# Patient Record
Sex: MALE | Race: WHITE | Employment: FULL TIME | URBAN - METROPOLITAN AREA
[De-identification: names, ages, dates, MRNs, and addresses within clinical notes are randomized per-mention and may not be internally consistent; named-entity substitution may affect disease eponyms.]

---

## 2019-10-25 ENCOUNTER — ANESTHESIA EVENT (OUTPATIENT)
Dept: OPERATING ROOM | Age: 54
End: 2019-10-25
Payer: COMMERCIAL

## 2019-10-28 ENCOUNTER — ANESTHESIA (OUTPATIENT)
Dept: OPERATING ROOM | Age: 54
End: 2019-10-28
Payer: COMMERCIAL

## 2019-10-29 ENCOUNTER — HOSPITAL ENCOUNTER (OUTPATIENT)
Dept: PREADMISSION TESTING | Age: 54
Discharge: HOME | End: 2019-11-02
Payer: COMMERCIAL

## 2019-10-29 VITALS
DIASTOLIC BLOOD PRESSURE: 93 MMHG | BODY MASS INDEX: 40.43 KG/M2 | HEART RATE: 85 BPM | SYSTOLIC BLOOD PRESSURE: 165 MMHG | OXYGEN SATURATION: 93 % | HEIGHT: 74 IN | WEIGHT: 315 LBS | RESPIRATION RATE: 20 BRPM

## 2019-10-29 LAB
ABSOLUTE EOS #: 0.4 K/UL (ref 0–0.44)
ABSOLUTE IMMATURE GRANULOCYTE: 0.24 K/UL (ref 0–0.3)
ABSOLUTE LYMPH #: 1.26 K/UL (ref 1.1–3.7)
ABSOLUTE MONO #: 1.08 K/UL (ref 0.1–1.2)
ANION GAP SERPL CALCULATED.3IONS-SCNC: 11 MMOL/L (ref 9–17)
BASOPHILS # BLD: 1 % (ref 0–2)
BASOPHILS ABSOLUTE: 0.07 K/UL (ref 0–0.2)
BUN BLDV-MCNC: 21 MG/DL (ref 6–20)
BUN/CREAT BLD: 21 (ref 9–20)
CALCIUM SERPL-MCNC: 9.3 MG/DL (ref 8.6–10.4)
CHLORIDE BLD-SCNC: 100 MMOL/L (ref 98–107)
CO2: 28 MMOL/L (ref 20–31)
CREAT SERPL-MCNC: 0.99 MG/DL (ref 0.7–1.2)
DIFFERENTIAL TYPE: ABNORMAL
EOSINOPHILS RELATIVE PERCENT: 3 % (ref 1–4)
GFR AFRICAN AMERICAN: >60 ML/MIN
GFR NON-AFRICAN AMERICAN: >60 ML/MIN
GFR SERPL CREATININE-BSD FRML MDRD: ABNORMAL ML/MIN/{1.73_M2}
GFR SERPL CREATININE-BSD FRML MDRD: ABNORMAL ML/MIN/{1.73_M2}
GLUCOSE BLD-MCNC: 111 MG/DL (ref 70–99)
HCT VFR BLD CALC: 52.8 % (ref 40.7–50.3)
HEMOGLOBIN: 16.5 G/DL (ref 13–17)
IMMATURE GRANULOCYTES: 2 %
LYMPHOCYTES # BLD: 10 % (ref 24–43)
MCH RBC QN AUTO: 26.7 PG (ref 25.2–33.5)
MCHC RBC AUTO-ENTMCNC: 31.3 G/DL (ref 28.4–34.8)
MCV RBC AUTO: 85.3 FL (ref 82.6–102.9)
MONOCYTES # BLD: 9 % (ref 3–12)
NRBC AUTOMATED: 0 PER 100 WBC
PDW BLD-RTO: 17.9 % (ref 11.8–14.4)
PLATELET # BLD: 231 K/UL (ref 138–453)
PLATELET ESTIMATE: ABNORMAL
PMV BLD AUTO: 9.9 FL (ref 8.1–13.5)
POTASSIUM SERPL-SCNC: 4.4 MMOL/L (ref 3.7–5.3)
RBC # BLD: 6.19 M/UL (ref 4.21–5.77)
RBC # BLD: ABNORMAL 10*6/UL
SEG NEUTROPHILS: 75 % (ref 36–65)
SEGMENTED NEUTROPHILS ABSOLUTE COUNT: 9.55 K/UL (ref 1.5–8.1)
SODIUM BLD-SCNC: 139 MMOL/L (ref 135–144)
WBC # BLD: 12.6 K/UL (ref 3.5–11.3)
WBC # BLD: ABNORMAL 10*3/UL

## 2019-10-29 PROCEDURE — 36415 COLL VENOUS BLD VENIPUNCTURE: CPT

## 2019-10-29 PROCEDURE — 80048 BASIC METABOLIC PNL TOTAL CA: CPT

## 2019-10-29 PROCEDURE — 93005 ELECTROCARDIOGRAM TRACING: CPT | Performed by: ANESTHESIOLOGY

## 2019-10-29 PROCEDURE — 85025 COMPLETE CBC W/AUTO DIFF WBC: CPT

## 2019-10-29 RX ORDER — FEXOFENADINE HCL 180 MG/1
180 TABLET ORAL DAILY
Status: ON HOLD | COMMUNITY
End: 2022-01-07 | Stop reason: HOSPADM

## 2019-10-29 RX ORDER — OMEPRAZOLE 40 MG/1
40 CAPSULE, DELAYED RELEASE ORAL DAILY
Status: ON HOLD | COMMUNITY
End: 2022-01-07 | Stop reason: HOSPADM

## 2019-10-29 RX ORDER — SUMATRIPTAN 100 MG/1
100 TABLET, FILM COATED ORAL
Status: ON HOLD | COMMUNITY
End: 2022-01-07 | Stop reason: SDUPTHER

## 2019-10-29 RX ORDER — FUROSEMIDE 40 MG/1
40 TABLET ORAL DAILY
Status: ON HOLD | COMMUNITY
End: 2022-01-07 | Stop reason: HOSPADM

## 2019-10-29 RX ORDER — MONTELUKAST SODIUM 10 MG/1
10 TABLET ORAL DAILY
Status: ON HOLD | COMMUNITY
End: 2022-01-07 | Stop reason: SDUPTHER

## 2019-10-29 RX ORDER — ALBUTEROL SULFATE 2.5 MG/3ML
2.5 SOLUTION RESPIRATORY (INHALATION) EVERY 6 HOURS PRN
Status: ON HOLD | COMMUNITY
End: 2022-01-07 | Stop reason: SDUPTHER

## 2019-10-30 LAB
EKG ATRIAL RATE: 84 BPM
EKG P AXIS: 50 DEGREES
EKG P-R INTERVAL: 168 MS
EKG Q-T INTERVAL: 344 MS
EKG QRS DURATION: 92 MS
EKG QTC CALCULATION (BAZETT): 406 MS
EKG R AXIS: 20 DEGREES
EKG T AXIS: 48 DEGREES
EKG VENTRICULAR RATE: 84 BPM

## 2019-10-30 PROCEDURE — 93010 ELECTROCARDIOGRAM REPORT: CPT | Performed by: INTERNAL MEDICINE

## 2019-10-31 ENCOUNTER — ANESTHESIA EVENT (OUTPATIENT)
Dept: OPERATING ROOM | Age: 54
End: 2019-10-31
Payer: COMMERCIAL

## 2019-11-01 ENCOUNTER — ANESTHESIA (OUTPATIENT)
Dept: OPERATING ROOM | Age: 54
End: 2019-11-01
Payer: COMMERCIAL

## 2019-11-01 ENCOUNTER — HOSPITAL ENCOUNTER (OUTPATIENT)
Age: 54
Setting detail: OUTPATIENT SURGERY
Discharge: HOME | End: 2019-11-01
Attending: PODIATRIST | Admitting: PODIATRIST
Payer: COMMERCIAL

## 2019-11-01 VITALS
SYSTOLIC BLOOD PRESSURE: 162 MMHG | OXYGEN SATURATION: 93 % | TEMPERATURE: 97.9 F | RESPIRATION RATE: 20 BRPM | HEART RATE: 90 BPM | DIASTOLIC BLOOD PRESSURE: 93 MMHG

## 2019-11-01 VITALS — TEMPERATURE: 97.9 F | OXYGEN SATURATION: 94 % | DIASTOLIC BLOOD PRESSURE: 84 MMHG | SYSTOLIC BLOOD PRESSURE: 147 MMHG

## 2019-11-01 DIAGNOSIS — Z98.890 STATUS POST LEFT FOOT SURGERY: Primary | ICD-10-CM

## 2019-11-01 LAB
GLUCOSE BLD-MCNC: 128 MG/DL (ref 75–110)
GLUCOSE BLD-MCNC: 96 MG/DL (ref 75–110)

## 2019-11-01 PROCEDURE — 7100000001 HC PACU RECOVERY - ADDTL 15 MIN: Performed by: PODIATRIST

## 2019-11-01 PROCEDURE — 82947 ASSAY GLUCOSE BLOOD QUANT: CPT

## 2019-11-01 PROCEDURE — 3600000012 HC SURGERY LEVEL 2 ADDTL 15MIN: Performed by: PODIATRIST

## 2019-11-01 PROCEDURE — 7100000000 HC PACU RECOVERY - FIRST 15 MIN: Performed by: PODIATRIST

## 2019-11-01 PROCEDURE — 2709999900 HC NON-CHARGEABLE SUPPLY: Performed by: PODIATRIST

## 2019-11-01 PROCEDURE — 6360000002 HC RX W HCPCS: Performed by: ANESTHESIOLOGY

## 2019-11-01 PROCEDURE — 2500000003 HC RX 250 WO HCPCS: Performed by: PODIATRIST

## 2019-11-01 PROCEDURE — 7100000010 HC PHASE II RECOVERY - FIRST 15 MIN: Performed by: PODIATRIST

## 2019-11-01 PROCEDURE — 7100000011 HC PHASE II RECOVERY - ADDTL 15 MIN: Performed by: PODIATRIST

## 2019-11-01 PROCEDURE — 3600000002 HC SURGERY LEVEL 2 BASE: Performed by: PODIATRIST

## 2019-11-01 PROCEDURE — 2500000003 HC RX 250 WO HCPCS: Performed by: NURSE ANESTHETIST, CERTIFIED REGISTERED

## 2019-11-01 PROCEDURE — 6360000002 HC RX W HCPCS: Performed by: STUDENT IN AN ORGANIZED HEALTH CARE EDUCATION/TRAINING PROGRAM

## 2019-11-01 PROCEDURE — 2580000003 HC RX 258: Performed by: NURSE ANESTHETIST, CERTIFIED REGISTERED

## 2019-11-01 PROCEDURE — 3700000000 HC ANESTHESIA ATTENDED CARE: Performed by: PODIATRIST

## 2019-11-01 PROCEDURE — 2580000003 HC RX 258: Performed by: ANESTHESIOLOGY

## 2019-11-01 PROCEDURE — 6360000002 HC RX W HCPCS: Performed by: NURSE ANESTHETIST, CERTIFIED REGISTERED

## 2019-11-01 PROCEDURE — C1776 JOINT DEVICE (IMPLANTABLE): HCPCS | Performed by: PODIATRIST

## 2019-11-01 PROCEDURE — 2720000010 HC SURG SUPPLY STERILE: Performed by: PODIATRIST

## 2019-11-01 PROCEDURE — 2580000003 HC RX 258: Performed by: STUDENT IN AN ORGANIZED HEALTH CARE EDUCATION/TRAINING PROGRAM

## 2019-11-01 PROCEDURE — 3700000001 HC ADD 15 MINUTES (ANESTHESIA): Performed by: PODIATRIST

## 2019-11-01 DEVICE — FLEXSPAN FLEXIBLE HINGE TOE W/GROMMETS
Type: IMPLANTABLE DEVICE | Site: FOOT | Status: FUNCTIONAL
Brand: SWANSON

## 2019-11-01 RX ORDER — SODIUM CHLORIDE, SODIUM LACTATE, POTASSIUM CHLORIDE, CALCIUM CHLORIDE 600; 310; 30; 20 MG/100ML; MG/100ML; MG/100ML; MG/100ML
INJECTION, SOLUTION INTRAVENOUS CONTINUOUS
Status: DISCONTINUED | OUTPATIENT
Start: 2019-11-02 | End: 2019-11-01 | Stop reason: HOSPADM

## 2019-11-01 RX ORDER — SODIUM CHLORIDE 0.9 % (FLUSH) 0.9 %
10 SYRINGE (ML) INJECTION PRN
Status: DISCONTINUED | OUTPATIENT
Start: 2019-11-01 | End: 2019-11-01 | Stop reason: HOSPADM

## 2019-11-01 RX ORDER — BUPIVACAINE HYDROCHLORIDE 5 MG/ML
INJECTION, SOLUTION EPIDURAL; INTRACAUDAL PRN
Status: DISCONTINUED | OUTPATIENT
Start: 2019-11-01 | End: 2019-11-01 | Stop reason: ALTCHOICE

## 2019-11-01 RX ORDER — SODIUM CHLORIDE 9 MG/ML
INJECTION, SOLUTION INTRAVENOUS CONTINUOUS
Status: DISCONTINUED | OUTPATIENT
Start: 2019-11-02 | End: 2019-11-01

## 2019-11-01 RX ORDER — LIDOCAINE HYDROCHLORIDE 20 MG/ML
INJECTION, SOLUTION EPIDURAL; INFILTRATION; INTRACAUDAL; PERINEURAL PRN
Status: DISCONTINUED | OUTPATIENT
Start: 2019-11-01 | End: 2019-11-01 | Stop reason: SDUPTHER

## 2019-11-01 RX ORDER — ROCURONIUM BROMIDE 10 MG/ML
INJECTION, SOLUTION INTRAVENOUS PRN
Status: DISCONTINUED | OUTPATIENT
Start: 2019-11-01 | End: 2019-11-01 | Stop reason: SDUPTHER

## 2019-11-01 RX ORDER — DEXAMETHASONE SODIUM PHOSPHATE 10 MG/ML
INJECTION INTRAMUSCULAR; INTRAVENOUS PRN
Status: DISCONTINUED | OUTPATIENT
Start: 2019-11-01 | End: 2019-11-01 | Stop reason: SDUPTHER

## 2019-11-01 RX ORDER — MIDAZOLAM HYDROCHLORIDE 1 MG/ML
INJECTION INTRAMUSCULAR; INTRAVENOUS PRN
Status: DISCONTINUED | OUTPATIENT
Start: 2019-11-01 | End: 2019-11-01 | Stop reason: SDUPTHER

## 2019-11-01 RX ORDER — SODIUM CHLORIDE, SODIUM LACTATE, POTASSIUM CHLORIDE, CALCIUM CHLORIDE 600; 310; 30; 20 MG/100ML; MG/100ML; MG/100ML; MG/100ML
INJECTION, SOLUTION INTRAVENOUS CONTINUOUS PRN
Status: DISCONTINUED | OUTPATIENT
Start: 2019-11-01 | End: 2019-11-01 | Stop reason: SDUPTHER

## 2019-11-01 RX ORDER — LIDOCAINE HYDROCHLORIDE 10 MG/ML
1 INJECTION, SOLUTION EPIDURAL; INFILTRATION; INTRACAUDAL; PERINEURAL
Status: DISCONTINUED | OUTPATIENT
Start: 2019-11-01 | End: 2019-11-01 | Stop reason: HOSPADM

## 2019-11-01 RX ORDER — ONDANSETRON 2 MG/ML
INJECTION INTRAMUSCULAR; INTRAVENOUS PRN
Status: DISCONTINUED | OUTPATIENT
Start: 2019-11-01 | End: 2019-11-01 | Stop reason: SDUPTHER

## 2019-11-01 RX ORDER — FENTANYL CITRATE 50 UG/ML
25 INJECTION, SOLUTION INTRAMUSCULAR; INTRAVENOUS EVERY 5 MIN PRN
Status: DISCONTINUED | OUTPATIENT
Start: 2019-11-01 | End: 2019-11-01 | Stop reason: HOSPADM

## 2019-11-01 RX ORDER — FENTANYL CITRATE 50 UG/ML
INJECTION, SOLUTION INTRAMUSCULAR; INTRAVENOUS PRN
Status: DISCONTINUED | OUTPATIENT
Start: 2019-11-01 | End: 2019-11-01 | Stop reason: SDUPTHER

## 2019-11-01 RX ORDER — HYDROMORPHONE HCL 110MG/55ML
0.25 PATIENT CONTROLLED ANALGESIA SYRINGE INTRAVENOUS EVERY 5 MIN PRN
Status: DISCONTINUED | OUTPATIENT
Start: 2019-11-01 | End: 2019-11-01 | Stop reason: HOSPADM

## 2019-11-01 RX ORDER — ONDANSETRON 2 MG/ML
4 INJECTION INTRAMUSCULAR; INTRAVENOUS
Status: DISCONTINUED | OUTPATIENT
Start: 2019-11-01 | End: 2019-11-01 | Stop reason: HOSPADM

## 2019-11-01 RX ORDER — SODIUM CHLORIDE 0.9 % (FLUSH) 0.9 %
10 SYRINGE (ML) INJECTION EVERY 12 HOURS SCHEDULED
Status: DISCONTINUED | OUTPATIENT
Start: 2019-11-01 | End: 2019-11-01 | Stop reason: HOSPADM

## 2019-11-01 RX ORDER — GLYCOPYRROLATE 1 MG/5 ML
SYRINGE (ML) INTRAVENOUS PRN
Status: DISCONTINUED | OUTPATIENT
Start: 2019-11-01 | End: 2019-11-01 | Stop reason: SDUPTHER

## 2019-11-01 RX ORDER — PROPOFOL 10 MG/ML
INJECTION, EMULSION INTRAVENOUS PRN
Status: DISCONTINUED | OUTPATIENT
Start: 2019-11-01 | End: 2019-11-01 | Stop reason: SDUPTHER

## 2019-11-01 RX ORDER — HYDROCODONE BITARTRATE AND ACETAMINOPHEN 5; 325 MG/1; MG/1
1 TABLET ORAL EVERY 6 HOURS PRN
Qty: 28 TABLET | Refills: 0 | Status: SHIPPED | OUTPATIENT
Start: 2019-11-01 | End: 2019-11-08

## 2019-11-01 RX ADMIN — SODIUM CHLORIDE, POTASSIUM CHLORIDE, SODIUM LACTATE AND CALCIUM CHLORIDE: 600; 310; 30; 20 INJECTION, SOLUTION INTRAVENOUS at 06:40

## 2019-11-01 RX ADMIN — SUGAMMADEX 400 MG: 100 INJECTION, SOLUTION INTRAVENOUS at 08:37

## 2019-11-01 RX ADMIN — FENTANYL CITRATE 150 MCG: 50 INJECTION, SOLUTION INTRAMUSCULAR; INTRAVENOUS at 07:37

## 2019-11-01 RX ADMIN — LIDOCAINE HYDROCHLORIDE 100 MG: 20 INJECTION, SOLUTION EPIDURAL; INFILTRATION; INTRACAUDAL; PERINEURAL at 07:37

## 2019-11-01 RX ADMIN — Medication 0.2 MG: at 07:43

## 2019-11-01 RX ADMIN — FENTANYL CITRATE 25 MCG: 50 INJECTION, SOLUTION INTRAMUSCULAR; INTRAVENOUS at 09:58

## 2019-11-01 RX ADMIN — MIDAZOLAM 2 MG: 1 INJECTION INTRAMUSCULAR; INTRAVENOUS at 07:32

## 2019-11-01 RX ADMIN — SODIUM CHLORIDE, POTASSIUM CHLORIDE, SODIUM LACTATE AND CALCIUM CHLORIDE: 600; 310; 30; 20 INJECTION, SOLUTION INTRAVENOUS at 07:32

## 2019-11-01 RX ADMIN — FENTANYL CITRATE 25 MCG: 50 INJECTION, SOLUTION INTRAMUSCULAR; INTRAVENOUS at 09:52

## 2019-11-01 RX ADMIN — ONDANSETRON 4 MG: 2 INJECTION, SOLUTION INTRAMUSCULAR; INTRAVENOUS at 07:43

## 2019-11-01 RX ADMIN — PROPOFOL 170 MG: 10 INJECTION, EMULSION INTRAVENOUS at 07:37

## 2019-11-01 RX ADMIN — CEFAZOLIN 3 G: 1 INJECTION, POWDER, FOR SOLUTION INTRAMUSCULAR; INTRAVENOUS at 07:50

## 2019-11-01 RX ADMIN — DEXAMETHASONE SODIUM PHOSPHATE 10 MG: 10 INJECTION INTRAMUSCULAR; INTRAVENOUS at 07:43

## 2019-11-01 RX ADMIN — ROCURONIUM BROMIDE 50 MG: 10 INJECTION, SOLUTION INTRAVENOUS at 07:37

## 2019-11-01 ASSESSMENT — PULMONARY FUNCTION TESTS
PIF_VALUE: 32
PIF_VALUE: 33
PIF_VALUE: 32
PIF_VALUE: 33
PIF_VALUE: 33
PIF_VALUE: 34
PIF_VALUE: 33
PIF_VALUE: 32
PIF_VALUE: 33
PIF_VALUE: 33
PIF_VALUE: 32
PIF_VALUE: 22
PIF_VALUE: 33
PIF_VALUE: 32
PIF_VALUE: 33
PIF_VALUE: 34
PIF_VALUE: 34
PIF_VALUE: 32
PIF_VALUE: 33
PIF_VALUE: 33
PIF_VALUE: 31
PIF_VALUE: 32
PIF_VALUE: 32
PIF_VALUE: 33
PIF_VALUE: 34
PIF_VALUE: 33
PIF_VALUE: 34
PIF_VALUE: 33
PIF_VALUE: 34
PIF_VALUE: 32
PIF_VALUE: 33
PIF_VALUE: 1
PIF_VALUE: 33
PIF_VALUE: 2
PIF_VALUE: 33
PIF_VALUE: 34
PIF_VALUE: 32
PIF_VALUE: 33
PIF_VALUE: 35
PIF_VALUE: 33
PIF_VALUE: 32
PIF_VALUE: 33
PIF_VALUE: 32
PIF_VALUE: 33
PIF_VALUE: 33
PIF_VALUE: 1
PIF_VALUE: 32
PIF_VALUE: 33
PIF_VALUE: 34
PIF_VALUE: 32
PIF_VALUE: 32
PIF_VALUE: 33
PIF_VALUE: 32
PIF_VALUE: 33
PIF_VALUE: 32
PIF_VALUE: 34
PIF_VALUE: 33
PIF_VALUE: 33
PIF_VALUE: 2
PIF_VALUE: 33
PIF_VALUE: 32
PIF_VALUE: 33
PIF_VALUE: 33
PIF_VALUE: 0
PIF_VALUE: 2
PIF_VALUE: 33
PIF_VALUE: 33
PIF_VALUE: 34
PIF_VALUE: 33
PIF_VALUE: 1
PIF_VALUE: 1
PIF_VALUE: 34
PIF_VALUE: 50
PIF_VALUE: 0
PIF_VALUE: 33
PIF_VALUE: 31
PIF_VALUE: 33
PIF_VALUE: 32
PIF_VALUE: 33
PIF_VALUE: 32
PIF_VALUE: 32
PIF_VALUE: 33

## 2019-11-01 ASSESSMENT — PAIN SCALES - GENERAL
PAINLEVEL_OUTOF10: 0
PAINLEVEL_OUTOF10: 5
PAINLEVEL_OUTOF10: 0
PAINLEVEL_OUTOF10: 4

## 2019-11-01 ASSESSMENT — PAIN - FUNCTIONAL ASSESSMENT: PAIN_FUNCTIONAL_ASSESSMENT: 0-10

## 2019-12-12 ENCOUNTER — HOSPITAL ENCOUNTER (OUTPATIENT)
Age: 54
Setting detail: OUTPATIENT SURGERY
Discharge: HOME | End: 2019-12-12
Attending: PODIATRIST | Admitting: PODIATRIST
Payer: COMMERCIAL

## 2019-12-12 VITALS
HEART RATE: 79 BPM | BODY MASS INDEX: 40.43 KG/M2 | HEIGHT: 74 IN | WEIGHT: 315 LBS | DIASTOLIC BLOOD PRESSURE: 97 MMHG | OXYGEN SATURATION: 93 % | SYSTOLIC BLOOD PRESSURE: 137 MMHG | RESPIRATION RATE: 14 BRPM | TEMPERATURE: 97.7 F

## 2019-12-12 VITALS — SYSTOLIC BLOOD PRESSURE: 131 MMHG | OXYGEN SATURATION: 91 % | DIASTOLIC BLOOD PRESSURE: 77 MMHG | TEMPERATURE: 97.2 F

## 2019-12-12 DIAGNOSIS — Z98.890 STATUS POST FOOT SURGERY: ICD-10-CM

## 2019-12-12 DIAGNOSIS — G89.18 ACUTE POSTOPERATIVE PAIN: Primary | ICD-10-CM

## 2019-12-12 LAB — GLUCOSE BLD-MCNC: 101 MG/DL (ref 75–110)

## 2019-12-12 PROCEDURE — 2580000003 HC RX 258: Performed by: NURSE ANESTHETIST, CERTIFIED REGISTERED

## 2019-12-12 PROCEDURE — 3700000001 HC ADD 15 MINUTES (ANESTHESIA): Performed by: PODIATRIST

## 2019-12-12 PROCEDURE — 82947 ASSAY GLUCOSE BLOOD QUANT: CPT

## 2019-12-12 PROCEDURE — 3700000000 HC ANESTHESIA ATTENDED CARE: Performed by: PODIATRIST

## 2019-12-12 PROCEDURE — 2500000003 HC RX 250 WO HCPCS: Performed by: NURSE ANESTHETIST, CERTIFIED REGISTERED

## 2019-12-12 PROCEDURE — 7100000010 HC PHASE II RECOVERY - FIRST 15 MIN: Performed by: PODIATRIST

## 2019-12-12 PROCEDURE — 6360000002 HC RX W HCPCS: Performed by: STUDENT IN AN ORGANIZED HEALTH CARE EDUCATION/TRAINING PROGRAM

## 2019-12-12 PROCEDURE — 7100000011 HC PHASE II RECOVERY - ADDTL 15 MIN: Performed by: PODIATRIST

## 2019-12-12 PROCEDURE — 6360000002 HC RX W HCPCS: Performed by: NURSE ANESTHETIST, CERTIFIED REGISTERED

## 2019-12-12 PROCEDURE — 6360000002 HC RX W HCPCS: Performed by: ANESTHESIOLOGY

## 2019-12-12 PROCEDURE — 3600000012 HC SURGERY LEVEL 2 ADDTL 15MIN: Performed by: PODIATRIST

## 2019-12-12 PROCEDURE — 6370000000 HC RX 637 (ALT 250 FOR IP): Performed by: ANESTHESIOLOGY

## 2019-12-12 PROCEDURE — 2709999900 HC NON-CHARGEABLE SUPPLY: Performed by: PODIATRIST

## 2019-12-12 PROCEDURE — 2500000003 HC RX 250 WO HCPCS: Performed by: PODIATRIST

## 2019-12-12 PROCEDURE — 2580000003 HC RX 258: Performed by: STUDENT IN AN ORGANIZED HEALTH CARE EDUCATION/TRAINING PROGRAM

## 2019-12-12 PROCEDURE — 7100000000 HC PACU RECOVERY - FIRST 15 MIN: Performed by: PODIATRIST

## 2019-12-12 PROCEDURE — 7100000001 HC PACU RECOVERY - ADDTL 15 MIN: Performed by: PODIATRIST

## 2019-12-12 PROCEDURE — 3600000002 HC SURGERY LEVEL 2 BASE: Performed by: PODIATRIST

## 2019-12-12 PROCEDURE — 2580000003 HC RX 258: Performed by: ANESTHESIOLOGY

## 2019-12-12 PROCEDURE — C1776 JOINT DEVICE (IMPLANTABLE): HCPCS | Performed by: PODIATRIST

## 2019-12-12 DEVICE — FLEXSPAN FLEXIBLE HINGE TOE W/GROMMETS
Type: IMPLANTABLE DEVICE | Site: FOOT | Status: FUNCTIONAL
Brand: SWANSON

## 2019-12-12 RX ORDER — ONDANSETRON 2 MG/ML
4 INJECTION INTRAMUSCULAR; INTRAVENOUS
Status: DISCONTINUED | OUTPATIENT
Start: 2019-12-12 | End: 2019-12-12 | Stop reason: HOSPADM

## 2019-12-12 RX ORDER — BUDESONIDE AND FORMOTEROL FUMARATE DIHYDRATE 160; 4.5 UG/1; UG/1
2 AEROSOL RESPIRATORY (INHALATION) 2 TIMES DAILY
Status: ON HOLD | COMMUNITY
End: 2022-01-07 | Stop reason: SDUPTHER

## 2019-12-12 RX ORDER — LIDOCAINE HYDROCHLORIDE 10 MG/ML
1 INJECTION, SOLUTION EPIDURAL; INFILTRATION; INTRACAUDAL; PERINEURAL
Status: DISCONTINUED | OUTPATIENT
Start: 2019-12-12 | End: 2019-12-12 | Stop reason: HOSPADM

## 2019-12-12 RX ORDER — SODIUM CHLORIDE 0.9 % (FLUSH) 0.9 %
10 SYRINGE (ML) INJECTION EVERY 12 HOURS SCHEDULED
Status: DISCONTINUED | OUTPATIENT
Start: 2019-12-12 | End: 2019-12-12 | Stop reason: HOSPADM

## 2019-12-12 RX ORDER — ONDANSETRON 2 MG/ML
INJECTION INTRAMUSCULAR; INTRAVENOUS PRN
Status: DISCONTINUED | OUTPATIENT
Start: 2019-12-12 | End: 2019-12-12 | Stop reason: SDUPTHER

## 2019-12-12 RX ORDER — OXYCODONE HYDROCHLORIDE AND ACETAMINOPHEN 5; 325 MG/1; MG/1
1 TABLET ORAL EVERY 6 HOURS PRN
Qty: 28 TABLET | Refills: 0 | Status: SHIPPED | OUTPATIENT
Start: 2019-12-12 | End: 2019-12-19

## 2019-12-12 RX ORDER — HYDROMORPHONE HCL 110MG/55ML
0.25 PATIENT CONTROLLED ANALGESIA SYRINGE INTRAVENOUS EVERY 5 MIN PRN
Status: DISCONTINUED | OUTPATIENT
Start: 2019-12-12 | End: 2019-12-12 | Stop reason: HOSPADM

## 2019-12-12 RX ORDER — SODIUM CHLORIDE, SODIUM LACTATE, POTASSIUM CHLORIDE, CALCIUM CHLORIDE 600; 310; 30; 20 MG/100ML; MG/100ML; MG/100ML; MG/100ML
INJECTION, SOLUTION INTRAVENOUS CONTINUOUS
Status: DISCONTINUED | OUTPATIENT
Start: 2019-12-12 | End: 2019-12-12 | Stop reason: HOSPADM

## 2019-12-12 RX ORDER — ROCURONIUM BROMIDE 10 MG/ML
INJECTION, SOLUTION INTRAVENOUS PRN
Status: DISCONTINUED | OUTPATIENT
Start: 2019-12-12 | End: 2019-12-12 | Stop reason: SDUPTHER

## 2019-12-12 RX ORDER — FENTANYL CITRATE 50 UG/ML
25 INJECTION, SOLUTION INTRAMUSCULAR; INTRAVENOUS EVERY 5 MIN PRN
Status: DISCONTINUED | OUTPATIENT
Start: 2019-12-12 | End: 2019-12-12 | Stop reason: HOSPADM

## 2019-12-12 RX ORDER — PROPOFOL 10 MG/ML
INJECTION, EMULSION INTRAVENOUS PRN
Status: DISCONTINUED | OUTPATIENT
Start: 2019-12-12 | End: 2019-12-12 | Stop reason: SDUPTHER

## 2019-12-12 RX ORDER — OXYCODONE HYDROCHLORIDE AND ACETAMINOPHEN 5; 325 MG/1; MG/1
1 TABLET ORAL ONCE
Status: COMPLETED | OUTPATIENT
Start: 2019-12-12 | End: 2019-12-12

## 2019-12-12 RX ORDER — LIDOCAINE HYDROCHLORIDE 20 MG/ML
INJECTION, SOLUTION EPIDURAL; INFILTRATION; INTRACAUDAL; PERINEURAL PRN
Status: DISCONTINUED | OUTPATIENT
Start: 2019-12-12 | End: 2019-12-12 | Stop reason: SDUPTHER

## 2019-12-12 RX ORDER — DEXAMETHASONE SODIUM PHOSPHATE 10 MG/ML
INJECTION INTRAMUSCULAR; INTRAVENOUS PRN
Status: DISCONTINUED | OUTPATIENT
Start: 2019-12-12 | End: 2019-12-12 | Stop reason: SDUPTHER

## 2019-12-12 RX ORDER — BUPIVACAINE HYDROCHLORIDE 5 MG/ML
INJECTION, SOLUTION EPIDURAL; INTRACAUDAL PRN
Status: DISCONTINUED | OUTPATIENT
Start: 2019-12-12 | End: 2019-12-12 | Stop reason: ALTCHOICE

## 2019-12-12 RX ORDER — SODIUM CHLORIDE, SODIUM LACTATE, POTASSIUM CHLORIDE, CALCIUM CHLORIDE 600; 310; 30; 20 MG/100ML; MG/100ML; MG/100ML; MG/100ML
INJECTION, SOLUTION INTRAVENOUS CONTINUOUS PRN
Status: DISCONTINUED | OUTPATIENT
Start: 2019-12-12 | End: 2019-12-12 | Stop reason: SDUPTHER

## 2019-12-12 RX ORDER — SODIUM CHLORIDE 9 MG/ML
INJECTION, SOLUTION INTRAVENOUS CONTINUOUS
Status: DISCONTINUED | OUTPATIENT
Start: 2019-12-12 | End: 2019-12-12

## 2019-12-12 RX ORDER — MIDAZOLAM HYDROCHLORIDE 1 MG/ML
INJECTION INTRAMUSCULAR; INTRAVENOUS PRN
Status: DISCONTINUED | OUTPATIENT
Start: 2019-12-12 | End: 2019-12-12 | Stop reason: SDUPTHER

## 2019-12-12 RX ORDER — SODIUM CHLORIDE 0.9 % (FLUSH) 0.9 %
10 SYRINGE (ML) INJECTION PRN
Status: DISCONTINUED | OUTPATIENT
Start: 2019-12-12 | End: 2019-12-12 | Stop reason: HOSPADM

## 2019-12-12 RX ORDER — FENTANYL CITRATE 50 UG/ML
INJECTION, SOLUTION INTRAMUSCULAR; INTRAVENOUS PRN
Status: DISCONTINUED | OUTPATIENT
Start: 2019-12-12 | End: 2019-12-12 | Stop reason: SDUPTHER

## 2019-12-12 RX ADMIN — SODIUM CHLORIDE, POTASSIUM CHLORIDE, SODIUM LACTATE AND CALCIUM CHLORIDE: 600; 310; 30; 20 INJECTION, SOLUTION INTRAVENOUS at 13:11

## 2019-12-12 RX ADMIN — DEXTROSE MONOHYDRATE 3 G: 50 INJECTION, SOLUTION INTRAVENOUS at 13:26

## 2019-12-12 RX ADMIN — ONDANSETRON 4 MG: 2 INJECTION, SOLUTION INTRAMUSCULAR; INTRAVENOUS at 14:36

## 2019-12-12 RX ADMIN — MIDAZOLAM 1 MG: 1 INJECTION INTRAMUSCULAR; INTRAVENOUS at 13:18

## 2019-12-12 RX ADMIN — OXYCODONE AND ACETAMINOPHEN 1 TABLET: 5; 325 TABLET ORAL at 15:44

## 2019-12-12 RX ADMIN — DEXAMETHASONE SODIUM PHOSPHATE 10 MG: 10 INJECTION INTRAMUSCULAR; INTRAVENOUS at 14:36

## 2019-12-12 RX ADMIN — SODIUM CHLORIDE, POTASSIUM CHLORIDE, SODIUM LACTATE AND CALCIUM CHLORIDE: 600; 310; 30; 20 INJECTION, SOLUTION INTRAVENOUS at 12:11

## 2019-12-12 RX ADMIN — FENTANYL CITRATE 25 MCG: 50 INJECTION, SOLUTION INTRAMUSCULAR; INTRAVENOUS at 15:59

## 2019-12-12 RX ADMIN — LIDOCAINE HYDROCHLORIDE 60 MG: 20 INJECTION, SOLUTION EPIDURAL; INFILTRATION; INTRACAUDAL; PERINEURAL at 13:18

## 2019-12-12 RX ADMIN — PROPOFOL 200 MG: 10 INJECTION, EMULSION INTRAVENOUS at 13:18

## 2019-12-12 RX ADMIN — Medication 100 MCG: at 13:18

## 2019-12-12 RX ADMIN — MIDAZOLAM 1 MG: 1 INJECTION INTRAMUSCULAR; INTRAVENOUS at 13:11

## 2019-12-12 RX ADMIN — ROCURONIUM BROMIDE 50 MG: 10 INJECTION, SOLUTION INTRAVENOUS at 13:18

## 2019-12-12 ASSESSMENT — PULMONARY FUNCTION TESTS
PIF_VALUE: 15
PIF_VALUE: 35
PIF_VALUE: 0
PIF_VALUE: 33
PIF_VALUE: 12
PIF_VALUE: 34
PIF_VALUE: 32
PIF_VALUE: 2
PIF_VALUE: 34
PIF_VALUE: 2
PIF_VALUE: 33
PIF_VALUE: 34
PIF_VALUE: 0
PIF_VALUE: 35
PIF_VALUE: 34
PIF_VALUE: 34
PIF_VALUE: 0
PIF_VALUE: 33
PIF_VALUE: 34
PIF_VALUE: 33
PIF_VALUE: 33
PIF_VALUE: 2
PIF_VALUE: 34
PIF_VALUE: 34
PIF_VALUE: 33
PIF_VALUE: 34
PIF_VALUE: 33
PIF_VALUE: 11
PIF_VALUE: 33
PIF_VALUE: 33
PIF_VALUE: 1
PIF_VALUE: 34
PIF_VALUE: 2
PIF_VALUE: 34
PIF_VALUE: 33
PIF_VALUE: 33
PIF_VALUE: 34
PIF_VALUE: 34
PIF_VALUE: 33
PIF_VALUE: 35
PIF_VALUE: 33
PIF_VALUE: 35
PIF_VALUE: 30
PIF_VALUE: 34
PIF_VALUE: 35
PIF_VALUE: 31
PIF_VALUE: 9
PIF_VALUE: 28
PIF_VALUE: 34
PIF_VALUE: 1
PIF_VALUE: 11
PIF_VALUE: 35
PIF_VALUE: 33
PIF_VALUE: 34
PIF_VALUE: 2
PIF_VALUE: 34
PIF_VALUE: 33
PIF_VALUE: 35
PIF_VALUE: 35
PIF_VALUE: 16
PIF_VALUE: 34
PIF_VALUE: 36
PIF_VALUE: 34
PIF_VALUE: 0
PIF_VALUE: 34
PIF_VALUE: 33
PIF_VALUE: 34
PIF_VALUE: 0
PIF_VALUE: 34
PIF_VALUE: 0
PIF_VALUE: 34
PIF_VALUE: 34
PIF_VALUE: 33
PIF_VALUE: 34
PIF_VALUE: 33
PIF_VALUE: 34
PIF_VALUE: 33
PIF_VALUE: 35
PIF_VALUE: 11
PIF_VALUE: 33
PIF_VALUE: 25
PIF_VALUE: 34
PIF_VALUE: 1
PIF_VALUE: 34
PIF_VALUE: 35
PIF_VALUE: 5
PIF_VALUE: 35
PIF_VALUE: 35
PIF_VALUE: 33
PIF_VALUE: 34
PIF_VALUE: 35
PIF_VALUE: 33
PIF_VALUE: 34
PIF_VALUE: 33
PIF_VALUE: 16
PIF_VALUE: 40
PIF_VALUE: 34
PIF_VALUE: 33
PIF_VALUE: 10
PIF_VALUE: 1

## 2019-12-12 ASSESSMENT — PAIN - FUNCTIONAL ASSESSMENT: PAIN_FUNCTIONAL_ASSESSMENT: 0-10

## 2019-12-12 ASSESSMENT — PAIN SCALES - GENERAL
PAINLEVEL_OUTOF10: 5
PAINLEVEL_OUTOF10: 5
PAINLEVEL_OUTOF10: 0
PAINLEVEL_OUTOF10: 0
PAINLEVEL_OUTOF10: 5
PAINLEVEL_OUTOF10: 0

## 2019-12-12 ASSESSMENT — PAIN DESCRIPTION - ORIENTATION: ORIENTATION: RIGHT

## 2019-12-12 ASSESSMENT — PAIN DESCRIPTION - LOCATION: LOCATION: FOOT

## 2019-12-12 ASSESSMENT — PAIN DESCRIPTION - DESCRIPTORS: DESCRIPTORS: PRESSURE

## 2019-12-12 ASSESSMENT — PAIN DESCRIPTION - PAIN TYPE: TYPE: SURGICAL PAIN

## 2019-12-14 ENCOUNTER — TELEPHONE (OUTPATIENT)
Dept: PODIATRY | Age: 54
End: 2019-12-14

## 2021-12-11 ENCOUNTER — HOSPITAL ENCOUNTER (INPATIENT)
Age: 56
LOS: 1 days | Discharge: HOME | DRG: 208 | End: 2021-12-12
Attending: EMERGENCY MEDICINE | Admitting: FAMILY MEDICINE
Payer: COMMERCIAL

## 2021-12-11 ENCOUNTER — APPOINTMENT (OUTPATIENT)
Dept: GENERAL RADIOLOGY | Age: 56
DRG: 208 | End: 2021-12-11
Payer: COMMERCIAL

## 2021-12-11 ENCOUNTER — APPOINTMENT (OUTPATIENT)
Dept: CT IMAGING | Age: 56
DRG: 208 | End: 2021-12-11
Payer: COMMERCIAL

## 2021-12-11 DIAGNOSIS — U07.1 PNEUMONIA DUE TO COVID-19 VIRUS: ICD-10-CM

## 2021-12-11 DIAGNOSIS — J18.9 PNEUMONIA OF BOTH LUNGS DUE TO INFECTIOUS ORGANISM, UNSPECIFIED PART OF LUNG: Primary | ICD-10-CM

## 2021-12-11 DIAGNOSIS — J12.82 PNEUMONIA DUE TO COVID-19 VIRUS: ICD-10-CM

## 2021-12-11 PROBLEM — J96.01 ACUTE RESPIRATORY FAILURE WITH HYPOXIA (HCC): Status: ACTIVE | Noted: 2021-12-11

## 2021-12-11 LAB
-: ABNORMAL
ABSOLUTE EOS #: 0 K/UL (ref 0–0.4)
ABSOLUTE IMMATURE GRANULOCYTE: 0 K/UL (ref 0–0.3)
ABSOLUTE LYMPH #: 0.29 K/UL (ref 1–4.8)
ABSOLUTE MONO #: 0.08 K/UL (ref 0.2–0.8)
ALBUMIN SERPL-MCNC: 3.4 G/DL (ref 3.5–5.2)
ALBUMIN/GLOBULIN RATIO: ABNORMAL (ref 1–2.5)
ALP BLD-CCNC: 99 U/L (ref 40–129)
ALT SERPL-CCNC: 48 U/L (ref 5–41)
AMORPHOUS: ABNORMAL
ANION GAP SERPL CALCULATED.3IONS-SCNC: 16 MMOL/L (ref 9–17)
AST SERPL-CCNC: 90 U/L
BACTERIA: ABNORMAL
BASOPHILS # BLD: 0 %
BASOPHILS ABSOLUTE: 0 K/UL (ref 0–0.2)
BILIRUB SERPL-MCNC: 0.32 MG/DL (ref 0.3–1.2)
BILIRUBIN URINE: NEGATIVE
BUN BLDV-MCNC: 22 MG/DL (ref 6–20)
BUN/CREAT BLD: 19 (ref 9–20)
C-REACTIVE PROTEIN: 293.3 MG/L (ref 0–5)
CALCIUM SERPL-MCNC: 8.6 MG/DL (ref 8.6–10.4)
CASTS UA: ABNORMAL /LPF
CASTS UA: ABNORMAL /LPF
CHLORIDE BLD-SCNC: 101 MMOL/L (ref 98–107)
CO2: 18 MMOL/L (ref 20–31)
COLOR: YELLOW
COMMENT UA: ABNORMAL
CREAT SERPL-MCNC: 1.13 MG/DL (ref 0.7–1.2)
CRYSTALS, UA: ABNORMAL /HPF
D-DIMER QUANTITATIVE: 0.99 MG/L FEU (ref 0–0.59)
DIFFERENTIAL TYPE: ABNORMAL
EOSINOPHILS RELATIVE PERCENT: 0 % (ref 1–4)
EPITHELIAL CELLS UA: ABNORMAL /HPF (ref 0–5)
FERRITIN: 2586 UG/L (ref 30–400)
GFR AFRICAN AMERICAN: >60 ML/MIN
GFR NON-AFRICAN AMERICAN: >60 ML/MIN
GFR SERPL CREATININE-BSD FRML MDRD: ABNORMAL ML/MIN/{1.73_M2}
GFR SERPL CREATININE-BSD FRML MDRD: ABNORMAL ML/MIN/{1.73_M2}
GLUCOSE BLD-MCNC: 139 MG/DL (ref 70–99)
GLUCOSE URINE: NEGATIVE
HCT VFR BLD CALC: 47.2 % (ref 40.7–50.3)
HEMOGLOBIN: 15.7 G/DL (ref 13–17)
IMMATURE GRANULOCYTES: 0 %
KETONES, URINE: NEGATIVE
LACTATE DEHYDROGENASE: 838 U/L (ref 135–225)
LACTIC ACID, SEPSIS WHOLE BLOOD: NORMAL MMOL/L (ref 0.5–1.9)
LACTIC ACID, SEPSIS WHOLE BLOOD: NORMAL MMOL/L (ref 0.5–1.9)
LACTIC ACID, SEPSIS: 1.4 MMOL/L (ref 0.5–1.9)
LACTIC ACID, SEPSIS: 1.8 MMOL/L (ref 0.5–1.9)
LEGIONELLA PNEUMOPHILIA AG, URINE: NEGATIVE
LEUKOCYTE ESTERASE, URINE: NEGATIVE
LYMPHOCYTES # BLD: 7 % (ref 24–44)
MCH RBC QN AUTO: 30.7 PG (ref 25.2–33.5)
MCHC RBC AUTO-ENTMCNC: 33.3 G/DL (ref 28.4–34.8)
MCV RBC AUTO: 92.2 FL (ref 82.6–102.9)
MONOCYTES # BLD: 2 % (ref 1–7)
MORPHOLOGY: ABNORMAL
MORPHOLOGY: ABNORMAL
MUCUS: ABNORMAL
NITRITE, URINE: NEGATIVE
NRBC AUTOMATED: 0 PER 100 WBC
OTHER OBSERVATIONS UA: ABNORMAL
PDW BLD-RTO: 14.1 % (ref 11.8–14.4)
PH UA: 6 (ref 5–8)
PLATELET # BLD: 133 K/UL (ref 138–453)
PLATELET ESTIMATE: ABNORMAL
PMV BLD AUTO: 11.1 FL (ref 8.1–13.5)
POTASSIUM SERPL-SCNC: 3.9 MMOL/L (ref 3.7–5.3)
PROCALCITONIN: 0.37 NG/ML
PROTEIN UA: ABNORMAL
RBC # BLD: 5.12 M/UL (ref 4.21–5.77)
RBC # BLD: ABNORMAL 10*6/UL
RBC UA: ABNORMAL /HPF (ref 0–2)
RENAL EPITHELIAL, UA: ABNORMAL /HPF
SARS-COV-2, RAPID: DETECTED
SEG NEUTROPHILS: 91 % (ref 36–66)
SEGMENTED NEUTROPHILS ABSOLUTE COUNT: 3.73 K/UL (ref 1.8–7.7)
SODIUM BLD-SCNC: 135 MMOL/L (ref 135–144)
SOURCE: NORMAL
SPECIFIC GRAVITY UA: 1.01 (ref 1–1.03)
SPECIMEN DESCRIPTION: ABNORMAL
STREP PNEUMONIAE ANTIGEN: NEGATIVE
TOTAL PROTEIN: 6.7 G/DL (ref 6.4–8.3)
TRICHOMONAS: ABNORMAL
TURBIDITY: CLEAR
URINE HGB: ABNORMAL
UROBILINOGEN, URINE: NORMAL
WBC # BLD: 4.1 K/UL (ref 3.5–11.3)
WBC # BLD: ABNORMAL 10*3/UL
WBC UA: ABNORMAL /HPF (ref 0–5)
YEAST: ABNORMAL

## 2021-12-11 PROCEDURE — 6360000002 HC RX W HCPCS: Performed by: EMERGENCY MEDICINE

## 2021-12-11 PROCEDURE — 6360000004 HC RX CONTRAST MEDICATION: Performed by: EMERGENCY MEDICINE

## 2021-12-11 PROCEDURE — 71045 X-RAY EXAM CHEST 1 VIEW: CPT

## 2021-12-11 PROCEDURE — 85025 COMPLETE CBC W/AUTO DIFF WBC: CPT

## 2021-12-11 PROCEDURE — 83605 ASSAY OF LACTIC ACID: CPT

## 2021-12-11 PROCEDURE — 2700000000 HC OXYGEN THERAPY PER DAY

## 2021-12-11 PROCEDURE — 84145 PROCALCITONIN (PCT): CPT

## 2021-12-11 PROCEDURE — 1200000000 HC SEMI PRIVATE

## 2021-12-11 PROCEDURE — 82803 BLOOD GASES ANY COMBINATION: CPT

## 2021-12-11 PROCEDURE — 2580000003 HC RX 258: Performed by: EMERGENCY MEDICINE

## 2021-12-11 PROCEDURE — 87899 AGENT NOS ASSAY W/OPTIC: CPT

## 2021-12-11 PROCEDURE — 80053 COMPREHEN METABOLIC PANEL: CPT

## 2021-12-11 PROCEDURE — 94660 CPAP INITIATION&MGMT: CPT

## 2021-12-11 PROCEDURE — 94640 AIRWAY INHALATION TREATMENT: CPT

## 2021-12-11 PROCEDURE — 36415 COLL VENOUS BLD VENIPUNCTURE: CPT

## 2021-12-11 PROCEDURE — 82728 ASSAY OF FERRITIN: CPT

## 2021-12-11 PROCEDURE — 2500000003 HC RX 250 WO HCPCS: Performed by: EMERGENCY MEDICINE

## 2021-12-11 PROCEDURE — 87449 NOS EACH ORGANISM AG IA: CPT

## 2021-12-11 PROCEDURE — 87635 SARS-COV-2 COVID-19 AMP PRB: CPT

## 2021-12-11 PROCEDURE — 87040 BLOOD CULTURE FOR BACTERIA: CPT

## 2021-12-11 PROCEDURE — 99284 EMERGENCY DEPT VISIT MOD MDM: CPT

## 2021-12-11 PROCEDURE — 96360 HYDRATION IV INFUSION INIT: CPT

## 2021-12-11 PROCEDURE — 6370000000 HC RX 637 (ALT 250 FOR IP): Performed by: FAMILY MEDICINE

## 2021-12-11 PROCEDURE — 93005 ELECTROCARDIOGRAM TRACING: CPT | Performed by: EMERGENCY MEDICINE

## 2021-12-11 PROCEDURE — 81001 URINALYSIS AUTO W/SCOPE: CPT

## 2021-12-11 PROCEDURE — 83615 LACTATE (LD) (LDH) ENZYME: CPT

## 2021-12-11 PROCEDURE — 86140 C-REACTIVE PROTEIN: CPT

## 2021-12-11 PROCEDURE — 6360000002 HC RX W HCPCS: Performed by: FAMILY MEDICINE

## 2021-12-11 PROCEDURE — 71260 CT THORAX DX C+: CPT

## 2021-12-11 PROCEDURE — 85379 FIBRIN DEGRADATION QUANT: CPT

## 2021-12-11 PROCEDURE — 94761 N-INVAS EAR/PLS OXIMETRY MLT: CPT

## 2021-12-11 RX ORDER — ACETAMINOPHEN 650 MG/1
650 SUPPOSITORY RECTAL EVERY 6 HOURS PRN
Status: DISCONTINUED | OUTPATIENT
Start: 2021-12-11 | End: 2021-12-12 | Stop reason: HOSPADM

## 2021-12-11 RX ORDER — SODIUM CHLORIDE 9 MG/ML
INJECTION, SOLUTION INTRAVENOUS CONTINUOUS
Status: DISCONTINUED | OUTPATIENT
Start: 2021-12-11 | End: 2021-12-12 | Stop reason: HOSPADM

## 2021-12-11 RX ORDER — TIZANIDINE 2 MG/1
2 TABLET ORAL 3 TIMES DAILY PRN
Status: DISCONTINUED | OUTPATIENT
Start: 2021-12-11 | End: 2021-12-12 | Stop reason: HOSPADM

## 2021-12-11 RX ORDER — ONDANSETRON 2 MG/ML
4 INJECTION INTRAMUSCULAR; INTRAVENOUS EVERY 6 HOURS PRN
Status: DISCONTINUED | OUTPATIENT
Start: 2021-12-11 | End: 2021-12-12 | Stop reason: HOSPADM

## 2021-12-11 RX ORDER — SODIUM CHLORIDE 9 MG/ML
25 INJECTION, SOLUTION INTRAVENOUS PRN
Status: CANCELLED | OUTPATIENT
Start: 2021-12-11

## 2021-12-11 RX ORDER — BUDESONIDE AND FORMOTEROL FUMARATE DIHYDRATE 160; 4.5 UG/1; UG/1
2 AEROSOL RESPIRATORY (INHALATION) 2 TIMES DAILY
Status: DISCONTINUED | OUTPATIENT
Start: 2021-12-11 | End: 2021-12-12 | Stop reason: HOSPADM

## 2021-12-11 RX ORDER — SODIUM CHLORIDE 0.9 % (FLUSH) 0.9 %
10 SYRINGE (ML) INJECTION PRN
Status: DISCONTINUED | OUTPATIENT
Start: 2021-12-11 | End: 2021-12-11 | Stop reason: SDUPTHER

## 2021-12-11 RX ORDER — MORPHINE SULFATE 2 MG/ML
1 INJECTION, SOLUTION INTRAMUSCULAR; INTRAVENOUS EVERY 8 HOURS
Status: DISCONTINUED | OUTPATIENT
Start: 2021-12-11 | End: 2021-12-12

## 2021-12-11 RX ORDER — MONTELUKAST SODIUM 10 MG/1
10 TABLET ORAL DAILY
Status: DISCONTINUED | OUTPATIENT
Start: 2021-12-11 | End: 2021-12-12 | Stop reason: HOSPADM

## 2021-12-11 RX ORDER — 0.9 % SODIUM CHLORIDE 0.9 %
20 INTRAVENOUS SOLUTION INTRAVENOUS ONCE
Status: DISCONTINUED | OUTPATIENT
Start: 2021-12-11 | End: 2021-12-12 | Stop reason: HOSPADM

## 2021-12-11 RX ORDER — LORAZEPAM 2 MG/ML
1 INJECTION INTRAMUSCULAR ONCE
Status: COMPLETED | OUTPATIENT
Start: 2021-12-11 | End: 2021-12-11

## 2021-12-11 RX ORDER — PANTOPRAZOLE SODIUM 40 MG/1
40 TABLET, DELAYED RELEASE ORAL
Status: DISCONTINUED | OUTPATIENT
Start: 2021-12-12 | End: 2021-12-12 | Stop reason: HOSPADM

## 2021-12-11 RX ORDER — PROPOFOL 10 MG/ML
INJECTION, EMULSION INTRAVENOUS
Status: COMPLETED
Start: 2021-12-11 | End: 2021-12-12

## 2021-12-11 RX ORDER — ALBUTEROL SULFATE 2.5 MG/3ML
2.5 SOLUTION RESPIRATORY (INHALATION) EVERY 4 HOURS PRN
Status: DISCONTINUED | OUTPATIENT
Start: 2021-12-11 | End: 2021-12-12 | Stop reason: HOSPADM

## 2021-12-11 RX ORDER — DEXAMETHASONE SODIUM PHOSPHATE 10 MG/ML
10 INJECTION, SOLUTION INTRAMUSCULAR; INTRAVENOUS ONCE
Status: COMPLETED | OUTPATIENT
Start: 2021-12-11 | End: 2021-12-11

## 2021-12-11 RX ORDER — ACETAMINOPHEN 325 MG/1
650 TABLET ORAL EVERY 6 HOURS PRN
Status: DISCONTINUED | OUTPATIENT
Start: 2021-12-11 | End: 2021-12-12 | Stop reason: HOSPADM

## 2021-12-11 RX ORDER — LORAZEPAM 2 MG/ML
1 INJECTION INTRAMUSCULAR EVERY 6 HOURS PRN
Status: DISCONTINUED | OUTPATIENT
Start: 2021-12-11 | End: 2021-12-12 | Stop reason: HOSPADM

## 2021-12-11 RX ORDER — SODIUM CHLORIDE 0.9 % (FLUSH) 0.9 %
5-40 SYRINGE (ML) INJECTION EVERY 12 HOURS SCHEDULED
Status: DISCONTINUED | OUTPATIENT
Start: 2021-12-11 | End: 2021-12-12 | Stop reason: HOSPADM

## 2021-12-11 RX ORDER — SODIUM CHLORIDE, SODIUM LACTATE, POTASSIUM CHLORIDE, AND CALCIUM CHLORIDE .6; .31; .03; .02 G/100ML; G/100ML; G/100ML; G/100ML
1000 INJECTION, SOLUTION INTRAVENOUS ONCE
Status: COMPLETED | OUTPATIENT
Start: 2021-12-11 | End: 2021-12-11

## 2021-12-11 RX ORDER — SODIUM CHLORIDE 0.9 % (FLUSH) 0.9 %
10 SYRINGE (ML) INJECTION PRN
Status: DISCONTINUED | OUTPATIENT
Start: 2021-12-11 | End: 2021-12-12 | Stop reason: HOSPADM

## 2021-12-11 RX ORDER — IPRATROPIUM BROMIDE AND ALBUTEROL SULFATE 2.5; .5 MG/3ML; MG/3ML
1 SOLUTION RESPIRATORY (INHALATION) EVERY 4 HOURS PRN
Status: DISCONTINUED | OUTPATIENT
Start: 2021-12-11 | End: 2021-12-12 | Stop reason: HOSPADM

## 2021-12-11 RX ORDER — ONDANSETRON 4 MG/1
4 TABLET, ORALLY DISINTEGRATING ORAL EVERY 8 HOURS PRN
Status: DISCONTINUED | OUTPATIENT
Start: 2021-12-11 | End: 2021-12-12 | Stop reason: HOSPADM

## 2021-12-11 RX ADMIN — ETOMIDATE 20 MG: 2 INJECTION, SOLUTION INTRAVENOUS at 23:46

## 2021-12-11 RX ADMIN — MORPHINE SULFATE 1 MG: 2 INJECTION, SOLUTION INTRAMUSCULAR; INTRAVENOUS at 20:48

## 2021-12-11 RX ADMIN — MONTELUKAST 10 MG: 10 TABLET, FILM COATED ORAL at 20:47

## 2021-12-11 RX ADMIN — SODIUM CHLORIDE 0.2 MCG/KG/HR: 9 INJECTION, SOLUTION INTRAVENOUS at 21:55

## 2021-12-11 RX ADMIN — ENOXAPARIN SODIUM 30 MG: 100 INJECTION SUBCUTANEOUS at 20:49

## 2021-12-11 RX ADMIN — SUCCINYLCHOLINE CHLORIDE 100 MG: 20 INJECTION, SOLUTION INTRAMUSCULAR; INTRAVENOUS at 23:48

## 2021-12-11 RX ADMIN — Medication 20 ML: at 17:12

## 2021-12-11 RX ADMIN — IOPAMIDOL 100 ML: 755 INJECTION, SOLUTION INTRAVENOUS at 17:12

## 2021-12-11 RX ADMIN — DEXAMETHASONE SODIUM PHOSPHATE 10 MG: 10 INJECTION, SOLUTION INTRAMUSCULAR; INTRAVENOUS at 17:21

## 2021-12-11 RX ADMIN — SUCCINYLCHOLINE CHLORIDE 100 MG: 20 INJECTION, SOLUTION INTRAMUSCULAR; INTRAVENOUS at 23:47

## 2021-12-11 RX ADMIN — LORAZEPAM 1 MG: 2 INJECTION INTRAMUSCULAR; INTRAVENOUS at 21:48

## 2021-12-11 RX ADMIN — SODIUM CHLORIDE, PRESERVATIVE FREE 10 ML: 5 INJECTION INTRAVENOUS at 17:13

## 2021-12-11 RX ADMIN — SODIUM CHLORIDE, POTASSIUM CHLORIDE, SODIUM LACTATE AND CALCIUM CHLORIDE 1000 ML: 600; 310; 30; 20 INJECTION, SOLUTION INTRAVENOUS at 14:36

## 2021-12-11 RX ADMIN — LORAZEPAM 1 MG: 2 INJECTION INTRAMUSCULAR; INTRAVENOUS at 20:48

## 2021-12-11 RX ADMIN — ALBUTEROL SULFATE 5 MG: 5 SOLUTION RESPIRATORY (INHALATION) at 17:30

## 2021-12-11 ASSESSMENT — PAIN SCALES - GENERAL
PAINLEVEL_OUTOF10: 6
PAINLEVEL_OUTOF10: 6

## 2021-12-11 NOTE — ED PROVIDER NOTES
Noncontributory at this time    Allergies:is allergic to nuts [peanut-containing drug products] and sunflower oil. PHYSICAL EXAM     INITIAL VITALS: BP (!) 140/83   Pulse 100   Temp 98.4 °F (36.9 °C) (Oral)   Resp (!) 33   Ht 6' 2\" (1.88 m)   Wt 300 lb (136.1 kg)   SpO2 93%   BMI 38.52 kg/m²    Awake, alert, coop, responsive. Speech fluent, normal comprehension. Lungs demonstrate poor air entry bilaterally. No rales, rhonchi, wheezes, stridor, retractions. Patient is initial room air pulse ox saturation was in the mid to upper 50s on room air. Cardiac-S1S2, RRR, no murmur, rub, or gallop. Abdomen soft, nondistended, nontender. No organomegaly, mass, bruit. Normal bowel sounds. Lower extremities are negative for edema, swelling, tenderness. DIAGNOSTIC RESULTS     EKG: All EKG's are interpreted by the Emergency Department Physician who either signs or Co-signs this chart in the absence of a cardiologist.  EKG Interpretation    Interpreted by me    Rhythm: Sinus tachycardia. Rate: 100  Axis: normal  Ectopy: none  Conduction: Incomplete right bundle branch block. ST Segments: no acute change  T Waves: no acute change  Q Waves: none    Clinical Impression: no acute changes when compared to prior tracing dated 29 October 2019. RADIOLOGY:   I directly visualized the following  images and reviewed the radiologist interpretations:  CT CHEST PULMONARY EMBOLISM W CONTRAST   Final Result   1. No evidence of pulmonary embolism   2. Diffuse ground-glass opacities throughout the lungs, typical of COVID-19   pulmonary disease. XR CHEST (SINGLE VIEW FRONTAL)   Final Result   Multifocal hazy opacities throughout both lungs consistent with COVID   pneumonia and or pulmonary edema. Chest x-ray and radiologist interpretation have been reviewed.       LABS:  Labs Reviewed   COVID-19, RAPID - Abnormal; Notable for the following components:       Result Value    SARS-CoV-2, Rapid DETECTED (*) All other components within normal limits   CBC WITH AUTO DIFFERENTIAL - Abnormal; Notable for the following components:    Platelets 615 (*)     Seg Neutrophils 91 (*)     Lymphocytes 7 (*)     Eosinophils % 0 (*)     Absolute Lymph # 0.29 (*)     Absolute Mono # 0.08 (*)     All other components within normal limits   COMPREHENSIVE METABOLIC PANEL W/ REFLEX TO MG FOR LOW K - Abnormal; Notable for the following components:    Glucose 139 (*)     BUN 22 (*)     CO2 18 (*)     ALT 48 (*)     AST 90 (*)     Albumin 3.4 (*)     All other components within normal limits   URINALYSIS - Abnormal; Notable for the following components:    Urine Hgb 1+ (*)     Protein, UA 1+ (*)     All other components within normal limits   D-DIMER, QUANTITATIVE - Abnormal; Notable for the following components:    D-Dimer, Quant 0.99 (*)     All other components within normal limits   FERRITIN - Abnormal; Notable for the following components:    Ferritin 2,586 (*)     All other components within normal limits   LACTATE DEHYDROGENASE - Abnormal; Notable for the following components:     (*)     All other components within normal limits   MICROSCOPIC URINALYSIS - Abnormal; Notable for the following components:    Mucus, UA 2+ (*)     All other components within normal limits   CULTURE, BLOOD 1   CULTURE, BLOOD 2   LEGIONELLA ANTIGEN, URINE   STREP PNEUMONIAE ANTIGEN   CULTURE, RESPIRATORY   QUANTIFERON (R) TB GOLD   LACTATE, SEPSIS   LACTATE, SEPSIS   PROCALCITONIN   C-REACTIVE PROTEIN   POCT LACTIC ACID (LACTATE)   POCT LACTIC ACID (LACTATE)     Available lab results have been reviewed. EMERGENCY DEPARTMENT COURSE:   -------------------------  Lab work, chest x-ray, EKG are pending. IV fluid bolus has been ordered as well as additional lab work. Patient is currently on BiPAP and is oxygen saturation is in the lower to mid 90% range. Impression: Hypoxemia probably secondary to Covid pneumonia.   Plan: IV fluids and sepsis work-up in progress. Patient require admission to the hospital most likely to the intensive care unit. Chest x-ray has been reviewed and the radiologist interpretation is pending. Available lab work is been reviewed as well. Note is made of the elevated D-dimer. CT of the chest to rule out PE has been ordered and is pending. Patient was discussed with  who accepted the patient for admission and indicated he would place the orders. Radiologist interpretation of the CT of the chest is reviewed and does not demonstrate any obvious PE.    CRITICAL CARE:     35 minutes. CONSULTS:    Internal medicine. FINAL IMPRESSION      1. Pneumonia of both lungs due to infectious organism, unspecified part of lung    2. Pneumonia due to COVID-19 virus          DISPOSITION/PLAN:  DISPOSITION          PATIENT REFERRED TO:  No follow-up provider specified. DISCHARGE MEDICATIONS:  New Prescriptions    No medications on file       (Please note that portions of this note were completed with a voice recognition program.  Efforts were made to edit the dictations but occasionally words are mis-transcribed. )    Terese Thomas MD [address] [facility]  Attending Emergency Physician      Terese Thomas MD  12/11/21 0848

## 2021-12-11 NOTE — Clinical Note
Patient Class: Inpatient [101]   REQUIRED: Diagnosis: Acute respiratory failure with hypoxia ([facility] [659600]   Estimated Length of Stay: Estimated stay of more than 2 midnights

## 2021-12-11 NOTE — H&P
History & Physical  [facility]    Adult Hospitalist      Name: Flip Holm  MRN: [id number]     Acct: [de-identified]  Room: [place] [address]    Admit Date: 12/11/2021  2:18 PM  PCP: Kirk Guerrero MD    Primary Problem  Active Problems:    Acute respiratory failure with hypoxia [facility])  Resolved Problems:    * No resolved hospital problems. *        Assesment:     · Acute respiratory failure with hypoxia  · COVID-19 pneumonia  · Hypotension/hypovolemic shock   · Unvaccinated against SARS-CoV-2  · Diabetes mellitus type 2  · Essential hypertension  · Migraine headaches  · Obstructive sleep apnea on CPAP  · Moderate persistent asthma  · Osteoarthritis multiple joints  · Gastroesophageal reflux disease without esophagitis  · Obesity  · Low back pain, chronic  · Anxiety disorder        Plan:     · Admit to ICU  · Monitor vitals closely  · Keep SPO2 above 90%  · I's and O's  · IV fluid bolus/cautious IV fluids-DC  · BiPAP as needed  · IV Decadron  · Consult pulmonary/critical care  · Consult ID  · Pain control  · Antiemetics as needed  · Symbicort, Singulair  · Hold Lasix  · Hold Glucophage  · Accu-Cheks before meals and at bedtime  · Insulin sliding scale  · Hypoglycemia protocol  · Check inflammatory markers  · Ativan as needed  · Tizanidine as needed back pain  · Proning  · CBC, BMP  · DVT and GI prophylaxis. Chief Complaint:     Chief Complaint   Patient presents with    Cough    Shortness of Breath    Extremity Weakness         History of Present Illness:      Flip Holm is a 60 y.o.  male who presents with Cough, Shortness of Breath, and Extremity Weakness    Patient admitted through the emergency room where he presented to ED with cough, congestion and fatigue for almost 5 days. Patient says he also started getting dyspneic initially with exertion and now at rest.  Patient says cough has been dry. There are episodes of hacking cough. He says he has not had much appetite.   He has also had MED performed by Raquel Lopez MD at [address] [facility] Right 12/12/2019    RIGHT FOOT ARTHROPLASTY 1ST MTPJ (Right Foot)    ARTHROPLASTY Right 12/12/2019    RIGHT FOOT ARTHROPLASTY 1ST MTPJ performed by Raquel Lopez MD at [address] Right    [place]   2014    no blockage no stents    CHOLECYSTECTOMY      COLONOSCOPY      ENDOSCOPY, COLON, DIAGNOSTIC      TOE SURGERY Left 11/01/2019     LEFT FOOT 1ST MTPJ ARTHROPLASTY WITH PEREZ IMPLANT AND GROMMETS  ALLEN MED (Left )    TONSILLECTOMY          Medications Prior to Admission:       Prior to Admission medications    Medication Sig Start Date End Date Taking? Authorizing Provider   budesonide-formoterol (SYMBICORT) 160-4.5 MCG/ACT AERO Inhale 2 puffs into the lungs 2 times daily   Yes Historical Provider, MD   albuterol (PROVENTIL) (2.5 MG/3ML) 0.083% nebulizer solution Take 2.5 mg by nebulization every 6 hours as needed for Wheezing   Yes Historical Provider, MD   omeprazole (PRILOSEC) 40 MG delayed release capsule Take 40 mg by mouth daily   Yes Historical Provider, MD   montelukast (SINGULAIR) 10 MG tablet Take 10 mg by mouth daily   Yes Historical Provider, MD   fexofenadine (ALLEGRA) 180 MG tablet Take 180 mg by mouth daily   Yes Historical Provider, MD   SUMAtriptan (IMITREX) 100 MG tablet Take 100 mg by mouth once as needed for Migraine    Historical Provider, MD   furosemide (LASIX) 40 MG tablet Take 40 mg by mouth daily    Historical Provider, MD   metFORMIN (GLUCOPHAGE) 500 MG tablet Take 500 mg by mouth 2 times daily (with meals)    Historical Provider, MD        Allergies:       Nuts [peanut-containing drug products] and Sunflower oil    Social History:     Tobacco:    reports that he has quit smoking. He has never used smokeless tobacco.  Alcohol:      reports current alcohol use. Drug Use:  reports no history of drug use.     Family History:     Family History   Problem Relation Age of Onset    Heart Attack Sister 32    Diabetes Paternal Grandmother     Heart Disease Paternal Uncle     Heart Disease Paternal Uncle     Heart Disease Paternal Uncle     Cancer Maternal Aunt     Cancer Maternal Aunt     Cancer Maternal Uncle     Cancer Maternal Uncle          Physical Exam:     Vitals:  BP (!) 140/83   Pulse 106   Temp 98.4 °F (36.9 °C) (Oral)   Resp (!) 36   Ht 6' 2\" (1.88 m)   Wt 300 lb (136.1 kg)   SpO2 (!) 89%   BMI 38.52 kg/m²   Temp (24hrs), Av.4 °F (36.9 °C), Min:98.4 °F (36.9 °C), Max:98.4 °F (36.9 °C)          General appearance - alert, well appearing, and in moderate respiratory acute distress  Mental status - oriented to person, place, and time with normal affect  Head - normocephalic and atraumatic  Eyes - pupils equal and reactive, extraocular eye movements intact, conjunctiva clear  Ears - hearing appears to be intact  Nose - no drainage noted  Mouth - mucous membranes moist  Neck - supple, no carotid bruits, thyroid not palpable  Chest -coarse crackles auscultation bilaterally, increased effort  Heart - normal rate, regular rhythm, no murmur  Abdomen - soft, obese, nontender, nondistended, bowel sounds present all four quadrants, no masses, hepatomegaly or splenomegaly  Neurological - normal speech, no focal findings or movement disorder noted, cranial nerves II through XII grossly intact  Extremities - peripheral pulses palpable, no pedal edema or calf pain with palpation  Skin - no gross lesions, rashes, or induration noted        Data:     Labs:    Hematology:  Recent Labs     21  1445   WBC 4.1   RBC 5.12   HGB 15.7   HCT 47.2   MCV 92.2   MCH 30.7   MCHC 33.3   RDW 14.1   *   MPV 11.1   DDIMER 0.99*     Chemistry:  Recent Labs     21  1445      K 3.9      CO2 18*   GLUCOSE 139*   BUN 22*   CREATININE 1.13   ANIONGAP 16   LABGLOM >60   GFRAA >60   CALCIUM 8.6     Recent Labs     21  1445   PROT 6.7   LABALBU 3.4*   AST 90*   ALT 48* *   ALKPHOS 99   BILITOT 0.32       No results found for: INR, PROTIME    Lab Results   Component Value Date/Time    SPECIAL NOT REPORTED 11/13/2012 12:53 PM     Lab Results   Component Value Date/Time    CULTURE NO GROWTH 11/13/2012 12:53 PM    CULTURE  11/13/2012 12:53 PM     [facility] [address] [facility]  [facility] 3 [phone]       No results found for: POCPH, PHART, PH, POCPCO2, UNK0UME, PCO2, POCPO2, PO2ART, PO2, POCHCO3, IFH8OVF, HCO3, NBEA, PBEA, BEART, BE, THGBART, THB, YFG4NTQ, AVJR3XAM, U0MXVQTW, O2SAT, FIO2    Radiology:    XR CHEST (SINGLE VIEW FRONTAL)    Result Date: 12/11/2021  Multifocal hazy opacities throughout both lungs consistent with COVID pneumonia and or pulmonary edema. All radiological studies reviewed                Code Status:  No Order    Electronically signed by Baltazar Yen MD on 12/11/2021 at 5:21 PM     Copy sent to Dr. Castro Balbuena MD    This note was created with the assistance of a speech-recognition program.  Although the intention is to generate a document that actually reflects the content of the visit, no guarantees can be provided that every mistake has been identified and corrected by editing. Note was updated later by me after  physical examination and  completion of the assessment.

## 2021-12-12 ENCOUNTER — APPOINTMENT (OUTPATIENT)
Dept: GENERAL RADIOLOGY | Age: 56
DRG: 208 | End: 2021-12-12
Payer: COMMERCIAL

## 2021-12-12 ENCOUNTER — HOSPITAL ENCOUNTER (INPATIENT)
Age: 56
LOS: 26 days | Discharge: NURSING, REHAB OR LONG-TERM CARE | DRG: 004 | End: 2022-01-07
Attending: INTERNAL MEDICINE | Admitting: INTERNAL MEDICINE
Payer: COMMERCIAL

## 2021-12-12 ENCOUNTER — APPOINTMENT (OUTPATIENT)
Dept: GENERAL RADIOLOGY | Age: 56
DRG: 004 | End: 2021-12-12
Attending: INTERNAL MEDICINE
Payer: COMMERCIAL

## 2021-12-12 VITALS
BODY MASS INDEX: 38.5 KG/M2 | RESPIRATION RATE: 30 BRPM | DIASTOLIC BLOOD PRESSURE: 72 MMHG | TEMPERATURE: 98.4 F | SYSTOLIC BLOOD PRESSURE: 110 MMHG | HEIGHT: 74 IN | HEART RATE: 76 BPM | WEIGHT: 300 LBS | OXYGEN SATURATION: 95 %

## 2021-12-12 DIAGNOSIS — R45.1 RESTLESSNESS AND AGITATION: Primary | ICD-10-CM

## 2021-12-12 PROBLEM — K21.9 GERD (GASTROESOPHAGEAL REFLUX DISEASE): Status: ACTIVE | Noted: 2021-12-12

## 2021-12-12 PROBLEM — J45.909 ASTHMA: Status: ACTIVE | Noted: 2021-12-12

## 2021-12-12 PROBLEM — J12.82 PNEUMONIA DUE TO COVID-19 VIRUS: Status: ACTIVE | Noted: 2021-12-12

## 2021-12-12 PROBLEM — J80 ARDS (ADULT RESPIRATORY DISTRESS SYNDROME) (HCC): Status: ACTIVE | Noted: 2021-12-12

## 2021-12-12 PROBLEM — U07.1 PNEUMONIA DUE TO COVID-19 VIRUS: Status: ACTIVE | Noted: 2021-12-12

## 2021-12-12 PROBLEM — Z99.89 OSA ON CPAP: Status: ACTIVE | Noted: 2021-12-12

## 2021-12-12 PROBLEM — G47.33 OSA ON CPAP: Status: ACTIVE | Noted: 2021-12-12

## 2021-12-12 PROBLEM — E11.9 DIABETES MELLITUS (HCC): Status: ACTIVE | Noted: 2021-12-12

## 2021-12-12 PROBLEM — I10 HYPERTENSION: Status: ACTIVE | Noted: 2021-12-12

## 2021-12-12 LAB
ALLEN TEST: ABNORMAL
ANION GAP SERPL CALCULATED.3IONS-SCNC: 16 MMOL/L (ref 9–17)
BUN BLDV-MCNC: 23 MG/DL (ref 6–20)
BUN/CREAT BLD: 20 (ref 9–20)
C-REACTIVE PROTEIN: 277.4 MG/L (ref 0–5)
CALCIUM SERPL-MCNC: 8.1 MG/DL (ref 8.6–10.4)
CHLORIDE BLD-SCNC: 105 MMOL/L (ref 98–107)
CO2: 20 MMOL/L (ref 20–31)
CREAT SERPL-MCNC: 1.15 MG/DL (ref 0.7–1.2)
D-DIMER QUANTITATIVE: 2.47 MG/L FEU (ref 0–0.59)
FERRITIN: 2800 UG/L (ref 30–400)
FIBRINOGEN: 628 MG/DL (ref 179–518)
FIO2: 90
GFR AFRICAN AMERICAN: >60 ML/MIN
GFR NON-AFRICAN AMERICAN: >60 ML/MIN
GFR SERPL CREATININE-BSD FRML MDRD: ABNORMAL ML/MIN/{1.73_M2}
GFR SERPL CREATININE-BSD FRML MDRD: ABNORMAL ML/MIN/{1.73_M2}
GLUCOSE BLD-MCNC: 122 MG/DL (ref 75–110)
GLUCOSE BLD-MCNC: 152 MG/DL (ref 70–99)
GLUCOSE BLD-MCNC: 153 MG/DL (ref 75–110)
HCT VFR BLD CALC: 45.2 % (ref 40.7–50.3)
HEMOGLOBIN: 14.6 G/DL (ref 13–17)
INR BLD: 1
MCH RBC QN AUTO: 30.2 PG (ref 25.2–33.5)
MCHC RBC AUTO-ENTMCNC: 32.3 G/DL (ref 28.4–34.8)
MCV RBC AUTO: 93.6 FL (ref 82.6–102.9)
MODE: ABNORMAL
NEGATIVE BASE EXCESS, ART: 3 (ref 0–2)
NRBC AUTOMATED: 0 PER 100 WBC
O2 DEVICE/FLOW/%: ABNORMAL
PARTIAL THROMBOPLASTIN TIME: 39.1 SEC (ref 23.9–33.8)
PATIENT TEMP: ABNORMAL
PDW BLD-RTO: 14.5 % (ref 11.8–14.4)
PLATELET # BLD: 164 K/UL (ref 138–453)
PMV BLD AUTO: 10.7 FL (ref 8.1–13.5)
POC HCO3: 26 MMOL/L (ref 21–28)
POC O2 SATURATION: 89 % (ref 94–98)
POC PCO2 TEMP: ABNORMAL MM HG
POC PCO2: 60.2 MM HG (ref 35–48)
POC PH TEMP: ABNORMAL
POC PH: 7.24 (ref 7.35–7.45)
POC PO2 TEMP: ABNORMAL MM HG
POC PO2: 67.7 MM HG (ref 83–108)
POSITIVE BASE EXCESS, ART: ABNORMAL (ref 0–3)
POTASSIUM SERPL-SCNC: 4.6 MMOL/L (ref 3.7–5.3)
PROTHROMBIN TIME: 13 SEC (ref 11.5–14.2)
RBC # BLD: 4.83 M/UL (ref 4.21–5.77)
SAMPLE SITE: ABNORMAL
SODIUM BLD-SCNC: 141 MMOL/L (ref 135–144)
TCO2 (CALC), ART: ABNORMAL MMOL/L (ref 22–29)
TROPONIN INTERP: ABNORMAL
TROPONIN T: ABNORMAL NG/ML
TROPONIN, HIGH SENSITIVITY: 31 NG/L (ref 0–22)
WBC # BLD: 5.2 K/UL (ref 3.5–11.3)

## 2021-12-12 PROCEDURE — 85379 FIBRIN DEGRADATION QUANT: CPT

## 2021-12-12 PROCEDURE — 85610 PROTHROMBIN TIME: CPT

## 2021-12-12 PROCEDURE — 6370000000 HC RX 637 (ALT 250 FOR IP): Performed by: NURSE PRACTITIONER

## 2021-12-12 PROCEDURE — 2500000003 HC RX 250 WO HCPCS: Performed by: EMERGENCY MEDICINE

## 2021-12-12 PROCEDURE — 99291 CRITICAL CARE FIRST HOUR: CPT | Performed by: INTERNAL MEDICINE

## 2021-12-12 PROCEDURE — 5A1955Z RESPIRATORY VENTILATION, GREATER THAN 96 CONSECUTIVE HOURS: ICD-10-PCS | Performed by: INTERNAL MEDICINE

## 2021-12-12 PROCEDURE — 82947 ASSAY GLUCOSE BLOOD QUANT: CPT

## 2021-12-12 PROCEDURE — 6360000002 HC RX W HCPCS: Performed by: NURSE PRACTITIONER

## 2021-12-12 PROCEDURE — 82728 ASSAY OF FERRITIN: CPT

## 2021-12-12 PROCEDURE — 94645 CONT INHLJ TX EACH ADDL HOUR: CPT

## 2021-12-12 PROCEDURE — 2700000000 HC OXYGEN THERAPY PER DAY

## 2021-12-12 PROCEDURE — 71045 X-RAY EXAM CHEST 1 VIEW: CPT

## 2021-12-12 PROCEDURE — 5A1935Z RESPIRATORY VENTILATION, LESS THAN 24 CONSECUTIVE HOURS: ICD-10-PCS | Performed by: INTERNAL MEDICINE

## 2021-12-12 PROCEDURE — 84484 ASSAY OF TROPONIN QUANT: CPT

## 2021-12-12 PROCEDURE — 0BH17EZ INSERTION OF ENDOTRACHEAL AIRWAY INTO TRACHEA, VIA NATURAL OR ARTIFICIAL OPENING: ICD-10-PCS | Performed by: INTERNAL MEDICINE

## 2021-12-12 PROCEDURE — 2580000003 HC RX 258: Performed by: INTERNAL MEDICINE

## 2021-12-12 PROCEDURE — 6360000002 HC RX W HCPCS: Performed by: INTERNAL MEDICINE

## 2021-12-12 PROCEDURE — 6360000002 HC RX W HCPCS: Performed by: FAMILY MEDICINE

## 2021-12-12 PROCEDURE — 94002 VENT MGMT INPAT INIT DAY: CPT

## 2021-12-12 PROCEDURE — 85730 THROMBOPLASTIN TIME PARTIAL: CPT

## 2021-12-12 PROCEDURE — 85027 COMPLETE CBC AUTOMATED: CPT

## 2021-12-12 PROCEDURE — APPSS60 APP SPLIT SHARED TIME 46-60 MINUTES: Performed by: NURSE PRACTITIONER

## 2021-12-12 PROCEDURE — 85384 FIBRINOGEN ACTIVITY: CPT

## 2021-12-12 PROCEDURE — 94761 N-INVAS EAR/PLS OXIMETRY MLT: CPT

## 2021-12-12 PROCEDURE — 2000000000 HC ICU R&B

## 2021-12-12 PROCEDURE — 99223 1ST HOSP IP/OBS HIGH 75: CPT | Performed by: INTERNAL MEDICINE

## 2021-12-12 PROCEDURE — 6360000002 HC RX W HCPCS

## 2021-12-12 PROCEDURE — XW033H5 INTRODUCTION OF TOCILIZUMAB INTO PERIPHERAL VEIN, PERCUTANEOUS APPROACH, NEW TECHNOLOGY GROUP 5: ICD-10-PCS | Performed by: INTERNAL MEDICINE

## 2021-12-12 PROCEDURE — 36620 INSERTION CATHETER ARTERY: CPT

## 2021-12-12 PROCEDURE — 37799 UNLISTED PX VASCULAR SURGERY: CPT

## 2021-12-12 PROCEDURE — 87641 MR-STAPH DNA AMP PROBE: CPT

## 2021-12-12 PROCEDURE — 2580000003 HC RX 258: Performed by: NURSE PRACTITIONER

## 2021-12-12 PROCEDURE — 6360000002 HC RX W HCPCS: Performed by: EMERGENCY MEDICINE

## 2021-12-12 PROCEDURE — 82803 BLOOD GASES ANY COMBINATION: CPT

## 2021-12-12 PROCEDURE — 99222 1ST HOSP IP/OBS MODERATE 55: CPT | Performed by: INTERNAL MEDICINE

## 2021-12-12 PROCEDURE — 02HV33Z INSERTION OF INFUSION DEVICE INTO SUPERIOR VENA CAVA, PERCUTANEOUS APPROACH: ICD-10-PCS | Performed by: INTERNAL MEDICINE

## 2021-12-12 PROCEDURE — 6370000000 HC RX 637 (ALT 250 FOR IP): Performed by: INTERNAL MEDICINE

## 2021-12-12 PROCEDURE — 2500000003 HC RX 250 WO HCPCS: Performed by: INTERNAL MEDICINE

## 2021-12-12 PROCEDURE — 80048 BASIC METABOLIC PNL TOTAL CA: CPT

## 2021-12-12 PROCEDURE — 2580000003 HC RX 258

## 2021-12-12 PROCEDURE — 86140 C-REACTIVE PROTEIN: CPT

## 2021-12-12 RX ORDER — DEXTROSE MONOHYDRATE 50 MG/ML
100 INJECTION, SOLUTION INTRAVENOUS PRN
Status: DISCONTINUED | OUTPATIENT
Start: 2021-12-12 | End: 2022-01-07 | Stop reason: HOSPADM

## 2021-12-12 RX ORDER — SODIUM CHLORIDE 9 MG/ML
INJECTION, SOLUTION INTRAVENOUS CONTINUOUS
Status: DISCONTINUED | OUTPATIENT
Start: 2021-12-12 | End: 2022-01-07 | Stop reason: HOSPADM

## 2021-12-12 RX ORDER — PROPOFOL 10 MG/ML
5-50 INJECTION, EMULSION INTRAVENOUS CONTINUOUS
Status: DISCONTINUED | OUTPATIENT
Start: 2021-12-12 | End: 2021-12-12 | Stop reason: HOSPADM

## 2021-12-12 RX ORDER — NICOTINE POLACRILEX 4 MG
15 LOZENGE BUCCAL PRN
Status: DISCONTINUED | OUTPATIENT
Start: 2021-12-12 | End: 2022-01-07 | Stop reason: HOSPADM

## 2021-12-12 RX ORDER — POLYETHYLENE GLYCOL 3350 17 G/17G
17 POWDER, FOR SOLUTION ORAL DAILY PRN
Status: DISCONTINUED | OUTPATIENT
Start: 2021-12-12 | End: 2021-12-18

## 2021-12-12 RX ORDER — SUCCINYLCHOLINE CHLORIDE 20 MG/ML
100 INJECTION INTRAMUSCULAR; INTRAVENOUS ONCE
Status: COMPLETED | OUTPATIENT
Start: 2021-12-12 | End: 2021-12-11

## 2021-12-12 RX ORDER — VECURONIUM BROMIDE 1 MG/ML
10 INJECTION, POWDER, LYOPHILIZED, FOR SOLUTION INTRAVENOUS ONCE
Status: COMPLETED | OUTPATIENT
Start: 2021-12-12 | End: 2021-12-12

## 2021-12-12 RX ORDER — MORPHINE SULFATE 2 MG/ML
1 INJECTION, SOLUTION INTRAMUSCULAR; INTRAVENOUS EVERY 8 HOURS PRN
Status: DISCONTINUED | OUTPATIENT
Start: 2021-12-12 | End: 2021-12-12 | Stop reason: HOSPADM

## 2021-12-12 RX ORDER — VITAMIN B COMPLEX
6000 TABLET ORAL DAILY
Status: COMPLETED | OUTPATIENT
Start: 2021-12-12 | End: 2021-12-18

## 2021-12-12 RX ORDER — VITAMIN B COMPLEX
2000 TABLET ORAL DAILY
Status: DISCONTINUED | OUTPATIENT
Start: 2021-12-19 | End: 2022-01-07 | Stop reason: HOSPADM

## 2021-12-12 RX ORDER — PROPOFOL 10 MG/ML
5-50 INJECTION, EMULSION INTRAVENOUS
Status: DISCONTINUED | OUTPATIENT
Start: 2021-12-12 | End: 2021-12-16

## 2021-12-12 RX ORDER — PROPOFOL 10 MG/ML
5-50 INJECTION, EMULSION INTRAVENOUS CONTINUOUS
Status: DISCONTINUED | OUTPATIENT
Start: 2021-12-12 | End: 2021-12-12

## 2021-12-12 RX ORDER — ACETAMINOPHEN 325 MG/1
650 TABLET ORAL EVERY 6 HOURS PRN
Status: DISCONTINUED | OUTPATIENT
Start: 2021-12-12 | End: 2021-12-18

## 2021-12-12 RX ORDER — NOREPINEPHRINE BIT/0.9 % NACL 16MG/250ML
2-100 INFUSION BOTTLE (ML) INTRAVENOUS CONTINUOUS
Status: DISCONTINUED | OUTPATIENT
Start: 2021-12-12 | End: 2022-01-06

## 2021-12-12 RX ORDER — FUROSEMIDE 10 MG/ML
100 INJECTION INTRAMUSCULAR; INTRAVENOUS ONCE
Status: COMPLETED | OUTPATIENT
Start: 2021-12-12 | End: 2021-12-12

## 2021-12-12 RX ORDER — PROPOFOL 10 MG/ML
5-50 INJECTION, EMULSION INTRAVENOUS ONCE
Status: COMPLETED | OUTPATIENT
Start: 2021-12-12 | End: 2021-12-12

## 2021-12-12 RX ORDER — SODIUM CHLORIDE 9 MG/ML
25 INJECTION, SOLUTION INTRAVENOUS PRN
Status: DISCONTINUED | OUTPATIENT
Start: 2021-12-12 | End: 2022-01-07 | Stop reason: HOSPADM

## 2021-12-12 RX ORDER — DEXTROSE MONOHYDRATE 25 G/50ML
12.5 INJECTION, SOLUTION INTRAVENOUS PRN
Status: DISCONTINUED | OUTPATIENT
Start: 2021-12-12 | End: 2022-01-07 | Stop reason: HOSPADM

## 2021-12-12 RX ORDER — ROCURONIUM BROMIDE 10 MG/ML
100 INJECTION, SOLUTION INTRAVENOUS ONCE
Status: COMPLETED | OUTPATIENT
Start: 2021-12-12 | End: 2021-12-12

## 2021-12-12 RX ORDER — DEXAMETHASONE SODIUM PHOSPHATE 10 MG/ML
6 INJECTION INTRAMUSCULAR; INTRAVENOUS EVERY 24 HOURS
Status: DISCONTINUED | OUTPATIENT
Start: 2021-12-12 | End: 2021-12-12

## 2021-12-12 RX ORDER — PROPOFOL 10 MG/ML
INJECTION, EMULSION INTRAVENOUS
Status: DISCONTINUED
Start: 2021-12-12 | End: 2021-12-12

## 2021-12-12 RX ORDER — ETOMIDATE 2 MG/ML
20 INJECTION INTRAVENOUS ONCE
Status: COMPLETED | OUTPATIENT
Start: 2021-12-12 | End: 2021-12-11

## 2021-12-12 RX ORDER — DEXAMETHASONE SODIUM PHOSPHATE 10 MG/ML
10 INJECTION INTRAMUSCULAR; INTRAVENOUS EVERY 24 HOURS
Status: DISCONTINUED | OUTPATIENT
Start: 2021-12-17 | End: 2022-01-01

## 2021-12-12 RX ORDER — SODIUM CHLORIDE 0.9 % (FLUSH) 0.9 %
5-40 SYRINGE (ML) INJECTION PRN
Status: DISCONTINUED | OUTPATIENT
Start: 2021-12-12 | End: 2022-01-07 | Stop reason: HOSPADM

## 2021-12-12 RX ORDER — MIDAZOLAM HYDROCHLORIDE 1 MG/ML
6 INJECTION INTRAMUSCULAR; INTRAVENOUS ONCE
Status: COMPLETED | OUTPATIENT
Start: 2021-12-12 | End: 2021-12-12

## 2021-12-12 RX ORDER — ONDANSETRON 4 MG/1
4 TABLET, ORALLY DISINTEGRATING ORAL EVERY 8 HOURS PRN
Status: DISCONTINUED | OUTPATIENT
Start: 2021-12-12 | End: 2022-01-07 | Stop reason: HOSPADM

## 2021-12-12 RX ORDER — ONDANSETRON 2 MG/ML
4 INJECTION INTRAMUSCULAR; INTRAVENOUS EVERY 6 HOURS PRN
Status: DISCONTINUED | OUTPATIENT
Start: 2021-12-12 | End: 2022-01-07 | Stop reason: HOSPADM

## 2021-12-12 RX ORDER — SODIUM CHLORIDE 0.9 % (FLUSH) 0.9 %
5-40 SYRINGE (ML) INJECTION EVERY 12 HOURS SCHEDULED
Status: DISCONTINUED | OUTPATIENT
Start: 2021-12-12 | End: 2022-01-07 | Stop reason: HOSPADM

## 2021-12-12 RX ORDER — ACETAMINOPHEN 650 MG/1
650 SUPPOSITORY RECTAL EVERY 6 HOURS PRN
Status: DISCONTINUED | OUTPATIENT
Start: 2021-12-12 | End: 2022-01-07 | Stop reason: HOSPADM

## 2021-12-12 RX ORDER — PROPOFOL 10 MG/ML
INJECTION, EMULSION INTRAVENOUS
Status: COMPLETED
Start: 2021-12-12 | End: 2021-12-12

## 2021-12-12 RX ADMIN — PROPOFOL 5 MCG/KG/MIN: 10 INJECTION, EMULSION INTRAVENOUS at 13:15

## 2021-12-12 RX ADMIN — Medication 6000 UNITS: at 15:01

## 2021-12-12 RX ADMIN — PROPOFOL 40 MCG/KG/MIN: 10 INJECTION, EMULSION INTRAVENOUS at 00:00

## 2021-12-12 RX ADMIN — EPOPROSTENOL 50 NG/KG/MIN: 1.5 INJECTION, POWDER, LYOPHILIZED, FOR SOLUTION INTRAVENOUS at 12:37

## 2021-12-12 RX ADMIN — EPOPROSTENOL 50 NG/KG/MIN: 1.5 INJECTION, POWDER, LYOPHILIZED, FOR SOLUTION INTRAVENOUS at 17:50

## 2021-12-12 RX ADMIN — INSULIN LISPRO 0 UNITS: 100 INJECTION, SOLUTION INTRAVENOUS; SUBCUTANEOUS at 22:10

## 2021-12-12 RX ADMIN — FUROSEMIDE 100 MG: 10 INJECTION, SOLUTION INTRAMUSCULAR; INTRAVENOUS at 01:36

## 2021-12-12 RX ADMIN — Medication 7 MG/HR: at 18:33

## 2021-12-12 RX ADMIN — SODIUM CHLORIDE: 9 INJECTION, SOLUTION INTRAVENOUS at 20:57

## 2021-12-12 RX ADMIN — ENOXAPARIN SODIUM 30 MG: 100 INJECTION SUBCUTANEOUS at 15:01

## 2021-12-12 RX ADMIN — Medication 100 MCG/HR: at 18:33

## 2021-12-12 RX ADMIN — Medication 1 MG/HR: at 01:40

## 2021-12-12 RX ADMIN — CISATRACURIUM BESYLATE 2 MCG/KG/MIN: 10 INJECTION, SOLUTION INTRAVENOUS at 14:07

## 2021-12-12 RX ADMIN — Medication 50 MCG/HR: at 11:40

## 2021-12-12 RX ADMIN — TOCILIZUMAB 810 MG: 180 INJECTION, SOLUTION SUBCUTANEOUS at 15:07

## 2021-12-12 RX ADMIN — ENOXAPARIN SODIUM 30 MG: 100 INJECTION SUBCUTANEOUS at 20:58

## 2021-12-12 RX ADMIN — INSULIN LISPRO 1 UNITS: 100 INJECTION, SOLUTION INTRAVENOUS; SUBCUTANEOUS at 15:07

## 2021-12-12 RX ADMIN — PROPOFOL 15 MCG/KG/MIN: 10 INJECTION, EMULSION INTRAVENOUS at 20:57

## 2021-12-12 RX ADMIN — VECURONIUM BROMIDE 10 MG: 1 INJECTION, POWDER, LYOPHILIZED, FOR SOLUTION INTRAVENOUS at 14:45

## 2021-12-12 RX ADMIN — VECURONIUM BROMIDE 10 MG: 1 INJECTION, POWDER, LYOPHILIZED, FOR SOLUTION INTRAVENOUS at 00:19

## 2021-12-12 RX ADMIN — Medication 2 MCG/MIN: at 11:46

## 2021-12-12 RX ADMIN — ROCURONIUM BROMIDE 100 MG: 10 INJECTION INTRAVENOUS at 11:40

## 2021-12-12 RX ADMIN — WATER: 1 INJECTION INTRAMUSCULAR; INTRAVENOUS; SUBCUTANEOUS at 14:30

## 2021-12-12 RX ADMIN — ACETAMINOPHEN 650 MG: 325 TABLET ORAL at 18:47

## 2021-12-12 RX ADMIN — PROPOFOL 40 MCG/KG/MIN: 10 INJECTION, EMULSION INTRAVENOUS at 03:03

## 2021-12-12 RX ADMIN — ENOXAPARIN SODIUM 30 MG: 100 INJECTION SUBCUTANEOUS at 09:02

## 2021-12-12 RX ADMIN — CISATRACURIUM BESYLATE 3 MCG/KG/MIN: 10 INJECTION, SOLUTION INTRAVENOUS at 18:33

## 2021-12-12 RX ADMIN — Medication 4 MG/HR: at 11:46

## 2021-12-12 RX ADMIN — DEXAMETHASONE SODIUM PHOSPHATE 20 MG: 10 INJECTION INTRAMUSCULAR; INTRAVENOUS at 16:46

## 2021-12-12 RX ADMIN — MIDAZOLAM 6 MG: 1 INJECTION INTRAMUSCULAR; INTRAVENOUS at 00:06

## 2021-12-12 RX ADMIN — PROPOFOL 40 MCG/KG/MIN: 10 INJECTION, EMULSION INTRAVENOUS at 08:09

## 2021-12-12 RX ADMIN — CISATRACURIUM BESYLATE 0.5 MCG/KG/MIN: 10 INJECTION, SOLUTION INTRAVENOUS at 13:53

## 2021-12-12 ASSESSMENT — PAIN SCALES - GENERAL
PAINLEVEL_OUTOF10: 6
PAINLEVEL_OUTOF10: 0

## 2021-12-12 ASSESSMENT — PULMONARY FUNCTION TESTS
PIF_VALUE: 36
PIF_VALUE: 35
PIF_VALUE: 37
PIF_VALUE: 24
PIF_VALUE: 30
PIF_VALUE: 38
PIF_VALUE: 38
PIF_VALUE: 27
PIF_VALUE: 28
PIF_VALUE: 33
PIF_VALUE: 40

## 2021-12-12 NOTE — ED NOTES
Pt ripped out both IVs and bipap mask respiratory, dr at bedside     [facility]  12/11/21 3427

## 2021-12-12 NOTE — ED NOTES
Pt ripping off bipap mask. Very agitated. Respiratory, ER doctor at bedside.       Bandar Sanchez RN  12/11/21 7243

## 2021-12-12 NOTE — ED NOTES
Writer spoke with Dr. Larissa Lara about pt being intubated. Dr. Larissa Lara informed by Armin Shelley there may be a bed at [address] [facility], that Dr. Larissa Lara needs to speak with access to initiate transfer.       Sukhjinder Lewis, RN  12/12/21 3064

## 2021-12-12 NOTE — CONSULTS
[facility] - Initial Consult Note COVID 19 Patient  Today's Date and Time: 12/12/2021, 12:17 PM    Impression :     COVID 19 Confirmed Infection  Covid tests:  12/11/2021: Positive  Elevated inflammatory markers  Acute hypoxic respiratory failure  History of asthma  Diabetes mellitus  Essential hypertension  IRMA on CPAP  Patient has not received the Covid vaccination    Recommendations:   Antibiotic treatment:  Monitor off antibiotics  Covid Rx:    Remdesivir-out of window  Decadron-high-dose initiated  Actemra-ordered 12/12/2021  Monoclonal antibodies-out of window      Medical Decision Making/Summary/Discussion:12/12/2021     Patient admitted with COVID 19 infection    Infection Control Recommendations   [place] Precautions  Airborne isolation  Droplet Isolation    Antimicrobial Stewardship Recommendations       Discontinuation of therapy  Coordination of Outpatient Care:   Estimated Length of IV antimicrobials:TBD  Patient will need Midline Catheter Insertion: TBD  Patient will need PICC line Insertion: No  Patient will need: Home IV , [place],  SNF,  LTAC:TBD  Patient will need outpatient wound care:No    Chief complaint/reason for consultation:   Concern for COVID infection      History of Present Illness:   Dominga Silva is a 60y.o.-year-old male who was initially admitted on 12/12/2021. Patient seen at the request of Dr. Rose Colby:    Patient presented through [facility] on 12/11/2021 with complaints of worsening shortness of breath with associated generalized weakness and nonproductive cough over the past several days. He was exposed to his son who was diagnosed with Covid last week and has not received the Covid vaccine. The patient was very hypoxic with an SPO2 in the high 50s on room air.     Imaging revealed bilateral pulmonary opacities typical of COVID-19    Abnormal labs include:    Ferritin 2800  LDH 838    Patient has been intubated and transferred to [facility]  He has been started on high-dose Decadron and Actemra has been ordered    CT CHEST PULMONARY EMBOLISM W CONTRAST 12/11/2021   Final Result   1. No evidence of pulmonary embolism   2. Diffuse ground-glass opacities throughout the lungs, typical of COVID-19   pulmonary disease.           XR CHEST (SINGLE VIEW FRONTAL) 12/11/2021   Final Result   Multifocal hazy opacities throughout both lungs consistent with COVID   pneumonia and or pulmonary edema       Patient admitted because of concerns with COVID 19.    CURRENT EVALUATION : 12/12/2021    T-max 99.7  VS stable    Patient exhibiting respiratory distress. yes  Respiratory secretions: no    Patient receiving supplemental oxygen. Mechanical ventilation  RR:  20  02 sat: 91    % FIO2: 90  PEEP:   21    QTc:       NEWS Score: 0-4 Low risk group; 5-6: Medium risk group; 7 or above: High risk group  Parameters 3 2 1 0 1 2 3   Age    < 65   = 65   RR = 8  9-11 12-20  21-24 = 25   O2 Sats = 91 92-93 94-95 = 96      Suppl O2  Yes  No      SBP = 90  101-110 111-219   = 220   HR = 40  41-50 51-90  111-130 = 131   Consciousness    Alert   Drowsiness, lethargy, or confusion   Temperature = 35.0 C (95.0 F)  35.1-36.0 C 95.1-96.9 F 36.1-38.0 C 97.0-100.4 F 38.1-39.0 C 100.5-102.3 F = 39.1 C = 102.4 F      NEWS Score:  12/12/2021: 13 high risk    Overall Daily Picture:      Worsening    Presence of secondary bacterial Infection:    No   Additional antibiotics: No    Labs, X rays reviewed: 12/12/2021    BUN:23  Cr:1.15    WBC:5.2  Hb:14.6  Plat: 164    Absolute Neutrophils:3.73  Absolute Lymphocytes:0.29  Neutrophil/Lymphocyte Ratio: 12.8 high risk    CRP:293-->277  Ferritin:2800  LDH: 838    Pro Calcitonin:      Cultures:  Urine:    Blood:    Sputum :    Wound:      CXR:   1221 diffuse bilateral infiltrates  CAT:      Discussed with patient, RN, CC, IM.      12-12-21:      I have personally Mukul Mott reviewed the past medical history, past surgical history, medications, social history, and family history, and I have updated the database accordingly.   Past Medical History:     Past Medical History:   Diagnosis Date    Arthritis     Asthma     Diabetes mellitus ([address]     Edema     GERD (gastroesophageal reflux disease)     Hypertension     on lasix    Migraine     IRMA on CPAP     seldom use machine       Past Surgical  History:     Past Surgical History:   Procedure Laterality Date    APPENDECTOMY      ARTHROPLASTY Left 11/1/2019    LEFT FOOT 1ST MTPJ ARTHROPLASTY WITH PEREZ IMPLANT AND GROMMETS  [facility] performed by Milagros Cueva MD at [address] [facility] Right 12/12/2019    RIGHT FOOT ARTHROPLASTY 1ST MTPJ (Right Foot)    ARTHROPLASTY Right 12/12/2019    RIGHT FOOT ARTHROPLASTY 1ST MTPJ performed by Milagros Cueva MD at [address] Right    [address]  2014    no blockage no stents    CHOLECYSTECTOMY      COLONOSCOPY      ENDOSCOPY, COLON, DIAGNOSTIC      TOE SURGERY Left 11/01/2019     LEFT FOOT 1ST MTPJ ARTHROPLASTY WITH PEREZ IMPLANT AND GROMMETS  [facility] (Left )    TONSILLECTOMY         Medications:      sodium chloride flush  5-40 mL IntraVENous 2 times per day    enoxaparin  30 mg SubCUTAneous BID    Vitamin D  6,000 Units Oral Daily    Followed by   Beryle Oxford ON 12/19/2021] Vitamin D  2,000 Units Oral Daily    dexamethasone  6 mg IntraVENous Q24H       Social History:     Social History     Socioeconomic History    Marital status:      Spouse name: Not on file    Number of children: Not on file    Years of education: Not on file    Highest education level: Not on file   Occupational History    Not on file   Tobacco Use    Smoking status: Former Smoker    Smokeless tobacco: Never Used   Vaping Use    Vaping Use: Never used   Substance and Sexual Activity    Alcohol use: Yes     Comment: every couple months    Drug use: Never    Sexual activity: Not on file   Other Topics Concern    Not on file   Social History Narrative    Not on file     Social Determinants of Health     Financial Resource Strain:     Difficulty of Paying Living Expenses: Not on file   Food Insecurity:     Worried About Running Out of Food in the Last Year: Not on file    Lucina of Food in the Last Year: Not on file   Transportation Needs:     Lack of Transportation (Medical): Not on file    Lack of Transportation (Non-Medical): Not on file   Physical Activity:     Days of Exercise per Week: Not on file    Minutes of Exercise per Session: Not on file   Stress:     Feeling of Stress : Not on file   Social Connections:     Frequency of Communication with Friends and Family: Not on file    Frequency of Social Gatherings with Friends and Family: Not on file    Attends Evangelical Services: Not on file    Active Member of [address] [place] [address] [employer] or Organizations: Not on file    Attends Club or Organization Meetings: Not on file    Marital Status: Not on file   Intimate Partner Violence:     Fear of Current or Ex-Partner: Not on file    Emotionally Abused: Not on file    Physically Abused: Not on file    Sexually Abused: Not on file   Housing Stability:     Unable to Pay for Housing in the Last Year: Not on file    Number of [place] in the Last Year: Not on file    Unstable Housing in the Last Year: Not on file       Family History:     Family History   Problem Relation Age of Onset    Heart Attack Sister 32    Diabetes Paternal Grandmother     Heart Disease Paternal Uncle     Heart Disease Paternal Uncle     Heart Disease Paternal Uncle     Cancer Maternal Aunt     Cancer Maternal Aunt     Cancer Maternal Uncle     Cancer Maternal Uncle         Allergies:   Nuts [peanut-containing drug products] and Sunflower oil     Review of Systems:     Unable to assess 12/12/2021  Intubated and sedated  Constitutional: No fevers or chills.  No systemic complaints  Head: No headaches  Eyes: No double vision or blurry vision. No conjunctival inflammation. ENT: No sore throat or runny nose. . No hearing loss, tinnitus or vertigo. Cardiovascular: No chest pain or palpitations. No Shortness of breath. No LARSON  Lung: No Shortness of breath or cough. No sputum production  Abdomen: No nausea, vomiting, diarrhea, or abdominal pain. Jordon Punt No cramps. Genitourinary: No increased urinary frequency, or dysuria. No hematuria. No suprapubic or CVA pain  Musculoskeletal: No muscle aches or pains. No joint effusions, swelling or deformities  Hematologic: No bleeding or bruising. Neurologic: No headache, weakness, numbness, or tingling. Integument: No rash, no ulcers. Psychiatric: No depression. Endocrine: No polyuria, no polydipsia, no polyphagia. Physical Examination :     Patient Vitals for the past 8 hrs:   Pulse Resp SpO2   12/12/21 1154 77 (!) 6 95 %   12/12/21 1140 63 (!) 31 92 %   12/12/21 1139 63 (!) 31 91 %     General Appearance: Intubated and sedated  Head:  Normocephalic, no trauma  Eyes: Pupils equal, round, reactive to light; sclera anicteric; conjunctivae pink. No embolic phenomena. ENT: Oropharynx clear, without erythema, exudate, or thrush. No tenderness of sinuses. Mouth/throat: mucosa pink and moist. No lesions. Dentition in good repair. Neck:Supple, without lymphadenopathy. Thyroid normal, No bruits. Pulmonary/Chest: Diminished to auscultation, without wheezes, rales, or rhonchi. No dullness to percussion. Cardiovascular: Regular rate and rhythm without murmurs, rubs, or gallops. Abdomen: Soft, non tender. Bowel sounds normal. No organomegaly  All four Extremities: No cyanosis, clubbing, edema, or effusions. Neurologic: Intubated and sedated  Skin: Warm and dry with good turgor. No signs of peripheral arterial or venous insufficiency. No ulcerations. No open wounds.     Medical Decision Making -Laboratory:   I have independently reviewed/ordered the following labs:    CBC with Differential:   Recent Labs     12/11/21  1445 12/12/21  0645   WBC 4.1 5.2   HGB 15.7 14.6   HCT 47.2 45.2   * 164   LYMPHOPCT 7*  --    MONOPCT 2  --      BMP:   Recent Labs     12/11/21  1445 12/12/21  0645    141   K 3.9 4.6    105   CO2 18* 20   BUN 22* 23*   CREATININE 1.13 1.15     Hepatic Function Panel:   Recent Labs     12/11/21  1445   PROT 6.7   LABALBU 3.4*   BILITOT 0.32   ALKPHOS 99   ALT 48*   AST 90*     No results for input(s): RPR in the last 72 hours. No results for input(s): HIV in the last 72 hours. No results for input(s): BC in the last 72 hours. Lab Results   Component Value Date    MUCUS 2+ 12/11/2021    RBC 4.83 12/12/2021    TRICHOMONAS NOT REPORTED 12/11/2021    WBC 5.2 12/12/2021    YEAST NOT REPORTED 12/11/2021    TURBIDITY Clear 12/11/2021     Lab Results   Component Value Date    CREATININE 1.15 12/12/2021    GLUCOSE 152 12/12/2021       Medical Decision Making-Imaging:     Narrative   EXAMINATION:   CTA OF THE CHEST 12/11/2021 11:31 am       TECHNIQUE:   CTA of the chest was performed after the administration of intravenous   contrast.  Multiplanar reformatted images are provided for review.  MIP   images are provided for review.  Dose modulation, iterative reconstruction,   and/or weight based adjustment of the mA/kV was utilized to reduce the   radiation dose to as low as reasonably achievable.       COMPARISON:   Chest x-ray dated December 11, 2021       HISTORY:   ORDERING SYSTEM PROVIDED HISTORY: hypoxemia, covid positive, d-dimer-0.99   TECHNOLOGIST PROVIDED HISTORY:   hypoxemia, covid positive, d-dimer-0.99   Decision Support Exception - unselect if not a suspected or confirmed   emergency medical condition->Emergency Medical Condition (MA)   Reason for Exam: COVID positive, elevated D-Dimer       FINDINGS:   Pulmonary Arteries: Pulmonary arteries are adequately opacified for   evaluation.  No evidence of intraluminal filling defect to suggest pulmonary   embolism.  Main pulmonary artery is normal in caliber.       Mediastinum: Nonspecific mediastinal and hilar lymph nodes are present,   likely reactive. .  The heart and pericardium demonstrate no acute   abnormality.  There is no acute abnormality of the thoracic aorta.       Lungs/pleura: Diffuse ground-glass opacification is present throughout the   lungs, typical of COVID-19 pulmonary disease.  No pleural effusion or   pneumothorax is present.       Upper Abdomen: Images through the upper abdomen demonstrate a small hiatal   hernia.  Diffuse hepatic steatosis is present.  The gallbladder is surgically   absent.       Soft Tissues/Bones: No acute bone or soft tissue abnormality.           Impression   1. No evidence of pulmonary embolism   2. Diffuse ground-glass opacities throughout the lungs, typical of COVID-19   pulmonary disease.         Narrative   EXAMINATION:   ONE XRAY VIEW OF THE CHEST       12/11/2021 3:54 pm       COMPARISON:   November 13, 2012       HISTORY:   ORDERING SYSTEM PROVIDED HISTORY: hypoxemia, probable covid pneumonia   TECHNOLOGIST PROVIDED HISTORY:   hypoxemia, probable covid pneumonia       FINDINGS:   Multifocal hazy opacities throughout both lungs would be consistent with   history of COVID pneumonia.  Pulmonary edema could have a similar appearance.    No pneumothorax or pleural effusion.  Cardiac size enlarged.  Mediastinum   unremarkable.  No acute osseous abnormality.           Impression   Multifocal hazy opacities throughout both lungs consistent with COVID   pneumonia and or pulmonary edema.                   Medical Decision [id number]:       Medical Decision Making-Other:     Note:  Labs, medications, radiologic studies were reviewed with personal review of films  Large amounts of data were reviewed  Discussed with nursing Staff, Discharge planner  Infection Control and Prevention measures reviewed  All prior entries were reviewed  Administer medications as ordered  Prognosis: Guarded  Discharge planning reviewed      Thank you for allowing us to participate in the care of this patient. Please call with questions. SHO Regalado - CNP     ATTESTATION:    I have discussed the case, including pertinent history and exam findings with the APRN. I have evaluated the  History, physical findings and pictures of the patient and the key elements of the encounter have been performed by me. I have reviewed the laboratory data, other diagnostic studies and discussed them with the APRN. I have updated the medical record where necessary. I agree with the assessment, plan and orders as documented by the APRN.     Christian Dolan MD.      Pager: [phone] - Office: [phone]

## 2021-12-12 NOTE — PLAN OF CARE
Problem: OXYGENATION/RESPIRATORY FUNCTION  Goal: Patient will maintain patent airway  Outcome: Ongoing     Problem: OXYGENATION/RESPIRATORY FUNCTION  Goal: Patient will achieve/maintain normal respiratory rate/effort  Description: Respiratory rate and effort will be within normal limits for the patient  Outcome: Ongoing     Problem: MECHANICAL VENTILATION  Goal: Patient will maintain patent airway  Outcome: Ongoing     Problem: MECHANICAL VENTILATION  Goal: Oral health is maintained or improved  Outcome: Ongoing     Problem: MECHANICAL VENTILATION  Goal: ET tube will be managed safely  Outcome: Ongoing     Problem: MECHANICAL VENTILATION  Goal: Ability to express needs and understand communication  Outcome: Ongoing     Problem: SKIN INTEGRITY  Goal: Skin integrity is maintained or improved  Outcome: Ongoing

## 2021-12-12 NOTE — PROGRESS NOTES
Insert Arterial Line  Date/Time:  12/12/21, 6:59 AM  Performed by: Michelet Gudino RCP    Patient identity confirmed: arm band and provided demographic data   Time out: Immediately prior to procedure a \"time out\" was called to verify the correct patient, procedure, equipment, support staff. Preparation: Patient was prepped and draped in the usual sterile fashion.     Location:left radial    Hamilton's test normal: yes  Needle gauge: 20     Number of attempts: 1  Post-procedure: transparent dressing applied and line secured    Patient tolerance: well

## 2021-12-12 NOTE — CONSULTS
Pulmonary Critical Care   Consult Note    Patient - Dominga Silva  Date of Admission -  12/12/2021 11:39 AM  Date of Evaluation -  12/12/2021  Room and Bed Number -  3026/3026-01   Hospital Day - 0    CHIEF COMPLAINT : ACUTE HYPOXIC RESPIRATORY FAILURE DUE TO COVID -19 PNEUMONIA   HPI:   Dominga Silva  60 y.o. male  admitted for COVID-19 at [facility] ER due to worsening oxygenation he was initially started on BiPAP and subsequently intubated. On room air his saturations had been 50%. CTA no PE but bilateral pulmonary infiltrates. He was accepted at [address] [place] [address] ICU. On arrival he is on PEEP of 22, 100% FiO2. SECRETIONS  -Amount:  [x] Small [] Moderate  [] Large    [] None  Color:     [x] White [] Colored  [] Bloody    SEDATION:    [] Propofol gtt  [x] Versed gtt  [x] FENTANYL  gtt  gtt   [] No Sedation    PARALYZED:  [] No    [x] Yes    VASOPRESSORS:  [x] No    [] Yes  [] Levophed [] Dopamine [] Vasopressin  [] Dobutamine [] Phenylephrine [] Epinephrine    INHALED veletri  : [] No    [x] Yes started 12/12/21    PRONE :       [] No    [x] Yes    Actemra:             [] No    [x] Yes  12/12/21  DEXAMETHASONE : [] No    [x] Yes      Ros unable to perform due to intubation and sedation    OBJECTIVE:     VITAL SIGNS:  /69   Pulse 88   Resp (!) 38   SpO2 90%   Tmax over 24 hours:  No data recorded.       Patient Vitals for the past 8 hrs:   BP Pulse Resp SpO2   12/12/21 1400 -- 88 (!) 38 90 %   12/12/21 1330 118/69 97 (!) 36 90 %   12/12/21 1315 (!) 159/87 110 (!) 38 92 %   12/12/21 1300 (!) 172/91 111 (!) 33 92 %   12/12/21 1238 -- 109 30 91 %   12/12/21 1230 (!) 169/84 105 30 90 %   12/12/21 1200 119/75 84 30 92 %   12/12/21 1154 -- 77 (!) 6 95 %   12/12/21 1145 -- 68 30 94 %   12/12/21 1140 -- 63 (!) 31 92 %   12/12/21 1139 -- 63 (!) 31 91 %         Intake/Output Summary (Last 24 hours) at 12/12/2021 1444  Last data filed at 12/12/2021 1330  Gross per 24 hour   Intake --   Output [address] [] Room Air      ABGs:     Lab Results   Component Value Date    GOK8JNW NOT REPORTED 12/12/2021    FIO2 90.0 12/12/2021       DATA:  Complete Blood Count:   Recent Labs     12/11/21  1445 12/12/21  0645   WBC 4.1 5.2   RBC 5.12 4.83   HGB 15.7 14.6   HCT 47.2 45.2   MCV 92.2 93.6   MCH 30.7 30.2   MCHC 33.3 32.3   RDW 14.1 14.5*   * 164   MPV 11.1 10.7        Last 3 Blood Glucose:   Recent Labs     12/11/21  1445 12/12/21  0645   GLUCOSE 139* 152*        PT/INR:    Lab Results   Component Value Date    PROTIME 13.0 12/12/2021    INR 1.0 12/12/2021     PTT:    Lab Results   Component Value Date    APTT 39.1 12/12/2021       Comprehensive Metabolic Profile:   Recent Labs     12/11/21  1445 12/12/21  0645    141   K 3.9 4.6    105   CO2 18* 20   BUN 22* 23*   CREATININE 1.13 1.15   GLUCOSE 139* 152*   CALCIUM 8.6 8.1*   PROT 6.7  --    LABALBU 3.4*  --    BILITOT 0.32  --    ALKPHOS 99  --    AST 90*  --    ALT 48*  --       Magnesium: No results found for: MG  Phosphorus: No results found for: PHOS  Ionized Calcium: No results found for: CAION     Urinalysis:   Lab Results   Component Value Date    NITRU NEGATIVE 12/11/2021    COLORU Yellow 12/11/2021    PHUR 6.0 12/11/2021    WBCUA 5 TO 10 12/11/2021    RBCUA 5 TO 10 12/11/2021    MUCUS 2+ 12/11/2021    TRICHOMONAS NOT REPORTED 12/11/2021    YEAST NOT REPORTED 12/11/2021    BACTERIA NOT REPORTED 12/11/2021    SPECGRAV 1.015 12/11/2021    LEUKOCYTESUR NEGATIVE 12/11/2021    UROBILINOGEN Normal 12/11/2021    BILIRUBINUR NEGATIVE 12/11/2021    GLUCOSEU NEGATIVE 12/11/2021    KETUA NEGATIVE 12/11/2021    AMORPHOUS NOT REPORTED 12/11/2021               XR CHEST (SINGLE VIEW FRONTAL)    Result Date: 12/11/2021  Multifocal hazy opacities throughout both lungs consistent with COVID pneumonia and or pulmonary edema.      XR CHEST PORTABLE    Result Date: 12/12/2021  Stable appearing chest with unchanged support tubes/lines and persistent extensive bilateral airspace disease consistent with known COVID pneumonia. XR CHEST PORTABLE    Result Date: 12/12/2021  Right internal jugular central venous catheter tip projecting over the proximal SVC versus brachiocephalic vein. No pneumothorax. Otherwise stable exam from earlier today. XR CHEST PORTABLE    Result Date: 12/12/2021  Endotracheal tube tip is 3.2 cm above the saul. Overall significant worsening of multifocal airspace opacities keeping with history of COVID-19. CT CHEST PULMONARY EMBOLISM W CONTRAST    Result Date: 12/11/2021  1. No evidence of pulmonary embolism 2. Diffuse ground-glass opacities throughout the lungs, typical of COVID-19 pulmonary disease. Past Medical History:   Diagnosis Date    Arthritis     Asthma     Diabetes mellitus ([address]     Edema     GERD (gastroesophageal reflux disease)     Hypertension     on lasix    Migraine     IRMA on CPAP     seldom use machine        Social History     Socioeconomic History    Marital status:      Spouse name: Not on file    Number of children: Not on file    Years of education: Not on file    Highest education level: Not on file   Occupational History    Not on file   Tobacco Use    Smoking status: Former Smoker    Smokeless tobacco: Never Used   Vaping Use    Vaping Use: Never used   Substance and Sexual Activity    Alcohol use: Yes     Comment: every couple months    Drug use: Never    Sexual activity: Not on file   Other Topics Concern    Not on file   Social History Narrative    Not on file     Social Determinants of Health     Financial Resource Strain:     Difficulty of Paying Living Expenses: Not on file   Food Insecurity:     Worried About Running Out of Food in the Last Year: Not on file    Lucina of Food in the Last Year: Not on file   Transportation Needs:     Lack of Transportation (Medical): Not on file    Lack of Transportation (Non-Medical):  Not on file   Physical Activity:     Days of Exercise per Week: Not on file    Minutes of Exercise per Session: Not on file   Stress:     Feeling of Stress : Not on file   Social Connections:     Frequency of Communication with Friends and Family: Not on file    Frequency of Social Gatherings with Friends and Family: Not on file    Attends [employer] Services: Not on file    Active Member of [address] [place] [address] [place] or Organizations: Not on file    Attends Club or Organization Meetings: Not on file    Marital Status: Not on file   Intimate Partner Violence:     Fear of Current or Ex-Partner: Not on file    Emotionally Abused: Not on file    Physically Abused: Not on file    Sexually Abused: Not on file   Housing Stability:     Unable to Pay for Housing in the Last Year: Not on file    Number of [place] in the Last Year: Not on file    Unstable Housing in the Last Year: Not on file         There is no immunization history on file for this patient.       Family History   Problem Relation Age of Onset    Heart Attack Sister 32    Diabetes Paternal Grandmother     Heart Disease Paternal Uncle     Heart Disease Paternal Uncle     Heart Disease Paternal Uncle     Cancer Maternal Aunt     Cancer Maternal Aunt     Cancer Maternal Uncle     Cancer Maternal Uncle          Past Surgical History:   Procedure Laterality Date    APPENDECTOMY      ARTHROPLASTY Left 11/1/2019    LEFT FOOT 1ST MTPJ ARTHROPLASTY WITH PEREZ IMPLANT AND GROMMETS  [facility] performed by Sobia Price MD at [address] [facility] Right 12/12/2019    RIGHT FOOT ARTHROPLASTY 1ST MTPJ (Right Foot)    ARTHROPLASTY Right 12/12/2019    RIGHT FOOT ARTHROPLASTY 1ST MTPJ performed by Sobia Price MD at [address] Right    [address]  2014    no blockage no stents    CHOLECYSTECTOMY      COLONOSCOPY      ENDOSCOPY, COLON, DIAGNOSTIC      TOE SURGERY Left 11/01/2019     LEFT FOOT 1ST MTPJ ARTHROPLASTY WITH PEREZ IMPLANT AND GROMMETS  ALLEN MED (Left )    TONSILLECTOMY            VENTILATOR SETTINGS:  Vent Information  $Ventilation: $Subsequent Day  Vent Type: Servo i  Vent Mode: PRVC  Vt Ordered: 500 mL  Rate Set: 30 bmp  FiO2 : (S) 100 %  SpO2: 90 %  SpO2/FiO2 ratio: 90  Sensitivity: 5  PEEP/CPAP: 21  I Time/ I Time %: 0.7 s  Humidification Source: HME  Nitric Oxide/Epoprostenol In Use?: Yes     PaO2/FiO2 RATIO:  Recent Labs     12/12/21  1320   POCPO2 67.7*      FiO2 : (S) 100 %       LABS:  ABGs:   Recent Labs     12/12/21  1320   POCPH 7.244*   POCPCO2 60.2*   POCPO2 67.7*   POCHCO3 26.0   ZGEY2NSA 89*            ASSESSMENT:     Principal Problem:    Pneumonia due to COVID-19 virus  Active Problems:    Acute respiratory failure with hypoxia (HCC)    Diabetes mellitus (HCC)    Asthma    RIMA on CPAP    Hypertension    GERD (gastroesophageal reflux disease)    ARDS (adult respiratory distress syndrome) (HCC)  Resolved Problems:    * No resolved hospital problems. *              Acute hypoxic respiratory failure secondary to COVID 19   Acute respiratory distress syndrome   Bilateral multifocal pneumonia due to COVID 19 infection   Covid -19 pandemic emergency     LOS: 0  PLAN:    D/w RT  D/w RN   Vent setting reviewed - Cont vent support per ARDS protocol - wean peep and fio2 - keep sats >90 %   Sedation reviewed  Start nimbex   Will start prone positioning   Start inhaled veletri   Airborne isolation and droplet precautions to be continued  Continue supportive care   Cont treatment with medications for COVID  Minimize iv fluids to keep the patient on the dry side  Cont tube feeding    Monitor endotracheal secretions   Will obtain xray chest and ABG as needed        Treatment plan Discussed with nursing staff in detail , all questions answered .      Total critical care time caring for this patient with life threatening, unstable organ failure, including direct patient contact, management of life support systems, review of data including imaging and labs, discussions with other team members and physicians at least 28  Min so far today, excluding procedures. Electronically signed by Allean Hammans, MD on 12/12/2021 at 2:44 PM       This patient was evaluated in the context of the global SARS-CoV-2 (COVID-19) pandemic, which necessitated considerations that the patient either has COVID-19 infection or is at risk of infection with COVID-19. Institutional protocols and algorithms that pertain to the evaluation & management of patients with COVID-19 or those at risk for COVID-19 are in a state of rapid changes based on information released by regulatory bodies including the CDC and federal and state organizations. These policies and algorithms were followed during the patient's care. Please note that this chart was generated using voice recognition Dragon dictation software. Although every effort was made to ensure the accuracy of this automated transcription, some errors in transcription may have occurred.

## 2021-12-12 NOTE — ED NOTES
Pt had a couple episodes of panic and severe anxiety about wearing Bi-Pap mask. Pt pulled mask off and stated his frustration and irritation with mask. When pt pulled Bi-pap mask off, his O2 sat dropped significantly. Bi-pap mask placed back on pt and writer and Mala TERAN were able to educate pt about keeping mask on and pt calmed down for the moment. Will contact Dr. Nora Eller to update about pt status and request medication for pt Anxiety.       Zen Jimenez RN  12/11/21 2022

## 2021-12-12 NOTE — H&P
[facility]  Office: [address], DO, Eliseo Reilly, DO, Chris Norton, DO, Ryann Pineda Blood, DO, Krista Real MD, Tima Portillo MD, Yasmeen Viramontes MD, Sanam Rico MD, Caro Roberts MD, Patsy Leon MD, Rancho Singh MD, Haskell Leventhal, DO, Abelardo Romero, DO, Cheryle Bumpers, MD,  Yves Kothari, DO, Liat Cosme MD, Kay Barhaona MD, Jamal Abarca MD, Darius Rivera MD, Gordon Cruz MD, Kalvin Spurling, MD, Saint Goodwill, MD, Pavel Jennings, [facility], [facility], [facility], Nancy Modi, CNP, Talia Rice, CNS, Elías Saldaña, CNP, Farzana Flores, CNP, Joy Barber, CNP, Georgia Abraham, CNP, Sia Barnes, CNP, Kiara Alba PA-C, Patricia Adhikari, DNP, Wilian Jack, CNP, Staci Askew, CNP, Ihsan Hopkins, CNP, Ariane Carter, CNP, Trupti Venegas, CNP, Brigette Varma, CNP, Nicole June, CNP         [address]    HISTORY AND PHYSICAL EXAMINATION            Date:   12/12/2021  Patient name:  Nancy Ortiz  Date of admission:  12/12/2021 11:39 AM  MRN:   [id number]  Account:  [de-identified]  YOB: 1965  PCP:    Mile Monahan MD  Room:   3026/3026-01  Code Status:    Full Code    Chief Complaint:     COVID    History Obtained From:     patient, electronic medical record    History of Present Illness:     43-year-old male presents for cough, shortness of breath and extremity weakness for 5 days. Patient presented at [facility].  Patient found to be hypoxic and hypotensive with oxygen saturation in the mid 90s on room air. Patient quickly required BiPAP to maintain oxygen saturations above 90%. Patient was unable to tolerate BiPAP and per chart repeatedly took off the mask and desaturated and 70s. Patient was seen by pulmonary medicine recommending transfer to [place] for further care.   Patient arrived intubated and sedated, pulmonary medicine was at bedside requiring paralytics to maintain ventilator synchrony. Intermittently required Levophed for blood pressure. Patient now sedated on Versed and fentanyl. Patient requiring 100% FiO2 with PEEP of 21. Past Medical History:     Past Medical History:   Diagnosis Date    Arthritis     Asthma     Diabetes mellitus ([address]     Edema     GERD (gastroesophageal reflux disease)     Hypertension     on lasix    Migraine     IRMA on CPAP     seldom use machine        Past Surgical History:     Past Surgical History:   Procedure Laterality Date    APPENDECTOMY      ARTHROPLASTY Left 11/1/2019    LEFT FOOT 1ST MTPJ ARTHROPLASTY WITH PEREZ IMPLANT AND GROMMETS  [facility] performed by Mag Dasilva MD at [address] [facility] Right 12/12/2019    RIGHT FOOT ARTHROPLASTY 1ST MTPJ (Right Foot)    ARTHROPLASTY Right 12/12/2019    RIGHT FOOT ARTHROPLASTY 1ST MTPJ performed by Mag Dasilva MD at [address] Right    [address]  [address]    no blockage no stents    CHOLECYSTECTOMY      COLONOSCOPY      ENDOSCOPY, COLON, DIAGNOSTIC      TOE SURGERY Left 11/01/2019     LEFT FOOT 1ST MTPJ ARTHROPLASTY WITH PEREZ IMPLANT AND GROMMETS  [facility] (Left )    TONSILLECTOMY          Medications Prior to Admission:     Prior to Admission medications    Medication Sig Start Date End Date Taking?  Authorizing Provider   budesonide-formoterol (SYMBICORT) 160-4.5 MCG/ACT AERO Inhale 2 puffs into the lungs 2 times daily    Historical Provider, MD   albuterol (PROVENTIL) (2.5 MG/3ML) 0.083% nebulizer solution Take 2.5 mg by nebulization every 6 hours as needed for Wheezing    Historical Provider, MD   SUMAtriptan (IMITREX) 100 MG tablet Take 100 mg by mouth once as needed for Migraine    Historical Provider, MD   omeprazole (PRILOSEC) 40 MG delayed release capsule Take 40 mg by mouth daily    Historical Provider, MD   furosemide (LASIX) 40 MG tablet Take 40 mg by mouth daily    Historical Provider, MD   montelukast (SINGULAIR) 10 MG tablet Take 10 mg by mouth daily    Historical Provider, MD   fexofenadine (ALLEGRA) 180 MG tablet Take 180 mg by mouth daily    Historical Provider, MD   metFORMIN (GLUCOPHAGE) 500 MG tablet Take 500 mg by mouth 2 times daily (with meals)    Historical Provider, MD        Allergies:     Nuts [peanut-containing drug products] and Sunflower oil    Social History:     Tobacco:    reports that he has quit smoking. He has never used smokeless tobacco.  Alcohol:      reports current alcohol use. Drug Use:  reports no history of drug use. Family History:     Family History   Problem Relation Age of Onset    Heart Attack Sister 32    Diabetes Paternal Grandmother     Heart Disease Paternal Uncle     Heart Disease Paternal Uncle     Heart Disease Paternal Uncle     Cancer Maternal Aunt     Cancer Maternal Aunt     Cancer Maternal Uncle     Cancer Maternal Uncle        Review of Systems:     Unable to assess, unresponsive paralyzed on ventilator    Physical Exam:   /69   Pulse 88   Resp (!) 38   SpO2 90%   No data recorded. No results for input(s): POCGLU in the last 72 hours.     Intake/Output Summary (Last 24 hours) at 12/12/2021 1445  Last data filed at 12/12/2021 1330  Gross per 24 hour   Intake --   Output 575 ml   Net -575 ml       General Appearance: ill appearing, toxic, intubated sedated  Head: normocephalic, atraumatic  Mouth: mucous membranes moist, ET tube in place, OG in place   Neck: supple, no carotid bruits, thyroid not palpable, CVC in place right CVC  Lungs: Bilateral equal air entry, clear to ausculation, no wheezing, rales or rhonchi, normal effort  Cardiovascular: normal rate, regular rhythm, no murmur, gallop, rub  Abdomen: Soft, nontender, nondistended, normal bowel sounds, no hepatomegaly or splenomegaly  Neurologic: unable to assess  Skin: No gross lesions, rashes, bruising or bleeding on exposed skin area  Extremities: peripheral pulses palpable, no pedal edema or calf pain with palpation    Investigations:      Laboratory Testing:  Recent Results (from the past 24 hour(s))   Culture, Blood 2    Collection Time: 12/11/21  3:04 PM    Specimen: Blood   Result Value Ref Range    Specimen Description . BLOOD     Special Requests RT ARM, 4 ML     Culture NO GROWTH 12 HOURS    Lactate, Sepsis    Collection Time: 12/11/21  5:15 PM   Result Value Ref Range    Lactic Acid, Sepsis 1.4 0.5 - 1.9 mmol/L    Lactic Acid, Sepsis, Whole Blood NOT REPORTED 0.5 - 1.9 mmol/L   Urinalysis    Collection Time: 12/11/21  5:30 PM   Result Value Ref Range    Color, UA Yellow Yellow    Turbidity UA Clear Clear    Glucose, Ur NEGATIVE NEGATIVE    Bilirubin Urine NEGATIVE NEGATIVE    Ketones, Urine NEGATIVE NEGATIVE    Specific Gravity, UA 1.015 1.005 - 1.030    Urine Hgb 1+ (A) NEGATIVE    pH, UA 6.0 5.0 - 8.0    Protein, UA 1+ (A) NEGATIVE    Urobilinogen, Urine Normal Normal    Nitrite, Urine NEGATIVE NEGATIVE    Leukocyte Esterase, Urine NEGATIVE NEGATIVE    Urinalysis Comments NOT REPORTED    Legionella antigen, urine    Collection Time: 12/11/21  5:30 PM    Specimen: Urine, clean catch   Result Value Ref Range    Legionella Pneumophilia Ag, Urine NEGATIVE    Strep Pneumoniae Antigen    Collection Time: 12/11/21  5:30 PM    Specimen: Urine, clean catch   Result Value Ref Range    Source . URINE     Strep pneumo Ag NEGATIVE    Microscopic Urinalysis    Collection Time: 12/11/21  5:30 PM   Result Value Ref Range    -          WBC, UA 5 TO 10 0 - 5 /HPF    RBC, UA 5 TO 10 0 - 2 /HPF    Casts UA 20 TO 50 /LPF    Casts UA HYALINE /LPF    Crystals, UA NOT REPORTED None /HPF    Epithelial Cells UA 2 TO 5 0 - 5 /HPF    Renal Epithelial, UA NOT REPORTED 0 /HPF    Bacteria, UA NOT REPORTED None    Mucus, UA 2+ (A) None    Trichomonas, UA NOT REPORTED None    Amorphous, UA NOT REPORTED None    Other Observations UA NOT REPORTED NOT REQ.     Yeast, UA NOT REPORTED None   EKG 12 lead    Collection Time: 12/11/21  5:47 PM   Result Value Ref Range    Ventricular Rate 100 BPM    Atrial Rate 100 BPM    P-R Interval 168 ms    QRS Duration 98 ms    Q-T Interval 338 ms    QTc Calculation (Bazett) 436 ms    P Axis 41 degrees    R Axis -8 degrees    T Axis 31 degrees   Fibrinogen    Collection Time: 12/12/21  6:45 AM   Result Value Ref Range    Fibrinogen 628 (H) 179 - 518 mg/dL   APTT    Collection Time: 12/12/21  6:45 AM   Result Value Ref Range    PTT 39.1 (H) 23.9 - 33.8 sec   Protime-INR    Collection Time: 12/12/21  6:45 AM   Result Value Ref Range    Protime 13.0 11.5 - 14.2 sec    INR 1.0    Troponin    Collection Time: 12/12/21  6:45 AM   Result Value Ref Range    Troponin, High Sensitivity 31 (H) 0 - 22 ng/L    Troponin T NOT REPORTED <0.03 ng/mL    Troponin Interp NOT REPORTED    Basic Metabolic Panel w/ Reflex to MG    Collection Time: 12/12/21  6:45 AM   Result Value Ref Range    Glucose 152 (H) 70 - 99 mg/dL    BUN 23 (H) 6 - 20 mg/dL    CREATININE 1.15 0.70 - 1.20 mg/dL    Bun/Cre Ratio 20 9 - 20    Calcium 8.1 (L) 8.6 - 10.4 mg/dL    Sodium 141 135 - 144 mmol/L    Potassium 4.6 3.7 - 5.3 mmol/L    Chloride 105 98 - 107 mmol/L    CO2 20 20 - 31 mmol/L    Anion Gap 16 9 - 17 mmol/L    GFR Non-African American >60 >60 mL/min    GFR African American >60 >60 mL/min    GFR Comment          GFR Staging NOT REPORTED    CBC    Collection Time: 12/12/21  6:45 AM   Result Value Ref Range    WBC 5.2 3.5 - 11.3 k/uL    RBC 4.83 4.21 - 5.77 m/uL    Hemoglobin 14.6 13.0 - 17.0 g/dL    Hematocrit 45.2 40.7 - 50.3 %    MCV 93.6 82.6 - 102.9 fL    MCH 30.2 25.2 - 33.5 pg    MCHC 32.3 28.4 - 34.8 g/dL    RDW 14.5 (H) 11.8 - 14.4 %    Platelets 003 122 - 881 k/uL    MPV 10.7 8.1 - 13.5 fL    NRBC Automated 0.0 0.0 per 100 WBC   D-Dimer, Quantitative    Collection Time: 12/12/21  6:45 AM   Result Value Ref Range    D-Dimer, Quant 2.47 (H) 0.00 - 0.59 mg/L FEU   FERRITIN    Collection Time: 12/12/21  6:45 AM   Result Value Ref Range    Ferritin 2,800 (H) 30 - 400 ug/L   C-REACTIVE PROTEIN    Collection Time: 12/12/21  6:45 AM   Result Value Ref Range    .4 (H) 0.0 - 5.0 mg/L   Arterial Blood Gas, POC    Collection Time: 12/12/21  1:20 PM   Result Value Ref Range    POC pH 7.244 (L) 7.350 - 7.450    POC pCO2 60.2 (H) 35.0 - 48.0 mm Hg    POC PO2 67.7 (L) 83.0 - 108.0 mm Hg    POC HCO3 26.0 21.0 - 28.0 mmol/L    TCO2 (calc), Art NOT REPORTED 22.0 - 29.0 mmol/L    Negative Base Excess, Art 3 (H) 0.0 - 2.0    Positive Base Excess, Art NOT REPORTED 0.0 - 3.0    POC O2 SAT 89 (L) 94.0 - 98.0 %    O2 Device/Flow/% Adult Ventilator     Hamilton Test NOT REPORTED     Sample Site Arterial Line     Mode NOT REPORTED     FIO2 90.0     Pt Temp NOT REPORTED     POC pH Temp NOT REPORTED     POC pCO2 Temp NOT REPORTED mm Hg    POC pO2 Temp NOT REPORTED mm Hg       Imaging/Diagnostics:  XR CHEST (SINGLE VIEW FRONTAL)    Result Date: 12/11/2021  Multifocal hazy opacities throughout both lungs consistent with COVID pneumonia and or pulmonary edema. XR CHEST PORTABLE    Result Date: 12/12/2021  Stable appearing chest with unchanged support tubes/lines and persistent extensive bilateral airspace disease consistent with known COVID pneumonia. XR CHEST PORTABLE    Result Date: 12/12/2021  Right internal jugular central venous catheter tip projecting over the proximal SVC versus brachiocephalic vein. No pneumothorax. Otherwise stable exam from earlier today. XR CHEST PORTABLE    Result Date: 12/12/2021  Endotracheal tube tip is 3.2 cm above the saul. Overall significant worsening of multifocal airspace opacities keeping with history of COVID-19. CT CHEST PULMONARY EMBOLISM W CONTRAST    Result Date: 12/11/2021  1. No evidence of pulmonary embolism 2. Diffuse ground-glass opacities throughout the lungs, typical of COVID-19 pulmonary disease.        Assessment :      Hospital Problems           Last Modified POA    * (Principal) Pneumonia due to COVID-19 virus 12/12/2021 Yes    Acute respiratory failure with hypoxia ([address] 12/12/2021 Yes    Diabetes mellitus ([address] 12/12/2021 Yes    Asthma 12/12/2021 Yes    IRMA on CPAP 12/12/2021 Yes    Overview Signed 12/12/2021  2:39 PM by Adarsh Busby DO     seldom use machine         Hypertension 12/12/2021 Yes    Overview Signed 12/12/2021  2:39 PM by Adarsh Busby DO     on lasix         GERD (gastroesophageal reflux disease) 12/12/2021 Yes    ARDS (adult respiratory distress syndrome) ([address] 12/12/2021 Yes          Plan:     Patient status inpatient in the Progressive Unit/Step down    COVID-19 pneumonia-patient being followed by infectious disease and pulmonary medicine. Patient ordered Actemra, paralyzed and sedated by pulmonary medicine. CRP very elevated, patient's prognosis guarded  Respiratory failure with hypoxia-paralyzed and sedated  ARDS  Essential hypertension  IRMA  Type 2 diabetes- ISS, NPO currently      Consultations:   IP CONSULT TO INFECTIOUS DISEASES  IP CONSULT TO CRITICAL CARE  PHARMACY TO DOSE MEDICATION    Patient is admitted as inpatient status because of co-morbidities listed above, severity of signs and symptoms as outlined, requirement for current medical therapies and most importantly because of direct risk to patient if care not provided in a hospital setting. Expected length of stay > 48 hours.     Adarsh Busby DO  12/12/2021  2:45 PM    Copy sent to Dr. Roselyn Mcclain MD

## 2021-12-12 NOTE — PROGRESS NOTES
Patient weight is between 101-149kg. For prophylaxis with Enoxaparin, Pharmacy adjusted the dose to account for the patient's increased body weight in accordance with hospital approved protocol. The dose has been changed to 30mg BID. Please contact pharmacy with any concerns @ [phone]. Thank you.    Ana Lilia [facility]andro [facility]  12/11/2021 7:03 PM

## 2021-12-12 NOTE — ED NOTES
Spoke with Peterson Peters (Son) and updated about pt current status. Gave son ED phone number to call for any questions.       Inderjit Richardson RN  12/11/21 1033

## 2021-12-13 LAB
ABSOLUTE EOS #: 0 K/UL (ref 0–0.4)
ABSOLUTE IMMATURE GRANULOCYTE: 0.03 K/UL (ref 0–0.3)
ABSOLUTE LYMPH #: 0.23 K/UL (ref 1–4.8)
ABSOLUTE MONO #: 0.3 K/UL (ref 0.1–0.8)
ACTION: NORMAL
ALBUMIN SERPL-MCNC: 3.1 G/DL (ref 3.5–5.2)
ALBUMIN/GLOBULIN RATIO: 1.1 (ref 1–2.5)
ALLEN TEST: ABNORMAL
ALP BLD-CCNC: 104 U/L (ref 40–129)
ALT SERPL-CCNC: 50 U/L (ref 5–41)
ANION GAP SERPL CALCULATED.3IONS-SCNC: 13 MMOL/L (ref 9–17)
AST SERPL-CCNC: 79 U/L
ATYPICAL LYMPHOCYTE ABSOLUTE COUNT: 0.07 K/UL
ATYPICAL LYMPHOCYTES: 2 %
BASOPHILS # BLD: 0 % (ref 0–2)
BASOPHILS ABSOLUTE: 0 K/UL (ref 0–0.2)
BILIRUB SERPL-MCNC: 0.25 MG/DL (ref 0.3–1.2)
BUN BLDV-MCNC: 33 MG/DL (ref 6–20)
BUN/CREAT BLD: ABNORMAL (ref 9–20)
C-REACTIVE PROTEIN: 137.3 MG/L (ref 0–5)
CALCIUM SERPL-MCNC: 8.4 MG/DL (ref 8.6–10.4)
CHLORIDE BLD-SCNC: 110 MMOL/L (ref 98–107)
CO2: 20 MMOL/L (ref 20–31)
CREAT SERPL-MCNC: 0.91 MG/DL (ref 0.7–1.2)
DATE AND TIME: NORMAL
DIFFERENTIAL TYPE: ABNORMAL
EKG ATRIAL RATE: 100 BPM
EKG P AXIS: 41 DEGREES
EKG P-R INTERVAL: 168 MS
EKG Q-T INTERVAL: 338 MS
EKG QRS DURATION: 98 MS
EKG QTC CALCULATION (BAZETT): 436 MS
EKG R AXIS: -8 DEGREES
EKG T AXIS: 31 DEGREES
EKG VENTRICULAR RATE: 100 BPM
EOSINOPHILS RELATIVE PERCENT: 0 % (ref 1–4)
FIO2: 100
GFR AFRICAN AMERICAN: >60 ML/MIN
GFR NON-AFRICAN AMERICAN: >60 ML/MIN
GFR SERPL CREATININE-BSD FRML MDRD: ABNORMAL ML/MIN/{1.73_M2}
GFR SERPL CREATININE-BSD FRML MDRD: ABNORMAL ML/MIN/{1.73_M2}
GLUCOSE BLD-MCNC: 135 MG/DL (ref 75–110)
GLUCOSE BLD-MCNC: 141 MG/DL (ref 75–110)
GLUCOSE BLD-MCNC: 144 MG/DL (ref 75–110)
GLUCOSE BLD-MCNC: 148 MG/DL (ref 75–110)
GLUCOSE BLD-MCNC: 155 MG/DL (ref 74–100)
GLUCOSE BLD-MCNC: 158 MG/DL (ref 70–99)
GLUCOSE BLD-MCNC: 158 MG/DL (ref 75–110)
GLUCOSE BLD-MCNC: 163 MG/DL (ref 75–110)
GLUCOSE BLD-MCNC: 167 MG/DL (ref 74–100)
HCO3 VENOUS: 24.6 MMOL/L (ref 22–29)
HCT VFR BLD CALC: 45.1 % (ref 40.7–50.3)
HEMOGLOBIN: 14.8 G/DL (ref 13–17)
IMMATURE GRANULOCYTES: 1 %
LYMPHOCYTES # BLD: 7 % (ref 24–44)
MAGNESIUM: 2.2 MG/DL (ref 1.6–2.6)
MCH RBC QN AUTO: 31 PG (ref 25.2–33.5)
MCHC RBC AUTO-ENTMCNC: 32.8 G/DL (ref 28.4–34.8)
MCV RBC AUTO: 94.4 FL (ref 82.6–102.9)
MODE: ABNORMAL
MONOCYTES # BLD: 9 % (ref 1–7)
MORPHOLOGY: NORMAL
MRSA, DNA, NASAL: NORMAL
NEGATIVE BASE EXCESS, ART: 2 (ref 0–2)
NEGATIVE BASE EXCESS, ART: 3 (ref 0–2)
NEGATIVE BASE EXCESS, ART: ABNORMAL (ref 0–2)
NEGATIVE BASE EXCESS, ART: ABNORMAL (ref 0–2)
NEGATIVE BASE EXCESS, VEN: 5 (ref 0–2)
NOTIFY: NORMAL
NRBC AUTOMATED: 0 PER 100 WBC
O2 DEVICE/FLOW/%: ABNORMAL
O2 SAT, VEN: 92 % (ref 60–85)
PATIENT TEMP: 37.5
PATIENT TEMP: ABNORMAL
PCO2, VEN: 65.2 MM HG (ref 41–51)
PDW BLD-RTO: 14.6 % (ref 11.8–14.4)
PH VENOUS: 7.18 (ref 7.32–7.43)
PLATELET # BLD: 205 K/UL (ref 138–453)
PLATELET ESTIMATE: ABNORMAL
PMV BLD AUTO: 10.6 FL (ref 8.1–13.5)
PO2, VEN: 80.1 MM HG (ref 30–50)
POC HCO3: 22.1 MMOL/L (ref 21–28)
POC HCO3: 24.5 MMOL/L (ref 21–28)
POC HCO3: 27.7 MMOL/L (ref 21–28)
POC HCO3: 29.8 MMOL/L (ref 21–28)
POC O2 SATURATION: 100 % (ref 94–98)
POC O2 SATURATION: 96 % (ref 94–98)
POC O2 SATURATION: 96 % (ref 94–98)
POC O2 SATURATION: 97 % (ref 94–98)
POC PCO2 TEMP: 65 MM HG
POC PCO2 TEMP: ABNORMAL MM HG
POC PCO2: 37.3 MM HG (ref 35–48)
POC PCO2: 46.8 MM HG (ref 35–48)
POC PCO2: 49.1 MM HG (ref 35–48)
POC PCO2: 63.7 MM HG (ref 35–48)
POC PH TEMP: 7.27
POC PH TEMP: ABNORMAL
POC PH: 7.28 (ref 7.35–7.45)
POC PH: 7.33 (ref 7.35–7.45)
POC PH: 7.36 (ref 7.35–7.45)
POC PH: 7.38 (ref 7.35–7.45)
POC PO2 TEMP: 101 MM HG
POC PO2 TEMP: ABNORMAL MM HG
POC PO2: 319.5 MM HG (ref 83–108)
POC PO2: 85.8 MM HG (ref 83–108)
POC PO2: 95.3 MM HG (ref 83–108)
POC PO2: 97.9 MM HG (ref 83–108)
POSITIVE BASE EXCESS, ART: 1 (ref 0–3)
POSITIVE BASE EXCESS, ART: 1 (ref 0–3)
POSITIVE BASE EXCESS, ART: ABNORMAL (ref 0–3)
POSITIVE BASE EXCESS, ART: ABNORMAL (ref 0–3)
POSITIVE BASE EXCESS, VEN: ABNORMAL (ref 0–3)
POTASSIUM SERPL-SCNC: 4.9 MMOL/L (ref 3.7–5.3)
RBC # BLD: 4.78 M/UL (ref 4.21–5.77)
RBC # BLD: ABNORMAL 10*6/UL
READ BACK: YES
SAMPLE SITE: ABNORMAL
SEG NEUTROPHILS: 81 % (ref 36–66)
SEGMENTED NEUTROPHILS ABSOLUTE COUNT: 2.67 K/UL (ref 1.8–7.7)
SODIUM BLD-SCNC: 143 MMOL/L (ref 135–144)
SPECIMEN DESCRIPTION: NORMAL
TCO2 (CALC), ART: ABNORMAL MMOL/L (ref 22–29)
TOTAL CO2, VENOUS: ABNORMAL MMOL/L (ref 23–30)
TOTAL PROTEIN: 6 G/DL (ref 6.4–8.3)
VITAMIN D 25-HYDROXY: 69.2 NG/ML (ref 30–100)
WBC # BLD: 3.3 K/UL (ref 3.5–11.3)
WBC # BLD: ABNORMAL 10*3/UL

## 2021-12-13 PROCEDURE — 94644 CONT INHLJ TX 1ST HOUR: CPT

## 2021-12-13 PROCEDURE — 2500000003 HC RX 250 WO HCPCS: Performed by: INTERNAL MEDICINE

## 2021-12-13 PROCEDURE — 94645 CONT INHLJ TX EACH ADDL HOUR: CPT

## 2021-12-13 PROCEDURE — APPSS30 APP SPLIT SHARED TIME 16-30 MINUTES: Performed by: NURSE PRACTITIONER

## 2021-12-13 PROCEDURE — 6360000002 HC RX W HCPCS: Performed by: NURSE PRACTITIONER

## 2021-12-13 PROCEDURE — 51702 INSERT TEMP BLADDER CATH: CPT

## 2021-12-13 PROCEDURE — 94003 VENT MGMT INPAT SUBQ DAY: CPT

## 2021-12-13 PROCEDURE — 51798 US URINE CAPACITY MEASURE: CPT

## 2021-12-13 PROCEDURE — 80053 COMPREHEN METABOLIC PANEL: CPT

## 2021-12-13 PROCEDURE — 2580000003 HC RX 258: Performed by: NURSE PRACTITIONER

## 2021-12-13 PROCEDURE — 82947 ASSAY GLUCOSE BLOOD QUANT: CPT

## 2021-12-13 PROCEDURE — 6360000002 HC RX W HCPCS: Performed by: INTERNAL MEDICINE

## 2021-12-13 PROCEDURE — 83735 ASSAY OF MAGNESIUM: CPT

## 2021-12-13 PROCEDURE — 82306 VITAMIN D 25 HYDROXY: CPT

## 2021-12-13 PROCEDURE — 6370000000 HC RX 637 (ALT 250 FOR IP): Performed by: INTERNAL MEDICINE

## 2021-12-13 PROCEDURE — 85025 COMPLETE CBC W/AUTO DIFF WBC: CPT

## 2021-12-13 PROCEDURE — 2000000000 HC ICU R&B

## 2021-12-13 PROCEDURE — 2580000003 HC RX 258: Performed by: INTERNAL MEDICINE

## 2021-12-13 PROCEDURE — 99291 CRITICAL CARE FIRST HOUR: CPT | Performed by: INTERNAL MEDICINE

## 2021-12-13 PROCEDURE — 2700000000 HC OXYGEN THERAPY PER DAY

## 2021-12-13 PROCEDURE — 86140 C-REACTIVE PROTEIN: CPT

## 2021-12-13 PROCEDURE — 6370000000 HC RX 637 (ALT 250 FOR IP): Performed by: NURSE PRACTITIONER

## 2021-12-13 PROCEDURE — 82803 BLOOD GASES ANY COMBINATION: CPT

## 2021-12-13 PROCEDURE — 99232 SBSQ HOSP IP/OBS MODERATE 35: CPT | Performed by: INTERNAL MEDICINE

## 2021-12-13 PROCEDURE — 37799 UNLISTED PX VASCULAR SURGERY: CPT

## 2021-12-13 PROCEDURE — 94761 N-INVAS EAR/PLS OXIMETRY MLT: CPT

## 2021-12-13 RX ORDER — 0.9 % SODIUM CHLORIDE 0.9 %
500 INTRAVENOUS SOLUTION INTRAVENOUS ONCE
Status: COMPLETED | OUTPATIENT
Start: 2021-12-13 | End: 2021-12-14

## 2021-12-13 RX ADMIN — Medication 6000 UNITS: at 09:08

## 2021-12-13 RX ADMIN — DEXAMETHASONE SODIUM PHOSPHATE 20 MG: 10 INJECTION INTRAMUSCULAR; INTRAVENOUS at 13:33

## 2021-12-13 RX ADMIN — PROPOFOL 15 MCG/KG/MIN: 10 INJECTION, EMULSION INTRAVENOUS at 13:32

## 2021-12-13 RX ADMIN — PROPOFOL 15 MCG/KG/MIN: 10 INJECTION, EMULSION INTRAVENOUS at 05:54

## 2021-12-13 RX ADMIN — ENOXAPARIN SODIUM 30 MG: 100 INJECTION SUBCUTANEOUS at 09:08

## 2021-12-13 RX ADMIN — EPOPROSTENOL 50 NG/KG/MIN: 1.5 INJECTION, POWDER, LYOPHILIZED, FOR SOLUTION INTRAVENOUS at 18:21

## 2021-12-13 RX ADMIN — CISATRACURIUM BESYLATE 3 MCG/KG/MIN: 10 INJECTION, SOLUTION INTRAVENOUS at 21:18

## 2021-12-13 RX ADMIN — EPOPROSTENOL 50 NG/KG/MIN: 1.5 INJECTION, POWDER, LYOPHILIZED, FOR SOLUTION INTRAVENOUS at 00:25

## 2021-12-13 RX ADMIN — ENOXAPARIN SODIUM 30 MG: 100 INJECTION SUBCUTANEOUS at 22:25

## 2021-12-13 RX ADMIN — Medication 100 MCG/HR: at 05:54

## 2021-12-13 RX ADMIN — EPOPROSTENOL 50 NG/KG/MIN: 1.5 INJECTION, POWDER, LYOPHILIZED, FOR SOLUTION INTRAVENOUS at 23:37

## 2021-12-13 RX ADMIN — EPOPROSTENOL 50 NG/KG/MIN: 1.5 INJECTION, POWDER, LYOPHILIZED, FOR SOLUTION INTRAVENOUS at 06:41

## 2021-12-13 RX ADMIN — Medication 7 MG/HR: at 10:17

## 2021-12-13 RX ADMIN — SODIUM CHLORIDE, PRESERVATIVE FREE 10 ML: 5 INJECTION INTRAVENOUS at 09:08

## 2021-12-13 RX ADMIN — EPOPROSTENOL 50 NG/KG/MIN: 1.5 INJECTION, POWDER, LYOPHILIZED, FOR SOLUTION INTRAVENOUS at 12:30

## 2021-12-13 RX ADMIN — INSULIN LISPRO 1 UNITS: 100 INJECTION, SOLUTION INTRAVENOUS; SUBCUTANEOUS at 22:26

## 2021-12-13 RX ADMIN — PROPOFOL 15 MCG/KG/MIN: 10 INJECTION, EMULSION INTRAVENOUS at 21:18

## 2021-12-13 RX ADMIN — CISATRACURIUM BESYLATE 3 MCG/KG/MIN: 10 INJECTION, SOLUTION INTRAVENOUS at 13:11

## 2021-12-13 RX ADMIN — CISATRACURIUM BESYLATE 3 MCG/KG/MIN: 10 INJECTION, SOLUTION INTRAVENOUS at 03:29

## 2021-12-13 RX ADMIN — Medication 100 MCG/HR: at 16:14

## 2021-12-13 RX ADMIN — SODIUM CHLORIDE, PRESERVATIVE FREE 10 ML: 5 INJECTION INTRAVENOUS at 21:19

## 2021-12-13 ASSESSMENT — PULMONARY FUNCTION TESTS
PIF_VALUE: 33
PIF_VALUE: 34
PIF_VALUE: 39
PIF_VALUE: 30
PIF_VALUE: 32

## 2021-12-13 ASSESSMENT — PAIN SCALES - GENERAL
PAINLEVEL_OUTOF10: 0

## 2021-12-13 NOTE — PROGRESS NOTES
Physician Progress Note      PATIENT:               Austin Ramirez  CSN #:                  [id number]  :                       1965  ADMIT DATE:       2021 11:39 AM  DISCH DATE:  [facility]  PROVIDER #:        Krystal CLEANING          QUERY TEXT:    Pt admitted with COVID-19 pneumonia and noted to have SIRS. If possible,   please document in progress notes and discharge summary if you are evaluating   and/or treating: The medical record reflects the following:  Risk Factors: COVID-19 pneumonia  Clinical Indicators: WBC 3.3, Tmax 100.6, HR max 111, RR 30's, CRP   137.3<277.4, fibrinogen 628, ferritin 2800, hypotension initially requiring   pressors  Treatment: IVF boluses, Decadron, Actemra, mechanical ventilation,   Levophed-now off, labs, cultures, ID and pulmonology consults, droplet plus   isolation    Call if any questions. Thank you, Rosey Baron, [facility]  Options provided:  -- Sepsis present on admission due to COVID-19 pneumonia  -- Sepsis with septic shock-now resolved present on admission due to COVID-19   pneumonia  -- Covid-19 pneumonia without sepsis  -- Other - I will add my own diagnosis  -- Disagree - Not applicable / Not valid  -- Disagree - Clinically unable to determine / Unknown  -- Refer to Clinical Documentation Reviewer    PROVIDER RESPONSE TEXT:    This patient has sepsis which was present on admission due to COVID-19   pneumonia.     Query created by: Franky Washington on 2021 7:12 AM      Electronically signed by:  Krystal CLEANING 2021 9:37 AM

## 2021-12-13 NOTE — PLAN OF CARE
Nutrition Problem #1: Inadequate oral intake  Intervention: Food and/or Nutrient Delivery: Continue Current Diet, Start Tube Feeding  Nutritional Goals: Pt to meet % of est'd needs daily via EN

## 2021-12-13 NOTE — PLAN OF CARE
Problem: Airway Clearance - Ineffective  Goal: Achieve or maintain patent airway  Outcome: Ongoing     Problem: Gas Exchange - Impaired  Goal: Absence of hypoxia  Outcome: Ongoing  Goal: Promote optimal lung function  Outcome: Ongoing     Problem: Breathing Pattern - Ineffective  Goal: Ability to achieve and maintain a regular respiratory rate  Outcome: Ongoing     Problem:  Body Temperature -  Risk of, Imbalanced  Goal: Ability to maintain a body temperature within defined limits  Outcome: Ongoing  Goal: Will regain or maintain usual level of consciousness  Outcome: Ongoing  Goal: Complications related to the disease process, condition or treatment will be avoided or minimized  Outcome: Ongoing     Problem: Isolation Precautions - Risk of Spread of Infection  Goal: Prevent transmission of infection  Outcome: Ongoing     Problem: Nutrition Deficits  Goal: Optimize nutritional status  Outcome: Ongoing     Problem: Risk for Fluid Volume Deficit  Goal: Maintain normal heart rhythm  Outcome: Ongoing  Goal: Maintain absence of muscle cramping  Outcome: Ongoing  Goal: Maintain normal serum potassium, sodium, calcium, phosphorus, and pH  Outcome: Ongoing     Problem: Loneliness or Risk for Loneliness  Goal: Demonstrate positive use of time alone when socialization is not possible  Outcome: Ongoing     Problem: Fatigue  Goal: Verbalize increase energy and improved vitality  Outcome: Ongoing     Problem: Patient Education: Go to Patient Education Activity  Goal: Patient/Family Education  Outcome: Ongoing     Problem: OXYGENATION/RESPIRATORY FUNCTION  Goal: Patient will maintain patent airway  12/13/2021 0922 by Netta Bundy RN  Outcome: Ongoing  12/13/2021 0858 by Carmen Erwin RCP  Outcome: Ongoing  12/12/2021 2145 by Gila Armstrong RCP  Outcome: Ongoing  Goal: Patient will achieve/maintain normal respiratory rate/effort  Description: Respiratory rate and effort will be within normal limits for the patient  12/13/2021 1653 by Abigail Leal RN  Outcome: Ongoing  12/13/2021 0858 by Stormy Jimenez RCP  Outcome: Ongoing  12/12/2021 2145 by Claudene Cord, RCP  Outcome: Ongoing     Problem: MECHANICAL VENTILATION  Goal: Patient will maintain patent airway  12/13/2021 0922 by Abigail Leal RN  Outcome: Ongoing  12/13/2021 0858 by Stormy Jimenez RCP  Outcome: Ongoing  12/12/2021 2145 by Claudene Cord, RCP  Outcome: Ongoing  Goal: Oral health is maintained or improved  12/13/2021 0922 by Abigail Leal RN  Outcome: Ongoing  12/13/2021 0858 by Stormy Jimenez RCP  Outcome: Ongoing  12/12/2021 2145 by Claudene Cord, RCP  Outcome: Ongoing  Goal: ET tube will be managed safely  12/13/2021 0922 by Abigail Leal RN  Outcome: Ongoing  12/13/2021 0858 by Stormy Jimenez RCP  Outcome: Ongoing  12/12/2021 2145 by Claudene Cord, RCP  Outcome: Ongoing  Goal: Ability to express needs and understand communication  12/13/2021 0922 by Abigail Leal RN  Outcome: Ongoing  12/13/2021 0858 by Stormy Jimenez RCP  Outcome: Ongoing  12/12/2021 2145 by Claudene Cord, RCP  Outcome: Ongoing  Goal: Mobility/activity is maintained at optimum level for patient  12/13/2021 4123 by Abigail Leal RN  Outcome: Ongoing  12/13/2021 0858 by Stormy Jimenez RCP  Outcome: Ongoing  12/12/2021 2145 by Claudene Cord, RCP  Outcome: Ongoing     Problem: SKIN INTEGRITY  Goal: Skin integrity is maintained or improved  12/13/2021 0922 by Abigail Leal RN  Outcome: Ongoing  12/12/2021 2145 by Claudene Cord, RCP  Outcome: Ongoing     Problem: Skin Integrity:  Goal: Will show no infection signs and symptoms  Description: Will show no infection signs and symptoms  Outcome: Ongoing  Goal: Absence of new skin breakdown  Description: Absence of new skin breakdown  Outcome: Ongoing     Problem: Falls - Risk of:  Goal: Will remain free from falls  Description: Will remain free from falls  Outcome: Ongoing  Goal: Absence of physical injury  Description: Absence of physical injury  Outcome: Ongoing

## 2021-12-13 NOTE — PROGRESS NOTES
[facility]  Office: [address], DO, Tiffanie Bran, DO, Gildardo Altamirano, DO, Debbie Cat Blood, DO, Navi Manuel MD, Say Galan MD, Stephani Gupta MD, Ángel Cosme MD, Darby Leger MD, Nabeel Jiang MD, Koko Duron MD, Praneeth Church, DO, Segundo Villar DO, Andriy Li MD,  Tennille Stover, DO, Cyndy North MD, Domenica Srivastava MD, Nuzhat Redmond MD, Minesh Nieves MD, Sammie Strong MD, Rolanda Tapia MD, Jl Krishnamurthy MD, Beth Oconnor, [facility], [facility], [facility], Shaun Yepez, CNP, Wilfredo Hearn, CNS, Rupa Mak, CNP, Yang Serra, CNP, Havemiguel Lemoss, CNP, Ronna Vasquez, CNP, Shari Pro, CNP, Milissa Pallas, PA-C, Trudi Soulier, [facility], Halle Dunn, CNP, Criselda Gupta, CNP, Jewel Benson, CNP, Frankey Blush, CNP, Marissa Johnson, CNP, Kaitlin Berry, CNP, Pablo Underwood, [address] [place] [address]    Progress Note    12/13/2021    10:50 AM    Name:   Viky Devi  MRN:     [id number]     Acct:      [de-identified]   Room:   [address] [place] [address] Day:  1  Admit Date:  12/12/2021 11:39 AM    PCP:   Jerry Taylor MD  Code Status:  Full Code    Subjective:     C/C: COVID Pneumonia    Interval History Status: not changed. Patient still on high ventilator requirements. Slightly improving. RN updated family today. Brief History:     38-year-old male presents for cough, shortness of breath and extremity weakness for 5 days. Patient presented at [facility].  Patient found to be hypoxic and hypotensive with oxygen saturation in the mid 90s on room air. Patient quickly required BiPAP to maintain oxygen saturations above 90%. Patient was unable to tolerate BiPAP and per chart repeatedly took off the mask and desaturated and 70s. Patient was seen by pulmonary medicine recommending transfer to [place] for further care.   Patient arrived intubated and sedated, pulmonary medicine was at bedside requiring paralytics to maintain ventilator synchrony. Intermittently required Levophed for blood pressure. Patient now sedated on Versed and fentanyl. Patient requiring 100% FiO2 with PEEP of 21. Review of Systems:     Patient unable to respond due to being intubated sedated and paralyzed    Medications: Allergies: Allergies   Allergen Reactions    Nuts [Peanut-Containing Drug Products] Anaphylaxis    Sunflower Oil Anaphylaxis     Sunflower seeds       Current Meds:   Scheduled Meds:    sodium chloride flush  5-40 mL IntraVENous 2 times per day    enoxaparin  30 mg SubCUTAneous BID    Vitamin D  6,000 Units Oral Daily    Followed by   Alexandrea Grit ON 12/19/2021] Vitamin D  2,000 Units Oral Daily    dexamethasone  20 mg IntraVENous Q24H    Followed by   Alexandrea Grit ON 12/17/2021] dexamethasone  10 mg IntraVENous Q24H    insulin lispro  0-6 Units SubCUTAneous Q4H     Continuous Infusions:    sodium chloride      dextrose      norepinephrine Stopped (12/12/21 1149)    cisatracurium (NIMBEX) infusion 3 mcg/kg/min (12/13/21 0329)    midazolam 7 mg/hr (12/13/21 1017)    fentaNYL 100 mcg/hr (12/13/21 0554)    epoprostenol (VELETRI) nebulization solution 50 ng/kg/min (12/13/21 0641)    propofol 15 mcg/kg/min (12/13/21 0554)    dextrose      sodium chloride 50 mL/hr at 12/12/21 2057     PRN Meds: sodium chloride flush, sodium chloride, ondansetron **OR** ondansetron, polyethylene glycol, acetaminophen **OR** acetaminophen, glucose, dextrose, glucagon (rDNA), dextrose, glucose, dextrose, glucagon (rDNA), dextrose    Data:     Past Medical History:   has a past medical history of Arthritis, Asthma, Diabetes mellitus ([address] Edema, GERD (gastroesophageal reflux disease), Hypertension, Migraine, and IRMA on CPAP. Social History:   reports that he has quit smoking. He has never used smokeless tobacco. He reports current alcohol use. He reports that he does not use drugs.      Family History:   Family History   Problem Relation Age of Onset    Heart Attack Sister 32    Diabetes Paternal Grandmother     Heart Disease Paternal Uncle     Heart Disease Paternal Uncle     Heart Disease Paternal Uncle     Cancer Maternal Aunt     Cancer Maternal Aunt     Cancer Maternal Uncle     Cancer Maternal Uncle        Vitals:  /72   Pulse 68   Temp 100.6 °F (38.1 °C) (Esophageal)   Resp 30   Wt (!) 306 lb 7 oz (139 kg)   SpO2 94%   BMI 39.34 kg/m²   Temp (24hrs), Av.6 °F (38.1 °C), Min:100.6 °F (38.1 °C), Max:100.6 °F (38.1 °C)    Recent Labs     21  0009 21  0253 21  0447 21  0717   POCGLU 167* 148* 155* 141*       I/O (24Hr):     Intake/Output Summary (Last 24 hours) at 2021 1050  Last data filed at 2021 0447  Gross per 24 hour   Intake 1744.41 ml   Output 1850 ml   Net -105.59 ml       Labs:  Hematology:  Recent Labs     21  1445 21  0645 21  0539   WBC 4.1 5.2 3.3*   RBC 5.12 4.83 4.78   HGB 15.7 14.6 14.8   HCT 47.2 45.2 45.1   MCV 92.2 93.6 94.4   MCH 30.7 30.2 31.0   MCHC 33.3 32.3 32.8   RDW 14.1 14.5* 14.6*   * 164 205   MPV 11.1 10.7 10.6   .3* 277.4* 137.3*   INR  --  1.0  --    DDIMER 0.99* 2.47*  --      Chemistry:  Recent Labs     21  1445 21  0645 21  0539    141 143   K 3.9 4.6 4.9    105 110*   CO2 18* 20 20   GLUCOSE 139* 152* 158*   BUN 22* 23* 33*   CREATININE 1.13 1.15 0.91   MG  --   --  2.2   ANIONGAP 16 16 13   LABGLOM >60 >60 >60   GFRAA >60 >60 >60   CALCIUM 8.6 8.1* 8.4*   TROPHS  --  31*  --      Recent Labs     21  1445 21  1504 21  1825 21  0009 21  0253 21  0447 21  0539 21  0717   PROT 6.7  --   --   --   --   --  6.0*  --    LABALBU 3.4*  --   --   --   --   --  3.1*  --    AST 90*  --   --   --   --   --  79*  --    ALT 48*  --   --   --   --   --  50*  --    *  --   --   --   --   --   --   --    ALKPHOS 99  --   -- --   --   --  104  --    BILITOT 0.32  --   --   --   --   --  0.25*  --    POCGLU  --  153* 122* 167* 148* 155*  --  141*     ABG:  Lab Results   Component Value Date    POCPH 7.358 12/13/2021    POCPCO2 49.1 12/13/2021    POCPO2 319.5 12/13/2021    POCHCO3 27.7 12/13/2021    NBEA NOT REPORTED 12/13/2021    PBEA 1 12/13/2021    DHS4FGB NOT REPORTED 12/13/2021    GHBY8MXV 100 12/13/2021    FIO2 100.0 12/13/2021     Lab Results   Component Value Date/Time    SPECIAL RT ARM, 4 ML 12/11/2021 03:04 PM     Lab Results   Component Value Date/Time    CULTURE NO GROWTH 1 DAY 12/11/2021 03:04 PM       Radiology:  XR CHEST (SINGLE VIEW FRONTAL)    Result Date: 12/12/2021  Stable appearing     XR CHEST (SINGLE VIEW FRONTAL)    Result Date: 12/11/2021  Multifocal hazy opacities throughout both lungs consistent with COVID pneumonia and or pulmonary edema. XR CHEST PORTABLE    Result Date: 12/12/2021  Stable appearing chest with unchanged support tubes/lines and persistent extensive bilateral airspace disease consistent with known COVID pneumonia. XR CHEST PORTABLE    Result Date: 12/12/2021  Right internal jugular central venous catheter tip projecting over the proximal SVC versus brachiocephalic vein. No pneumothorax. Otherwise stable exam from earlier today. XR CHEST PORTABLE    Result Date: 12/12/2021  Endotracheal tube tip is 3.2 cm above the saul. Overall significant worsening of multifocal airspace opacities keeping with history of COVID-19. CT CHEST PULMONARY EMBOLISM W CONTRAST    Result Date: 12/11/2021  1. No evidence of pulmonary embolism 2. Diffuse ground-glass opacities throughout the lungs, typical of COVID-19 pulmonary disease.        Physical Examination:        General appearance: Unresponsive and paralyzed  Lungs:  clear to auscultation bilaterally, normal effort  Heart:  regular rate and rhythm, no murmur  Abdomen:  soft, nontender, nondistended, normal bowel sounds, no masses, hepatomegaly, splenomegaly  Extremities:  no edema, redness, tenderness in the calves  Skin:  no gross lesions, rashes, induration    Assessment:        Hospital Problems           Last Modified POA    * (Principal) Pneumonia due to COVID-19 virus 12/12/2021 Yes    Acute respiratory failure with hypoxia ([address] 12/12/2021 Yes    Diabetes mellitus ([address] 12/12/2021 Yes    Asthma 12/12/2021 Yes    IRMA on CPAP 12/12/2021 Yes    Overview Signed 12/12/2021  2:39 PM by Jan Monroe DO     seldom use machine         Hypertension 12/12/2021 Yes    Overview Signed 12/12/2021  2:39 PM by Jan Monroe DO     on lasix         GERD (gastroesophageal reflux disease) 12/12/2021 Yes    ARDS (adult respiratory distress syndrome) ([facility] 12/12/2021 Yes          Plan:        COVID-19 pneumonia-status post Actemra 12/12/2021, CRP decreasing. Patient is oxygen requirements also improving while proning. Nursing called family today, requested  support.   Respiratory failure with hypoxia-paralyzed and sedated  ARDS  Essential hypertension  IRMA  Type 2 diabetes- ISS, NPO currently        Jan Monroe DO  12/13/2021  10:50 AM

## 2021-12-13 NOTE — CARE COORDINATION
Case Management Initial Discharge Plan  Janet Gastelum,             Met with: son, Vera Olmedo I, and daughter, Angella Dejesus, via telephone to discuss discharge plans. Information verified: address, contacts, phone number, , insurance Yes  Insurance Provider: [employer]    Emergency Contact/Next of Kin name & number: Angella Dejesus (daughter [phone]  Who are involved in patient's support system? family    PCP: Hayes Orr MD  Date of last visit: couple weeks      Discharge Planning    Living Arrangements:  [address]  1 stairs to climb to get into front door, 1 flight stairs to climb to reach second floor  Location of bedroom/bathroom in home 2nd    Patient able to perform ADL's:Independent    Current Services (outpatient & in home)   DME equipment: cpap-does not wear and nebulizer  DME provider:     Is patient receiving oral anticoagulation therapy? No-daughter does not think so    If indicated:   Physician managing anticoagulation treatment:   Where does patient obtain lab work for ATC treatment? Potential Assistance Needed:  [place]    Patient agreeable to home care: No  [place] of choice provided:  n/a    Prior SNF/Rehab Placement and Facility:   Agreeable to SNF/Rehab: Yes  [place] of choice provided: yes     Evaluation: no    Expected Discharge date:       Patient expects to be discharged to: If home: is the family and/or caregiver wiling & able to provide support at home? yes  Who will be providing this support? children    Follow Up Appointment: Best Day/ Time:      Transportation provider:   Transportation arrangements needed for discharge: No    Readmission Risk              Risk of Unplanned Readmission:  16             Does patient have a readmission risk score greater than 14?: Yes  If yes, follow-up appointment must be made within 7 days of discharge.      Goals of Care: breathe easier      Educated Vera Olmedo I and Angella Dejesus on transitional options, provided freedom of choice and are agreeable with plan      Discharge Plan: goal is to return home with children. Pt lives with daughter and two sons. They are agreeable to [facility] or SNF if needed. Lists sent to Edgar's email [email or web address]          Electronically signed by Reyna Tejada RN on 12/13/21 at 11:27 AM EST

## 2021-12-13 NOTE — PROGRESS NOTES
Pt currently proned, head turned to the left, no skin breakdown noticed. ETT secured by twill. Pt tolerated well. RN x1 assisted.

## 2021-12-13 NOTE — PROGRESS NOTES
[facility] - Initial Consult Note COVID 19 Patient  Today's Date and Time: 12/13/2021, 10:45 AM    Impression :     · COVID 19 Confirmed Infection  · Covid tests:  · 12/11/2021: Positive  · Elevated inflammatory markers  · Acute hypoxic respiratory failure  · History of asthma  · Diabetes mellitus  · Essential hypertension  · IRMA on CPAP  · Patient has not received the Covid vaccination    Recommendations:   · Antibiotic treatment:  · Monitor off antibiotics  · Covid Rx:    · Remdesivir-out of window  · Decadron-high-dose initiated  · Actemra-ordered 12/12/2021  · Monoclonal antibodies-out of window      Medical Decision Making/Summary/Discussion:12/13/2021     · Patient admitted with COVID 19 infection    Infection Control Recommendations   · Universal Precautions  · Airborne isolation  · Droplet Isolation    Antimicrobial Stewardship Recommendations       · Discontinuation of therapy  Coordination of Outpatient Care:   · Estimated Length of IV antimicrobials:TBD  · Patient will need Midline Catheter Insertion: TBD  · Patient will need PICC line Insertion: No  · Patient will need: Home IV , [place],  SNF,  LTAC:TBD  · Patient will need outpatient wound care:No    Chief complaint/reason for consultation:   · Concern for COVID infection      History of Present Illness:   Kj Galvan is a 60y.o.-year-old male who was initially admitted on 12/12/2021. Patient seen at the request of Dr. Lashawn Rawls:    Patient presented through [facility] ER on 12/11/2021 with complaints of worsening shortness of breath with associated generalized weakness and nonproductive cough over the past several days. He was exposed to his son who was diagnosed with Covid last week and has not received the Covid vaccine. The patient was very hypoxic with an SPO2 in the high 50s on room air.     Imaging revealed bilateral pulmonary opacities typical of Infection:    No   Additional antibiotics: No    Labs, X rays reviewed: 12/13/2021    BUN:23-->33  Cr:1.15-->0.91    WBC:5.2-->3.3  Hb:14.6-->14.8  Plat: 164-->205    Absolute Neutrophils:3.73  Absolute Lymphocytes:0.29  Neutrophil/Lymphocyte Ratio: 12.8 high risk    CRP:293-->277-->137  Ferritin:2800  LDH: 838    Pro Calcitonin:      Cultures:  Urine:  ·   Blood:  ·   Sputum :  ·   Wound:       CXR:   · 1221 diffuse bilateral infiltrates  CAT:      Discussed with patient, RN, CC, IM.      12-12-21:      I have personally reviewed the past medical history, past surgical history, medications, social history, and family history, and I have updated the database accordingly.   Past Medical History:     Past Medical History:   Diagnosis Date    Arthritis     Asthma     Diabetes mellitus ([address]     Edema     GERD (gastroesophageal reflux disease)     Hypertension     on lasix    Migraine     IRMA on CPAP     seldom use machine       Past Surgical  History:     Past Surgical History:   Procedure Laterality Date    APPENDECTOMY      ARTHROPLASTY Left 11/1/2019    LEFT FOOT 1ST MTPJ ARTHROPLASTY WITH PEREZ IMPLANT AND GROMMETS  [facility] performed by Lex Orosco MD at [address] [facility] Right 12/12/2019    RIGHT FOOT ARTHROPLASTY 1ST MTPJ (Right Foot)    ARTHROPLASTY Right 12/12/2019    RIGHT FOOT ARTHROPLASTY 1ST MTPJ performed by Lex Orosco MD at [address] Right    [address]  2014    no blockage no stents    CHOLECYSTECTOMY      COLONOSCOPY      ENDOSCOPY, COLON, DIAGNOSTIC      TOE SURGERY Left 11/01/2019     LEFT FOOT 1ST MTPJ ARTHROPLASTY WITH PEREZ IMPLANT AND GROMMETS  ALLEN MED (Left )    TONSILLECTOMY         Medications:      sodium chloride flush  5-40 mL IntraVENous 2 times per day    enoxaparin  30 mg SubCUTAneous BID    Vitamin D  6,000 Units Oral Daily    Followed by   Armando Amezcua ON 12/19/2021] Vitamin D  2,000 Units Oral Daily    Number of Places Lived in the Last Year: Not on file    Unstable Housing in the Last Year: Not on file       Family History:     Family History   Problem Relation Age of Onset    Heart Attack Sister 32    Diabetes Paternal Grandmother     Heart Disease Paternal Uncle     Heart Disease Paternal Uncle     Heart Disease Paternal Uncle     Cancer Maternal Aunt     Cancer Maternal Aunt     Cancer Maternal Uncle     Cancer Maternal Uncle         Allergies:   Nuts [peanut-containing drug products] and Sunflower oil     Review of Systems:     Unable to assess 12/13/2021  Intubated and sedated  Constitutional: No fevers or chills. No systemic complaints  Head: No headaches  Eyes: No double vision or blurry vision. No conjunctival inflammation. ENT: No sore throat or runny nose. . No hearing loss, tinnitus or vertigo. Cardiovascular: No chest pain or palpitations. No Shortness of breath. No LARSON  Lung: No Shortness of breath or cough. No sputum production  Abdomen: No nausea, vomiting, diarrhea, or abdominal pain. [place] No cramps. Genitourinary: No increased urinary frequency, or dysuria. No hematuria. No suprapubic or CVA pain  Musculoskeletal: No muscle aches or pains. No joint effusions, swelling or deformities  Hematologic: No bleeding or bruising. Neurologic: No headache, weakness, numbness, or tingling. Integument: No rash, no ulcers. Psychiatric: No depression. Endocrine: No polyuria, no polydipsia, no polyphagia.     Physical Examination :     Patient Vitals for the past 8 hrs:   BP Pulse Resp SpO2 Weight   12/13/21 0900 113/72 68 30 94 %    12/13/21 0853  67 29 94 %    12/13/21 0800 125/86 69 30 94 %    12/13/21 0700 116/86 68 30 94 %    12/13/21 0641   30 93 %    12/13/21 0600 109/77 65 30 93 %    12/13/21 0500 108/74 66 30 94 % (!) 306 lb 7 oz (139 kg)   12/13/21 0445   30 95 %    12/13/21 0400 106/71 72 30 96 %    12/13/21 0300 103/61 69 30 95 %      General Appearance: Intubated and sedated  Head:  Normocephalic, no trauma  Eyes: Pupils equal, round, reactive to light; sclera anicteric; conjunctivae pink. No embolic phenomena. ENT: Oropharynx clear, without erythema, exudate, or thrush. No tenderness of sinuses. Mouth/throat: mucosa pink and moist. No lesions. Dentition in good repair. Neck:Supple, without lymphadenopathy. Thyroid normal, No bruits. Pulmonary/Chest: Diminished to auscultation, without wheezes, rales, or rhonchi. No dullness to percussion. Cardiovascular: Regular rate and rhythm without murmurs, rubs, or gallops. Abdomen: Soft, non tender. Bowel sounds normal. No organomegaly  All four Extremities: No cyanosis, clubbing, edema, or effusions. Neurologic: Intubated and sedated  Skin: Warm and dry with good turgor. No signs of peripheral arterial or venous insufficiency. No ulcerations. No open wounds. Medical Decision Making -Laboratory:   I have independently reviewed/ordered the following labs:    CBC with Differential:   Recent Labs     12/11/21  1445 12/11/21  1445 12/12/21  0645 12/13/21  0539   WBC 4.1   < > 5.2 3.3*   HGB 15.7   < > 14.6 14.8   HCT 47.2   < > 45.2 45.1   *   < > 164 205   LYMPHOPCT 7*  --   --  7*   MONOPCT 2  --   --  9*    < > = values in this interval not displayed. BMP:   Recent Labs     12/12/21  0645 12/13/21  0539    143   K 4.6 4.9    110*   CO2 20 20   BUN 23* 33*   CREATININE 1.15 0.91   MG  --  2.2     Hepatic Function Panel:   Recent Labs     12/11/21  1445 12/13/21  0539   PROT 6.7 6.0*   LABALBU 3.4* 3.1*   BILITOT 0.32 0.25*   ALKPHOS 99 104   ALT 48* 50*   AST 90* 79*     No results for input(s): RPR in the last 72 hours. No results for input(s): HIV in the last 72 hours. No results for input(s): BC in the last 72 hours.   Lab Results   Component Value Date    MUCUS 2+ 12/11/2021    RBC 4.78 12/13/2021    TRICHOMONAS NOT REPORTED 12/11/2021    WBC 3.3 12/13/2021    YEAST NOT REPORTED 12/11/2021    TURBIDITY Clear 12/11/2021     Lab Results   Component Value Date    CREATININE 0.91 12/13/2021    GLUCOSE 158 12/13/2021       Medical Decision Making-Imaging:     Narrative   EXAMINATION:   CTA OF THE CHEST 12/11/2021 11:31 am       TECHNIQUE:   CTA of the chest was performed after the administration of intravenous   contrast.  Multiplanar reformatted images are provided for review. MIP   images are provided for review. Dose modulation, iterative reconstruction,   and/or weight based adjustment of the mA/kV was utilized to reduce the   radiation dose to as low as reasonably achievable. COMPARISON:   Chest x-ray dated December 11, 2021       HISTORY:   ORDERING SYSTEM PROVIDED HISTORY: hypoxemia, covid positive, d-dimer-0.99   TECHNOLOGIST PROVIDED HISTORY:   hypoxemia, covid positive, d-dimer-0.99   Decision Support Exception - unselect if not a suspected or confirmed   emergency medical condition->Emergency Medical Condition (MA)   Reason for Exam: COVID positive, elevated D-Dimer       FINDINGS:   Pulmonary Arteries: Pulmonary arteries are adequately opacified for   evaluation. No evidence of intraluminal filling defect to suggest pulmonary   embolism. Main pulmonary artery is normal in caliber. Mediastinum: Nonspecific mediastinal and hilar lymph nodes are present,   likely reactive. .  The heart and pericardium demonstrate no acute   abnormality. There is no acute abnormality of the thoracic aorta. Lungs/pleura: Diffuse ground-glass opacification is present throughout the   lungs, typical of COVID-19 pulmonary disease. No pleural effusion or   pneumothorax is present. Upper Abdomen: Images through the upper abdomen demonstrate a small hiatal   hernia. Diffuse hepatic steatosis is present. The gallbladder is surgically   absent. Soft Tissues/Bones: No acute bone or soft tissue abnormality. Impression   1. No evidence of pulmonary embolism   2.  Diffuse ground-glass opacities throughout the lungs, typical of COVID-19   pulmonary disease. Narrative   EXAMINATION:   ONE XRAY VIEW OF THE CHEST       12/11/2021 3:54 pm       COMPARISON:   November 13, 2012       HISTORY:   ORDERING SYSTEM PROVIDED HISTORY: hypoxemia, probable covid pneumonia   TECHNOLOGIST PROVIDED HISTORY:   hypoxemia, probable covid pneumonia       FINDINGS:   Multifocal hazy opacities throughout both lungs would be consistent with   history of COVID pneumonia. Pulmonary edema could have a similar appearance. No pneumothorax or pleural effusion. Cardiac size enlarged. Mediastinum   unremarkable. No acute osseous abnormality. Impression   Multifocal hazy opacities throughout both lungs consistent with COVID   pneumonia and or pulmonary edema. Medical Decision Tgxqqs-Wlvnknql-Ssuls:       Medical Decision Making-Other:     Note:  · Labs, medications, radiologic studies were reviewed with personal review of films  · Large amounts of data were reviewed  · Discussed with nursing Staff, Discharge planner  · Infection Control and Prevention measures reviewed  · All prior entries were reviewed  · Administer medications as ordered  · Prognosis: Guarded  · Discharge planning reviewed      Thank you for allowing us to participate in the care of this patient. Please call with questions. Onofre Greenwood APRN - CNP     ATTESTATION:    I have discussed the case, including pertinent history and exam findings with the APRN. I have evaluated the  History, physical findings and pictures of the patient and the key elements of the encounter have been performed by me. I have reviewed the laboratory data, other diagnostic studies and discussed them with the APRN. I have updated the medical record where necessary. I agree with the assessment, plan and orders as documented by the APRN.     Isidra Klein MD.          Pager: [phone] - Office: [phone]

## 2021-12-13 NOTE — PLAN OF CARE
Problem: OXYGENATION/RESPIRATORY FUNCTION  Goal: Patient will maintain patent airway  12/12/2021 2145 by Too Hernandez RCP  Outcome: Ongoing     Problem: OXYGENATION/RESPIRATORY FUNCTION  Goal: Patient will achieve/maintain normal respiratory rate/effort  Description: Respiratory rate and effort will be within normal limits for the patient  12/12/2021 2145 by Too Hernandez RCP  Outcome: Ongoing     Problem: MECHANICAL VENTILATION  Goal: Patient will maintain patent airway  12/12/2021 2145 by Too Hernandez RCP  Outcome: Ongoing     Problem: MECHANICAL VENTILATION  Goal: Oral health is maintained or improved  12/12/2021 2145 by Too Hernandez RCP  Outcome: Ongoing     Problem: MECHANICAL VENTILATION  Goal: ET tube will be managed safely  12/12/2021 2145 by Too Hernandez RCP  Outcome: Ongoing     Problem: MECHANICAL VENTILATION  Goal: Ability to express needs and understand communication  12/12/2021 2145 by Too Hernandez RCP  Outcome: Ongoing     Problem: MECHANICAL VENTILATION  Goal: Mobility/activity is maintained at optimum level for patient  Outcome: Ongoing     Problem: SKIN INTEGRITY  Goal: Skin integrity is maintained or improved  12/12/2021 2145 by Too Hernandez RCP  Outcome: Ongoing

## 2021-12-13 NOTE — PROGRESS NOTES
[facility] - Initial Consult Note COVID 19 Patient  Today's Date and Time: 12/13/2021, 3:39 PM    Impression :     COVID 19 Confirmed Infection  Covid tests:  12/11/2021: Positive  Elevated inflammatory markers  Acute hypoxic respiratory failure  History of asthma  Diabetes mellitus  Essential hypertension  IRMA on CPAP  Patient has not received the Covid vaccination    Recommendations:   Antibiotic treatment:  Monitor off antibiotics  Covid Rx:    Remdesivir-out of window  Decadron-high-dose initiated  Actemra-ordered 12/12/2021  Monoclonal antibodies-out of window      Medical Decision Making/Summary/Discussion:12/13/2021     Patient admitted with COVID 19 infection    Infection Control Recommendations   [place] Precautions  Airborne isolation  Droplet Isolation    Antimicrobial Stewardship Recommendations       Discontinuation of therapy  Coordination of Outpatient Care:   Estimated Length of IV antimicrobials:TBD  Patient will need Midline Catheter Insertion: TBD  Patient will need PICC line Insertion: No  Patient will need: Home IV , [place],  SNF,  LTAC:TBD  Patient will need outpatient wound care:No    Chief complaint/reason for consultation:   Concern for COVID infection      History of Present Illness:   Alexsandra Walton is a 60y.o.-year-old male who was initially admitted on 12/12/2021. Patient seen at the request of Dr. Nicolette Negro:    Patient presented through [facility]'s ER on 12/11/2021 with complaints of worsening shortness of breath with associated generalized weakness and nonproductive cough over the past several days. He was exposed to his son who was diagnosed with Covid last week and has not received the Covid vaccine. The patient was very hypoxic with an SPO2 in the high 50s on room air.     Imaging revealed bilateral pulmonary opacities typical of COVID-19    Abnormal labs include:    Ferritin 2800  LDH 838    Patient has been intubated and transferred to [facility]  He has been started on high-dose Decadron and Actemra has been ordered    CT CHEST PULMONARY EMBOLISM W CONTRAST 12/11/2021   Final Result   1. No evidence of pulmonary embolism   2. Diffuse ground-glass opacities throughout the lungs, typical of COVID-19   pulmonary disease.           XR CHEST (SINGLE VIEW FRONTAL) 12/11/2021   Final Result   Multifocal hazy opacities throughout both lungs consistent with COVID   pneumonia and or pulmonary edema       Patient admitted because of concerns with COVID 19.    CURRENT EVALUATION : 12/13/2021    Low-grade fevers continue  VS stable    The patient continues on mechanical ventilation  He was prone at the time of evaluation. CRP remains elevated but has trended down to day to 137 from 293  Sediment noted in urine-the patient has a same Hahn from outlying facility. Orders placed to place Hahn and send a new urine culture. No other acute issues noted at this time    Discussed with RN    Patient exhibiting respiratory distress. yes  Respiratory secretions: no    Patient receiving supplemental oxygen.   Mechanical ventilation  RR:  20-->30  02 sat: 91-->94    % FIO2: 90-->80  PEEP:   21-->16    QTc:       NEWS Score: 0-4 Low risk group; 5-6: Medium risk group; 7 or above: High risk group  Parameters 3 2 1 0 1 2 3   Age    < 65   = 65   RR = 8  9-11 12-20  21-24 = 25   O2 Sats = 91 92-93 94-95 = 96      Suppl O2  Yes  No      SBP = 90  101-110 111-219   = 220   HR = 40  41-50 51-90  111-130 = 131   Consciousness    Alert   Drowsiness, lethargy, or confusion   Temperature = 35.0 C (95.0 F)  35.1-36.0 C 95.1-96.9 F 36.1-38.0 C 97.0-100.4 F 38.1-39.0 C 100.5-102.3 F = 39.1 C = 102.4 F      NEWS Score:  12/12/2021: 13 high risk    Overall Daily Picture:     Slightly improving today    Presence of secondary bacterial Infection:    No   Additional antibiotics: No    Labs, X rays reviewed: 12/13/2021    BUN:23-->33  Cr:1.15-->0.91    WBC:5.2-->3.3  Hb:14.6-->14.8  Plat: 164-->205    Absolute Neutrophils:3.73  Absolute Lymphocytes:0.29  Neutrophil/Lymphocyte Ratio: 12.8 high risk    CRP:293-->277-->137  Ferritin:2800  LDH: 838    Pro Calcitonin:      Cultures:  Urine:    Blood:    Sputum :    Wound:      CXR:   1221 diffuse bilateral infiltrates  CAT:      Discussed with patient, RN, CC, IM.      12-12-21:      I have personally reviewed the past medical history, past surgical history, medications, social history, and family history, and I have updated the database accordingly.   Past Medical History:     Past Medical History:   Diagnosis Date    Arthritis     Asthma     Diabetes mellitus ([address]     Edema     GERD (gastroesophageal reflux disease)     Hypertension     on lasix    Migraine     IRMA on CPAP     seldom use machine       Past Surgical  History:     Past Surgical History:   Procedure Laterality Date    APPENDECTOMY      ARTHROPLASTY Left 11/1/2019    LEFT FOOT 1ST MTPJ ARTHROPLASTY WITH PEREZ IMPLANT AND GROMMETS  [facility] performed by Ashley Lainez MD at [address] [facility] Right 12/12/2019    RIGHT FOOT ARTHROPLASTY 1ST MTPJ (Right Foot)    ARTHROPLASTY Right 12/12/2019    RIGHT FOOT ARTHROPLASTY 1ST MTPJ performed by Ashley Lainez MD at [address] Right    [address]  2014    no blockage no stents    CHOLECYSTECTOMY      COLONOSCOPY      ENDOSCOPY, COLON, DIAGNOSTIC      TOE SURGERY Left 11/01/2019     LEFT FOOT 1ST MTPJ ARTHROPLASTY WITH PEREZ IMPLANT AND GROMMETS  [facility] (Left )    TONSILLECTOMY         Medications:      sodium chloride flush  5-40 mL IntraVENous 2 times per day    enoxaparin  30 mg SubCUTAneous BID    Vitamin D  6,000 Units Oral Daily    Followed by   Danika Oliver ON 12/19/2021] Vitamin D  2,000 Units Oral Daily    dexamethasone  20 mg IntraVENous Q24H    Followed by   Danika Oliver ON 12/17/2021] dexamethasone  10 mg IntraVENous Q24H    insulin lispro  0-6 Units SubCUTAneous Q4H       Social History:     Social History     Socioeconomic History    Marital status:      Spouse name: Not on file    Number of children: Not on file    Years of education: Not on file    Highest education level: Not on file   Occupational History    Not on file   Tobacco Use    Smoking status: Former Smoker    Smokeless tobacco: Never Used   Vaping Use    Vaping Use: Never used   Substance and Sexual Activity    Alcohol use: Yes     Comment: every couple months    Drug use: Never    Sexual activity: Not on file   Other Topics Concern    Not on file   Social History Narrative    Not on file     Social Determinants of Health     Financial Resource Strain:     Difficulty of Paying Living Expenses: Not on file   Food Insecurity:     Worried About [address] [employer] in the Last Year: Not on file    [address] [employer] in the Last Year: Not on file   Transportation Needs:     Lack of Transportation (Medical): Not on file    Lack of Transportation (Non-Medical):  Not on file   Physical Activity:     Days of Exercise per Week: Not on file    Minutes of Exercise per Session: Not on file   Stress:     Feeling of Stress : Not on file   Social Connections:     Frequency of Communication with Friends and Family: Not on file    Frequency of Social Gatherings with Friends and Family: Not on file    Attends Sabianism Services: Not on file    Active Member of Clubs or Organizations: Not on file    Attends Club or Organization Meetings: Not on file    Marital Status: Not on file   Intimate Partner Violence:     Fear of Current or Ex-Partner: Not on file    Emotionally Abused: Not on file    Physically Abused: Not on file    Sexually Abused: Not on file   Housing Stability:     Unable to Pay for Housing in the Last Year: Not on file    Number of [place] in the Last Year: Not on file    Unstable Housing in the Last Year: Not on file       Family History:     Family History   Problem Relation Age of Onset    Heart Attack Sister 32    Diabetes Paternal Grandmother     Heart Disease Paternal Uncle     Heart Disease Paternal Uncle     Heart Disease Paternal Uncle     Cancer Maternal Aunt     Cancer Maternal Aunt     Cancer Maternal Uncle     Cancer Maternal Uncle         Allergies:   Nuts [peanut-containing drug products] and Sunflower oil     Review of Systems:     Unable to assess 12/13/2021  Intubated and sedated  Constitutional: No fevers or chills. No systemic complaints  Head: No headaches  Eyes: No double vision or blurry vision. No conjunctival inflammation. ENT: No sore throat or runny nose. . No hearing loss, tinnitus or vertigo. Cardiovascular: No chest pain or palpitations. No Shortness of breath. No LARSON  Lung: No Shortness of breath or cough. No sputum production  Abdomen: No nausea, vomiting, diarrhea, or abdominal pain. Mani Freud No cramps. Genitourinary: No increased urinary frequency, or dysuria. No hematuria. No suprapubic or CVA pain  Musculoskeletal: No muscle aches or pains. No joint effusions, swelling or deformities  Hematologic: No bleeding or bruising. Neurologic: No headache, weakness, numbness, or tingling. Integument: No rash, no ulcers. Psychiatric: No depression. Endocrine: No polyuria, no polydipsia, no polyphagia.     Physical Examination :     Patient Vitals for the past 8 hrs:   BP Pulse Resp SpO2 Height   12/13/21 1300 124/81 70 30 90 % --   12/13/21 1200 122/77 70 30 (!) 89 % --   12/13/21 1153 -- 70 28 90 % --   12/13/21 1133 -- -- -- -- 6' 2\" (1.88 m)   12/13/21 1130 118/73 73 30 91 % --   12/13/21 1100 118/77 70 30 95 % --   12/13/21 1030 115/73 67 30 94 % --   12/13/21 1000 106/68 66 28 94 % --   12/13/21 0900 113/72 68 30 94 % --   12/13/21 0853 -- 67 29 94 % --   12/13/21 0800 125/86 69 30 94 % --     General Appearance: Intubated and sedated  Head:  Normocephalic, no trauma  Eyes: Pupils equal, round, reactive to light; sclera anicteric; conjunctivae pink. No embolic phenomena. ENT: Oropharynx clear, without erythema, exudate, or thrush. No tenderness of sinuses. Mouth/throat: mucosa pink and moist. No lesions. Dentition in good repair. Neck:Supple, without lymphadenopathy. Thyroid normal, No bruits. Pulmonary/Chest: Diminished to auscultation, without wheezes, rales, or rhonchi. No dullness to percussion. Cardiovascular: Regular rate and rhythm without murmurs, rubs, or gallops. Abdomen: Soft, non tender. Bowel sounds normal. No organomegaly  All four Extremities: No cyanosis, clubbing, edema, or effusions. Neurologic: Intubated and sedated  Skin: Warm and dry with good turgor. No signs of peripheral arterial or venous insufficiency. No ulcerations. No open wounds. Medical Decision Making -Laboratory:   I have independently reviewed/ordered the following labs:    CBC with Differential:   Recent Labs     12/11/21  1445 12/11/21  1445 12/12/21  0645 12/13/21  0539   WBC 4.1   < > 5.2 3.3*   HGB 15.7   < > 14.6 14.8   HCT 47.2   < > 45.2 45.1   *   < > 164 205   LYMPHOPCT 7*  --   --  7*   MONOPCT 2  --   --  9*    < > = values in this interval not displayed. BMP:   Recent Labs     12/12/21  0645 12/13/21  0539    143   K 4.6 4.9    110*   CO2 20 20   BUN 23* 33*   CREATININE 1.15 0.91   MG  --  2.2     Hepatic Function Panel:   Recent Labs     12/11/21  1445 12/13/21  0539   PROT 6.7 6.0*   LABALBU 3.4* 3.1*   BILITOT 0.32 0.25*   ALKPHOS 99 104   ALT 48* 50*   AST 90* 79*     No results for input(s): RPR in the last 72 hours. No results for input(s): HIV in the last 72 hours. No results for input(s): BC in the last 72 hours.   Lab Results   Component Value Date    MUCUS 2+ 12/11/2021    RBC 4.78 12/13/2021    TRICHOMONAS NOT REPORTED 12/11/2021    WBC 3.3 12/13/2021    YEAST NOT REPORTED 12/11/2021    TURBIDITY Clear 12/11/2021     Lab Results Component Value Date    CREATININE 0.91 12/13/2021    GLUCOSE 158 12/13/2021       Medical Decision Making-Imaging:     Narrative   EXAMINATION:   CTA OF THE CHEST 12/11/2021 11:31 am       TECHNIQUE:   CTA of the chest was performed after the administration of intravenous   contrast.  Multiplanar reformatted images are provided for review.  MIP   images are provided for review. Dose modulation, iterative reconstruction,   and/or weight based adjustment of the mA/kV was utilized to reduce the   radiation dose to as low as reasonably achievable.       COMPARISON:   Chest x-ray dated December 11, 2021       HISTORY:   ORDERING SYSTEM PROVIDED HISTORY: hypoxemia, covid positive, d-dimer-0.99   TECHNOLOGIST PROVIDED HISTORY:   hypoxemia, covid positive, d-dimer-0.99   Decision Support Exception - unselect if not a suspected or confirmed   emergency medical condition->Emergency Medical Condition (MA)   Reason for Exam: COVID positive, elevated D-Dimer       FINDINGS:   Pulmonary Arteries: Pulmonary arteries are adequately opacified for   evaluation.  No evidence of intraluminal filling defect to suggest pulmonary   embolism.  Main pulmonary artery is normal in caliber.       Mediastinum: Nonspecific mediastinal and hilar lymph nodes are present,   likely reactive. .  The heart and pericardium demonstrate no acute   abnormality.  There is no acute abnormality of the thoracic aorta.       Lungs/pleura: Diffuse ground-glass opacification is present throughout the   lungs, typical of COVID-19 pulmonary disease.  No pleural effusion or   pneumothorax is present.       Upper Abdomen: Images through the upper abdomen demonstrate a small hiatal   hernia.  Diffuse hepatic steatosis is present.  The gallbladder is surgically   absent.       Soft Tissues/Bones: No acute bone or soft tissue abnormality.           Impression   1. No evidence of pulmonary embolism   2.  Diffuse ground-glass opacities throughout the lungs, typical of

## 2021-12-13 NOTE — PLAN OF CARE
Problem: OXYGENATION/RESPIRATORY FUNCTION  Goal: Patient will maintain patent airway  12/13/2021 0858 by Uriah Rico RCP  Outcome: Ongoing     Problem: OXYGENATION/RESPIRATORY FUNCTION  Goal: Patient will achieve/maintain normal respiratory rate/effort  Description: Respiratory rate and effort will be within normal limits for the patient  12/13/2021 0858 by Uriah Rico RCP  Outcome: Ongoing     Problem: MECHANICAL VENTILATION  Goal: Patient will maintain patent airway  12/13/2021 0858 by Uriah Rico RCP  Outcome: Ongoing     Problem: MECHANICAL VENTILATION  Goal: Oral health is maintained or improved  12/13/2021 0858 by Uriah Rico RCP  Outcome: Ongoing     Problem: MECHANICAL VENTILATION  Goal: ET tube will be managed safely  12/13/2021 0858 by Uriah Rico RCP  Outcome: Ongoing     Problem: MECHANICAL VENTILATION  Goal: Ability to express needs and understand communication  12/13/2021 0858 by Uriah Rico RCP  Outcome: Ongoing     Problem: MECHANICAL VENTILATION  Goal: Mobility/activity is maintained at optimum level for patient  12/13/2021 0858 by Uriah Rico RCP  Outcome: Ongoing

## 2021-12-13 NOTE — PROGRESS NOTES
Comprehensive Nutrition Assessment    Type and Reason for Visit:  Initial, Consult (TF ordering and management)    Nutrition Recommendations/Plan:   -Continue NPO status   -Recommend tube feeding of Vital AF 1.2 (Peptide-Based) @ 50 mL/hr x 24 hrs + 2 protein modulars per day~> 1648 kcals, 142 gms protein, 973 mLs water (w/ current rate of propofol 1981 kcals/d              -Water flushes per MD discretion               -If Prone/supine ~>               -Run TF @ 20 mL/hr x 16 hrs while prone               -Run TF @ 110 mL/hr x 8 hrs while supine + 2 protein modulars per day  -Will monitor EN, labs and weights     Nutrition Assessment:  Pt admitted d/t COVID Pneumonia. RD consulted to start tube feeding today. Pt currently NPO, intubated, sedated and in droplet plus precaution room. Pt currently prone during time of visit. Propofol running @ 12.6 mL/hr. Pt noted w/ nuts and sunflower oil. No sunflower oil in Vital AF 1.2 No wt hx in EMR. Will start tube feeding and monitor.      Malnutrition Assessment:  Malnutrition Status:  Insufficient data    Context:  Acute Illness     Findings of the 6 clinical characteristics of malnutrition:  Energy Intake:  Mild decrease in energy intake (Comment)  Weight Loss:  Unable to assess     Body Fat Loss:  Unable to assess     Muscle Mass Loss:  Unable to assess    Fluid Accumulation:  1 - Mild Generalized   Strength:  Not Performed    Estimated Daily Nutrient Needs:  Energy (kcal):  11-14 ~> 5054-0794 kcals/d; Weight Used for Energy Requirements:  Current     Protein (g):  1.5-2.0 gm/kg ~>129-172 gms/d; Weight Used for Protein Requirements:  Ideal        Fluid (ml/day):  per MD;     Nutrition Related Findings:  hypoactive bowel sounds; glucose 158; meds reviewed      Wounds:  None       Current Nutrition Therapies:    Diet NPO  Additional Calorie Sources:   propofol @ 12.6 mL/hr ~> 333 kcals/d    Anthropometric Measures:  · Height: 6' 2\" (188 cm)  · Current Body Weight: 306 lb (138.8 kg)   · Admission Body Weight: 306 lb (138.8 kg)    · Ideal Body Weight: 190 lbs; % Ideal Body Weight 161.1 %   · BMI: 39.3  · BMI Categories: Obese Class 2 (BMI 35.0 -39.9)       Nutrition Diagnosis:   · Inadequate oral intake related to impaired respiratory function as evidenced by NPO or clear liquid status due to medical condition, intubation, nutrition support - enteral nutrition      Nutrition Interventions:   Food and/or Nutrient Delivery:  Continue Current Diet, Start Tube Feeding  Nutrition Education/Counseling:  Education not indicated   Coordination of Nutrition Care:  Continue to monitor while inpatient    Goals: Set   Pt to meet % of est'd needs daily via EN       Nutrition Monitoring and Evaluation:   Food/Nutrient Intake Outcomes:  Enteral Nutrition Intake/Tolerance  Physical Signs/Symptoms Outcomes:  Biochemical Data, Nutrition Focused Physical Findings, Skin, Weight, GI Status, Fluid Status or Edema, Hemodynamic Status     Discharge Planning:     Too soon to determine     Electronically signed by Jose Whiting RD, LD on 12/13/21 at 11:37 AM EST    Contact: [phone]

## 2021-12-14 ENCOUNTER — APPOINTMENT (OUTPATIENT)
Dept: GENERAL RADIOLOGY | Age: 56
DRG: 004 | End: 2021-12-14
Attending: INTERNAL MEDICINE
Payer: COMMERCIAL

## 2021-12-14 LAB
-: ABNORMAL
ALBUMIN SERPL-MCNC: 3 G/DL (ref 3.5–5.2)
ALBUMIN SERPL-MCNC: 3 G/DL (ref 3.5–5.2)
ALBUMIN/GLOBULIN RATIO: 1 (ref 1–2.5)
ALLEN TEST: ABNORMAL
ALP BLD-CCNC: 106 U/L (ref 40–129)
ALT SERPL-CCNC: 58 U/L (ref 5–41)
AMORPHOUS: ABNORMAL
ANION GAP SERPL CALCULATED.3IONS-SCNC: 11 MMOL/L (ref 9–17)
AST SERPL-CCNC: 76 U/L
BACTERIA: ABNORMAL
BILIRUB SERPL-MCNC: 0.32 MG/DL (ref 0.3–1.2)
BILIRUBIN DIRECT: 0.15 MG/DL
BILIRUBIN URINE: NEGATIVE
BILIRUBIN, INDIRECT: 0.17 MG/DL (ref 0–1)
BUN BLDV-MCNC: 36 MG/DL (ref 6–20)
BUN/CREAT BLD: ABNORMAL (ref 9–20)
C-REACTIVE PROTEIN: 50.8 MG/L (ref 0–5)
CALCIUM SERPL-MCNC: 8.5 MG/DL (ref 8.6–10.4)
CASTS UA: ABNORMAL /LPF (ref 0–8)
CHLORIDE BLD-SCNC: 111 MMOL/L (ref 98–107)
CO2: 24 MMOL/L (ref 20–31)
COLOR: YELLOW
CREAT SERPL-MCNC: 0.83 MG/DL (ref 0.7–1.2)
CRYSTALS, UA: ABNORMAL /HPF
CULTURE: NO GROWTH
EPITHELIAL CELLS UA: ABNORMAL /HPF (ref 0–5)
FIO2: 90
GFR AFRICAN AMERICAN: >60 ML/MIN
GFR NON-AFRICAN AMERICAN: >60 ML/MIN
GFR SERPL CREATININE-BSD FRML MDRD: ABNORMAL ML/MIN/{1.73_M2}
GFR SERPL CREATININE-BSD FRML MDRD: ABNORMAL ML/MIN/{1.73_M2}
GLOBULIN: ABNORMAL G/DL (ref 1.5–3.8)
GLUCOSE BLD-MCNC: 151 MG/DL (ref 75–110)
GLUCOSE BLD-MCNC: 160 MG/DL (ref 75–110)
GLUCOSE BLD-MCNC: 164 MG/DL (ref 75–110)
GLUCOSE BLD-MCNC: 173 MG/DL (ref 75–110)
GLUCOSE BLD-MCNC: 174 MG/DL (ref 70–99)
GLUCOSE BLD-MCNC: 182 MG/DL (ref 75–110)
GLUCOSE BLD-MCNC: 183 MG/DL (ref 74–100)
GLUCOSE URINE: NEGATIVE
HCT VFR BLD CALC: 47.4 % (ref 40.7–50.3)
HEMOGLOBIN: 14.9 G/DL (ref 13–17)
KETONES, URINE: NEGATIVE
LEUKOCYTE ESTERASE, URINE: NEGATIVE
Lab: NORMAL
MAGNESIUM: 2.3 MG/DL (ref 1.6–2.6)
MCH RBC QN AUTO: 30.1 PG (ref 25.2–33.5)
MCHC RBC AUTO-ENTMCNC: 31.4 G/DL (ref 28.4–34.8)
MCV RBC AUTO: 95.8 FL (ref 82.6–102.9)
MODE: ABNORMAL
MUCUS: ABNORMAL
NEGATIVE BASE EXCESS, ART: ABNORMAL (ref 0–2)
NITRITE, URINE: NEGATIVE
NRBC AUTOMATED: 0 PER 100 WBC
O2 DEVICE/FLOW/%: ABNORMAL
OTHER OBSERVATIONS UA: ABNORMAL
PATIENT TEMP: ABNORMAL
PDW BLD-RTO: 14.6 % (ref 11.8–14.4)
PH UA: 6.5 (ref 5–8)
PHOSPHORUS: 2.5 MG/DL (ref 2.5–4.5)
PLATELET # BLD: 250 K/UL (ref 138–453)
PMV BLD AUTO: 10.7 FL (ref 8.1–13.5)
POC HCO3: 30.7 MMOL/L (ref 21–28)
POC O2 SATURATION: 100 % (ref 94–98)
POC PCO2 TEMP: ABNORMAL MM HG
POC PCO2: 55.8 MM HG (ref 35–48)
POC PH TEMP: ABNORMAL
POC PH: 7.35 (ref 7.35–7.45)
POC PO2 TEMP: ABNORMAL MM HG
POC PO2: 254.3 MM HG (ref 83–108)
POSITIVE BASE EXCESS, ART: 4 (ref 0–3)
POTASSIUM SERPL-SCNC: 5.1 MMOL/L (ref 3.7–5.3)
PROTEIN UA: ABNORMAL
RBC # BLD: 4.95 M/UL (ref 4.21–5.77)
RBC UA: ABNORMAL /HPF (ref 0–4)
RENAL EPITHELIAL, UA: ABNORMAL /HPF
SAMPLE SITE: ABNORMAL
SODIUM BLD-SCNC: 146 MMOL/L (ref 135–144)
SPECIFIC GRAVITY UA: 1.03 (ref 1–1.03)
SPECIMEN DESCRIPTION: NORMAL
TCO2 (CALC), ART: ABNORMAL MMOL/L (ref 22–29)
TOTAL PROTEIN: 6.1 G/DL (ref 6.4–8.3)
TRICHOMONAS: ABNORMAL
TURBIDITY: CLEAR
URINE HGB: ABNORMAL
UROBILINOGEN, URINE: NORMAL
WBC # BLD: 5.6 K/UL (ref 3.5–11.3)
WBC UA: ABNORMAL /HPF (ref 0–5)
YEAST: ABNORMAL

## 2021-12-14 PROCEDURE — 6370000000 HC RX 637 (ALT 250 FOR IP): Performed by: INTERNAL MEDICINE

## 2021-12-14 PROCEDURE — 6360000002 HC RX W HCPCS: Performed by: INTERNAL MEDICINE

## 2021-12-14 PROCEDURE — 82947 ASSAY GLUCOSE BLOOD QUANT: CPT

## 2021-12-14 PROCEDURE — 94645 CONT INHLJ TX EACH ADDL HOUR: CPT

## 2021-12-14 PROCEDURE — 81001 URINALYSIS AUTO W/SCOPE: CPT

## 2021-12-14 PROCEDURE — 6360000002 HC RX W HCPCS: Performed by: NURSE PRACTITIONER

## 2021-12-14 PROCEDURE — 87086 URINE CULTURE/COLONY COUNT: CPT

## 2021-12-14 PROCEDURE — 2580000003 HC RX 258: Performed by: NURSE PRACTITIONER

## 2021-12-14 PROCEDURE — 86140 C-REACTIVE PROTEIN: CPT

## 2021-12-14 PROCEDURE — 2700000000 HC OXYGEN THERAPY PER DAY

## 2021-12-14 PROCEDURE — 2580000003 HC RX 258: Performed by: INTERNAL MEDICINE

## 2021-12-14 PROCEDURE — 80053 COMPREHEN METABOLIC PANEL: CPT

## 2021-12-14 PROCEDURE — C9113 INJ PANTOPRAZOLE SODIUM, VIA: HCPCS | Performed by: NURSE PRACTITIONER

## 2021-12-14 PROCEDURE — 85027 COMPLETE CBC AUTOMATED: CPT

## 2021-12-14 PROCEDURE — 2580000003 HC RX 258: Performed by: STUDENT IN AN ORGANIZED HEALTH CARE EDUCATION/TRAINING PROGRAM

## 2021-12-14 PROCEDURE — 82803 BLOOD GASES ANY COMBINATION: CPT

## 2021-12-14 PROCEDURE — 2500000003 HC RX 250 WO HCPCS: Performed by: INTERNAL MEDICINE

## 2021-12-14 PROCEDURE — 99233 SBSQ HOSP IP/OBS HIGH 50: CPT | Performed by: INTERNAL MEDICINE

## 2021-12-14 PROCEDURE — 82248 BILIRUBIN DIRECT: CPT

## 2021-12-14 PROCEDURE — 83735 ASSAY OF MAGNESIUM: CPT

## 2021-12-14 PROCEDURE — 6370000000 HC RX 637 (ALT 250 FOR IP): Performed by: NURSE PRACTITIONER

## 2021-12-14 PROCEDURE — 99233 SBSQ HOSP IP/OBS HIGH 50: CPT | Performed by: NURSE PRACTITIONER

## 2021-12-14 PROCEDURE — 94640 AIRWAY INHALATION TREATMENT: CPT

## 2021-12-14 PROCEDURE — 80076 HEPATIC FUNCTION PANEL: CPT

## 2021-12-14 PROCEDURE — 94003 VENT MGMT INPAT SUBQ DAY: CPT

## 2021-12-14 PROCEDURE — 6360000002 HC RX W HCPCS: Performed by: STUDENT IN AN ORGANIZED HEALTH CARE EDUCATION/TRAINING PROGRAM

## 2021-12-14 PROCEDURE — 2000000000 HC ICU R&B

## 2021-12-14 PROCEDURE — 71045 X-RAY EXAM CHEST 1 VIEW: CPT

## 2021-12-14 PROCEDURE — 80069 RENAL FUNCTION PANEL: CPT

## 2021-12-14 PROCEDURE — 94761 N-INVAS EAR/PLS OXIMETRY MLT: CPT

## 2021-12-14 PROCEDURE — 99291 CRITICAL CARE FIRST HOUR: CPT | Performed by: INTERNAL MEDICINE

## 2021-12-14 PROCEDURE — 37799 UNLISTED PX VASCULAR SURGERY: CPT

## 2021-12-14 PROCEDURE — 84100 ASSAY OF PHOSPHORUS: CPT

## 2021-12-14 PROCEDURE — APPSS30 APP SPLIT SHARED TIME 16-30 MINUTES: Performed by: NURSE PRACTITIONER

## 2021-12-14 RX ORDER — PANTOPRAZOLE SODIUM 40 MG/10ML
40 INJECTION, POWDER, LYOPHILIZED, FOR SOLUTION INTRAVENOUS DAILY
Status: DISCONTINUED | OUTPATIENT
Start: 2021-12-14 | End: 2021-12-18

## 2021-12-14 RX ORDER — SODIUM CHLORIDE 9 MG/ML
10 INJECTION INTRAVENOUS DAILY
Status: DISCONTINUED | OUTPATIENT
Start: 2021-12-14 | End: 2021-12-18

## 2021-12-14 RX ORDER — FUROSEMIDE 10 MG/ML
40 INJECTION INTRAMUSCULAR; INTRAVENOUS ONCE
Status: COMPLETED | OUTPATIENT
Start: 2021-12-14 | End: 2021-12-14

## 2021-12-14 RX ADMIN — CISATRACURIUM BESYLATE 3 MCG/KG/MIN: 10 INJECTION, SOLUTION INTRAVENOUS at 20:47

## 2021-12-14 RX ADMIN — PANTOPRAZOLE SODIUM 40 MG: 40 INJECTION, POWDER, FOR SOLUTION INTRAVENOUS at 14:10

## 2021-12-14 RX ADMIN — Medication 150 MCG/HR: at 13:16

## 2021-12-14 RX ADMIN — SODIUM CHLORIDE, PRESERVATIVE FREE 10 ML: 5 INJECTION INTRAVENOUS at 14:10

## 2021-12-14 RX ADMIN — INSULIN LISPRO 1 UNITS: 100 INJECTION, SOLUTION INTRAVENOUS; SUBCUTANEOUS at 22:18

## 2021-12-14 RX ADMIN — ENOXAPARIN SODIUM 30 MG: 100 INJECTION SUBCUTANEOUS at 08:34

## 2021-12-14 RX ADMIN — CISATRACURIUM BESYLATE 3 MCG/KG/MIN: 10 INJECTION, SOLUTION INTRAVENOUS at 05:37

## 2021-12-14 RX ADMIN — ENOXAPARIN SODIUM 30 MG: 100 INJECTION SUBCUTANEOUS at 21:25

## 2021-12-14 RX ADMIN — PROPOFOL 30 MCG/KG/MIN: 10 INJECTION, EMULSION INTRAVENOUS at 23:38

## 2021-12-14 RX ADMIN — INSULIN LISPRO 3 UNITS: 100 INJECTION, SOLUTION INTRAVENOUS; SUBCUTANEOUS at 09:12

## 2021-12-14 RX ADMIN — Medication 150 MCG/HR: at 20:46

## 2021-12-14 RX ADMIN — INSULIN LISPRO 3 UNITS: 100 INJECTION, SOLUTION INTRAVENOUS; SUBCUTANEOUS at 18:11

## 2021-12-14 RX ADMIN — Medication 6000 UNITS: at 08:34

## 2021-12-14 RX ADMIN — PROPOFOL 15 MCG/KG/MIN: 10 INJECTION, EMULSION INTRAVENOUS at 04:39

## 2021-12-14 RX ADMIN — PROPOFOL 30 MCG/KG/MIN: 10 INJECTION, EMULSION INTRAVENOUS at 18:48

## 2021-12-14 RX ADMIN — CISATRACURIUM BESYLATE 3 MCG/KG/MIN: 10 INJECTION, SOLUTION INTRAVENOUS at 12:33

## 2021-12-14 RX ADMIN — EPOPROSTENOL 40 NG/KG/MIN: 1.5 INJECTION, POWDER, LYOPHILIZED, FOR SOLUTION INTRAVENOUS at 23:55

## 2021-12-14 RX ADMIN — FUROSEMIDE 40 MG: 10 INJECTION, SOLUTION INTRAMUSCULAR; INTRAVENOUS at 08:34

## 2021-12-14 RX ADMIN — EPOPROSTENOL 50 NG/KG/MIN: 1.5 INJECTION, POWDER, LYOPHILIZED, FOR SOLUTION INTRAVENOUS at 10:50

## 2021-12-14 RX ADMIN — EPOPROSTENOL 40 NG/KG/MIN: 1.5 INJECTION, POWDER, LYOPHILIZED, FOR SOLUTION INTRAVENOUS at 16:42

## 2021-12-14 RX ADMIN — PROPOFOL 25 MCG/KG/MIN: 10 INJECTION, EMULSION INTRAVENOUS at 09:43

## 2021-12-14 RX ADMIN — DEXAMETHASONE SODIUM PHOSPHATE 20 MG: 10 INJECTION INTRAMUSCULAR; INTRAVENOUS at 12:28

## 2021-12-14 RX ADMIN — EPOPROSTENOL 50 NG/KG/MIN: 1.5 INJECTION, POWDER, LYOPHILIZED, FOR SOLUTION INTRAVENOUS at 04:49

## 2021-12-14 RX ADMIN — PROPOFOL 30 MCG/KG/MIN: 10 INJECTION, EMULSION INTRAVENOUS at 14:10

## 2021-12-14 RX ADMIN — Medication 100 MCG/HR: at 04:39

## 2021-12-14 RX ADMIN — SODIUM CHLORIDE, PRESERVATIVE FREE 10 ML: 5 INJECTION INTRAVENOUS at 20:47

## 2021-12-14 RX ADMIN — SODIUM CHLORIDE 500 ML: 0.9 INJECTION, SOLUTION INTRAVENOUS at 00:22

## 2021-12-14 RX ADMIN — SODIUM CHLORIDE, PRESERVATIVE FREE 10 ML: 5 INJECTION INTRAVENOUS at 08:36

## 2021-12-14 RX ADMIN — Medication 7 MG/HR: at 02:10

## 2021-12-14 RX ADMIN — INSULIN LISPRO 1 UNITS: 100 INJECTION, SOLUTION INTRAVENOUS; SUBCUTANEOUS at 05:47

## 2021-12-14 RX ADMIN — Medication 8 MG/HR: at 17:11

## 2021-12-14 RX ADMIN — INSULIN LISPRO 3 UNITS: 100 INJECTION, SOLUTION INTRAVENOUS; SUBCUTANEOUS at 12:34

## 2021-12-14 ASSESSMENT — PULMONARY FUNCTION TESTS
PIF_VALUE: 31
PIF_VALUE: 31
PIF_VALUE: 30
PIF_VALUE: 32
PIF_VALUE: 30

## 2021-12-14 ASSESSMENT — PAIN SCALES - GENERAL: PAINLEVEL_OUTOF10: 0

## 2021-12-14 NOTE — PROGRESS NOTES
[facility] - Progress Note   Note COVID 19 Patient  Today's Date and Time: 12/14/2021, 8:47 AM    Impression :     COVID 19 Confirmed Infection  Covid tests:  12/11/2021: Positive  Elevated inflammatory markers  Acute hypoxic respiratory failure  History of asthma  Diabetes mellitus  Essential hypertension  IRMA on CPAP  Patient has not received the Covid vaccination    Recommendations:   Antibiotic treatment:  Monitor off antibiotics  Covid Rx:    Remdesivir-out of window  Decadron-high-dose initiated  Actemra-ordered 12/12/2021  Monoclonal antibodies-out of window      Medical Decision Making/Summary/Discussion:12/14/2021     Patient admitted with COVID 19 infection  He may come out of isolation on 12/31/21    Infection Control Recommendations   [place] Precautions  Airborne isolation  Droplet Isolation    Antimicrobial Stewardship Recommendations       Discontinuation of therapy  Coordination of Outpatient Care:   Estimated Length of IV antimicrobials:TBD  Patient will need Midline Catheter Insertion: TBD  Patient will need PICC line Insertion: No  Patient will need: Home IV , [place],  SNF,  LTAC:TBD  Patient will need outpatient wound care:No    Chief complaint/reason for consultation:   Concern for COVID infection      History of Present Illness:   Ana Dooley is a 60y.o.-year-old male who was initially admitted on 12/12/2021. Patient seen at the request of Dr. Taryn Kim:    Patient presented through [facility] ER on 12/11/2021 with complaints of worsening shortness of breath with associated generalized weakness and nonproductive cough over the past several days. He was exposed to his son who was diagnosed with Covid last week and has not received the Covid vaccine. The patient was very hypoxic with an SPO2 in the high 50s on room air.     Imaging revealed bilateral pulmonary opacities typical of COVID-19    Abnormal labs include:    Ferritin 2800      Patient has been intubated and transferred to [facility]  He has been started on high-dose Decadron and Actemra has been ordered    CT CHEST PULMONARY EMBOLISM W CONTRAST 12/11/2021   Final Result   1. No evidence of pulmonary embolism   2. Diffuse ground-glass opacities throughout the lungs, typical of COVID-19   pulmonary disease.           XR CHEST (SINGLE VIEW FRONTAL) 12/11/2021   Final Result   Multifocal hazy opacities throughout both lungs consistent with COVID   pneumonia and or pulmonary edema       Patient admitted because of concerns with COVID 19.    CURRENT EVALUATION : 12/14/2021    Low-grade fevers continue  VS stable    The patient continues on mechanical ventilation with sedation and paralytic per ARDS protocol. CRP remains elevated but has trended down to day to 137 from 293  Sediment noted in urine-the patient has a same Hahn from outlying facility. Orders placed to place Hahn and send a new urine culture. No other acute issues noted at this time    Discussed with RN    Patient exhibiting respiratory distress. yes  Respiratory secretions: no    Patient receiving supplemental oxygen.   Mechanical ventilation  RR:  20-->30  02 sat: 91-->94-->96    % FIO2: 90-->80  PEEP:   21-->16    QTc:       NEWS Score: 0-4 Low risk group; 5-6: Medium risk group; 7 or above: High risk group  Parameters 3 2 1 0 1 2 3   Age    < 65   = 65   RR = 8  9-11 12-20  21-24 = 25   O2 Sats = 91 92-93 94-95 = 96      Suppl O2  Yes  No      SBP = 90  101-110 111-219   = 220   HR = 40  41-50 51-90  111-130 = 131   Consciousness    Alert   Drowsiness, lethargy, or confusion   Temperature = 35.0 C (95.0 F)  35.1-36.0 C 95.1-96.9 F 36.1-38.0 C 97.0-100.4 F 38.1-39.0 C 100.5-102.3 F = 39.1 C = 102.4 F      NEWS Score:  12/12/2021: 13 high risk    Overall Daily Picture:     Unchanged    Presence of secondary bacterial Infection:    No   Additional antibiotics: No    Labs, X rays reviewed: 12/14/2021    BUN:23-->33-->36  Cr:1.15-->0.91-->0.83    WBC:5.2-->3.3-->5.6  Hb:14.6-->14.8-->14.9  Plat: 164-->205-->250    Absolute Neutrophils:3.73  Absolute Lymphocytes:0.29  Neutrophil/Lymphocyte Ratio: 12.8 high risk    CRP:293-->277-->137-->50.8  Ferritin:2800  LDH: 838    Pro Calcitonin:      Cultures:  Urine:    Blood:    Sputum :    Wound:      CXR:   1221 diffuse bilateral infiltrates  CAT:      Discussed with patient, RN, CC, IM.      12-12-21:      I have personally reviewed the past medical history, past surgical history, medications, social history, and family history, and I have updated the database accordingly.   Past Medical History:     Past Medical History:   Diagnosis Date    Arthritis     Asthma     Diabetes mellitus ([address]     Edema     GERD (gastroesophageal reflux disease)     Hypertension     on lasix    Migraine     IRMA on CPAP     seldom use machine       Past Surgical  History:     Past Surgical History:   Procedure Laterality Date    APPENDECTOMY      ARTHROPLASTY Left 11/1/2019    LEFT FOOT 1ST MTPJ ARTHROPLASTY WITH PEREZ IMPLANT AND GROMMETS  [facility] performed by Kavitha Saunders MD at [address] [facility] Right 12/12/2019    RIGHT FOOT ARTHROPLASTY 1ST MTPJ (Right Foot)    ARTHROPLASTY Right 12/12/2019    RIGHT FOOT ARTHROPLASTY 1ST MTPJ performed by Kavitha Saunders MD at [address] Right    [address] [facility] [address]  2014    no blockage no stents    CHOLECYSTECTOMY      COLONOSCOPY      ENDOSCOPY, COLON, DIAGNOSTIC      TOE SURGERY Left 11/01/2019     LEFT FOOT 1ST MTPJ ARTHROPLASTY WITH PEREZ IMPLANT AND GROMMETS  [facility] (Left )    TONSILLECTOMY         Medications:      sodium chloride flush  5-40 mL IntraVENous 2 times per day    enoxaparin  30 mg SubCUTAneous BID    Vitamin D  6,000 Units Oral Daily    Followed by   Peterson Rodas ON 12/19/2021] Vitamin D  2,000 Units Oral Daily    dexamethasone  20 mg IntraVENous Q24H    Followed by   Hope Rosa ON 12/17/2021] dexamethasone  10 mg IntraVENous Q24H    insulin lispro  0-6 Units SubCUTAneous Q4H       Social History:     Social History     Socioeconomic History    Marital status:      Spouse name: Not on file    Number of children: Not on file    Years of education: Not on file    Highest education level: Not on file   Occupational History    Not on file   Tobacco Use    Smoking status: Former Smoker    Smokeless tobacco: Never Used   Vaping Use    Vaping Use: Never used   Substance and Sexual Activity    Alcohol use: Yes     Comment: every couple months    Drug use: Never    Sexual activity: Not on file   Other Topics Concern    Not on file   Social History Narrative    Not on file     Social Determinants of Health     Financial Resource Strain:     Difficulty of Paying Living Expenses: Not on file   Food Insecurity:     Worried About [address] in the Last Year: Not on file    [address] in the Last Year: Not on file   Transportation Needs:     Lack of Transportation (Medical): Not on file    Lack of Transportation (Non-Medical):  Not on file   Physical Activity:     Days of Exercise per Week: Not on file    Minutes of Exercise per Session: Not on file   Stress:     Feeling of Stress : Not on file   Social Connections:     Frequency of Communication with Friends and Family: Not on file    Frequency of Social Gatherings with Friends and Family: Not on file    Attends Yazidism Services: Not on file    Active Member of Clubs or Organizations: Not on file    Attends Club or Organization Meetings: Not on file    Marital Status: Not on file   Intimate Partner Violence:     Fear of Current or Ex-Partner: Not on file    Emotionally Abused: Not on file    Physically Abused: Not on file    Sexually Abused: Not on file   Housing Stability:     Unable to Pay for Housing in the Last Year: Not on file    Number of Places Lived in the Last Year: Not on file    Unstable Housing in the Last Year: Not on file       Family History:     Family History   Problem Relation Age of Onset    Heart Attack Sister 32    Diabetes Paternal Grandmother     Heart Disease Paternal Uncle     Heart Disease Paternal Uncle     Heart Disease Paternal Uncle     Cancer Maternal Aunt     Cancer Maternal Aunt     Cancer Maternal Uncle     Cancer Maternal Uncle         Allergies:   Nuts [peanut-containing drug products] and Sunflower oil     Review of Systems:     Unable to assess 12/14/2021  Intubated and sedated  Constitutional: No fevers or chills. No systemic complaints  Head: No headaches  Eyes: No double vision or blurry vision. No conjunctival inflammation. ENT: No sore throat or runny nose. . No hearing loss, tinnitus or vertigo. Cardiovascular: No chest pain or palpitations. No Shortness of breath. No LARSON  Lung: No Shortness of breath or cough. No sputum production  Abdomen: No nausea, vomiting, diarrhea, or abdominal pain. [place] Skates No cramps. Genitourinary: No increased urinary frequency, or dysuria. No hematuria. No suprapubic or CVA pain  Musculoskeletal: No muscle aches or pains. No joint effusions, swelling or deformities  Hematologic: No bleeding or bruising. Neurologic: No headache, weakness, numbness, or tingling. Integument: No rash, no ulcers. Psychiatric: No depression. Endocrine: No polyuria, no polydipsia, no polyphagia.     Physical Examination :     Patient Vitals for the past 8 hrs:   BP Temp Temp src Pulse Resp SpO2 Weight   12/14/21 0715 -- -- -- -- 30 96 % --   12/14/21 0615 -- -- -- -- 30 95 % --   12/14/21 0600 -- 98.8 °F (37.1 °C) -- 75 30 95 % 283 lb 11.7 oz (128.7 kg)   12/14/21 0514 -- -- -- -- 30 96 % --   12/14/21 0505 -- -- -- 81 30 96 % --   12/14/21 0500 -- -- -- 83 30 96 % --   12/14/21 0415 -- -- -- -- 30 95 % --   12/14/21 0400 139/71 99 °F (37.2 °C) Esophageal 82 30 95 % --   12/14/21 1235 -- -- -- -- 30 95 % --   12/14/21 0300 -- -- -- 79 30 95 % --   12/14/21 0215 -- -- -- -- 30 95 % --   12/14/21 0200 -- 99 °F (37.2 °C) Esophageal 74 30 95 % --   12/14/21 0115 -- -- -- -- 30 95 % --   12/14/21 0100 -- -- -- 67 30 95 % --     General Appearance: Intubated and sedated  Head:  Normocephalic, no trauma  Eyes: Pupils equal, round, reactive to light; sclera anicteric; conjunctivae pink. No embolic phenomena. ENT: Oropharynx clear, without erythema, exudate, or thrush. No tenderness of sinuses. Mouth/throat: mucosa pink and moist. No lesions. Dentition in good repair. Neck:Supple, without lymphadenopathy. Thyroid normal, No bruits. Pulmonary/Chest: Diminished to auscultation, without wheezes, rales, or rhonchi. No dullness to percussion. Cardiovascular: Regular rate and rhythm without murmurs, rubs, or gallops. Abdomen: Soft, non tender. Bowel sounds normal. No organomegaly  All four Extremities: No cyanosis, clubbing, edema, or effusions. Neurologic: Intubated and sedated  Skin: Warm and dry with good turgor. No signs of peripheral arterial or venous insufficiency. No ulcerations. No open wounds. Medical Decision Making -Laboratory:   I have independently reviewed/ordered the following labs:    CBC with Differential:   Recent Labs     12/11/21  1445 12/12/21  0645 12/13/21  0539 12/14/21  0545   WBC 4.1   < > 3.3* 5.6   HGB 15.7   < > 14.8 14.9   HCT 47.2   < > 45.1 47.4   *   < > 205 250   LYMPHOPCT 7*  --  7*  --    MONOPCT 2  --  9*  --     < > = values in this interval not displayed.      BMP:   Recent Labs     12/13/21  0539 12/14/21  0545    146*   K 4.9 5.1   * 111*   CO2 20 24   BUN 33* 36*   CREATININE 0.91 0.83   MG 2.2 2.3     Hepatic Function Panel:   Recent Labs     12/13/21  0539 12/14/21  0545   PROT 6.0* 6.1*   LABALBU 3.1* 3.0*  3.0*   BILIDIR  --  0.15   IBILI  --  0.17   BILITOT 0.25* 0.32   ALKPHOS 104 106   ALT 50* 58*   AST 79* 76*     No results for input(s): RPR in the last 72 hours. No results for input(s): HIV in the last 72 hours. No results for input(s): BC in the last 72 hours. Lab Results   Component Value Date    MUCUS NOT REPORTED 12/14/2021    RBC 4.95 12/14/2021    TRICHOMONAS NOT REPORTED 12/14/2021    WBC 5.6 12/14/2021    YEAST NOT REPORTED 12/14/2021    TURBIDITY Clear 12/14/2021     Lab Results   Component Value Date    CREATININE 0.83 12/14/2021    GLUCOSE 174 12/14/2021       Medical Decision Making-Imaging:     Narrative   EXAMINATION:   CTA OF THE CHEST 12/11/2021 11:31 am       TECHNIQUE:   CTA of the chest was performed after the administration of intravenous   contrast.  Multiplanar reformatted images are provided for review.  MIP   images are provided for review. Dose modulation, iterative reconstruction,   and/or weight based adjustment of the mA/kV was utilized to reduce the   radiation dose to as low as reasonably achievable.       COMPARISON:   Chest x-ray dated December 11, 2021       HISTORY:   ORDERING SYSTEM PROVIDED HISTORY: hypoxemia, covid positive, d-dimer-0.99   TECHNOLOGIST PROVIDED HISTORY:   hypoxemia, covid positive, d-dimer-0.99   Decision Support Exception - unselect if not a suspected or confirmed   emergency medical condition->Emergency Medical Condition (MA)   Reason for Exam: COVID positive, elevated D-Dimer       FINDINGS:   Pulmonary Arteries: Pulmonary arteries are adequately opacified for   evaluation.  No evidence of intraluminal filling defect to suggest pulmonary   embolism.  Main pulmonary artery is normal in caliber.       Mediastinum: Nonspecific mediastinal and hilar lymph nodes are present,   likely reactive. .  The heart and pericardium demonstrate no acute   abnormality.  There is no acute abnormality of the thoracic aorta.       Lungs/pleura: Diffuse ground-glass opacification is present throughout the   lungs, typical of COVID-19 pulmonary disease.  No pleural effusion or   pneumothorax is present.       Upper Abdomen: Images through the upper abdomen demonstrate a small hiatal   hernia.  Diffuse hepatic steatosis is present.  The gallbladder is surgically   absent.       Soft Tissues/Bones: No acute bone or soft tissue abnormality.           Impression   1. No evidence of pulmonary embolism   2. Diffuse ground-glass opacities throughout the lungs, typical of COVID-19   pulmonary disease.         Narrative   EXAMINATION:   ONE XRAY VIEW OF THE CHEST       12/11/2021 3:54 pm       COMPARISON:   November 13, 2012       HISTORY:   ORDERING SYSTEM PROVIDED HISTORY: hypoxemia, probable covid pneumonia   TECHNOLOGIST PROVIDED HISTORY:   hypoxemia, probable covid pneumonia       FINDINGS:   Multifocal hazy opacities throughout both lungs would be consistent with   history of COVID pneumonia.  Pulmonary edema could have a similar appearance. No pneumothorax or pleural effusion.  Cardiac size enlarged.  Mediastinum   unremarkable.  No acute osseous abnormality.           Impression   Multifocal hazy opacities throughout both lungs consistent with COVID   pneumonia and or pulmonary edema.                   Medical Decision [id number]:       Medical Decision Making-Other:     Note:  Labs, medications, radiologic studies were reviewed with personal review of films  Large amounts of data were reviewed  Discussed with nursing Staff, Discharge planner  Infection Control and Prevention measures reviewed  All prior entries were reviewed  Administer medications as ordered  Prognosis: Guarded  Discharge planning reviewed      Thank you for allowing us to participate in the care of this patient. Please call with questions. Kaleigh Cobb, APRN - CNP     ATTESTATION:    I have discussed the case, including pertinent history and exam findings with the APRN. I have evaluated the  History, physical findings and pictures of the patient and the key elements of the encounter have been performed by me.  I have reviewed the laboratory data, other diagnostic studies and discussed them with the APRN. I have updated the medical record where necessary. I agree with the assessment, plan and orders as documented by the APRN.     Padmini Jimenez MD.        Pager: [phone] - Office: [phone]

## 2021-12-14 NOTE — PLAN OF CARE
Problem: OXYGENATION/RESPIRATORY FUNCTION  Goal: Patient will maintain patent airway  12/13/2021 2151 by Donald Del Valle RCP  Outcome: Ongoing     Problem: OXYGENATION/RESPIRATORY FUNCTION  Goal: Patient will achieve/maintain normal respiratory rate/effort  Description: Respiratory rate and effort will be within normal limits for the patient  12/13/2021 2151 by Elizabeth Del Valle RCP  Outcome: Ongoing     Problem: MECHANICAL VENTILATION  Goal: Patient will maintain patent airway  12/13/2021 2151 by Donald Del Valle RCP  Outcome: Ongoing     Problem: MECHANICAL VENTILATION  Goal: Oral health is maintained or improved  12/13/2021 2151 by Elizabeth Del Valle RCP  Outcome: Ongoing     Problem: MECHANICAL VENTILATION  Goal: ET tube will be managed safely  12/13/2021 2151 by Elizabeth Del Valle RCP  Outcome: Ongoing     Problem: MECHANICAL VENTILATION  Goal: Ability to express needs and understand communication  12/13/2021 2151 by Elizabeth Del Valle RCP  Outcome: Ongoing     Problem: MECHANICAL VENTILATION  Goal: Mobility/activity is maintained at optimum level for patient  12/13/2021 2151 by Elizabeth Del Valle RCP  Outcome: Ongoing     Problem: SKIN INTEGRITY  Goal: Skin integrity is maintained or improved  12/13/2021 2151 by Rohit Nolasco RCP  Outcome: Ongoing

## 2021-12-14 NOTE — PLAN OF CARE
Problem: OXYGENATION/RESPIRATORY FUNCTION  Goal: Patient will maintain patent airway  12/14/2021 0925 by Fidencio Portillo RCP  Outcome: Ongoing     Problem: OXYGENATION/RESPIRATORY FUNCTION  Goal: Patient will achieve/maintain normal respiratory rate/effort  Description: Respiratory rate and effort will be within normal limits for the patient  12/14/2021 0925 by Fidencio Portillo RCP  Outcome: Ongoing     Problem: MECHANICAL VENTILATION  Goal: Patient will maintain patent airway  12/14/2021 0925 by Fidencio Portillo RCP  Outcome: Ongoing     Problem: MECHANICAL VENTILATION  Goal: Oral health is maintained or improved  12/14/2021 0925 by Fidencio Portillo RCP  Outcome: Ongoing     Problem: MECHANICAL VENTILATION  Goal: ET tube will be managed safely  12/14/2021 0925 by Fidencio Portillo RCP  Outcome: Ongoing     Problem: MECHANICAL VENTILATION  Goal: Ability to express needs and understand communication  12/14/2021 0925 by Fidencio Portillo RCP  Outcome: Ongoing     Problem: MECHANICAL VENTILATION  Goal: Mobility/activity is maintained at optimum level for patient  12/14/2021 0925 by Fidencio Portillo RCP  Outcome: Ongoing     Problem: SKIN INTEGRITY  Goal: Skin integrity is maintained or improved  12/14/2021 0925 by Fidencio Portillo RCP  Outcome: Ongoing

## 2021-12-14 NOTE — PLAN OF CARE
Problem: Airway Clearance - Ineffective  Goal: Achieve or maintain patent airway  Outcome: Ongoing     Problem: Gas Exchange - Impaired  Goal: Absence of hypoxia  Outcome: Ongoing  Goal: Promote optimal lung function  Outcome: Ongoing     Problem: Breathing Pattern - Ineffective  Goal: Ability to achieve and maintain a regular respiratory rate  Outcome: Ongoing     Problem:  Body Temperature -  Risk of, Imbalanced  Goal: Ability to maintain a body temperature within defined limits  Outcome: Ongoing  Goal: Will regain or maintain usual level of consciousness  Outcome: Ongoing  Goal: Complications related to the disease process, condition or treatment will be avoided or minimized  Outcome: Ongoing     Problem: Isolation Precautions - Risk of Spread of Infection  Goal: Prevent transmission of infection  Outcome: Ongoing     Problem: Nutrition Deficits  Goal: Optimize nutritional status  Outcome: Ongoing     Problem: Risk for Fluid Volume Deficit  Goal: Maintain normal heart rhythm  Outcome: Ongoing  Goal: Maintain absence of muscle cramping  Outcome: Ongoing  Goal: Maintain normal serum potassium, sodium, calcium, phosphorus, and pH  Outcome: Ongoing     Problem: Loneliness or Risk for Loneliness  Goal: Demonstrate positive use of time alone when socialization is not possible  Outcome: Ongoing     Problem: Fatigue  Goal: Verbalize increase energy and improved vitality  Outcome: Ongoing     Problem: Patient Education: Go to Patient Education Activity  Goal: Patient/Family Education  Outcome: Ongoing     Problem: OXYGENATION/RESPIRATORY FUNCTION  Goal: Patient will maintain patent airway  12/14/2021 0853 by Kelsey Guerra RN  Outcome: Ongoing  12/13/2021 2151 by Lorri Del Valle RCP  Outcome: Ongoing  Goal: Patient will achieve/maintain normal respiratory rate/effort  Description: Respiratory rate and effort will be within normal limits for the patient  12/14/2021 0853 by Kelsey Guerra RN  Outcome: Ongoing  12/13/2021 2151 by Isabel Del Valle RCP  Outcome: Ongoing     Problem: MECHANICAL VENTILATION  Goal: Patient will maintain patent airway  12/14/2021 0853 by Maria R Fragoso RN  Outcome: Ongoing  12/13/2021 2151 by Sandee Bernheim, RCP  Outcome: Ongoing  Goal: Oral health is maintained or improved  12/14/2021 0853 by Maria R Fragoso RN  Outcome: Ongoing  12/13/2021 2151 by Sandee Bernheim, RCP  Outcome: Ongoing  Goal: ET tube will be managed safely  12/14/2021 0853 by Maria R Fragoso RN  Outcome: Ongoing  12/13/2021 2151 by Isabel Del Valle RCP  Outcome: Ongoing  Goal: Ability to express needs and understand communication  12/14/2021 0853 by Maria R Fragoso RN  Outcome: Ongoing  12/13/2021 2151 by Isabel Del Valle RCP  Outcome: Ongoing  Goal: Mobility/activity is maintained at optimum level for patient  12/14/2021 0853 by Maria R Fragoso RN  Outcome: Ongoing  12/13/2021 2151 by Isabel Del Valle RCP  Outcome: Ongoing     Problem: SKIN INTEGRITY  Goal: Skin integrity is maintained or improved  12/14/2021 0853 by Maria R Fragoso RN  Outcome: Ongoing  12/13/2021 2151 by Isabel Del Valle RCP  Outcome: Ongoing     Problem: Skin Integrity:  Goal: Will show no infection signs and symptoms  Description: Will show no infection signs and symptoms  Outcome: Ongoing  Goal: Absence of new skin breakdown  Description: Absence of new skin breakdown  Outcome: Ongoing     Problem: Falls - Risk of:  Goal: Will remain free from falls  Description: Will remain free from falls  Outcome: Ongoing  Goal: Absence of physical injury  Description: Absence of physical injury  Outcome: Ongoing     Problem: Nutrition  Goal: Optimal nutrition therapy  Outcome: Ongoing

## 2021-12-14 NOTE — FLOWSHEET NOTE
Assessment: Patient is a 60 y.o. male who was admitted to the hospital due to \"COVID-19. \" Patient remains \"in isolation,\" and was \"intubated,\" at time of  visit to unit. Intervention:  visited patient per [facility] support. \" Patient was \"intubated,\" and in \"isolation,\" at time of 's visit to unit.  located patient's bedside nurse, who indicated that she recently spoke to patient's mother and daughter. Per nurse, patient's family is Haskell Gottron a hard time accepting,\" patient's condition. Patient's bedside nurse indicated that family may appreciate a follow-up call in the morning from spiritual care to offer support. Outcome: Patient remained intubated throughout encounter. Plan: Patient will remain on [facility] list and next shift  will make follow-up visit. Chaplains can be reached 24/7 via [employer]. Camryn Corey     12/13/21 [id number]   Encounter Summary   Services provided to: Patient not available   Referral/Consult From: Multi-disciplinary team  ([facility])   Support System Parent;  Children   Continue Visiting   (12/13/2021, pt intubated)   Complexity of Encounter Low   Length of Encounter 15 minutes   Routine   Type Initial   Spiritual/Presybeterian   Type Spiritual support   Assessment Unable to respond   Intervention Sustaining presence/ Ministry of presence   Outcome Did not respond

## 2021-12-14 NOTE — PROGRESS NOTES
[facility]  Office: [address], DO, Kim Orellana, DO, Shweta Song, DO, Ramy Barba Blood, DO, Tyler Robledo MD, Payton Reich MD, Chip Mcneil MD, Francisca Arias MD, Bonnie Hodgkin, MD, Shelbie Lora MD, Anuel Garsia MD, Kasia Fabian, DO, Daily Luke, DO, Torrey Estes MD,  Desiree Christine, DO, Barbara Harrison MD, Christiano Perez MD, Dawna Bella MD, Naeem Van MD, Odalys Moreno MD, Jamar Schmitz MD, Aston Sauer MD, Ankit Adair [facility], [facility], CNP, Caity Ramos, CNP, Jcarlos Ramirez, CNS, Lluvia Adams, CNP, Antoinette Hoskins, CNP, Mile Beckham, CNP, Bre Deal, CNP, Dharmesh Doll, CNP, Radonna Aschoff, PA-C, Sherri Wilson, [facility], Marc Cobb, CNP, Enzo Hernandez, CNP, Crissy Yen, CNP, Morgan Gan, CNP, Brandan Bundy, CNP, Renuka Rodriguez CNP, Arron Sahu, [address] [place] [address]    Progress Note    12/14/2021    9:52 AM    Name:   Milagro Murray  MRN:     [id number]     Acct:      [de-identified]   Room:   [address] [place] [address] Day:  2  Admit Date:  12/12/2021 11:39 AM    PCP:   Quinton Saavedra MD  Code Status:  Full Code    Subjective:     C/C: SOB  Interval History Status: not changed.      Patient seen and evaluated in room intubated, sedated, proned  On Nimbex, fentanyl, Versed, propofol  Levophed weaned off with good blood pressure support  On 80% FiO2 vent support, SPO2 in the mid 90 range, epoprostenol continuous nebulizations started on 12/14  UOP 1.3 L over the last 24 hours with a slightly +950 mL fluid balance since admission, received one-time dose of Lasix 40 mg IV push on 12/14, chest x-ray from 12/12 reviewed  CRP downtrending  BC X2 NGTD D2, urine culture pending  Tube feed infusing at 25 mL an hour    Brief History:     64-year-old male presents for cough, shortness of breath and extremity weakness for 5 days.  Patient presented at [address] [place] [address] found to be hypoxic and hypotensive with oxygen saturation in the mid 90s on room air.  Patient quickly required BiPAP to maintain oxygen saturations above 90%.  Patient was unable to tolerate BiPAP and per chart repeatedly took off the mask and desaturated and 70s.  Patient was seen by pulmonary medicine recommending transfer to [place] for further care. Medardo Metz arrived intubated and sedated, pulmonary medicine was at bedside requiring paralytics to maintain ventilator synchrony.  Intermittently required Levophed for blood pressure.  Patient now sedated on Versed and fentanyl.  Patient requiring 100% FiO2 with PEEP of 21. Review of Systems:     Unable to assess intubated, sedated, proned    Medications: Allergies:     Allergies   Allergen Reactions    Nuts [Peanut-Containing Drug Products] Anaphylaxis    Sunflower Oil Anaphylaxis     Sunflower seeds       Current Meds:   Scheduled Meds:    sodium chloride flush  5-40 mL IntraVENous 2 times per day    enoxaparin  30 mg SubCUTAneous BID    Vitamin D  6,000 Units Oral Daily    Followed by   Carrie Loges ON 12/19/2021] Vitamin D  2,000 Units Oral Daily    dexamethasone  20 mg IntraVENous Q24H    Followed by   Carrie Logdevon ON 12/17/2021] dexamethasone  10 mg IntraVENous Q24H    insulin lispro  0-6 Units SubCUTAneous Q4H     Continuous Infusions:    sodium chloride      dextrose      norepinephrine Stopped (12/12/21 1149)    cisatracurium (NIMBEX) infusion 3 mcg/kg/min (12/14/21 0537)    midazolam 8 mg/hr (12/14/21 0945)    fentaNYL 125 mcg/hr (12/14/21 0945)    epoprostenol (VELETRI) nebulization solution 50 ng/kg/min (12/14/21 7659)    propofol 25 mcg/kg/min (12/14/21 0943)    dextrose      sodium chloride 10 mL/hr at 12/13/21 1548     PRN Meds: sodium chloride flush, sodium chloride, ondansetron **OR** ondansetron, polyethylene glycol, acetaminophen **OR** acetaminophen, glucose, dextrose, glucagon (rDNA), dextrose, glucose, dextrose, glucagon (rDNA), dextrose    Data:     Past Medical History:   has a past medical history of Arthritis, Asthma, Diabetes mellitus ([address] Edema, GERD (gastroesophageal reflux disease), Hypertension, Migraine, and IRMA on CPAP. Social History:   reports that he has quit smoking. He has never used smokeless tobacco. He reports current alcohol use. He reports that he does not use drugs. Family History:   Family History   Problem Relation Age of Onset    Heart Attack Sister 32    Diabetes Paternal Grandmother     Heart Disease Paternal Uncle     Heart Disease Paternal Uncle     Heart Disease Paternal Uncle     Cancer Maternal Aunt     Cancer Maternal Aunt     Cancer Maternal Uncle     Cancer Maternal Uncle        Vitals:  /71   Pulse 87   Temp 99 °F (37.2 °C) (Bladder)   Resp 30   Ht 6' 2\" (1.88 m)   Wt 283 lb 11.7 oz (128.7 kg)   SpO2 96%   BMI 36.43 kg/m²   Temp (24hrs), Av.9 °F (37.2 °C), Min:98.4 °F (36.9 °C), Max:99 °F (37.2 °C)    Recent Labs     21  2224 21  0209 21  0501 21  0655   POCGLU 163* 173* 183* 160*       I/O (24Hr): Intake/Output Summary (Last 24 hours) at 2021 0952  Last data filed at 2021 0800  Gross per 24 hour   Intake 2463.8 ml   Output 1205 ml   Net 1258.8 ml       Labs:  Hematology:  Recent Labs     21  1445 21  1445 21  0645 21  0539 21  0545   WBC 4.1   < > 5.2 3.3* 5.6   RBC 5.12   < > 4.83 4.78 4.95   HGB 15.7   < > 14.6 14.8 14.9   HCT 47.2   < > 45.2 45.1 47.4   MCV 92.2   < > 93.6 94.4 95.8   MCH 30.7   < > 30.2 31.0 30.1   MCHC 33.3   < > 32.3 32.8 31.4   RDW 14.1   < > 14.5* 14.6* 14.6*   *   < > 164 205 250   MPV 11.1   < > 10.7 10.6 10.7   .3*   < > 277.4* 137.3* 50.8*   INR  --   --  1.0  --   --    DDIMER 0.99*  --  2.47*  --   --     < > = values in this interval not displayed.      Chemistry:  Recent Labs     21  0645 21  0539 21  0545    143 146*   K 4.6 4.9 5.1  110* 111*   CO2 20 20 24   GLUCOSE 152* 158* 174*   BUN 23* 33* 36*   CREATININE 1.15 0.91 0.83   MG  --  2.2 2.3   ANIONGAP 16 13 11   LABGLOM >60 >60 >60   GFRAA >60 >60 >60   CALCIUM 8.1* 8.4* 8.5*   PHOS  --   --  2.5   TROPHS 31*  --   --      Recent Labs     12/11/21  1445 12/12/21  1504 12/13/21  0539 12/13/21  0717 12/13/21  1130 12/13/21  1853 12/13/21  2224 12/14/21  0209 12/14/21  0501 12/14/21  0545 12/14/21  0655   PROT 6.7  --  6.0*  --   --   --   --   --   --  6.1*  --    LABALBU 3.4*  --  3.1*  --   --   --   --   --   --  3.0*  3.0*  --    AST 90*  --  79*  --   --   --   --   --   --  76*  --    ALT 48*  --  50*  --   --   --   --   --   --  58*  --    *  --   --   --   --   --   --   --   --   --   --    ALKPHOS 99  --  104  --   --   --   --   --   --  106  --    BILITOT 0.32  --  0.25*  --   --   --   --   --   --  0.32  --    BILIDIR  --   --   --   --   --   --   --   --   --  0.15  --    POCGLU  --    < >  --    < > 144* 135* 163* 173* 183*  --  160*    < > = values in this interval not displayed. ABG:  Lab Results   Component Value Date    POCPH 7.349 12/14/2021    POCPCO2 55.8 12/14/2021    POCPO2 254.3 12/14/2021    POCHCO3 30.7 12/14/2021    NBEA NOT REPORTED 12/14/2021    PBEA 4 12/14/2021    WXS2TOD NOT REPORTED 12/14/2021    SSFC2ZRT 100 12/14/2021    FIO2 90.0 12/14/2021     Lab Results   Component Value Date/Time    SPECIAL RT ARM, 4 ML 12/11/2021 03:04 PM     Lab Results   Component Value Date/Time    CULTURE NO GROWTH 2 DAYS 12/11/2021 03:04 PM       Radiology:  XR CHEST (SINGLE VIEW FRONTAL)    Result Date: 12/12/2021  Stable appearing     XR CHEST (SINGLE VIEW FRONTAL)    Result Date: 12/11/2021  Multifocal hazy opacities throughout both lungs consistent with COVID pneumonia and or pulmonary edema.      XR CHEST PORTABLE    Result Date: 12/12/2021  Stable appearing chest with unchanged support tubes/lines and persistent extensive bilateral airspace disease consistent with known COVID pneumonia. XR CHEST PORTABLE    Result Date: 12/12/2021  Right internal jugular central venous catheter tip projecting over the proximal SVC versus brachiocephalic vein. No pneumothorax. Otherwise stable exam from earlier today. XR CHEST PORTABLE    Result Date: 12/12/2021  Endotracheal tube tip is 3.2 cm above the saul. Overall significant worsening of multifocal airspace opacities keeping with history of COVID-19. CT CHEST PULMONARY EMBOLISM W CONTRAST    Result Date: 12/11/2021  1. No evidence of pulmonary embolism 2. Diffuse ground-glass opacities throughout the lungs, typical of COVID-19 pulmonary disease. Physical Examination:        General appearance: Intubated, sedated, ill-appearing  Mental Status: Unable to assess, sedated  Lungs: Mechanical breath sounds, 80% FiO2 vent support  Heart:  regular rate and rhythm, no murmur  Abdomen: Obese, unable to fully assess abdomen secondary to prone position  : Hahn catheter  Extremities: Generalized anasarca without redness in the calves  Skin: Pale, taut    Assessment:        Hospital Problems           Last Modified POA    * (Principal) Pneumonia due to COVID-19 virus 12/12/2021 Yes    Acute respiratory failure with hypoxia ([address] 12/12/2021 Yes    Diabetes mellitus ([address] 12/12/2021 Yes    Asthma 12/12/2021 Yes    IRMA on CPAP 12/12/2021 Yes    Overview Signed 12/12/2021  2:39 PM by [employer], DO     seldom use machine         Hypertension 12/12/2021 Yes    Overview Signed 12/12/2021  2:39 PM by [employer], DO     on lasix         GERD (gastroesophageal reflux disease) 12/12/2021 Yes    ARDS (adult respiratory distress syndrome) ([address] 12/12/2021 Yes          Plan:        COVID-19 viral pneumonia:   - Would not appear that patient has been vaccinated.     - Infectious disease following.    - Received Actemra 12/12/2021.    - Started on high-dose Decadron 5/12/2021.    - Out of the window for remdesivir or monoclonal antibody.   - CRP downtrending    Acute respiratory failure with hypoxia and history of asthma:  - Secondary to #1.    - Complicated by patient's history of IRMA and asthma.    - Currently intubated on 80% FiO2.    - Managed by critical care. - Epoprostenol to be started on 12/14.     - Chest x-ray reviewed from 12/12 received Lasix 40 mg.    - Chest x-ray ordered for tomorrow    IRMA on CPAP:   - O2 management/vent support managed by critical care    Primary hypertension:   - Patient previously hypotensive since admission,   - Levophed has been weaned off since 12/14.    - Continue off antihypertensives for now    Diabetes mellitus type 2:   - A.m. blood sugar mildly elevated with coadministration of Decadron.   -  On low-dose SSI every 4 hours, continue for now  - uptitrate as warranted    GERD: Start Protonix    GI/DVT prophylaxis: Protonix, Lovenox twice daily    Prognosis continues to be guarded at this time    SHO Shepherd NP  12/14/2021  9:52 AM

## 2021-12-14 NOTE — PROGRESS NOTES
Pulmonary Critical Care   Consult Note    Patient - Acacia Upton  Date of Admission -  2021 11:39 AM  Date of Evaluation -  2021  Room and Bed Number -  3026/3026-01   Hospital Day - 2    CHIEF COMPLAINT : ACUTE HYPOXIC RESPIRATORY FAILURE DUE TO COVID -19 PNEUMONIA   HPI:   Acacia Upton  60 y.o. male  admitted for COVID-19 at [facility] ER due to worsening oxygenation he was initially started on BiPAP and subsequently intubated. On room air his saturations had been 50%. CTA no PE but bilateral pulmonary infiltrates. He was accepted at [address] [place] [address] ICU.   On arrival he is on PEEP of 22, 100% FiO2.    2021   Subjective      Prone   No acute events   Low grade fever     SECRETIONS  -Amount:  [x] Small [] Moderate  [] Large    [] None  Color:     [x] White [] Colored  [] Bloody    SEDATION:    [x] Propofol gtt  [x] Versed gtt  [x] FENTANYL  gtt  gtt   [] No Sedation    PARALYZED:  [] No    [x] Yes    VASOPRESSORS:  [x] No    [] Yes  [] Levophed [] Dopamine [] Vasopressin  [] Dobutamine [] Phenylephrine [] Epinephrine    INHALED veletri  : [] No    [x] Yes started 21    PRONE :       [] No    [x] Yes    Actemra:             [] No    [x] Yes  21  DEXAMETHASONE : [] No    [x] Yes      Ros unable to perform due to intubation and sedation    OBJECTIVE:     VITAL SIGNS:  /71   Pulse 79   Temp 99 °F (37.2 °C) (Bladder)   Resp 30   Ht 6' 2\" (1.88 m)   Wt 283 lb 11.7 oz (128.7 kg)   SpO2 96%   BMI 36.43 kg/m²   Tmax over 24 hours:  Temp (24hrs), Av.9 °F (37.2 °C), Min:98.4 °F (36.9 °C), Max:99.1 °F (37.3 °C)      Patient Vitals for the past 8 hrs:   Temp Temp src Pulse Resp SpO2 Weight   21 1115 -- -- -- 30 96 % --   21 1054 -- -- 79 30 96 % --   21 1015 -- -- -- 30 95 % --   21 1000 99 °F (37.2 °C) Bladder 81 30 95 % --   2145 -- -- 82 30 95 % --   21 -- -- 85 30 96 % --   21 -- -- 88 30 96 % --   21 0900 99.1 °F (37.3 °C) Bladder 90 28 97 % --   12/14/21 0845 -- -- 87 30 95 % --   12/14/21 0830 -- -- 81 30 96 % --   12/14/21 0815 -- -- 79 30 96 % --   12/14/21 0800 99 °F (37.2 °C) Bladder 79 30 96 % --   12/14/21 0745 -- -- 75 30 96 % --   12/14/21 0730 -- -- 75 30 96 % --   12/14/21 0715 -- -- 74 30 96 % --   12/14/21 0700 99 °F (37.2 °C) Bladder 74 30 96 % --   12/14/21 0615 -- -- -- 30 95 % --   12/14/21 0600 98.8 °F (37.1 °C) -- 75 30 95 % 283 lb 11.7 oz (128.7 kg)   12/14/21 0514 -- -- -- 30 96 % --   12/14/21 0505 -- -- 81 30 96 % --   12/14/21 0500 -- -- 83 30 96 % --         Intake/Output Summary (Last 24 hours) at 12/14/2021 1221  Last data filed at 12/14/2021 1000  Gross per 24 hour   Intake 2473.8 ml   Output 1030 ml   Net 1443.8 ml     Date 12/14/21 0000 - 12/14/21 2359   Shift 3669-9828 1086-7003 1203-9618 24 Hour Total   INTAKE   P.O.(mL/kg/hr) 0(0)   0   I. V.(mL/kg) 228.1(1.8)   228.1(1.8)   NG/GT(mL/kg) 178(1.4) 60(0.5)  238(1.8)   Shift Total(mL/kg) 406.1(3.2) 60(0.5)  466.1(3.6)   OUTPUT   Urine(mL/kg/hr) 625(0.6)   625   Shift Total(mL/kg) 625(4.9)   625(4.9)   Weight (kg) 128.7 128.7 128.7 128.7     Wt Readings from Last 3 Encounters:   12/14/21 283 lb 11.7 oz (128.7 kg)   12/11/21 300 lb (136.1 kg)   12/12/19 (!) 355 lb 9.6 oz (161.3 kg)     Body mass index is 36.43 kg/m².         PHYSICAL EXAM:  Intubated , prone  Sedated   Lungs rales   cvs r s1 s2 r   Abdomen nt nd bs +  Le no edema       MEDICATIONS:  Scheduled Meds:   sodium chloride flush  5-40 mL IntraVENous 2 times per day    enoxaparin  30 mg SubCUTAneous BID    Vitamin D  6,000 Units Oral Daily    Followed by   Shaunna Nolan ON 12/19/2021] Vitamin D  2,000 Units Oral Daily    dexamethasone  20 mg IntraVENous Q24H    Followed by   Shaunna Nloan ON 12/17/2021] dexamethasone  10 mg IntraVENous Q24H    insulin lispro  0-6 Units SubCUTAneous Q4H     Continuous Infusions:   sodium chloride      dextrose      norepinephrine Stopped (12/12/21 1149)    cisatracurium (NIMBEX) infusion 3 mcg/kg/min (12/14/21 0537)    midazolam 8 mg/hr (12/14/21 0945)    fentaNYL 125 mcg/hr (12/14/21 0945)    epoprostenol (VELETRI) nebulization solution 50 ng/kg/min (12/14/21 1050)    propofol 25 mcg/kg/min (12/14/21 0943)    dextrose      sodium chloride 10 mL/hr at 12/13/21 1548     PRN Meds:   sodium chloride flush, 5-40 mL, PRN  sodium chloride, 25 mL, PRN  ondansetron, 4 mg, Q8H PRN   Or  ondansetron, 4 mg, Q6H PRN  polyethylene glycol, 17 g, Daily PRN  acetaminophen, 650 mg, Q6H PRN   Or  acetaminophen, 650 mg, Q6H PRN  glucose, 15 g, PRN  dextrose, 12.5 g, PRN  glucagon (rDNA), 1 mg, PRN  dextrose, 100 mL/hr, PRN  glucose, 15 g, PRN  dextrose, 12.5 g, PRN  glucagon (rDNA), 1 mg, PRN  dextrose, 100 mL/hr, PRN        SUPPORT DEVICES: [x] Ventilator [] BIPAP  [] Nasal Cannula [] Room Air      ABGs:     Lab Results   Component Value Date    KQL2DKO NOT REPORTED 12/14/2021    FIO2 90.0 12/14/2021       DATA:  Complete Blood Count:   Recent Labs     12/12/21  0645 12/13/21  0539 12/14/21  0545   WBC 5.2 3.3* 5.6   RBC 4.83 4.78 4.95   HGB 14.6 14.8 14.9   HCT 45.2 45.1 47.4   MCV 93.6 94.4 95.8   MCH 30.2 31.0 30.1   MCHC 32.3 32.8 31.4   RDW 14.5* 14.6* 14.6*    205 250   MPV 10.7 10.6 10.7        Last 3 Blood Glucose:   Recent Labs     12/11/21  1445 12/12/21  0645 12/13/21  0539 12/14/21  0545   GLUCOSE 139* 152* 158* 174*        PT/INR:    Lab Results   Component Value Date    PROTIME 13.0 12/12/2021    INR 1.0 12/12/2021     PTT:    Lab Results   Component Value Date    APTT 39.1 12/12/2021       Comprehensive Metabolic Profile:   Recent Labs     12/11/21  1445 12/11/21  1445 12/12/21  0645 12/13/21  0539 12/14/21  0545      < > 141 143 146*   K 3.9   < > 4.6 4.9 5.1      < > 105 110* 111*   CO2 18*   < > 20 20 24   BUN 22*   < > 23* 33* 36*   CREATININE 1.13   < > 1.15 0.91 0.83   GLUCOSE 139*   < > 152* 158* 174*   CALCIUM 8.6   < > 8.1* 8.4* 8.5*   PROT 6.7  --   --  6.0* 6.1*   LABALBU 3.4*  --   --  3.1* 3.0*  3.0*   BILITOT 0.32  --   --  0.25* 0.32   ALKPHOS 99  --   --  104 106   AST 90*  --   --  79* 76*   ALT 48*  --   --  50* 58*    < > = values in this interval not displayed. Magnesium:   Lab Results   Component Value Date    MG 2.3 12/14/2021    MG 2.2 12/13/2021     Phosphorus:   Lab Results   Component Value Date    PHOS 2.5 12/14/2021     Ionized Calcium: No results found for: MARYON     Urinalysis:   Lab Results   Component Value Date    NITRU NEGATIVE 12/14/2021    COLORU Yellow 12/14/2021    PHUR 6.5 12/14/2021    WBCUA 0 TO 2 12/14/2021    RBCUA TOO NUMEROUS TO COUNT 12/14/2021    MUCUS NOT REPORTED 12/14/2021    TRICHOMONAS NOT REPORTED 12/14/2021    YEAST NOT REPORTED 12/14/2021    BACTERIA NOT REPORTED 12/14/2021    SPECGRAV 1.026 12/14/2021    LEUKOCYTESUR NEGATIVE 12/14/2021    UROBILINOGEN Normal 12/14/2021    BILIRUBINUR NEGATIVE 12/14/2021    GLUCOSEU NEGATIVE 12/14/2021    KETUA NEGATIVE 12/14/2021    AMORPHOUS NOT REPORTED 12/14/2021               XR CHEST (SINGLE VIEW FRONTAL)    Result Date: 12/11/2021  Multifocal hazy opacities throughout both lungs consistent with COVID pneumonia and or pulmonary edema. XR CHEST PORTABLE    Result Date: 12/12/2021  Stable appearing chest with unchanged support tubes/lines and persistent extensive bilateral airspace disease consistent with known COVID pneumonia. XR CHEST PORTABLE    Result Date: 12/12/2021  Right internal jugular central venous catheter tip projecting over the proximal SVC versus brachiocephalic vein. No pneumothorax. Otherwise stable exam from earlier today. XR CHEST PORTABLE    Result Date: 12/12/2021  Endotracheal tube tip is 3.2 cm above the saul. Overall significant worsening of multifocal airspace opacities keeping with history of COVID-19. CT CHEST PULMONARY EMBOLISM W CONTRAST    Result Date: 12/11/2021  1.  No evidence of pulmonary embolism 2. Diffuse ground-glass opacities throughout the lungs, typical of COVID-19 pulmonary disease. Past Medical History:   Diagnosis Date    Arthritis     Asthma     Diabetes mellitus ([address]     Edema     GERD (gastroesophageal reflux disease)     Hypertension     on lasix    Migraine     IRMA on CPAP     seldom use machine        Social History     Socioeconomic History    Marital status:      Spouse name: Not on file    Number of children: Not on file    Years of education: Not on file    Highest education level: Not on file   Occupational History    Not on file   Tobacco Use    Smoking status: Former Smoker    Smokeless tobacco: Never Used   Vaping Use    Vaping Use: Never used   Substance and Sexual Activity    Alcohol use: Yes     Comment: every couple months    Drug use: Never    Sexual activity: Not on file   Other Topics Concern    Not on file   Social History Narrative    Not on file     Social Determinants of Health     Financial Resource Strain:     Difficulty of Paying Living Expenses: Not on file   Food Insecurity:     Worried About Running Out of Food in the Last Year: Not on file    Lucina of Food in the Last Year: Not on file   Transportation Needs:     Lack of Transportation (Medical): Not on file    Lack of Transportation (Non-Medical):  Not on file   Physical Activity:     Days of Exercise per Week: Not on file    Minutes of Exercise per Session: Not on file   Stress:     Feeling of Stress : Not on file   Social Connections:     Frequency of Communication with Friends and Family: Not on file    Frequency of Social Gatherings with Friends and Family: Not on file    Attends [employer] Services: Not on file    Active Member of Clubs or Organizations: Not on file    Attends Club or Organization Meetings: Not on file    Marital Status: Not on file   Intimate Partner Violence:     Fear of Current or Ex-Partner: Not on file   [employer] Abused: Not on file    Physically Abused: Not on file    Sexually Abused: Not on file   Housing Stability:     Unable to Pay for Housing in the Last Year: Not on file    Number of Places Lived in the Last Year: Not on file    Unstable Housing in the Last Year: Not on file         There is no immunization history on file for this patient.       Family History   Problem Relation Age of Onset    Heart Attack Sister 32    Diabetes Paternal Grandmother     Heart Disease Paternal Uncle     Heart Disease Paternal Uncle     Heart Disease Paternal Uncle     Cancer Maternal Aunt     Cancer Maternal Aunt     Cancer Maternal Uncle     Cancer Maternal Uncle          Past Surgical History:   Procedure Laterality Date    APPENDECTOMY      ARTHROPLASTY Left 11/1/2019    LEFT FOOT 1ST MTPJ ARTHROPLASTY WITH PEREZ IMPLANT AND GROMMETS  [facility] performed by Efrain Phan MD at [address] [facility] Right 12/12/2019    RIGHT FOOT ARTHROPLASTY 1ST MTPJ (Right Foot)    ARTHROPLASTY Right 12/12/2019    RIGHT FOOT ARTHROPLASTY 1ST MTPJ performed by Efrain Phan MD at [address] Right    [address]  2014    no blockage no stents    CHOLECYSTECTOMY      COLONOSCOPY      ENDOSCOPY, COLON, DIAGNOSTIC      TOE SURGERY Left 11/01/2019     LEFT FOOT 1ST MTPJ ARTHROPLASTY WITH PEREZ IMPLANT AND GROMMETS  [facility] (Left )    TONSILLECTOMY            VENTILATOR SETTINGS:  Vent Information  $Ventilation: $Subsequent Day  Skin Assessment: Clean, dry, & intact  Equipment Changed: Vent Circuit  Vent Type: Servo i  Vent Mode: PRVC  Vt Ordered: 500 mL  Rate Set: 30 bmp  FiO2 : 80 %  SpO2: 96 %  SpO2/FiO2 ratio: 118.75  Sensitivity: 3  PEEP/CPAP: 14  I Time/ I Time %: 0.7 s  Humidification Source: Heated wire  Humidification Temp: 37  Humidification Temp Measured: 37.2  Circuit Condensation: Drained  Nitric Oxide/Epoprostenol In Use?: Yes     PaO2/FiO2 RATIO:  Recent Labs 12/14/21  0501   POCPO2 254.3*      FiO2 : 80 %       LABS:  ABGs:   Recent Labs     12/12/21  0420 12/12/21  1320 12/13/21  0009 12/13/21  0447 12/14/21  0501   POCPH 7.326* 7.244* 7.278* 7.358 7.349*   POCPCO2 46.8 60.2* 63.7* 49.1* 55.8*   POCPO2 95.3 67.7* 97.9 319.5* 254.3*   POCHCO3 24.5 26.0 29.8* 27.7 30.7*   XKIH1CKS 97 89* 96 100* 100*            ASSESSMENT:     Principal Problem:    Pneumonia due to COVID-19 virus  Active Problems:    Acute respiratory failure with hypoxia (HCC)    Diabetes mellitus (HCC)    Asthma    IRMA on CPAP    Hypertension    GERD (gastroesophageal reflux disease)    ARDS (adult respiratory distress syndrome) (HCC)  Resolved Problems:    * No resolved hospital problems. *              Acute hypoxic respiratory failure secondary to COVID 19   Acute respiratory distress syndrome   Bilateral multifocal pneumonia due to COVID 19 infection   Covid -19 pandemic emergency     LOS: 2  PLAN:    D/w RT  D/w RN   Vent setting reviewed -  Wean inhaled flolan   Cont ARDS ventilation   Agree with lasix once dose   Sedation reviewed nimbex    prone positioning   Airborne isolation and droplet precautions to be continued  Continue supportive care   Cont treatment with medications for COVID  Minimize iv fluids to keep the patient on the dry side  Cont tube feeding    Monitor endotracheal secretions   Will obtain xray chest in am   ABG     D/w Dr Rafa Campbell Discussed with nursing staff in detail , all questions answered . Total critical care time caring for this patient with life threatening, unstable organ failure, including direct patient contact, management of life support systems, review of data including imaging and labs, discussions with other team members and physicians at least 28  Min so far today, excluding procedures.   Electronically signed by Eris York MD on 12/14/2021 at 12:21 PM       This patient was evaluated in the context of the global SARS-CoV-2 (COVID-19) pandemic, which necessitated considerations that the patient either has COVID-19 infection or is at risk of infection with COVID-19. Institutional protocols and algorithms that pertain to the evaluation & management of patients with COVID-19 or those at risk for COVID-19 are in a state of rapid changes based on information released by regulatory bodies including the CDC and federal and state organizations. These policies and algorithms were followed during the patient's care. Please note that this chart was generated using voice recognition Dragon dictation software. Although every effort was made to ensure the accuracy of this automated transcription, some errors in transcription may have occurred.

## 2021-12-15 LAB
ALBUMIN SERPL-MCNC: 3 G/DL (ref 3.5–5.2)
ALBUMIN SERPL-MCNC: 3 G/DL (ref 3.5–5.2)
ALBUMIN/GLOBULIN RATIO: 1.1 (ref 1–2.5)
ALLEN TEST: ABNORMAL
ALP BLD-CCNC: 116 U/L (ref 40–129)
ALT SERPL-CCNC: 210 U/L (ref 5–41)
ANION GAP SERPL CALCULATED.3IONS-SCNC: 12 MMOL/L (ref 9–17)
AST SERPL-CCNC: 222 U/L
BILIRUB SERPL-MCNC: 0.54 MG/DL (ref 0.3–1.2)
BILIRUBIN DIRECT: 0.39 MG/DL
BILIRUBIN, INDIRECT: 0.15 MG/DL (ref 0–1)
BUN BLDV-MCNC: 42 MG/DL (ref 6–20)
BUN/CREAT BLD: ABNORMAL (ref 9–20)
C-REACTIVE PROTEIN: 23.5 MG/L (ref 0–5)
CALCIUM SERPL-MCNC: 8.4 MG/DL (ref 8.6–10.4)
CHLORIDE BLD-SCNC: 114 MMOL/L (ref 98–107)
CO2: 23 MMOL/L (ref 20–31)
CREAT SERPL-MCNC: 0.77 MG/DL (ref 0.7–1.2)
FIO2: 90
GFR AFRICAN AMERICAN: >60 ML/MIN
GFR NON-AFRICAN AMERICAN: >60 ML/MIN
GFR SERPL CREATININE-BSD FRML MDRD: ABNORMAL ML/MIN/{1.73_M2}
GFR SERPL CREATININE-BSD FRML MDRD: ABNORMAL ML/MIN/{1.73_M2}
GLOBULIN: ABNORMAL G/DL (ref 1.5–3.8)
GLUCOSE BLD-MCNC: 128 MG/DL (ref 75–110)
GLUCOSE BLD-MCNC: 143 MG/DL (ref 75–110)
GLUCOSE BLD-MCNC: 157 MG/DL (ref 75–110)
GLUCOSE BLD-MCNC: 161 MG/DL (ref 75–110)
GLUCOSE BLD-MCNC: 170 MG/DL (ref 75–110)
GLUCOSE BLD-MCNC: 172 MG/DL (ref 75–110)
GLUCOSE BLD-MCNC: 177 MG/DL (ref 70–99)
GLUCOSE BLD-MCNC: 177 MG/DL (ref 75–110)
GLUCOSE BLD-MCNC: 202 MG/DL (ref 74–100)
HCT VFR BLD CALC: 47.2 % (ref 40.7–50.3)
HEMOGLOBIN: 15.1 G/DL (ref 13–17)
MAGNESIUM: 2.4 MG/DL (ref 1.6–2.6)
MCH RBC QN AUTO: 30.9 PG (ref 25.2–33.5)
MCHC RBC AUTO-ENTMCNC: 32 G/DL (ref 28.4–34.8)
MCV RBC AUTO: 96.5 FL (ref 82.6–102.9)
MODE: ABNORMAL
NEGATIVE BASE EXCESS, ART: ABNORMAL (ref 0–2)
NRBC AUTOMATED: 0 PER 100 WBC
O2 DEVICE/FLOW/%: ABNORMAL
PATIENT TEMP: ABNORMAL
PDW BLD-RTO: 14.1 % (ref 11.8–14.4)
PHOSPHORUS: 2.7 MG/DL (ref 2.5–4.5)
PLATELET # BLD: 226 K/UL (ref 138–453)
PMV BLD AUTO: 10.5 FL (ref 8.1–13.5)
POC HCO3: 31.9 MMOL/L (ref 21–28)
POC O2 SATURATION: 98 % (ref 94–98)
POC PCO2 TEMP: ABNORMAL MM HG
POC PCO2: 55 MM HG (ref 35–48)
POC PH TEMP: ABNORMAL
POC PH: 7.37 (ref 7.35–7.45)
POC PO2 TEMP: ABNORMAL MM HG
POC PO2: 119.6 MM HG (ref 83–108)
POSITIVE BASE EXCESS, ART: 5 (ref 0–3)
POTASSIUM SERPL-SCNC: 4.8 MMOL/L (ref 3.7–5.3)
RBC # BLD: 4.89 M/UL (ref 4.21–5.77)
SAMPLE SITE: ABNORMAL
SODIUM BLD-SCNC: 149 MMOL/L (ref 135–144)
TCO2 (CALC), ART: ABNORMAL MMOL/L (ref 22–29)
TOTAL PROTEIN: 5.7 G/DL (ref 6.4–8.3)
TRIGL SERPL-MCNC: 557 MG/DL
WBC # BLD: 6.4 K/UL (ref 3.5–11.3)

## 2021-12-15 PROCEDURE — 6360000002 HC RX W HCPCS: Performed by: NURSE PRACTITIONER

## 2021-12-15 PROCEDURE — 2580000003 HC RX 258: Performed by: NURSE PRACTITIONER

## 2021-12-15 PROCEDURE — 6370000000 HC RX 637 (ALT 250 FOR IP): Performed by: NURSE PRACTITIONER

## 2021-12-15 PROCEDURE — 2000000000 HC ICU R&B

## 2021-12-15 PROCEDURE — 6360000002 HC RX W HCPCS: Performed by: INTERNAL MEDICINE

## 2021-12-15 PROCEDURE — 84100 ASSAY OF PHOSPHORUS: CPT

## 2021-12-15 PROCEDURE — 2580000003 HC RX 258: Performed by: INTERNAL MEDICINE

## 2021-12-15 PROCEDURE — 94645 CONT INHLJ TX EACH ADDL HOUR: CPT

## 2021-12-15 PROCEDURE — 82803 BLOOD GASES ANY COMBINATION: CPT

## 2021-12-15 PROCEDURE — 85027 COMPLETE CBC AUTOMATED: CPT

## 2021-12-15 PROCEDURE — 80076 HEPATIC FUNCTION PANEL: CPT

## 2021-12-15 PROCEDURE — 83735 ASSAY OF MAGNESIUM: CPT

## 2021-12-15 PROCEDURE — 86140 C-REACTIVE PROTEIN: CPT

## 2021-12-15 PROCEDURE — 2700000000 HC OXYGEN THERAPY PER DAY

## 2021-12-15 PROCEDURE — 6370000000 HC RX 637 (ALT 250 FOR IP): Performed by: INTERNAL MEDICINE

## 2021-12-15 PROCEDURE — 82248 BILIRUBIN DIRECT: CPT

## 2021-12-15 PROCEDURE — 99233 SBSQ HOSP IP/OBS HIGH 50: CPT | Performed by: NURSE PRACTITIONER

## 2021-12-15 PROCEDURE — 94761 N-INVAS EAR/PLS OXIMETRY MLT: CPT

## 2021-12-15 PROCEDURE — 80069 RENAL FUNCTION PANEL: CPT

## 2021-12-15 PROCEDURE — 6360000002 HC RX W HCPCS

## 2021-12-15 PROCEDURE — 80053 COMPREHEN METABOLIC PANEL: CPT

## 2021-12-15 PROCEDURE — 82947 ASSAY GLUCOSE BLOOD QUANT: CPT

## 2021-12-15 PROCEDURE — 2500000003 HC RX 250 WO HCPCS: Performed by: INTERNAL MEDICINE

## 2021-12-15 PROCEDURE — 94003 VENT MGMT INPAT SUBQ DAY: CPT

## 2021-12-15 PROCEDURE — 37799 UNLISTED PX VASCULAR SURGERY: CPT

## 2021-12-15 PROCEDURE — 99232 SBSQ HOSP IP/OBS MODERATE 35: CPT | Performed by: INTERNAL MEDICINE

## 2021-12-15 PROCEDURE — 99291 CRITICAL CARE FIRST HOUR: CPT | Performed by: INTERNAL MEDICINE

## 2021-12-15 PROCEDURE — C9113 INJ PANTOPRAZOLE SODIUM, VIA: HCPCS | Performed by: NURSE PRACTITIONER

## 2021-12-15 PROCEDURE — 84478 ASSAY OF TRIGLYCERIDES: CPT

## 2021-12-15 RX ORDER — INSULIN GLARGINE 100 [IU]/ML
10 INJECTION, SOLUTION SUBCUTANEOUS NIGHTLY
Status: DISCONTINUED | OUTPATIENT
Start: 2021-12-15 | End: 2022-01-07 | Stop reason: HOSPADM

## 2021-12-15 RX ORDER — FUROSEMIDE 10 MG/ML
INJECTION INTRAMUSCULAR; INTRAVENOUS
Status: COMPLETED
Start: 2021-12-15 | End: 2021-12-15

## 2021-12-15 RX ORDER — FUROSEMIDE 10 MG/ML
40 INJECTION INTRAMUSCULAR; INTRAVENOUS DAILY
Status: DISCONTINUED | OUTPATIENT
Start: 2021-12-15 | End: 2021-12-28

## 2021-12-15 RX ADMIN — EPOPROSTENOL 40 NG/KG/MIN: 1.5 INJECTION, POWDER, LYOPHILIZED, FOR SOLUTION INTRAVENOUS at 07:00

## 2021-12-15 RX ADMIN — PROPOFOL 30 MCG/KG/MIN: 10 INJECTION, EMULSION INTRAVENOUS at 11:13

## 2021-12-15 RX ADMIN — DEXAMETHASONE SODIUM PHOSPHATE 20 MG: 10 INJECTION INTRAMUSCULAR; INTRAVENOUS at 13:30

## 2021-12-15 RX ADMIN — PROPOFOL 30 MCG/KG/MIN: 10 INJECTION, EMULSION INTRAVENOUS at 03:04

## 2021-12-15 RX ADMIN — Medication 8 MG/HR: at 05:25

## 2021-12-15 RX ADMIN — SODIUM CHLORIDE, PRESERVATIVE FREE 10 ML: 5 INJECTION INTRAVENOUS at 08:48

## 2021-12-15 RX ADMIN — CISATRACURIUM BESYLATE 3 MCG/KG/MIN: 10 INJECTION, SOLUTION INTRAVENOUS at 13:30

## 2021-12-15 RX ADMIN — Medication 150 MCG/HR: at 11:13

## 2021-12-15 RX ADMIN — INSULIN LISPRO 1 UNITS: 100 INJECTION, SOLUTION INTRAVENOUS; SUBCUTANEOUS at 06:26

## 2021-12-15 RX ADMIN — Medication 6000 UNITS: at 08:47

## 2021-12-15 RX ADMIN — EPOPROSTENOL 30 NG/KG/MIN: 1.5 INJECTION, POWDER, LYOPHILIZED, FOR SOLUTION INTRAVENOUS at 17:50

## 2021-12-15 RX ADMIN — ENOXAPARIN SODIUM 30 MG: 100 INJECTION SUBCUTANEOUS at 08:47

## 2021-12-15 RX ADMIN — PROPOFOL 15 MCG/KG/MIN: 10 INJECTION, EMULSION INTRAVENOUS at 20:19

## 2021-12-15 RX ADMIN — PANTOPRAZOLE SODIUM 40 MG: 40 INJECTION, POWDER, FOR SOLUTION INTRAVENOUS at 08:47

## 2021-12-15 RX ADMIN — FUROSEMIDE 40 MG: 10 INJECTION, SOLUTION INTRAMUSCULAR; INTRAVENOUS at 13:39

## 2021-12-15 RX ADMIN — INSULIN LISPRO 1 UNITS: 100 INJECTION, SOLUTION INTRAVENOUS; SUBCUTANEOUS at 23:00

## 2021-12-15 RX ADMIN — Medication 10 MG/HR: at 18:08

## 2021-12-15 RX ADMIN — INSULIN LISPRO 1 UNITS: 100 INJECTION, SOLUTION INTRAVENOUS; SUBCUTANEOUS at 18:47

## 2021-12-15 RX ADMIN — SODIUM CHLORIDE, PRESERVATIVE FREE 10 ML: 5 INJECTION INTRAVENOUS at 21:04

## 2021-12-15 RX ADMIN — PROPOFOL 30 MCG/KG/MIN: 10 INJECTION, EMULSION INTRAVENOUS at 07:03

## 2021-12-15 RX ADMIN — ENOXAPARIN SODIUM 30 MG: 100 INJECTION SUBCUTANEOUS at 20:25

## 2021-12-15 RX ADMIN — INSULIN GLARGINE 10 UNITS: 100 INJECTION, SOLUTION SUBCUTANEOUS at 20:31

## 2021-12-15 RX ADMIN — CISATRACURIUM BESYLATE 3 MCG/KG/MIN: 10 INJECTION, SOLUTION INTRAVENOUS at 23:04

## 2021-12-15 RX ADMIN — Medication 175 MCG/HR: at 18:07

## 2021-12-15 RX ADMIN — CISATRACURIUM BESYLATE 3 MCG/KG/MIN: 10 INJECTION, SOLUTION INTRAVENOUS at 05:25

## 2021-12-15 RX ADMIN — INSULIN LISPRO 1 UNITS: 100 INJECTION, SOLUTION INTRAVENOUS; SUBCUTANEOUS at 02:47

## 2021-12-15 RX ADMIN — Medication 200 MCG/HR: at 23:04

## 2021-12-15 RX ADMIN — FUROSEMIDE 40 MG: 10 INJECTION, SOLUTION INTRAVENOUS at 13:39

## 2021-12-15 ASSESSMENT — PULMONARY FUNCTION TESTS
PIF_VALUE: 31
PIF_VALUE: 31
PIF_VALUE: 32
PIF_VALUE: 31
PIF_VALUE: 34

## 2021-12-15 ASSESSMENT — PAIN SCALES - GENERAL: PAINLEVEL_OUTOF10: 0

## 2021-12-15 NOTE — PLAN OF CARE
Problem: Airway Clearance - Ineffective  Goal: Achieve or maintain patent airway  Outcome: Ongoing     Problem: Gas Exchange - Impaired  Goal: Absence of hypoxia  Outcome: Ongoing  Goal: Promote optimal lung function  Outcome: Ongoing     Problem: Breathing Pattern - Ineffective  Goal: Ability to achieve and maintain a regular respiratory rate  Outcome: Ongoing     Problem:  Body Temperature -  Risk of, Imbalanced  Goal: Ability to maintain a body temperature within defined limits  Outcome: Ongoing  Goal: Will regain or maintain usual level of consciousness  Outcome: Ongoing  Goal: Complications related to the disease process, condition or treatment will be avoided or minimized  Outcome: Ongoing     Problem: Isolation Precautions - Risk of Spread of Infection  Goal: Prevent transmission of infection  Outcome: Ongoing     Problem: Nutrition Deficits  Goal: Optimize nutritional status  Outcome: Ongoing     Problem: Risk for Fluid Volume Deficit  Goal: Maintain normal heart rhythm  Outcome: Ongoing  Goal: Maintain absence of muscle cramping  Outcome: Ongoing  Goal: Maintain normal serum potassium, sodium, calcium, phosphorus, and pH  Outcome: Ongoing     Problem: Loneliness or Risk for Loneliness  Goal: Demonstrate positive use of time alone when socialization is not possible  Outcome: Ongoing     Problem: Fatigue  Goal: Verbalize increase energy and improved vitality  Outcome: Ongoing     Problem: Patient Education: Go to Patient Education Activity  Goal: Patient/Family Education  Outcome: Ongoing     Problem: OXYGENATION/RESPIRATORY FUNCTION  Goal: Patient will maintain patent airway  12/15/2021 0531 by Tamir Olson RN  Outcome: Ongoing  12/14/2021 2002 by Sil Del Valle RCP  Outcome: Ongoing  Goal: Patient will achieve/maintain normal respiratory rate/effort  Description: Respiratory rate and effort will be within normal limits for the patient  12/15/2021 0531 by Tamir Oslon RN  Outcome: Ongoing  12/14/2021 2002 by Nicolette Del Valle RCP  Outcome: Ongoing     Problem: MECHANICAL VENTILATION  Goal: Patient will maintain patent airway  12/15/2021 0531 by Ross Nina RN  Outcome: Ongoing  12/14/2021 2002 by Nicolette Del Valle RCP  Outcome: Ongoing  Goal: Oral health is maintained or improved  12/15/2021 0531 by Ross Nina RN  Outcome: Ongoing  12/14/2021 2002 by Nicolette Del Valle RCP  Outcome: Ongoing  Goal: ET tube will be managed safely  12/15/2021 0531 by Ross Nina RN  Outcome: Ongoing  12/14/2021 2002 by Nicolette Del Valle RCP  Outcome: Ongoing  Goal: Ability to express needs and understand communication  12/15/2021 0531 by Ross Nina RN  Outcome: Ongoing  12/14/2021 2002 by Nicolette Del Valle RCP  Outcome: Ongoing  Goal: Mobility/activity is maintained at optimum level for patient  12/15/2021 0531 by Ross Nina RN  Outcome: Ongoing  12/14/2021 2002 by Nicolette Del Valle RCP  Outcome: Ongoing     Problem: SKIN INTEGRITY  Goal: Skin integrity is maintained or improved  12/15/2021 0531 by Ross Nina RN  Outcome: Ongoing  12/14/2021 2002 by Nicolette Del Valle RCP  Outcome: Ongoing     Problem: Skin Integrity:  Goal: Will show no infection signs and symptoms  Description: Will show no infection signs and symptoms  Outcome: Ongoing  Goal: Absence of new skin breakdown  Description: Absence of new skin breakdown  Outcome: Ongoing     Problem: Falls - Risk of:  Goal: Will remain free from falls  Description: Will remain free from falls  Outcome: Ongoing  Goal: Absence of physical injury  Description: Absence of physical injury  Outcome: Ongoing     Problem: Nutrition  Goal: Optimal nutrition therapy  Outcome: Ongoing

## 2021-12-15 NOTE — PLAN OF CARE
Ongoing  12/15/2021 0531 by Antonia Quintero RN  Outcome: Ongoing  12/14/2021 2002 by Deepali Del Valle RCP  Outcome: Ongoing  Goal: Ability to express needs and understand communication  12/15/2021 0758 by Mary Bansal RCP  Outcome: Ongoing  12/15/2021 0531 by Antonia Quintero RN  Outcome: Ongoing  12/14/2021 2002 by Deepali Del Valle RCP  Outcome: Ongoing  Goal: Mobility/activity is maintained at optimum level for patient  12/15/2021 0758 by Mary Bansal RCP  Outcome: Ongoing  12/15/2021 0531 by Antonia Quintero RN  Outcome: Ongoing  12/14/2021 2002 by Aline Ahuja RCP  Outcome: Ongoing     Problem: Skin Integrity:  Goal: Will show no infection signs and symptoms  Description: Will show no infection signs and symptoms  12/15/2021 0531 by Antonia Quintero RN  Outcome: Ongoing  Goal: Absence of new skin breakdown  Description: Absence of new skin breakdown  12/15/2021 0531 by Antonia Quintero RN  Outcome: Ongoing

## 2021-12-15 NOTE — PROGRESS NOTES
Pulmonary Critical Care   Consult Note    Patient - Noy Anthony  Date of Admission -  2021 11:39 AM  Date of Evaluation -  12/15/2021  Room and Bed Number -  3026/3026-01   Hospital Day - 3    CHIEF COMPLAINT : ACUTE HYPOXIC RESPIRATORY FAILURE DUE TO COVID -19 PNEUMONIA   HPI:   Noy Anthony  60 y.o. male  admitted for COVID-19 at [facility] ER due to worsening oxygenation he was initially started on BiPAP and subsequently intubated. On room air his saturations had been 50%. CTA no PE but bilateral pulmonary infiltrates. He was accepted at [address] [place] [address] ICU.   On arrival he is on PEEP of 22, 100% FiO2.    12/15/2021   Subjective      Prone   No acute events   Plateau pressure <66    SECRETIONS  -Amount:  [x] Small [] Moderate  [] Large    [] None  Color:     [x] White [] Colored  [] Bloody    SEDATION:    [x] Propofol gtt  [x] Versed gtt  [x] FENTANYL  gtt  gtt   [] No Sedation    PARALYZED:  [] No    [x] Yes    VASOPRESSORS:  [x] No    [] Yes  [] Levophed [] Dopamine [] Vasopressin  [] Dobutamine [] Phenylephrine [] Epinephrine    INHALED veletri  : [] No    [x] Yes started 21    PRONE :       [] No    [x] Yes    Actemra:             [] No    [x] Yes  21  DEXAMETHASONE : [] No    [x] Yes      Ros unable to perform due to intubation and sedation    OBJECTIVE:     VITAL SIGNS:  /64   Pulse 69   Temp 99.7 °F (37.6 °C) (Bladder)   Resp 30   Ht 6' 2\" (1.88 m)   Wt 278 lb 14.1 oz (126.5 kg)   SpO2 95%   BMI 35.81 kg/m²   Tmax over 24 hours:  Temp (24hrs), Av.1 °F (37.3 °C), Min:98.1 °F (36.7 °C), Max:99.7 °F (37.6 °C)      Patient Vitals for the past 8 hrs:   Temp src Pulse Resp SpO2   12/15/21 0800 Bladder 69 30 95 %   12/15/21 0754 -- -- 30 95 %   12/15/21 0700 -- -- 30 96 %         Intake/Output Summary (Last 24 hours) at 12/15/2021 1435  Last data filed at 12/15/2021 0800  Gross per 24 hour   Intake 2744.2 ml   Output 1825 ml   Net 919.2 ml     Date 12/15/21 0000 - 12/15/21 2359   Shift 4657-7788 8900-4657 4533-1661 24 Hour Total   INTAKE   P.O.(mL/kg/hr) 0(0)   0   I. V.(mL/kg) 729. 2(5.8) 214(1.7)  943. 2(7.5)   NG/GT(mL/kg) 921(7.3) 98(0.8)  1019(8.1)   Shift Total(mL/kg) 1650.2(13) 312(2.5)  1962.2(15.5)   OUTPUT   Urine(mL/kg/hr) 825(0.8) 300  1125   Shift Total(mL/kg) 825(6.5) 300(2.4)  1125(8.9)   Weight (kg) 126.5 126.5 126.5 126.5     Wt Readings from Last 3 Encounters:   12/15/21 278 lb 14.1 oz (126.5 kg)   12/11/21 300 lb (136.1 kg)   12/12/19 (!) 355 lb 9.6 oz (161.3 kg)     Body mass index is 35.81 kg/m².         PHYSICAL EXAM:  Intubated , prone  Sedated   Lungs rales   cvs r s1 s2 r   Abdomen nt nd bs +  Le no edema       MEDICATIONS:  Scheduled Meds:   furosemide  40 mg IntraVENous Daily    insulin glargine  10 Units SubCUTAneous Nightly    pantoprazole  40 mg IntraVENous Daily    And    sodium chloride (PF)  10 mL IntraVENous Daily    sodium chloride flush  5-40 mL IntraVENous 2 times per day    enoxaparin  30 mg SubCUTAneous BID    Vitamin D  6,000 Units Oral Daily    Followed by   Christine Small ON 12/19/2021] Vitamin D  2,000 Units Oral Daily    dexamethasone  20 mg IntraVENous Q24H    Followed by   Christine Small ON 12/17/2021] dexamethasone  10 mg IntraVENous Q24H    insulin lispro  0-6 Units SubCUTAneous Q4H     Continuous Infusions:   epoprostenol (VELETRI) nebulization solution 40 ng/kg/min (12/15/21 0700)    sodium chloride      dextrose      norepinephrine Stopped (12/12/21 1149)    cisatracurium (NIMBEX) infusion 3 mcg/kg/min (12/15/21 0525)    midazolam 8 mg/hr (12/15/21 0525)    fentaNYL 150 mcg/hr (12/15/21 1113)    propofol 30 mcg/kg/min (12/15/21 1113)    dextrose      sodium chloride 10 mL/hr at 12/13/21 1548     PRN Meds:   sodium chloride flush, 5-40 mL, PRN  sodium chloride, 25 mL, PRN  ondansetron, 4 mg, Q8H PRN   Or  ondansetron, 4 mg, Q6H PRN  polyethylene glycol, 17 g, Daily PRN  acetaminophen, 650 mg, Q6H PRN   Or  acetaminophen, 650 mg, Q6H PRN  glucose, 15 g, PRN  dextrose, 12.5 g, PRN  glucagon (rDNA), 1 mg, PRN  dextrose, 100 mL/hr, PRN  glucose, 15 g, PRN  dextrose, 12.5 g, PRN  glucagon (rDNA), 1 mg, PRN  dextrose, 100 mL/hr, PRN        SUPPORT DEVICES: [x] Ventilator [] BIPAP  [] Nasal Cannula [] Room Air      ABGs:     Lab Results   Component Value Date    RCS8OCV NOT REPORTED 12/15/2021    FIO2 90.0 12/15/2021       DATA:  Complete Blood Count:   Recent Labs     12/13/21  0539 12/14/21  0545 12/15/21  0440   WBC 3.3* 5.6 6.4   RBC 4.78 4.95 4.89   HGB 14.8 14.9 15.1   HCT 45.1 47.4 47.2   MCV 94.4 95.8 96.5   MCH 31.0 30.1 30.9   MCHC 32.8 31.4 32.0   RDW 14.6* 14.6* 14.1    250 226   MPV 10.6 10.7 10.5        Last 3 Blood Glucose:   Recent Labs     12/13/21  0539 12/14/21  0545 12/15/21  0440   GLUCOSE 158* 174* 177*        PT/INR:    Lab Results   Component Value Date    PROTIME 13.0 12/12/2021    INR 1.0 12/12/2021     PTT:    Lab Results   Component Value Date    APTT 39.1 12/12/2021       Comprehensive Metabolic Profile:   Recent Labs     12/13/21  0539 12/14/21  0545 12/15/21  0440    146* 149*   K 4.9 5.1 4.8   * 111* 114*   CO2 20 24 23   BUN 33* 36* 42*   CREATININE 0.91 0.83 0.77   GLUCOSE 158* 174* 177*   CALCIUM 8.4* 8.5* 8.4*   PROT 6.0* 6.1* 5.7*   LABALBU 3.1* 3.0*  3.0* 3.0*  3.0*   BILITOT 0.25* 0.32 0.54   ALKPHOS 104 106 116   AST 79* 76* 222*   ALT 50* 58* 210*      Magnesium:   Lab Results   Component Value Date    MG 2.4 12/15/2021    MG 2.3 12/14/2021    MG 2.2 12/13/2021     Phosphorus:   Lab Results   Component Value Date    PHOS 2.7 12/15/2021    PHOS 2.5 12/14/2021     Ionized Calcium: No results found for: FREDDY     Urinalysis:   Lab Results   Component Value Date    NITRU NEGATIVE 12/14/2021    COLORU Yellow 12/14/2021    PHUR 6.5 12/14/2021    WBCUA 0 TO 2 12/14/2021    RBCUA TOO NUMEROUS TO COUNT 12/14/2021    MUCUS NOT REPORTED 12/14/2021    TRICHOMONAS NOT REPORTED 12/14/2021 YEAST NOT REPORTED 12/14/2021    BACTERIA NOT REPORTED 12/14/2021    SPECGRAV 1.026 12/14/2021    LEUKOCYTESUR NEGATIVE 12/14/2021    UROBILINOGEN Normal 12/14/2021    BILIRUBINUR NEGATIVE 12/14/2021    GLUCOSEU NEGATIVE 12/14/2021    KETUA NEGATIVE 12/14/2021    AMORPHOUS NOT REPORTED 12/14/2021               XR CHEST (SINGLE VIEW FRONTAL)    Result Date: 12/11/2021  Multifocal hazy opacities throughout both lungs consistent with COVID pneumonia and or pulmonary edema. XR CHEST PORTABLE    Result Date: 12/12/2021  Stable appearing chest with unchanged support tubes/lines and persistent extensive bilateral airspace disease consistent with known COVID pneumonia. XR CHEST PORTABLE    Result Date: 12/12/2021  Right internal jugular central venous catheter tip projecting over the proximal SVC versus brachiocephalic vein. No pneumothorax. Otherwise stable exam from earlier today. XR CHEST PORTABLE    Result Date: 12/12/2021  Endotracheal tube tip is 3.2 cm above the saul. Overall significant worsening of multifocal airspace opacities keeping with history of COVID-19. CT CHEST PULMONARY EMBOLISM W CONTRAST    Result Date: 12/11/2021  1. No evidence of pulmonary embolism 2. Diffuse ground-glass opacities throughout the lungs, typical of COVID-19 pulmonary disease.         Past Medical History:   Diagnosis Date    Arthritis     Asthma     Diabetes mellitus ([address]     Edema     GERD (gastroesophageal reflux disease)     Hypertension     on lasix    Migraine     IRMA on CPAP     seldom use machine        Social History     Socioeconomic History    Marital status:      Spouse name: Not on file    Number of children: Not on file    Years of education: Not on file    Highest education level: Not on file   Occupational History    Not on file   Tobacco Use    Smoking status: Former Smoker    Smokeless tobacco: Never Used   Vaping Use    Vaping Use: Never used   Substance and Sexual Activity  Alcohol use: Yes     Comment: every couple months    Drug use: Never    Sexual activity: Not on file   Other Topics Concern    Not on file   Social History Narrative    Not on file     Social Determinants of Health     Financial Resource Strain:     Difficulty of Paying Living Expenses: Not on file   Food Insecurity:     Worried About Running Out of Food in the Last Year: Not on file    Lucina of Food in the Last Year: Not on file   Transportation Needs:     Lack of Transportation (Medical): Not on file    Lack of Transportation (Non-Medical): Not on file   Physical Activity:     Days of Exercise per Week: Not on file    Minutes of Exercise per Session: Not on file   Stress:     Feeling of Stress : Not on file   Social Connections:     Frequency of Communication with Friends and Family: Not on file    Frequency of Social Gatherings with Friends and Family: Not on file    Attends [employer] Services: Not on file    Active Member of [address] [place] [address] or Organizations: Not on file    Attends Club or Organization Meetings: Not on file    Marital Status: Not on file   Intimate Partner Violence:     Fear of Current or Ex-Partner: Not on file    Emotionally Abused: Not on file    Physically Abused: Not on file    Sexually Abused: Not on file   Housing Stability:     Unable to Pay for Housing in the Last Year: Not on file    Number of [place] in the Last Year: Not on file    Unstable Housing in the Last Year: Not on file         There is no immunization history on file for this patient.       Family History   Problem Relation Age of Onset    Heart Attack Sister 32    Diabetes Paternal Grandmother     Heart Disease Paternal Uncle     Heart Disease Paternal Uncle     Heart Disease Paternal Uncle     Cancer Maternal Aunt     Cancer Maternal Aunt     Cancer Maternal Uncle     Cancer Maternal Uncle          Past Surgical History:   Procedure Laterality Date    APPENDECTOMY      ARTHROPLASTY Left 11/1/2019    LEFT FOOT 1ST MTPJ ARTHROPLASTY WITH PEREZ IMPLANT AND GROMMETS  [facility] performed by Lex Orosco MD at [facility] Right 12/12/2019    RIGHT FOOT ARTHROPLASTY 1ST MTPJ (Right Foot)    ARTHROPLASTY Right 12/12/2019    RIGHT FOOT ARTHROPLASTY 1ST MTPJ performed by Lex Orosco MD at [address] Right    [address]  2014    no blockage no stents    CHOLECYSTECTOMY      COLONOSCOPY      ENDOSCOPY, COLON, DIAGNOSTIC      TOE SURGERY Left 11/01/2019     LEFT FOOT 1ST MTPJ ARTHROPLASTY WITH PEREZ IMPLANT AND GROMMETS  [facility] (Left )    TONSILLECTOMY            VENTILATOR SETTINGS:  Vent Information  $Ventilation: $Subsequent Day  Skin Assessment: Clean, dry, & intact  Equipment Changed: Vent Circuit  Vent Type: Servo i  Vent Mode: PRVC  Vt Ordered: 500 mL  Rate Set: 30 bmp  FiO2 : 85 %  SpO2: 95 %  SpO2/FiO2 ratio: 111.76  Sensitivity: 3  PEEP/CPAP: 14  I Time/ I Time %: 0.7 s  Humidification Source: Heated wire  Humidification Temp: 37  Humidification Temp Measured: 38  Circuit Condensation: Drained  Nitric Oxide/Epoprostenol In Use?: Yes     PaO2/FiO2 RATIO:  Recent Labs     12/15/21  0223   POCPO2 119.6*      FiO2 : 85 %       LABS:  ABGs:   Recent Labs     12/13/21  0009 12/13/21  0447 12/14/21  0501 12/15/21  0223   POCPH 7.278* 7.358 7.349* 7.371   POCPCO2 63.7* 49.1* 55.8* 55.0*   POCPO2 97.9 319.5* 254.3* 119.6*   POCHCO3 29.8* 27.7 30.7* 31.9*   UQLZ5PHJ 96 100* 100* 98            ASSESSMENT:     Principal Problem:    Pneumonia due to COVID-19 virus  Active Problems:    Acute respiratory failure with hypoxia (HCC)    Diabetes mellitus (HCC)    Asthma    IRMA on CPAP    Hypertension    GERD (gastroesophageal reflux disease)    ARDS (adult respiratory distress syndrome) (HCC)  Resolved Problems:    * No resolved hospital problems.  *              Acute hypoxic respiratory failure secondary to COVID 19   Acute respiratory distress syndrome   Bilateral multifocal pneumonia due to COVID 19 infection   Covid -19 pandemic emergency     LOS: 3  PLAN:    D/w RT  D/w RN   Vent setting reviewed -peep is 14 fio2 85 %   Continue veletri at 40 ng /kb/min - tomorrow -30 ng /kg/min   Cont ARDS ventilation   Sedation reviewed nimbex    prone positioning   Airborne isolation and droplet precautions to be continued  Continue supportive care   Cont treatment with medications for COVID  Minimize iv fluids to keep the patient on the dry side  Cont tube feeding    Monitor endotracheal secretions   Will obtain xray chest in am   ABG             Treatment plan Discussed with nursing staff in detail , all questions answered . Total critical care time caring for this patient with life threatening, unstable organ failure, including direct patient contact, management of life support systems, review of data including imaging and labs, discussions with other team members and physicians at least 28  Min so far today, excluding procedures. Electronically signed by Marshal Stevenson MD on 12/15/2021 at 2:35 PM       This patient was evaluated in the context of the global SARS-CoV-2 (COVID-19) pandemic, which necessitated considerations that the patient either has COVID-19 infection or is at risk of infection with COVID-19. Institutional protocols and algorithms that pertain to the evaluation & management of patients with COVID-19 or those at risk for COVID-19 are in a state of rapid changes based on information released by regulatory bodies including the CDC and federal and state organizations. These policies and algorithms were followed during the patient's care. Please note that this chart was generated using voice recognition Dragon dictation software. Although every effort was made to ensure the accuracy of this automated transcription, some errors in transcription may have occurred.

## 2021-12-15 NOTE — PLAN OF CARE
Problem: OXYGENATION/RESPIRATORY FUNCTION  Goal: Patient will maintain patent airway  12/14/2021 2002 by Wilder Del Valle RCP  Outcome: Ongoing     Problem: OXYGENATION/RESPIRATORY FUNCTION  Goal: Patient will achieve/maintain normal respiratory rate/effort  Description: Respiratory rate and effort will be within normal limits for the patient  12/14/2021 2002 by Smooth Del Valle RCP  Outcome: Ongoing     Problem: MECHANICAL VENTILATION  Goal: Patient will maintain patent airway  12/14/2021 2002 by Wilder Del Valle RCP  Outcome: Ongoing     Problem: MECHANICAL VENTILATION  Goal: Oral health is maintained or improved  12/14/2021 2002 by Smooth Del Valle RCP  Outcome: Ongoing     Problem: MECHANICAL VENTILATION  Goal: ET tube will be managed safely  12/14/2021 2002 by Smooth Del Valle RCP  Outcome: Ongoing     Problem: MECHANICAL VENTILATION  Goal: Ability to express needs and understand communication  12/14/2021 2002 by Stan Le RCP  Outcome: Ongoing     Problem: MECHANICAL VENTILATION  Goal: Mobility/activity is maintained at optimum level for patient  12/14/2021 2002 by Smooth Del Valle RCP  Outcome: Ongoing     Problem: SKIN INTEGRITY  Goal: Skin integrity is maintained or improved  12/14/2021 2002 by Stan Le RCP  Outcome: Ongoing

## 2021-12-15 NOTE — PLAN OF CARE
Problem: Airway Clearance - Ineffective  Goal: Achieve or maintain patent airway  12/15/2021 1820 by Héctor Lazo RN  Outcome: Ongoing  12/15/2021 0758 by Mya June RCP  Outcome: Ongoing  12/15/2021 0531 by Ross Nina RN  Outcome: Ongoing     Problem: Gas Exchange - Impaired  Goal: Absence of hypoxia  12/15/2021 1820 by Héctor Lazo RN  Outcome: Ongoing  12/15/2021 0758 by Mya June RCP  Outcome: Ongoing  12/15/2021 0531 by Ross Nina RN  Outcome: Ongoing  Goal: Promote optimal lung function  12/15/2021 1820 by Héctor Lazo RN  Outcome: Ongoing  12/15/2021 0758 by Mya June RCP  Outcome: Ongoing  12/15/2021 0531 by Ross Nina RN  Outcome: Ongoing     Problem: Breathing Pattern - Ineffective  Goal: Ability to achieve and maintain a regular respiratory rate  12/15/2021 1820 by Héctor Lazo RN  Outcome: Ongoing  12/15/2021 0758 by Mya June RCP  Outcome: Ongoing  12/15/2021 0531 by Ross Nina RN  Outcome: Ongoing     Problem:  Body Temperature -  Risk of, Imbalanced  Goal: Ability to maintain a body temperature within defined limits  12/15/2021 1820 by Héctor Lazo RN  Outcome: Ongoing  12/15/2021 0531 by Ross Nina RN  Outcome: Ongoing  Goal: Will regain or maintain usual level of consciousness  12/15/2021 1820 by Héctor Lazo RN  Outcome: Ongoing  12/15/2021 0531 by Ross Nina RN  Outcome: Ongoing  Goal: Complications related to the disease process, condition or treatment will be avoided or minimized  12/15/2021 1820 by Héctor Lazo RN  Outcome: Ongoing  12/15/2021 0531 by Ross Nina RN  Outcome: Ongoing     Problem: Isolation Precautions - Risk of Spread of Infection  Goal: Prevent transmission of infection  12/15/2021 1820 by Héctor Lazo RN  Outcome: Ongoing  12/15/2021 0531 by Ross Nina RN  Outcome: Ongoing     Problem: Nutrition Deficits  Goal: Optimize nutritional status  12/15/2021 1820 by Constantino Baker PARUL Tabares  Outcome: Ongoing  12/15/2021 0531 by Stephanie Howell RN  Outcome: Ongoing     Problem: Risk for Fluid Volume Deficit  Goal: Maintain normal heart rhythm  12/15/2021 1820 by Taryn Sears RN  Outcome: Ongoing  12/15/2021 0531 by Stephanie Howell RN  Outcome: Ongoing  Goal: Maintain absence of muscle cramping  12/15/2021 1820 by Taryn Sears RN  Outcome: Ongoing  12/15/2021 0531 by Stephanie Howell RN  Outcome: Ongoing  Goal: Maintain normal serum potassium, sodium, calcium, phosphorus, and pH  12/15/2021 1820 by Taryn Sears RN  Outcome: Ongoing  12/15/2021 0531 by Stephanie Howell RN  Outcome: Ongoing     Problem: Loneliness or Risk for Loneliness  Goal: Demonstrate positive use of time alone when socialization is not possible  12/15/2021 1820 by Taryn Sears RN  Outcome: Ongoing  12/15/2021 0531 by Stephanie Howell RN  Outcome: Ongoing     Problem: Fatigue  Goal: Verbalize increase energy and improved vitality  12/15/2021 1820 by Taryn Sears RN  Outcome: Ongoing  12/15/2021 0531 by Stephanie Howell RN  Outcome: Ongoing     Problem: Patient Education: Go to Patient Education Activity  Goal: Patient/Family Education  12/15/2021 3032 by Taryn Sears RN  Outcome: Ongoing  12/15/2021 0531 by Stephanie Howell RN  Outcome: Ongoing     Problem: OXYGENATION/RESPIRATORY FUNCTION  Goal: Patient will maintain patent airway  12/15/2021 1820 by Taryn Sears RN  Outcome: Ongoing  12/15/2021 0758 by Deirdre Condon RCP  Outcome: Ongoing  12/15/2021 0531 by Stephanie Howell RN  Outcome: Ongoing  Goal: Patient will achieve/maintain normal respiratory rate/effort  Description: Respiratory rate and effort will be within normal limits for the patient  12/15/2021 1820 by Taryn Sears RN  Outcome: Ongoing  12/15/2021 0758 by Deirdre Condon RCP  Outcome: Ongoing  12/15/2021 0531 by Stephanie Howell RN  Outcome: Ongoing     Problem: MECHANICAL VENTILATION  Goal: Patient will maintain patent airway  12/15/2021 1820 by Francisco Jack RN  Outcome: Ongoing  12/15/2021 0758 by Benson Telles RCP  Outcome: Ongoing  12/15/2021 0531 by Wes Royal RN  Outcome: Ongoing  Goal: Oral health is maintained or improved  12/15/2021 1820 by Francisco Jack RN  Outcome: Ongoing  12/15/2021 0758 by Benson Telles RCP  Outcome: Ongoing  12/15/2021 0531 by Wes Royal RN  Outcome: Ongoing  Goal: ET tube will be managed safely  12/15/2021 1820 by Francisco Jack RN  Outcome: Ongoing  12/15/2021 0758 by Benson Telles RCP  Outcome: Ongoing  12/15/2021 0531 by Wes Royal RN  Outcome: Ongoing  Goal: Ability to express needs and understand communication  12/15/2021 1820 by Francisco Jack RN  Outcome: Ongoing  12/15/2021 0758 by Benson Telles RCP  Outcome: Ongoing  12/15/2021 0531 by Wes Royal RN  Outcome: Ongoing  Goal: Mobility/activity is maintained at optimum level for patient  12/15/2021 1820 by Francisco Jack RN  Outcome: Ongoing  12/15/2021 0758 by Benson Telles RCP  Outcome: Ongoing  12/15/2021 0531 by Wes Royal RN  Outcome: Ongoing     Problem: SKIN INTEGRITY  Goal: Skin integrity is maintained or improved  12/15/2021 1820 by Francisco Jack RN  Outcome: Ongoing  12/15/2021 0758 by Benson Telles RCP  Outcome: Ongoing  12/15/2021 0531 by Wes Royal RN  Outcome: Ongoing     Problem: Skin Integrity:  Goal: Will show no infection signs and symptoms  Description: Will show no infection signs and symptoms  12/15/2021 1820 by Francisco Jack RN  Outcome: Ongoing  12/15/2021 0531 by Wes Royal RN  Outcome: Ongoing  Goal: Absence of new skin breakdown  Description: Absence of new skin breakdown  12/15/2021 1820 by Francisco Jack RN  Outcome: Ongoing  12/15/2021 0531 by Wes Royal RN  Outcome: Ongoing     Problem: Falls - Risk of:  Goal: Will remain free from falls  Description: Will remain free from falls  12/15/2021 1820 by Francisco Jack RN  Outcome: Ongoing  12/15/2021 0531 by Randolph Yanes RN  Outcome: Ongoing  Goal: Absence of physical injury  Description: Absence of physical injury  12/15/2021 1820 by Jeniffer Martin RN  Outcome: Ongoing  12/15/2021 0531 by Randolph Yanes RN  Outcome: Ongoing     Problem: Nutrition  Goal: Optimal nutrition therapy  12/15/2021 1820 by Jeniffer Martin RN  Outcome: Ongoing  12/15/2021 0531 by Randolph Yanes RN  Outcome: Ongoing

## 2021-12-15 NOTE — CARE COORDINATION
Transitional planning:  Met with Dr. Ash Perez to discuss discharge planning. Pt is still paralyzed. Kamar Menendez report from Vivien, unit RN, states pt is weaning off lung perfusion medication. Int/paralyzed  FiO2 85%, PEEP 14, Prone at 1600 hrs today. On Fentanyl, Versed and Propofol IV's.

## 2021-12-15 NOTE — PROGRESS NOTES
Patient was unable to tolerate BiPAP and per chart repeatedly took off the mask and desaturated and 70s.  Patient was seen by pulmonary medicine recommending transfer to [place] for further care. Milderd Suzie arrived intubated and sedated, pulmonary medicine was at bedside requiring paralytics to maintain ventilator synchrony.  Intermittently required Levophed for blood pressure.  Patient now sedated on Versed and fentanyl.  Patient requiring 100% FiO2 with PEEP of 21. Review of Systems:     Unable to assess intubated, sedated, proned    Medications: Allergies:     Allergies   Allergen Reactions    Nuts [Peanut-Containing Drug Products] Anaphylaxis    Sunflower Oil Anaphylaxis     Sunflower seeds       Current Meds:   Scheduled Meds:    pantoprazole  40 mg IntraVENous Daily    And    sodium chloride (PF)  10 mL IntraVENous Daily    sodium chloride flush  5-40 mL IntraVENous 2 times per day    enoxaparin  30 mg SubCUTAneous BID    Vitamin D  6,000 Units Oral Daily    Followed by   Woody Shannon ON 12/19/2021] Vitamin D  2,000 Units Oral Daily    dexamethasone  20 mg IntraVENous Q24H    Followed by   Woody Shannon ON 12/17/2021] dexamethasone  10 mg IntraVENous Q24H    insulin lispro  0-6 Units SubCUTAneous Q4H     Continuous Infusions:    epoprostenol (VELETRI) nebulization solution 40 ng/kg/min (12/15/21 0700)    sodium chloride      dextrose      norepinephrine Stopped (12/12/21 1149)    cisatracurium (NIMBEX) infusion 3 mcg/kg/min (12/15/21 0525)    midazolam 8 mg/hr (12/15/21 0525)    fentaNYL 150 mcg/hr (12/15/21 1113)    propofol 30 mcg/kg/min (12/15/21 1113)    dextrose      sodium chloride 10 mL/hr at 12/13/21 1548     PRN Meds: sodium chloride flush, sodium chloride, ondansetron **OR** ondansetron, polyethylene glycol, acetaminophen **OR** acetaminophen, glucose, dextrose, glucagon (rDNA), dextrose, glucose, dextrose, glucagon (rDNA), dextrose    Data:     Past Medical History: has a past medical history of Arthritis, Asthma, Diabetes mellitus ([address] Edema, GERD (gastroesophageal reflux disease), Hypertension, Migraine, and IRMA on CPAP. Social History:   reports that he has quit smoking. He has never used smokeless tobacco. He reports current alcohol use. He reports that he does not use drugs. Family History:   Family History   Problem Relation Age of Onset    Heart Attack Sister 32    Diabetes Paternal Grandmother     Heart Disease Paternal Uncle     Heart Disease Paternal Uncle     Heart Disease Paternal Uncle     Cancer Maternal Aunt     Cancer Maternal Aunt     Cancer Maternal Uncle     Cancer Maternal Uncle        Vitals:  /64   Pulse 69   Temp 99.7 °F (37.6 °C) (Bladder)   Resp 30   Ht 6' 2\" (1.88 m)   Wt 278 lb 14.1 oz (126.5 kg)   SpO2 95%   BMI 35.81 kg/m²   Temp (24hrs), Av.1 °F (37.3 °C), Min:98.1 °F (36.7 °C), Max:99.7 °F (37.6 °C)    Recent Labs     12/15/21  0212 12/15/21  0223 12/15/21  0559 12/15/21  1112   POCGLU 177* 202* 170* 128*       I/O (24Hr):     Intake/Output Summary (Last 24 hours) at 12/15/2021 1237  Last data filed at 12/15/2021 0800  Gross per 24 hour   Intake 2774.2 ml   Output 3975 ml   Net -1200.8 ml       Labs:  Hematology:  Recent Labs     21  0539 21  0545 12/15/21  0440   WBC 3.3* 5.6 6.4   RBC 4.78 4.95 4.89   HGB 14.8 14.9 15.1   HCT 45.1 47.4 47.2   MCV 94.4 95.8 96.5   MCH 31.0 30.1 30.9   MCHC 32.8 31.4 32.0   RDW 14.6* 14.6* 14.1    250 226   MPV 10.6 10.7 10.5   .3* 50.8* 23.5*     Chemistry:  Recent Labs     21  0539 21  0545 12/15/21  0440    146* 149*   K 4.9 5.1 4.8   * 111* 114*   CO2 20 24 23   GLUCOSE 158* 174* 177*   BUN 33* 36* 42*   CREATININE 0.91 0.83 0.77   MG 2.2 2.3 2.4   ANIONGAP 13 11 12   LABGLOM >60 >60 >60   GFRAA >60 >60 >60   CALCIUM 8.4* 8.5* 8.4*   PHOS  --  2.5 2.7     Recent Labs     21  0539 21  0717 21  0545 12/14/21  0655 12/14/21  1741 12/14/21  2012 12/15/21  0212 12/15/21  0223 12/15/21  0440 12/15/21  0559 12/15/21  1112   PROT 6.0*  --  6.1*  --   --   --   --   --  5.7*  --   --    LABALBU 3.1*  --  3.0*  3.0*  --   --   --   --   --  3.0*  3.0*  --   --    AST 79*  --  76*  --   --   --   --   --  222*  --   --    ALT 50*  --  58*  --   --   --   --   --  210*  --   --    ALKPHOS 104  --  106  --   --   --   --   --  116  --   --    BILITOT 0.25*  --  0.32  --   --   --   --   --  0.54  --   --    BILIDIR  --   --  0.15  --   --   --   --   --  0.39*  --   --    POCGLU  --    < >  --    < > 151* 182* 177* 202*  --  170* 128*    < > = values in this interval not displayed. ABG:  Lab Results   Component Value Date    POCPH 7.371 12/15/2021    POCPCO2 55.0 12/15/2021    POCPO2 119.6 12/15/2021    POCHCO3 31.9 12/15/2021    NBEA NOT REPORTED 12/15/2021    PBEA 5 12/15/2021    WMG1RBP NOT REPORTED 12/15/2021    FKJW5UTG 98 12/15/2021    FIO2 90.0 12/15/2021     Lab Results   Component Value Date/Time    SPECIAL NOT REPORTED 12/14/2021 12:43 AM     Lab Results   Component Value Date/Time    CULTURE NO GROWTH 12/14/2021 12:43 AM       Radiology:  XR CHEST (SINGLE VIEW FRONTAL)    Result Date: 12/12/2021  Stable appearing     XR CHEST (SINGLE VIEW FRONTAL)    Result Date: 12/11/2021  Multifocal hazy opacities throughout both lungs consistent with COVID pneumonia and or pulmonary edema. XR CHEST PORTABLE    Result Date: 12/12/2021  Stable appearing chest with unchanged support tubes/lines and persistent extensive bilateral airspace disease consistent with known COVID pneumonia. XR CHEST PORTABLE    Result Date: 12/12/2021  Right internal jugular central venous catheter tip projecting over the proximal SVC versus brachiocephalic vein. No pneumothorax. Otherwise stable exam from earlier today. XR CHEST PORTABLE    Result Date: 12/12/2021  Endotracheal tube tip is 3.2 cm above the saul.  Overall significant worsening of multifocal airspace opacities keeping with history of COVID-19. CT CHEST PULMONARY EMBOLISM W CONTRAST    Result Date: 12/11/2021  1. No evidence of pulmonary embolism 2. Diffuse ground-glass opacities throughout the lungs, typical of COVID-19 pulmonary disease. Physical Examination:        General appearance: Intubated, sedated, ill-appearing  Mental Status: Unable to assess, sedated  Lungs: Mechanical breath sounds, 80% FiO2 vent support  Heart:  regular rate and rhythm, no murmur  Abdomen: Obese, unable to fully assess abdomen secondary to prone position  : Hahn catheter  Extremities: Generalized anasarca without redness in the calves  Skin: Pale, taut, left dorsal aspect of foot with wound    Assessment:        Hospital Problems           Last Modified POA    * (Principal) Pneumonia due to COVID-19 virus 12/12/2021 Yes    Acute respiratory failure with hypoxia ([address] 12/12/2021 Yes    Diabetes mellitus ([address] 12/12/2021 Yes    Asthma 12/12/2021 Yes    IRMA on CPAP 12/12/2021 Yes    Overview Signed 12/12/2021  2:39 PM by Elmer Frank,      seldom use machine         Hypertension 12/12/2021 Yes    Overview Signed 12/12/2021  2:39 PM by Elmer Frank DO     on lasix         GERD (gastroesophageal reflux disease) 12/12/2021 Yes    ARDS (adult respiratory distress syndrome) ([facility] 12/12/2021 Yes          Plan:        COVID-19 viral pneumonia:   - Would not appear that patient has been vaccinated. - Infectious disease following.    - Received Actemra 12/12/2021.    - Started on high-dose Decadron 5/12/2021.    - Out of the window for remdesivir or monoclonal antibody.   - CRP downtrending    Acute respiratory failure with hypoxia and history of asthma:  - Secondary to #1.    - Complicated by patient's history of IRMA and asthma.    - Currently intubated on 85% FiO2.    - Managed by critical care.     - Epoprostenol continues    - Lasix 40 mg IV push daily started on 12/15, chest x-ray reviewed    IRMA on CPAP:   - O2 management/vent support managed by critical care    Primary hypertension:   - Patient previously hypotensive since admission,   - Levophed has been weaned off since 12/14.    - Continue off antihypertensives for now    Diabetes mellitus type 2:   - A.m. blood sugar mildly elevated with coadministration of Decadron.   -  On low-dose SSI every 4 hours, continue for now  - uptitrate as warranted, start Lantus 10 units in the evening, will likely discontinue once patient has been weaned from Decadron    GERD: Protonix    GI/DVT prophylaxis: Protonix, Lovenox twice daily    Prognosis continues to be guarded at this time    SHO Salgado NP  12/15/2021  12:37 PM

## 2021-12-15 NOTE — PROGRESS NOTES
[facility] - Progress Note   Note COVID 19 Patient  Today's Date and Time: 12/15/2021, 10:57 AM    Impression :     COVID 19 Confirmed Infection  Covid tests:  12/11/2021: Positive  Elevated inflammatory markers  Acute hypoxic respiratory failure  History of asthma  Diabetes mellitus  Essential hypertension  IRMA on CPAP  Patient has not received the Covid vaccination    Recommendations:   Antibiotic treatment:  Monitor off antibiotics  Covid Rx:    Remdesivir-out of window  Decadron-high-dose initiated  Actemra-ordered 12/12/2021  Monoclonal antibodies-out of window      Medical Decision Making/Summary/Discussion:12/15/2021     Patient admitted with COVID 19 infection  He may come out of isolation on 12/31/21    Infection Control Recommendations   [place] Precautions  Airborne isolation  Droplet Isolation    Antimicrobial Stewardship Recommendations       Discontinuation of therapy  Coordination of Outpatient Care:   Estimated Length of IV antimicrobials:TBD  Patient will need Midline Catheter Insertion: TBD  Patient will need PICC line Insertion: No  Patient will need: Home IV , [place],  SNF,  LTAC:TBD  Patient will need outpatient wound care:No    Chief complaint/reason for consultation:   Concern for COVID infection      History of Present Illness:   Janet Gastelum is a 60y.o.-year-old male who was initially admitted on 12/12/2021. Patient seen at the request of Dr. Gearline Osgood:    Patient presented through [facility] ER on 12/11/2021 with complaints of worsening shortness of breath with associated generalized weakness and nonproductive cough over the past several days. He was exposed to his son who was diagnosed with Covid last week and has not received the Covid vaccine. The patient was very hypoxic with an SPO2 in the high 50s on room air.     Imaging revealed bilateral pulmonary opacities typical of COVID-19    Abnormal labs include:    Ferritin 2800      Patient has been intubated and transferred to [facility]  He has been started on high-dose Decadron and Actemra has been ordered    CT CHEST PULMONARY EMBOLISM W CONTRAST 12/11/2021   Final Result   1. No evidence of pulmonary embolism   2. Diffuse ground-glass opacities throughout the lungs, typical of COVID-19   pulmonary disease.           XR CHEST (SINGLE VIEW FRONTAL) 12/11/2021   Final Result   Multifocal hazy opacities throughout both lungs consistent with COVID   pneumonia and or pulmonary edema       Patient admitted because of concerns with COVID 19.    CURRENT EVALUATION : 12/15/2021    Low-grade fevers continue  VS stable    The patient continues on mechanical ventilation with sedation and paralytic per ARDS protocol. CRP is decreasing   CXR is showing mild improvement in opacities    LFTs increasing    Patient exhibiting respiratory distress. yes  Respiratory secretions: no    Patient receiving supplemental oxygen.   Mechanical ventilation  RR:  20-->30  02 sat: 91-->94--95    % FIO2: 90-->80-->85  PEEP:   21-->16-->14    QTc:       NEWS Score: 0-4 Low risk group; 5-6: Medium risk group; 7 or above: High risk group  Parameters 3 2 1 0 1 2 3   Age    < 65   = 65   RR = 8  9-11 12-20  21-24 = 25   O2 Sats = 91 92-93 94-95 = 96      Suppl O2  Yes  No      SBP = 90  101-110 111-219   = 220   HR = 40  41-50 51-90  111-130 = 131   Consciousness    Alert   Drowsiness, lethargy, or confusion   Temperature = 35.0 C (95.0 F)  35.1-36.0 C 95.1-96.9 F 36.1-38.0 C 97.0-100.4 F 38.1-39.0 C 100.5-102.3 F = 39.1 C = 102.4 F      NEWS Score:  12/12/2021: 13 high risk    Overall Daily Picture:     Unchanged    Presence of secondary bacterial Infection:    No   Additional antibiotics: No    Labs, X rays reviewed: 12/15/2021    BUN:42  Cr:0.77    WBC:6.4  Hb:15.1  Plat: 226    Absolute Neutrophils:3.73  Absolute Lymphocytes:0.29  Neutrophil/Lymphocyte Ratio: 12.8 high risk    CRP:293-->277-->137-->50.8-->23  Ferritin:2800  LDH: 838    Pro Calcitonin:      Cultures:  Urine:    Blood:    Sputum :    Wound:      CXR:   8815 diffuse bilateral infiltrates  CAT:      Discussed with patient, RN, CC, IM.      12-12-21:      I have personally reviewed the past medical history, past surgical history, medications, social history, and family history, and I have updated the database accordingly.   Past Medical History:     Past Medical History:   Diagnosis Date    Arthritis     Asthma     Diabetes mellitus ([address]     Edema     GERD (gastroesophageal reflux disease)     Hypertension     on lasix    Migraine     IRMA on CPAP     seldom use machine       Past Surgical  History:     Past Surgical History:   Procedure Laterality Date    APPENDECTOMY      ARTHROPLASTY Left 11/1/2019    LEFT FOOT 1ST MTPJ ARTHROPLASTY WITH PEREZ IMPLANT AND GROMMETS  [facility] performed by Ciera Doshi MD at [address] [facility] Right 12/12/2019    RIGHT FOOT ARTHROPLASTY 1ST MTPJ (Right Foot)    ARTHROPLASTY Right 12/12/2019    RIGHT FOOT ARTHROPLASTY 1ST MTPJ performed by Ciera Doshi MD at [address] Right    [address]  2014    no blockage no stents    CHOLECYSTECTOMY      COLONOSCOPY      ENDOSCOPY, COLON, DIAGNOSTIC      TOE SURGERY Left 11/01/2019     LEFT FOOT 1ST MTPJ ARTHROPLASTY WITH PEREZ IMPLANT AND GROMMETS  [facility] (Left )    TONSILLECTOMY         Medications:      pantoprazole  40 mg IntraVENous Daily    And    sodium chloride (PF)  10 mL IntraVENous Daily    sodium chloride flush  5-40 mL IntraVENous 2 times per day    enoxaparin  30 mg SubCUTAneous BID    Vitamin D  6,000 Units Oral Daily    Followed by   Eunice Smith ON 12/19/2021] Vitamin D  2,000 Units Oral Daily    dexamethasone  20 mg IntraVENous Q24H    Followed by   Eunice Smith ON 12/17/2021] dexamethasone  10 mg IntraVENous Q24H    insulin lispro  0-6 Units SubCUTAneous Q4H       Social History:     Social History     Socioeconomic History    Marital status:      Spouse name: Not on file    Number of children: Not on file    Years of education: Not on file    Highest education level: Not on file   Occupational History    Not on file   Tobacco Use    Smoking status: Former Smoker    Smokeless tobacco: Never Used   Vaping Use    Vaping Use: Never used   Substance and Sexual Activity    Alcohol use: Yes     Comment: every couple months    Drug use: Never    Sexual activity: Not on file   Other Topics Concern    Not on file   Social History Narrative    Not on file     Social Determinants of Health     Financial Resource Strain:     Difficulty of Paying Living Expenses: Not on file   Food Insecurity:     Worried About [address] in the Last Year: Not on file    [address] dscovered in the Last Year: Not on file   Transportation Needs:     Lack of Transportation (Medical): Not on file    Lack of Transportation (Non-Medical):  Not on file   Physical Activity:     Days of Exercise per Week: Not on file    Minutes of Exercise per Session: Not on file   Stress:     Feeling of Stress : Not on file   Social Connections:     Frequency of Communication with Friends and Family: Not on file    Frequency of Social Gatherings with Friends and Family: Not on file    Attends Gnosticism Services: Not on file    Active Member of [address] [place] [address] or Organizations: Not on file    Attends Club or Organization Meetings: Not on file    Marital Status: Not on file   Intimate Partner Violence:     Fear of Current or Ex-Partner: Not on file    Emotionally Abused: Not on file    Physically Abused: Not on file    Sexually Abused: Not on file   Housing Stability:     Unable to Pay for Housing in the Last Year: Not on file    Number of [place] in the Last Year: Not on file    Unstable Housing in the Last Year: Not on file       Family History:     Family History   Problem Relation Age of Onset    Heart Attack Sister 32    Diabetes Paternal Grandmother     Heart Disease Paternal Uncle     Heart Disease Paternal Uncle     Heart Disease Paternal Uncle     Cancer Maternal Aunt     Cancer Maternal Aunt     Cancer Maternal Uncle     Cancer Maternal Uncle         Allergies:   Nuts [peanut-containing drug products] and Sunflower oil     Review of Systems:     Unable to assess 12/15/2021  Intubated and sedated  Constitutional: No fevers or chills. No systemic complaints  Head: No headaches  Eyes: No double vision or blurry vision. No conjunctival inflammation. ENT: No sore throat or runny nose. . No hearing loss, tinnitus or vertigo. Cardiovascular: No chest pain or palpitations. No Shortness of breath. No LARSON  Lung: No Shortness of breath or cough. No sputum production  Abdomen: No nausea, vomiting, diarrhea, or abdominal pain. Rometta Favre No cramps. Genitourinary: No increased urinary frequency, or dysuria. No hematuria. No suprapubic or CVA pain  Musculoskeletal: No muscle aches or pains. No joint effusions, swelling or deformities  Hematologic: No bleeding or bruising. Neurologic: No headache, weakness, numbness, or tingling. Integument: No rash, no ulcers. Psychiatric: No depression. Endocrine: No polyuria, no polydipsia, no polyphagia.     Physical Examination :     Patient Vitals for the past 8 hrs:   BP Temp Temp src Pulse Resp SpO2 Weight   12/15/21 0800 -- -- Bladder 69 30 95 % --   12/15/21 0754 -- -- -- -- 30 95 % --   12/15/21 0700 -- -- -- -- 30 96 % --   12/15/21 0607 -- -- -- -- 30 96 % --   12/15/21 0600 -- 99.7 °F (37.6 °C) Bladder 67 29 96 % --   12/15/21 0515 -- -- -- -- 25 95 % --   12/15/21 0500 -- -- -- 70 30 93 % 278 lb 14.1 oz (126.5 kg)   12/15/21 0415 -- -- -- -- 30 94 % --   12/15/21 0400 122/64 99.3 °F (37.4 °C) Bladder 69 30 94 % --   12/15/21 0315 -- -- -- -- 30 94 % --   12/15/21 0300 -- -- -- 69 30 94 % --     General Appearance: Intubated and sedated  Head:  Normocephalic, no trauma  Eyes: Pupils equal, round, reactive to light; sclera anicteric; conjunctivae pink. No embolic phenomena. ENT: Oropharynx clear, without erythema, exudate, or thrush. No tenderness of sinuses. Mouth/throat: mucosa pink and moist. No lesions. Dentition in good repair. Neck:Supple, without lymphadenopathy. Thyroid normal, No bruits. Pulmonary/Chest: Diminished to auscultation, without wheezes, rales, or rhonchi. No dullness to percussion. Cardiovascular: Regular rate and rhythm without murmurs, rubs, or gallops. Abdomen: Soft, non tender. Bowel sounds normal. No organomegaly  All four Extremities: No cyanosis, clubbing, edema, or effusions. Neurologic: Intubated and sedated  Skin: Warm and dry with good turgor. No signs of peripheral arterial or venous insufficiency. No ulcerations. No open wounds. Medical Decision Making -Laboratory:   I have independently reviewed/ordered the following labs:    CBC with Differential:   Recent Labs     12/13/21  0539 12/13/21  0539 12/14/21  0545 12/15/21  0440   WBC 3.3*   < > 5.6 6.4   HGB 14.8   < > 14.9 15.1   HCT 45.1   < > 47.4 47.2      < > 250 226   LYMPHOPCT 7*  --   --   --    MONOPCT 9*  --   --   --     < > = values in this interval not displayed. BMP:   Recent Labs     12/14/21  0545 12/15/21  0440   * 149*   K 5.1 4.8   * 114*   CO2 24 23   BUN 36* 42*   CREATININE 0.83 0.77   MG 2.3 2.4     Hepatic Function Panel:   Recent Labs     12/14/21  0545 12/15/21  0440   PROT 6.1* 5.7*   LABALBU 3.0*  3.0* 3.0*  3.0*   BILIDIR 0.15 0.39*   IBILI 0.17 0.15   BILITOT 0.32 0.54   ALKPHOS 106 116   ALT 58* 210*   AST 76* 222*     No results for input(s): RPR in the last 72 hours. No results for input(s): HIV in the last 72 hours. No results for input(s): BC in the last 72 hours.   Lab Results   Component Value Date    MUCUS NOT REPORTED 12/14/2021    RBC 4.89 tissue abnormality.           Impression   1. No evidence of pulmonary embolism   2. Diffuse ground-glass opacities throughout the lungs, typical of COVID-19   pulmonary disease.         Narrative   EXAMINATION:   ONE XRAY VIEW OF THE CHEST       12/11/2021 3:54 pm       COMPARISON:   November 13, 2012       HISTORY:   ORDERING SYSTEM PROVIDED HISTORY: hypoxemia, probable covid pneumonia   TECHNOLOGIST PROVIDED HISTORY:   hypoxemia, probable covid pneumonia       FINDINGS:   Multifocal hazy opacities throughout both lungs would be consistent with   history of COVID pneumonia.  Pulmonary edema could have a similar appearance. No pneumothorax or pleural effusion.  Cardiac size enlarged.  Mediastinum   unremarkable.  No acute osseous abnormality.           Impression   Multifocal hazy opacities throughout both lungs consistent with COVID   pneumonia and or pulmonary edema.                   Medical Decision [id number]:       Medical Decision Making-Other:     Note:  Labs, medications, radiologic studies were reviewed with personal review of films  Large amounts of data were reviewed  Discussed with nursing Staff, Discharge planner  Infection Control and Prevention measures reviewed  All prior entries were reviewed  Administer medications as ordered  Prognosis: Guarded  Discharge planning reviewed      Thank you for allowing us to participate in the care of this patient. Please call with questions. Cheyenne Kaba APRN - CNP       ATTESTATION:    I have discussed the case, including pertinent history and exam findings with the APRN. I have evaluated the  History, physical findings and pictures of the patient and the key elements of the encounter have been performed by me. I have reviewed the laboratory data, other diagnostic studies and discussed them with the APRN. I have updated the medical record where necessary. I agree with the assessment, plan and orders as documented by the APRN.     Dean Michelle Chery MD.          Pager: [phone] - Office: [phone]

## 2021-12-16 ENCOUNTER — APPOINTMENT (OUTPATIENT)
Dept: GENERAL RADIOLOGY | Age: 56
DRG: 004 | End: 2021-12-16
Attending: INTERNAL MEDICINE
Payer: COMMERCIAL

## 2021-12-16 LAB
ALBUMIN SERPL-MCNC: 3.1 G/DL (ref 3.5–5.2)
ALBUMIN SERPL-MCNC: 3.1 G/DL (ref 3.5–5.2)
ALBUMIN/GLOBULIN RATIO: 1.2 (ref 1–2.5)
ALLEN TEST: ABNORMAL
ALP BLD-CCNC: 107 U/L (ref 40–129)
ALT SERPL-CCNC: 224 U/L (ref 5–41)
ANION GAP SERPL CALCULATED.3IONS-SCNC: 12 MMOL/L (ref 9–17)
AST SERPL-CCNC: 109 U/L
BILIRUB SERPL-MCNC: 0.41 MG/DL (ref 0.3–1.2)
BILIRUBIN DIRECT: 0.22 MG/DL
BILIRUBIN, INDIRECT: 0.19 MG/DL (ref 0–1)
BUN BLDV-MCNC: 47 MG/DL (ref 6–20)
BUN/CREAT BLD: ABNORMAL (ref 9–20)
CALCIUM SERPL-MCNC: 8.1 MG/DL (ref 8.6–10.4)
CHLORIDE BLD-SCNC: 112 MMOL/L (ref 98–107)
CO2: 27 MMOL/L (ref 20–31)
CREAT SERPL-MCNC: 0.69 MG/DL (ref 0.7–1.2)
CULTURE: NORMAL
CULTURE: NORMAL
FIO2: ABNORMAL
GFR AFRICAN AMERICAN: >60 ML/MIN
GFR NON-AFRICAN AMERICAN: >60 ML/MIN
GFR SERPL CREATININE-BSD FRML MDRD: ABNORMAL ML/MIN/{1.73_M2}
GFR SERPL CREATININE-BSD FRML MDRD: ABNORMAL ML/MIN/{1.73_M2}
GLOBULIN: ABNORMAL G/DL (ref 1.5–3.8)
GLUCOSE BLD-MCNC: 120 MG/DL (ref 75–110)
GLUCOSE BLD-MCNC: 133 MG/DL (ref 75–110)
GLUCOSE BLD-MCNC: 146 MG/DL (ref 75–110)
GLUCOSE BLD-MCNC: 146 MG/DL (ref 75–110)
GLUCOSE BLD-MCNC: 154 MG/DL (ref 70–99)
GLUCOSE BLD-MCNC: 171 MG/DL (ref 75–110)
HCT VFR BLD CALC: 48.5 % (ref 40.7–50.3)
HEMOGLOBIN: 15.4 G/DL (ref 13–17)
Lab: NORMAL
Lab: NORMAL
MAGNESIUM: 2.3 MG/DL (ref 1.6–2.6)
MCH RBC QN AUTO: 30.6 PG (ref 25.2–33.5)
MCHC RBC AUTO-ENTMCNC: 31.8 G/DL (ref 28.4–34.8)
MCV RBC AUTO: 96.4 FL (ref 82.6–102.9)
MODE: ABNORMAL
NEGATIVE BASE EXCESS, ART: ABNORMAL (ref 0–2)
NRBC AUTOMATED: 0 PER 100 WBC
O2 DEVICE/FLOW/%: ABNORMAL
PATIENT TEMP: ABNORMAL
PDW BLD-RTO: 14.2 % (ref 11.8–14.4)
PHOSPHORUS: 2.8 MG/DL (ref 2.5–4.5)
PLATELET # BLD: 229 K/UL (ref 138–453)
PMV BLD AUTO: 10.7 FL (ref 8.1–13.5)
POC HCO3: 29.4 MMOL/L (ref 21–28)
POC O2 SATURATION: 89 % (ref 94–98)
POC PCO2 TEMP: ABNORMAL MM HG
POC PCO2: 49.7 MM HG (ref 35–48)
POC PH TEMP: ABNORMAL
POC PH: 7.38 (ref 7.35–7.45)
POC PO2 TEMP: ABNORMAL MM HG
POC PO2: 59.5 MM HG (ref 83–108)
POSITIVE BASE EXCESS, ART: 3 (ref 0–3)
POTASSIUM SERPL-SCNC: 4.5 MMOL/L (ref 3.7–5.3)
RBC # BLD: 5.03 M/UL (ref 4.21–5.77)
SAMPLE SITE: ABNORMAL
SODIUM BLD-SCNC: 147 MMOL/L (ref 135–144)
SODIUM BLD-SCNC: 151 MMOL/L (ref 135–144)
SPECIMEN DESCRIPTION: NORMAL
SPECIMEN DESCRIPTION: NORMAL
TCO2 (CALC), ART: ABNORMAL MMOL/L (ref 22–29)
TOTAL PROTEIN: 5.6 G/DL (ref 6.4–8.3)
TRIGL SERPL-MCNC: 693 MG/DL
WBC # BLD: 9.1 K/UL (ref 3.5–11.3)

## 2021-12-16 PROCEDURE — 2000000000 HC ICU R&B

## 2021-12-16 PROCEDURE — 83735 ASSAY OF MAGNESIUM: CPT

## 2021-12-16 PROCEDURE — 84478 ASSAY OF TRIGLYCERIDES: CPT

## 2021-12-16 PROCEDURE — 71045 X-RAY EXAM CHEST 1 VIEW: CPT

## 2021-12-16 PROCEDURE — 99291 CRITICAL CARE FIRST HOUR: CPT | Performed by: INTERNAL MEDICINE

## 2021-12-16 PROCEDURE — 99233 SBSQ HOSP IP/OBS HIGH 50: CPT | Performed by: NURSE PRACTITIONER

## 2021-12-16 PROCEDURE — 6360000002 HC RX W HCPCS: Performed by: INTERNAL MEDICINE

## 2021-12-16 PROCEDURE — 6360000002 HC RX W HCPCS: Performed by: NURSE PRACTITIONER

## 2021-12-16 PROCEDURE — 2580000003 HC RX 258: Performed by: NURSE PRACTITIONER

## 2021-12-16 PROCEDURE — 80053 COMPREHEN METABOLIC PANEL: CPT

## 2021-12-16 PROCEDURE — 94003 VENT MGMT INPAT SUBQ DAY: CPT

## 2021-12-16 PROCEDURE — 99232 SBSQ HOSP IP/OBS MODERATE 35: CPT | Performed by: INTERNAL MEDICINE

## 2021-12-16 PROCEDURE — 6370000000 HC RX 637 (ALT 250 FOR IP): Performed by: INTERNAL MEDICINE

## 2021-12-16 PROCEDURE — 94645 CONT INHLJ TX EACH ADDL HOUR: CPT

## 2021-12-16 PROCEDURE — 84100 ASSAY OF PHOSPHORUS: CPT

## 2021-12-16 PROCEDURE — 82248 BILIRUBIN DIRECT: CPT

## 2021-12-16 PROCEDURE — 82947 ASSAY GLUCOSE BLOOD QUANT: CPT

## 2021-12-16 PROCEDURE — 82803 BLOOD GASES ANY COMBINATION: CPT

## 2021-12-16 PROCEDURE — 80069 RENAL FUNCTION PANEL: CPT

## 2021-12-16 PROCEDURE — 2580000003 HC RX 258: Performed by: INTERNAL MEDICINE

## 2021-12-16 PROCEDURE — 6370000000 HC RX 637 (ALT 250 FOR IP): Performed by: NURSE PRACTITIONER

## 2021-12-16 PROCEDURE — 2500000003 HC RX 250 WO HCPCS: Performed by: INTERNAL MEDICINE

## 2021-12-16 PROCEDURE — 84295 ASSAY OF SERUM SODIUM: CPT

## 2021-12-16 PROCEDURE — 85027 COMPLETE CBC AUTOMATED: CPT

## 2021-12-16 PROCEDURE — 94761 N-INVAS EAR/PLS OXIMETRY MLT: CPT

## 2021-12-16 PROCEDURE — C9113 INJ PANTOPRAZOLE SODIUM, VIA: HCPCS | Performed by: NURSE PRACTITIONER

## 2021-12-16 PROCEDURE — 80076 HEPATIC FUNCTION PANEL: CPT

## 2021-12-16 PROCEDURE — 37799 UNLISTED PX VASCULAR SURGERY: CPT

## 2021-12-16 PROCEDURE — 2700000000 HC OXYGEN THERAPY PER DAY

## 2021-12-16 RX ADMIN — KETAMINE HYDROCHLORIDE 0.2 MG/KG/HR: 50 INJECTION INTRAMUSCULAR; INTRAVENOUS at 20:50

## 2021-12-16 RX ADMIN — INSULIN LISPRO 1 UNITS: 100 INJECTION, SOLUTION INTRAVENOUS; SUBCUTANEOUS at 05:55

## 2021-12-16 RX ADMIN — EPOPROSTENOL 30 NG/KG/MIN: 1.5 INJECTION, POWDER, LYOPHILIZED, FOR SOLUTION INTRAVENOUS at 11:46

## 2021-12-16 RX ADMIN — SODIUM CHLORIDE, PRESERVATIVE FREE 10 ML: 5 INJECTION INTRAVENOUS at 08:00

## 2021-12-16 RX ADMIN — Medication 6000 UNITS: at 07:58

## 2021-12-16 RX ADMIN — INSULIN GLARGINE 10 UNITS: 100 INJECTION, SOLUTION SUBCUTANEOUS at 21:32

## 2021-12-16 RX ADMIN — CISATRACURIUM BESYLATE 3 MCG/KG/MIN: 10 INJECTION, SOLUTION INTRAVENOUS at 14:42

## 2021-12-16 RX ADMIN — Medication 200 MCG/HR: at 10:31

## 2021-12-16 RX ADMIN — Medication 10 MG/HR: at 14:14

## 2021-12-16 RX ADMIN — INSULIN LISPRO 1 UNITS: 100 INJECTION, SOLUTION INTRAVENOUS; SUBCUTANEOUS at 17:42

## 2021-12-16 RX ADMIN — PROPOFOL 20 MCG/KG/MIN: 10 INJECTION, EMULSION INTRAVENOUS at 07:59

## 2021-12-16 RX ADMIN — Medication 10 MG/HR: at 05:27

## 2021-12-16 RX ADMIN — SODIUM CHLORIDE, PRESERVATIVE FREE 10 ML: 5 INJECTION INTRAVENOUS at 21:08

## 2021-12-16 RX ADMIN — ENOXAPARIN SODIUM 30 MG: 100 INJECTION SUBCUTANEOUS at 21:07

## 2021-12-16 RX ADMIN — Medication 200 MCG/HR: at 15:04

## 2021-12-16 RX ADMIN — PROPOFOL 25 MCG/KG/MIN: 10 INJECTION, EMULSION INTRAVENOUS at 14:30

## 2021-12-16 RX ADMIN — PROPOFOL 20 MCG/KG/MIN: 10 INJECTION, EMULSION INTRAVENOUS at 17:53

## 2021-12-16 RX ADMIN — FUROSEMIDE 40 MG: 10 INJECTION, SOLUTION INTRAVENOUS at 07:58

## 2021-12-16 RX ADMIN — CISATRACURIUM BESYLATE 3 MCG/KG/MIN: 10 INJECTION, SOLUTION INTRAVENOUS at 22:10

## 2021-12-16 RX ADMIN — Medication 200 MCG/HR: at 22:08

## 2021-12-16 RX ADMIN — Medication 200 MCG/HR: at 04:01

## 2021-12-16 RX ADMIN — PANTOPRAZOLE SODIUM 40 MG: 40 INJECTION, POWDER, FOR SOLUTION INTRAVENOUS at 07:58

## 2021-12-16 RX ADMIN — ENOXAPARIN SODIUM 30 MG: 100 INJECTION SUBCUTANEOUS at 07:58

## 2021-12-16 RX ADMIN — EPOPROSTENOL 30 NG/KG/MIN: 1.5 INJECTION, POWDER, LYOPHILIZED, FOR SOLUTION INTRAVENOUS at 02:50

## 2021-12-16 RX ADMIN — CISATRACURIUM BESYLATE 3 MCG/KG/MIN: 10 INJECTION, SOLUTION INTRAVENOUS at 05:24

## 2021-12-16 RX ADMIN — EPOPROSTENOL 30 NG/KG/MIN: 1.5 INJECTION, POWDER, LYOPHILIZED, FOR SOLUTION INTRAVENOUS at 20:06

## 2021-12-16 RX ADMIN — POLYETHYLENE GLYCOL 3350 17 G: 17 POWDER, FOR SOLUTION ORAL at 08:03

## 2021-12-16 RX ADMIN — SODIUM CHLORIDE, PRESERVATIVE FREE 10 ML: 5 INJECTION INTRAVENOUS at 07:59

## 2021-12-16 RX ADMIN — PROPOFOL 20 MCG/KG/MIN: 10 INJECTION, EMULSION INTRAVENOUS at 04:01

## 2021-12-16 RX ADMIN — DEXAMETHASONE SODIUM PHOSPHATE 20 MG: 10 INJECTION INTRAMUSCULAR; INTRAVENOUS at 12:19

## 2021-12-16 ASSESSMENT — PULMONARY FUNCTION TESTS
PIF_VALUE: 30
PIF_VALUE: 32
PIF_VALUE: 34
PIF_VALUE: 27
PIF_VALUE: 28
PIF_VALUE: 32

## 2021-12-16 ASSESSMENT — PAIN SCALES - GENERAL: PAINLEVEL_OUTOF10: 0

## 2021-12-16 NOTE — PLAN OF CARE
Problem: Airway Clearance - Ineffective  Goal: Achieve or maintain patent airway  12/16/2021 0312 by Pierre Franklin RN  Outcome: Ongoing  12/15/2021 2252 by Eliseo Miller RCP  Outcome: Ongoing  12/15/2021 1820 by Yuriy Leal RN  Outcome: Ongoing     Problem: Gas Exchange - Impaired  Goal: Absence of hypoxia  12/16/2021 0312 by Pierre Franklin RN  Outcome: Ongoing  12/15/2021 2252 by Eliseo Miller RCP  Outcome: Ongoing  12/15/2021 1820 by Yuriy Leal RN  Outcome: Ongoing  Goal: Promote optimal lung function  12/16/2021 0312 by Pierre Franklin RN  Outcome: Ongoing  12/15/2021 2252 by Eliseo Miller RCP  Outcome: Ongoing  12/15/2021 1820 by Yuriy Leal RN  Outcome: Ongoing     Problem: Breathing Pattern - Ineffective  Goal: Ability to achieve and maintain a regular respiratory rate  12/16/2021 0312 by Pierre Franklin RN  Outcome: Ongoing  12/15/2021 2252 by Eliseo Miller RCP  Outcome: Ongoing  12/15/2021 1820 by Yuriy Leal RN  Outcome: Ongoing     Problem:  Body Temperature -  Risk of, Imbalanced  Goal: Ability to maintain a body temperature within defined limits  12/16/2021 0312 by Pierre Franklin RN  Outcome: Ongoing  12/15/2021 1820 by Yuriy Leal RN  Outcome: Ongoing  Goal: Will regain or maintain usual level of consciousness  12/16/2021 0312 by Pierre Franklin RN  Outcome: Ongoing  12/15/2021 1820 by Yuriy Leal RN  Outcome: Ongoing  Goal: Complications related to the disease process, condition or treatment will be avoided or minimized  12/16/2021 0312 by Pierre Franklin RN  Outcome: Ongoing  12/15/2021 1820 by Yuriy Leal RN  Outcome: Ongoing     Problem: Isolation Precautions - Risk of Spread of Infection  Goal: Prevent transmission of infection  12/16/2021 0312 by Pierre Franklin RN  Outcome: Ongoing  12/15/2021 1820 by Yuriy Leal RN  Outcome: Ongoing     Problem: Nutrition Deficits  Goal: Optimize nutritional status  12/16/2021 0312 by Justyna Ying RN  Outcome: Ongoing  12/15/2021 1820 by Chely Fofana RN  Outcome: Ongoing     Problem: Risk for Fluid Volume Deficit  Goal: Maintain normal heart rhythm  12/16/2021 0312 by Justyna Ying RN  Outcome: Ongoing  12/15/2021 1820 by Chely Fofana RN  Outcome: Ongoing  Goal: Maintain absence of muscle cramping  12/16/2021 0312 by Justyna Ying RN  Outcome: Ongoing  12/15/2021 1820 by Chely Fofana RN  Outcome: Ongoing  Goal: Maintain normal serum potassium, sodium, calcium, phosphorus, and pH  12/16/2021 0312 by Justyna Ying RN  Outcome: Ongoing  12/15/2021 1820 by Chely Fofana RN  Outcome: Ongoing     Problem: Loneliness or Risk for Loneliness  Goal: Demonstrate positive use of time alone when socialization is not possible  12/16/2021 0312 by Justyna Ying RN  Outcome: Ongoing  12/15/2021 1820 by Chely Fofana RN  Outcome: Ongoing     Problem: Fatigue  Goal: Verbalize increase energy and improved vitality  12/16/2021 0312 by Justyna Ying RN  Outcome: Ongoing  12/15/2021 1820 by Chely Fofana RN  Outcome: Ongoing     Problem: Patient Education: Go to Patient Education Activity  Goal: Patient/Family Education  12/16/2021 2118 by Justyna Ying RN  Outcome: Ongoing  12/15/2021 1820 by Chely Fofana RN  Outcome: Ongoing     Problem: OXYGENATION/RESPIRATORY FUNCTION  Goal: Patient will maintain patent airway  12/16/2021 0312 by Justyna Ying RN  Outcome: Ongoing  12/15/2021 2252 by Bianka Chavez RCP  Outcome: Ongoing  12/15/2021 1820 by Chely Fofana RN  Outcome: Ongoing  Goal: Patient will achieve/maintain normal respiratory rate/effort  Description: Respiratory rate and effort will be within normal limits for the patient  12/16/2021 4016 by Justyna Ying RN  Outcome: Ongoing  12/15/2021 2252 by Bianka Chavez RCP  Outcome: Ongoing  12/15/2021 1820 by Chely Fofana RN  Outcome: Ongoing Problem: MECHANICAL VENTILATION  Goal: Patient will maintain patent airway  12/16/2021 0312 by Wang Diallo RN  Outcome: Ongoing  12/15/2021 2252 by Zenobia Cody RCP  Outcome: Ongoing  12/15/2021 1820 by Sahra Evans RN  Outcome: Ongoing  Goal: Oral health is maintained or improved  12/16/2021 0312 by Wang Daillo RN  Outcome: Ongoing  12/15/2021 2252 by Zenobia Cody RCP  Outcome: Ongoing  12/15/2021 1820 by Sahra Evans RN  Outcome: Ongoing  Goal: ET tube will be managed safely  12/16/2021 0312 by Wang Diallo RN  Outcome: Ongoing  12/15/2021 2252 by Zenobia Cody RCP  Outcome: Ongoing  12/15/2021 1820 by Sahra Evans RN  Outcome: Ongoing  Goal: Ability to express needs and understand communication  12/16/2021 0312 by Wang Diallo RN  Outcome: Ongoing  12/15/2021 1820 by Sahra Evans RN  Outcome: Ongoing  Goal: Mobility/activity is maintained at optimum level for patient  12/16/2021 2833 by Wang Diallo RN  Outcome: Ongoing  12/15/2021 1820 by Sahra Evans RN  Outcome: Ongoing     Problem: SKIN INTEGRITY  Goal: Skin integrity is maintained or improved  12/16/2021 0312 by Wang Diallo RN  Outcome: Ongoing  12/15/2021 1820 by Sahra Evans RN  Outcome: Ongoing     Problem: Skin Integrity:  Goal: Will show no infection signs and symptoms  Description: Will show no infection signs and symptoms  12/16/2021 0312 by Wang Diallo RN  Outcome: Ongoing  12/15/2021 1820 by Sahra Evans RN  Outcome: Ongoing  Goal: Absence of new skin breakdown  Description: Absence of new skin breakdown  12/16/2021 0312 by Wang Diallo RN  Outcome: Ongoing  12/15/2021 1820 by Sahra Evans RN  Outcome: Ongoing     Problem: Falls - Risk of:  Goal: Will remain free from falls  Description: Will remain free from falls  12/15/2021 1820 by Sahra Evans RN  Outcome: Ongoing  Goal: Absence of physical injury  Description: Absence of physical injury  12/15/2021 1820 by Kate Olivarez, RN  Outcome: Ongoing     Problem: Nutrition  Goal: Optimal nutrition therapy  12/15/2021 1820 by Kate Olivarez RN  Outcome: Ongoing

## 2021-12-16 NOTE — PROGRESS NOTES
[facility]  Office: [address], DO, Eliseo Reilly, DO, Chris Norton, DO, Ryann Pineda Blood, DO, Krista Real MD, Tima Portillo MD, Yasmeen Viramontes MD, Sanam Rico MD, Caro Roberts MD, Patsy Leon MD, Rancho Singh MD, Haskell Leventhal, DO, Abelardo Romero, DO, Cheryle Bumpers, MD,  Yves Kothari, DO, Liat Cosme MD, Kay Barahona MD, Jamal Abarca MD, Darius Rivera MD, Gordon Cruz MD, Kalvin Spurling, MD, Saint Goodwill, MD, Pavel Jennings, [facility], [facility], [facility], Nancy Modi, CNP, Talia Rice, CNS, Elías Saldaña, CNP, Farzana Flores, CNP, Joy Barber, CNP, Georgia Abraham, CNP, Sia Barnes, CNP, Kiara Alba PA-C, Patricia Adhikari, [facility], Wilian Jack, CNP, Staci Askew, CNP, Ihsan Hopkins, CNP, Ariane Carter, CNP, Trupti Venegas, CNP, Brigette Varma, CNP, Nicole June, [address] [place] [address]    Progress Note    12/16/2021    9:16 AM    Name:   Nancy Ortiz  MRN:     [id number]     Acct:      [de-identified]   Room:   [address] [place] [address] Day:  4  Admit Date:  12/12/2021 11:39 AM    PCP:   Mile Monahan MD  Code Status:  Full Code    Subjective:     C/C: SOB  Interval History Status: not changed.      Patient seen and evaluated in room intubated, sedated, proned  On Nimbex, fentanyl, Versed, propofol  Levophed weaned off with good blood pressure supports  Continues on on 80% FiO2 vent support  epoprostenol continuous nebulizations started on 12/14, dose decreased  UOP 2.6 L over the last 24 hours with a slightly 800 mL fluid deficit since admission  Lasix held on 12/16 secondary to hypernatremia   CRP downtrending  BC X2 NGTD D4, urine culture negative  Tube feed continues  Brief History:     58-year-old male presents for cough, shortness of breath and extremity weakness for 5 days.  Patient presented at [address] [place] [address] found to be hypoxic and hypotensive with oxygen saturation in the mid 90s on room air.  Patient quickly required BiPAP to maintain oxygen saturations above 90%.  Patient was unable to tolerate BiPAP and per chart repeatedly took off the mask and desaturated and 70s.  Patient was seen by pulmonary medicine recommending transfer to [place] for further care. Cherry Contreras arrived intubated and sedated, pulmonary medicine was at bedside requiring paralytics to maintain ventilator synchrony.  Intermittently required Levophed for blood pressure.  Patient now sedated on Versed and fentanyl.  Patient requiring 100% FiO2 with PEEP of 21 initially    Review of Systems:     Unable to assess intubated, sedated, proned    Medications: Allergies:     Allergies   Allergen Reactions    Nuts [Peanut-Containing Drug Products] Anaphylaxis    Sunflower Oil Anaphylaxis     Sunflower seeds       Current Meds:   Scheduled Meds:    [Held by provider] furosemide  40 mg IntraVENous Daily    insulin glargine  10 Units SubCUTAneous Nightly    pantoprazole  40 mg IntraVENous Daily    And    sodium chloride (PF)  10 mL IntraVENous Daily    sodium chloride flush  5-40 mL IntraVENous 2 times per day    enoxaparin  30 mg SubCUTAneous BID    Vitamin D  6,000 Units Oral Daily    Followed by   Alexandrea Lucia ON 12/19/2021] Vitamin D  2,000 Units Oral Daily    dexamethasone  20 mg IntraVENous Q24H    Followed by   Alexandrea Lucia ON 12/17/2021] dexamethasone  10 mg IntraVENous Q24H    insulin lispro  0-6 Units SubCUTAneous Q4H     Continuous Infusions:    epoprostenol (VELETRI) nebulization solution 30 ng/kg/min (12/16/21 0250)    sodium chloride      dextrose      norepinephrine Stopped (12/12/21 1149)    cisatracurium (NIMBEX) infusion 3 mcg/kg/min (12/16/21 0524)    midazolam 10 mg/hr (12/16/21 0527)    fentaNYL 200 mcg/hr (12/16/21 0401)    propofol 20 mcg/kg/min (12/16/21 0099)    dextrose      sodium chloride 10 mL/hr at 12/13/21 1548     PRN Meds: sodium chloride flush, sodium chloride, ondansetron **OR** ondansetron, polyethylene glycol, acetaminophen **OR** acetaminophen, glucose, dextrose, glucagon (rDNA), dextrose, glucose, dextrose, glucagon (rDNA), dextrose    Data:     Past Medical History:   has a past medical history of Arthritis, Asthma, Diabetes mellitus ([address] Edema, GERD (gastroesophageal reflux disease), Hypertension, Migraine, and IRMA on CPAP. Social History:   reports that he has quit smoking. He has never used smokeless tobacco. He reports current alcohol use. He reports that he does not use drugs. Family History:   Family History   Problem Relation Age of Onset    Heart Attack Sister 32    Diabetes Paternal Grandmother     Heart Disease Paternal Uncle     Heart Disease Paternal Uncle     Heart Disease Paternal Uncle     Cancer Maternal Aunt     Cancer Maternal Aunt     Cancer Maternal Uncle     Cancer Maternal Uncle        Vitals:  /64   Pulse 64   Temp 100 °F (37.8 °C)   Resp 30   Ht 6' 2\" (1.88 m)   Wt 278 lb 14.1 oz (126.5 kg)   SpO2 100%   BMI 35.81 kg/m²   Temp (24hrs), Av °F (37.8 °C), Min:100 °F (37.8 °C), Max:100 °F (37.8 °C)    Recent Labs     12/15/21  1840 12/15/21  2029 21  0237 21  0523   POCGLU 172* 161* 133* 146*       I/O (24Hr):     Intake/Output Summary (Last 24 hours) at 2021 0916  Last data filed at 2021 0800  Gross per 24 hour   Intake 1857.03 ml   Output 2270 ml   Net -412.97 ml       Labs:  Hematology:  Recent Labs     21  0545 12/15/21  0440 21  0533   WBC 5.6 6.4 9.1   RBC 4.95 4.89 5.03   HGB 14.9 15.1 15.4   HCT 47.4 47.2 48.5   MCV 95.8 96.5 96.4   MCH 30.1 30.9 30.6   MCHC 31.4 32.0 31.8   RDW 14.6* 14.1 14.2    226 229   MPV 10.7 10.5 10.7   CRP 50.8* 23.5*  --      Chemistry:  Recent Labs     21  0545 12/15/21  0440 21  0533   * 149* 151*   K 5.1 4.8 4.5   * 114* 112*   CO2 24 23 27   GLUCOSE 174* 177* 154*   BUN 36* 42* 47*   CREATININE 0.83 0.77 0.69*   MG 2.3 2.4 2.3   ANIONGAP 11 12 12   LABGLOM >60 >60 >60   GFRAA >60 >60 >60   CALCIUM 8.5* 8.4* 8.1*   PHOS 2.5 2.7 2.8     Recent Labs     12/14/21  0545 12/14/21  0655 12/15/21  0440 12/15/21  0559 12/15/21  1523 12/15/21  1613 12/15/21  1629 12/15/21  1840 12/15/21  2029 12/16/21  0237 12/16/21  0523 12/16/21  0533   PROT 6.1*  --  5.7*  --   --   --   --   --   --   --   --  5.6*   LABALBU 3.0*  3.0*  --  3.0*  3.0*  --   --   --   --   --   --   --   --  3.1*  3.1*   AST 76*  --  222*  --   --   --   --   --   --   --   --  109*   ALT 58*  --  210*  --   --   --   --   --   --   --   --  224*   ALKPHOS 106  --  116  --   --   --   --   --   --   --   --  107   BILITOT 0.32  --  0.54  --   --   --   --   --   --   --   --  0.41   BILIDIR 0.15  --  0.39*  --   --   --   --   --   --   --   --  0.22   TRIG  --   --   --   --   --  557*  --   --   --   --   --   --    POCGLU  --    < >  --    < > 143*  --  157* 172* 161* 133* 146*  --     < > = values in this interval not displayed. ABG:  Lab Results   Component Value Date    POCPH 7.380 12/16/2021    POCPCO2 49.7 12/16/2021    POCPO2 59.5 12/16/2021    POCHCO3 29.4 12/16/2021    NBEA NOT REPORTED 12/16/2021    PBEA 3 12/16/2021    DKL5WTL NOT REPORTED 12/16/2021    LIJJ4OJN 89 12/16/2021    FIO2 NOT REPORTED 12/16/2021     Lab Results   Component Value Date/Time    SPECIAL NOT REPORTED 12/14/2021 12:43 AM     Lab Results   Component Value Date/Time    CULTURE NO GROWTH 12/14/2021 12:43 AM       Radiology:  XR CHEST (SINGLE VIEW FRONTAL)    Result Date: 12/12/2021  Stable appearing     XR CHEST (SINGLE VIEW FRONTAL)    Result Date: 12/11/2021  Multifocal hazy opacities throughout both lungs consistent with COVID pneumonia and or pulmonary edema. XR CHEST PORTABLE    Result Date: 12/12/2021  Stable appearing chest with unchanged support tubes/lines and persistent extensive bilateral airspace disease consistent with known COVID pneumonia.      XR CHEST PORTABLE    Result Date: 12/12/2021  Right internal jugular central venous catheter tip projecting over the proximal SVC versus brachiocephalic vein. No pneumothorax. Otherwise stable exam from earlier today. XR CHEST PORTABLE    Result Date: 12/12/2021  Endotracheal tube tip is 3.2 cm above the saul. Overall significant worsening of multifocal airspace opacities keeping with history of COVID-19. CT CHEST PULMONARY EMBOLISM W CONTRAST    Result Date: 12/11/2021  1. No evidence of pulmonary embolism 2. Diffuse ground-glass opacities throughout the lungs, typical of COVID-19 pulmonary disease. Physical Examination:        General appearance: Intubated, sedated, ill-appearing  Mental Status: Unable to assess, sedated  Lungs: Mechanical breath sounds, 80% FiO2 vent support  Heart:  regular rate and rhythm, no murmur  Abdomen: Obese, unable to fully assess abdomen secondary to prone position  : Hahn catheter  Extremities: Generalized anasarca without redness in the calves  Skin: Pale, taut    Assessment:        Hospital Problems           Last Modified POA    * (Principal) Pneumonia due to COVID-19 virus 12/12/2021 Yes    Acute respiratory failure with hypoxia ([address] 12/12/2021 Yes    Diabetes mellitus ([address] 12/12/2021 Yes    Asthma 12/12/2021 Yes    IRMA on CPAP 12/12/2021 Yes    Overview Signed 12/12/2021  2:39 PM by John Buckner, DO     seldom use machine         Hypertension 12/12/2021 Yes    Overview Signed 12/12/2021  2:39 PM by John Buckner, DO     on lasix         GERD (gastroesophageal reflux disease) 12/12/2021 Yes    ARDS (adult respiratory distress syndrome) ([address] 12/12/2021 Yes          Plan:        COVID-19 viral pneumonia:   - Would not appear that patient has been vaccinated.     - Infectious disease following.    - Received Actemra 12/12/2021.    - Started on high-dose Decadron 5/12/2021.    - Out of the window for remdesivir or monoclonal antibody.   - CRP downtrending    Acute respiratory failure with hypoxia and history of asthma:  - Secondary to #1.    - Complicated by patient's history of IRMA and asthma.    - Currently intubated on 80% FiO2.    - Managed by critical care.     - Epoprostenol started on 12/14, dose managed and decreased by critical care       IRMA on CPAP:   - O2 management/vent support managed by critical care    Hypernatremia:   - hold lasix  - start free water   - trend every 6 hours  - goal NA lynette the next 24 hours 145    Primary hypertension:   - Patient previously hypotensive since admission,   - Levophed has been weaned off since 12/14.    - Continue off antihypertensives for now    Diabetes mellitus type 2:   - A.m. blood sugar mildly elevated with coadministration of Decadron.   -  On low-dose SSI every 4 hours, continue for now  - uptitrate as warranted  - started on lantus, continue     Hypertriglyceridemia: Secondary to propofol    GERD: Start Protonix    GI/DVT prophylaxis: Protonix, Lovenox twice daily    Prognosis continues to be guarded at this time    SHO Johnston - NP  12/16/2021  9:16 AM

## 2021-12-16 NOTE — PLAN OF CARE
Problem: Airway Clearance - Ineffective  Goal: Achieve or maintain patent airway  12/15/2021 2252 by Laila Oakley RCP  Outcome: Ongoing     Problem: Gas Exchange - Impaired  Goal: Absence of hypoxia  12/15/2021 2252 by Laila Oakley RCP  Outcome: Ongoing     Problem: Gas Exchange - Impaired  Goal: Promote optimal lung function  12/15/2021 2252 by Laila Oakley RCP  Outcome: Ongoing     Problem: Breathing Pattern - Ineffective  Goal: Ability to achieve and maintain a regular respiratory rate  12/15/2021 2252 by Laila Oakley RCP  Outcome: Ongoing     Problem: OXYGENATION/RESPIRATORY FUNCTION  Goal: Patient will maintain patent airway  12/15/2021 2252 by Laila Oakley RCP  Outcome: Ongoing     Problem: OXYGENATION/RESPIRATORY FUNCTION  Goal: Patient will achieve/maintain normal respiratory rate/effort  Description: Respiratory rate and effort will be within normal limits for the patient  12/15/2021 2252 by Laila Oakley RCP  Outcome: Ongoing     Problem: MECHANICAL VENTILATION  Goal: Patient will maintain patent airway  12/15/2021 2252 by Laila Oakley RCP  Outcome: Ongoing     Problem: MECHANICAL VENTILATION  Goal: Oral health is maintained or improved  12/15/2021 2252 by Laila Oakley RCP  Outcome: Ongoing     Problem: MECHANICAL VENTILATION  Goal: ET tube will be managed safely  12/15/2021 2252 by Laila Oakley RCP  Outcome: Ongoing

## 2021-12-16 NOTE — PROGRESS NOTES
Pulmonary Critical Care   Consult Note    Patient - Alyce Norton  Date of Admission -  2021 11:39 AM  Date of Evaluation -  2021  Room and Bed Number -  3026/3026-01   Hospital Day - 4    CHIEF COMPLAINT : ACUTE HYPOXIC RESPIRATORY FAILURE DUE TO COVID -19 PNEUMONIA   HPI:   Alyce Norton  60 y.o. male  admitted for COVID-19 at [facility] due to worsening oxygenation he was initially started on BiPAP and subsequently intubated. On room air his saturations had been 50%. CTA no PE but bilateral pulmonary infiltrates. He was accepted at [address] [place] [address] ICU.   On arrival he is on PEEP of 22, 100% FiO2.    2021   Subjective      Prone   No acute events       SECRETIONS  -Amount:  [x] Small [] Moderate  [] Large    [] None  Color:     [x] White [] Colored  [] Bloody    SEDATION:    [x] Propofol gtt  [x] Versed gtt  [x] FENTANYL  gtt  gtt   [] No Sedation    PARALYZED:  [] No    [x] Yes    VASOPRESSORS:  [x] No    [] Yes  [] Levophed [] Dopamine [] Vasopressin  [] Dobutamine [] Phenylephrine [] Epinephrine    INHALED veletri  : [] No    [x] Yes started 21    PRONE :       [] No    [x] Yes    Actemra:             [] No    [x] Yes  21  DEXAMETHASONE : [] No    [x] Yes      Ros unable to perform due to intubation and sedation    OBJECTIVE:     VITAL SIGNS:  /64   Pulse 71   Temp 100 °F (37.8 °C)   Resp 30   Ht 6' 2\" (1.88 m)   Wt 278 lb 14.1 oz (126.5 kg)   SpO2 100%   BMI 35.81 kg/m²   Tmax over 24 hours:  Temp (24hrs), Av °F (37.8 °C), Min:100 °F (37.8 °C), Max:100 °F (37.8 °C)      Patient Vitals for the past 8 hrs:   Pulse Resp SpO2 Height   21 1500 71 30 100 % --   21 1203 -- -- -- 6' 2\" (1.88 m)   21 1100 69 30 100 % --   21 1000 68 30 100 % --   21 0900 66 30 100 % --   21 0827 64 30 100 % --   21 67 30 100 % --         Intake/Output Summary (Last 24 hours) at 2021 1544  Last data filed at 2021 1225  Gross per 24 hour   Intake 2989.03 ml   Output 3045 ml   Net -55.97 ml     Date 12/16/21 0000 - 12/16/21 2359   Shift 6140-1880 4432-6113 9127-2325 24 Hour Total   INTAKE   P.O.(mL/kg/hr) 0(0)   0   I. V.(mL/kg) 409.2(3.2)   409.2(3.2)   NG/GT(mL/kg) 440(3.5) 1132(8.9)  1572(12.4)   Shift Total(mL/kg) 849. 2(6.7) 1132(8.9)  1981. 2(15.7)   OUTPUT   Urine(mL/kg/hr) 585(0.6) 1975  2560   Shift Total(mL/kg) 585(4.6) 1975(15.6)  8399(82.8)   Weight (kg) 126.5 126.5 126.5 126.5     Wt Readings from Last 3 Encounters:   12/15/21 278 lb 14.1 oz (126.5 kg)   12/11/21 300 lb (136.1 kg)   12/12/19 (!) 355 lb 9.6 oz (161.3 kg)     Body mass index is 35.81 kg/m².         PHYSICAL EXAM:  Intubated , prone  Sedated   Lungs rales   cvs r s1 s2 r   Abdomen nt nd bs +  Le no edema       MEDICATIONS:  Scheduled Meds:   [Held by provider] furosemide  40 mg IntraVENous Daily    insulin glargine  10 Units SubCUTAneous Nightly    pantoprazole  40 mg IntraVENous Daily    And    sodium chloride (PF)  10 mL IntraVENous Daily    sodium chloride flush  5-40 mL IntraVENous 2 times per day    enoxaparin  30 mg SubCUTAneous BID    Vitamin D  6,000 Units Oral Daily    Followed by   Jonathan Chen ON 12/19/2021] Vitamin D  2,000 Units Oral Daily    [START ON 12/17/2021] dexamethasone  10 mg IntraVENous Q24H    insulin lispro  0-6 Units SubCUTAneous Q4H     Continuous Infusions:   epoprostenol (VELETRI) nebulization solution 30 ng/kg/min (12/16/21 1146)    sodium chloride      dextrose      norepinephrine Stopped (12/12/21 1149)    cisatracurium (NIMBEX) infusion 3 mcg/kg/min (12/16/21 1442)    midazolam 10 mg/hr (12/16/21 1414)    fentaNYL 200 mcg/hr (12/16/21 1504)    propofol 25 mcg/kg/min (12/16/21 1430)    dextrose      sodium chloride 10 mL/hr at 12/13/21 1548     PRN Meds:   sodium chloride flush, 5-40 mL, PRN  sodium chloride, 25 mL, PRN  ondansetron, 4 mg, Q8H PRN   Or  ondansetron, 4 mg, Q6H PRN  polyethylene glycol, 17 g, Daily PRN  acetaminophen, 650 mg, Q6H PRN   Or  acetaminophen, 650 mg, Q6H PRN  glucose, 15 g, PRN  dextrose, 12.5 g, PRN  glucagon (rDNA), 1 mg, PRN  dextrose, 100 mL/hr, PRN  glucose, 15 g, PRN  dextrose, 12.5 g, PRN  glucagon (rDNA), 1 mg, PRN  dextrose, 100 mL/hr, PRN        SUPPORT DEVICES: [x] Ventilator [] BIPAP  [] Nasal Cannula [] Room Air      ABGs:     Lab Results   Component Value Date    ZPH8OAF NOT REPORTED 12/16/2021    FIO2 NOT REPORTED 12/16/2021       DATA:  Complete Blood Count:   Recent Labs     12/14/21  0545 12/15/21  0440 12/16/21  0533   WBC 5.6 6.4 9.1   RBC 4.95 4.89 5.03   HGB 14.9 15.1 15.4   HCT 47.4 47.2 48.5   MCV 95.8 96.5 96.4   MCH 30.1 30.9 30.6   MCHC 31.4 32.0 31.8   RDW 14.6* 14.1 14.2    226 229   MPV 10.7 10.5 10.7        Last 3 Blood Glucose:   Recent Labs     12/14/21  0545 12/15/21  0440 12/16/21  0533   GLUCOSE 174* 177* 154*        PT/INR:    Lab Results   Component Value Date    PROTIME 13.0 12/12/2021    INR 1.0 12/12/2021     PTT:    Lab Results   Component Value Date    APTT 39.1 12/12/2021       Comprehensive Metabolic Profile:   Recent Labs     12/14/21  0545 12/15/21  0440 12/16/21  0533   * 149* 151*   K 5.1 4.8 4.5   * 114* 112*   CO2 24 23 27   BUN 36* 42* 47*   CREATININE 0.83 0.77 0.69*   GLUCOSE 174* 177* 154*   CALCIUM 8.5* 8.4* 8.1*   PROT 6.1* 5.7* 5.6*   LABALBU 3.0*  3.0* 3.0*  3.0* 3.1*  3.1*   BILITOT 0.32 0.54 0.41   ALKPHOS 106 116 107   AST 76* 222* 109*   ALT 58* 210* 224*      Magnesium:   Lab Results   Component Value Date    MG 2.3 12/16/2021    MG 2.4 12/15/2021    MG 2.3 12/14/2021     Phosphorus:   Lab Results   Component Value Date    PHOS 2.8 12/16/2021    PHOS 2.7 12/15/2021    PHOS 2.5 12/14/2021     Ionized Calcium: No results found for: FREDDY     Urinalysis:   Lab Results   Component Value Date    NITRU NEGATIVE 12/14/2021    COLORU Yellow 12/14/2021    PHUR 6.5 12/14/2021    WBCUA 0 TO 2 12/14/2021    RBCUA TOO NUMEROUS TO COUNT 12/14/2021    MUCUS NOT REPORTED 12/14/2021    TRICHOMONAS NOT REPORTED 12/14/2021    YEAST NOT REPORTED 12/14/2021    BACTERIA NOT REPORTED 12/14/2021    SPECGRAV 1.026 12/14/2021    LEUKOCYTESUR NEGATIVE 12/14/2021    UROBILINOGEN Normal 12/14/2021    BILIRUBINUR NEGATIVE 12/14/2021    GLUCOSEU NEGATIVE 12/14/2021    KETUA NEGATIVE 12/14/2021    AMORPHOUS NOT REPORTED 12/14/2021           XR CHEST (SINGLE VIEW FRONTAL)    Result Date: 12/14/2021  Mild interval improvement in multifocal airspace disease particularly at the right base. Support tubes and lines as above. XR CHEST (SINGLE VIEW FRONTAL)    Result Date: 12/12/2021  Stable appearing     XR CHEST (SINGLE VIEW FRONTAL)    Result Date: 12/11/2021  Multifocal hazy opacities throughout both lungs consistent with COVID pneumonia and or pulmonary edema. XR CHEST PORTABLE    Result Date: 12/12/2021  Stable appearing chest with unchanged support tubes/lines and persistent extensive bilateral airspace disease consistent with known COVID pneumonia. XR CHEST PORTABLE    Result Date: 12/12/2021  Right internal jugular central venous catheter tip projecting over the proximal SVC versus brachiocephalic vein. No pneumothorax. Otherwise stable exam from earlier today. XR CHEST PORTABLE    Result Date: 12/12/2021  Endotracheal tube tip is 3.2 cm above the saul. Overall significant worsening of multifocal airspace opacities keeping with history of COVID-19. CT CHEST PULMONARY EMBOLISM W CONTRAST    Result Date: 12/11/2021  1. No evidence of pulmonary embolism 2. Diffuse ground-glass opacities throughout the lungs, typical of COVID-19 pulmonary disease.            Past Medical History:   Diagnosis Date    Arthritis     Asthma     Diabetes mellitus ([address]     Edema     GERD (gastroesophageal reflux disease)     Hypertension     on lasix    Migraine     IRMA on CPAP     seldom use machine        Social History     Socioeconomic History    Marital status:      Spouse name: Not on file    Number of children: Not on file    Years of education: Not on file    Highest education level: Not on file   Occupational History    Not on file   Tobacco Use    Smoking status: Former Smoker    Smokeless tobacco: Never Used   Vaping Use    Vaping Use: Never used   Substance and Sexual Activity    Alcohol use: Yes     Comment: every couple months    Drug use: Never    Sexual activity: Not on file   Other Topics Concern    Not on file   Social History Narrative    Not on file     Social Determinants of Health     Financial Resource Strain:     Difficulty of Paying Living Expenses: Not on file   Food Insecurity:     Worried About [address] in the Last Year: Not on file    [address] in the Last Year: Not on file   Transportation Needs:     Lack of Transportation (Medical): Not on file    Lack of Transportation (Non-Medical): Not on file   Physical Activity:     Days of Exercise per Week: Not on file    Minutes of Exercise per Session: Not on file   Stress:     Feeling of Stress : Not on file   Social Connections:     Frequency of Communication with Friends and Family: Not on file    Frequency of Social Gatherings with Friends and Family: Not on file    Attends [employer] Services: Not on file    Active Member of [address] [place] [address] [employer] or Organizations: Not on file    Attends Club or Organization Meetings: Not on file    Marital Status: Not on file   Intimate Partner Violence:     Fear of Current or Ex-Partner: Not on file    Emotionally Abused: Not on file    Physically Abused: Not on file    Sexually Abused: Not on file   Housing Stability:     Unable to Pay for Housing in the Last Year: Not on file    Number of [place] in the Last Year: Not on file    Unstable Housing in the Last Year: Not on file         There is no immunization history on file for this patient.       Family History   Problem Relation Age of Onset  Heart Attack Sister 32    Diabetes Paternal Grandmother     Heart Disease Paternal Uncle     Heart Disease Paternal Uncle     Heart Disease Paternal Uncle     Cancer Maternal Aunt     Cancer Maternal Aunt     Cancer Maternal Uncle     Cancer Maternal Uncle          Past Surgical History:   Procedure Laterality Date    APPENDECTOMY      ARTHROPLASTY Left 11/1/2019    LEFT FOOT 1ST MTPJ ARTHROPLASTY WITH PEREZ IMPLANT AND GROMMETS  [facility] performed by Zan Carrizales MD at [facility] Right 12/12/2019    RIGHT FOOT ARTHROPLASTY 1ST MTPJ (Right Foot)    ARTHROPLASTY Right 12/12/2019    RIGHT FOOT ARTHROPLASTY 1ST MTPJ performed by Zan Carrizales MD at [address] Right    [address]  2014    no blockage no stents    CHOLECYSTECTOMY      COLONOSCOPY      ENDOSCOPY, COLON, DIAGNOSTIC      TOE SURGERY Left 11/01/2019     LEFT FOOT 1ST MTPJ ARTHROPLASTY WITH PEREZ IMPLANT AND GROMMETS  [facility] (Left )    TONSILLECTOMY            VENTILATOR SETTINGS:  Vent Information  $Ventilation: $Subsequent Day  Skin Assessment: Clean, dry, & intact  Suction Catheter Diameter: 14  Equipment ID: [id number]  Equipment Changed: Expiratory Filter (and hirsch)  Vent Type: Servo i  Vent Mode: PRVC  Vt Ordered: 500 mL  Rate Set: 30 bmp  FiO2 : 80 %  SpO2: 100 %  SpO2/FiO2 ratio: 125  Sensitivity: 3  PEEP/CPAP: 14  I Time/ I Time %: 0.6 s  Humidification Source: Heated wire  Humidification Temp: 37  Humidification Temp Measured: 37  Circuit Condensation: Drained  Nitric Oxide/Epoprostenol In Use?: Yes     PaO2/FiO2 RATIO:  Recent Labs     12/16/21  0537   POCPO2 59.5*      FiO2 : 80 %       LABS:  ABGs:   Recent Labs     12/14/21  0501 12/15/21  0223 12/16/21  0537   POCPH 7.349* 7.371 7.380   POCPCO2 55.8* 55.0* 49.7*   POCPO2 254.3* 119.6* 59.5*   POCHCO3 30.7* 31.9* 29.4*   PWMA1TIE 100* 98 89*            ASSESSMENT:     Principal Problem:    Pneumonia due to COVID-19 virus  Active Problems:    Acute respiratory failure with hypoxia (HCC)    Diabetes mellitus (HCC)    Asthma    IRMA on CPAP    Hypertension    GERD (gastroesophageal reflux disease)    ARDS (adult respiratory distress syndrome) ([facility] [address]  Resolved Problems:    * No resolved hospital problems. *              Acute hypoxic respiratory failure secondary to COVID 19   Acute respiratory distress syndrome   Bilateral multifocal pneumonia due to COVID 19 infection   Covid -19 pandemic emergency    Hypernatremia     LOS: 4  PLAN:    D/w RT  D/w RN   Check triglycerides if worsening from yesterday then start ketamine gtt in addition to versed , fentanyl  and wean off propofol   Vent setting reviewed -wean peep and fio2 per ARDS protocol - keep sats >90 %   Continue veletri at 30 ng /kg/min   Cont ARDS ventilation    prone positioning - continue   Airborne isolation and droplet precautions to be continued  Continue supportive care   Cont treatment with medications for COVID  Cont tube feeding  Monitor endotracheal secretions   Will obtain xray chest in am   ABG             Treatment plan Discussed with nursing staff in detail , all questions answered . Total critical care time caring for this patient with life threatening, unstable organ failure, including direct patient contact, management of life support systems, review of data including imaging and labs, discussions with other team members and physicians at least 28  Min so far today, excluding procedures. Electronically signed by Omer Cassidy MD on 12/16/2021 at 3:44 PM       This patient was evaluated in the context of the global SARS-CoV-2 (COVID-19) pandemic, which necessitated considerations that the patient either has COVID-19 infection or is at risk of infection with COVID-19.  Institutional protocols and algorithms that pertain to the evaluation & management of patients with COVID-19 or those at risk for COVID-19 are in a state of rapid changes based on information released by regulatory bodies including the CDC and federal and state organizations. These policies and algorithms were followed during the patient's care. Please note that this chart was generated using voice recognition Dragon dictation software. Although every effort was made to ensure the accuracy of this automated transcription, some errors in transcription may have occurred.

## 2021-12-16 NOTE — PROGRESS NOTES
NPO  ADULT TUBE FEEDING; Orogastric; Peptide Based; Continuous; 10; Yes; 24; Q 4 hours; 50; 100; Q 4 hours; Protein; Bolus 2 Proteinex modulars per day  Current Tube Feeding (TF) Orders:  · Feeding Route: Orogastric  · Formula: Peptide Based  · Schedule: Continuous  · Additives/Modulars: Protein (2x/d)  · Water Flushes: per MD   · Rate: 20 mL prone   · Current TF & Flush Orders Provides: 20 mL/hr x 16 hrs while prone , @ 110 mL/hr x 8 hrs while supine + 2 protein modulars per day ~> 1648 kcals, 142 gms protein, 973 mLs water + propofol kcals  · Goal TF & Flush Orders Provides: 20 mL/hr x 16 hrs while prone , @ 110 mL/hr x 8 hrs while supine + 2 protein modulars per day ~> 1648 kcals, 142 gms protein, 973 mLs water + propofol kcals    Additional Calorie Sources:   propofol @ 16.3 mL/hr ~> 430 kcals/d    Anthropometric Measures:  · Height: 6' 2\" (188 cm)  · Current Body Weight: 278 lb (126.1 kg)   · Admission Body Weight: 306 lb (138.8 kg)    · Ideal Body Weight: 190 lbs; % Ideal Body Weight 146.3 %   · BMI: 35.7  · BMI Categories: Obese Class 2 (BMI 35.0 -39.9)       Nutrition Diagnosis:   · Inadequate oral intake related to impaired respiratory function as evidenced by NPO or clear liquid status due to medical condition, intubation, nutrition support - enteral nutrition      Nutrition Interventions:   Food and/or Nutrient Delivery:  Continue NPO, Continue Current Tube Feeding  Nutrition Education/Counseling:  Education not indicated   Coordination of Nutrition Care:  Continue to monitor while inpatient    Goals: Achieved   Pt to meet % of est'd needs daily via EN       Nutrition Monitoring and Evaluation:   Food/Nutrient Intake Outcomes:  Enteral Nutrition Intake/Tolerance  Physical Signs/Symptoms Outcomes:  Biochemical Data, Nutrition Focused Physical Findings, Skin, Weight, GI Status, Fluid Status or Edema, Hemodynamic Status     Discharge Planning:     Too soon to determine     Electronically signed by Jamia Mcgarry RD, LD on 12/16/21 at 12:07 PM EST    Contact: [phone]

## 2021-12-16 NOTE — PROGRESS NOTES
[facility] - Progress Note   Note COVID 19 Patient  Today's Date and Time: 12/16/2021, 11:04 AM    Impression :     COVID 19 Confirmed Infection  Covid tests:  12/11/2021: Positive  Elevated inflammatory markers  Acute hypoxic respiratory failure  History of asthma  Diabetes mellitus  Essential hypertension  IRMA on CPAP  Patient has not received the Covid vaccination    Recommendations:   Antibiotic treatment:  Monitor off antibiotics  Covid Rx:    Remdesivir-out of window  Decadron-high-dose initiated  Actemra-ordered 12/12/2021  Monoclonal antibodies-out of window      Medical Decision Making/Summary/Discussion:12/16/2021     Patient admitted with COVID 19 infection  He may come out of isolation on 12/31/21    Infection Control Recommendations   [place] Precautions  Airborne isolation  Droplet Isolation    Antimicrobial Stewardship Recommendations       Discontinuation of therapy  Coordination of Outpatient Care:   Estimated Length of IV antimicrobials:TBD  Patient will need Midline Catheter Insertion: TBD  Patient will need PICC line Insertion: No  Patient will need: Home IV , [place],  SNF,  LTAC:TBD  Patient will need outpatient wound care:No    Chief complaint/reason for consultation:   Concern for COVID infection      History of Present Illness:   Lois Carreno is a 60y.o.-year-old male who was initially admitted on 12/12/2021. Patient seen at the request of Dr. Lavon Sims:    Patient presented through [facility] on 12/11/2021 with complaints of worsening shortness of breath with associated generalized weakness and nonproductive cough over the past several days. He was exposed to his son who was diagnosed with Covid last week and has not received the Covid vaccine. The patient was very hypoxic with an SPO2 in the high 50s on room air.     Imaging revealed bilateral pulmonary opacities typical of COVID-19    Abnormal labs include:    Ferritin 2800      Patient has been intubated and transferred to [facility]  He has been started on high-dose Decadron and Actemra has been ordered    CT CHEST PULMONARY EMBOLISM W CONTRAST 12/11/2021   Final Result   1. No evidence of pulmonary embolism   2. Diffuse ground-glass opacities throughout the lungs, typical of COVID-19   pulmonary disease.           XR CHEST (SINGLE VIEW FRONTAL) 12/11/2021   Final Result   Multifocal hazy opacities throughout both lungs consistent with COVID   pneumonia and or pulmonary edema       Patient admitted because of concerns with COVID 19.    CURRENT EVALUATION : 12/16/2021    Low-grade fevers continue  VS stable    The patient continues on mechanical ventilation with sedation and paralytic per ARDS protocol. CRP is decreasing   CXR is showing mild improvement in opacities    LFTs increasing    Patient exhibiting respiratory distress. yes  Respiratory secretions: no    Patient receiving supplemental oxygen.   Mechanical ventilation  RR:  20-->30  02 sat: 91-->94--95-->100    % FIO2: 90-->80-->85-->80  PEEP:   21-->16-->14    QTc:       NEWS Score: 0-4 Low risk group; 5-6: Medium risk group; 7 or above: High risk group  Parameters 3 2 1 0 1 2 3   Age    < 65   = 65   RR = 8  9-11 12-20  21-24 = 25   O2 Sats = 91 92-93 94-95 = 96      Suppl O2  Yes  No      SBP = 90  101-110 111-219   = 220   HR = 40  41-50 51-90  111-130 = 131   Consciousness    Alert   Drowsiness, lethargy, or confusion   Temperature = 35.0 C (95.0 F)  35.1-36.0 C 95.1-96.9 F 36.1-38.0 C 97.0-100.4 F 38.1-39.0 C 100.5-102.3 F = 39.1 C = 102.4 F      NEWS Score:  12/12/2021: 13 high risk    Overall Daily Picture:     Unchanged    Presence of secondary bacterial Infection:    No   Additional antibiotics: No    Labs, X rays reviewed: 12/16/2021    BUN:42-->47  Cr:0.77-->0.69    WBC:6.4-->9.1  Hb:15.1-->15.4  Plat: 226-->229    Absolute Neutrophils:3.73  Absolute Lymphocytes:0.29  Neutrophil/Lymphocyte Ratio: 12.8 high risk    CRP:293-->277-->137-->50.8-->23  Ferritin:2800  LDH: 838    Pro Calcitonin:      Cultures:  Urine:    Blood:    Sputum :    Wound:      CXR:   0550 diffuse bilateral infiltrates  CAT:      Discussed with patient, RN, CC, IM.      12-12-21:      I have personally reviewed the past medical history, past surgical history, medications, social history, and family history, and I have updated the database accordingly.   Past Medical History:     Past Medical History:   Diagnosis Date    Arthritis     Asthma     Diabetes mellitus ([address]     Edema     GERD (gastroesophageal reflux disease)     Hypertension     on lasix    Migraine     IRMA on CPAP     seldom use machine       Past Surgical  History:     Past Surgical History:   Procedure Laterality Date    APPENDECTOMY      ARTHROPLASTY Left 11/1/2019    LEFT FOOT 1ST MTPJ ARTHROPLASTY WITH PEREZ IMPLANT AND GROMMETS  [facility] performed by Lizbeth Humphreys MD at [facility] Right 12/12/2019    RIGHT FOOT ARTHROPLASTY 1ST MTPJ (Right Foot)    ARTHROPLASTY Right 12/12/2019    RIGHT FOOT ARTHROPLASTY 1ST MTPJ performed by Lizbeth Humphreys MD at [address] Right    [address]  2014    no blockage no stents    CHOLECYSTECTOMY      COLONOSCOPY      ENDOSCOPY, COLON, DIAGNOSTIC      TOE SURGERY Left 11/01/2019     LEFT FOOT 1ST MTPJ ARTHROPLASTY WITH PEREZ IMPLANT AND GROMMETS  [facility] (Left )    TONSILLECTOMY         Medications:      [Held by provider] furosemide  40 mg IntraVENous Daily    insulin glargine  10 Units SubCUTAneous Nightly    pantoprazole  40 mg IntraVENous Daily    And    sodium chloride (PF)  10 mL IntraVENous Daily    sodium chloride flush  5-40 mL IntraVENous 2 times per day    enoxaparin  30 mg SubCUTAneous BID    Vitamin D  6,000 Units Oral Daily    Followed by   Al Iyer ON 12/19/2021] Vitamin D  2,000 Units Oral Daily    dexamethasone  20 mg IntraVENous Q24H    Followed by   Fiorella Lennon ON 12/17/2021] dexamethasone  10 mg IntraVENous Q24H    insulin lispro  0-6 Units SubCUTAneous Q4H       Social History:     Social History     Socioeconomic History    Marital status:      Spouse name: Not on file    Number of children: Not on file    Years of education: Not on file    Highest education level: Not on file   Occupational History    Not on file   Tobacco Use    Smoking status: Former Smoker    Smokeless tobacco: Never Used   Vaping Use    Vaping Use: Never used   Substance and Sexual Activity    Alcohol use: Yes     Comment: every couple months    Drug use: Never    Sexual activity: Not on file   Other Topics Concern    Not on file   Social History Narrative    Not on file     Social Determinants of Health     Financial Resource Strain:     Difficulty of Paying Living Expenses: Not on file   Food Insecurity:     Worried About [address] in the Last Year: Not on file    [address] in the Last Year: Not on file   Transportation Needs:     Lack of Transportation (Medical): Not on file    Lack of Transportation (Non-Medical):  Not on file   Physical Activity:     Days of Exercise per Week: Not on file    Minutes of Exercise per Session: Not on file   Stress:     Feeling of Stress : Not on file   Social Connections:     Frequency of Communication with Friends and Family: Not on file    Frequency of Social Gatherings with Friends and Family: Not on file    Attends Sikh Services: Not on file    Active Member of Clubs or Organizations: Not on file    Attends Club or Organization Meetings: Not on file    Marital Status: Not on file   Intimate Partner Violence:     Fear of Current or Ex-Partner: Not on file    Emotionally Abused: Not on file    Physically Abused: Not on file    Sexually Abused: Not on file   Housing Stability:     Unable to Pay for Housing in the Last Year: Not on file    Number of Places Lived in the Last Year: Not on file    Unstable Housing in the Last Year: Not on file       Family History:     Family History   Problem Relation Age of Onset    Heart Attack Sister 32    Diabetes Paternal Grandmother     Heart Disease Paternal Uncle     Heart Disease Paternal Uncle     Heart Disease Paternal Uncle     Cancer Maternal Aunt     Cancer Maternal Aunt     Cancer Maternal Uncle     Cancer Maternal Uncle         Allergies:   Nuts [peanut-containing drug products] and Sunflower oil     Review of Systems:     Unable to assess 12/16/2021  Intubated and sedated  Constitutional: No fevers or chills. No systemic complaints  Head: No headaches  Eyes: No double vision or blurry vision. No conjunctival inflammation. ENT: No sore throat or runny nose. . No hearing loss, tinnitus or vertigo. Cardiovascular: No chest pain or palpitations. No Shortness of breath. No LARSON  Lung: No Shortness of breath or cough. No sputum production  Abdomen: No nausea, vomiting, diarrhea, or abdominal pain. Mahamed Bronson No cramps. Genitourinary: No increased urinary frequency, or dysuria. No hematuria. No suprapubic or CVA pain  Musculoskeletal: No muscle aches or pains. No joint effusions, swelling or deformities  Hematologic: No bleeding or bruising. Neurologic: No headache, weakness, numbness, or tingling. Integument: No rash, no ulcers. Psychiatric: No depression. Endocrine: No polyuria, no polydipsia, no polyphagia.     Physical Examination :     Patient Vitals for the past 8 hrs:   Temp src Pulse Resp SpO2   12/16/21 1000 -- 68 30 100 %   12/16/21 0900 -- 66 30 100 %   12/16/21 0827 -- 64 30 100 %   12/16/21 0800 -- 67 30 100 %   12/16/21 0700 -- 70 30 100 %   12/16/21 0629 -- 72 30 98 %   12/16/21 0600 -- 72 30 98 %   12/16/21 0537 -- -- 26 98 %   12/16/21 0500 -- 69 30 98 %   12/16/21 0400 Bladder 65 30 97 %     General Appearance: Intubated and sedated  Head:  Normocephalic, no trauma  Eyes: Pupils equal, round, reactive to light; sclera anicteric; conjunctivae pink. No embolic phenomena. ENT: Oropharynx clear, without erythema, exudate, or thrush. No tenderness of sinuses. Mouth/throat: mucosa pink and moist. No lesions. Dentition in good repair. Neck:Supple, without lymphadenopathy. Thyroid normal, No bruits. Pulmonary/Chest: Diminished to auscultation, without wheezes, rales, or rhonchi. No dullness to percussion. Cardiovascular: Regular rate and rhythm without murmurs, rubs, or gallops. Abdomen: Soft, non tender. Bowel sounds normal. No organomegaly  All four Extremities: No cyanosis, clubbing, edema, or effusions. Neurologic: Intubated and sedated  Skin: Warm and dry with good turgor. No signs of peripheral arterial or venous insufficiency. No ulcerations. No open wounds. Medical Decision Making -Laboratory:   I have independently reviewed/ordered the following labs:    CBC with Differential:   Recent Labs     12/15/21  0440 12/16/21  0533   WBC 6.4 9.1   HGB 15.1 15.4   HCT 47.2 48.5    229     BMP:   Recent Labs     12/15/21  0440 12/16/21  0533   * 151*   K 4.8 4.5   * 112*   CO2 23 27   BUN 42* 47*   CREATININE 0.77 0.69*   MG 2.4 2.3     Hepatic Function Panel:   Recent Labs     12/15/21  0440 12/16/21  0533   PROT 5.7* 5.6*   LABALBU 3.0*  3.0* 3.1*  3.1*   BILIDIR 0.39* 0.22   IBILI 0.15 0.19   BILITOT 0.54 0.41   ALKPHOS 116 107   * 224*   * 109*     No results for input(s): RPR in the last 72 hours. No results for input(s): HIV in the last 72 hours. No results for input(s): BC in the last 72 hours.   Lab Results   Component Value Date    MUCUS NOT REPORTED 12/14/2021    RBC 5.03 12/16/2021    TRICHOMONAS NOT REPORTED 12/14/2021    WBC 9.1 12/16/2021    YEAST NOT REPORTED 12/14/2021    TURBIDITY Clear 12/14/2021     Lab Results   Component Value Date    CREATININE 0.69 12/16/2021    GLUCOSE 154 12/16/2021       Medical Decision Making-Imaging:     Narrative   EXAMINATION:   CTA OF THE CHEST 12/11/2021 11:31 am       TECHNIQUE:   CTA of the chest was performed after the administration of intravenous   contrast.  Multiplanar reformatted images are provided for review.  MIP   images are provided for review. Dose modulation, iterative reconstruction,   and/or weight based adjustment of the mA/kV was utilized to reduce the   radiation dose to as low as reasonably achievable.       COMPARISON:   Chest x-ray dated December 11, 2021       HISTORY:   ORDERING SYSTEM PROVIDED HISTORY: hypoxemia, covid positive, d-dimer-0.99   TECHNOLOGIST PROVIDED HISTORY:   hypoxemia, covid positive, d-dimer-0.99   Decision Support Exception - unselect if not a suspected or confirmed   emergency medical condition->Emergency Medical Condition (MA)   Reason for Exam: COVID positive, elevated D-Dimer       FINDINGS:   Pulmonary Arteries: Pulmonary arteries are adequately opacified for   evaluation.  No evidence of intraluminal filling defect to suggest pulmonary   embolism.  Main pulmonary artery is normal in caliber.       Mediastinum: Nonspecific mediastinal and hilar lymph nodes are present,   likely reactive. .  The heart and pericardium demonstrate no acute   abnormality.  There is no acute abnormality of the thoracic aorta.       Lungs/pleura: Diffuse ground-glass opacification is present throughout the   lungs, typical of COVID-19 pulmonary disease.  No pleural effusion or   pneumothorax is present.       Upper Abdomen: Images through the upper abdomen demonstrate a small hiatal   hernia.  Diffuse hepatic steatosis is present.  The gallbladder is surgically   absent.       Soft Tissues/Bones: No acute bone or soft tissue abnormality.           Impression   1. No evidence of pulmonary embolism   2.  Diffuse ground-glass opacities throughout the lungs, typical of COVID-19   pulmonary disease.         Narrative   EXAMINATION:   ONE XRAY VIEW OF THE CHEST     12/11/2021 3:54 pm       COMPARISON:   November 13, 2012       HISTORY:   ORDERING SYSTEM PROVIDED HISTORY: hypoxemia, probable covid pneumonia   TECHNOLOGIST PROVIDED HISTORY:   hypoxemia, probable covid pneumonia       FINDINGS:   Multifocal hazy opacities throughout both lungs would be consistent with   history of COVID pneumonia.  Pulmonary edema could have a similar appearance. No pneumothorax or pleural effusion.  Cardiac size enlarged.  Mediastinum   unremarkable.  No acute osseous abnormality.           Impression   Multifocal hazy opacities throughout both lungs consistent with COVID   pneumonia and or pulmonary edema.                   Medical Decision [id number]:       Medical Decision Making-Other:     Note:  Labs, medications, radiologic studies were reviewed with personal review of films  Large amounts of data were reviewed  Discussed with nursing Staff, Discharge planner  Infection Control and Prevention measures reviewed  All prior entries were reviewed  Administer medications as ordered  Prognosis: Guarded  Discharge planning reviewed      Thank you for allowing us to participate in the care of this patient. Please call with questions. SHO Regalado - CNP     ATTESTATION:    I have discussed the case, including pertinent history and exam findings with the APRN. I have evaluated the  History, physical findings and pictures of the patient and the key elements of the encounter have been performed by me. I have reviewed the laboratory data, other diagnostic studies and discussed them with the APRN. I have updated the medical record where necessary. I agree with the assessment, plan and orders as documented by the APRN.     Christian Dolan MD.        Pager: [phone] - Office: [phone]

## 2021-12-17 LAB
ALBUMIN SERPL-MCNC: 3.2 G/DL (ref 3.5–5.2)
ALBUMIN SERPL-MCNC: 3.2 G/DL (ref 3.5–5.2)
ALBUMIN/GLOBULIN RATIO: 1.3 (ref 1–2.5)
ALLEN TEST: ABNORMAL
ALP BLD-CCNC: 97 U/L (ref 40–129)
ALT SERPL-CCNC: 183 U/L (ref 5–41)
ANION GAP SERPL CALCULATED.3IONS-SCNC: 14 MMOL/L (ref 9–17)
AST SERPL-CCNC: 57 U/L
BILIRUB SERPL-MCNC: 0.36 MG/DL (ref 0.3–1.2)
BILIRUBIN DIRECT: 0.17 MG/DL
BILIRUBIN, INDIRECT: 0.19 MG/DL (ref 0–1)
BUN BLDV-MCNC: 50 MG/DL (ref 6–20)
BUN/CREAT BLD: ABNORMAL (ref 9–20)
CALCIUM SERPL-MCNC: 8.4 MG/DL (ref 8.6–10.4)
CHLORIDE BLD-SCNC: 111 MMOL/L (ref 98–107)
CO2: 26 MMOL/L (ref 20–31)
CREAT SERPL-MCNC: 0.69 MG/DL (ref 0.7–1.2)
FIO2: ABNORMAL
GFR AFRICAN AMERICAN: >60 ML/MIN
GFR NON-AFRICAN AMERICAN: >60 ML/MIN
GFR SERPL CREATININE-BSD FRML MDRD: ABNORMAL ML/MIN/{1.73_M2}
GFR SERPL CREATININE-BSD FRML MDRD: ABNORMAL ML/MIN/{1.73_M2}
GLOBULIN: ABNORMAL G/DL (ref 1.5–3.8)
GLUCOSE BLD-MCNC: 117 MG/DL (ref 75–110)
GLUCOSE BLD-MCNC: 120 MG/DL (ref 75–110)
GLUCOSE BLD-MCNC: 129 MG/DL (ref 75–110)
GLUCOSE BLD-MCNC: 133 MG/DL (ref 75–110)
GLUCOSE BLD-MCNC: 146 MG/DL (ref 75–110)
GLUCOSE BLD-MCNC: 152 MG/DL (ref 70–99)
GLUCOSE BLD-MCNC: 154 MG/DL (ref 74–100)
GLUCOSE BLD-MCNC: 155 MG/DL (ref 75–110)
GLUCOSE BLD-MCNC: 155 MG/DL (ref 75–110)
GLUCOSE BLD-MCNC: 166 MG/DL (ref 75–110)
GLUCOSE BLD-MCNC: 93 MG/DL (ref 75–110)
HCT VFR BLD CALC: 48.4 % (ref 40.7–50.3)
HEMOGLOBIN: 15.4 G/DL (ref 13–17)
MAGNESIUM: 2.5 MG/DL (ref 1.6–2.6)
MCH RBC QN AUTO: 30.9 PG (ref 25.2–33.5)
MCHC RBC AUTO-ENTMCNC: 31.8 G/DL (ref 28.4–34.8)
MCV RBC AUTO: 97.2 FL (ref 82.6–102.9)
MODE: ABNORMAL
NEGATIVE BASE EXCESS, ART: ABNORMAL (ref 0–2)
NRBC AUTOMATED: 0.2 PER 100 WBC
O2 DEVICE/FLOW/%: ABNORMAL
PATIENT TEMP: ABNORMAL
PDW BLD-RTO: 14.2 % (ref 11.8–14.4)
PHOSPHORUS: 4 MG/DL (ref 2.5–4.5)
PLATELET # BLD: 208 K/UL (ref 138–453)
PMV BLD AUTO: 11.2 FL (ref 8.1–13.5)
POC HCO3: 30.5 MMOL/L (ref 21–28)
POC O2 SATURATION: 98 % (ref 94–98)
POC PCO2 TEMP: ABNORMAL MM HG
POC PCO2: 54.1 MM HG (ref 35–48)
POC PH TEMP: ABNORMAL
POC PH: 7.36 (ref 7.35–7.45)
POC PO2 TEMP: ABNORMAL MM HG
POC PO2: 117 MM HG (ref 83–108)
POSITIVE BASE EXCESS, ART: 4 (ref 0–3)
POTASSIUM SERPL-SCNC: 4.9 MMOL/L (ref 3.7–5.3)
RBC # BLD: 4.98 M/UL (ref 4.21–5.77)
SAMPLE SITE: ABNORMAL
SODIUM BLD-SCNC: 147 MMOL/L (ref 135–144)
SODIUM BLD-SCNC: 148 MMOL/L (ref 135–144)
SODIUM BLD-SCNC: 149 MMOL/L (ref 135–144)
SODIUM BLD-SCNC: 151 MMOL/L (ref 135–144)
TCO2 (CALC), ART: ABNORMAL MMOL/L (ref 22–29)
TOTAL PROTEIN: 5.7 G/DL (ref 6.4–8.3)
WBC # BLD: 10.5 K/UL (ref 3.5–11.3)

## 2021-12-17 PROCEDURE — 82248 BILIRUBIN DIRECT: CPT

## 2021-12-17 PROCEDURE — 99232 SBSQ HOSP IP/OBS MODERATE 35: CPT | Performed by: INTERNAL MEDICINE

## 2021-12-17 PROCEDURE — 84295 ASSAY OF SERUM SODIUM: CPT

## 2021-12-17 PROCEDURE — 2580000003 HC RX 258: Performed by: NURSE PRACTITIONER

## 2021-12-17 PROCEDURE — 82947 ASSAY GLUCOSE BLOOD QUANT: CPT

## 2021-12-17 PROCEDURE — 2500000003 HC RX 250 WO HCPCS: Performed by: INTERNAL MEDICINE

## 2021-12-17 PROCEDURE — 6370000000 HC RX 637 (ALT 250 FOR IP): Performed by: NURSE PRACTITIONER

## 2021-12-17 PROCEDURE — 94761 N-INVAS EAR/PLS OXIMETRY MLT: CPT

## 2021-12-17 PROCEDURE — C9113 INJ PANTOPRAZOLE SODIUM, VIA: HCPCS | Performed by: NURSE PRACTITIONER

## 2021-12-17 PROCEDURE — 2500000003 HC RX 250 WO HCPCS: Performed by: EMERGENCY MEDICINE

## 2021-12-17 PROCEDURE — 2700000000 HC OXYGEN THERAPY PER DAY

## 2021-12-17 PROCEDURE — 94003 VENT MGMT INPAT SUBQ DAY: CPT

## 2021-12-17 PROCEDURE — 6360000002 HC RX W HCPCS: Performed by: NURSE PRACTITIONER

## 2021-12-17 PROCEDURE — 94645 CONT INHLJ TX EACH ADDL HOUR: CPT

## 2021-12-17 PROCEDURE — 80076 HEPATIC FUNCTION PANEL: CPT

## 2021-12-17 PROCEDURE — 80069 RENAL FUNCTION PANEL: CPT

## 2021-12-17 PROCEDURE — 82803 BLOOD GASES ANY COMBINATION: CPT

## 2021-12-17 PROCEDURE — 85027 COMPLETE CBC AUTOMATED: CPT

## 2021-12-17 PROCEDURE — 99291 CRITICAL CARE FIRST HOUR: CPT | Performed by: INTERNAL MEDICINE

## 2021-12-17 PROCEDURE — 2580000003 HC RX 258: Performed by: INTERNAL MEDICINE

## 2021-12-17 PROCEDURE — 6360000002 HC RX W HCPCS: Performed by: INTERNAL MEDICINE

## 2021-12-17 PROCEDURE — 37799 UNLISTED PX VASCULAR SURGERY: CPT

## 2021-12-17 PROCEDURE — 84100 ASSAY OF PHOSPHORUS: CPT

## 2021-12-17 PROCEDURE — 83735 ASSAY OF MAGNESIUM: CPT

## 2021-12-17 PROCEDURE — 6370000000 HC RX 637 (ALT 250 FOR IP): Performed by: INTERNAL MEDICINE

## 2021-12-17 PROCEDURE — 2000000000 HC ICU R&B

## 2021-12-17 PROCEDURE — 99233 SBSQ HOSP IP/OBS HIGH 50: CPT | Performed by: NURSE PRACTITIONER

## 2021-12-17 RX ORDER — DEXMEDETOMIDINE HYDROCHLORIDE 4 UG/ML
.2-1.4 INJECTION, SOLUTION INTRAVENOUS CONTINUOUS
Status: DISCONTINUED | OUTPATIENT
Start: 2021-12-17 | End: 2021-12-27

## 2021-12-17 RX ADMIN — ENOXAPARIN SODIUM 30 MG: 100 INJECTION SUBCUTANEOUS at 20:53

## 2021-12-17 RX ADMIN — PANTOPRAZOLE SODIUM 40 MG: 40 INJECTION, POWDER, FOR SOLUTION INTRAVENOUS at 08:25

## 2021-12-17 RX ADMIN — CISATRACURIUM BESYLATE 3 MCG/KG/MIN: 10 INJECTION, SOLUTION INTRAVENOUS at 08:30

## 2021-12-17 RX ADMIN — SODIUM CHLORIDE, PRESERVATIVE FREE 10 ML: 5 INJECTION INTRAVENOUS at 20:53

## 2021-12-17 RX ADMIN — Medication 6000 UNITS: at 08:25

## 2021-12-17 RX ADMIN — SODIUM CHLORIDE, PRESERVATIVE FREE 10 ML: 5 INJECTION INTRAVENOUS at 08:01

## 2021-12-17 RX ADMIN — ENOXAPARIN SODIUM 30 MG: 100 INJECTION SUBCUTANEOUS at 08:01

## 2021-12-17 RX ADMIN — EPOPROSTENOL 30 NG/KG/MIN: 1.5 INJECTION, POWDER, LYOPHILIZED, FOR SOLUTION INTRAVENOUS at 06:15

## 2021-12-17 RX ADMIN — DEXMEDETOMIDINE HYDROCHLORIDE 0.4 MCG/KG/HR: 4 INJECTION, SOLUTION INTRAVENOUS at 18:51

## 2021-12-17 RX ADMIN — DEXMEDETOMIDINE HYDROCHLORIDE 0.4 MCG/KG/HR: 4 INJECTION, SOLUTION INTRAVENOUS at 02:40

## 2021-12-17 RX ADMIN — Medication 200 MCG/HR: at 15:46

## 2021-12-17 RX ADMIN — INSULIN LISPRO 1 UNITS: 100 INJECTION, SOLUTION INTRAVENOUS; SUBCUTANEOUS at 14:21

## 2021-12-17 RX ADMIN — CISATRACURIUM BESYLATE 3 MCG/KG/MIN: 10 INJECTION, SOLUTION INTRAVENOUS at 17:18

## 2021-12-17 RX ADMIN — CISATRACURIUM BESYLATE 3 MCG/KG/MIN: 10 INJECTION, SOLUTION INTRAVENOUS at 23:29

## 2021-12-17 RX ADMIN — DEXAMETHASONE SODIUM PHOSPHATE 10 MG: 10 INJECTION INTRAMUSCULAR; INTRAVENOUS at 12:16

## 2021-12-17 RX ADMIN — ACETAMINOPHEN 650 MG: 325 TABLET ORAL at 18:00

## 2021-12-17 RX ADMIN — Medication 10 MG/HR: at 10:45

## 2021-12-17 RX ADMIN — Medication 10 MG/HR: at 00:09

## 2021-12-17 RX ADMIN — Medication 200 MCG/HR: at 09:00

## 2021-12-17 RX ADMIN — Medication 200 MCG/HR: at 02:41

## 2021-12-17 RX ADMIN — Medication 10 MG/HR: at 20:57

## 2021-12-17 RX ADMIN — INSULIN GLARGINE 10 UNITS: 100 INJECTION, SOLUTION SUBCUTANEOUS at 20:57

## 2021-12-17 RX ADMIN — KETAMINE HYDROCHLORIDE 0.2 MG/KG/HR: 50 INJECTION INTRAMUSCULAR; INTRAVENOUS at 20:56

## 2021-12-17 RX ADMIN — Medication 200 MCG/HR: at 20:57

## 2021-12-17 RX ADMIN — INSULIN LISPRO 1 UNITS: 100 INJECTION, SOLUTION INTRAVENOUS; SUBCUTANEOUS at 17:17

## 2021-12-17 ASSESSMENT — PULMONARY FUNCTION TESTS
PIF_VALUE: 30
PIF_VALUE: 27
PIF_VALUE: 27
PIF_VALUE: 25
PIF_VALUE: 30

## 2021-12-17 ASSESSMENT — PAIN SCALES - GENERAL
PAINLEVEL_OUTOF10: 0

## 2021-12-17 NOTE — PROGRESS NOTES
[facility]  Office: [address], DO, Padmini Mulligan, DO, Dustin Waller, DO, Maya Donovan Blood, DO, Betina Amezcua MD, Hunter Rider MD, Vicky Roque MD, Jeovany Mccormick MD, Cindy Sanchez MD, Nancy Duncan MD, Guy Fowler MD, Chago Walker, DO, Nick Reyes, DO, Isela Boykin MD,  Rigo Northern, DO, Cecilio Rios MD, Maria Elena Tena MD, Juventino Lee MD, Wilmar Rae MD, Griselda Barakat MD, Marvin Michael MD, Modesta Felix MD, Juwan Knapp, [facility], [facility], CNP, Zoë Villaseñori, CNP, Kaveh Wolfe, [facility], Satish Paiz, Zaida Coarabella, CNP, Katiana Toth, CNP, Coco Baez, CNP, Lydia Caballero, CNP, Brett Aggarwal PA-C, Jeff Mike, [facility], Renae Lange, CNP, Marianna Bains, CNP, Obdulio Necessary, CNP, Douglas Nino, CNP, Teri Chicas, CNP, Joanne Lima, CNP, Jolly Dakin, [address] [place] [address]    Progress Note    12/17/2021    9:06 AM    Name:   Debbie Tamayo  MRN:     [id number]     Acct:      [de-identified]   Room:   [address] [place] [address] Day:  5  Admit Date:  12/12/2021 11:39 AM    PCP:   Adelso Mccracken MD  Code Status:  Full Code    Subjective:     C/C: SOB  Interval History Status: not changed.      Patient seen and evaluated in room intubated, sedated, proned  On Nimbex, fentanyl, Versed, propofol, ketamine added 12/16 per critical care  FiO2 requirements down to 70% from 80%  epoprostenol continuous nebulizations continue  UOP 2.6 L over the last 24 hours with a slightly 800 mL fluid deficit since admission  Lasix held on 12/16 secondary to hypernatremia, free water flushes on hold secondary to proning  CRP downtrending  BC X2 NGTD D5, urine culture negative  Tube feed continues 20 mL an hour  Brief History:     47-year-old male presents for cough, shortness of breath and extremity weakness for 5 days.  Patient presented at [address] [place] [address] found to be hypoxic and hypotensive with oxygen saturation in the mid 90s on room air.  Patient quickly required BiPAP to maintain oxygen saturations above 90%.  Patient was unable to tolerate BiPAP and per chart repeatedly took off the mask and desaturated and 70s.  Patient was seen by pulmonary medicine recommending transfer to [place] for further care. Li Lara arrived intubated and sedated, pulmonary medicine was at bedside requiring paralytics to maintain ventilator synchrony.  Intermittently required Levophed for blood pressure.  Patient now sedated on Versed and fentanyl.  Patient requiring 100% FiO2 with PEEP of 21 initially    Review of Systems:     Unable to assess intubated, sedated, proned    Medications: Allergies:     Allergies   Allergen Reactions    Nuts [Peanut-Containing Drug Products] Anaphylaxis    Sunflower Oil Anaphylaxis     Sunflower seeds       Current Meds:   Scheduled Meds:    [Held by provider] furosemide  40 mg IntraVENous Daily    insulin glargine  10 Units SubCUTAneous Nightly    pantoprazole  40 mg IntraVENous Daily    And    sodium chloride (PF)  10 mL IntraVENous Daily    sodium chloride flush  5-40 mL IntraVENous 2 times per day    enoxaparin  30 mg SubCUTAneous BID    Vitamin D  6,000 Units Oral Daily    Followed by   Jonathan Chen ON 12/19/2021] Vitamin D  2,000 Units Oral Daily    dexamethasone  10 mg IntraVENous Q24H    insulin lispro  0-6 Units SubCUTAneous Q4H     Continuous Infusions:    dexmedetomidine Stopped (12/17/21 0827)    ketamine (KETALAR) infusion for analgosedation 0.2 mg/kg/hr (12/16/21 2050)    epoprostenol (VELETRI) nebulization solution 30 ng/kg/min (12/17/21 0615)    sodium chloride      dextrose      norepinephrine Stopped (12/12/21 1149)    cisatracurium (NIMBEX) infusion 3 mcg/kg/min (12/16/21 2210)    midazolam 10 mg/hr (12/17/21 0009)    fentaNYL 200 mcg/hr (12/17/21 0600)    dextrose      sodium chloride 10 mL/hr at 12/13/21 1548     PRN Meds: sodium chloride flush, sodium chloride, ondansetron **OR** ondansetron, polyethylene glycol, acetaminophen **OR** acetaminophen, glucose, dextrose, glucagon (rDNA), dextrose, glucose, dextrose, glucagon (rDNA), dextrose    Data:     Past Medical History:   has a past medical history of Arthritis, Asthma, Diabetes mellitus ([address] Edema, GERD (gastroesophageal reflux disease), Hypertension, Migraine, and IRMA on CPAP. Social History:   reports that he has quit smoking. He has never used smokeless tobacco. He reports current alcohol use. He reports that he does not use drugs. Family History:   Family History   Problem Relation Age of Onset    Heart Attack Sister 32    Diabetes Paternal Grandmother     Heart Disease Paternal Uncle     Heart Disease Paternal Uncle     Heart Disease Paternal Uncle     Cancer Maternal Aunt     Cancer Maternal Aunt     Cancer Maternal Uncle     Cancer Maternal Uncle        Vitals:  /64   Pulse 65   Temp 99.1 °F (37.3 °C) (Axillary)   Resp 30   Ht 6' 2\" (1.88 m)   Wt 278 lb 14.1 oz (126.5 kg)   SpO2 100%   BMI 35.81 kg/m²   Temp (24hrs), Av.5 °F (37.5 °C), Min:99.1 °F (37.3 °C), Max:99.9 °F (37.7 °C)    Recent Labs     21  2130 21  0417 21  0615 21  0709   POCGLU 171* 133* 154* 120*       I/O (24Hr):     Intake/Output Summary (Last 24 hours) at 2021 0906  Last data filed at 2021 0600  Gross per 24 hour   Intake 1713.44 ml   Output 3100 ml   Net -1386.56 ml       Labs:  Hematology:  Recent Labs     12/15/21  0440 21  0533 21  0415   WBC 6.4 9.1 10.5   RBC 4.89 5.03 4.98   HGB 15.1 15.4 15.4   HCT 47.2 48.5 48.4   MCV 96.5 96.4 97.2   MCH 30.9 30.6 30.9   MCHC 32.0 31.8 31.8   RDW 14.1 14.2 14.2    229 208   MPV 10.5 10.7 11.2   CRP 23.5*  --   --      Chemistry:  Recent Labs     12/15/21  0440 12/15/21  0440 21  0533 21  0533 21  2132 21  0415 21  0820   *   < > 151*   < > 147* 151* 149*   K 4.8 --  4.5  --   --  4.9  --    *  --  112*  --   --  111*  --    CO2 23  --  27  --   --  26  --    GLUCOSE 177*  --  154*  --   --  152*  --    BUN 42*  --  47*  --   --  50*  --    CREATININE 0.77  --  0.69*  --   --  0.69*  --    MG 2.4  --  2.3  --   --  2.5  --    ANIONGAP 12  --  12  --   --  14  --    LABGLOM >60  --  >60  --   --  >60  --    GFRAA >60  --  >60  --   --  >60  --    CALCIUM 8.4*  --  8.1*  --   --  8.4*  --    PHOS 2.7  --  2.8  --   --  4.0  --     < > = values in this interval not displayed. Recent Labs     12/15/21  0440 12/15/21  0559 12/15/21  1613 12/16/21  0533 12/16/21  1027 12/16/21  1358 12/16/21  1741 12/16/21  1751 12/16/21  2130 12/17/21  0415 12/17/21  0417 12/17/21  0615 12/17/21  0709   PROT 5.7*  --   --  5.6*  --   --   --   --   --  5.7*  --   --   --    LABALBU 3.0*  3.0*  --   --  3.1*  3.1*  --   --   --   --   --  3.2*  3.2*  --   --   --    *  --   --  109*  --   --   --   --   --  57*  --   --   --    *  --   --  224*  --   --   --   --   --  183*  --   --   --    ALKPHOS 116  --   --  107  --   --   --   --   --  97  --   --   --    BILITOT 0.54  --   --  0.41  --   --   --   --   --  0.36  --   --   --    BILIDIR 0.39*  --   --  0.22  --   --   --   --   --  0.17  --   --   --    TRIG  --    < >   < >  --   --   --   --  693*  --   --   --   --   --    POCGLU  --    < >  --   --    < > 146* 166*  --  171*  --  133* 154* 120*    < > = values in this interval not displayed.      ABG:  Lab Results   Component Value Date    POCPH 7.359 12/17/2021    POCPCO2 54.1 12/17/2021    POCPO2 117.0 12/17/2021    POCHCO3 30.5 12/17/2021    NBEA NOT REPORTED 12/17/2021    PBEA 4 12/17/2021    XQJ9JMM NOT REPORTED 12/17/2021    PKBO3LHB 98 12/17/2021    FIO2 NOT REPORTED 12/17/2021     Lab Results   Component Value Date/Time    SPECIAL NOT REPORTED 12/14/2021 12:43 AM     Lab Results   Component Value Date/Time    CULTURE NO GROWTH 12/14/2021 12:43 AM Radiology:  XR CHEST (SINGLE VIEW FRONTAL)    Result Date: 12/12/2021  Stable appearing     XR CHEST (SINGLE VIEW FRONTAL)    Result Date: 12/11/2021  Multifocal hazy opacities throughout both lungs consistent with COVID pneumonia and or pulmonary edema. XR CHEST PORTABLE    Result Date: 12/12/2021  Stable appearing chest with unchanged support tubes/lines and persistent extensive bilateral airspace disease consistent with known COVID pneumonia. XR CHEST PORTABLE    Result Date: 12/12/2021  Right internal jugular central venous catheter tip projecting over the proximal SVC versus brachiocephalic vein. No pneumothorax. Otherwise stable exam from earlier today. XR CHEST PORTABLE    Result Date: 12/12/2021  Endotracheal tube tip is 3.2 cm above the saul. Overall significant worsening of multifocal airspace opacities keeping with history of COVID-19. CT CHEST PULMONARY EMBOLISM W CONTRAST    Result Date: 12/11/2021  1. No evidence of pulmonary embolism 2. Diffuse ground-glass opacities throughout the lungs, typical of COVID-19 pulmonary disease.        Physical Examination:        General appearance: Intubated, sedated, ill-appearing  Mental Status: Unable to assess, sedated  Lungs: Mechanical breath sounds, 70% FiO2 vent support  Heart:  regular rate and rhythm, no murmur  Abdomen: Obese, unable to fully assess abdomen secondary to prone position  : Hahn catheter  Extremities: Generalized anasarca without redness in the calves  Skin: Pale, taut, edematous    Assessment:        Hospital Problems           Last Modified POA    * (Principal) Pneumonia due to COVID-19 virus 12/12/2021 Yes    Acute respiratory failure with hypoxia ([address] 12/12/2021 Yes    Diabetes mellitus ([address] 12/12/2021 Yes    Asthma 12/12/2021 Yes    IRMA on CPAP 12/12/2021 Yes    Overview Signed 12/12/2021  2:39 PM by Segundo Villar, DO     seldom use machine         Hypertension 12/12/2021 Yes    Overview Signed 12/12/2021  2:39 PM by John Buckner, DO     on lasix         GERD (gastroesophageal reflux disease) 12/12/2021 Yes    ARDS (adult respiratory distress syndrome) ([facility] 12/12/2021 Yes          Plan:        COVID-19 viral pneumonia:   - Would not appear that patient has been vaccinated. - Infectious disease following.    - Received Actemra 12/12/2021.    - Started on high-dose Decadron 5/12/2021.    - Out of the window for remdesivir or monoclonal antibody.   - CRP downtrending    Acute respiratory failure with hypoxia and history of asthma:  - Secondary to #1.    - Complicated by patient's history of IRMA and asthma.    - Currently intubated on 70% FiO2.    - Managed by critical care. - Epoprostenol started on 12/14, dose managed and decreased by critical care       IRMA on CPAP:   - O2 management/vent support managed by critical care    Hypernatremia:   - hold lasix  - continue free water, while supine  -Trend every 6 hours, currently 149    Primary hypertension:   - Patient previously hypotensive since admission,   - Levophed has been weaned off since 12/14.    - Continue off antihypertensives for now    Diabetes mellitus type 2:   - A.m. blood sugar stable  -  On low-dose SSI every 4 hours, continue  - uptitrate as warranted  - started on lantus, continue 10 units at night    Hypertriglyceridemia: Secondary to propofol    GERD: Protonix    GI/DVT prophylaxis: Protonix, Lovenox twice daily    Prognosis continues to be guarded at this time, discussed POC with attending. Called patient's daughter Anna Holbrook [phone] and had a conversation with her regarding plan of care and prognosis.  All questions were answered    SHO Romero NP  12/17/2021  9:06 AM

## 2021-12-17 NOTE — PROGRESS NOTES
Frankie Andres, daughter, updated on patient hemodynamic stability. All questions/concerns answered at this time.

## 2021-12-17 NOTE — PLAN OF CARE
improved  Outcome: Ongoing  Goal: ET tube will be managed safely  Outcome: Ongoing  Goal: Ability to express needs and understand communication  Outcome: Ongoing  Goal: Mobility/activity is maintained at optimum level for patient  Outcome: Ongoing     Problem: SKIN INTEGRITY  Goal: Skin integrity is maintained or improved  Outcome: Ongoing     Problem: Skin Integrity:  Goal: Will show no infection signs and symptoms  Description: Will show no infection signs and symptoms  Outcome: Ongoing  Goal: Absence of new skin breakdown  Description: Absence of new skin breakdown  Outcome: Ongoing     Problem: Falls - Risk of:  Goal: Will remain free from falls  Description: Will remain free from falls  Outcome: Ongoing  Goal: Absence of physical injury  Description: Absence of physical injury  Outcome: Ongoing     Problem: Nutrition  Goal: Optimal nutrition therapy  Outcome: Ongoing

## 2021-12-17 NOTE — PROGRESS NOTES
[facility] - Progress Note   Note COVID 19 Patient  Today's Date and Time: 12/17/2021, 5:10 AM    Impression :     COVID 19 Confirmed Infection  Covid tests:  12/11/2021: Positive  Elevated inflammatory markers  Acute hypoxic respiratory failure  History of asthma  Diabetes mellitus  Essential hypertension  IRMA on CPAP  Patient has not received the Covid vaccination    Recommendations:   Antibiotic treatment:  Monitor off antibiotics  Covid Rx:    Remdesivir-out of window  Decadron-high-dose initiated  Actemra-ordered 12/12/2021  Monoclonal antibodies-out of window      Medical Decision Making/Summary/Discussion:12/17/2021     Patient admitted with COVID 19 infection  He may come out of isolation on 12/31/21    Infection Control Recommendations   [place] Precautions  Airborne isolation  Droplet Isolation    Antimicrobial Stewardship Recommendations       Discontinuation of therapy  Coordination of Outpatient Care:   Estimated Length of IV antimicrobials:TBD  Patient will need Midline Catheter Insertion: TBD  Patient will need PICC line Insertion: No  Patient will need: Home IV , [place],  SNF,  LTAC:TBD  Patient will need outpatient wound care:No    Chief complaint/reason for consultation:   Concern for COVID infection      History of Present Illness:   Kj Galvan is a 60y.o.-year-old male who was initially admitted on 12/12/2021. Patient seen at the request of Dr. Lashawn Rawls:    Patient presented through [facility] on 12/11/2021 with complaints of worsening shortness of breath with associated generalized weakness and nonproductive cough over the past several days. He was exposed to his son who was diagnosed with Covid last week and has not received the Covid vaccine. The patient was very hypoxic with an SPO2 in the high 50s on room air.     Imaging revealed bilateral pulmonary opacities typical of COVID-19    Abnormal labs include:    Ferritin 2800      Patient has been intubated and transferred to [facility]  He has been started on high-dose Decadron and Actemra has been ordered    CT CHEST PULMONARY EMBOLISM W CONTRAST 12/11/2021   Final Result   1. No evidence of pulmonary embolism   2. Diffuse ground-glass opacities throughout the lungs, typical of COVID-19   pulmonary disease.           XR CHEST (SINGLE VIEW FRONTAL) 12/11/2021   Final Result   Multifocal hazy opacities throughout both lungs consistent with COVID   pneumonia and or pulmonary edema       Patient admitted because of concerns with COVID 19.    CURRENT EVALUATION : 12/17/2021    Low-grade fevers continue  VS stable    The patient continues on mechanical ventilation with sedation and paralytic per ARDS protocol. CRP is decreasing   CXR is showing mild improvement in opacities    LFTs increasing  Hypernatremia improving    No other acute changes noted at this time. Patient exhibiting respiratory distress. yes  Respiratory secretions: no    Patient receiving supplemental oxygen.   Mechanical ventilation  RR:  20-->30  02 sat: 97    % FIO2: 90-->80-->85-->80  PEEP:   21-->16-->14    QTc:       NEWS Score: 0-4 Low risk group; 5-6: Medium risk group; 7 or above: High risk group  Parameters 3 2 1 0 1 2 3   Age    < 65   = 65   RR = 8  9-11 12-20  21-24 = 25   O2 Sats = 91 92-93 94-95 = 96      Suppl O2  Yes  No      SBP = 90  101-110 111-219   = 220   HR = 40  41-50 51-90  111-130 = 131   Consciousness    Alert   Drowsiness, lethargy, or confusion   Temperature = 35.0 C (95.0 F)  35.1-36.0 C 95.1-96.9 F 36.1-38.0 C 97.0-100.4 F 38.1-39.0 C 100.5-102.3 F = 39.1 C = 102.4 F      NEWS Score:  12/12/2021: 13 high risk    Overall Daily Picture:     Unchanged    Presence of secondary bacterial Infection:    No   Additional antibiotics: No    Labs, X rays reviewed: 12/17/2021    BUN:42-->47  Cr:0.77-->0.69    WBC:6.4-->9.1-->10.5  Hb:15.1-->15.4  Plat: 226-->229-->208    Absolute Neutrophils:3.73  Absolute Lymphocytes:0.29  Neutrophil/Lymphocyte Ratio: 12.8 high risk    CRP:293-->277-->137-->50.8-->23  Ferritin:2800  LDH: 838    Pro Calcitonin:      Cultures:  Urine:    Blood:    Sputum :    Wound:      CXR:   1221 diffuse bilateral infiltrates  CAT:      Discussed with patient, RN, CC, IM.      12-12-21:      I have personally reviewed the past medical history, past surgical history, medications, social history, and family history, and I have updated the database accordingly.   Past Medical History:     Past Medical History:   Diagnosis Date    Arthritis     Asthma     Diabetes mellitus ([address]     Edema     GERD (gastroesophageal reflux disease)     Hypertension     on lasix    Migraine     IRMA on CPAP     seldom use machine       Past Surgical  History:     Past Surgical History:   Procedure Laterality Date    APPENDECTOMY      ARTHROPLASTY Left 11/1/2019    LEFT FOOT 1ST MTPJ ARTHROPLASTY WITH PEREZ IMPLANT AND GROMMETS  [facility] performed by Ciera Doshi MD at [facility] Right 12/12/2019    RIGHT FOOT ARTHROPLASTY 1ST MTPJ (Right Foot)    ARTHROPLASTY Right 12/12/2019    RIGHT FOOT ARTHROPLASTY 1ST MTPJ performed by Ciera Doshi MD at [address] Right    [address]  2014    no blockage no stents    CHOLECYSTECTOMY      COLONOSCOPY      ENDOSCOPY, COLON, DIAGNOSTIC      TOE SURGERY Left 11/01/2019     LEFT FOOT 1ST MTPJ ARTHROPLASTY WITH PEREZ IMPLANT AND GROMMETS  [facility] (Left )    TONSILLECTOMY         Medications:      [Held by provider] furosemide  40 mg IntraVENous Daily    insulin glargine  10 Units SubCUTAneous Nightly    pantoprazole  40 mg IntraVENous Daily    And    sodium chloride (PF)  10 mL IntraVENous Daily    sodium chloride flush  5-40 mL IntraVENous 2 times per day    enoxaparin  30 mg SubCUTAneous BID    Vitamin D  6,000 Units Oral Daily    Followed by   Amada Mcghee ON 12/19/2021] Vitamin D  2,000 Units Oral Daily    dexamethasone  10 mg IntraVENous Q24H    insulin lispro  0-6 Units SubCUTAneous Q4H       Social History:     Social History     Socioeconomic History    Marital status:      Spouse name: Not on file    Number of children: Not on file    Years of education: Not on file    Highest education level: Not on file   Occupational History    Not on file   Tobacco Use    Smoking status: Former Smoker    Smokeless tobacco: Never Used   Vaping Use    Vaping Use: Never used   Substance and Sexual Activity    Alcohol use: Yes     Comment: every couple months    Drug use: Never    Sexual activity: Not on file   Other Topics Concern    Not on file   Social History Narrative    Not on file     Social Determinants of Health     Financial Resource Strain:     Difficulty of Paying Living Expenses: Not on file   Food Insecurity:     Worried About Running Out of Food in the Last Year: Not on file    Lucina of Food in the Last Year: Not on file   Transportation Needs:     Lack of Transportation (Medical): Not on file    Lack of Transportation (Non-Medical):  Not on file   Physical Activity:     Days of Exercise per Week: Not on file    Minutes of Exercise per Session: Not on file   Stress:     Feeling of Stress : Not on file   Social Connections:     Frequency of Communication with Friends and Family: Not on file    Frequency of Social Gatherings with Friends and Family: Not on file    Attends Scientologist Services: Not on file    Active Member of Clubs or Organizations: Not on file    Attends Club or Organization Meetings: Not on file    Marital Status: Not on file   Intimate Partner Violence:     Fear of Current or Ex-Partner: Not on file    Emotionally Abused: Not on file    Physically Abused: Not on file    Sexually Abused: Not on file   Housing Normocephalic, no trauma  Eyes: Pupils equal, round, reactive to light; sclera anicteric; conjunctivae pink. No embolic phenomena. ENT: Oropharynx clear, without erythema, exudate, or thrush. No tenderness of sinuses. Mouth/throat: mucosa pink and moist. No lesions. Dentition in good repair. Neck:Supple, without lymphadenopathy. Thyroid normal, No bruits. Pulmonary/Chest: Diminished to auscultation, without wheezes, rales, or rhonchi. No dullness to percussion. Cardiovascular: Regular rate and rhythm without murmurs, rubs, or gallops. Abdomen: Soft, non tender. Bowel sounds normal. No organomegaly  All four Extremities: No cyanosis, clubbing, edema, or effusions. Neurologic: Intubated and sedated  Skin: Warm and dry with good turgor. No signs of peripheral arterial or venous insufficiency. No ulcerations. No open wounds. Medical Decision Making -Laboratory:   I have independently reviewed/ordered the following labs:    CBC with Differential:   Recent Labs     12/16/21 0533 12/17/21  0415   WBC 9.1 10.5   HGB 15.4 15.4   HCT 48.5 48.4    208     BMP:   Recent Labs     12/15/21  0440 12/15/21  0440 12/16/21 0533 12/16/21  2132   *   < > 151* 147*   K 4.8  --  4.5  --    *  --  112*  --    CO2 23  --  27  --    BUN 42*  --  47*  --    CREATININE 0.77  --  0.69*  --    MG 2.4  --  2.3  --     < > = values in this interval not displayed. Hepatic Function Panel:   Recent Labs     12/15/21  0440 12/16/21  0533   PROT 5.7* 5.6*   LABALBU 3.0*  3.0* 3.1*  3.1*   BILIDIR 0.39* 0.22   IBILI 0.15 0.19   BILITOT 0.54 0.41   ALKPHOS 116 107   * 224*   * 109*     No results for input(s): RPR in the last 72 hours. No results for input(s): HIV in the last 72 hours. No results for input(s): BC in the last 72 hours.   Lab Results   Component Value Date    MUCUS NOT REPORTED 12/14/2021    RBC 4.98 12/17/2021    TRICHOMONAS NOT REPORTED 12/14/2021    WBC 10.5 12/17/2021    YEAST NOT REPORTED 12/14/2021    TURBIDITY Clear 12/14/2021     Lab Results   Component Value Date    CREATININE 0.69 12/16/2021    GLUCOSE 154 12/16/2021       Medical Decision Making-Imaging:     Narrative   EXAMINATION:   CTA OF THE CHEST 12/11/2021 11:31 am       TECHNIQUE:   CTA of the chest was performed after the administration of intravenous   contrast.  Multiplanar reformatted images are provided for review.  MIP   images are provided for review. Dose modulation, iterative reconstruction,   and/or weight based adjustment of the mA/kV was utilized to reduce the   radiation dose to as low as reasonably achievable.       COMPARISON:   Chest x-ray dated December 11, 2021       HISTORY:   ORDERING SYSTEM PROVIDED HISTORY: hypoxemia, covid positive, d-dimer-0.99   TECHNOLOGIST PROVIDED HISTORY:   hypoxemia, covid positive, d-dimer-0.99   Decision Support Exception - unselect if not a suspected or confirmed   emergency medical condition->Emergency Medical Condition (MA)   Reason for Exam: COVID positive, elevated D-Dimer       FINDINGS:   Pulmonary Arteries: Pulmonary arteries are adequately opacified for   evaluation.  No evidence of intraluminal filling defect to suggest pulmonary   embolism.  Main pulmonary artery is normal in caliber.       Mediastinum: Nonspecific mediastinal and hilar lymph nodes are present,   likely reactive. .  The heart and pericardium demonstrate no acute   abnormality.  There is no acute abnormality of the thoracic aorta.       Lungs/pleura: Diffuse ground-glass opacification is present throughout the   lungs, typical of COVID-19 pulmonary disease.  No pleural effusion or   pneumothorax is present.       Upper Abdomen: Images through the upper abdomen demonstrate a small hiatal   hernia.  Diffuse hepatic steatosis is present.  The gallbladder is surgically   absent.       Soft Tissues/Bones: No acute bone or soft tissue abnormality.           Impression   1. No evidence of pulmonary embolism   2. Diffuse ground-glass opacities throughout the lungs, typical of COVID-19   pulmonary disease.         Narrative   EXAMINATION:   ONE XRAY VIEW OF THE CHEST       12/11/2021 3:54 pm       COMPARISON:   November 13, 2012       HISTORY:   ORDERING SYSTEM PROVIDED HISTORY: hypoxemia, probable covid pneumonia   TECHNOLOGIST PROVIDED HISTORY:   hypoxemia, probable covid pneumonia       FINDINGS:   Multifocal hazy opacities throughout both lungs would be consistent with   history of COVID pneumonia.  Pulmonary edema could have a similar appearance. No pneumothorax or pleural effusion.  Cardiac size enlarged.  Mediastinum   unremarkable.  No acute osseous abnormality.           Impression   Multifocal hazy opacities throughout both lungs consistent with COVID   pneumonia and or pulmonary edema.                   Medical Decision Thvofa-Imtewfrk-Zqrkh:       Medical Decision Making-Other:     Note:  Labs, medications, radiologic studies were reviewed with personal review of films  Large amounts of data were reviewed  Discussed with nursing Staff, Discharge planner  Infection Control and Prevention measures reviewed  All prior entries were reviewed  Administer medications as ordered  Prognosis: Guarded  Discharge planning reviewed      Thank you for allowing us to participate in the care of this patient. Please call with questions. Kaleigh Cobb, APRN - CNP     ATTESTATION:    I have discussed the case, including pertinent history and exam findings with the APRN. I have evaluated the  History, physical findings and pictures of the patient and the key elements of the encounter have been performed by me. I have reviewed the laboratory data, other diagnostic studies and discussed them with the APRN. I have updated the medical record where necessary. I agree with the assessment, plan and orders as documented by the APRN.     Tarun Shrestha MD.        Pager: [phone] - Office: [phone]

## 2021-12-18 LAB
-: ABNORMAL
ABSOLUTE EOS #: 0 K/UL (ref 0–0.4)
ABSOLUTE IMMATURE GRANULOCYTE: 0.68 K/UL (ref 0–0.3)
ABSOLUTE LYMPH #: 0.54 K/UL (ref 1–4.8)
ABSOLUTE MONO #: 1.08 K/UL (ref 0.1–0.8)
ALLEN TEST: ABNORMAL
AMORPHOUS: ABNORMAL
ANION GAP SERPL CALCULATED.3IONS-SCNC: 11 MMOL/L (ref 9–17)
BACTERIA: ABNORMAL
BASOPHILS # BLD: 0 % (ref 0–2)
BASOPHILS ABSOLUTE: 0 K/UL (ref 0–0.2)
BILIRUBIN URINE: NEGATIVE
BUN BLDV-MCNC: 42 MG/DL (ref 6–20)
BUN/CREAT BLD: ABNORMAL (ref 9–20)
CALCIUM SERPL-MCNC: 8.6 MG/DL (ref 8.6–10.4)
CASTS UA: ABNORMAL /LPF (ref 0–8)
CHLORIDE BLD-SCNC: 108 MMOL/L (ref 98–107)
CO2: 27 MMOL/L (ref 20–31)
COLOR: ABNORMAL
CREAT SERPL-MCNC: 0.53 MG/DL (ref 0.7–1.2)
CRYSTALS, UA: ABNORMAL /HPF
DIFFERENTIAL TYPE: ABNORMAL
EOSINOPHILS RELATIVE PERCENT: 0 % (ref 1–4)
EPITHELIAL CELLS UA: ABNORMAL /HPF (ref 0–5)
FIO2: 65
GFR AFRICAN AMERICAN: >60 ML/MIN
GFR NON-AFRICAN AMERICAN: >60 ML/MIN
GFR SERPL CREATININE-BSD FRML MDRD: ABNORMAL ML/MIN/{1.73_M2}
GFR SERPL CREATININE-BSD FRML MDRD: ABNORMAL ML/MIN/{1.73_M2}
GLUCOSE BLD-MCNC: 112 MG/DL (ref 74–100)
GLUCOSE BLD-MCNC: 117 MG/DL (ref 70–99)
GLUCOSE BLD-MCNC: 117 MG/DL (ref 75–110)
GLUCOSE BLD-MCNC: 139 MG/DL (ref 75–110)
GLUCOSE BLD-MCNC: 149 MG/DL (ref 75–110)
GLUCOSE BLD-MCNC: 161 MG/DL (ref 75–110)
GLUCOSE BLD-MCNC: 193 MG/DL (ref 75–110)
GLUCOSE URINE: NEGATIVE
HCT VFR BLD CALC: 48.3 % (ref 40.7–50.3)
HEMOGLOBIN: 15.4 G/DL (ref 13–17)
IMMATURE GRANULOCYTES: 5 %
KETONES, URINE: NEGATIVE
LEUKOCYTE ESTERASE, URINE: NEGATIVE
LYMPHOCYTES # BLD: 4 % (ref 24–44)
MCH RBC QN AUTO: 31 PG (ref 25.2–33.5)
MCHC RBC AUTO-ENTMCNC: 31.9 G/DL (ref 28.4–34.8)
MCV RBC AUTO: 97.2 FL (ref 82.6–102.9)
MODE: ABNORMAL
MONOCYTES # BLD: 8 % (ref 1–7)
MORPHOLOGY: NORMAL
MUCUS: ABNORMAL
NEGATIVE BASE EXCESS, ART: ABNORMAL (ref 0–2)
NITRITE, URINE: NEGATIVE
NRBC AUTOMATED: 0 PER 100 WBC
NUCLEATED RED BLOOD CELLS: 2 PER 100 WBC
O2 DEVICE/FLOW/%: ABNORMAL
OTHER OBSERVATIONS UA: ABNORMAL
PATIENT TEMP: ABNORMAL
PDW BLD-RTO: 14.1 % (ref 11.8–14.4)
PH UA: 6 (ref 5–8)
PHOSPHORUS: 3.2 MG/DL (ref 2.5–4.5)
PLATELET # BLD: 189 K/UL (ref 138–453)
PLATELET ESTIMATE: ABNORMAL
PMV BLD AUTO: 10.7 FL (ref 8.1–13.5)
POC HCO3: 29.3 MMOL/L (ref 21–28)
POC O2 SATURATION: 82 % (ref 94–98)
POC PCO2 TEMP: ABNORMAL MM HG
POC PCO2: 42.7 MM HG (ref 35–48)
POC PH TEMP: ABNORMAL
POC PH: 7.45 (ref 7.35–7.45)
POC PO2 TEMP: ABNORMAL MM HG
POC PO2: 45.1 MM HG (ref 83–108)
POSITIVE BASE EXCESS, ART: 5 (ref 0–3)
POTASSIUM SERPL-SCNC: 4.6 MMOL/L (ref 3.7–5.3)
PROTEIN UA: ABNORMAL
RBC # BLD: 4.97 M/UL (ref 4.21–5.77)
RBC # BLD: ABNORMAL 10*6/UL
RBC UA: ABNORMAL /HPF (ref 0–4)
RENAL EPITHELIAL, UA: ABNORMAL /HPF
SAMPLE SITE: ABNORMAL
SEG NEUTROPHILS: 83 % (ref 36–66)
SEGMENTED NEUTROPHILS ABSOLUTE COUNT: 11.2 K/UL (ref 1.8–7.7)
SODIUM BLD-SCNC: 143 MMOL/L (ref 135–144)
SODIUM BLD-SCNC: 145 MMOL/L (ref 135–144)
SODIUM BLD-SCNC: 146 MMOL/L (ref 135–144)
SODIUM BLD-SCNC: 146 MMOL/L (ref 135–144)
SPECIFIC GRAVITY UA: 1.03 (ref 1–1.03)
TCO2 (CALC), ART: ABNORMAL MMOL/L (ref 22–29)
TRICHOMONAS: ABNORMAL
TURBIDITY: CLEAR
URINE HGB: ABNORMAL
UROBILINOGEN, URINE: NORMAL
WBC # BLD: 13.5 K/UL (ref 3.5–11.3)
WBC # BLD: ABNORMAL 10*3/UL
WBC UA: ABNORMAL /HPF (ref 0–5)
YEAST: ABNORMAL

## 2021-12-18 PROCEDURE — 2500000003 HC RX 250 WO HCPCS: Performed by: INTERNAL MEDICINE

## 2021-12-18 PROCEDURE — 2580000003 HC RX 258: Performed by: INTERNAL MEDICINE

## 2021-12-18 PROCEDURE — 87070 CULTURE OTHR SPECIMN AEROBIC: CPT

## 2021-12-18 PROCEDURE — 6360000002 HC RX W HCPCS: Performed by: INTERNAL MEDICINE

## 2021-12-18 PROCEDURE — 82803 BLOOD GASES ANY COMBINATION: CPT

## 2021-12-18 PROCEDURE — 80048 BASIC METABOLIC PNL TOTAL CA: CPT

## 2021-12-18 PROCEDURE — 99233 SBSQ HOSP IP/OBS HIGH 50: CPT | Performed by: STUDENT IN AN ORGANIZED HEALTH CARE EDUCATION/TRAINING PROGRAM

## 2021-12-18 PROCEDURE — 6360000002 HC RX W HCPCS: Performed by: NURSE PRACTITIONER

## 2021-12-18 PROCEDURE — 87040 BLOOD CULTURE FOR BACTERIA: CPT

## 2021-12-18 PROCEDURE — 94003 VENT MGMT INPAT SUBQ DAY: CPT

## 2021-12-18 PROCEDURE — 2000000000 HC ICU R&B

## 2021-12-18 PROCEDURE — 94645 CONT INHLJ TX EACH ADDL HOUR: CPT

## 2021-12-18 PROCEDURE — 89220 SPUTUM SPECIMEN COLLECTION: CPT

## 2021-12-18 PROCEDURE — 94761 N-INVAS EAR/PLS OXIMETRY MLT: CPT

## 2021-12-18 PROCEDURE — 82947 ASSAY GLUCOSE BLOOD QUANT: CPT

## 2021-12-18 PROCEDURE — 37799 UNLISTED PX VASCULAR SURGERY: CPT

## 2021-12-18 PROCEDURE — 36415 COLL VENOUS BLD VENIPUNCTURE: CPT

## 2021-12-18 PROCEDURE — 81001 URINALYSIS AUTO W/SCOPE: CPT

## 2021-12-18 PROCEDURE — 87205 SMEAR GRAM STAIN: CPT

## 2021-12-18 PROCEDURE — 84100 ASSAY OF PHOSPHORUS: CPT

## 2021-12-18 PROCEDURE — 2580000003 HC RX 258: Performed by: NURSE PRACTITIONER

## 2021-12-18 PROCEDURE — 99291 CRITICAL CARE FIRST HOUR: CPT | Performed by: INTERNAL MEDICINE

## 2021-12-18 PROCEDURE — 84295 ASSAY OF SERUM SODIUM: CPT

## 2021-12-18 PROCEDURE — C9113 INJ PANTOPRAZOLE SODIUM, VIA: HCPCS | Performed by: NURSE PRACTITIONER

## 2021-12-18 PROCEDURE — 2700000000 HC OXYGEN THERAPY PER DAY

## 2021-12-18 PROCEDURE — 6370000000 HC RX 637 (ALT 250 FOR IP): Performed by: NURSE PRACTITIONER

## 2021-12-18 PROCEDURE — 2500000003 HC RX 250 WO HCPCS: Performed by: EMERGENCY MEDICINE

## 2021-12-18 PROCEDURE — 85025 COMPLETE CBC W/AUTO DIFF WBC: CPT

## 2021-12-18 PROCEDURE — 99233 SBSQ HOSP IP/OBS HIGH 50: CPT | Performed by: INTERNAL MEDICINE

## 2021-12-18 PROCEDURE — 6370000000 HC RX 637 (ALT 250 FOR IP): Performed by: INTERNAL MEDICINE

## 2021-12-18 RX ORDER — POLYETHYLENE GLYCOL 3350 17 G/17G
17 POWDER, FOR SOLUTION ORAL 2 TIMES DAILY PRN
Status: DISCONTINUED | OUTPATIENT
Start: 2021-12-18 | End: 2022-01-07 | Stop reason: HOSPADM

## 2021-12-18 RX ORDER — SENNOSIDES 8.8 MG/5ML
5 LIQUID ORAL NIGHTLY PRN
Status: DISCONTINUED | OUTPATIENT
Start: 2021-12-18 | End: 2022-01-07 | Stop reason: HOSPADM

## 2021-12-18 RX ORDER — LANSOPRAZOLE
30 KIT
Status: DISCONTINUED | OUTPATIENT
Start: 2021-12-19 | End: 2022-01-07 | Stop reason: HOSPADM

## 2021-12-18 RX ORDER — BISACODYL 10 MG
10 SUPPOSITORY, RECTAL RECTAL DAILY PRN
Status: DISCONTINUED | OUTPATIENT
Start: 2021-12-18 | End: 2022-01-07 | Stop reason: HOSPADM

## 2021-12-18 RX ORDER — ACETAMINOPHEN 160 MG/5ML
650 SOLUTION ORAL EVERY 4 HOURS PRN
Status: DISCONTINUED | OUTPATIENT
Start: 2021-12-18 | End: 2022-01-07 | Stop reason: HOSPADM

## 2021-12-18 RX ADMIN — Medication 200 MCG/HR: at 21:55

## 2021-12-18 RX ADMIN — SODIUM CHLORIDE, PRESERVATIVE FREE 10 ML: 5 INJECTION INTRAVENOUS at 09:00

## 2021-12-18 RX ADMIN — INSULIN GLARGINE 10 UNITS: 100 INJECTION, SOLUTION SUBCUTANEOUS at 21:55

## 2021-12-18 RX ADMIN — ACETAMINOPHEN 650 MG: 325 TABLET ORAL at 00:06

## 2021-12-18 RX ADMIN — DEXMEDETOMIDINE HYDROCHLORIDE 1 MCG/KG/HR: 4 INJECTION, SOLUTION INTRAVENOUS at 18:27

## 2021-12-18 RX ADMIN — ACETAMINOPHEN 650 MG: 325 TABLET ORAL at 16:21

## 2021-12-18 RX ADMIN — EPOPROSTENOL 20 NG/KG/MIN: 1.5 INJECTION, POWDER, LYOPHILIZED, FOR SOLUTION INTRAVENOUS at 06:54

## 2021-12-18 RX ADMIN — Medication 10 MG/HR: at 19:32

## 2021-12-18 RX ADMIN — Medication 6000 UNITS: at 10:34

## 2021-12-18 RX ADMIN — DEXMEDETOMIDINE HYDROCHLORIDE 0.5 MCG/KG/HR: 4 INJECTION, SOLUTION INTRAVENOUS at 08:30

## 2021-12-18 RX ADMIN — Medication 10 MG/HR: at 06:17

## 2021-12-18 RX ADMIN — Medication 10 MG/HR: at 16:22

## 2021-12-18 RX ADMIN — ACETAMINOPHEN 650 MG: 160 SOLUTION ORAL at 20:34

## 2021-12-18 RX ADMIN — ENOXAPARIN SODIUM 30 MG: 100 INJECTION SUBCUTANEOUS at 20:34

## 2021-12-18 RX ADMIN — Medication 200 MCG/HR: at 06:17

## 2021-12-18 RX ADMIN — Medication 200 MCG/HR: at 18:27

## 2021-12-18 RX ADMIN — INSULIN LISPRO 1 UNITS: 100 INJECTION, SOLUTION INTRAVENOUS; SUBCUTANEOUS at 10:00

## 2021-12-18 RX ADMIN — ENOXAPARIN SODIUM 30 MG: 100 INJECTION SUBCUTANEOUS at 08:30

## 2021-12-18 RX ADMIN — CISATRACURIUM BESYLATE 3 MCG/KG/MIN: 10 INJECTION, SOLUTION INTRAVENOUS at 19:32

## 2021-12-18 RX ADMIN — CISATRACURIUM BESYLATE 3 MCG/KG/MIN: 10 INJECTION, SOLUTION INTRAVENOUS at 11:52

## 2021-12-18 RX ADMIN — DEXMEDETOMIDINE HYDROCHLORIDE 1 MCG/KG/HR: 4 INJECTION, SOLUTION INTRAVENOUS at 21:48

## 2021-12-18 RX ADMIN — SODIUM CHLORIDE, PRESERVATIVE FREE 10 ML: 5 INJECTION INTRAVENOUS at 20:34

## 2021-12-18 RX ADMIN — CEFEPIME 2000 MG: 2 INJECTION, POWDER, FOR SOLUTION INTRAVENOUS at 22:27

## 2021-12-18 RX ADMIN — INSULIN LISPRO 1 UNITS: 100 INJECTION, SOLUTION INTRAVENOUS; SUBCUTANEOUS at 18:26

## 2021-12-18 RX ADMIN — PANTOPRAZOLE SODIUM 40 MG: 40 INJECTION, POWDER, FOR SOLUTION INTRAVENOUS at 08:30

## 2021-12-18 RX ADMIN — DEXMEDETOMIDINE HYDROCHLORIDE 0.4 MCG/KG/HR: 4 INJECTION, SOLUTION INTRAVENOUS at 00:08

## 2021-12-18 RX ADMIN — Medication 200 MCG/HR: at 00:09

## 2021-12-18 RX ADMIN — SODIUM CHLORIDE, PRESERVATIVE FREE 10 ML: 5 INJECTION INTRAVENOUS at 09:54

## 2021-12-18 RX ADMIN — Medication 200 MCG/HR: at 13:03

## 2021-12-18 RX ADMIN — DEXMEDETOMIDINE HYDROCHLORIDE 0.8 MCG/KG/HR: 4 INJECTION, SOLUTION INTRAVENOUS at 15:10

## 2021-12-18 RX ADMIN — ACETAMINOPHEN 650 MG: 325 TABLET ORAL at 06:17

## 2021-12-18 RX ADMIN — DEXAMETHASONE SODIUM PHOSPHATE 10 MG: 10 INJECTION INTRAMUSCULAR; INTRAVENOUS at 13:28

## 2021-12-18 ASSESSMENT — PAIN SCALES - GENERAL
PAINLEVEL_OUTOF10: 0

## 2021-12-18 ASSESSMENT — PULMONARY FUNCTION TESTS
PIF_VALUE: 29
PIF_VALUE: 28
PIF_VALUE: 29
PIF_VALUE: 29
PIF_VALUE: 28
PIF_VALUE: 30

## 2021-12-18 NOTE — FLOWSHEET NOTE
SPIRITUAL CARE DEPARTMENT - [address]  PROGRESS NOTE    Shift date: 12/17/21  Shift day: Friday   Shift # 2    Room # 3026/3026-01   Name: Ana Dooley            Age: 60 y.o. Gender: male          Tenriism: Unknown   Place of Buddhist: Unknown    Referral: Routine Visit    Admit Date & Time: 12/12/2021 11:39 AM    PATIENT/EVENT DESCRIPTION:  Ana Dooley is a 60 y.o. male in room 3026. SPIRITUAL ASSESSMENT/INTERVENTION:   set up zoom call with family and with the Pt. Pt was intubated and in prone position. Family were grateful for the zoom call to touch base with family. Family were able to see Pt via zoom. SPIRITUAL CARE FOLLOW-UP PLAN:  Chaplains will remain available to offer spiritual and emotional support as needed. Electronically signed by Joaquim Lezama, on 12/17/2021 at 8:48 PM.  [facility]  [phone]       12/17/21 2046   Encounter Summary   Services provided to: Patient and family together   Referral/Consult From: Multi-disciplinary team   Support System Parent; Children   Continue Visiting   (12/17/21)   Complexity of Encounter Moderate   Length of Encounter 30 minutes   Routine   Type Follow up   Assessment Unable to respond   Intervention Sustaining presence/ Ministry of presence; Facilitated family conference   Outcome Acceptance; Comfort;  Hopeful; Expressed gratitude

## 2021-12-18 NOTE — PROGRESS NOTES
Pulmonary Critical Care   Consult Note    Patient - Janet Gastelum  Date of Admission -  2021 11:39 AM  Date of Evaluation -  2021  Room and Bed Number -  3026/3026-01   Hospital Day - 5    CHIEF COMPLAINT : ACUTE HYPOXIC RESPIRATORY FAILURE DUE TO COVID -19 PNEUMONIA   HPI:   Janet Gastelum  60 y.o. male  admitted for COVID-19 at [facility] ER due to worsening oxygenation he was initially started on BiPAP and subsequently intubated. On room air his saturations had been 50%. CTA no PE but bilateral pulmonary infiltrates. He was accepted at [address] [place] [address] ICU.   On arrival he is on PEEP of 22, 100% FiO2.    2021   Subjective      Prone    No acute events   Wean peep and fio2   Wean veletri     SECRETIONS  -Amount:  [x] Small [] Moderate  [] Large    [] None  Color:     [x] White [] Colored  [] Bloody    SEDATION:    [x] Propofol gtt  [x] Versed gtt  [x] FENTANYL  gtt  gtt   [] No Sedation    PARALYZED:  [] No    [x] Yes    VASOPRESSORS:  [x] No    [] Yes  [] Levophed [] Dopamine [] Vasopressin  [] Dobutamine [] Phenylephrine [] Epinephrine    INHALED veletri  : [] No    [x] Yes started 21    PRONE :       [] No    [x] Yes    Actemra:             [] No    [x] Yes  21  DEXAMETHASONE : [] No    [x] Yes      Ros unable to perform due to intubation and sedation    OBJECTIVE:     VITAL SIGNS:  BP (!) 142/78   Pulse 69   Temp 101.7 °F (38.7 °C) (Bladder)   Resp 30   Ht 6' 2\" (1.88 m)   Wt 278 lb 14.1 oz (126.5 kg)   SpO2 91%   BMI 35.81 kg/m²   Tmax over 24 hours:  Temp (24hrs), Av.2 °F (37.9 °C), Min:99.1 °F (37.3 °C), Max:101.7 °F (38.7 °C)      Patient Vitals for the past 8 hrs:   BP Temp Temp src Pulse Resp SpO2   21 -- -- -- 69 30 --   21 -- -- -- 70 -- 91 %   21 -- -- -- 70 30 --   21 -- -- -- 70 30 --   21 -- -- -- 72 30 --   21 (!) 142/78 101.7 °F (38.7 °C) Bladder 80 27 92 %   21 -- -- -- 77 30 97 %   12/17/21 1930 -- -- -- 82 30 97 %   12/17/21 1915 -- -- -- 87 30 97 %   12/17/21 1900 -- -- -- 97 30 97 %   12/17/21 1800 -- -- -- 102 30 97 %   12/17/21 1700 -- -- -- 93 30 96 %   12/17/21 1600 -- -- -- 81 30 99 %   12/17/21 1500 -- -- -- 77 30 100 %         Intake/Output Summary (Last 24 hours) at 12/17/2021 2202  Last data filed at 12/17/2021 2100  Gross per 24 hour   Intake 1543.71 ml   Output 2705 ml   Net -1161.29 ml     Date 12/17/21 0000 - 12/17/21 2359   Shift 2085-8725 0037-4292 1673-5292 24 Hour Total   INTAKE   I.V.(mL/kg) 452. 9(3.6)  674. 8(5.3) 1127.7(8.9)   NG/GT(mL/kg) 80(0.6) 240(1.9) 96(0.8) 416(3.3)   Shift Total(mL/kg) 532. 9(4.2) 240(1.9) 770.8(6.1) 1543. 7(12.2)   OUTPUT   Urine(mL/kg/hr) 725(0.7) 975(1) 1005 2705   Shift Total(mL/kg) 725(5.7) 975(7.7) 1005(7.9) 2216(10.8)   Weight (kg) 126.5 126.5 126.5 126.5     Wt Readings from Last 3 Encounters:   12/15/21 278 lb 14.1 oz (126.5 kg)   12/11/21 300 lb (136.1 kg)   12/12/19 (!) 355 lb 9.6 oz (161.3 kg)     Body mass index is 35.81 kg/m².         PHYSICAL EXAM:  Intubated , prone  Sedated   Lungs rales   cvs r s1 s2 r   Abdomen nt nd bs +  Le no edema       MEDICATIONS:  Scheduled Meds:   [Held by provider] furosemide  40 mg IntraVENous Daily    insulin glargine  10 Units SubCUTAneous Nightly    pantoprazole  40 mg IntraVENous Daily    And    sodium chloride (PF)  10 mL IntraVENous Daily    sodium chloride flush  5-40 mL IntraVENous 2 times per day    enoxaparin  30 mg SubCUTAneous BID    Vitamin D  6,000 Units Oral Daily    Followed by   Milly Romo ON 12/19/2021] Vitamin D  2,000 Units Oral Daily    dexamethasone  10 mg IntraVENous Q24H    insulin lispro  0-6 Units SubCUTAneous Q4H     Continuous Infusions:   dexmedetomidine 0.4 mcg/kg/hr (12/17/21 1851)    ketamine (KETALAR) infusion for analgosedation 0.2 mg/kg/hr (12/17/21 2056)    epoprostenol (VELETRI) nebulization solution 20 ng/kg/min (12/17/21 1641)    sodium chloride      dextrose      norepinephrine Stopped (12/12/21 1149)    cisatracurium (NIMBEX) infusion 3 mcg/kg/min (12/17/21 1718)    midazolam 10 mg/hr (12/17/21 2057)    fentaNYL 200 mcg/hr (12/17/21 2057)    dextrose      sodium chloride 10 mL/hr at 12/13/21 1548     PRN Meds:   sodium chloride flush, 5-40 mL, PRN  sodium chloride, 25 mL, PRN  ondansetron, 4 mg, Q8H PRN   Or  ondansetron, 4 mg, Q6H PRN  polyethylene glycol, 17 g, Daily PRN  acetaminophen, 650 mg, Q6H PRN   Or  acetaminophen, 650 mg, Q6H PRN  glucose, 15 g, PRN  dextrose, 12.5 g, PRN  glucagon (rDNA), 1 mg, PRN  dextrose, 100 mL/hr, PRN  glucose, 15 g, PRN  dextrose, 12.5 g, PRN  glucagon (rDNA), 1 mg, PRN  dextrose, 100 mL/hr, PRN        SUPPORT DEVICES: [x] Ventilator [] BIPAP  [] Nasal Cannula [] Room Air      ABGs:     Lab Results   Component Value Date    CJI5YNH NOT REPORTED 12/17/2021    FIO2 NOT REPORTED 12/17/2021       DATA:  Complete Blood Count:   Recent Labs     12/15/21  0440 12/16/21  0533 12/17/21  0415   WBC 6.4 9.1 10.5   RBC 4.89 5.03 4.98   HGB 15.1 15.4 15.4   HCT 47.2 48.5 48.4   MCV 96.5 96.4 97.2   MCH 30.9 30.6 30.9   MCHC 32.0 31.8 31.8   RDW 14.1 14.2 14.2    229 208   MPV 10.5 10.7 11.2        Last 3 Blood Glucose:   Recent Labs     12/15/21  0440 12/16/21  0533 12/17/21  0415   GLUCOSE 177* 154* 152*        PT/INR:    Lab Results   Component Value Date    PROTIME 13.0 12/12/2021    INR 1.0 12/12/2021     PTT:    Lab Results   Component Value Date    APTT 39.1 12/12/2021       Comprehensive Metabolic Profile:   Recent Labs     12/15/21  0440 12/15/21  0440 12/16/21  0533 12/16/21  2132 12/17/21  0415 12/17/21  0415 12/17/21  0820 12/17/21  1440 12/17/21 2008   *   < > 151*   < > 151*   < > 149* 148* 147*   K 4.8  --  4.5  --  4.9  --   --   --   --    *  --  112*  --  111*  --   --   --   --    CO2 23  --  27  --  26  --   --   --   --    BUN 42*  --  47*  --  50*  --   --   --   -- CREATININE 0.77  --  0.69*  --  0.69*  --   --   --   --    GLUCOSE 177*  --  154*  --  152*  --   --   --   --    CALCIUM 8.4*  --  8.1*  --  8.4*  --   --   --   --    PROT 5.7*  --  5.6*  --  5.7*  --   --   --   --    LABALBU 3.0*  3.0*  --  3.1*  3.1*  --  3.2*  3.2*  --   --   --   --    BILITOT 0.54  --  0.41  --  0.36  --   --   --   --    ALKPHOS 116  --  107  --  97  --   --   --   --    *  --  109*  --  57*  --   --   --   --    *  --  224*  --  183*  --   --   --   --     < > = values in this interval not displayed. Magnesium:   Lab Results   Component Value Date    MG 2.5 12/17/2021    MG 2.3 12/16/2021    MG 2.4 12/15/2021     Phosphorus:   Lab Results   Component Value Date    PHOS 4.0 12/17/2021    PHOS 2.8 12/16/2021    PHOS 2.7 12/15/2021     Ionized Calcium: No results found for: CAION     Urinalysis:   Lab Results   Component Value Date    NITRU NEGATIVE 12/14/2021    COLORU Yellow 12/14/2021    PHUR 6.5 12/14/2021    WBCUA 0 TO 2 12/14/2021    RBCUA TOO NUMEROUS TO COUNT 12/14/2021    MUCUS NOT REPORTED 12/14/2021    TRICHOMONAS NOT REPORTED 12/14/2021    YEAST NOT REPORTED 12/14/2021    BACTERIA NOT REPORTED 12/14/2021    SPECGRAV 1.026 12/14/2021    LEUKOCYTESUR NEGATIVE 12/14/2021    UROBILINOGEN Normal 12/14/2021    BILIRUBINUR NEGATIVE 12/14/2021    GLUCOSEU NEGATIVE 12/14/2021    KETUA NEGATIVE 12/14/2021    AMORPHOUS NOT REPORTED 12/14/2021           XR CHEST (SINGLE VIEW FRONTAL)    Result Date: 12/14/2021  Mild interval improvement in multifocal airspace disease particularly at the right base. Support tubes and lines as above. XR CHEST (SINGLE VIEW FRONTAL)    Result Date: 12/12/2021  Stable appearing     XR CHEST (SINGLE VIEW FRONTAL)    Result Date: 12/11/2021  Multifocal hazy opacities throughout both lungs consistent with COVID pneumonia and or pulmonary edema.      XR CHEST PORTABLE    Result Date: 12/12/2021  Stable appearing chest with unchanged support tubes/lines and persistent extensive bilateral airspace disease consistent with known COVID pneumonia. XR CHEST PORTABLE    Result Date: 12/12/2021  Right internal jugular central venous catheter tip projecting over the proximal SVC versus brachiocephalic vein. No pneumothorax. Otherwise stable exam from earlier today. XR CHEST PORTABLE    Result Date: 12/12/2021  Endotracheal tube tip is 3.2 cm above the saul. Overall significant worsening of multifocal airspace opacities keeping with history of COVID-19. CT CHEST PULMONARY EMBOLISM W CONTRAST    Result Date: 12/11/2021  1. No evidence of pulmonary embolism 2. Diffuse ground-glass opacities throughout the lungs, typical of COVID-19 pulmonary disease. Past Medical History:   Diagnosis Date    Arthritis     Asthma     Diabetes mellitus ([address]     Edema     GERD (gastroesophageal reflux disease)     Hypertension     on lasix    Migraine     IRMA on CPAP     seldom use machine        Social History     Socioeconomic History    Marital status:      Spouse name: None    Number of children: None    Years of education: None    Highest education level: None   Occupational History    None   Tobacco Use    Smoking status: Former Smoker    Smokeless tobacco: Never Used   Vaping Use    Vaping Use: Never used   Substance and Sexual Activity    Alcohol use: Yes     Comment: every couple months    Drug use: Never    Sexual activity: None   Other Topics Concern    None   Social History Narrative    None     Social Determinants of Health     Financial Resource Strain:     Difficulty of Paying Living Expenses: Not on file   Food Insecurity:     Worried About Running Out of Food in the Last Year: Not on file    Lucina of Food in the Last Year: Not on file   Transportation Needs:     Lack of Transportation (Medical): Not on file    Lack of Transportation (Non-Medical):  Not on file   Physical Activity:     Days of Exercise per Week: Not on file    Minutes of Exercise per Session: Not on file   Stress:     Feeling of Stress : Not on file   Social Connections:     Frequency of Communication with Friends and Family: Not on file    Frequency of Social Gatherings with Friends and Family: Not on file    Attends Judaism Services: Not on file    Active Member of [address] [place] [address] [employer] or Organizations: Not on file    Attends Club or Organization Meetings: Not on file    Marital Status: Not on file   Intimate Partner Violence:     Fear of Current or Ex-Partner: Not on file    Emotionally Abused: Not on file    Physically Abused: Not on file    Sexually Abused: Not on file   Housing Stability:     Unable to Pay for Housing in the Last Year: Not on file    Number of [place] in the Last Year: Not on file    Unstable Housing in the Last Year: Not on file         There is no immunization history on file for this patient.       Family History   Problem Relation Age of Onset    Heart Attack Sister 32    Diabetes Paternal Grandmother     Heart Disease Paternal Uncle     Heart Disease Paternal Uncle     Heart Disease Paternal Uncle     Cancer Maternal Aunt     Cancer Maternal Aunt     Cancer Maternal Uncle     Cancer Maternal Uncle          Past Surgical History:   Procedure Laterality Date    APPENDECTOMY      ARTHROPLASTY Left 11/1/2019    LEFT FOOT 1ST MTPJ ARTHROPLASTY WITH PEREZ IMPLANT AND GROMMETS  [facility] performed by Frances Webb MD at [address] [facility] Right 12/12/2019    RIGHT FOOT ARTHROPLASTY 1ST MTPJ (Right Foot)    ARTHROPLASTY Right 12/12/2019    RIGHT FOOT ARTHROPLASTY 1ST MTPJ performed by Frances Webb MD at [address] Right    [address]  2014    no blockage no stents    CHOLECYSTECTOMY      COLONOSCOPY      ENDOSCOPY, COLON, DIAGNOSTIC      TOE SURGERY Left 11/01/2019     LEFT FOOT 1ST MTPJ ARTHROPLASTY WITH PEREZ IMPLANT AND GROMMETS  [facility] (Left )    TONSILLECTOMY            VENTILATOR SETTINGS:  Vent Information  $Ventilation: $Subsequent Day  Skin Assessment: Clean, dry, & intact  Suction Catheter Diameter: 14  Equipment ID: [id number]  Equipment Changed: Expiratory Filter (humidification, inspitaory filter)  Vent Type: Servo i  Vent Mode: PRVC  Vt Ordered: 500 mL  Rate Set: 30 bmp  FiO2 : 60 %  SpO2: 91 %  SpO2/FiO2 ratio: 151.67  Sensitivity: 3  PEEP/CPAP: 10  I Time/ I Time %: 0.6 s  Humidification Source: Heated wire  Humidification Temp: 37  Humidification Temp Measured: 37  Circuit Condensation: Drained  Nitric Oxide/Epoprostenol In Use?: Yes     PaO2/FiO2 RATIO:  Recent Labs     12/17/21  0615   POCPO2 117.0*      FiO2 : 60 %       LABS:  ABGs:   Recent Labs     12/15/21  0223 12/16/21  0537 12/17/21  0615   POCPH 7.371 7.380 7. 359   POCPCO2 55.0* 49.7* 54.1*   POCPO2 119.6* 59.5* 117.0*   POCHCO3 31.9* 29.4* 30.5*   [id number] 98 89* 98            ASSESSMENT:     Principal Problem:    Pneumonia due to COVID-19 virus  Active Problems:    Acute respiratory failure with hypoxia (HCC)    Diabetes mellitus (HCC)    Asthma    IRMA on CPAP    Hypertension    GERD (gastroesophageal reflux disease)    ARDS (adult respiratory distress syndrome) (HCC)  Resolved Problems:    * No resolved hospital problems.  *              Acute hypoxic respiratory failure secondary to COVID 19   Acute respiratory distress syndrome   Bilateral multifocal pneumonia due to COVID 19 infection   Covid -19 pandemic emergency    Hypernatremia     LOS: 5  PLAN:    D/w RT  D/w RN   Sedation reviewed   Continue veletri at 20 ng /kg/min , wean   Cont ARDS ventilation    prone positioning - continue   Airborne isolation and droplet precautions to be continued  Continue supportive care   Cont treatment with medications for COVID  Cont tube feeding  Monitor endotracheal secretions   Will obtain xray chest   ABG             Treatment plan Discussed with nursing staff in detail , all questions answered . Total critical care time caring for this patient with life threatening, unstable organ failure, including direct patient contact, management of life support systems, review of data including imaging and labs, discussions with other team members and physicians at least 28  Min so far today, excluding procedures. Electronically signed by Kelsea Churchill MD on 12/17/2021 at 10:02 PM       This patient was evaluated in the context of the global SARS-CoV-2 (COVID-19) pandemic, which necessitated considerations that the patient either has COVID-19 infection or is at risk of infection with COVID-19. Institutional protocols and algorithms that pertain to the evaluation & management of patients with COVID-19 or those at risk for COVID-19 are in a state of rapid changes based on information released by regulatory bodies including the CDC and federal and state organizations. These policies and algorithms were followed during the patient's care. Please note that this chart was generated using voice recognition Dragon dictation software. Although every effort was made to ensure the accuracy of this automated transcription, some errors in transcription may have occurred.

## 2021-12-18 NOTE — PLAN OF CARE
Problem: Airway Clearance - Ineffective  Goal: Achieve or maintain patent airway  Outcome: Ongoing     Problem: Gas Exchange - Impaired  Goal: Absence of hypoxia  Outcome: Ongoing  Goal: Promote optimal lung function  Outcome: Ongoing     Problem: Breathing Pattern - Ineffective  Goal: Ability to achieve and maintain a regular respiratory rate  Outcome: Ongoing     Problem:  Body Temperature -  Risk of, Imbalanced  Goal: Ability to maintain a body temperature within defined limits  Outcome: Ongoing  Goal: Will regain or maintain usual level of consciousness  Outcome: Ongoing  Goal: Complications related to the disease process, condition or treatment will be avoided or minimized  Outcome: Ongoing     Problem: Isolation Precautions - Risk of Spread of Infection  Goal: Prevent transmission of infection  Outcome: Ongoing     Problem: Nutrition Deficits  Goal: Optimize nutritional status  Outcome: Ongoing     Problem: Risk for Fluid Volume Deficit  Goal: Maintain normal heart rhythm  Outcome: Ongoing  Goal: Maintain absence of muscle cramping  Outcome: Ongoing  Goal: Maintain normal serum potassium, sodium, calcium, phosphorus, and pH  Outcome: Ongoing

## 2021-12-18 NOTE — PROGRESS NOTES
[facility]  Office: [address], DO, Leslye Hairstonree, DO, Kent Mohs, DO, Yoel Alcaraz Alexandra, DO, Pollo Dumont MD, Hanh Rea MD, Radha Varela MD, Chelsea Newman MD, Ritu Vernon MD, Analy Faria MD, Yue Mir MD, Amparo Howell, DO, Prema Suarez, DO, Sabino Carney MD,  Bert Lopez DO, Manjinder Galvez MD, Yamila Luu MD, Zaida Jordan MD, Roma Pak MD, Mary Bowen MD, Damon Perry MD, Larry Arredondo MD, Tracie Lai, [facility], [facility], CNP, Portillo Márquez, CNP, Ryne Rodriguez, CNS, Marium Sheffield, [facility], Marcia Garber, CNP, Rachna Wilson, CNP, Terri Jade, CNP, Riaz Jeffries, CNP, Jamar Rendon PA-C, Kim Sagastume, DNP, Eladio Goldberg, [facility], Zoey Jensen, CNP, Kd Jackson, CNP, Demian Fletcher, CNP, Dorita Casanova, CNP, Saul Heath, [facility], Marcia Owens, [address] [place] [address]    Progress Note    12/18/2021    2:55 PM    Name:   Karen Morales  MRN:     [id number]     [place]:      [de-identified]   Room:   [address] [place] [address] Day:  6  Admit Date:  12/12/2021 11:39 AM    PCP:   Mraia Elena Garcia MD  Code Status:  Full Code    Subjective:     C/C: Shortness of breath  Interval History Status: not changed. Patient seen and examined. Remains intubated, sedated, paralyzed. Patient still on 80% FiO2. Ptint still proning, spiking fevers. On versed, precedex, ketamine, and nimbex. Brief History:     75-year-old male past medical history of obesity, asthma, IRMA on CPAP, hypertension, GERD presents with shortness of breath and cough at outlying facility. Patient transferred to [facility] for further care. Patient was intubated 12/11/2021 currently paralyzed    Review of Systems:     Unable to obtain due to intubation, sedation and paralytics    Medications: Allergies:     Allergies   Allergen Reactions    Nuts [Peanut-Containing Drug Products] Anaphylaxis    Sunflower Oil Anaphylaxis     Sunflower seeds       Current Meds:   Scheduled Meds:    furosemide  40 mg IntraVENous Daily    insulin glargine  10 Units SubCUTAneous Nightly    pantoprazole  40 mg IntraVENous Daily    And    sodium chloride (PF)  10 mL IntraVENous Daily    sodium chloride flush  5-40 mL IntraVENous 2 times per day    enoxaparin  30 mg SubCUTAneous BID    [START ON 12/19/2021] Vitamin D  2,000 Units Oral Daily    dexamethasone  10 mg IntraVENous Q24H    insulin lispro  0-6 Units SubCUTAneous Q4H     Continuous Infusions:    dexmedetomidine 0.8 mcg/kg/hr (12/18/21 1350)    ketamine (KETALAR) infusion for analgosedation 0.2 mg/kg/hr (12/17/21 2056)    sodium chloride      dextrose      norepinephrine Stopped (12/12/21 1149)    cisatracurium (NIMBEX) infusion 3 mcg/kg/min (12/18/21 1152)    midazolam 10 mg/hr (12/18/21 0617)    fentaNYL 200 mcg/hr (12/18/21 1303)    dextrose      sodium chloride 10 mL/hr at 12/13/21 1548     PRN Meds: sodium chloride flush, sodium chloride, ondansetron **OR** ondansetron, polyethylene glycol, acetaminophen **OR** acetaminophen, glucose, dextrose, glucagon (rDNA), dextrose, glucose, dextrose, glucagon (rDNA), dextrose    Data:     Past Medical History:   has a past medical history of Arthritis, Asthma, Diabetes mellitus ([address] Edema, GERD (gastroesophageal reflux disease), Hypertension, Migraine, and IRMA on CPAP. Social History:   reports that he has quit smoking. He has never used smokeless tobacco. He reports current alcohol use. He reports that he does not use drugs.      Family History:   Family History   Problem Relation Age of Onset    Heart Attack Sister 32    Diabetes Paternal Grandmother     Heart Disease Paternal Uncle     Heart Disease Paternal Uncle     Heart Disease Paternal Uncle     Cancer Maternal Aunt     Cancer Maternal Aunt     Cancer Maternal Uncle     Cancer Maternal Uncle        Vitals:  /65   Pulse 77   Temp 100.4 °F (38 °C) (Bladder)   Resp 30 Ht 6' 2\" (1.88 m)   Wt 278 lb 14.1 oz (126.5 kg)   SpO2 92%   BMI 35.81 kg/m²   Temp (24hrs), Av.9 °F (38.3 °C), Min:100.4 °F (38 °C), Max:101.7 °F (38.7 °C)    Recent Labs     21  2212 21  0242 21  0430 21  1138   POCGLU 129* 117* 112* 161*       I/O (24Hr): Intake/Output Summary (Last 24 hours) at 2021 1455  Last data filed at 2021 1000  Gross per 24 hour   Intake 2234.12 ml   Output 2345 ml   Net -110.88 ml       Labs:  Hematology:  Recent Labs     21  0533 21  0415 21  0510   WBC 9.1 10.5 13.5*   RBC 5.03 4.98 4.97   HGB 15.4 15.4 15.4   HCT 48.5 48.4 48.3   MCV 96.4 97.2 97.2   MCH 30.6 30.9 31.0   MCHC 31.8 31.8 31.9   RDW 14.2 14.2 14.1    208 189   MPV 10.7 11.2 10.7     Chemistry:  Recent Labs     21  0533 21  2132 21  0415 21  0820 21  0245 21  0510 21  0946   *   < > 151*   < > 146* 146* 145*   K 4.5  --  4.9  --   --  4.6  --    *  --  111*  --   --  108*  --    CO2 27  --  26  --   --  27  --    GLUCOSE 154*  --  152*  --   --  117*  --    BUN 47*  --  50*  --   --  42*  --    CREATININE 0.69*  --  0.69*  --   --  0.53*  --    MG 2.3  --  2.5  --   --   --   --    ANIONGAP 12  --  14  --   --  11  --    LABGLOM >60  --  >60  --   --  >60  --    GFRAA >60  --  >60  --   --  >60  --    CALCIUM 8.1*  --  8.4*  --   --  8.6  --    PHOS 2.8  --  4.0  --   --  3.2  --     < > = values in this interval not displayed.      Recent Labs     21  0533 21  1027 21  1751 21  2130 21  0415 21  0417 21  1559 21  1935 21  2212 21  0242 21  0430 21  1138   PROT 5.6*  --   --   --  5.7*  --   --   --   --   --   --   --    LABALBU 3.1*  3.1*  --   --   --  3.2*  3.2*  --   --   --   --   --   --   --    *  --   --   --  57*  --   --   --   --   --   --   --    *  --   --   --  183*  --   --   --   --   --   -- --    ALKPHOS 107  --   --   --  97  --   --   --   --   --   --   --    BILITOT 0.41  --   --   --  0.36  --   --   --   --   --   --   --    BILIDIR 0.22  --   --   --  0.17  --   --   --   --   --   --   --    TRIG  --   --  693*  --   --   --   --   --   --   --   --   --    POCGLU  --    < >  --    < >  --    < > 155* 155* 129* 117* 112* 161*    < > = values in this interval not displayed. ABG:  Lab Results   Component Value Date    POCPH 7.445 12/18/2021    POCPCO2 42.7 12/18/2021    POCPO2 45.1 12/18/2021    POCHCO3 29.3 12/18/2021    NBEA NOT REPORTED 12/18/2021    PBEA 5 12/18/2021    WAW0VAH NOT REPORTED 12/18/2021    SOKS5SKD 82 12/18/2021    FIO2 65.0 12/18/2021     Lab Results   Component Value Date/Time    SPECIAL NOT REPORTED 12/18/2021 12:34 PM     Lab Results   Component Value Date/Time    CULTURE NO GROWTH <24 HRS 12/18/2021 12:34 PM       Radiology:  XR CHEST (SINGLE VIEW FRONTAL)    Result Date: 12/16/2021  No significant change in multifocal airspace opacities. Continued follow-up is recommended document complete resolution following medical treatment course. Stable position lines and catheters     XR CHEST (SINGLE VIEW FRONTAL)    Result Date: 12/14/2021  Mild interval improvement in multifocal airspace disease particularly at the right base. Support tubes and lines as above. XR CHEST (SINGLE VIEW FRONTAL)    Result Date: 12/12/2021  Stable appearing     XR CHEST (SINGLE VIEW FRONTAL)    Result Date: 12/11/2021  Multifocal hazy opacities throughout both lungs consistent with COVID pneumonia and or pulmonary edema. XR CHEST PORTABLE    Result Date: 12/12/2021  Stable appearing chest with unchanged support tubes/lines and persistent extensive bilateral airspace disease consistent with known COVID pneumonia. XR CHEST PORTABLE    Result Date: 12/12/2021  Right internal jugular central venous catheter tip projecting over the proximal SVC versus brachiocephalic vein.   No pneumothorax. Otherwise stable exam from earlier today. CT CHEST PULMONARY EMBOLISM W CONTRAST    Result Date: 12/11/2021  1. No evidence of pulmonary embolism 2. Diffuse ground-glass opacities throughout the lungs, typical of COVID-19 pulmonary disease. Physical Examination:        Physical Exam  Vitals reviewed. Constitutional:       Appearance: He is ill-appearing and toxic-appearing. Interventions: He is intubated. HENT:      Head: Normocephalic and atraumatic. Right Ear: External ear normal.      Left Ear: External ear normal.      Nose: Nose normal.      Mouth/Throat:      Comments: ET tube grossly in place  Eyes:      Pupils: Pupils are equal, round, and reactive to light. Cardiovascular:      Rate and Rhythm: Regular rhythm. Tachycardia present. Pulses: Normal pulses. Pulmonary:      Effort: Tachypnea present. He is intubated. Breath sounds: Decreased air movement present. Examination of the right-upper field reveals decreased breath sounds. Examination of the left-upper field reveals decreased breath sounds. Examination of the right-middle field reveals decreased breath sounds. Examination of the right-lower field reveals decreased breath sounds. Examination of the left-lower field reveals decreased breath sounds. Decreased breath sounds present. No wheezing or rhonchi. Abdominal:      General: There is no distension. Palpations: Abdomen is soft. Tenderness: There is no abdominal tenderness. Musculoskeletal:      Cervical back: No rigidity. Right lower leg: Edema present. Left lower leg: Edema present. Skin:     General: Skin is warm. Capillary Refill: Capillary refill takes less than 2 seconds. Coloration: Skin is not jaundiced. Neurological:      General: No focal deficit present.            Assessment:        Hospital Problems           Last Modified POA    * (Principal) Pneumonia due to COVID-19 virus 12/12/2021 Yes    [facility] 12/12/2021 Yes    Diabetes mellitus ([facility] 12/12/2021 Yes    Asthma 12/12/2021 Yes    IRMA on CPAP 12/12/2021 Yes    Overview Signed 12/12/2021  2:39 PM by Yenni Logan, DO     seldom use machine         Hypertension 12/12/2021 Yes    Overview Signed 12/12/2021  2:39 PM by Yenni Detroit, DO     on lasix         GERD (gastroesophageal reflux disease) 12/12/2021 Yes    ARDS (adult respiratory distress syndrome) ([facility] 12/12/2021 Yes          Plan:        Acute respiratory failure due to covid 19. Intubated. Sedated, paralyzed. Paralyzed, fentanyl, Ketamine, versed, precedex. Appreciate critical care recommendations. On epoprostenol   Lasix for IV diureses  Tube feeds, Lantus, ISS, medium dose  Decadron 10 mg q24  switch Protonix to lansoprazole.    Bowel regimen added  Prognosis guarded    Perla Galo MD  12/18/2021  2:55 PM

## 2021-12-18 NOTE — PROGRESS NOTES
Patient placed supine w/ assist of RT and RN x3 without event. Writer will collaborate with next shift to prone.

## 2021-12-19 ENCOUNTER — APPOINTMENT (OUTPATIENT)
Dept: GENERAL RADIOLOGY | Age: 56
DRG: 004 | End: 2021-12-19
Attending: INTERNAL MEDICINE
Payer: COMMERCIAL

## 2021-12-19 LAB
ABSOLUTE EOS #: 0 K/UL (ref 0–0.4)
ABSOLUTE IMMATURE GRANULOCYTE: 0.34 K/UL (ref 0–0.3)
ABSOLUTE LYMPH #: 0.51 K/UL (ref 1–4.8)
ABSOLUTE MONO #: 1.02 K/UL (ref 0.1–0.8)
ALBUMIN SERPL-MCNC: 3.1 G/DL (ref 3.5–5.2)
ALBUMIN/GLOBULIN RATIO: 1.3 (ref 1–2.5)
ALLEN TEST: ABNORMAL
ALP BLD-CCNC: 74 U/L (ref 40–129)
ALT SERPL-CCNC: 93 U/L (ref 5–41)
ANION GAP SERPL CALCULATED.3IONS-SCNC: 12 MMOL/L (ref 9–17)
AST SERPL-CCNC: 31 U/L
BASOPHILS # BLD: 0 % (ref 0–2)
BASOPHILS ABSOLUTE: 0 K/UL (ref 0–0.2)
BILIRUB SERPL-MCNC: 0.57 MG/DL (ref 0.3–1.2)
BUN BLDV-MCNC: 30 MG/DL (ref 6–20)
BUN/CREAT BLD: ABNORMAL (ref 9–20)
CALCIUM SERPL-MCNC: 8.4 MG/DL (ref 8.6–10.4)
CHLORIDE BLD-SCNC: 104 MMOL/L (ref 98–107)
CO2: 26 MMOL/L (ref 20–31)
CREAT SERPL-MCNC: 0.45 MG/DL (ref 0.7–1.2)
CULTURE: ABNORMAL
CULTURE: NORMAL
DIFFERENTIAL TYPE: ABNORMAL
DIRECT EXAM: ABNORMAL
EOSINOPHILS RELATIVE PERCENT: 0 % (ref 1–4)
FIO2: 65
GFR AFRICAN AMERICAN: >60 ML/MIN
GFR NON-AFRICAN AMERICAN: >60 ML/MIN
GFR SERPL CREATININE-BSD FRML MDRD: ABNORMAL ML/MIN/{1.73_M2}
GFR SERPL CREATININE-BSD FRML MDRD: ABNORMAL ML/MIN/{1.73_M2}
GLUCOSE BLD-MCNC: 119 MG/DL (ref 75–110)
GLUCOSE BLD-MCNC: 126 MG/DL (ref 75–110)
GLUCOSE BLD-MCNC: 129 MG/DL (ref 75–110)
GLUCOSE BLD-MCNC: 131 MG/DL (ref 70–99)
GLUCOSE BLD-MCNC: 133 MG/DL (ref 74–100)
GLUCOSE BLD-MCNC: 219 MG/DL (ref 75–110)
HCT VFR BLD CALC: 47.6 % (ref 40.7–50.3)
HEMOGLOBIN: 15.5 G/DL (ref 13–17)
IMMATURE GRANULOCYTES: 2 %
LYMPHOCYTES # BLD: 3 % (ref 24–44)
Lab: ABNORMAL
Lab: NORMAL
MCH RBC QN AUTO: 31.1 PG (ref 25.2–33.5)
MCHC RBC AUTO-ENTMCNC: 32.6 G/DL (ref 28.4–34.8)
MCV RBC AUTO: 95.6 FL (ref 82.6–102.9)
MODE: ABNORMAL
MONOCYTES # BLD: 6 % (ref 1–7)
MORPHOLOGY: NORMAL
NEGATIVE BASE EXCESS, ART: ABNORMAL (ref 0–2)
NRBC AUTOMATED: 0 PER 100 WBC
O2 DEVICE/FLOW/%: ABNORMAL
PATIENT TEMP: ABNORMAL
PDW BLD-RTO: 13.7 % (ref 11.8–14.4)
PLATELET # BLD: 191 K/UL (ref 138–453)
PLATELET ESTIMATE: ABNORMAL
PMV BLD AUTO: 11.4 FL (ref 8.1–13.5)
POC HCO3: 30.3 MMOL/L (ref 21–28)
POC O2 SATURATION: 90 % (ref 94–98)
POC PCO2 TEMP: ABNORMAL MM HG
POC PCO2: 40.3 MM HG (ref 35–48)
POC PH TEMP: ABNORMAL
POC PH: 7.48 (ref 7.35–7.45)
POC PO2 TEMP: ABNORMAL MM HG
POC PO2: 54.2 MM HG (ref 83–108)
POSITIVE BASE EXCESS, ART: 6 (ref 0–3)
POTASSIUM SERPL-SCNC: 4.7 MMOL/L (ref 3.7–5.3)
PROCALCITONIN: 0.08 NG/ML
RBC # BLD: 4.98 M/UL (ref 4.21–5.77)
RBC # BLD: ABNORMAL 10*6/UL
SAMPLE SITE: ABNORMAL
SEG NEUTROPHILS: 89 % (ref 36–66)
SEGMENTED NEUTROPHILS ABSOLUTE COUNT: 15.13 K/UL (ref 1.8–7.7)
SODIUM BLD-SCNC: 142 MMOL/L (ref 135–144)
SPECIMEN DESCRIPTION: ABNORMAL
SPECIMEN DESCRIPTION: NORMAL
TCO2 (CALC), ART: ABNORMAL MMOL/L (ref 22–29)
TOTAL PROTEIN: 5.5 G/DL (ref 6.4–8.3)
WBC # BLD: 17 K/UL (ref 3.5–11.3)
WBC # BLD: ABNORMAL 10*3/UL

## 2021-12-19 PROCEDURE — 84145 PROCALCITONIN (PCT): CPT

## 2021-12-19 PROCEDURE — 6360000002 HC RX W HCPCS: Performed by: NURSE PRACTITIONER

## 2021-12-19 PROCEDURE — 99233 SBSQ HOSP IP/OBS HIGH 50: CPT | Performed by: STUDENT IN AN ORGANIZED HEALTH CARE EDUCATION/TRAINING PROGRAM

## 2021-12-19 PROCEDURE — 2580000003 HC RX 258: Performed by: INTERNAL MEDICINE

## 2021-12-19 PROCEDURE — 82947 ASSAY GLUCOSE BLOOD QUANT: CPT

## 2021-12-19 PROCEDURE — 82803 BLOOD GASES ANY COMBINATION: CPT

## 2021-12-19 PROCEDURE — 6360000002 HC RX W HCPCS: Performed by: INTERNAL MEDICINE

## 2021-12-19 PROCEDURE — 6370000000 HC RX 637 (ALT 250 FOR IP): Performed by: STUDENT IN AN ORGANIZED HEALTH CARE EDUCATION/TRAINING PROGRAM

## 2021-12-19 PROCEDURE — 6370000000 HC RX 637 (ALT 250 FOR IP): Performed by: NURSE PRACTITIONER

## 2021-12-19 PROCEDURE — 2500000003 HC RX 250 WO HCPCS: Performed by: INTERNAL MEDICINE

## 2021-12-19 PROCEDURE — 85025 COMPLETE CBC W/AUTO DIFF WBC: CPT

## 2021-12-19 PROCEDURE — 2700000000 HC OXYGEN THERAPY PER DAY

## 2021-12-19 PROCEDURE — 94003 VENT MGMT INPAT SUBQ DAY: CPT

## 2021-12-19 PROCEDURE — 2000000000 HC ICU R&B

## 2021-12-19 PROCEDURE — 2500000003 HC RX 250 WO HCPCS: Performed by: EMERGENCY MEDICINE

## 2021-12-19 PROCEDURE — 80053 COMPREHEN METABOLIC PANEL: CPT

## 2021-12-19 PROCEDURE — 37799 UNLISTED PX VASCULAR SURGERY: CPT

## 2021-12-19 PROCEDURE — 94761 N-INVAS EAR/PLS OXIMETRY MLT: CPT

## 2021-12-19 PROCEDURE — 2580000003 HC RX 258: Performed by: NURSE PRACTITIONER

## 2021-12-19 PROCEDURE — 71045 X-RAY EXAM CHEST 1 VIEW: CPT

## 2021-12-19 PROCEDURE — 99291 CRITICAL CARE FIRST HOUR: CPT | Performed by: INTERNAL MEDICINE

## 2021-12-19 PROCEDURE — 6370000000 HC RX 637 (ALT 250 FOR IP): Performed by: INTERNAL MEDICINE

## 2021-12-19 RX ADMIN — DEXMEDETOMIDINE HYDROCHLORIDE 0.8 MCG/KG/HR: 4 INJECTION, SOLUTION INTRAVENOUS at 19:35

## 2021-12-19 RX ADMIN — Medication 200 MCG/HR: at 22:45

## 2021-12-19 RX ADMIN — ACETAMINOPHEN 650 MG: 160 SOLUTION ORAL at 05:39

## 2021-12-19 RX ADMIN — POLYETHYLENE GLYCOL 3350 17 G: 17 POWDER, FOR SOLUTION ORAL at 08:21

## 2021-12-19 RX ADMIN — Medication 200 MCG/HR: at 04:29

## 2021-12-19 RX ADMIN — BISACODYL 10 MG: 10 SUPPOSITORY RECTAL at 14:46

## 2021-12-19 RX ADMIN — DEXAMETHASONE SODIUM PHOSPHATE 10 MG: 10 INJECTION INTRAMUSCULAR; INTRAVENOUS at 13:16

## 2021-12-19 RX ADMIN — ACETAMINOPHEN 650 MG: 650 SUPPOSITORY RECTAL at 22:39

## 2021-12-19 RX ADMIN — Medication 30 MG: at 06:19

## 2021-12-19 RX ADMIN — ACETAMINOPHEN 650 MG: 160 SOLUTION ORAL at 12:03

## 2021-12-19 RX ADMIN — DEXMEDETOMIDINE HYDROCHLORIDE 1 MCG/KG/HR: 4 INJECTION, SOLUTION INTRAVENOUS at 01:06

## 2021-12-19 RX ADMIN — ACETAMINOPHEN 650 MG: 160 SOLUTION ORAL at 18:35

## 2021-12-19 RX ADMIN — FUROSEMIDE 40 MG: 10 INJECTION, SOLUTION INTRAVENOUS at 08:36

## 2021-12-19 RX ADMIN — DEXMEDETOMIDINE HYDROCHLORIDE 1 MCG/KG/HR: 4 INJECTION, SOLUTION INTRAVENOUS at 06:20

## 2021-12-19 RX ADMIN — Medication 2000 UNITS: at 08:19

## 2021-12-19 RX ADMIN — ACETAMINOPHEN 650 MG: 160 SOLUTION ORAL at 01:12

## 2021-12-19 RX ADMIN — INSULIN GLARGINE 10 UNITS: 100 INJECTION, SOLUTION SUBCUTANEOUS at 21:26

## 2021-12-19 RX ADMIN — INSULIN LISPRO 1 UNITS: 100 INJECTION, SOLUTION INTRAVENOUS; SUBCUTANEOUS at 21:23

## 2021-12-19 RX ADMIN — CEFEPIME 2000 MG: 2 INJECTION, POWDER, FOR SOLUTION INTRAVENOUS at 09:08

## 2021-12-19 RX ADMIN — KETAMINE HYDROCHLORIDE 0.2 MG/KG/HR: 50 INJECTION INTRAMUSCULAR; INTRAVENOUS at 05:52

## 2021-12-19 RX ADMIN — DEXMEDETOMIDINE HYDROCHLORIDE 1 MCG/KG/HR: 4 INJECTION, SOLUTION INTRAVENOUS at 04:14

## 2021-12-19 RX ADMIN — ENOXAPARIN SODIUM 30 MG: 100 INJECTION SUBCUTANEOUS at 08:20

## 2021-12-19 RX ADMIN — Medication 200 MCG/HR: at 17:14

## 2021-12-19 RX ADMIN — SODIUM CHLORIDE, PRESERVATIVE FREE 10 ML: 5 INJECTION INTRAVENOUS at 08:20

## 2021-12-19 RX ADMIN — Medication 10 MG/HR: at 12:46

## 2021-12-19 RX ADMIN — DEXMEDETOMIDINE HYDROCHLORIDE 0.8 MCG/KG/HR: 4 INJECTION, SOLUTION INTRAVENOUS at 11:06

## 2021-12-19 RX ADMIN — CISATRACURIUM BESYLATE 3 MCG/KG/MIN: 10 INJECTION, SOLUTION INTRAVENOUS at 03:05

## 2021-12-19 RX ADMIN — Medication 200 MCG/HR: at 11:05

## 2021-12-19 RX ADMIN — CEFEPIME 2000 MG: 2 INJECTION, POWDER, FOR SOLUTION INTRAVENOUS at 21:21

## 2021-12-19 RX ADMIN — Medication 10 MG/HR: at 23:59

## 2021-12-19 RX ADMIN — CISATRACURIUM BESYLATE 3 MCG/KG/MIN: 10 INJECTION, SOLUTION INTRAVENOUS at 12:47

## 2021-12-19 RX ADMIN — ENOXAPARIN SODIUM 30 MG: 100 INJECTION SUBCUTANEOUS at 21:22

## 2021-12-19 RX ADMIN — INSULIN LISPRO 4 UNITS: 100 INJECTION, SOLUTION INTRAVENOUS; SUBCUTANEOUS at 17:15

## 2021-12-19 RX ADMIN — CISATRACURIUM BESYLATE 3.09 MCG/KG/MIN: 10 INJECTION, SOLUTION INTRAVENOUS at 23:39

## 2021-12-19 ASSESSMENT — PAIN SCALES - GENERAL
PAINLEVEL_OUTOF10: 0

## 2021-12-19 ASSESSMENT — PULMONARY FUNCTION TESTS
PIF_VALUE: 29
PIF_VALUE: 31
PIF_VALUE: 30
PIF_VALUE: 29

## 2021-12-19 NOTE — PROGRESS NOTES
[facility]  Office: [address], DO, Tien Ott, DO, Osiel Fernandez, DO, Ernestina Harada Blood, DO, Governor Leida MD, Joan Lewis MD, Rochelle Bedoya MD, Ozzy Valle MD, Casey Plunkett MD, Kian Ferrer MD, Ramirez Cuba MD, Dominique Corbett, DO, Lupillo Cazares, DO, Vito Dumont MD,  Joseph Chavez DO, Jose Angel Restrepo MD, Gabe Jeffery MD, Tony Gaviria MD, Rizwana Jones MD, Juliano Appiah MD, Vida Beasley MD, William Cosme MD, Nahed Winter, [facility], [facility], CNP, Tato Salinas, CNP, Pillo Munoz, CNS, Xavier Anaya, CNP, Archie Laboy, CNP, Hannah Lundberg, CNP, Mayo Watt, CNP, Meghan Watt, CNP, Bindu Pinzon PA-C, Sukumar Soto, [facility], Maegan Churchill, [facility], John Norris, CNP, Vicky Schwartz, CNP, Celestino Marin, CNP, Liza Terrell, CNP, Giacomo Espino, CNP, Matthew Iglesias, [address] [place] [address]    Progress Note    12/19/2021    5:59 PM    Name:   Fletcher Malhotra  MRN:     [id number]     Acct:      [de-identified]   Room:   [address] [place] [address] Day:  7  Admit Date:  12/12/2021 11:39 AM    PCP:   Keri Vora MD  Code Status:  Full Code    Subjective:     C/C: Shortness of breath  Interval History Status: not changed. Patient seen and examined. Remains intubated, sedated, paralyzed. Patient still on 80% FiO2. Ptint still proning, spiking fevers. On versed, precedex, ketamine, and nimbex. Brief History:     55-year-old male past medical history of obesity, asthma, IRMA on CPAP, hypertension, GERD presents with shortness of breath and cough at outlying facility. Patient transferred to [facility] [place] [facility] for further care. Patient was intubated 12/11/2021 currently paralyzed    Review of Systems:     Unable to obtain due to intubation, sedation and paralytics    Medications: Allergies:     Allergies   Allergen Reactions    Nuts [Peanut-Containing Drug Products] Anaphylaxis    Sunflower Oil Anaphylaxis     Sunflower seeds       Current Meds:   Scheduled Meds:    lansoprazole  30 mg Oral QAM AC    cefepime  2,000 mg IntraVENous Q12H    furosemide  40 mg IntraVENous Daily    insulin glargine  10 Units SubCUTAneous Nightly    sodium chloride flush  5-40 mL IntraVENous 2 times per day    enoxaparin  30 mg SubCUTAneous BID    Vitamin D  2,000 Units Oral Daily    dexamethasone  10 mg IntraVENous Q24H    insulin lispro  0-6 Units SubCUTAneous Q4H     Continuous Infusions:    dexmedetomidine 0.8 mcg/kg/hr (12/19/21 1106)    ketamine (KETALAR) infusion for analgosedation 0.2 mg/kg/hr (12/19/21 0552)    sodium chloride      dextrose      norepinephrine Stopped (12/12/21 1149)    cisatracurium (NIMBEX) infusion 3 mcg/kg/min (12/19/21 1247)    midazolam 10 mg/hr (12/19/21 1246)    fentaNYL 200 mcg/hr (12/19/21 1714)    dextrose      sodium chloride 10 mL/hr at 12/13/21 1548     PRN Meds: polyethylene glycol, senna, docusate, bisacodyl, acetaminophen, sodium chloride flush, sodium chloride, ondansetron **OR** ondansetron, [DISCONTINUED] acetaminophen **OR** acetaminophen, glucose, dextrose, glucagon (rDNA), dextrose, glucose, dextrose, glucagon (rDNA), dextrose    Data:     Past Medical History:   has a past medical history of Arthritis, Asthma, Diabetes mellitus ([address] Edema, GERD (gastroesophageal reflux disease), Hypertension, Migraine, and IRMA on CPAP. Social History:   reports that he has quit smoking. He has never used smokeless tobacco. He reports current alcohol use. He reports that he does not use drugs.      Family History:   Family History   Problem Relation Age of Onset    Heart Attack Sister 32    Diabetes Paternal Grandmother     Heart Disease Paternal Uncle     Heart Disease Paternal Uncle     Heart Disease Paternal Uncle     Cancer Maternal Aunt     Cancer Maternal Aunt     Cancer Maternal Uncle     Cancer Maternal Uncle        Vitals:  /63   Pulse 71   Temp 102.9 °F (39.4 °C) (Oral) Resp 30   Ht 6' 2\" (1.88 m)   Wt 266 lb 8.6 oz (120.9 kg)   SpO2 92%   BMI 34.22 kg/m²   Temp (24hrs), Av.7 °F (39.3 °C), Min:102.4 °F (39.1 °C), Max:102.9 °F (39.4 °C)    Recent Labs     21  0457 21  1110 21  1439 21  1624   POCGLU 133* 126* 129* 219*       I/O (24Hr): Intake/Output Summary (Last 24 hours) at 2021 1759  Last data filed at 2021 1714  Gross per 24 hour   Intake 4421.36 ml   Output 3835 ml   Net 586.36 ml       Labs:  Hematology:  Recent Labs     21  0415 21  0510 21  0504   WBC 10.5 13.5* 17.0*   RBC 4.98 4.97 4.98   HGB 15.4 15.4 15.5   HCT 48.4 48.3 47.6   MCV 97.2 97.2 95.6   MCH 30.9 31.0 31.1   MCHC 31.8 31.9 32.6   RDW 14.2 14.1 13.7    189 191   MPV 11.2 10.7 11.4     Chemistry:  Recent Labs     21  0415 21  0820 21  0510 21  0510 21  0946 21  1535 21  0504   *   < > 146*   < > 145* 143 142   K 4.9  --  4.6  --   --   --  4.7   *  --  108*  --   --   --  104   CO2 26  --  27  --   --   --  26   GLUCOSE 152*  --  117*  --   --   --  131*   BUN 50*  --  42*  --   --   --  30*   CREATININE 0.69*  --  0.53*  --   --   --  0.45*   MG 2.5  --   --   --   --   --   --    ANIONGAP 14  --  11  --   --   --  12   LABGLOM >60  --  >60  --   --   --  >60   GFRAA >60  --  >60  --   --   --  >60   CALCIUM 8.4*  --  8.6  --   --   --  8.4*   PHOS 4.0  --  3.2  --   --   --   --     < > = values in this interval not displayed.      Recent Labs     21  0415 21  0417 21  2154 21  0210 21  0457 21  0504 21  1110 21  1439 21  1624   PROT 5.7*  --   --   --   --  5.5*  --   --   --    LABALBU 3.2*  3.2*  --   --   --   --  3.1*  --   --   --    AST 57*  --   --   --   --  31  --   --   --    *  --   --   --   --  93*  --   --   --    ALKPHOS 97  --   --   --   --  74  --   --   --    BILITOT 0.36  --   --   --   --  0.57  --   -- --    BILIDIR 0.17  --   --   --   --   --   --   --   --    POCGLU  --    < > 139* 119* 133*  --  126* 129* 219*    < > = values in this interval not displayed. ABG:  Lab Results   Component Value Date    POCPH 7.484 12/19/2021    POCPCO2 40.3 12/19/2021    POCPO2 54.2 12/19/2021    POCHCO3 30.3 12/19/2021    NBEA NOT REPORTED 12/19/2021    PBEA 6 12/19/2021    QBN6RPD NOT REPORTED 12/19/2021    SABZ9QAU 90 12/19/2021    FIO2 65.0 12/19/2021     Lab Results   Component Value Date/Time    SPECIAL NOT REPORTED 12/18/2021 12:34 PM     Lab Results   Component Value Date/Time    CULTURE NO GROWTH 1 DAY 12/18/2021 12:34 PM       Radiology:  XR CHEST (SINGLE VIEW FRONTAL)    Result Date: 12/16/2021  No significant change in multifocal airspace opacities. Continued follow-up is recommended document complete resolution following medical treatment course. Stable position lines and catheters     XR CHEST (SINGLE VIEW FRONTAL)    Result Date: 12/14/2021  Mild interval improvement in multifocal airspace disease particularly at the right base. Support tubes and lines as above. XR CHEST (SINGLE VIEW FRONTAL)    Result Date: 12/12/2021  Stable appearing     XR CHEST (SINGLE VIEW FRONTAL)    Result Date: 12/11/2021  Multifocal hazy opacities throughout both lungs consistent with COVID pneumonia and or pulmonary edema. XR CHEST PORTABLE    Result Date: 12/12/2021  Stable appearing chest with unchanged support tubes/lines and persistent extensive bilateral airspace disease consistent with known COVID pneumonia. XR CHEST PORTABLE    Result Date: 12/12/2021  Right internal jugular central venous catheter tip projecting over the proximal SVC versus brachiocephalic vein. No pneumothorax. Otherwise stable exam from earlier today. CT CHEST PULMONARY EMBOLISM W CONTRAST    Result Date: 12/11/2021  1. No evidence of pulmonary embolism 2.  Diffuse ground-glass opacities throughout the lungs, typical of COVID-19 pulmonary disease. Physical Examination:        Physical Exam  Vitals reviewed. Constitutional:       Appearance: He is ill-appearing and toxic-appearing. Interventions: He is intubated. HENT:      Head: Normocephalic and atraumatic. Right Ear: External ear normal.      Left Ear: External ear normal.      Nose: Nose normal.      Mouth/Throat:      Comments: ET tube grossly in place  Eyes:      General:         Right eye: No discharge. Left eye: No discharge. Cardiovascular:      Rate and Rhythm: Regular rhythm. Tachycardia present. Pulses: Normal pulses. Pulmonary:      Effort: Tachypnea present. He is intubated. Breath sounds: Decreased air movement present. Examination of the right-upper field reveals decreased breath sounds. Examination of the left-upper field reveals decreased breath sounds. Examination of the right-middle field reveals decreased breath sounds. Examination of the right-lower field reveals decreased breath sounds. Examination of the left-lower field reveals decreased breath sounds. Decreased breath sounds present. No wheezing or rhonchi. Abdominal:      General: There is no distension. Palpations: Abdomen is soft. Tenderness: There is no abdominal tenderness. Musculoskeletal:      Cervical back: No rigidity. Right lower leg: No edema. Left lower leg: No edema. Skin:     General: Skin is warm. Capillary Refill: Capillary refill takes less than 2 seconds. Coloration: Skin is not jaundiced. Neurological:      General: No focal deficit present.            Assessment:        Hospital Problems           Last Modified POA    * (Principal) Pneumonia due to COVID-19 virus 12/12/2021 Yes    Acute respiratory failure with hypoxia ([address] 12/12/2021 Yes    Diabetes mellitus ([address] 12/12/2021 Yes    Asthma 12/12/2021 Yes    IRMA on CPAP 12/12/2021 Yes    Overview Signed 12/12/2021  2:39 PM by Jp Ibanez, DO     seldom use machine Hypertension 12/12/2021 Yes    Overview Signed 12/12/2021  2:39 PM by Danae Rivas DO     on lasix         GERD (gastroesophageal reflux disease) 12/12/2021 Yes    ARDS (adult respiratory distress syndrome) [facility] 12/12/2021 Yes          Plan:        Acute respiratory failure due to covid 19. Intubated. Sedated, paralyzed. Paralyzed, fentanyl, Ketamine, versed, precedex. Appreciate critical care recommendations. On epoprostenol   Lasix for IV diureses  Tube feeds, Lantus, ISS, medium dose  Decadron 10 mg q24  switch Protonix to lansoprazole.    Bowel regimen added  Prognosis guarded    Margaux Pandey MD  12/19/2021  5:59 PM

## 2021-12-19 NOTE — PLAN OF CARE
Problem: Airway Clearance - Ineffective  Goal: Achieve or maintain patent airway  12/19/2021 1054 by Alfredo Daily RN  Outcome: Ongoing  12/18/2021 2311 by Bindu Ruiz RN  Outcome: Ongoing     Problem: Gas Exchange - Impaired  Goal: Absence of hypoxia  12/19/2021 1054 by Alfredo Daily RN  Outcome: Ongoing  12/18/2021 2311 by Bindu Ruiz RN  Outcome: Ongoing  Goal: Promote optimal lung function  12/19/2021 1054 by Alfredo Daily RN  Outcome: Ongoing  12/18/2021 2311 by Bindu Ruiz RN  Outcome: Ongoing     Problem: Breathing Pattern - Ineffective  Goal: Ability to achieve and maintain a regular respiratory rate  12/19/2021 1054 by Alfredo Daily RN  Outcome: Ongoing  12/18/2021 2311 by Bindu Ruiz RN  Outcome: Ongoing     Problem:  Body Temperature -  Risk of, Imbalanced  Goal: Ability to maintain a body temperature within defined limits  12/19/2021 1054 by Alfredo Daily RN  Outcome: Ongoing  12/18/2021 2311 by Bindu Ruiz RN  Outcome: Ongoing  Goal: Will regain or maintain usual level of consciousness  12/19/2021 1054 by Alfredo Daily RN  Outcome: Ongoing  12/18/2021 2311 by Bindu Ruiz RN  Outcome: Ongoing  Goal: Complications related to the disease process, condition or treatment will be avoided or minimized  12/19/2021 1054 by Alfredo Daily RN  Outcome: Ongoing  12/18/2021 2311 by Bindu Ruiz RN  Outcome: Ongoing     Problem: Isolation Precautions - Risk of Spread of Infection  Goal: Prevent transmission of infection  12/19/2021 1054 by Alfredo Daily RN  Outcome: Ongoing  12/18/2021 2311 by Bindu Ruiz RN  Outcome: Ongoing     Problem: Nutrition Deficits  Goal: Optimize nutritional status  12/19/2021 1054 by Alfredo Daily RN  Outcome: Ongoing  12/18/2021 2311 by Bindu Ruiz RN  Outcome: Ongoing     Problem: Risk for Fluid Volume Deficit  Goal: Maintain normal heart rhythm  12/19/2021 1054 by Alfredo Daily RN  Outcome: Ongoing  12/18/2021 2311 by Pamela Abraham RN  Outcome: Ongoing  Goal: Maintain absence of muscle cramping  12/19/2021 1054 by Miguel Ángel Vance RN  Outcome: Ongoing  12/18/2021 2311 by Pamela Abraham RN  Outcome: Ongoing  Goal: Maintain normal serum potassium, sodium, calcium, phosphorus, and pH  12/19/2021 1054 by Miguel Ángel Vance RN  Outcome: Ongoing  12/18/2021 2311 by Pamela Abraham RN  Outcome: Ongoing     Problem: Loneliness or Risk for Loneliness  Goal: Demonstrate positive use of time alone when socialization is not possible  12/19/2021 1054 by Miguel Ángel Vance RN  Outcome: Ongoing  12/18/2021 2311 by Pamela Abraham RN  Outcome: Ongoing     Problem: Fatigue  Goal: Verbalize increase energy and improved vitality  12/19/2021 1054 by Miguel Ángel Vance RN  Outcome: Ongoing  12/18/2021 2311 by Pamela Abraham RN  Outcome: Ongoing     Problem: Patient Education: Go to Patient Education Activity  Goal: Patient/Family Education  12/19/2021 1054 by Miguel Ángel Vance RN  Outcome: Ongoing  12/18/2021 2311 by Pamela Abraham RN  Outcome: Ongoing     Problem: OXYGENATION/RESPIRATORY FUNCTION  Goal: Patient will maintain patent airway  12/19/2021 1054 by Miguel Ángel Vance RN  Outcome: Ongoing  12/18/2021 2311 by Pamela Abraham RN  Outcome: Ongoing  Goal: Patient will achieve/maintain normal respiratory rate/effort  Description: Respiratory rate and effort will be within normal limits for the patient  12/19/2021 1054 by Miguel Ángel Vance RN  Outcome: Ongoing  12/18/2021 2311 by Pamela Abraham RN  Outcome: Ongoing     Problem: MECHANICAL VENTILATION  Goal: Patient will maintain patent airway  12/19/2021 1054 by Miguel Ángel Vance RN  Outcome: Ongoing  12/18/2021 2311 by Pamela Abraham RN  Outcome: Ongoing  Goal: Oral health is maintained or improved  12/19/2021 1054 by Miguel Ángel Vance RN  Outcome: Ongoing  12/18/2021 2311 by Pamela Abraham RN  Outcome: Ongoing  Goal: ET tube will be managed safely  12/19/2021 1054 by Salena Crespo RN  Outcome: Ongoing  12/18/2021 2311 by Sveta Llamas RN  Outcome: Ongoing  Goal: Ability to express needs and understand communication  12/19/2021 1054 by Salena Crespo RN  Outcome: Ongoing  12/18/2021 2311 by Sveta Llamas RN  Outcome: Ongoing  Goal: Mobility/activity is maintained at optimum level for patient  12/19/2021 1054 by Salena Crespo RN  Outcome: Ongoing  12/18/2021 2311 by Sveta Llamas RN  Outcome: Ongoing     Problem: SKIN INTEGRITY  Goal: Skin integrity is maintained or improved  12/19/2021 1054 by Salena Crespo RN  Outcome: Ongoing  12/18/2021 2311 by Sveta Llamas RN  Outcome: Ongoing     Problem: Skin Integrity:  Goal: Will show no infection signs and symptoms  Description: Will show no infection signs and symptoms  12/19/2021 1054 by Salena Crespo RN  Outcome: Ongoing  12/18/2021 2311 by Sveta Llamas RN  Outcome: Ongoing  Goal: Absence of new skin breakdown  Description: Absence of new skin breakdown  12/19/2021 1054 by Salena Crespo RN  Outcome: Ongoing  12/18/2021 2311 by Sveta Llamas RN  Outcome: Ongoing     Problem: Falls - Risk of:  Goal: Will remain free from falls  Description: Will remain free from falls  12/19/2021 1054 by Salena Crespo RN  Outcome: Ongoing  12/18/2021 2311 by Sveta Llamas RN  Outcome: Ongoing  Goal: Absence of physical injury  Description: Absence of physical injury  12/19/2021 1054 by Salena Crespo RN  Outcome: Ongoing  12/18/2021 2311 by Sveta Llamas RN  Outcome: Ongoing     Problem: Nutrition  Goal: Optimal nutrition therapy  12/19/2021 1054 by Salena Crespo RN  Outcome: Ongoing  12/18/2021 2311 by Sveta Llamas RN  Outcome: Ongoing

## 2021-12-19 NOTE — PLAN OF CARE
Problem: Airway Clearance - Ineffective  Goal: Achieve or maintain patent airway  Outcome: Ongoing     Problem: Gas Exchange - Impaired  Goal: Absence of hypoxia  Outcome: Ongoing  Goal: Promote optimal lung function  Outcome: Ongoing     Problem: Breathing Pattern - Ineffective  Goal: Ability to achieve and maintain a regular respiratory rate  Outcome: Ongoing     Problem:  Body Temperature -  Risk of, Imbalanced  Goal: Ability to maintain a body temperature within defined limits  Outcome: Ongoing  Goal: Will regain or maintain usual level of consciousness  Outcome: Ongoing  Goal: Complications related to the disease process, condition or treatment will be avoided or minimized  Outcome: Ongoing     Problem: Isolation Precautions - Risk of Spread of Infection  Goal: Prevent transmission of infection  Outcome: Ongoing     Problem: Nutrition Deficits  Goal: Optimize nutritional status  Outcome: Ongoing     Problem: Risk for Fluid Volume Deficit  Goal: Maintain normal heart rhythm  Outcome: Ongoing  Goal: Maintain absence of muscle cramping  Outcome: Ongoing  Goal: Maintain normal serum potassium, sodium, calcium, phosphorus, and pH  Outcome: Ongoing     Problem: Patient Education: Go to Patient Education Activity  Goal: Patient/Family Education  Outcome: Ongoing     Problem: OXYGENATION/RESPIRATORY FUNCTION  Goal: Patient will maintain patent airway  Outcome: Ongoing  Goal: Patient will achieve/maintain normal respiratory rate/effort  Description: Respiratory rate and effort will be within normal limits for the patient  Outcome: Ongoing     Problem: SKIN INTEGRITY  Goal: Skin integrity is maintained or improved  Outcome: Ongoing     Problem: Skin Integrity:  Goal: Will show no infection signs and symptoms  Description: Will show no infection signs and symptoms  Outcome: Ongoing  Goal: Absence of new skin breakdown  Description: Absence of new skin breakdown  Outcome: Ongoing     Problem: Falls - Risk of:  Goal: Will remain free from falls  Description: Will remain free from falls  Outcome: Ongoing  Goal: Absence of physical injury  Description: Absence of physical injury  Outcome: Ongoing

## 2021-12-19 NOTE — PROGRESS NOTES
[facility] - Progress Note   Note COVID 19 Patient  Today's Date and Time: 12/18/2021, 7:44 PM    Impression :     COVID 19 Confirmed Infection  Covid tests:  12/11/2021: Positive  Elevated inflammatory markers  Acute hypoxic respiratory failure  History of asthma  Diabetes mellitus  Essential hypertension  IRMA on CPAP  Patient has not received the Covid vaccination    Recommendations:   Antibiotic treatment:  The patient is having high-grade fevers and cultures have been sent. I will order chest x-ray for tomorrow morning. I will start the patient empirically on antibiotic therapy with cefepime  Covid Rx:    Remdesivir-out of window  Decadron-high-dose initiated  Actemra-ordered 12/12/2021  Monoclonal antibodies-out of window      Medical Decision Making/Summary/Discussion:12/18/2021     Patient admitted with COVID 19 infection  He may come out of isolation on 12/31/21    Infection Control Recommendations   [place] Precautions  Airborne isolation  Droplet Isolation    Antimicrobial Stewardship Recommendations       Discontinuation of therapy  Coordination of Outpatient Care:   Estimated Length of IV antimicrobials:TBD  Patient will need Midline Catheter Insertion: TBD  Patient will need PICC line Insertion: No  Patient will need: Home IV , [place],  SNF,  LTAC:TBD  Patient will need outpatient wound care:No    Chief complaint/reason for consultation:   Concern for COVID infection      History of Present Illness:   Yolette Rodriguez is a 60y.o.-year-old male who was initially admitted on 12/12/2021. Patient seen at the request of Dr. Joselyn Verma:    Patient presented through [facility] ER on 12/11/2021 with complaints of worsening shortness of breath with associated generalized weakness and nonproductive cough over the past several days.     He was exposed to his son who was diagnosed with Covid last week and has not received the Covid vaccine. The patient was very hypoxic with an SPO2 in the high 50s on room air. Imaging revealed bilateral pulmonary opacities typical of COVID-19    Abnormal labs include:    Ferritin 2800      Patient has been intubated and transferred to [facility]  He has been started on high-dose Decadron and Actemra has been ordered    CT CHEST PULMONARY EMBOLISM W CONTRAST 12/11/2021   Final Result   1. No evidence of pulmonary embolism   2. Diffuse ground-glass opacities throughout the lungs, typical of COVID-19   pulmonary disease.           XR CHEST (SINGLE VIEW FRONTAL) 12/11/2021   Final Result   Multifocal hazy opacities throughout both lungs consistent with COVID   pneumonia and or pulmonary edema       Patient admitted because of concerns with COVID 19.    CURRENT EVALUATION : 12/18/2021    Patient has been having high-grade fevers  VS stable    The patient continues on mechanical ventilation with sedation and paralytic per ARDS protocol. He is on 70% FiO2 and 12 of PEEP with epoprostenol  He is currently prone at the time of my evaluation    CRP is decreasing   CXR is showing mild improvement in opacities    LFTs increasing  Hypernatremia improving    No other acute changes noted at this time. Patient exhibiting respiratory distress. yes  Respiratory secretions: no    Patient receiving supplemental oxygen.   Mechanical ventilation  RR:  20-->30  02 sat: 97    % FIO2: 90-->80-->85-->80  PEEP:   21-->16-->14    QTc:       NEWS Score: 0-4 Low risk group; 5-6: Medium risk group; 7 or above: High risk group  Parameters 3 2 1 0 1 2 3   Age    < 65   = 65   RR = 8  9-11 12-20  21-24 = 25   O2 Sats = 91 92-93 94-95 = 96      Suppl O2  Yes  No      SBP = 90  101-110 111-219   = 220   HR = 40  41-50 51-90  111-130 = 131   Consciousness    Alert   Drowsiness, lethargy, or confusion   Temperature = 35.0 C (95.0 F)  35.1-36.0 C 95.1-96.9 F 36.1-38.0 C 97.0-100.4 F 38.1-39.0 C 100.5-102.3 F = 39.1 C = 102.4 F      NEWS Score:  12/12/2021: 13 high risk    Overall Daily Picture:     Unchanged    Presence of secondary bacterial Infection:    No   Additional antibiotics: No    Labs, X rays reviewed: 12/18/2021    BUN:42-->47  Cr:0.77-->0.69    WBC:6.4-->9.1-->10.5  Hb:15.1-->15.4  Plat: 226-->229-->208    Absolute Neutrophils:3.73  Absolute Lymphocytes:0.29  Neutrophil/Lymphocyte Ratio: 12.8 high risk    CRP:293-->277-->137-->50.8-->23  Ferritin:2800  LDH: 838    Pro Calcitonin:      Cultures:  Urine:    Blood:    Sputum :    Wound:      CXR:   1221 diffuse bilateral infiltrates  CAT:      Discussed with patient, RN, CC, IM.      12-12-21:      I have personally reviewed the past medical history, past surgical history, medications, social history, and family history, and I have updated the database accordingly.   Past Medical History:     Past Medical History:   Diagnosis Date    Arthritis     Asthma     Diabetes mellitus ([facility] [address]     Edema     GERD (gastroesophageal reflux disease)     Hypertension     on lasix    Migraine     IRMA on CPAP     seldom use machine       Past Surgical  History:     Past Surgical History:   Procedure Laterality Date    APPENDECTOMY      ARTHROPLASTY Left 11/1/2019    LEFT FOOT 1ST MTPJ ARTHROPLASTY WITH PEREZ IMPLANT AND GROMMETS  [facility] performed by Felix Gallardo MD at [address] [facility] Right 12/12/2019    RIGHT FOOT ARTHROPLASTY 1ST MTPJ (Right Foot)    ARTHROPLASTY Right 12/12/2019    RIGHT FOOT ARTHROPLASTY 1ST MTPJ performed by Felix Gallardo MD at [address] Right    [address]  2014    no blockage no stents    CHOLECYSTECTOMY      COLONOSCOPY      ENDOSCOPY, COLON, DIAGNOSTIC      TOE SURGERY Left 11/01/2019     LEFT FOOT 1ST MTPJ ARTHROPLASTY WITH PEREZ IMPLANT AND GROMMETS  [facility] (Left )    TONSILLECTOMY         Medications:      [START ON 12/19/2021] lansoprazole  30 mg Oral QAM AC  furosemide  40 mg IntraVENous Daily    insulin glargine  10 Units SubCUTAneous Nightly    sodium chloride flush  5-40 mL IntraVENous 2 times per day    enoxaparin  30 mg SubCUTAneous BID    [START ON 12/19/2021] Vitamin D  2,000 Units Oral Daily    dexamethasone  10 mg IntraVENous Q24H    insulin lispro  0-6 Units SubCUTAneous Q4H       Social History:     Social History     Socioeconomic History    Marital status:      Spouse name: Not on file    Number of children: Not on file    Years of education: Not on file    Highest education level: Not on file   Occupational History    Not on file   Tobacco Use    Smoking status: Former Smoker    Smokeless tobacco: Never Used   Vaping Use    Vaping Use: Never used   Substance and Sexual Activity    Alcohol use: Yes     Comment: every couple months    Drug use: Never    Sexual activity: Not on file   Other Topics Concern    Not on file   Social History Narrative    Not on file     Social Determinants of Health     Financial Resource Strain:     Difficulty of Paying Living Expenses: Not on file   Food Insecurity:     Worried About [address] in the Last Year: Not on file    [address] in the Last Year: Not on file   Transportation Needs:     Lack of Transportation (Medical): Not on file    Lack of Transportation (Non-Medical):  Not on file   Physical Activity:     Days of Exercise per Week: Not on file    Minutes of Exercise per Session: Not on file   Stress:     Feeling of Stress : Not on file   Social Connections:     Frequency of Communication with Friends and Family: Not on file    Frequency of Social Gatherings with Friends and Family: Not on file    Attends Protestant Services: Not on file    Active Member of Clubs or Organizations: Not on file    Attends Club or Organization Meetings: Not on file    Marital Status: Not on file   Intimate Partner Violence:     Fear of Current or Ex-Partner: Not on file   Farshad Hector Emotionally Abused: Not on file    Physically Abused: Not on file    Sexually Abused: Not on file   Housing Stability:     Unable to Pay for Housing in the Last Year: Not on file    Number of Places Lived in the Last Year: Not on file    Unstable Housing in the Last Year: Not on file       Family History:     Family History   Problem Relation Age of Onset    Heart Attack Sister 32    Diabetes Paternal Grandmother     Heart Disease Paternal Uncle     Heart Disease Paternal Uncle     Heart Disease Paternal Uncle     Cancer Maternal Aunt     Cancer Maternal Aunt     Cancer Maternal Uncle     Cancer Maternal Uncle         Allergies:   Nuts [peanut-containing drug products] and Sunflower oil     Review of Systems:     Unable to assess 12/18/2021  Intubated and sedated      Physical Examination :     Patient Vitals for the past 8 hrs:   Pulse Resp SpO2   12/18/21 1900 74 22 --   12/18/21 1527 71 -- 95 %   12/18/21 1500 70 29 97 %   12/18/21 1400 77 30 92 %   12/18/21 1300 105 27 98 %   12/18/21 1200 109 8 96 %     General Appearance: Intubated and sedated  Head:  Normocephalic, no trauma  ENT: Not well evaluated as the patient is orally intubated  Neck:Supple, without lymphadenopathy. Thyroid normal, No bruits. Pulmonary/Chest: Diminished to auscultation, without wheezes, rales, or rhonchi. No dullness to percussion. Cardiovascular: Regular rate and rhythm without murmurs, rubs, or gallops. Abdomen: Soft, non tender. Bowel sounds normal. No organomegaly  All four Extremities: No cyanosis, clubbing, edema, or effusions. Neurologic: Intubated and sedated and paralyzed  Skin: Warm and dry with good turgor. No signs of peripheral arterial or venous insufficiency. No ulcerations. No open wounds.     Medical Decision Making -Laboratory:   I have independently reviewed/ordered the following labs:    CBC with Differential:   Recent Labs     12/17/21  0415 12/18/21  0510   WBC 10.5 13.5*   HGB 15.4 15.4   HCT 48.4 48.3    189   LYMPHOPCT  --  4*   MONOPCT  --  8*     BMP:   Recent Labs     12/16/21  0533 12/16/21  2132 12/17/21  0415 12/17/21  0820 12/18/21  0510 12/18/21  0510 12/18/21  0946 12/18/21  1535   *   < > 151*   < > 146*   < > 145* 143   K 4.5  --  4.9  --  4.6  --   --   --    *  --  111*  --  108*  --   --   --    CO2 27  --  26  --  27  --   --   --    BUN 47*  --  50*  --  42*  --   --   --    CREATININE 0.69*  --  0.69*  --  0.53*  --   --   --    MG 2.3  --  2.5  --   --   --   --   --     < > = values in this interval not displayed. Hepatic Function Panel:   Recent Labs     12/16/21 0533 12/17/21 0415   PROT 5.6* 5.7*   LABALBU 3.1*  3.1* 3.2*  3.2*   BILIDIR 0.22 0.17   IBILI 0.19 0.19   BILITOT 0.41 0.36   ALKPHOS 107 97   * 183*   * 57*     No results for input(s): RPR in the last 72 hours. No results for input(s): HIV in the last 72 hours. No results for input(s): BC in the last 72 hours. Lab Results   Component Value Date    MUCUS NOT REPORTED 12/18/2021    RBC 4.97 12/18/2021    TRICHOMONAS NOT REPORTED 12/18/2021    WBC 13.5 12/18/2021    YEAST NOT REPORTED 12/18/2021    TURBIDITY Clear 12/18/2021     Lab Results   Component Value Date    CREATININE 0.53 12/18/2021    GLUCOSE 117 12/18/2021       Medical Decision Making-Imaging:     Narrative   EXAMINATION:   CTA OF THE CHEST 12/11/2021 11:31 am       TECHNIQUE:   CTA of the chest was performed after the administration of intravenous   contrast.  Multiplanar reformatted images are provided for review.  MIP   images are provided for review.  Dose modulation, iterative reconstruction,   and/or weight based adjustment of the mA/kV was utilized to reduce the   radiation dose to as low as reasonably achievable.       COMPARISON:   Chest x-ray dated December 11, 2021       HISTORY:   ORDERING SYSTEM PROVIDED HISTORY: hypoxemia, covid positive, d-dimer-0.99   TECHNOLOGIST PROVIDED HISTORY:   hypoxemia, covid positive, d-dimer-0.99   Decision Support Exception - unselect if not a suspected or confirmed   emergency medical condition->Emergency Medical Condition (MA)   Reason for Exam: COVID positive, elevated D-Dimer       FINDINGS:   Pulmonary Arteries: Pulmonary arteries are adequately opacified for   evaluation.  No evidence of intraluminal filling defect to suggest pulmonary   embolism.  Main pulmonary artery is normal in caliber.       Mediastinum: Nonspecific mediastinal and hilar lymph nodes are present,   likely reactive. .  The heart and pericardium demonstrate no acute   abnormality.  There is no acute abnormality of the thoracic aorta.       Lungs/pleura: Diffuse ground-glass opacification is present throughout the   lungs, typical of COVID-19 pulmonary disease.  No pleural effusion or   pneumothorax is present.       Upper Abdomen: Images through the upper abdomen demonstrate a small hiatal   hernia.  Diffuse hepatic steatosis is present.  The gallbladder is surgically   absent.       Soft Tissues/Bones: No acute bone or soft tissue abnormality.           Impression   1. No evidence of pulmonary embolism   2. Diffuse ground-glass opacities throughout the lungs, typical of COVID-19   pulmonary disease.         Narrative   EXAMINATION:   ONE XRAY VIEW OF THE CHEST       12/11/2021 3:54 pm       COMPARISON:   November 13, 2012       HISTORY:   ORDERING SYSTEM PROVIDED HISTORY: hypoxemia, probable covid pneumonia   TECHNOLOGIST PROVIDED HISTORY:   hypoxemia, probable covid pneumonia       FINDINGS:   Multifocal hazy opacities throughout both lungs would be consistent with   history of COVID pneumonia.  Pulmonary edema could have a similar appearance.    No pneumothorax or pleural effusion.  Cardiac size enlarged.  Mediastinum   unremarkable.  No acute osseous abnormality.           Impression   Multifocal hazy opacities throughout both lungs consistent with COVID   pneumonia and or pulmonary edema.         Medical Decision Dknahk-Gtvcpquy-Ummbk:     Culture, Respiratory [[id number]] (Abnormal) Collected: 12/18/21 1143   Order Status: Completed Specimen: Endotracheal Updated: 12/18/21 1439    Specimen Description . ENDOTRACHEAL    Special Requests NOT REPORTED    Direct Exam >25 NEUTROPHILS/LPF     < 10 EPITHELIAL CELLS/LPF     MIXED BACTERIAL MORPHOTYPES SEEN ON GRAM STAIN.  Abnormal     Culture PENDING   Culture, Blood 1 [[id number]] Collected: 12/18/21 1234   Order Status: Completed Specimen: Blood Updated: 12/18/21 1358    Specimen Description . BLOOD    Special Requests NOT REPORTED    Culture NO GROWTH <24 HRS   Culture, Blood 1 [[id number]]    Order Status: Sent Specimen: Blood    Culture, Urine [[id number]] Collected: 12/14/21 0043   Order Status: Completed Specimen: Urine, straight catheter Updated: 12/14/21 1934    Specimen Description . URINE,STRAIGHT CATHETER    Special Requests NOT REPORTED    Culture NO GROWTH   MRSA DNA Probe, Nasal [[id number]] Collected: 12/12/21 1245   Order Status: Completed Specimen: Nasal Updated: 12/13/21 1052    Specimen Description . NASAL SWAB    MRSA, DNA, Nasal NEGATIVE:  MRSA DNA not detected by nucleic acid amplification. Comment:                                                    Results should be used as an adjunct to nosocomial control efforts to identify patients   needing enhanced precautions.     The test is not intended to identify patients with staphylococcal infections.  Results   should not be used to guide or monitor treatment for MRSA infections.              Medical Decision Making-Other:     Note:  Labs, medications, radiologic studies were reviewed with personal review of films  Large amounts of data were reviewed  Discussed with nursing Staff, Discharge planner  Infection Control and Prevention measures reviewed  All prior entries were reviewed  Administer medications as ordered  Prognosis: Guarded  Discharge planning reviewed      Thank you for allowing us to participate in the care of this patient. Please call with questions.       Electronically signed by Lloyd Alvarenga MD on 12/18/2021 at 7:45 PM

## 2021-12-19 NOTE — PROGRESS NOTES
Pulmonary Critical Care   Consult Note    Patient - Noy Anthony  Date of Admission -  2021 11:39 AM  Date of Evaluation -  2021  Room and Bed Number -  3026/3026-01   Hospital Day - 6    CHIEF COMPLAINT : ACUTE HYPOXIC RESPIRATORY FAILURE DUE TO COVID -19 PNEUMONIA   HPI:   Noy Anthony  60 y.o. male  admitted for COVID-19 at [facility] ER due to worsening oxygenation he was initially started on BiPAP and subsequently intubated. On room air his saturations had been 50%. CTA no PE but bilateral pulmonary infiltrates. He was accepted at [address] [place] [address] ICU. On arrival he is on PEEP of 22, 100% FiO2.    2021   Subjective      Continues to prone. Modest improvement in the prone position. I/O-. Epoprostenol does not seem to be helping. Will discontinue. Sedation with fentanyl, midazolam, and dexmedetomidine. Nursing reports that patient columns on DACs. Paralyzed with cis-atacarium. FiO2 70%. Labs reviewed.   SECRETIONS  -Amount:  [x] Small [] Moderate  [] Large    [] None  Color:     [x] White [] Colored  [] Bloody    SEDATION:    [x] Propofol gtt  [x] Versed gtt  [x] FENTANYL  gtt  gtt   [] No Sedation    PARALYZED:  [] No    [x] Yes    VASOPRESSORS:  [x] No    [] Yes  [] Levophed [] Dopamine [] Vasopressin  [] Dobutamine [] Phenylephrine [] Epinephrine    INHALED veletri  : [] No    [x] Yes started 21    PRONE :       [] No    [x] Yes    Actemra:             [] No    [x] Yes  21  DEXAMETHASONE : [] No    [x] Yes      Ros unable to perform due to intubation and sedation    OBJECTIVE:     VITAL SIGNS:  /67   Pulse 71   Temp 102.6 °F (39.2 °C) (Bladder)   Resp 30   Ht 6' 2\" (1.88 m)   Wt 278 lb 14.1 oz (126.5 kg)   SpO2 96%   BMI 35.81 kg/m²   Tmax over 24 hours:  Temp (24hrs), Av.2 °F (38.4 °C), Min:100.4 °F (38 °C), Max:102.6 °F (39.2 °C)      Patient Vitals for the past 8 hrs:   BP Temp Temp src Pulse Resp SpO2   21 2200 -- -- -- 71 30 96 % 12/18/21 2130 -- -- -- 72 30 96 %   12/18/21 2123 -- -- -- -- -- 96 %   12/18/21 2100 -- -- -- 70 30 97 %   12/18/21 2030 -- -- -- 71 30 97 %   12/18/21 2000 127/67 102.6 °F (39.2 °C) Bladder 71 30 96 %   12/18/21 1930 -- -- -- 70 30 97 %   12/18/21 1900 -- -- -- 74 30 --   12/18/21 1527 -- -- -- 71 -- 95 %   12/18/21 1500 -- -- -- 70 29 97 %         Intake/Output Summary (Last 24 hours) at 12/18/2021 2208  Last data filed at 12/18/2021 2200  Gross per 24 hour   Intake 3261.02 ml   Output 2135 ml   Net 1126.02 ml     Date 12/18/21 0000 - 12/18/21 2359   Shift 8182-0574 1583-1542 0762-5245 24 Hour Total   INTAKE   I.V.(mL/kg) 299.1(2.4)  1110.7(8.8) 1409. 7(11.1)   NG/GT(mL/kg) 553(4.4) 300(2.4) 687(5.4) 0425(09.3)   Shift Total(mL/kg) 852. 1(6.7) 300(2.4) 1797. 7(14.2) 2949. 7(23.3)   OUTPUT   Urine(mL/kg/hr) 655(0.6) 400(0.4) 945 2000   Shift Total(mL/kg) 655(5.2) 400(3.2) 945(7.5) 2000(15.8)   Weight (kg) 126.5 126.5 126.5 126.5     Wt Readings from Last 3 Encounters:   12/15/21 278 lb 14.1 oz (126.5 kg)   12/11/21 300 lb (136.1 kg)   12/12/19 (!) 355 lb 9.6 oz (161.3 kg)     Body mass index is 35.81 kg/m². PHYSICAL EXAM:      I have discussed the care of Kj Galvan, including pertinent history and exam findings, with the resident/APC/staff. I have seen the patient and the key elements of all parts of the encounter have been performed by me. Physical exam was deferred to limit physical exposure and PPE resources. I reviewed the interval history, interpreted all available radiographic, laboratory and physiologic data at the time of service. I agree with the assessment and plan as documented by resident/APC/ ancillary staff.        MEDICATIONS:  Scheduled Meds:   [START ON 12/19/2021] lansoprazole  30 mg Oral QAM AC    cefepime  2,000 mg IntraVENous Q12H    furosemide  40 mg IntraVENous Daily    insulin glargine  10 Units SubCUTAneous Nightly    sodium chloride flush  5-40 mL IntraVENous 2 times per day    enoxaparin  30 mg SubCUTAneous BID    [START ON 12/19/2021] Vitamin D  2,000 Units Oral Daily    dexamethasone  10 mg IntraVENous Q24H    insulin lispro  0-6 Units SubCUTAneous Q4H     Continuous Infusions:   dexmedetomidine 1 mcg/kg/hr (12/18/21 2148)    ketamine (KETALAR) infusion for analgosedation 0.2 mg/kg/hr (12/17/21 2056)    sodium chloride      dextrose      norepinephrine Stopped (12/12/21 1149)    cisatracurium (NIMBEX) infusion 3 mcg/kg/min (12/18/21 1932)    midazolam 10 mg/hr (12/18/21 1932)    fentaNYL 200 mcg/hr (12/18/21 1827)    dextrose      sodium chloride 10 mL/hr at 12/13/21 1548     PRN Meds:   polyethylene glycol, 17 g, BID PRN  senna, 5 mL, Nightly PRN  docusate, 100 mg, BID PRN  bisacodyl, 10 mg, Daily PRN  acetaminophen, 650 mg, Q4H PRN  sodium chloride flush, 5-40 mL, PRN  sodium chloride, 25 mL, PRN  ondansetron, 4 mg, Q8H PRN   Or  ondansetron, 4 mg, Q6H PRN  acetaminophen, 650 mg, Q6H PRN  glucose, 15 g, PRN  dextrose, 12.5 g, PRN  glucagon (rDNA), 1 mg, PRN  dextrose, 100 mL/hr, PRN  glucose, 15 g, PRN  dextrose, 12.5 g, PRN  glucagon (rDNA), 1 mg, PRN  dextrose, 100 mL/hr, PRN        SUPPORT DEVICES: [x] Ventilator [] BIPAP  [] Nasal Cannula [] Room Air      ABGs:     Lab Results   Component Value Date    [id number] NOT REPORTED 12/18/2021    FIO2 65.0 12/18/2021       DATA:  Complete Blood Count:   Recent Labs     12/16/21  0533 12/17/21  0415 12/18/21  0510   WBC 9.1 10.5 13.5*   RBC 5.03 4.98 4.97   HGB 15.4 15.4 15.4   HCT 48.5 48.4 48.3   MCV 96.4 97.2 97.2   MCH 30.6 30.9 31.0   MCHC 31.8 31.8 31.9   RDW 14.2 14.2 14.1    208 189   MPV 10.7 11.2 10.7        Last 3 Blood Glucose:   Recent Labs     12/16/21  0533 12/17/21  0415 12/18/21  0510   GLUCOSE 154* 152* 117*        PT/INR:    Lab Results   Component Value Date    PROTIME 13.0 12/12/2021    INR 1.0 12/12/2021     PTT:    Lab Results   Component Value Date    APTT 39.1 12/12/2021 Comprehensive Metabolic Profile:   Recent Labs     12/16/21  0533 12/16/21  2132 12/17/21  0415 12/17/21  0820 12/18/21  0510 12/18/21  0946 12/18/21  1535   *   < > 151*   < > 146* 145* 143   K 4.5  --  4.9  --  4.6  --   --    *  --  111*  --  108*  --   --    CO2 27  --  26  --  27  --   --    BUN 47*  --  50*  --  42*  --   --    CREATININE 0.69*  --  0.69*  --  0.53*  --   --    GLUCOSE 154*  --  152*  --  117*  --   --    CALCIUM 8.1*  --  8.4*  --  8.6  --   --    PROT 5.6*  --  5.7*  --   --   --   --    LABALBU 3.1*  3.1*  --  3.2*  3.2*  --   --   --   --    BILITOT 0.41  --  0.36  --   --   --   --    ALKPHOS 107  --  97  --   --   --   --    *  --  57*  --   --   --   --    *  --  183*  --   --   --   --     < > = values in this interval not displayed. Magnesium:   Lab Results   Component Value Date    MG 2.5 12/17/2021    MG 2.3 12/16/2021    MG 2.4 12/15/2021     Phosphorus:   Lab Results   Component Value Date    PHOS 3.2 12/18/2021    PHOS 4.0 12/17/2021    PHOS 2.8 12/16/2021     Ionized Calcium: No results found for: CAION     Urinalysis:   Lab Results   Component Value Date    NITRU NEGATIVE 12/18/2021    COLORU Dark Yellow 12/18/2021    PHUR 6.0 12/18/2021    WBCUA 5 TO 10 12/18/2021    RBCUA 20 TO 50 12/18/2021    MUCUS NOT REPORTED 12/18/2021    TRICHOMONAS NOT REPORTED 12/18/2021    YEAST NOT REPORTED 12/18/2021    BACTERIA NOT REPORTED 12/18/2021    SPECGRAV 1.032 12/18/2021    LEUKOCYTESUR NEGATIVE 12/18/2021    UROBILINOGEN Normal 12/18/2021    BILIRUBINUR NEGATIVE 12/18/2021    GLUCOSEU NEGATIVE 12/18/2021    KETUA NEGATIVE 12/18/2021    AMORPHOUS NOT REPORTED 12/18/2021           XR CHEST (SINGLE VIEW FRONTAL)    Result Date: 12/14/2021  Mild interval improvement in multifocal airspace disease particularly at the right base. Support tubes and lines as above.      XR CHEST (SINGLE VIEW FRONTAL)    Result Date: 12/12/2021  Stable appearing     XR CHEST Last Year: Not on file    Ran Out of Food in the Last Year: Not on file   Transportation Needs:     Lack of Transportation (Medical): Not on file    Lack of Transportation (Non-Medical): Not on file   Physical Activity:     Days of Exercise per Week: Not on file    Minutes of Exercise per Session: Not on file   Stress:     Feeling of Stress : Not on file   Social Connections:     Frequency of Communication with Friends and Family: Not on file    Frequency of Social Gatherings with Friends and Family: Not on file    Attends Shinto Services: Not on file    Active Member of [address] [place] [address] [employer] or Organizations: Not on file    Attends Club or Organization Meetings: Not on file    Marital Status: Not on file   Intimate Partner Violence:     Fear of Current or Ex-Partner: Not on file    Emotionally Abused: Not on file    Physically Abused: Not on file    Sexually Abused: Not on file   Housing Stability:     Unable to Pay for Housing in the Last Year: Not on file    Number of [place] in the Last Year: Not on file    Unstable Housing in the Last Year: Not on file         There is no immunization history on file for this patient.       Family History   Problem Relation Age of Onset    Heart Attack Sister 32    Diabetes Paternal Grandmother     Heart Disease Paternal Uncle     Heart Disease Paternal Uncle     Heart Disease Paternal Uncle     Cancer Maternal Aunt     Cancer Maternal Aunt     Cancer Maternal Uncle     Cancer Maternal Uncle          Past Surgical History:   Procedure Laterality Date    APPENDECTOMY      ARTHROPLASTY Left 11/1/2019    LEFT FOOT 1ST MTPJ ARTHROPLASTY WITH PEREZ IMPLANT AND GROMMETS  ALLEN [facility] performed by Lo Mcintosh MD at [facility] Right 12/12/2019    RIGHT FOOT ARTHROPLASTY 1ST MTPJ (Right Foot)    ARTHROPLASTY Right 12/12/2019    RIGHT FOOT ARTHROPLASTY 1ST MTPJ performed by Lo Mcintosh MD at [address] Right    [address]  CATHETERIZATION  2014    no blockage no stents    CHOLECYSTECTOMY      COLONOSCOPY      ENDOSCOPY, COLON, DIAGNOSTIC      TOE SURGERY Left 11/01/2019     LEFT FOOT 1ST MTPJ ARTHROPLASTY WITH PEREZ IMPLANT AND GROMMETS  ALLEN MED (Left )    TONSILLECTOMY            VENTILATOR SETTINGS:  Vent Information  $Ventilation: $Subsequent Day  Skin Assessment: Clean, dry, & intact  Suction Catheter Diameter: 14  Equipment ID: [id number]  Equipment Changed: Expiratory Filter (humidification, inspitaory filter)  Vent Type: Servo i  Vent Mode: PRVC  Vt Ordered: 500 mL  Rate Set: 30 bmp  FiO2 : 70 %  SpO2: 96 %  SpO2/FiO2 ratio: 137.14  Sensitivity: 3  PEEP/CPAP: 12  I Time/ I Time %: 0.6 s  Humidification Source: Heated wire  Humidification Temp: 37  Humidification Temp Measured: 37  Circuit Condensation: Drained  Nitric Oxide/Epoprostenol In Use?: No     PaO2/FiO2 RATIO:  Recent Labs     12/18/21  0430   POCPO2 45.1*      FiO2 : 70 %       LABS:  ABGs:   Recent Labs     12/16/21  0537 12/17/21  0615 12/18/21  0430   POCPH 7.380 7.359 7.445   POCPCO2 49.7* 54.1* 42.7   POCPO2 59.5* 117.0* 45.1*   POCHCO3 29.4* 30.5* 29.3*   LUPQ5SOD 89* 98 82*            ASSESSMENT:     Principal Problem:    Pneumonia due to COVID-19 virus  Active Problems:    Acute respiratory failure with hypoxia (HCC)    Diabetes mellitus (HCC)    Asthma    IRMA on CPAP    Hypertension    GERD (gastroesophageal reflux disease)    ARDS (adult respiratory distress syndrome) ([facility])  Resolved Problems:    * No resolved hospital problems. *              Acute hypoxic respiratory failure secondary to COVID 19   Acute respiratory distress syndrome   Bilateral multifocal pneumonia due to COVID 19 infection   Covid -19 pandemic emergency    Hypernatremia     LOS: 6  PLAN:    · D/w RT  · D/w RN   · Sedation reviewed   · Discontinue epoprostenol. · Cont ARDS ventilation   ·  prone positioning - continue if helping.   · Airborne isolation and droplet precautions to be continued  · Continue supportive care   · Cont treatment with medications for COVID  · Cont tube feeding  · Monitor endotracheal secretions   · Will obtain xray chest   · ABG  · Prognosis guarded given age and severity of illness. Treatment plan Discussed with nursing staff in detail , all questions answered . Total critical care time caring for this patient with life threatening, unstable organ failure, including direct patient contact, management of life support systems, review of data including imaging and labs, discussions with other team members and physicians at least 27  Min so far today, excluding procedures. Electronically signed by Anthony Mchugh DO on 12/18/2021 at 10:08 PM       This patient was evaluated in the context of the global SARS-CoV-2 (COVID-19) pandemic, which necessitated considerations that the patient either has COVID-19 infection or is at risk of infection with COVID-19. Institutional protocols and algorithms that pertain to the evaluation & management of patients with COVID-19 or those at risk for COVID-19 are in a state of rapid changes based on information released by regulatory bodies including the CDC and federal and state organizations. These policies and algorithms were followed during the patient's care. Please note that this chart was generated using voice recognition Dragon dictation software. Although every effort was made to ensure the accuracy of this automated transcription, some errors in transcription may have occurred.

## 2021-12-20 LAB
ABSOLUTE EOS #: 0 K/UL (ref 0–0.4)
ABSOLUTE IMMATURE GRANULOCYTE: 0.34 K/UL (ref 0–0.3)
ABSOLUTE LYMPH #: 1.51 K/UL (ref 1–4.8)
ABSOLUTE MONO #: 1.51 K/UL (ref 0.1–0.8)
ALBUMIN SERPL-MCNC: 2.9 G/DL (ref 3.5–5.2)
ALBUMIN/GLOBULIN RATIO: 1.2 (ref 1–2.5)
ALLEN TEST: ABNORMAL
ALP BLD-CCNC: 72 U/L (ref 40–129)
ALT SERPL-CCNC: 69 U/L (ref 5–41)
ANION GAP SERPL CALCULATED.3IONS-SCNC: 11 MMOL/L (ref 9–17)
AST SERPL-CCNC: 28 U/L
BASOPHILS # BLD: 0 % (ref 0–2)
BASOPHILS ABSOLUTE: 0 K/UL (ref 0–0.2)
BILIRUB SERPL-MCNC: 0.52 MG/DL (ref 0.3–1.2)
BUN BLDV-MCNC: 32 MG/DL (ref 6–20)
BUN/CREAT BLD: ABNORMAL (ref 9–20)
CALCIUM SERPL-MCNC: 8.3 MG/DL (ref 8.6–10.4)
CHLORIDE BLD-SCNC: 106 MMOL/L (ref 98–107)
CO2: 26 MMOL/L (ref 20–31)
CREAT SERPL-MCNC: 0.5 MG/DL (ref 0.7–1.2)
DIFFERENTIAL TYPE: ABNORMAL
EOSINOPHILS RELATIVE PERCENT: 0 % (ref 1–4)
FIO2: 65
GFR AFRICAN AMERICAN: >60 ML/MIN
GFR NON-AFRICAN AMERICAN: >60 ML/MIN
GFR SERPL CREATININE-BSD FRML MDRD: ABNORMAL ML/MIN/{1.73_M2}
GFR SERPL CREATININE-BSD FRML MDRD: ABNORMAL ML/MIN/{1.73_M2}
GLUCOSE BLD-MCNC: 108 MG/DL (ref 74–100)
GLUCOSE BLD-MCNC: 112 MG/DL (ref 75–110)
GLUCOSE BLD-MCNC: 120 MG/DL (ref 70–99)
GLUCOSE BLD-MCNC: 126 MG/DL (ref 75–110)
GLUCOSE BLD-MCNC: 128 MG/DL (ref 75–110)
GLUCOSE BLD-MCNC: 136 MG/DL (ref 75–110)
GLUCOSE BLD-MCNC: 148 MG/DL (ref 75–110)
GLUCOSE BLD-MCNC: 152 MG/DL (ref 75–110)
GLUCOSE BLD-MCNC: 168 MG/DL (ref 75–110)
HCT VFR BLD CALC: 46.6 % (ref 40.7–50.3)
HEMOGLOBIN: 14.8 G/DL (ref 13–17)
IMMATURE GRANULOCYTES: 2 %
LYMPHOCYTES # BLD: 9 % (ref 24–44)
MCH RBC QN AUTO: 30.2 PG (ref 25.2–33.5)
MCHC RBC AUTO-ENTMCNC: 31.8 G/DL (ref 28.4–34.8)
MCV RBC AUTO: 95.1 FL (ref 82.6–102.9)
MODE: ABNORMAL
MONOCYTES # BLD: 9 % (ref 1–7)
MORPHOLOGY: NORMAL
NEGATIVE BASE EXCESS, ART: ABNORMAL (ref 0–2)
NRBC AUTOMATED: 0 PER 100 WBC
O2 DEVICE/FLOW/%: ABNORMAL
PATIENT TEMP: ABNORMAL
PDW BLD-RTO: 13.7 % (ref 11.8–14.4)
PLATELET # BLD: 177 K/UL (ref 138–453)
PLATELET ESTIMATE: ABNORMAL
PMV BLD AUTO: 11.9 FL (ref 8.1–13.5)
POC HCO3: 26.9 MMOL/L (ref 21–28)
POC O2 SATURATION: 92 % (ref 94–98)
POC PCO2 TEMP: ABNORMAL MM HG
POC PCO2: 36 MM HG (ref 35–48)
POC PH TEMP: ABNORMAL
POC PH: 7.48 (ref 7.35–7.45)
POC PO2 TEMP: ABNORMAL MM HG
POC PO2: 58.8 MM HG (ref 83–108)
POSITIVE BASE EXCESS, ART: 4 (ref 0–3)
POTASSIUM SERPL-SCNC: 4.4 MMOL/L (ref 3.7–5.3)
RBC # BLD: 4.9 M/UL (ref 4.21–5.77)
RBC # BLD: ABNORMAL 10*6/UL
SAMPLE SITE: ABNORMAL
SEG NEUTROPHILS: 80 % (ref 36–66)
SEGMENTED NEUTROPHILS ABSOLUTE COUNT: 13.44 K/UL (ref 1.8–7.7)
SODIUM BLD-SCNC: 143 MMOL/L (ref 135–144)
TCO2 (CALC), ART: ABNORMAL MMOL/L (ref 22–29)
TOTAL PROTEIN: 5.4 G/DL (ref 6.4–8.3)
WBC # BLD: 16.8 K/UL (ref 3.5–11.3)
WBC # BLD: ABNORMAL 10*3/UL

## 2021-12-20 PROCEDURE — 2700000000 HC OXYGEN THERAPY PER DAY

## 2021-12-20 PROCEDURE — 82803 BLOOD GASES ANY COMBINATION: CPT

## 2021-12-20 PROCEDURE — 99232 SBSQ HOSP IP/OBS MODERATE 35: CPT | Performed by: INTERNAL MEDICINE

## 2021-12-20 PROCEDURE — 2000000000 HC ICU R&B

## 2021-12-20 PROCEDURE — 6360000002 HC RX W HCPCS: Performed by: NURSE PRACTITIONER

## 2021-12-20 PROCEDURE — 82947 ASSAY GLUCOSE BLOOD QUANT: CPT

## 2021-12-20 PROCEDURE — 2500000003 HC RX 250 WO HCPCS: Performed by: INTERNAL MEDICINE

## 2021-12-20 PROCEDURE — 6370000000 HC RX 637 (ALT 250 FOR IP): Performed by: NURSE PRACTITIONER

## 2021-12-20 PROCEDURE — 94150 VITAL CAPACITY TEST: CPT

## 2021-12-20 PROCEDURE — 2500000003 HC RX 250 WO HCPCS: Performed by: EMERGENCY MEDICINE

## 2021-12-20 PROCEDURE — 6370000000 HC RX 637 (ALT 250 FOR IP): Performed by: STUDENT IN AN ORGANIZED HEALTH CARE EDUCATION/TRAINING PROGRAM

## 2021-12-20 PROCEDURE — 6370000000 HC RX 637 (ALT 250 FOR IP): Performed by: INTERNAL MEDICINE

## 2021-12-20 PROCEDURE — 85025 COMPLETE CBC W/AUTO DIFF WBC: CPT

## 2021-12-20 PROCEDURE — 2580000003 HC RX 258: Performed by: NURSE PRACTITIONER

## 2021-12-20 PROCEDURE — 94003 VENT MGMT INPAT SUBQ DAY: CPT

## 2021-12-20 PROCEDURE — 99291 CRITICAL CARE FIRST HOUR: CPT | Performed by: INTERNAL MEDICINE

## 2021-12-20 PROCEDURE — 99233 SBSQ HOSP IP/OBS HIGH 50: CPT | Performed by: INTERNAL MEDICINE

## 2021-12-20 PROCEDURE — 94761 N-INVAS EAR/PLS OXIMETRY MLT: CPT

## 2021-12-20 PROCEDURE — 2580000003 HC RX 258: Performed by: INTERNAL MEDICINE

## 2021-12-20 PROCEDURE — 6360000002 HC RX W HCPCS: Performed by: INTERNAL MEDICINE

## 2021-12-20 PROCEDURE — 80053 COMPREHEN METABOLIC PANEL: CPT

## 2021-12-20 PROCEDURE — 37799 UNLISTED PX VASCULAR SURGERY: CPT

## 2021-12-20 RX ORDER — METOPROLOL TARTRATE 5 MG/5ML
5 INJECTION INTRAVENOUS EVERY 6 HOURS PRN
Status: DISCONTINUED | OUTPATIENT
Start: 2021-12-20 | End: 2022-01-06

## 2021-12-20 RX ADMIN — DEXMEDETOMIDINE HYDROCHLORIDE 1 MCG/KG/HR: 4 INJECTION, SOLUTION INTRAVENOUS at 22:59

## 2021-12-20 RX ADMIN — ACETAMINOPHEN 650 MG: 160 SOLUTION ORAL at 20:53

## 2021-12-20 RX ADMIN — Medication 200 MCG/HR: at 14:18

## 2021-12-20 RX ADMIN — CISATRACURIUM BESYLATE 2.5 MCG/KG/MIN: 10 INJECTION, SOLUTION INTRAVENOUS at 08:20

## 2021-12-20 RX ADMIN — Medication 2 MCG/MIN: at 07:06

## 2021-12-20 RX ADMIN — DEXMEDETOMIDINE HYDROCHLORIDE 0.8 MCG/KG/HR: 4 INJECTION, SOLUTION INTRAVENOUS at 00:00

## 2021-12-20 RX ADMIN — DEXMEDETOMIDINE HYDROCHLORIDE 0.8 MCG/KG/HR: 4 INJECTION, SOLUTION INTRAVENOUS at 09:13

## 2021-12-20 RX ADMIN — ENOXAPARIN SODIUM 30 MG: 100 INJECTION SUBCUTANEOUS at 20:53

## 2021-12-20 RX ADMIN — Medication 200 MCG/HR: at 19:49

## 2021-12-20 RX ADMIN — METOPROLOL TARTRATE 5 MG: 5 INJECTION INTRAVENOUS at 21:36

## 2021-12-20 RX ADMIN — CEFEPIME 2000 MG: 2 INJECTION, POWDER, FOR SOLUTION INTRAVENOUS at 09:20

## 2021-12-20 RX ADMIN — POLYETHYLENE GLYCOL 3350 17 G: 17 POWDER, FOR SOLUTION ORAL at 15:52

## 2021-12-20 RX ADMIN — POLYETHYLENE GLYCOL 3350 17 G: 17 POWDER, FOR SOLUTION ORAL at 08:56

## 2021-12-20 RX ADMIN — IBUPROFEN 200 MG: 100 SUSPENSION ORAL at 10:52

## 2021-12-20 RX ADMIN — Medication 2000 UNITS: at 08:57

## 2021-12-20 RX ADMIN — INSULIN GLARGINE 10 UNITS: 100 INJECTION, SOLUTION SUBCUTANEOUS at 20:53

## 2021-12-20 RX ADMIN — ACETAMINOPHEN 650 MG: 160 SOLUTION ORAL at 15:52

## 2021-12-20 RX ADMIN — Medication 30 MG: at 08:56

## 2021-12-20 RX ADMIN — SODIUM CHLORIDE, PRESERVATIVE FREE 10 ML: 5 INJECTION INTRAVENOUS at 20:53

## 2021-12-20 RX ADMIN — Medication 200 MCG/HR: at 04:24

## 2021-12-20 RX ADMIN — FUROSEMIDE 40 MG: 10 INJECTION, SOLUTION INTRAVENOUS at 08:56

## 2021-12-20 RX ADMIN — Medication 200 MCG/HR: at 09:20

## 2021-12-20 RX ADMIN — Medication 3 MG/HR: at 20:52

## 2021-12-20 RX ADMIN — DEXMEDETOMIDINE HYDROCHLORIDE 0.8 MCG/KG/HR: 4 INJECTION, SOLUTION INTRAVENOUS at 04:22

## 2021-12-20 RX ADMIN — DOCUSATE SODIUM 100 MG: 50 LIQUID ORAL at 08:56

## 2021-12-20 RX ADMIN — SODIUM CHLORIDE, PRESERVATIVE FREE 10 ML: 5 INJECTION INTRAVENOUS at 08:57

## 2021-12-20 RX ADMIN — Medication 200 MCG/HR: at 18:50

## 2021-12-20 RX ADMIN — CISATRACURIUM BESYLATE 2 MCG/KG/MIN: 10 INJECTION, SOLUTION INTRAVENOUS at 16:45

## 2021-12-20 RX ADMIN — CEFEPIME 2000 MG: 2 INJECTION, POWDER, FOR SOLUTION INTRAVENOUS at 20:52

## 2021-12-20 RX ADMIN — KETAMINE HYDROCHLORIDE 0.2 MG/KG/HR: 50 INJECTION INTRAMUSCULAR; INTRAVENOUS at 21:22

## 2021-12-20 RX ADMIN — ACETAMINOPHEN 650 MG: 160 SOLUTION ORAL at 10:29

## 2021-12-20 RX ADMIN — DEXMEDETOMIDINE HYDROCHLORIDE 1 MCG/KG/HR: 4 INJECTION, SOLUTION INTRAVENOUS at 16:31

## 2021-12-20 RX ADMIN — ACETAMINOPHEN 650 MG: 650 SUPPOSITORY RECTAL at 06:28

## 2021-12-20 RX ADMIN — INSULIN LISPRO 1 UNITS: 100 INJECTION, SOLUTION INTRAVENOUS; SUBCUTANEOUS at 10:50

## 2021-12-20 RX ADMIN — Medication 10 MG/HR: at 10:02

## 2021-12-20 RX ADMIN — DEXMEDETOMIDINE HYDROCHLORIDE 1 MCG/KG/HR: 4 INJECTION, SOLUTION INTRAVENOUS at 19:49

## 2021-12-20 RX ADMIN — INSULIN LISPRO 1 UNITS: 100 INJECTION, SOLUTION INTRAVENOUS; SUBCUTANEOUS at 16:20

## 2021-12-20 RX ADMIN — ENOXAPARIN SODIUM 30 MG: 100 INJECTION SUBCUTANEOUS at 08:56

## 2021-12-20 RX ADMIN — IBUPROFEN 200 MG: 100 SUSPENSION ORAL at 17:17

## 2021-12-20 RX ADMIN — DEXAMETHASONE SODIUM PHOSPHATE 10 MG: 10 INJECTION INTRAMUSCULAR; INTRAVENOUS at 12:22

## 2021-12-20 ASSESSMENT — PULMONARY FUNCTION TESTS
PIF_VALUE: 29
PIF_VALUE: 31
PIF_VALUE: 28
PIF_VALUE: 32
PIF_VALUE: 32
PIF_VALUE: 31
PIF_VALUE: 29

## 2021-12-20 ASSESSMENT — PAIN SCALES - GENERAL
PAINLEVEL_OUTOF10: 0
PAINLEVEL_OUTOF10: 6

## 2021-12-20 NOTE — PROGRESS NOTES
[facility]  Office: [address], DO, Eliseo Reilly, DO, Chris Norton, DO, Ryann Morris, DO, Krista Real MD, Tima Portillo MD, Yasmeen Viramontes MD, Sanam Rico MD, Caro Roberts MD, Patsy Leon MD, Rancho Singh MD, Haskell Leventhal, DO, Abelardo Romero, DO, Cheryle Bumpers, MD,  Yves Kothari, DO, Liat Cosme MD, aKy Barahona MD, Jamal Abarca MD, Darius Rivera MD, Gordon Cruz MD, Kalvin Spurling, MD, Saint Goodwill, MD, Pavel Jennings, [facility], [facility], [facility], Nancy Modi, CNP, Talia Rice, CNS, Elías Saldaña, CNP, Farzana Flores, CNP, Joy Barber, CNP, Georgia Abraham, CNP, Sia Barnes, CNP, Kiara Alba PA-C, Patricia Adhikari, DNP, Wilian Jack, [facility], Staci Askew, CNP, Ihsan Hopkins, CNP, Ariane Carter, CNP, Trupti Venegas, CNP, Brigette Varma, CNP, Nicole June, [address] [place] [address]    Progress Note    12/20/2021    10:54 AM    Name:   Nancy Ortiz  MRN:     [id number]     Acct:      [de-identified]   Room:   [address] [place] [address] Day:  8  Admit Date:  12/12/2021 11:39 AM    PCP:   Mile Monahan MD  Code Status:  Full Code    Subjective:     C/C: Shortness of Breath      Interval History Status: not changed. Patient still having fevers. He is seen supine on vent, sedated and paralyzed. He is on 60% FiO2. Brief History:     Per my partner:  \"62year-old male past medical history of obesity, asthma, IRMA on CPAP, hypertension, GERD presents with shortness of breath and cough at [facility] facility. Patient transferred to [facility] for further care. Patient was intubated 12/11/2021 currently paralyzed\"    Review of Systems:     Unable to obtain    Medications: Allergies:     Allergies   Allergen Reactions    Nuts [Peanut-Containing Drug Products] Anaphylaxis    Sunflower Oil Anaphylaxis     Sunflower seeds       Current Meds:   Scheduled Meds:    lansoprazole  30 mg Oral QAM AC    cefepime  2,000 mg IntraVENous Q12H    furosemide  40 mg IntraVENous Daily    insulin glargine  10 Units SubCUTAneous Nightly    sodium chloride flush  5-40 mL IntraVENous 2 times per day    enoxaparin  30 mg SubCUTAneous BID    Vitamin D  2,000 Units Oral Daily    dexamethasone  10 mg IntraVENous Q24H    insulin lispro  0-6 Units SubCUTAneous Q4H     Continuous Infusions:    dexmedetomidine 0.8 mcg/kg/hr (12/20/21 0913)    ketamine (KETALAR) infusion for analgosedation 0.2 mg/kg/hr (12/19/21 0552)    sodium chloride      dextrose      norepinephrine 2 mcg/min (12/20/21 0706)    cisatracurium (NIMBEX) infusion 2.5 mcg/kg/min (12/20/21 0820)    midazolam 10 mg/hr (12/20/21 1002)    fentaNYL 200 mcg/hr (12/20/21 0920)    dextrose      sodium chloride 10 mL/hr at 12/13/21 1548     PRN Meds: ibuprofen, polyethylene glycol, senna, docusate, bisacodyl, acetaminophen, sodium chloride flush, sodium chloride, ondansetron **OR** ondansetron, [DISCONTINUED] acetaminophen **OR** acetaminophen, glucose, dextrose, glucagon (rDNA), dextrose, glucose, dextrose, glucagon (rDNA), dextrose    Data:     Past Medical History:   has a past medical history of Arthritis, Asthma, Diabetes mellitus ([address] Edema, GERD (gastroesophageal reflux disease), Hypertension, Migraine, and IRMA on CPAP. Social History:   reports that he has quit smoking. He has never used smokeless tobacco. He reports current alcohol use. He reports that he does not use drugs.      Family History:   Family History   Problem Relation Age of Onset    Heart Attack Sister 32    Diabetes Paternal Grandmother     Heart Disease Paternal Uncle     Heart Disease Paternal Uncle     Heart Disease Paternal Uncle     Cancer Maternal Aunt     Cancer Maternal Aunt     Cancer Maternal Uncle     Cancer Maternal Uncle        Vitals:  BP (!) 136/93   Pulse 116   Temp 102.2 °F (39 °C) (Bladder)   Resp 30   Ht 6' 2\" (1.88 m)   Wt 266 lb 8.6 oz (120.9 kg) SpO2 92%   BMI 34.22 kg/m²   Temp (24hrs), Av.5 °F (38.6 °C), Min:100.4 °F (38 °C), Max:102.6 °F (39.2 °C)    Recent Labs     21  0516 21  0554 21  0646 21  1000   POCGLU 108* 112* 126* 148*       I/O (24Hr): Intake/Output Summary (Last 24 hours) at 2021 1054  Last data filed at 2021 1000  Gross per 24 hour   Intake 1403.59 ml   Output 4150 ml   Net -2746.41 ml       Labs:  Hematology:  Recent Labs     21  0510 21  0504 21  0558   WBC 13.5* 17.0* 16.8*   RBC 4.97 4.98 4.90   HGB 15.4 15.5 14.8   HCT 48.3 47.6 46.6   MCV 97.2 95.6 95.1   MCH 31.0 31.1 30.2   MCHC 31.9 32.6 31.8   RDW 14.1 13.7 13.7    191 177   MPV 10.7 11.4 11.9     Chemistry:  Recent Labs     21  0510 21  0946 21  1535 21  0504 21  0558   *   < > 143 142 143   K 4.6  --   --  4.7 4.4   *  --   --  104 106   CO2 27  --   --  26 26   GLUCOSE 117*  --   --  131* 120*   BUN 42*  --   --  30* 32*   CREATININE 0.53*  --   --  0.45* 0.50*   ANIONGAP 11  --   --  12 11   LABGLOM >60  --   --  >60 >60   GFRAA >60  --   --  >60 >60   CALCIUM 8.6  --   --  8.4* 8.3*   PHOS 3.2  --   --   --   --     < > = values in this interval not displayed. Recent Labs     21  0504 21  1110 21  2116 21  0244 21  0516 21  0554 21  0558 21  0646 21  1000   PROT 5.5*  --   --   --   --   --  5.4*  --   --    LABALBU 3.1*  --   --   --   --   --  2.9*  --   --    AST 31  --   --   --   --   --  28  --   --    ALT 93*  --   --   --   --   --  69*  --   --    ALKPHOS 74  --   --   --   --   --  72  --   --    BILITOT 0.57  --   --   --   --   --  0.52  --   --    POCGLU  --    < > 152* 136* 108* 112*  --  126* 148*    < > = values in this interval not displayed.      ABG:  Lab Results   Component Value Date    POCPH 7.482 2021    POCPCO2 36.0 2021    POCPO2 58.8 2021    POCHCO3 26.9 2021 NBEA NOT REPORTED 12/20/2021    PBEA 4 12/20/2021    FVV3FYK NOT REPORTED 12/20/2021    XTEH0FQV 92 12/20/2021    FIO2 65.0 12/20/2021     Lab Results   Component Value Date/Time    SPECIAL NOT REPORTED 12/18/2021 12:34 PM     Lab Results   Component Value Date/Time    CULTURE NO GROWTH 1 DAY 12/18/2021 12:34 PM       Radiology:  XR CHEST (SINGLE VIEW FRONTAL)    Result Date: 12/16/2021  No significant change in multifocal airspace opacities. Continued follow-up is recommended document complete resolution following medical treatment course. Stable position lines and catheters     XR CHEST (SINGLE VIEW FRONTAL)    Result Date: 12/14/2021  Mild interval improvement in multifocal airspace disease particularly at the right base. Support tubes and lines as above. XR CHEST PORTABLE    Result Date: 12/19/2021  Cardiomegaly, vascular congestion and worsening pulmonary opacities with bilateral effusions favoring pulmonary edema over multifocal airspace disease. Support tubes and lines as above.        Physical Examination:        General appearance:  Intubated, sedated  Mental Status:  sedated  Lungs:  clear to auscultation bilaterally, anterior  Heart:  regular rate and rhythm, no murmur  Abdomen:  soft, nontender, obese, hypoactive bowel sounds, no masses, hepatomegaly, splenomegaly  Extremities:  trace edema bilat, no redness, tenderness in the calves  Skin:  no gross lesions, rashes, induration    Assessment:        Hospital Problems           Last Modified POA    * (Principal) Pneumonia due to COVID-19 virus 12/12/2021 Yes    Acute respiratory failure with hypoxia ([address] 12/12/2021 Yes    Diabetes mellitus ([address] 12/12/2021 Yes    Asthma 12/12/2021 Yes    IRMA on CPAP 12/12/2021 Yes    Overview Signed 12/12/2021  2:39 PM by Naveen Goodman, DO     seldom use machine         Hypertension 12/12/2021 Yes    Overview Signed 12/12/2021  2:39 PM by Naveen Goodman DO     on lasix         GERD (gastroesophageal reflux disease) 12/12/2021 Yes    ARDS (adult respiratory distress syndrome) ([facility] 12/12/2021 Yes          Plan:        Acute resp failure due to Covid 19- sedated and paralyzed on vent, managed by Pulmonary. Lasix IV, monitor I/Os, s/p Actemra, on Decadron, ID following.   Fever from ?- cultures negative thus far, ID started IV Cefepime  HTN- Lopressor 5mg IV q 6hrs prn SBP > 150  DM2- check BGT q 6hrs, SSI  Constipation- Miralax, Senna, colace, supp prn  Gi/ DVT proph  Full code  Prognosis guarded    dw nurse    Estuardo Pelayo MD  12/20/2021  10:54 AM

## 2021-12-20 NOTE — PROGRESS NOTES
[facility] - Progress Note   Note COVID 19 Patient  Today's Date and Time: 12/20/2021, 8:45 AM    Impression :     COVID 19 Confirmed Infection  Covid tests:  12/11/2021: Positive  Elevated inflammatory markers  Acute hypoxic respiratory failure  History of asthma  Diabetes mellitus  Essential hypertension  IRMA on CPAP  Patient has not received the Covid vaccination    Recommendations:   Antibiotic treatment:  The patient is having high-grade fevers and cultures have been sent.-No growth on cultures thus far  I will start the patient empirically on antibiotic therapy with cefepime on 12/18  Covid Rx:    Remdesivir-out of window  Decadron-high-dose initiated  Actemra-ordered 12/12/2021  Monoclonal antibodies-out of window      Medical Decision Making/Summary/Discussion:12/20/2021     Patient admitted with COVID 19 infection  He may come out of isolation on 12/31/21    Infection Control Recommendations   Marian[place] Precautions  Airborne isolation  Droplet Isolation    Antimicrobial Stewardship Recommendations       Discontinuation of therapy  Coordination of Outpatient Care:   Estimated Length of IV antimicrobials:TBD  Patient will need Midline Catheter Insertion: TBD  Patient will need PICC line Insertion: No  Patient will need: Home IV , [place],  SNF,  LTAC:TBD  Patient will need outpatient wound care:No    Chief complaint/reason for consultation:   Concern for COVID infection      History of Present Illness:   Lizz Sosa is a 60y.o.-year-old male who was initially admitted on 12/12/2021. Patient seen at the request of Dr. Mihaela Tripathi:    Patient presented through [facility] ER on 12/11/2021 with complaints of worsening shortness of breath with associated generalized weakness and nonproductive cough over the past several days. He was exposed to his son who was diagnosed with Covid last week and has not received the Covid vaccine.     The patient was very hypoxic with an SPO2 in the high 50s on room air. Imaging revealed bilateral pulmonary opacities typical of COVID-19    Abnormal labs include:    Ferritin 2800      Patient has been intubated and transferred to [facility]  He has been started on high-dose Decadron and Actemra has been ordered    CT CHEST PULMONARY EMBOLISM W CONTRAST 12/11/2021   Final Result   1. No evidence of pulmonary embolism   2. Diffuse ground-glass opacities throughout the lungs, typical of COVID-19   pulmonary disease.           XR CHEST (SINGLE VIEW FRONTAL) 12/11/2021   Final Result   Multifocal hazy opacities throughout both lungs consistent with COVID   pneumonia and or pulmonary edema       Patient admitted because of concerns with COVID 19.    CURRENT EVALUATION : 12/20/2021    High-grade fevers continue  Hypotension on low dose levophed with tachycardia    The patient continues on mechanical ventilation with sedation and paralytic per ARDS protocol. He is on 70-->65% FiO2 and 12 of PEEP with epoprostenol    CRP is decreasing   CXR is showing concern for pulmonary edema    Cefepime continues from 12/18  Cultures show no growth thus far    Patient exhibiting respiratory distress. yes  Respiratory secretions: no    Patient receiving supplemental oxygen.   Mechanical ventilation  RR:  20-->30  02 sat: 97-->95    % FIO2: 90-->80-->85-->80-->65  PEEP:   21-->16-->14-->12    QTc:       NEWS Score: 0-4 Low risk group; 5-6: Medium risk group; 7 or above: High risk group  Parameters 3 2 1 0 1 2 3   Age    < 65   = 65   RR = 8  9-11 12-20  21-24 = 25   O2 Sats = 91 92-93 94-95 = 96      Suppl O2  Yes  No      SBP = 90  101-110 111-219   = 220   HR = 40  41-50 51-90  111-130 = 131   Consciousness    Alert   Drowsiness, lethargy, or confusion   Temperature = 35.0 C (95.0 F)  35.1-36.0 C 95.1-96.9 F 36.1-38.0 C 97.0-100.4 F 38.1-39.0 C 100.5-102.3 F = 39.1 C = 102.4 F      NEWS Score:  12/12/2021: 13 high risk    Overall Daily Picture:     Unchanged    Presence of secondary bacterial Infection:    No   Additional antibiotics: No    Labs, X rays reviewed: 12/20/2021    BUN:32  Cr:0.5    WBC:13.5-->17-->16.8  Hb:14.8  Plat: 177    Absolute Neutrophils:3.73  Absolute Lymphocytes:0.29  Neutrophil/Lymphocyte Ratio: 12.8 high risk    CRP:293-->277-->137-->50.8-->23  Ferritin:2800  LDH: 838    Pro Calcitonin:      Cultures:  Urine:  12/18/21: No bacterial growth  Blood:  12/18/21: No growth thus far  Sputum :  12/18/21: Normal respiratory sherry  Wound:      CXR:     12/19/21      1221 diffuse bilateral infiltrates  CAT:      Discussed with patient, RN, CC, IM.      12-12-21:      I have personally reviewed the past medical history, past surgical history, medications, social history, and family history, and I have updated the database accordingly.   Past Medical History:     Past Medical History:   Diagnosis Date    Arthritis     Asthma     Diabetes mellitus ([address]     Edema     GERD (gastroesophageal reflux disease)     Hypertension     on lasix    Migraine     IRMA on CPAP     seldom use machine       Past Surgical  History:     Past Surgical History:   Procedure Laterality Date    APPENDECTOMY      ARTHROPLASTY Left 11/1/2019    LEFT FOOT 1ST MTPJ ARTHROPLASTY WITH PEREZ IMPLANT AND GROMMETS  [facility] performed by Zack Contreras MD at [address] [facility] Right 12/12/2019    RIGHT FOOT ARTHROPLASTY 1ST MTPJ (Right Foot)    ARTHROPLASTY Right 12/12/2019    RIGHT FOOT ARTHROPLASTY 1ST MTPJ performed by Zack Contreras MD at [address] Right    [address]  2014    no blockage no stents    CHOLECYSTECTOMY      COLONOSCOPY      ENDOSCOPY, COLON, DIAGNOSTIC      TOE SURGERY Left 11/01/2019     LEFT FOOT 1ST MTPJ ARTHROPLASTY WITH PEREZ IMPLANT AND GROMMETS  [facility]EN [facility] (Left )    TONSILLECTOMY         Medications:      lansoprazole  30 mg Oral QAM AC    cefepime  2,000 mg IntraVENous Q12H    furosemide  40 mg IntraVENous Daily    insulin glargine  10 Units SubCUTAneous Nightly    sodium chloride flush  5-40 mL IntraVENous 2 times per day    enoxaparin  30 mg SubCUTAneous BID    Vitamin D  2,000 Units Oral Daily    dexamethasone  10 mg IntraVENous Q24H    insulin lispro  0-6 Units SubCUTAneous Q4H       Social History:     Social History     Socioeconomic History    Marital status:      Spouse name: Not on file    Number of children: Not on file    Years of education: Not on file    Highest education level: Not on file   Occupational History    Not on file   Tobacco Use    Smoking status: Former Smoker    Smokeless tobacco: Never Used   Vaping Use    Vaping Use: Never used   Substance and Sexual Activity    Alcohol use: Yes     Comment: every couple months    Drug use: Never    Sexual activity: Not on file   Other Topics Concern    Not on file   Social History Narrative    Not on file     Social Determinants of Health     Financial Resource Strain:     Difficulty of Paying Living Expenses: Not on file   Food Insecurity:     Worried About Running Out of Food in the Last Year: Not on file    Lucina of Food in the Last Year: Not on file   Transportation Needs:     Lack of Transportation (Medical): Not on file    Lack of Transportation (Non-Medical):  Not on file   Physical Activity:     Days of Exercise per Week: Not on file    Minutes of Exercise per Session: Not on file   Stress:     Feeling of Stress : Not on file   Social Connections:     Frequency of Communication with Friends and Family: Not on file    Frequency of Social Gatherings with Friends and Family: Not on file    Attends Synagogue Services: Not on file    Active Member of Clubs or Organizations: Not on file    Attends Club or Organization Meetings: Not on file    Marital Status: Not on file   Intimate Partner Violence:     Fear of Current or Ex-Partner: Not on file    Emotionally Abused: Not on file    Physically Abused: Not on file    Sexually Abused: Not on file   Housing Stability:     Unable to Pay for Housing in the Last Year: Not on file    Number of Places Lived in the Last Year: Not on file    Unstable Housing in the Last Year: Not on file       Family History:     Family History   Problem Relation Age of Onset    Heart Attack Sister 32    Diabetes Paternal Grandmother     Heart Disease Paternal Uncle     Heart Disease Paternal Uncle     Heart Disease Paternal Uncle     Cancer Maternal Aunt     Cancer Maternal Aunt     Cancer Maternal Uncle     Cancer Maternal Uncle         Allergies:   Nuts [peanut-containing drug products] and Sunflower oil     Review of Systems:     Unable to assess 12/20/2021  Intubated and sedated      Physical Examination :     Patient Vitals for the past 8 hrs:   BP Temp Temp src Pulse Resp SpO2   12/20/21 0800 135/86 -- Bladder 115 30 96 %   12/20/21 0700 106/71 -- -- 68 30 93 %   12/20/21 0600 -- 102.2 °F (39 °C) Bladder 68 30 93 %   12/20/21 0513 -- -- -- -- 30 93 %   12/20/21 0500 -- -- -- 67 30 94 %   12/20/21 0400 -- 100.8 °F (38.2 °C) Bladder 66 30 97 %   12/20/21 0300 -- -- -- 66 30 97 %   12/20/21 0200 (!) 98/52 100.8 °F (38.2 °C) Oral 68 30 (!) 87 %   12/20/21 0100 108/71 -- -- 73 29 92 %     General Appearance: Intubated and sedated  Head:  Normocephalic, no trauma  ENT: Not well evaluated as the patient is orally intubated  Neck:Supple, without lymphadenopathy. Thyroid normal, No bruits. Pulmonary/Chest: Diminished to auscultation, without wheezes, rales, or rhonchi. No dullness to percussion. Cardiovascular: Regular rate and rhythm without murmurs, rubs, or gallops. Abdomen: Soft, non tender. Bowel sounds normal. No organomegaly  All four Extremities: No cyanosis, clubbing, edema, or effusions. Neurologic: Intubated and sedated and paralyzed  Skin: Warm and dry with good turgor. No signs of peripheral arterial or venous insufficiency. No ulcerations. No open wounds. Medical Decision Making -Laboratory:   I have independently reviewed/ordered the following labs:    CBC with Differential:   Recent Labs     12/19/21  0504 12/20/21  0558   WBC 17.0* 16.8*   HGB 15.5 14.8   HCT 47.6 46.6    177   LYMPHOPCT 3* 9*   MONOPCT 6 9*     BMP:   Recent Labs     12/19/21  0504 12/20/21  0558    143   K 4.7 4.4    106   CO2 26 26   BUN 30* 32*   CREATININE 0.45* 0.50*     Hepatic Function Panel:   Recent Labs     12/19/21  0504 12/20/21  0558   PROT 5.5* 5.4*   LABALBU 3.1* 2.9*   BILITOT 0.57 0.52   ALKPHOS 74 72   ALT 93* 69*   AST 31 28     No results for input(s): RPR in the last 72 hours. No results for input(s): HIV in the last 72 hours. No results for input(s): BC in the last 72 hours. Lab Results   Component Value Date    MUCUS NOT REPORTED 12/18/2021    RBC 4.90 12/20/2021    TRICHOMONAS NOT REPORTED 12/18/2021    WBC 16.8 12/20/2021    YEAST NOT REPORTED 12/18/2021    TURBIDITY Clear 12/18/2021     Lab Results   Component Value Date    CREATININE 0.50 12/20/2021    GLUCOSE 120 12/20/2021       Medical Decision Making-Imaging:     Narrative   EXAMINATION:   CTA OF THE CHEST 12/11/2021 11:31 am       TECHNIQUE:   CTA of the chest was performed after the administration of intravenous   contrast.  Multiplanar reformatted images are provided for review.  MIP   images are provided for review.  Dose modulation, iterative reconstruction,   and/or weight based adjustment of the mA/kV was utilized to reduce the   radiation dose to as low as reasonably achievable.       COMPARISON:   Chest x-ray dated December 11, 2021       HISTORY:   ORDERING SYSTEM PROVIDED HISTORY: hypoxemia, covid positive, d-dimer-0.99   TECHNOLOGIST PROVIDED HISTORY:   hypoxemia, covid positive, d-dimer-0.99   Decision Support Exception - unselect if not a suspected or confirmed   emergency medical condition->Emergency Medical Condition (MA)   Reason for Exam: COVID positive, elevated D-Dimer       FINDINGS:   Pulmonary Arteries: Pulmonary arteries are adequately opacified for   evaluation.  No evidence of intraluminal filling defect to suggest pulmonary   embolism.  Main pulmonary artery is normal in caliber.       Mediastinum: Nonspecific mediastinal and hilar lymph nodes are present,   likely reactive. .  The heart and pericardium demonstrate no acute   abnormality.  There is no acute abnormality of the thoracic aorta.       Lungs/pleura: Diffuse ground-glass opacification is present throughout the   lungs, typical of COVID-19 pulmonary disease.  No pleural effusion or   pneumothorax is present.       Upper Abdomen: Images through the upper abdomen demonstrate a small hiatal   hernia.  Diffuse hepatic steatosis is present.  The gallbladder is surgically   absent.       Soft Tissues/Bones: No acute bone or soft tissue abnormality.           Impression   1. No evidence of pulmonary embolism   2. Diffuse ground-glass opacities throughout the lungs, typical of COVID-19   pulmonary disease.         Narrative   EXAMINATION:   ONE XRAY VIEW OF THE CHEST       12/11/2021 3:54 pm       COMPARISON:   November 13, 2012       HISTORY:   ORDERING SYSTEM PROVIDED HISTORY: hypoxemia, probable covid pneumonia   TECHNOLOGIST PROVIDED HISTORY:   hypoxemia, probable covid pneumonia       FINDINGS:   Multifocal hazy opacities throughout both lungs would be consistent with   history of COVID pneumonia.  Pulmonary edema could have a similar appearance.    No pneumothorax or pleural effusion.  Cardiac size enlarged.  Mediastinum   unremarkable.  No acute osseous abnormality.           Impression   Multifocal hazy opacities throughout both lungs consistent with COVID   pneumonia and or pulmonary edema.               Medical Decision [id number]-[id number]-[id number]:     Culture, Respiratory [[id number]] (Abnormal) Collected: 12/18/21 6146 Order Status: Completed Specimen: Endotracheal Updated: 12/18/21 1439    Specimen Description . ENDOTRACHEAL    Special Requests NOT REPORTED    Direct Exam >25 NEUTROPHILS/LPF     < 10 EPITHELIAL CELLS/LPF     MIXED BACTERIAL MORPHOTYPES SEEN ON GRAM STAIN.  Abnormal     Culture PENDING   Culture, Blood 1 [[id number]] Collected: 12/18/21 1234   Order Status: Completed Specimen: Blood Updated: 12/18/21 1358    Specimen Description . BLOOD    Special Requests NOT REPORTED    Culture NO GROWTH <24 HRS   Culture, Blood 1 [[id number]]    Order Status: Sent Specimen: Blood    Culture, Urine [[id number]] Collected: 12/14/21 0043   Order Status: Completed Specimen: Urine, straight catheter Updated: 12/14/21 1934    Specimen Description . URINE,STRAIGHT CATHETER    Special Requests NOT REPORTED    Culture NO GROWTH   MRSA DNA Probe, Nasal [[id number]] Collected: 12/12/21 1245   Order Status: Completed Specimen: Nasal Updated: 12/13/21 1052    Specimen Description . NASAL SWAB    MRSA, DNA, Nasal NEGATIVE:  MRSA DNA not detected by nucleic acid amplification. Comment:                                                    Results should be used as an adjunct to nosocomial control efforts to identify patients   needing enhanced precautions.     The test is not intended to identify patients with staphylococcal infections.  Results   should not be used to guide or monitor treatment for MRSA infections. Medical Decision Making-Other:     Note:  Labs, medications, radiologic studies were reviewed with personal review of films  Large amounts of data were reviewed  Discussed with nursing Staff, Discharge planner  Infection Control and Prevention measures reviewed  All prior entries were reviewed  Administer medications as ordered  Prognosis: Guarded  Discharge planning reviewed      Thank you for allowing us to participate in the care of this patient. Please call with questions.       Electronically signed by Mckenna Rosen SHO Bernstein - CNP on 12/20/2021 at 8:45 AM     ATTESTATION:    I have discussed the case, including pertinent history and exam findings with the APRN. I have evaluated the  History, physical findings and pictures of the patient and the key elements of the encounter have been performed by me. I have reviewed the laboratory data, other diagnostic studies and discussed them with the APRN. I have updated the medical record where necessary. I agree with the assessment, plan and orders as documented by the APRN.     Myrna Colon MD.

## 2021-12-20 NOTE — PROGRESS NOTES
Comprehensive Nutrition Assessment    Type and Reason for Visit:  Reassess    Nutrition Recommendations/Plan:   -Continue NPO status   -Continue tube feeding of Vital AF 1.2 (Peptide-Based) @ 50 mL/hr x 24 hrs + 2 protein modulars per day~> 1648 kcals, 142 gms protein, 973 mLs water (w/out propofol running)              -Water flushes per MD discretion               -If Prone/supine ~>               -Run TF @ 20 mL/hr x 16 hrs while prone               -Run TF @ 110 mL/hr x 8 hrs while supine + 2 protein modulars per day  -Will continue to monitor EN, labs and weights   -Suggest initiating appropriate bowel regimen     Nutrition Assessment:  Pt remains NPO, intubated and in droplet plus precaution room. Propofol is now off. Pt noted w/ hypoactive bowel sounds. RN reports that pt has high residuals of 350-360 mLs yesterday but is currently tolerating tube feeding @ 20 mL/hr while prone and 110 mL/hr while supine. RN reports that pt may switch to 24 hr continuous tube feeds. Informed RN to run TF @ 50 mL/hr is 24 hr tube feeds are desired. Pt noted w/ nuts and sunflower oil. No sunflower oil in Vital AF 1.2. Pt w/ 28 lbs wt reduction x 4 days? Question accuracy of initial wt? Will continue to monitor.     Malnutrition Assessment:  Malnutrition Status:  Insufficient data    Context:  Acute Illness     Findings of the 6 clinical characteristics of malnutrition:  Energy Intake:  No significant decrease in energy intake  Weight Loss:  Unable to assess (28 lb wt reduction x 4 days?)     Body Fat Loss:  Unable to assess     Muscle Mass Loss:  Unable to assess    Fluid Accumulation:  1 - Mild Extremities,Generalized   Strength:  Not Performed    Estimated Daily Nutrient Needs:  Energy (kcal):  11-14 ~> 6866-9719 kcals/d; Weight Used for Energy Requirements:  Admission     Protein (g):  1.5-2.0 gm/kg ~>129-172 gms/d; Weight Used for Protein Requirements:  Ideal        Fluid (ml/day):  per MD;     Nutrition Related Findings:  hypoactive bowel sounds; labs/meds reviewed      Wounds:  None       Current Nutrition Therapies:    Diet NPO  ADULT TUBE FEEDING; Orogastric; Peptide Based; Continuous; 10; Yes; 24; Q 4 hours; 50; 30; Q 4 hours; Protein; Bolus 2 Proteinex modulars per day  Current Tube Feeding (TF) Orders:  · Feeding Route: Orogastric  · Formula: Peptide Based  · Schedule: Continuous  · Additives/Modulars: Protein (2x/d)  · Water Flushes: per MD   · Rate: 20 mL/hr prone, 110 mL/hr supine   · Current TF & Flush Orders Provides: 20 mL/hr x 16 hrs while prone , @ 110 mL/hr x 8 hrs while supine + 2 protein modulars per day ~> 1648 kcals, 142 gms protein, 973 mLs water  · Goal TF & Flush Orders Provides: 20 mL/hr x 16 hrs while prone , @ 110 mL/hr x 8 hrs while supine + 2 protein modulars per day ~> 1648 kcals, 142 gms protein, 973 mLs water    Additional Calorie Sources:   none    Anthropometric Measures:  · Height: 6' 2\" (188 cm)  · Current Body Weight: 266 lb (120.7 kg)   · Admission Body Weight: 306 lb (138.8 kg)   · Ideal Body Weight: 190 lbs; % Ideal Body Weight 140 %   · BMI: 34.1  · BMI Categories: Obese Class 1 (BMI 30.0-34. 9)       Nutrition Diagnosis:   · Inadequate oral intake related to impaired respiratory function as evidenced by NPO or clear liquid status due to medical condition,intubation,nutrition support - enteral nutrition      Nutrition Interventions:   Food and/or Nutrient Delivery:  Continue NPO,Continue Current Tube Feeding  Nutrition Education/Counseling:  Education not indicated   Coordination of Nutrition Care:  Continue to monitor while inpatient    Goals: Achieved   Pt to meet % of est'd needs daily via EN       Nutrition Monitoring and Evaluation:   Food/Nutrient Intake Outcomes:  Enteral Nutrition Intake/Tolerance  Physical Signs/Symptoms Outcomes:  Biochemical Data,Nutrition Focused Physical Findings,Skin,Weight,GI Status,Fluid Status or Edema,Hemodynamic Status     Discharge Planning:

## 2021-12-20 NOTE — PROGRESS NOTES
Pulmonary Critical Care   Consult Note    Patient - Lois Carreno  Date of Admission -  2021 11:39 AM  Date of Evaluation -  2021  Room and Bed Number -  3026/3026-01   Hospital Day - 8    CHIEF COMPLAINT : ACUTE HYPOXIC RESPIRATORY FAILURE DUE TO COVID -19 PNEUMONIA   HPI:   Lois Carreno  60 y.o. male  admitted for COVID-19 at [facility] ER due to worsening oxygenation he was initially started on BiPAP and subsequently intubated. On room air his saturations had been 50%. CTA no PE but bilateral pulmonary infiltrates. He was accepted at [address] [place] [address] ICU. On arrival he is on PEEP of 22, 100% FiO2.    2021   Subjective      FiO2 remains at 65%. Remains on cis-atacarium, fentanyl, ketamine, midazolam, and dexmedetomidine. No sedation/neuromuscular blocker holiday. I/O-5.4 L.   SECRETIONS  -Amount:  [x] Small [] Moderate  [] Large    [] None  Color:     [x] White [] Colored  [] Bloody    SEDATION:    [x] Propofol gtt  [x] Versed gtt  [x] FENTANYL  gtt  gtt   [] No Sedation    PARALYZED:  [] No    [x] Yes    VASOPRESSORS:  [x] No    [] Yes  [x] Levophed [] Dopamine [] Vasopressin  [] Dobutamine [] Phenylephrine [] Epinephrine    INHALED veletri  : [] No    [] Yes    PRONE :       [] No    [x] Yes    Actemra:             [] No    [x] Yes  21  DEXAMETHASONE : [] No    [x] Yes      Ros unable to perform due to intubation and sedation    OBJECTIVE:     VITAL SIGNS:  /71   Pulse 68   Temp 102.2 °F (39 °C) (Bladder)   Resp 30   Ht 6' 2\" (1.88 m)   Wt 266 lb 8.6 oz (120.9 kg)   SpO2 93%   BMI 34.22 kg/m²   Tmax over 24 hours:  Temp (24hrs), Av.5 °F (38.6 °C), Min:100.4 °F (38 °C), Max:102.6 °F (39.2 °C)      Patient Vitals for the past 8 hrs:   BP Temp Temp src Pulse Resp SpO2   21 0700 106/71 -- -- 68 30 93 %   21 0600 -- 102.2 °F (39 °C) Bladder 68 30 93 %   21 0513 -- -- -- -- 30 93 %   21 0500 -- -- -- 67 30 94 %   21 0400 -- 100.8 °F (38.2 °C) Bladder 66 30 97 %   12/20/21 0300 -- -- -- 66 30 97 %   12/20/21 0200 (!) 98/52 100.8 °F (38.2 °C) Oral 68 30 (!) 87 %   12/20/21 0100 108/71 -- -- 73 29 92 %   12/20/21 0018 -- -- -- -- 30 --         Intake/Output Summary (Last 24 hours) at 12/20/2021 0815  Last data filed at 12/20/2021 0600  Gross per 24 hour   Intake 1343.59 ml   Output 3200 ml   Net -1856.41 ml     Date 12/20/21 0000 - 12/20/21 2359   Shift 8864-0585 5834-6125 5648-0727 24 Hour Total   INTAKE   Shift Total(mL/kg)       OUTPUT   Urine(mL/kg/hr) 700(0.7)   700   Shift Total(mL/kg) 700(5.8)   700(5.8)   Weight (kg) 120.9 120.9 120.9 120.9     Wt Readings from Last 3 Encounters:   12/19/21 266 lb 8.6 oz (120.9 kg)   12/11/21 300 lb (136.1 kg)   12/12/19 (!) 355 lb 9.6 oz (161.3 kg)     Body mass index is 34.22 kg/m². PHYSICAL EXAM:      I have discussed the care of Janet Gastelum, including pertinent history and exam findings, with the resident/APC/staff. I have seen the patient and the key elements of all parts of the encounter have been performed by me. Physical exam was deferred to limit physical exposure and PPE resources. I reviewed the interval history, interpreted all available radiographic, laboratory and physiologic data at the time of service. I agree with the assessment and plan as documented by resident/APC/ ancillary staff.   Patient remains on the ventilator  Tracheostomy midline  No subcutaneous air  No JVD  No rhonchi  Abdomen is not distended  No edema    MEDICATIONS:  Scheduled Meds:   lansoprazole  30 mg Oral QAM AC    cefepime  2,000 mg IntraVENous Q12H    furosemide  40 mg IntraVENous Daily    insulin glargine  10 Units SubCUTAneous Nightly    sodium chloride flush  5-40 mL IntraVENous 2 times per day    enoxaparin  30 mg SubCUTAneous BID    Vitamin D  2,000 Units Oral Daily    dexamethasone  10 mg IntraVENous Q24H    insulin lispro  0-6 Units SubCUTAneous Q4H     Continuous Infusions:   dexmedetomidine 0.8 mcg/kg/hr (12/20/21 0422)    ketamine (KETALAR) infusion for analgosedation 0.2 mg/kg/hr (12/19/21 0574)    sodium chloride      dextrose      norepinephrine 2 mcg/min (12/20/21 0706)    cisatracurium (NIMBEX) infusion 3.086 mcg/kg/min (12/19/21 3889)    midazolam 10 mg/hr (12/19/21 2099)    fentaNYL 200 mcg/hr (12/20/21 0424)    dextrose      sodium chloride 10 mL/hr at 12/13/21 1548     PRN Meds:   polyethylene glycol, 17 g, BID PRN  senna, 5 mL, Nightly PRN  docusate, 100 mg, BID PRN  bisacodyl, 10 mg, Daily PRN  acetaminophen, 650 mg, Q4H PRN  sodium chloride flush, 5-40 mL, PRN  sodium chloride, 25 mL, PRN  ondansetron, 4 mg, Q8H PRN   Or  ondansetron, 4 mg, Q6H PRN  acetaminophen, 650 mg, Q6H PRN  glucose, 15 g, PRN  dextrose, 12.5 g, PRN  glucagon (rDNA), 1 mg, PRN  dextrose, 100 mL/hr, PRN  glucose, 15 g, PRN  dextrose, 12.5 g, PRN  glucagon (rDNA), 1 mg, PRN  dextrose, 100 mL/hr, PRN        SUPPORT DEVICES: [x] Ventilator [] BIPAP  [] Nasal Cannula [] Room Air      ABGs:     Lab Results   Component Value Date    VZJ1AJU NOT REPORTED 12/20/2021    FIO2 65.0 12/20/2021       DATA:  Complete Blood Count:   Recent Labs     12/18/21  0510 12/19/21  0504 12/20/21  0558   WBC 13.5* 17.0* 16.8*   RBC 4.97 4.98 4.90   HGB 15.4 15.5 14.8   HCT 48.3 47.6 46.6   MCV 97.2 95.6 95.1   MCH 31.0 31.1 30.2   MCHC 31.9 32.6 31.8   RDW 14.1 13.7 13.7    191 177   MPV 10.7 11.4 11.9        Last 3 Blood Glucose:   Recent Labs     12/18/21  0510 12/19/21  0504 12/20/21  0558   GLUCOSE 117* 131* 120*        PT/INR:    Lab Results   Component Value Date    PROTIME 13.0 12/12/2021    INR 1.0 12/12/2021     PTT:    Lab Results   Component Value Date    APTT 39.1 12/12/2021       Comprehensive Metabolic Profile:   Recent Labs     12/18/21  0510 12/18/21  0946 12/18/21  1535 12/19/21  0504 12/20/21  0558   *   < > 143 142 143   K 4.6  --   --  4.7 4.4   *  --   --  104 106   CO2 27  -- --  26 26   BUN 42*  --   --  30* 32*   CREATININE 0.53*  --   --  0.45* 0.50*   GLUCOSE 117*  --   --  131* 120*   CALCIUM 8.6  --   --  8.4* 8.3*   PROT  --   --   --  5.5* 5.4*   LABALBU  --   --   --  3.1* 2.9*   BILITOT  --   --   --  0.57 0.52   ALKPHOS  --   --   --  74 72   AST  --   --   --  31 28   ALT  --   --   --  93* 69*    < > = values in this interval not displayed. Magnesium:   Lab Results   Component Value Date    MG 2.5 12/17/2021    MG 2.3 12/16/2021    MG 2.4 12/15/2021     Phosphorus:   Lab Results   Component Value Date    PHOS 3.2 12/18/2021    PHOS 4.0 12/17/2021    PHOS 2.8 12/16/2021     Ionized Calcium: No results found for: FREDDY     Urinalysis:   Lab Results   Component Value Date    NITRU NEGATIVE 12/18/2021    COLORU Dark Yellow 12/18/2021    PHUR 6.0 12/18/2021    WBCUA 5 TO 10 12/18/2021    RBCUA 20 TO 50 12/18/2021    MUCUS NOT REPORTED 12/18/2021    TRICHOMONAS NOT REPORTED 12/18/2021    YEAST NOT REPORTED 12/18/2021    BACTERIA NOT REPORTED 12/18/2021    SPECGRAV 1.032 12/18/2021    LEUKOCYTESUR NEGATIVE 12/18/2021    UROBILINOGEN Normal 12/18/2021    BILIRUBINUR NEGATIVE 12/18/2021    GLUCOSEU NEGATIVE 12/18/2021    KETUA NEGATIVE 12/18/2021    AMORPHOUS NOT REPORTED 12/18/2021           XR CHEST (SINGLE VIEW FRONTAL)    Result Date: 12/14/2021  Mild interval improvement in multifocal airspace disease particularly at the right base. Support tubes and lines as above. XR CHEST (SINGLE VIEW FRONTAL)    Result Date: 12/12/2021  Stable appearing     XR CHEST (SINGLE VIEW FRONTAL)    Result Date: 12/11/2021  Multifocal hazy opacities throughout both lungs consistent with COVID pneumonia and or pulmonary edema. XR CHEST PORTABLE    Result Date: 12/12/2021  Stable appearing chest with unchanged support tubes/lines and persistent extensive bilateral airspace disease consistent with known COVID pneumonia.      XR CHEST PORTABLE    Result Date: 12/12/2021  Right internal jugular central venous catheter tip projecting over the proximal SVC versus brachiocephalic vein. No pneumothorax. Otherwise stable exam from earlier today. XR CHEST PORTABLE    Result Date: 12/12/2021  Endotracheal tube tip is 3.2 cm above the saul. Overall significant worsening of multifocal airspace opacities keeping with history of COVID-19. CT CHEST PULMONARY EMBOLISM W CONTRAST    Result Date: 12/11/2021  1. No evidence of pulmonary embolism 2. Diffuse ground-glass opacities throughout the lungs, typical of COVID-19 pulmonary disease. Past Medical History:   Diagnosis Date    Arthritis     Asthma     Diabetes mellitus ([address]     Edema     GERD (gastroesophageal reflux disease)     Hypertension     on lasix    Migraine     IRMA on CPAP     seldom use machine        Social History     Socioeconomic History    Marital status:      Spouse name: None    Number of children: None    Years of education: None    Highest education level: None   Occupational History    None   Tobacco Use    Smoking status: Former Smoker    Smokeless tobacco: Never Used   Vaping Use    Vaping Use: Never used   Substance and Sexual Activity    Alcohol use: Yes     Comment: every couple months    Drug use: Never    Sexual activity: None   Other Topics Concern    None   Social History Narrative    None     Social Determinants of Health     Financial Resource Strain:     Difficulty of Paying Living Expenses: Not on file   Food Insecurity:     Worried About Running Out of Food in the Last Year: Not on file    Lucina of Food in the Last Year: Not on file   Transportation Needs:     Lack of Transportation (Medical): Not on file    Lack of Transportation (Non-Medical):  Not on file   Physical Activity:     Days of Exercise per Week: Not on file    Minutes of Exercise per Session: Not on file   Stress:     Feeling of Stress : Not on file   Social Connections:     Frequency of Communication with Friends and Family: Not on file    Frequency of Social Gatherings with Friends and Family: Not on file    Attends Mosque Services: Not on file    Active Member of Clubs or Organizations: Not on file    Attends Club or Organization Meetings: Not on file    Marital Status: Not on file   Intimate Partner Violence:     Fear of Current or Ex-Partner: Not on file    Emotionally Abused: Not on file    Physically Abused: Not on file    Sexually Abused: Not on file   Housing Stability:     Unable to Pay for Housing in the Last Year: Not on file    Number of [place] in the Last Year: Not on file    Unstable Housing in the Last Year: Not on file         There is no immunization history on file for this patient.       Family History   Problem Relation Age of Onset    Heart Attack Sister 32    Diabetes Paternal Grandmother     Heart Disease Paternal Uncle     Heart Disease Paternal Uncle     Heart Disease Paternal Uncle     Cancer Maternal Aunt     Cancer Maternal Aunt     Cancer Maternal Uncle     Cancer Maternal Uncle          Past Surgical History:   Procedure Laterality Date    APPENDECTOMY      ARTHROPLASTY Left 11/1/2019    LEFT FOOT 1ST MTPJ ARTHROPLASTY WITH PEREZ IMPLANT AND GROMMETS  [facility] performed by Ciera Doshi MD at [address] Right 12/12/2019    RIGHT FOOT ARTHROPLASTY 1ST MTPJ (Right Foot)    ARTHROPLASTY Right 12/12/2019    RIGHT FOOT ARTHROPLASTY 1ST MTPJ performed by Ciera Doshi MD at [address] Right    [address] [facility] [address]  2014    no blockage no stents    CHOLECYSTECTOMY      COLONOSCOPY      ENDOSCOPY, COLON, DIAGNOSTIC      TOE SURGERY Left 11/01/2019     LEFT FOOT 1ST MTPJ ARTHROPLASTY WITH PEREZ IMPLANT AND GROMMETS  [facility] (Left )    TONSILLECTOMY            VENTILATOR SETTINGS:  Vent Information  $Ventilation: $Subsequent Day  Skin Assessment: Clean, dry, & intact  Suction Catheter Diameter: 14  Equipment ID: [id number]  Equipment Changed: Expiratory Filter (humidification, inspitaory filter)  Vent Type: Servo i  Vent Mode: PRVC  Vt Ordered: 500 mL  Rate Set: 30 bmp  FiO2 : 65 %  SpO2: 93 %  SpO2/FiO2 ratio: 143.08  Sensitivity: 3  PEEP/CPAP: 12  I Time/ I Time %: 0.6 s  Humidification Source: Heated wire  Humidification Temp: 37  Humidification Temp Measured: 37  Circuit Condensation: Drained  Nitric Oxide/Epoprostenol In Use?: No     PaO2/FiO2 RATIO:  Recent Labs     12/20/21  0516   POCPO2 58.8*      FiO2 : 65 %       LABS:  ABGs:   Recent Labs     12/18/21  0430 12/19/21  0457 12/20/21  0516   POCPH 7.445 7.484* 7.482*   POCPCO2 42.7 40.3 36.0   POCPO2 45.1* 54.2* 58.8*   POCHCO3 29.3* 30.3* 26.9   VPTR6LAZ 82* 90* 92*            ASSESSMENT:     Principal Problem:    Pneumonia due to COVID-19 virus  Active Problems:    Acute respiratory failure with hypoxia (HCC)    Diabetes mellitus (HCC)    Asthma    IRMA on CPAP    Hypertension    GERD (gastroesophageal reflux disease)    ARDS (adult respiratory distress syndrome) (HCC)  Resolved Problems:    * No resolved hospital problems. *              Acute hypoxic respiratory failure secondary to COVID 19   Acute respiratory distress syndrome   Bilateral multifocal pneumonia due to COVID 19 infection   Covid -19 pandemic emergency    Hypernatremia   Combined acute respiratory and metabolic alkalosis   Hyperglycemia, acceptable   Leukocytosis, stable    LOS: 8  PLAN:    · D/w RT  · D/w RN   · Sedation reviewed   · Epoprostenol was used without much change in the oxygenation and hence was discontinued  · Cont ARDS ventilation   ·  prone positioning - continue if helping.   · Airborne isolation and droplet precautions to be continued  · Continue supportive care   · Cont treatment with medications for COVID  · Cont tube feeding  · Monitor endotracheal secretions   · Will obtain xray chest   · ABG as needed  · Prognosis guarded given age and severity of illness. · Neuromuscular holiday when supine. Also sedation holiday. · On Lovenox and lansoprazole  · On Decadron            Treatment plan Discussed with nursing staff in detail , all questions answered . Total critical care time caring for this patient with life threatening, unstable organ failure, including direct patient contact, management of life support systems, review of data including imaging and labs, discussions with other team members and physicians at least 27  Min so far today, excluding procedures. Electronically signed by Usha Rodríguez MD on 12/20/2021 at 8:15 AM       This patient was evaluated in the context of the global SARS-CoV-2 (COVID-19) pandemic, which necessitated considerations that the patient either has COVID-19 infection or is at risk of infection with COVID-19. Institutional protocols and algorithms that pertain to the evaluation & management of patients with COVID-19 or those at risk for COVID-19 are in a state of rapid changes based on information released by regulatory bodies including the CDC and federal and state organizations. These policies and algorithms were followed during the patient's care. Please note that this chart was generated using voice recognition Dragon dictation software. Although every effort was made to ensure the accuracy of this automated transcription, some errors in transcription may have occurred.

## 2021-12-20 NOTE — PLAN OF CARE
Problem: Airway Clearance - Ineffective  Goal: Achieve or maintain patent airway  12/19/2021 2223 by Marianna Be RCP  Outcome: Ongoing     Problem: Gas Exchange - Impaired  Goal: Absence of hypoxia  12/19/2021 2223 by Marianna Be RCP  Outcome: Ongoing     Problem: Gas Exchange - Impaired  Goal: Promote optimal lung function  12/19/2021 2223 by Marianna Be RCP  Outcome: Ongoing     Problem: Breathing Pattern - Ineffective  Goal: Ability to achieve and maintain a regular respiratory rate  12/19/2021 2223 by Marianna Be RCP  Outcome: Ongoing     Problem: OXYGENATION/RESPIRATORY FUNCTION  Goal: Patient will maintain patent airway  12/19/2021 2223 by Marianna eB RCP  Outcome: Ongoing     Problem: OXYGENATION/RESPIRATORY FUNCTION  Goal: Patient will achieve/maintain normal respiratory rate/effort  Description: Respiratory rate and effort will be within normal limits for the patient  12/19/2021 2223 by Marianna Be RCP  Outcome: Ongoing     Problem: MECHANICAL VENTILATION  Goal: Patient will maintain patent airway  12/19/2021 2223 by Marianna Be RCP  Outcome: Ongoing     Problem: MECHANICAL VENTILATION  Goal: Oral health is maintained or improved  12/19/2021 2223 by Marianna Be RCP  Outcome: Ongoing     Problem: MECHANICAL VENTILATION  Goal: ET tube will be managed safely  12/19/2021 2223 by Marianna Be RCP  Outcome: Ongoing

## 2021-12-21 PROBLEM — R57.9 SHOCK (HCC): Status: ACTIVE | Noted: 2021-12-21

## 2021-12-21 LAB
ABSOLUTE EOS #: 0 K/UL (ref 0–0.4)
ABSOLUTE IMMATURE GRANULOCYTE: 0.37 K/UL (ref 0–0.3)
ABSOLUTE LYMPH #: 1.67 K/UL (ref 1–4.8)
ABSOLUTE MONO #: 0.93 K/UL (ref 0.1–0.8)
ALBUMIN SERPL-MCNC: 3.1 G/DL (ref 3.5–5.2)
ALBUMIN/GLOBULIN RATIO: 1.2 (ref 1–2.5)
ALLEN TEST: ABNORMAL
ALP BLD-CCNC: 67 U/L (ref 40–129)
ALT SERPL-CCNC: 62 U/L (ref 5–41)
ANION GAP SERPL CALCULATED.3IONS-SCNC: 11 MMOL/L (ref 9–17)
AST SERPL-CCNC: 22 U/L
BASOPHILS # BLD: 0 % (ref 0–2)
BASOPHILS ABSOLUTE: 0 K/UL (ref 0–0.2)
BILIRUB SERPL-MCNC: 0.51 MG/DL (ref 0.3–1.2)
BUN BLDV-MCNC: 34 MG/DL (ref 6–20)
BUN/CREAT BLD: ABNORMAL (ref 9–20)
CALCIUM SERPL-MCNC: 8.4 MG/DL (ref 8.6–10.4)
CHLORIDE BLD-SCNC: 107 MMOL/L (ref 98–107)
CO2: 24 MMOL/L (ref 20–31)
CREAT SERPL-MCNC: 0.35 MG/DL (ref 0.7–1.2)
DIFFERENTIAL TYPE: ABNORMAL
EOSINOPHILS RELATIVE PERCENT: 0 % (ref 1–4)
FIO2: 60
GFR AFRICAN AMERICAN: >60 ML/MIN
GFR NON-AFRICAN AMERICAN: >60 ML/MIN
GFR SERPL CREATININE-BSD FRML MDRD: ABNORMAL ML/MIN/{1.73_M2}
GFR SERPL CREATININE-BSD FRML MDRD: ABNORMAL ML/MIN/{1.73_M2}
GLUCOSE BLD-MCNC: 129 MG/DL (ref 75–110)
GLUCOSE BLD-MCNC: 150 MG/DL (ref 75–110)
GLUCOSE BLD-MCNC: 155 MG/DL (ref 70–99)
GLUCOSE BLD-MCNC: 165 MG/DL (ref 75–110)
GLUCOSE BLD-MCNC: 166 MG/DL (ref 74–100)
HCT VFR BLD CALC: 47.2 % (ref 40.7–50.3)
HEMOGLOBIN: 15.8 G/DL (ref 13–17)
IMMATURE GRANULOCYTES: 2 %
LYMPHOCYTES # BLD: 9 % (ref 24–44)
MCH RBC QN AUTO: 31 PG (ref 25.2–33.5)
MCHC RBC AUTO-ENTMCNC: 33.5 G/DL (ref 28.4–34.8)
MCV RBC AUTO: 92.5 FL (ref 82.6–102.9)
MODE: ABNORMAL
MONOCYTES # BLD: 5 % (ref 1–7)
MORPHOLOGY: NORMAL
NEGATIVE BASE EXCESS, ART: ABNORMAL (ref 0–2)
NRBC AUTOMATED: 0 PER 100 WBC
O2 DEVICE/FLOW/%: ABNORMAL
PATIENT TEMP: ABNORMAL
PDW BLD-RTO: 14 % (ref 11.8–14.4)
PLATELET # BLD: 228 K/UL (ref 138–453)
PLATELET ESTIMATE: ABNORMAL
PMV BLD AUTO: 11.6 FL (ref 8.1–13.5)
POC HCO3: 28.3 MMOL/L (ref 21–28)
POC LACTIC ACID: 1.77 MMOL/L (ref 0.56–1.39)
POC O2 SATURATION: 94 % (ref 94–98)
POC PCO2 TEMP: ABNORMAL MM HG
POC PCO2: 34.8 MM HG (ref 35–48)
POC PH TEMP: ABNORMAL
POC PH: 7.52 (ref 7.35–7.45)
POC PO2 TEMP: ABNORMAL MM HG
POC PO2: 61.3 MM HG (ref 83–108)
POSITIVE BASE EXCESS, ART: 6 (ref 0–3)
POTASSIUM SERPL-SCNC: 4 MMOL/L (ref 3.7–5.3)
RBC # BLD: 5.1 M/UL (ref 4.21–5.77)
RBC # BLD: ABNORMAL 10*6/UL
SAMPLE SITE: ABNORMAL
SEG NEUTROPHILS: 84 % (ref 36–66)
SEGMENTED NEUTROPHILS ABSOLUTE COUNT: 15.63 K/UL (ref 1.8–7.7)
SODIUM BLD-SCNC: 142 MMOL/L (ref 135–144)
TCO2 (CALC), ART: ABNORMAL MMOL/L (ref 22–29)
TOTAL PROTEIN: 5.6 G/DL (ref 6.4–8.3)
WBC # BLD: 18.6 K/UL (ref 3.5–11.3)
WBC # BLD: ABNORMAL 10*3/UL

## 2021-12-21 PROCEDURE — 6370000000 HC RX 637 (ALT 250 FOR IP): Performed by: INTERNAL MEDICINE

## 2021-12-21 PROCEDURE — 2580000003 HC RX 258: Performed by: INTERNAL MEDICINE

## 2021-12-21 PROCEDURE — 80053 COMPREHEN METABOLIC PANEL: CPT

## 2021-12-21 PROCEDURE — 99291 CRITICAL CARE FIRST HOUR: CPT | Performed by: INTERNAL MEDICINE

## 2021-12-21 PROCEDURE — 6370000000 HC RX 637 (ALT 250 FOR IP): Performed by: NURSE PRACTITIONER

## 2021-12-21 PROCEDURE — 2500000003 HC RX 250 WO HCPCS: Performed by: INTERNAL MEDICINE

## 2021-12-21 PROCEDURE — 2000000000 HC ICU R&B

## 2021-12-21 PROCEDURE — 2500000003 HC RX 250 WO HCPCS: Performed by: EMERGENCY MEDICINE

## 2021-12-21 PROCEDURE — 82803 BLOOD GASES ANY COMBINATION: CPT

## 2021-12-21 PROCEDURE — 99233 SBSQ HOSP IP/OBS HIGH 50: CPT | Performed by: INTERNAL MEDICINE

## 2021-12-21 PROCEDURE — 6360000002 HC RX W HCPCS: Performed by: INTERNAL MEDICINE

## 2021-12-21 PROCEDURE — 2580000003 HC RX 258: Performed by: NURSE PRACTITIONER

## 2021-12-21 PROCEDURE — 94003 VENT MGMT INPAT SUBQ DAY: CPT

## 2021-12-21 PROCEDURE — 6360000002 HC RX W HCPCS: Performed by: NURSE PRACTITIONER

## 2021-12-21 PROCEDURE — 37799 UNLISTED PX VASCULAR SURGERY: CPT

## 2021-12-21 PROCEDURE — 2700000000 HC OXYGEN THERAPY PER DAY

## 2021-12-21 PROCEDURE — 6370000000 HC RX 637 (ALT 250 FOR IP): Performed by: STUDENT IN AN ORGANIZED HEALTH CARE EDUCATION/TRAINING PROGRAM

## 2021-12-21 PROCEDURE — 99232 SBSQ HOSP IP/OBS MODERATE 35: CPT | Performed by: INTERNAL MEDICINE

## 2021-12-21 PROCEDURE — 85025 COMPLETE CBC W/AUTO DIFF WBC: CPT

## 2021-12-21 PROCEDURE — 83605 ASSAY OF LACTIC ACID: CPT

## 2021-12-21 PROCEDURE — 82947 ASSAY GLUCOSE BLOOD QUANT: CPT

## 2021-12-21 PROCEDURE — 94761 N-INVAS EAR/PLS OXIMETRY MLT: CPT

## 2021-12-21 RX ORDER — LACTULOSE 10 G/15ML
10 SOLUTION ORAL 3 TIMES DAILY
Status: DISCONTINUED | OUTPATIENT
Start: 2021-12-21 | End: 2021-12-22

## 2021-12-21 RX ADMIN — ENOXAPARIN SODIUM 30 MG: 100 INJECTION SUBCUTANEOUS at 08:39

## 2021-12-21 RX ADMIN — DEXMEDETOMIDINE HYDROCHLORIDE 1 MCG/KG/HR: 4 INJECTION, SOLUTION INTRAVENOUS at 09:07

## 2021-12-21 RX ADMIN — CISATRACURIUM BESYLATE 3.5 MCG/KG/MIN: 10 INJECTION, SOLUTION INTRAVENOUS at 08:20

## 2021-12-21 RX ADMIN — SODIUM CHLORIDE, PRESERVATIVE FREE 10 ML: 5 INJECTION INTRAVENOUS at 19:48

## 2021-12-21 RX ADMIN — Medication 200 MCG/HR: at 12:14

## 2021-12-21 RX ADMIN — DEXMEDETOMIDINE HYDROCHLORIDE 1 MCG/KG/HR: 4 INJECTION, SOLUTION INTRAVENOUS at 05:56

## 2021-12-21 RX ADMIN — Medication 200 MCG/HR: at 00:19

## 2021-12-21 RX ADMIN — DEXMEDETOMIDINE HYDROCHLORIDE 1 MCG/KG/HR: 4 INJECTION, SOLUTION INTRAVENOUS at 02:20

## 2021-12-21 RX ADMIN — CISATRACURIUM BESYLATE 4 MCG/KG/MIN: 10 INJECTION, SOLUTION INTRAVENOUS at 02:40

## 2021-12-21 RX ADMIN — METOPROLOL TARTRATE 5 MG: 5 INJECTION INTRAVENOUS at 15:46

## 2021-12-21 RX ADMIN — INSULIN LISPRO 1 UNITS: 100 INJECTION, SOLUTION INTRAVENOUS; SUBCUTANEOUS at 18:19

## 2021-12-21 RX ADMIN — BISACODYL 10 MG: 10 SUPPOSITORY RECTAL at 08:38

## 2021-12-21 RX ADMIN — DEXMEDETOMIDINE HYDROCHLORIDE 1 MCG/KG/HR: 4 INJECTION, SOLUTION INTRAVENOUS at 18:39

## 2021-12-21 RX ADMIN — INSULIN LISPRO 1 UNITS: 100 INJECTION, SOLUTION INTRAVENOUS; SUBCUTANEOUS at 23:54

## 2021-12-21 RX ADMIN — LACTULOSE 10 G: 20 SOLUTION ORAL at 15:07

## 2021-12-21 RX ADMIN — CEFEPIME 2000 MG: 2 INJECTION, POWDER, FOR SOLUTION INTRAVENOUS at 21:10

## 2021-12-21 RX ADMIN — Medication 8 MG/HR: at 12:15

## 2021-12-21 RX ADMIN — DEXMEDETOMIDINE HYDROCHLORIDE 1.1 MCG/KG/HR: 4 INJECTION, SOLUTION INTRAVENOUS at 15:36

## 2021-12-21 RX ADMIN — FUROSEMIDE 40 MG: 10 INJECTION, SOLUTION INTRAVENOUS at 08:38

## 2021-12-21 RX ADMIN — CEFEPIME 2000 MG: 2 INJECTION, POWDER, FOR SOLUTION INTRAVENOUS at 09:57

## 2021-12-21 RX ADMIN — Medication 9 MG/HR: at 23:15

## 2021-12-21 RX ADMIN — ACETAMINOPHEN 650 MG: 160 SOLUTION ORAL at 15:11

## 2021-12-21 RX ADMIN — CISATRACURIUM BESYLATE 3.5 MCG/KG/MIN: 10 INJECTION, SOLUTION INTRAVENOUS at 23:44

## 2021-12-21 RX ADMIN — Medication 200 MCG/HR: at 22:48

## 2021-12-21 RX ADMIN — Medication 30 MG: at 12:45

## 2021-12-21 RX ADMIN — ENOXAPARIN SODIUM 30 MG: 100 INJECTION SUBCUTANEOUS at 19:53

## 2021-12-21 RX ADMIN — DEXAMETHASONE SODIUM PHOSPHATE 10 MG: 10 INJECTION INTRAMUSCULAR; INTRAVENOUS at 12:01

## 2021-12-21 RX ADMIN — SODIUM CHLORIDE, PRESERVATIVE FREE 10 ML: 5 INJECTION INTRAVENOUS at 08:39

## 2021-12-21 RX ADMIN — Medication 200 MCG/HR: at 06:28

## 2021-12-21 RX ADMIN — INSULIN LISPRO 1 UNITS: 100 INJECTION, SOLUTION INTRAVENOUS; SUBCUTANEOUS at 00:51

## 2021-12-21 RX ADMIN — Medication 2000 UNITS: at 08:39

## 2021-12-21 RX ADMIN — IBUPROFEN 600 MG: 100 SUSPENSION ORAL at 13:13

## 2021-12-21 RX ADMIN — Medication 200 MCG/HR: at 17:47

## 2021-12-21 RX ADMIN — POLYETHYLENE GLYCOL 3350 17 G: 17 POWDER, FOR SOLUTION ORAL at 08:38

## 2021-12-21 RX ADMIN — CISATRACURIUM BESYLATE 3 MCG/KG/MIN: 10 INJECTION, SOLUTION INTRAVENOUS at 16:22

## 2021-12-21 RX ADMIN — DEXMEDETOMIDINE HYDROCHLORIDE 0.8 MCG/KG/HR: 4 INJECTION, SOLUTION INTRAVENOUS at 21:08

## 2021-12-21 RX ADMIN — INSULIN GLARGINE 10 UNITS: 100 INJECTION, SOLUTION SUBCUTANEOUS at 20:19

## 2021-12-21 RX ADMIN — LACTULOSE 10 G: 20 SOLUTION ORAL at 19:53

## 2021-12-21 RX ADMIN — DEXMEDETOMIDINE HYDROCHLORIDE 0.8 MCG/KG/HR: 4 INJECTION, SOLUTION INTRAVENOUS at 12:07

## 2021-12-21 RX ADMIN — INSULIN LISPRO 1 UNITS: 100 INJECTION, SOLUTION INTRAVENOUS; SUBCUTANEOUS at 05:57

## 2021-12-21 RX ADMIN — DOCUSATE SODIUM 100 MG: 50 LIQUID ORAL at 08:38

## 2021-12-21 ASSESSMENT — PULMONARY FUNCTION TESTS
PIF_VALUE: 30
PIF_VALUE: 28
PIF_VALUE: 29
PIF_VALUE: 27

## 2021-12-21 ASSESSMENT — PAIN SCALES - GENERAL
PAINLEVEL_OUTOF10: 0
PAINLEVEL_OUTOF10: 0

## 2021-12-21 NOTE — PROGRESS NOTES
Pulmonary Critical Care   Consult Note    Patient - Natalie Olsen  Date of Admission -  12/12/2021 11:39 AM  Date of Evaluation -  12/21/2021  Room and Bed Number -  3026/3026-01   Hospital Day - 9    CHIEF COMPLAINT : ACUTE HYPOXIC RESPIRATORY FAILURE DUE TO COVID -19 PNEUMONIA   HPI:   Natalie Olsen  60 y.o. male  admitted for COVID-19 at [facility] ER due to worsening oxygenation he was initially started on BiPAP and subsequently intubated. On room air his saturations had been 50%. CTA no PE but bilateral pulmonary infiltrates. He was accepted at [address] [place] [address] ICU. On arrival he is on PEEP of 22, 100% FiO2.    12/21/2021   Subjective      FiO2 remains at 60%. Remains on cis-atacarium, fentanyl, ketamine, midazolam, and dexmedetomidine. No sedation/neuromuscular blocker holiday. I/O-4.4 L. SECRETIONS  -Amount:  [x] Small [] Moderate  [] Large    [] None  Color:     [x] White [] Colored  [] Bloody    SEDATION:    [x] Propofol gtt  [x] Versed gtt  [x] FENTANYL  gtt  gtt   [] No Sedation    PARALYZED:  [] No    [x] Yes    VASOPRESSORS:  [x] No    [] Yes  [] Levophed [] Dopamine [] Vasopressin  [] Dobutamine [] Phenylephrine [] Epinephrine    INHALED veletri  : [] No    [] Yes    PRONE :       [x] No    [] Yes    Actemra:             [] No    [x] Yes  12/12/21  DEXAMETHASONE : [] No    [x] Yes      Ros unable to perform due to intubation and sedation    OBJECTIVE:     VITAL SIGNS:  /79   Pulse 60   Temp 102.2 °F (39 °C) (Bladder)   Resp 28   Ht 6' 2\" (1.88 m)   Wt 266 lb 8.6 oz (120.9 kg)   SpO2 97%   BMI 34.22 kg/m²   Tmax over 24 hours:  No data recorded.       Patient Vitals for the past 8 hrs:   BP Pulse Resp SpO2   12/21/21 0430 -- 60 28 --   12/21/21 0415 -- 58 30 --   12/21/21 0400 108/79 59 30 --   12/21/21 0349 -- 55 30 97 %   12/21/21 0330 -- 56 30 --   12/21/21 0300 107/79 56 30 --   12/21/21 0230 -- 57 30 --   12/21/21 0200 105/81 58 30 --   12/21/21 0130 -- 58 30 -- 12/21/21 0100 106/79 59 30 --   12/21/21 0030 -- 61 30 --         Intake/Output Summary (Last 24 hours) at 12/21/2021 0816  Last data filed at 12/21/2021 0400  Gross per 24 hour   Intake 1810 ml   Output 1650 ml   Net 160 ml     Date 12/21/21 0000 - 12/21/21 2359   Shift 7037-3891 1735-3456 4008-9187 24 Hour Total   INTAKE   NG/GT(mL/kg) 600(5)   600(5)   Shift Total(mL/kg) 600(5)   600(5)   OUTPUT   Shift Total(mL/kg)       Weight (kg) 120.9 120.9 120.9 120.9     Wt Readings from Last 3 Encounters:   12/19/21 266 lb 8.6 oz (120.9 kg)   12/11/21 300 lb (136.1 kg)   12/12/19 (!) 355 lb 9.6 oz (161.3 kg)     Body mass index is 34.22 kg/m². PHYSICAL EXAM:      I have discussed the care of Ira Aguirre, including pertinent history and exam findings, with the resident/APC/staff. I have seen the patient and the key elements of all parts of the encounter have been performed by me. Physical exam was deferred to limit physical exposure and PPE resources. I reviewed the interval history, interpreted all available radiographic, laboratory and physiologic data at the time of service. I agree with the assessment and plan as documented by resident/APC/ ancillary staff.   Patient remains on the ventilator  Trachea is in the midline  No subcutaneous air  No JVD  No rhonchi  Abdomen is not distended  No edema    MEDICATIONS:  Scheduled Meds:   insulin lispro  0-6 Units SubCUTAneous Q6H    lansoprazole  30 mg Oral QAM AC    cefepime  2,000 mg IntraVENous Q12H    furosemide  40 mg IntraVENous Daily    insulin glargine  10 Units SubCUTAneous Nightly    sodium chloride flush  5-40 mL IntraVENous 2 times per day    enoxaparin  30 mg SubCUTAneous BID    Vitamin D  2,000 Units Oral Daily    dexamethasone  10 mg IntraVENous Q24H     Continuous Infusions:   dexmedetomidine 1 mcg/kg/hr (12/21/21 2017)    ketamine (KETALAR) infusion for analgosedation 0.2 mg/kg/hr (12/20/21 2122)    sodium chloride      dextrose      norepinephrine 2 mcg/min (12/21/21 0020)    cisatracurium (NIMBEX) infusion 4 mcg/kg/min (12/21/21 0240)    midazolam 5 mg/hr (12/20/21 3127)    fentaNYL 200 mcg/hr (12/21/21 6689)    dextrose      sodium chloride 10 mL/hr at 12/13/21 1548     PRN Meds:   ibuprofen, 600 mg, Q6H PRN  metoprolol, 5 mg, Q6H PRN  polyethylene glycol, 17 g, BID PRN  senna, 5 mL, Nightly PRN  docusate, 100 mg, BID PRN  bisacodyl, 10 mg, Daily PRN  acetaminophen, 650 mg, Q4H PRN  sodium chloride flush, 5-40 mL, PRN  sodium chloride, 25 mL, PRN  ondansetron, 4 mg, Q8H PRN   Or  ondansetron, 4 mg, Q6H PRN  acetaminophen, 650 mg, Q6H PRN  glucose, 15 g, PRN  dextrose, 12.5 g, PRN  glucagon (rDNA), 1 mg, PRN  dextrose, 100 mL/hr, PRN  glucose, 15 g, PRN  dextrose, 12.5 g, PRN  glucagon (rDNA), 1 mg, PRN  dextrose, 100 mL/hr, PRN        SUPPORT DEVICES: [x] Ventilator [] BIPAP  [] Nasal Cannula [] Room Air      ABGs:     Lab Results   Component Value Date    GFY4DII NOT REPORTED 12/21/2021    FIO2 60.0 12/21/2021       DATA:  Complete Blood Count:   Recent Labs     12/19/21  0504 12/20/21  0558 12/21/21  0421   WBC 17.0* 16.8* 18.6*   RBC 4.98 4.90 5.10   HGB 15.5 14.8 15.8   HCT 47.6 46.6 47.2   MCV 95.6 95.1 92.5   MCH 31.1 30.2 31.0   MCHC 32.6 31.8 33.5   RDW 13.7 13.7 14.0    177 228   MPV 11.4 11.9 11.6        Last 3 Blood Glucose:   Recent Labs     12/19/21  0504 12/20/21  0558 12/21/21  0421   GLUCOSE 131* 120* 155*        PT/INR:    Lab Results   Component Value Date    PROTIME 13.0 12/12/2021    INR 1.0 12/12/2021     PTT:    Lab Results   Component Value Date    APTT 39.1 12/12/2021       Comprehensive Metabolic Profile:   Recent Labs     12/19/21  0504 12/20/21  0558 12/21/21  0421    143 142   K 4.7 4.4 4.0    106 107   CO2 26 26 24   BUN 30* 32* 34*   CREATININE 0.45* 0.50* 0.35*   GLUCOSE 131* 120* 155*   CALCIUM 8.4* 8.3* 8.4*   PROT 5.5* 5.4* 5.6*   LABALBU 3.1* 2.9* 3.1*   BILITOT 0.57 0.52 0.51 ALKPHOS 74 72 67   AST 31 28 22   ALT 93* 69* 62*      Magnesium:   Lab Results   Component Value Date    MG 2.5 12/17/2021    MG 2.3 12/16/2021    MG 2.4 12/15/2021     Phosphorus:   Lab Results   Component Value Date    PHOS 3.2 12/18/2021    PHOS 4.0 12/17/2021    PHOS 2.8 12/16/2021     Ionized Calcium: No results found for: FREDDY     Urinalysis:   Lab Results   Component Value Date    NITRU NEGATIVE 12/18/2021    COLORU Dark Yellow 12/18/2021    PHUR 6.0 12/18/2021    WBCUA 5 TO 10 12/18/2021    RBCUA 20 TO 50 12/18/2021    MUCUS NOT REPORTED 12/18/2021    TRICHOMONAS NOT REPORTED 12/18/2021    YEAST NOT REPORTED 12/18/2021    BACTERIA NOT REPORTED 12/18/2021    SPECGRAV 1.032 12/18/2021    LEUKOCYTESUR NEGATIVE 12/18/2021    UROBILINOGEN Normal 12/18/2021    BILIRUBINUR NEGATIVE 12/18/2021    GLUCOSEU NEGATIVE 12/18/2021    KETUA NEGATIVE 12/18/2021    AMORPHOUS NOT REPORTED 12/18/2021           XR CHEST (SINGLE VIEW FRONTAL)    Result Date: 12/14/2021  Mild interval improvement in multifocal airspace disease particularly at the right base. Support tubes and lines as above. XR CHEST (SINGLE VIEW FRONTAL)    Result Date: 12/12/2021  Stable appearing     XR CHEST (SINGLE VIEW FRONTAL)    Result Date: 12/11/2021  Multifocal hazy opacities throughout both lungs consistent with COVID pneumonia and or pulmonary edema. XR CHEST PORTABLE    Result Date: 12/12/2021  Stable appearing chest with unchanged support tubes/lines and persistent extensive bilateral airspace disease consistent with known COVID pneumonia. XR CHEST PORTABLE    Result Date: 12/12/2021  Right internal jugular central venous catheter tip projecting over the proximal SVC versus brachiocephalic vein. No pneumothorax. Otherwise stable exam from earlier today. XR CHEST PORTABLE    Result Date: 12/12/2021  Endotracheal tube tip is 3.2 cm above the saul.  Overall significant worsening of multifocal airspace opacities keeping with history of COVID-19. CT CHEST PULMONARY EMBOLISM W CONTRAST    Result Date: 12/11/2021  1. No evidence of pulmonary embolism 2. Diffuse ground-glass opacities throughout the lungs, typical of COVID-19 pulmonary disease. Past Medical History:   Diagnosis Date    Arthritis     Asthma     Diabetes mellitus ([address]     Edema     GERD (gastroesophageal reflux disease)     Hypertension     on lasix    Migraine     IRMA on CPAP     seldom use machine        Social History     Socioeconomic History    Marital status:      Spouse name: None    Number of children: None    Years of education: None    Highest education level: None   Occupational History    None   Tobacco Use    Smoking status: Former Smoker    Smokeless tobacco: Never Used   Vaping Use    Vaping Use: Never used   Substance and Sexual Activity    Alcohol use: Yes     Comment: every couple months    Drug use: Never    Sexual activity: None   Other Topics Concern    None   Social History Narrative    None     Social Determinants of Health     Financial Resource Strain:     Difficulty of Paying Living Expenses: Not on file   Food Insecurity:     Worried About Running Out of Food in the Last Year: Not on file    Lucina of Food in the Last Year: Not on file   Transportation Needs:     Lack of Transportation (Medical): Not on file    Lack of Transportation (Non-Medical):  Not on file   Physical Activity:     Days of Exercise per Week: Not on file    Minutes of Exercise per Session: Not on file   Stress:     Feeling of Stress : Not on file   Social Connections:     Frequency of Communication with Friends and Family: Not on file    Frequency of Social Gatherings with Friends and Family: Not on file    Attends [employer] Services: Not on file    Active Member of Clubs or Organizations: Not on file    Attends Club or Organization Meetings: Not on file    Marital Status: Not on file   Intimate Partner Violence:     Fear of Current or Ex-Partner: Not on file    Emotionally Abused: Not on file    Physically Abused: Not on file    Sexually Abused: Not on file   Housing Stability:     Unable to Pay for Housing in the Last Year: Not on file    Number of Places Lived in the Last Year: Not on file    Unstable Housing in the Last Year: Not on file         There is no immunization history on file for this patient.       Family History   Problem Relation Age of Onset    Heart Attack Sister 32    Diabetes Paternal Grandmother     Heart Disease Paternal Uncle     Heart Disease Paternal Uncle     Heart Disease Paternal Uncle     Cancer Maternal Aunt     Cancer Maternal Aunt     Cancer Maternal Uncle     Cancer Maternal Uncle          Past Surgical History:   Procedure Laterality Date    APPENDECTOMY      ARTHROPLASTY Left 11/1/2019    LEFT FOOT 1ST MTPJ ARTHROPLASTY WITH PEREZ IMPLANT AND GROMMETS  [facility] performed by Nimisha Dias MD at [address] [facility] Right 12/12/2019    RIGHT FOOT ARTHROPLASTY 1ST MTPJ (Right Foot)    ARTHROPLASTY Right 12/12/2019    RIGHT FOOT ARTHROPLASTY 1ST MTPJ performed by Nimisha Dias MD at [address] Right    [address]  2014    no blockage no stents    CHOLECYSTECTOMY      COLONOSCOPY      ENDOSCOPY, COLON, DIAGNOSTIC      TOE SURGERY Left 11/01/2019     LEFT FOOT 1ST MTPJ ARTHROPLASTY WITH PEREZ IMPLANT AND GROMMETS  [facility] (Left )    TONSILLECTOMY            VENTILATOR SETTINGS:  Vent Information  $Ventilation: $Subsequent Day  Skin Assessment: Clean, dry, & intact  Suction Catheter Diameter: 14  Equipment ID: [id number]  Equipment Changed: Expiratory Filter  Vent Type: Servo i  Vent Mode: PRVC  Vt Ordered: 500 mL  Rate Set: (S) 28 bmp  FiO2 : 60 %  SpO2: 97 %  SpO2/FiO2 ratio: 161.67  Sensitivity: 3  PEEP/CPAP: 12  I Time/ I Time %: (S) 0.7 s  Humidification Source: Heated wire  Humidification Temp: 37  Humidification Temp Measured: 37.3  Circuit Condensation: Drained  Nitric Oxide/Epoprostenol In Use?: No     PaO2/FiO2 RATIO:  Recent Labs     12/21/21  0413   POCPO2 61.3*      FiO2 : 60 %       LABS:  ABGs:   Recent Labs     12/19/21  0457 12/20/21  0516 12/21/21  0413   POCPH 7.484* 7.482* 7.518*   POCPCO2 40.3 36.0 34.8*   POCPO2 54.2* 58.8* 61.3*   POCHCO3 30.3* 26.9 28.3*   ECWB8MLC 90* 92* 94            ASSESSMENT:     Principal Problem:    Pneumonia due to COVID-19 virus  Active Problems:    Acute respiratory failure with hypoxia (HCC)    Diabetes mellitus (HCC)    Asthma    IRMA on CPAP    Hypertension    GERD (gastroesophageal reflux disease)    ARDS (adult respiratory distress syndrome) (HCC)  Resolved Problems:    * No resolved hospital problems. *              Acute hypoxic respiratory failure secondary to COVID 19   Acute respiratory distress syndrome   Bilateral multifocal pneumonia due to COVID 19 infection   Covid -19 pandemic emergency    Hypernatremia   Combined acute respiratory and metabolic alkalosis   Hyperglycemia, acceptable   Leukocytosis, stable    LOS: 9  PLAN:    · D/w RT  · D/w RN   · Sedation reviewed  · Cont ARDS ventilation  · Hold off on the proning as it did not appear to make much of difference  · Airborne isolation and droplet precautions to be continued  · Continue supportive care   · Cont treatment with medications for COVID  · Cont tube feeding  · Monitor endotracheal secretions   · Will obtain xray chest as needed  · ABG as needed  · Prognosis guarded given age and severity of illness. · Sedation and neuromuscular holiday, if tolerated  · On Lovenox and lansoprazole  · On Decadron    Treatment plan Discussed with nursing staff in detail , all questions answered .      Total critical care time caring for this patient with life threatening, unstable organ failure, including direct patient contact, management of life support systems, review of data including imaging and labs, discussions with other team members and physicians at least 27  Min so far today, excluding procedures. Electronically signed by Clarence Goetz MD on 12/21/2021 at 8:16 AM       This patient was evaluated in the context of the global SARS-CoV-2 (COVID-19) pandemic, which necessitated considerations that the patient either has COVID-19 infection or is at risk of infection with COVID-19. Institutional protocols and algorithms that pertain to the evaluation & management of patients with COVID-19 or those at risk for COVID-19 are in a state of rapid changes based on information released by regulatory bodies including the CDC and federal and state organizations. These policies and algorithms were followed during the patient's care. Please note that this chart was generated using voice recognition Dragon dictation software. Although every effort was made to ensure the accuracy of this automated transcription, some errors in transcription may have occurred.

## 2021-12-21 NOTE — CARE COORDINATION
Transitional Planning- I/S 60%, Attempted to call Pt's daughter, Joann Nance and Mother, Vianey Alexandre. No answer either line, was able to leave a voicemail message for Joann Nance requesting return call, need to discuss LTAC.   1719- Received call from Dtr Joann Nance, states they will make LTAC decision soon. Given choices again.

## 2021-12-21 NOTE — PLAN OF CARE
Problem: OXYGENATION/RESPIRATORY FUNCTION  Goal: Patient will maintain patent airway  12/21/2021 1123 by Buford Holstein, RCP  Outcome: Ongoing  12/21/2021 0915 by Salena Crespo RN  Outcome: Ongoing  12/20/2021 2127 by Nunu Castillo RCP  Outcome: Ongoing  Goal: Patient will achieve/maintain normal respiratory rate/effort  Description: Respiratory rate and effort will be within normal limits for the patient  12/21/2021 1123 by Buford Holstein, RCP  Outcome: Ongoing  12/21/2021 0915 by Salena Crespo RN  Outcome: Ongoing  12/20/2021 2127 by Nunu Castillo RCP  Outcome: Ongoing     Problem: MECHANICAL VENTILATION  Goal: Patient will maintain patent airway  12/21/2021 1123 by Buford Holstein, RCP  Outcome: Ongoing  12/21/2021 0915 by Salena Crespo RN  Outcome: Ongoing  12/20/2021 2127 by Nunu Castillo RCP  Outcome: Ongoing  Goal: Oral health is maintained or improved  12/21/2021 1123 by Buford Holstein, RCP  Outcome: Ongoing  12/21/2021 0915 by Salena Crespo RN  Outcome: Ongoing  12/20/2021 2127 by Nunu Castillo RCP  Outcome: Ongoing  Goal: ET tube will be managed safely  12/21/2021 1123 by Buford Holstein, RCP  Outcome: Ongoing  12/21/2021 0915 by Salena Crespo RN  Outcome: Ongoing  12/20/2021 2127 by Nunu Castillo RCP  Outcome: Ongoing  Goal: Ability to express needs and understand communication  12/21/2021 1123 by Buford Holstein, RCP  Outcome: Ongoing  12/21/2021 0915 by Salena Crespo RN  Outcome: Ongoing  12/20/2021 2127 by Nunu Castillo RCP  Outcome: Ongoing  Goal: Mobility/activity is maintained at optimum level for patient  12/21/2021 1123 by Buford Holstein, RCP  Outcome: Ongoing  12/21/2021 0915 by Salena Crespo RN  Outcome: Ongoing  12/20/2021 2127 by Nunu Castillo RCP  Outcome: Ongoing     Problem: SKIN INTEGRITY  Goal: Skin integrity is maintained or improved  12/21/2021 1123 by Eunice McdonaldMIHIR henao  Outcome: Ongoing  12/21/2021 0915 by Salena Crespo RN  Outcome: Ongoing  12/20/2021 2127 by Alexandrea Screen, RCP  Outcome: Ongoing

## 2021-12-21 NOTE — PROGRESS NOTES
RN in room to change Fentanyl gtt. Noticed there were two Fentanyl gtts hanging, one in place of where Versed was supposed to be running. Stopped second bag. Charge RN notified and message sent to [address] [place] [address] resident about med error. Fentanyl bag wasted in omnicell with charge RN and versed gtt restarted. [facility] will be documented.

## 2021-12-21 NOTE — PROGRESS NOTES
Nursing notified on call [facility] NP of current Fentanyl dosing. VS reviewed and remain stable. Patient intubated on mech vent and paralyzed/ sedated. Levophed has not required titration. No new orders given at this time. Nursing to update critical care service for further recommendations/ orders.

## 2021-12-21 NOTE — PROGRESS NOTES
[facility] - Progress Note   Note COVID 19 Patient  Today's Date and Time: 12/21/2021, 8:52 AM    Impression :     COVID 19 Confirmed Infection  Covid tests:  12/11/2021: Positive  Elevated inflammatory markers  Acute hypoxic respiratory failure  History of asthma  Diabetes mellitus  Essential hypertension  IRMA on CPAP  Patient has not received the Covid vaccination    Recommendations:   Antibiotic treatment:  The patient is having high-grade fevers and cultures have been sent.-No growth on cultures thus far  I will start the patient empirically on antibiotic therapy with cefepime on 12/18-fevers have resolved  Covid Rx:    Remdesivir-out of window  Decadron-high-dose initiated  Actemra-ordered 12/12/2021  Monoclonal antibodies-out of window      Medical Decision Making/Summary/Discussion:12/21/2021     Patient admitted with COVID 19 infection  He may come out of isolation on 12/31/21    Infection Control Recommendations   [place] Precautions  Airborne isolation  Droplet Isolation    Antimicrobial Stewardship Recommendations     Discontinuation of therapy  Coordination of Outpatient Care:   Estimated Length of IV antimicrobials:TBD  Patient will need Midline Catheter Insertion: TBD  Patient will need PICC line Insertion: No  Patient will need: Home IV , [place],  SNF,  LTAC:TBD  Patient will need outpatient wound care:No    Chief complaint/reason for consultation:   Concern for COVID infection      History of Present Illness:   Acacia Upton is a 60y.o.-year-old male who was initially admitted on 12/12/2021. Patient seen at the request of Dr. Janes Gilbert:    Patient presented through [facility] ER on 12/11/2021 with complaints of worsening shortness of breath with associated generalized weakness and nonproductive cough over the past several days.     He was exposed to his son who was diagnosed with Covid last week and has not received the Covid vaccine. The patient was very hypoxic with an SPO2 in the high 50s on room air. Imaging revealed bilateral pulmonary opacities typical of COVID-19    Abnormal labs include:    Ferritin 2800      Patient has been intubated and transferred to [facility]  He has been started on high-dose Decadron and Actemra has been ordered    CT CHEST PULMONARY EMBOLISM W CONTRAST 12/11/2021   Final Result   1. No evidence of pulmonary embolism   2. Diffuse ground-glass opacities throughout the lungs, typical of COVID-19   pulmonary disease.           XR CHEST (SINGLE VIEW FRONTAL) 12/11/2021   Final Result   Multifocal hazy opacities throughout both lungs consistent with COVID   pneumonia and or pulmonary edema       Patient admitted because of concerns with COVID 19.    CURRENT EVALUATION : 12/21/2021    Afebrile  Hypotension on low dose levophed     The patient remains on mechanical ventilation with 60% FiO2. Fevers have resolved    Leukocytosis continues at 18.6  Cefepime was initiated 12/18    CRP is decreasing   CXR is showing concern for pulmonary edema    Cefepime continues from 12/18  Cultures show no growth thus far    Discussed with RN    Patient exhibiting respiratory distress. yes  Respiratory secretions: no    Patient receiving supplemental oxygen.   Mechanical ventilation  RR:  24  02 sat: 92    % FIO2: 90-->80-->85-->80-->65-->60  PEEP:   21-->16-->14-->12    QTc:       NEWS Score: 0-4 Low risk group; 5-6: Medium risk group; 7 or above: High risk group  Parameters 3 2 1 0 1 2 3   Age    < 65   = 65   RR = 8  9-11 12-20  21-24 = 25   O2 Sats = 91 92-93 94-95 = 96      Suppl O2  Yes  No      SBP = 90  101-110 111-219   = 220   HR = 40  41-50 51-90  111-130 = 131   Consciousness    Alert   Drowsiness, lethargy, or confusion   Temperature = 35.0 C (95.0 F)  35.1-36.0 C 95.1-96.9 F 36.1-38.0 C 97.0-100.4 F 38.1-39.0 C 100.5-102.3 F = 39.1 C = 102.4 F      NEWS Score:  12/12/2021: 13 high risk    Overall Daily Picture:     Unchanged    Presence of secondary bacterial Infection:    No   Additional antibiotics: No    Labs, X rays reviewed: 12/21/2021    BUN:32-->34  Cr:0.5-->0.35    WBC:13.5-->17-->16.8-->18.6  Hb:14.8-->15.8  Plat: 177-->228    Absolute Neutrophils:3.73  Absolute Lymphocytes:0.29  Neutrophil/Lymphocyte Ratio: 12.8 high risk    CRP:293-->277-->137-->50.8-->23  Ferritin:2800  LDH: 838    Pro Calcitonin:      Cultures:  Urine:  12/18/21: No bacterial growth  Blood:  12/18/21: No growth thus far  Sputum :  12/18/21: Normal respiratory sherry  Wound:      CXR:     12/19/21      1221 diffuse bilateral infiltrates  CAT:      Discussed with patient, RN, CC, IM.      12-12-21:      I have personally reviewed the past medical history, past surgical history, medications, social history, and family history, and I have updated the database accordingly.   Past Medical History:     Past Medical History:   Diagnosis Date    Arthritis     Asthma     Diabetes mellitus ([address]     Edema     GERD (gastroesophageal reflux disease)     Hypertension     on lasix    Migraine     IRMA on CPAP     seldom use machine       Past Surgical  History:     Past Surgical History:   Procedure Laterality Date    APPENDECTOMY      ARTHROPLASTY Left 11/1/2019    LEFT FOOT 1ST MTPJ ARTHROPLASTY WITH PEREZ IMPLANT AND GROMMETS  [facility] performed by Guru Motta MD at [address] [facility] Right 12/12/2019    RIGHT FOOT ARTHROPLASTY 1ST MTPJ (Right Foot)    ARTHROPLASTY Right 12/12/2019    RIGHT FOOT ARTHROPLASTY 1ST MTPJ performed by Guru Motta MD at [address] Right    [address]  2014    no blockage no stents    CHOLECYSTECTOMY      COLONOSCOPY      ENDOSCOPY, COLON, DIAGNOSTIC      TOE SURGERY Left 11/01/2019     LEFT FOOT 1ST MTPJ ARTHROPLASTY WITH PEREZ IMPLANT AND GROMMETS  [facility] (Left )    TONSILLECTOMY Partner Violence:     Fear of Current or Ex-Partner: Not on file    Emotionally Abused: Not on file    Physically Abused: Not on file    Sexually Abused: Not on file   Housing Stability:     Unable to Pay for Housing in the Last Year: Not on file    Number of [place] in the Last Year: Not on file    Unstable Housing in the Last Year: Not on file       Family History:     Family History   Problem Relation Age of Onset    Heart Attack Sister 32    Diabetes Paternal Grandmother     Heart Disease Paternal Uncle     Heart Disease Paternal Uncle     Heart Disease Paternal Uncle     Cancer Maternal Aunt     Cancer Maternal Aunt     Cancer Maternal Uncle     Cancer Maternal Uncle         Allergies:   Nuts [peanut-containing drug products] and Sunflower oil     Review of Systems:     Unable to assess 12/21/2021  Intubated and sedated      Physical Examination :     Patient Vitals for the past 8 hrs:   BP Pulse Resp SpO2   12/21/21 0430 -- 60 28 --   12/21/21 0415 -- 58 30 --   12/21/21 0400 108/79 59 30 --   12/21/21 0349 -- 55 30 97 %   12/21/21 0330 -- 56 30 --   12/21/21 0300 107/79 56 30 --   12/21/21 0230 -- 57 30 --   12/21/21 0200 105/81 58 30 --   12/21/21 0130 -- 58 30 --   12/21/21 0100 106/79 59 30 --     General Appearance: Intubated and sedated  Head:  Normocephalic, no trauma  ENT: Not well evaluated as the patient is orally intubated  Neck:Supple, without lymphadenopathy. Thyroid normal, No bruits. Pulmonary/Chest: Diminished to auscultation, without wheezes, rales, or rhonchi. No dullness to percussion. Cardiovascular: Regular rate and rhythm without murmurs, rubs, or gallops. Abdomen: Soft, non tender. Bowel sounds normal. No organomegaly  All four Extremities: No cyanosis, clubbing, edema, or effusions. Neurologic: Intubated and sedated and paralyzed  Skin: Warm and dry with good turgor. No signs of peripheral arterial or venous insufficiency. No ulcerations.  No open wounds. Medical Decision Making -Laboratory:   I have independently reviewed/ordered the following labs:    CBC with Differential:   Recent Labs     12/20/21  0558 12/21/21 0421   WBC 16.8* 18.6*   HGB 14.8 15.8   HCT 46.6 47.2    228   LYMPHOPCT 9* 9*   MONOPCT 9* 5     BMP:   Recent Labs     12/20/21  0558 12/21/21 0421    142   K 4.4 4.0    107   CO2 26 24   BUN 32* 34*   CREATININE 0.50* 0.35*     Hepatic Function Panel:   Recent Labs     12/20/21  0558 12/21/21 0421   PROT 5.4* 5.6*   LABALBU 2.9* 3.1*   BILITOT 0.52 0.51   ALKPHOS 72 67   ALT 69* 62*   AST 28 22     No results for input(s): RPR in the last 72 hours. No results for input(s): HIV in the last 72 hours. No results for input(s): BC in the last 72 hours. Lab Results   Component Value Date    MUCUS NOT REPORTED 12/18/2021    RBC 5.10 12/21/2021    TRICHOMONAS NOT REPORTED 12/18/2021    WBC 18.6 12/21/2021    YEAST NOT REPORTED 12/18/2021    TURBIDITY Clear 12/18/2021     Lab Results   Component Value Date    CREATININE 0.35 12/21/2021    GLUCOSE 155 12/21/2021       Medical Decision Making-Imaging:     Narrative   EXAMINATION:   CTA OF THE CHEST 12/11/2021 11:31 am       TECHNIQUE:   CTA of the chest was performed after the administration of intravenous   contrast.  Multiplanar reformatted images are provided for review.  MIP   images are provided for review.  Dose modulation, iterative reconstruction,   and/or weight based adjustment of the mA/kV was utilized to reduce the   radiation dose to as low as reasonably achievable.       COMPARISON:   Chest x-ray dated December 11, 2021       HISTORY:   ORDERING SYSTEM PROVIDED HISTORY: hypoxemia, covid positive, d-dimer-0.99   TECHNOLOGIST PROVIDED HISTORY:   hypoxemia, covid positive, d-dimer-0.99   Decision Support Exception - unselect if not a suspected or confirmed   emergency medical condition->Emergency Medical Condition (MA)   Reason for Exam: COVID positive, elevated Description . ENDOTRACHEAL    Special Requests NOT REPORTED    Direct Exam >25 NEUTROPHILS/LPF     < 10 EPITHELIAL CELLS/LPF     MIXED BACTERIAL MORPHOTYPES SEEN ON GRAM STAIN.  Abnormal     Culture PENDING   Culture, Blood 1 [[id number]] Collected: 12/18/21 1234   Order Status: Completed Specimen: Blood Updated: 12/18/21 1358    Specimen Description . BLOOD    Special Requests NOT REPORTED    Culture NO GROWTH <24 HRS   Culture, Blood 1 [[id number]]    Order Status: Sent Specimen: Blood    Culture, Urine [[id number]] Collected: 12/14/21 0043   Order Status: Completed Specimen: Urine, straight catheter Updated: 12/14/21 1934    Specimen Description . URINE,STRAIGHT CATHETER    Special Requests NOT REPORTED    Culture NO GROWTH   MRSA DNA Probe, Nasal [[id number]] Collected: 12/12/21 1245   Order Status: Completed Specimen: Nasal Updated: 12/13/21 1052    Specimen Description . NASAL SWAB    MRSA, DNA, Nasal NEGATIVE:  MRSA DNA not detected by nucleic acid amplification. Comment:                                                    Results should be used as an adjunct to nosocomial control efforts to identify patients   needing enhanced precautions.     The test is not intended to identify patients with staphylococcal infections.  Results   should not be used to guide or monitor treatment for MRSA infections. Medical Decision Making-Other:     Note:  Labs, medications, radiologic studies were reviewed with personal review of films  Large amounts of data were reviewed  Discussed with nursing Staff, Discharge planner  Infection Control and Prevention measures reviewed  All prior entries were reviewed  Administer medications as ordered  Prognosis: Guarded  Discharge planning reviewed      Thank you for allowing us to participate in the care of this patient. Please call with questions.       Electronically signed by SHO Sanford CNP on 12/21/2021 at 8:52 AM     ATTESTATION:    I have discussed the case, including pertinent history and exam findings with the APRN. I have evaluated the  History, physical findings and pictures of the patient and the key elements of the encounter have been performed by me. I have reviewed the laboratory data, other diagnostic studies and discussed them with the APRN. I have updated the medical record where necessary. I agree with the assessment, plan and orders as documented by the APRN.     Modesta Ch MD.

## 2021-12-21 NOTE — PLAN OF CARE
Problem: Airway Clearance - Ineffective  Goal: Achieve or maintain patent airway  Outcome: Ongoing     Problem: Gas Exchange - Impaired  Goal: Absence of hypoxia  Outcome: Ongoing  Goal: Promote optimal lung function  Outcome: Ongoing     Problem: Breathing Pattern - Ineffective  Goal: Ability to achieve and maintain a regular respiratory rate  Outcome: Ongoing     Problem:  Body Temperature -  Risk of, Imbalanced  Goal: Ability to maintain a body temperature within defined limits  Outcome: Ongoing  Goal: Will regain or maintain usual level of consciousness  Outcome: Ongoing  Goal: Complications related to the disease process, condition or treatment will be avoided or minimized  Outcome: Ongoing     Problem: Isolation Precautions - Risk of Spread of Infection  Goal: Prevent transmission of infection  Outcome: Ongoing     Problem: Nutrition Deficits  Goal: Optimize nutritional status  Outcome: Ongoing     Problem: Risk for Fluid Volume Deficit  Goal: Maintain normal heart rhythm  Outcome: Ongoing  Goal: Maintain absence of muscle cramping  Outcome: Ongoing  Goal: Maintain normal serum potassium, sodium, calcium, phosphorus, and pH  Outcome: Ongoing     Problem: Loneliness or Risk for Loneliness  Goal: Demonstrate positive use of time alone when socialization is not possible  Outcome: Ongoing     Problem: Fatigue  Goal: Verbalize increase energy and improved vitality  Outcome: Ongoing     Problem: Patient Education: Go to Patient Education Activity  Goal: Patient/Family Education  Outcome: Ongoing     Problem: OXYGENATION/RESPIRATORY FUNCTION  Goal: Patient will maintain patent airway  12/21/2021 0915 by Brooks Fink RN  Outcome: Ongoing  12/20/2021 2127 by Raphael Canchola RCP  Outcome: Ongoing  Goal: Patient will achieve/maintain normal respiratory rate/effort  Description: Respiratory rate and effort will be within normal limits for the patient  12/21/2021 0915 by Brooks Fink RN  Outcome: Ongoing  12/20/2021 2127 by Paolo Hughes RCP  Outcome: Ongoing     Problem: MECHANICAL VENTILATION  Goal: Patient will maintain patent airway  12/21/2021 0915 by Gretchen Mendoza RN  Outcome: Ongoing  12/20/2021 2127 by Paolo Hughes RCP  Outcome: Ongoing  Goal: Oral health is maintained or improved  12/21/2021 0915 by Gretchen Mendoza RN  Outcome: Ongoing  12/20/2021 2127 by Paolo Hughes RCP  Outcome: Ongoing  Goal: ET tube will be managed safely  12/21/2021 0915 by Gretchen Mendoza RN  Outcome: Ongoing  12/20/2021 2127 by Paolo Hughes RCP  Outcome: Ongoing  Goal: Ability to express needs and understand communication  12/21/2021 0915 by Gretchen Mendoza RN  Outcome: Ongoing  12/20/2021 2127 by Paolo Hughes RCP  Outcome: Ongoing  Goal: Mobility/activity is maintained at optimum level for patient  12/21/2021 0915 by Gretchen Mendoza RN  Outcome: Ongoing  12/20/2021 2127 by Paolo Hughes RCP  Outcome: Ongoing     Problem: SKIN INTEGRITY  Goal: Skin integrity is maintained or improved  12/21/2021 0915 by Gretchen Mendoza RN  Outcome: Ongoing  12/20/2021 2127 by Paolo Hughes RCP  Outcome: Ongoing     Problem: Skin Integrity:  Goal: Will show no infection signs and symptoms  Description: Will show no infection signs and symptoms  Outcome: Ongoing  Goal: Absence of new skin breakdown  Description: Absence of new skin breakdown  Outcome: Ongoing     Problem: Falls - Risk of:  Goal: Will remain free from falls  Description: Will remain free from falls  Outcome: Ongoing  Goal: Absence of physical injury  Description: Absence of physical injury  Outcome: Ongoing     Problem: Nutrition  Goal: Optimal nutrition therapy  Outcome: Ongoing

## 2021-12-21 NOTE — PROGRESS NOTES
[facility]  Office: [address], DO, Wade Deborah, DO, Zenaidaabebe Taylor, DO, Yogesh Morris, DO, Roma Gordon MD, Martin Wooten MD, Nancy Langley MD, Romaine Sherwood MD, Sarah Vidal MD, Bryce Mackay MD, Jim Rodriguez MD, Derian Li, DO, Charlie Hughes DO, Chesley Saint, MD,  Glynda Fothergill, DO, Mami Gilbert MD, Roberto Vu MD, Erik Bradley MD, Jazmyn Lang MD, Yao Pinzon MD, Pb Valentino MD, Oumar Oviedo MD, Lizabeth Hernandez [facility], [facility], CNP, Dennie Hageman, CNP, Yessy Ibarra, CNS, Abida Forte, CNP, Sage Driver, CNP, Praneeth Mccarty, CNP, Hector Landis, CNP, Shelby Gomez, CNP, JASON WeissC, Jere Pierce, DNP, Norman Ovalles, DNP, Yury Kim, CNP, Cole Nation, CNP, Philipp Monk, CNP, Armond Livingston CNP, Jacquie Simms, CNP, Josefina Harmon, [address] [place] [address]    Progress Note    12/21/2021    6:33 PM    Name:   Ira Aguirre  MRN:     [id number]     Formerly [facility], [facility] Nim:      [de-identified]   Room:   [address] [place] [address] Day:  9  Admit Date:  12/12/2021 11:39 AM    PCP:   Moni Xiong MD  Code Status:  Full Code    Subjective:     C/C: Shortness of breath    Interval History Status: not changed. Patient seen and examined this morning. Remains intubated, sedated, paralyzed. Still without a bowel movement for quite some time. He has been having significant fevers. He is on a significant mount of sedation as well. Brief History:     61-year-old male past medical history of obesity, asthma, IRMA on CPAP, hypertension, GERD presents with shortness of breath and cough at outlying facility.  Patient transferred to [facility] for further care. Patient was intubated 12/11/2021 currently paralyzed    Review of Systems:     Unable to obtain secondary to intubation, sedation, paralytic    Medications: Allergies:     Allergies   Allergen Reactions    Nuts [Peanut-Containing Drug Products] Anaphylaxis    Sunflower Oil Anaphylaxis     Sunflower seeds       Current Meds:   Scheduled Meds:    lactulose  10 g Oral TID    insulin lispro  0-6 Units SubCUTAneous Q6H    lansoprazole  30 mg Oral QAM AC    cefepime  2,000 mg IntraVENous Q12H    furosemide  40 mg IntraVENous Daily    insulin glargine  10 Units SubCUTAneous Nightly    sodium chloride flush  5-40 mL IntraVENous 2 times per day    enoxaparin  30 mg SubCUTAneous BID    Vitamin D  2,000 Units Oral Daily    dexamethasone  10 mg IntraVENous Q24H     Continuous Infusions:    dexmedetomidine 0.9 mcg/kg/hr (12/21/21 1818)    ketamine (KETALAR) infusion for analgosedation 0.2 mg/kg/hr (12/20/21 2122)    sodium chloride      dextrose      norepinephrine 1 mcg/min (12/21/21 1800)    cisatracurium (NIMBEX) infusion 3 mcg/kg/min (12/21/21 1622)    midazolam 8 mg/hr (12/21/21 1815)    fentaNYL 200 mcg/hr (12/21/21 1747)    dextrose      sodium chloride 10 mL/hr at 12/13/21 1548     PRN Meds: ibuprofen, metoprolol, polyethylene glycol, senna, docusate, bisacodyl, acetaminophen, sodium chloride flush, sodium chloride, ondansetron **OR** ondansetron, [DISCONTINUED] acetaminophen **OR** acetaminophen, glucose, dextrose, glucagon (rDNA), dextrose, glucose, dextrose, glucagon (rDNA), dextrose    Data:     Past Medical History:   has a past medical history of Arthritis, Asthma, Diabetes mellitus ([address] Edema, GERD (gastroesophageal reflux disease), Hypertension, Migraine, and IRMA on CPAP. Social History:   reports that he has quit smoking. He has never used smokeless tobacco. He reports current alcohol use. He reports that he does not use drugs.      Family History:   Family History   Problem Relation Age of Onset    Heart Attack Sister 32    Diabetes Paternal Grandmother     Heart Disease Paternal Uncle     Heart Disease Paternal Uncle     Heart Disease Paternal Uncle     Cancer Maternal Aunt     Cancer Maternal Aunt     Cancer Maternal Uncle     Cancer Maternal Uncle        Vitals:  /66   Pulse 80   Temp 102.2 °F (39 °C) (Bladder)   Resp 28   Ht 6' 2\" (1.88 m)   Wt 266 lb 8.6 oz (120.9 kg)   SpO2 94%   BMI 34.22 kg/m²   No data recorded. Recent Labs     12/21/21  0050 12/21/21  0413 12/21/21  1035 12/21/21  1559   POCGLU 150* 166* 129* 165*       I/O (24Hr): Intake/Output Summary (Last 24 hours) at 12/21/2021 1833  Last data filed at 12/21/2021 1800  Gross per 24 hour   Intake 3754.63 ml   Output 2050 ml   Net 1704.63 ml       Labs:  Hematology:  Recent Labs     12/19/21  0504 12/20/21  0558 12/21/21  0421   WBC 17.0* 16.8* 18.6*   RBC 4.98 4.90 5.10   HGB 15.5 14.8 15.8   HCT 47.6 46.6 47.2   MCV 95.6 95.1 92.5   MCH 31.1 30.2 31.0   MCHC 32.6 31.8 33.5   RDW 13.7 13.7 14.0    177 228   MPV 11.4 11.9 11.6     Chemistry:  Recent Labs     12/19/21  0504 12/20/21  0558 12/21/21  0421    143 142   K 4.7 4.4 4.0    106 107   CO2 26 26 24   GLUCOSE 131* 120* 155*   BUN 30* 32* 34*   CREATININE 0.45* 0.50* 0.35*   ANIONGAP 12 11 11   LABGLOM >60 >60 >60   GFRAA >60 >60 >60   CALCIUM 8.4* 8.3* 8.4*     Recent Labs     12/19/21  0504 12/19/21  1110 12/20/21  0558 12/20/21  0646 12/20/21  1000 12/20/21  1612 12/21/21  0050 12/21/21  0413 12/21/21  0421 12/21/21  1035 12/21/21  1559   PROT 5.5*  --  5.4*  --   --   --   --   --  5.6*  --   --    LABALBU 3.1*  --  2.9*  --   --   --   --   --  3.1*  --   --    AST 31  --  28  --   --   --   --   --  22  --   --    ALT 93*  --  69*  --   --   --   --   --  62*  --   --    ALKPHOS 74  --  72  --   --   --   --   --  67  --   --    BILITOT 0.57  --  0.52  --   --   --   --   --  0.51  --   --    POCGLU  --    < >  --    < > 148* 168* 150* 166*  --  129* 165*    < > = values in this interval not displayed.      ABG:  Lab Results   Component Value Date    POCPH 7.518 12/21/2021    POCPCO2 34.8 12/21/2021    POCPO2 61.3 12/21/2021    POCHCO3 28.3 12/21/2021    NBEA NOT REPORTED 12/21/2021    PBEA 6 12/21/2021    RYQ0FXV NOT REPORTED 12/21/2021    RLJQ6EOX 94 12/21/2021    FIO2 60.0 12/21/2021     Lab Results   Component Value Date/Time    SPECIAL NOT REPORTED 12/18/2021 12:34 PM     Lab Results   Component Value Date/Time    CULTURE NO GROWTH 3 DAYS 12/18/2021 12:34 PM       Radiology:  XR CHEST (SINGLE VIEW FRONTAL)    Result Date: 12/16/2021  No significant change in multifocal airspace opacities. Continued follow-up is recommended document complete resolution following medical treatment course. Stable position lines and catheters     XR CHEST PORTABLE    Result Date: 12/19/2021  Cardiomegaly, vascular congestion and worsening pulmonary opacities with bilateral effusions favoring pulmonary edema over multifocal airspace disease. Support tubes and lines as above.        Physical Examination:        General appearance: Obese  gentleman lying supine in bed, intubated, sedated  HEENT: ET and OG tubes are present, right IJ CVC is present  Lungs:  clear to auscultation bilaterally, normal effort  Heart:  regular rate and rhythm, no murmur  Abdomen:  soft, nontender, protuberant, nondistended, diminished bowel sounds, no masses, hepatomegaly, splenomegaly  Extremities:  no edema, left radial art line is present  Skin:  no gross lesions, rashes, induration    Assessment:        Hospital Problems           Last Modified POA    * (Principal) Pneumonia due to COVID-19 virus 12/21/2021 Yes    Acute respiratory failure with hypoxia ([address] 12/21/2021 Yes    ARDS (adult respiratory distress syndrome) ([address] 12/21/2021 Yes    Shock ([address] 12/21/2021 No    Type 2 diabetes mellitus ([address] 12/21/2021 Yes    Asthma 12/21/2021 Yes    IRMA on CPAP 12/12/2021 Yes    Overview Signed 12/12/2021  2:39 PM by Danae Rivas DO     seldom use machine         Hypertension 12/12/2021 Yes    Overview Signed 12/12/2021  2:39 PM by Danae Rivas DO     on lasix         GERD (gastroesophageal reflux disease) 12/12/2021 Yes          Plan:        [id number] pneumonia-tested positive on 12/11/2021. Continue steroids. Received Actemra on 12/12. Continue vitamin D. Acute respiratory failure with hypoxia-appreciate pulmonology's management of the ventilator. Currently requiring 60% FiO2.  10 of PEEP. Wean as tolerated. Receiving Lasix 40 mg daily  Febrile illness-likely related to #1-infectious diseases following. Cefepime has been started. No growth on blood cultures. Type 2 diabetes mellitus-continue insulin sliding scale along with Lantus 10 units nightly  Obesity with BMI of 34.22  Essential hypertension-holding medications as patient is currently on norepinephrine  Constipation-received multiple as needed medications. Start lactulose today. Check KUB in the morning. May need Reglan. Neutrophilic leukocytosis-uncertain as to the significance of this. May be related to steroids. Labs reviewed  Continue DVT prophylaxis with Lovenox  Unvaccinated status    Patient is on quite a significant amount of sedation. Would like to wean this as we are able. Prognosis is guarded. Need to update family.     33 Danisha Bourgeois DO  12/21/2021  6:33 PM

## 2021-12-22 ENCOUNTER — APPOINTMENT (OUTPATIENT)
Dept: GENERAL RADIOLOGY | Age: 56
DRG: 004 | End: 2021-12-22
Attending: INTERNAL MEDICINE
Payer: COMMERCIAL

## 2021-12-22 LAB
ABSOLUTE EOS #: 0 K/UL (ref 0–0.4)
ABSOLUTE IMMATURE GRANULOCYTE: 0.15 K/UL (ref 0–0.3)
ABSOLUTE LYMPH #: 1.32 K/UL (ref 1–4.8)
ABSOLUTE MONO #: 1.32 K/UL (ref 0.1–0.8)
ALBUMIN SERPL-MCNC: 3.2 G/DL (ref 3.5–5.2)
ALBUMIN/GLOBULIN RATIO: 1.3 (ref 1–2.5)
ALLEN TEST: ABNORMAL
ALP BLD-CCNC: 66 U/L (ref 40–129)
ALT SERPL-CCNC: 60 U/L (ref 5–41)
ANION GAP SERPL CALCULATED.3IONS-SCNC: 11 MMOL/L (ref 9–17)
AST SERPL-CCNC: 27 U/L
BASOPHILS # BLD: 0 % (ref 0–2)
BASOPHILS ABSOLUTE: 0 K/UL (ref 0–0.2)
BILIRUB SERPL-MCNC: 0.37 MG/DL (ref 0.3–1.2)
BUN BLDV-MCNC: 35 MG/DL (ref 6–20)
BUN/CREAT BLD: ABNORMAL (ref 9–20)
CALCIUM SERPL-MCNC: 8.6 MG/DL (ref 8.6–10.4)
CHLORIDE BLD-SCNC: 106 MMOL/L (ref 98–107)
CO2: 24 MMOL/L (ref 20–31)
CREAT SERPL-MCNC: 0.42 MG/DL (ref 0.7–1.2)
DIFFERENTIAL TYPE: ABNORMAL
EOSINOPHILS RELATIVE PERCENT: 0 % (ref 1–4)
FIO2: 60
GFR AFRICAN AMERICAN: >60 ML/MIN
GFR NON-AFRICAN AMERICAN: >60 ML/MIN
GFR SERPL CREATININE-BSD FRML MDRD: ABNORMAL ML/MIN/{1.73_M2}
GFR SERPL CREATININE-BSD FRML MDRD: ABNORMAL ML/MIN/{1.73_M2}
GLUCOSE BLD-MCNC: 131 MG/DL (ref 75–110)
GLUCOSE BLD-MCNC: 142 MG/DL (ref 75–110)
GLUCOSE BLD-MCNC: 144 MG/DL (ref 74–100)
GLUCOSE BLD-MCNC: 156 MG/DL (ref 70–99)
GLUCOSE BLD-MCNC: 162 MG/DL (ref 75–110)
GLUCOSE BLD-MCNC: 169 MG/DL (ref 75–110)
GLUCOSE BLD-MCNC: 171 MG/DL (ref 75–110)
HCT VFR BLD CALC: 47.9 % (ref 40.7–50.3)
HEMOGLOBIN: 15.5 G/DL (ref 13–17)
IMMATURE GRANULOCYTES: 1 %
LYMPHOCYTES # BLD: 9 % (ref 24–44)
MCH RBC QN AUTO: 31 PG (ref 25.2–33.5)
MCHC RBC AUTO-ENTMCNC: 32.4 G/DL (ref 28.4–34.8)
MCV RBC AUTO: 95.8 FL (ref 82.6–102.9)
MODE: ABNORMAL
MONOCYTES # BLD: 9 % (ref 1–7)
MORPHOLOGY: NORMAL
NEGATIVE BASE EXCESS, ART: ABNORMAL (ref 0–2)
NRBC AUTOMATED: 0 PER 100 WBC
O2 DEVICE/FLOW/%: ABNORMAL
PATIENT TEMP: 37.3
PDW BLD-RTO: 13.9 % (ref 11.8–14.4)
PLATELET # BLD: 198 K/UL (ref 138–453)
PLATELET ESTIMATE: ABNORMAL
PMV BLD AUTO: 12.6 FL (ref 8.1–13.5)
POC HCO3: 25.3 MMOL/L (ref 21–28)
POC O2 SATURATION: 89 % (ref 94–98)
POC PCO2 TEMP: 41 MM HG
POC PCO2: 40.5 MM HG (ref 35–48)
POC PH TEMP: 7.4
POC PH: 7.4 (ref 7.35–7.45)
POC PO2 TEMP: 58 MM HG
POC PO2: 56.9 MM HG (ref 83–108)
POSITIVE BASE EXCESS, ART: 0 (ref 0–3)
POTASSIUM SERPL-SCNC: 3.7 MMOL/L (ref 3.7–5.3)
RBC # BLD: 5 M/UL (ref 4.21–5.77)
RBC # BLD: ABNORMAL 10*6/UL
SAMPLE SITE: ABNORMAL
SEG NEUTROPHILS: 81 % (ref 36–66)
SEGMENTED NEUTROPHILS ABSOLUTE COUNT: 11.91 K/UL (ref 1.8–7.7)
SODIUM BLD-SCNC: 141 MMOL/L (ref 135–144)
TCO2 (CALC), ART: ABNORMAL MMOL/L (ref 22–29)
TOTAL PROTEIN: 5.7 G/DL (ref 6.4–8.3)
WBC # BLD: 14.7 K/UL (ref 3.5–11.3)
WBC # BLD: ABNORMAL 10*3/UL

## 2021-12-22 PROCEDURE — 6360000002 HC RX W HCPCS: Performed by: NURSE PRACTITIONER

## 2021-12-22 PROCEDURE — 2500000003 HC RX 250 WO HCPCS: Performed by: EMERGENCY MEDICINE

## 2021-12-22 PROCEDURE — 2500000003 HC RX 250 WO HCPCS: Performed by: INTERNAL MEDICINE

## 2021-12-22 PROCEDURE — 6360000002 HC RX W HCPCS: Performed by: INTERNAL MEDICINE

## 2021-12-22 PROCEDURE — 6360000002 HC RX W HCPCS

## 2021-12-22 PROCEDURE — 6370000000 HC RX 637 (ALT 250 FOR IP): Performed by: INTERNAL MEDICINE

## 2021-12-22 PROCEDURE — 2580000003 HC RX 258: Performed by: INTERNAL MEDICINE

## 2021-12-22 PROCEDURE — 74018 RADEX ABDOMEN 1 VIEW: CPT

## 2021-12-22 PROCEDURE — 82803 BLOOD GASES ANY COMBINATION: CPT

## 2021-12-22 PROCEDURE — 99232 SBSQ HOSP IP/OBS MODERATE 35: CPT | Performed by: INTERNAL MEDICINE

## 2021-12-22 PROCEDURE — 2580000003 HC RX 258: Performed by: NURSE PRACTITIONER

## 2021-12-22 PROCEDURE — 6370000000 HC RX 637 (ALT 250 FOR IP): Performed by: NURSE PRACTITIONER

## 2021-12-22 PROCEDURE — 80053 COMPREHEN METABOLIC PANEL: CPT

## 2021-12-22 PROCEDURE — 37799 UNLISTED PX VASCULAR SURGERY: CPT

## 2021-12-22 PROCEDURE — 2000000000 HC ICU R&B

## 2021-12-22 PROCEDURE — 85025 COMPLETE CBC W/AUTO DIFF WBC: CPT

## 2021-12-22 PROCEDURE — 94003 VENT MGMT INPAT SUBQ DAY: CPT

## 2021-12-22 PROCEDURE — 99291 CRITICAL CARE FIRST HOUR: CPT | Performed by: INTERNAL MEDICINE

## 2021-12-22 PROCEDURE — 94761 N-INVAS EAR/PLS OXIMETRY MLT: CPT

## 2021-12-22 PROCEDURE — 2700000000 HC OXYGEN THERAPY PER DAY

## 2021-12-22 PROCEDURE — 71045 X-RAY EXAM CHEST 1 VIEW: CPT

## 2021-12-22 PROCEDURE — APPSS30 APP SPLIT SHARED TIME 16-30 MINUTES: Performed by: NURSE PRACTITIONER

## 2021-12-22 PROCEDURE — 6370000000 HC RX 637 (ALT 250 FOR IP): Performed by: STUDENT IN AN ORGANIZED HEALTH CARE EDUCATION/TRAINING PROGRAM

## 2021-12-22 RX ORDER — SODIUM CHLORIDE 0.9 % (FLUSH) 0.9 %
5-40 SYRINGE (ML) INJECTION PRN
Status: DISCONTINUED | OUTPATIENT
Start: 2021-12-22 | End: 2022-01-07 | Stop reason: HOSPADM

## 2021-12-22 RX ORDER — LIDOCAINE HYDROCHLORIDE 10 MG/ML
5 INJECTION, SOLUTION INFILTRATION; PERINEURAL ONCE
Status: DISCONTINUED | OUTPATIENT
Start: 2021-12-22 | End: 2022-01-02

## 2021-12-22 RX ORDER — CHLORDIAZEPOXIDE HYDROCHLORIDE 5 MG/1
5 CAPSULE, GELATIN COATED ORAL EVERY 6 HOURS PRN
Status: DISCONTINUED | OUTPATIENT
Start: 2021-12-22 | End: 2021-12-24

## 2021-12-22 RX ORDER — OXYCODONE HCL 5 MG/5 ML
10 SOLUTION, ORAL ORAL EVERY 6 HOURS
Status: DISCONTINUED | OUTPATIENT
Start: 2021-12-22 | End: 2021-12-31

## 2021-12-22 RX ORDER — SODIUM CHLORIDE 0.9 % (FLUSH) 0.9 %
5-40 SYRINGE (ML) INJECTION EVERY 12 HOURS SCHEDULED
Status: DISCONTINUED | OUTPATIENT
Start: 2021-12-22 | End: 2022-01-07 | Stop reason: HOSPADM

## 2021-12-22 RX ORDER — PROPOFOL 10 MG/ML
5-50 INJECTION, EMULSION INTRAVENOUS
Status: DISCONTINUED | OUTPATIENT
Start: 2021-12-22 | End: 2021-12-24

## 2021-12-22 RX ORDER — PROPOFOL 10 MG/ML
INJECTION, EMULSION INTRAVENOUS
Status: COMPLETED
Start: 2021-12-22 | End: 2021-12-22

## 2021-12-22 RX ORDER — SODIUM CHLORIDE 9 MG/ML
25 INJECTION, SOLUTION INTRAVENOUS PRN
Status: DISCONTINUED | OUTPATIENT
Start: 2021-12-22 | End: 2022-01-07 | Stop reason: HOSPADM

## 2021-12-22 RX ADMIN — Medication 200 MCG/HR: at 10:16

## 2021-12-22 RX ADMIN — SODIUM CHLORIDE, PRESERVATIVE FREE 10 ML: 5 INJECTION INTRAVENOUS at 19:40

## 2021-12-22 RX ADMIN — Medication 200 MCG/HR: at 20:27

## 2021-12-22 RX ADMIN — Medication 30 MG: at 10:54

## 2021-12-22 RX ADMIN — INSULIN LISPRO 1 UNITS: 100 INJECTION, SOLUTION INTRAVENOUS; SUBCUTANEOUS at 18:12

## 2021-12-22 RX ADMIN — ACETAMINOPHEN 650 MG: 160 SOLUTION ORAL at 04:12

## 2021-12-22 RX ADMIN — DEXMEDETOMIDINE HYDROCHLORIDE 1 MCG/KG/HR: 4 INJECTION, SOLUTION INTRAVENOUS at 10:17

## 2021-12-22 RX ADMIN — CEFEPIME 2000 MG: 2 INJECTION, POWDER, FOR SOLUTION INTRAVENOUS at 21:55

## 2021-12-22 RX ADMIN — INSULIN GLARGINE 10 UNITS: 100 INJECTION, SOLUTION SUBCUTANEOUS at 20:30

## 2021-12-22 RX ADMIN — Medication 200 MCG/HR: at 15:37

## 2021-12-22 RX ADMIN — FUROSEMIDE 40 MG: 10 INJECTION, SOLUTION INTRAVENOUS at 08:46

## 2021-12-22 RX ADMIN — DEXMEDETOMIDINE HYDROCHLORIDE 1 MCG/KG/HR: 4 INJECTION, SOLUTION INTRAVENOUS at 22:31

## 2021-12-22 RX ADMIN — DEXMEDETOMIDINE HYDROCHLORIDE 1 MCG/KG/HR: 4 INJECTION, SOLUTION INTRAVENOUS at 03:57

## 2021-12-22 RX ADMIN — ACETAMINOPHEN 650 MG: 160 SOLUTION ORAL at 20:24

## 2021-12-22 RX ADMIN — DOCUSATE SODIUM 100 MG: 50 LIQUID ORAL at 20:25

## 2021-12-22 RX ADMIN — PROPOFOL 15 MCG/KG/MIN: 10 INJECTION, EMULSION INTRAVENOUS at 17:48

## 2021-12-22 RX ADMIN — CEFEPIME 2000 MG: 2 INJECTION, POWDER, FOR SOLUTION INTRAVENOUS at 08:47

## 2021-12-22 RX ADMIN — SODIUM CHLORIDE, PRESERVATIVE FREE 10 ML: 5 INJECTION INTRAVENOUS at 08:45

## 2021-12-22 RX ADMIN — LACTULOSE 10 G: 20 SOLUTION ORAL at 14:43

## 2021-12-22 RX ADMIN — CISATRACURIUM BESYLATE 3.5 MCG/KG/MIN: 10 INJECTION, SOLUTION INTRAVENOUS at 06:02

## 2021-12-22 RX ADMIN — DEXMEDETOMIDINE HYDROCHLORIDE 1.1 MCG/KG/HR: 4 INJECTION, SOLUTION INTRAVENOUS at 17:20

## 2021-12-22 RX ADMIN — ENOXAPARIN SODIUM 30 MG: 100 INJECTION SUBCUTANEOUS at 20:26

## 2021-12-22 RX ADMIN — Medication 10 MG/HR: at 10:19

## 2021-12-22 RX ADMIN — DEXMEDETOMIDINE HYDROCHLORIDE 1.1 MCG/KG/HR: 4 INJECTION, SOLUTION INTRAVENOUS at 19:34

## 2021-12-22 RX ADMIN — Medication 10 MG/HR: at 20:26

## 2021-12-22 RX ADMIN — ENOXAPARIN SODIUM 30 MG: 100 INJECTION SUBCUTANEOUS at 08:45

## 2021-12-22 RX ADMIN — KETAMINE HYDROCHLORIDE 0.2 MG/KG/HR: 50 INJECTION INTRAMUSCULAR; INTRAVENOUS at 02:16

## 2021-12-22 RX ADMIN — INSULIN LISPRO 1 UNITS: 100 INJECTION, SOLUTION INTRAVENOUS; SUBCUTANEOUS at 13:47

## 2021-12-22 RX ADMIN — DEXMEDETOMIDINE HYDROCHLORIDE 1 MCG/KG/HR: 4 INJECTION, SOLUTION INTRAVENOUS at 14:43

## 2021-12-22 RX ADMIN — DEXAMETHASONE SODIUM PHOSPHATE 10 MG: 10 INJECTION INTRAMUSCULAR; INTRAVENOUS at 14:09

## 2021-12-22 RX ADMIN — DEXMEDETOMIDINE HYDROCHLORIDE 1 MCG/KG/HR: 4 INJECTION, SOLUTION INTRAVENOUS at 06:45

## 2021-12-22 RX ADMIN — METOPROLOL TARTRATE 5 MG: 5 INJECTION INTRAVENOUS at 02:55

## 2021-12-22 RX ADMIN — SODIUM CHLORIDE, PRESERVATIVE FREE 10 ML: 5 INJECTION INTRAVENOUS at 19:45

## 2021-12-22 RX ADMIN — OXYCODONE HYDROCHLORIDE 10 MG: 5 SOLUTION ORAL at 20:24

## 2021-12-22 RX ADMIN — Medication 2000 UNITS: at 08:45

## 2021-12-22 RX ADMIN — OXYCODONE HYDROCHLORIDE 10 MG: 5 SOLUTION ORAL at 14:43

## 2021-12-22 RX ADMIN — Medication 200 MCG/HR: at 04:57

## 2021-12-22 ASSESSMENT — PAIN SCALES - GENERAL
PAINLEVEL_OUTOF10: 0

## 2021-12-22 ASSESSMENT — PULMONARY FUNCTION TESTS
PIF_VALUE: 29
PIF_VALUE: 27
PIF_VALUE: 27
PIF_VALUE: 28
PIF_VALUE: 30

## 2021-12-22 NOTE — PLAN OF CARE
Problem: OXYGENATION/RESPIRATORY FUNCTION  Goal: Patient will maintain patent airway  12/22/2021 0949 by Ryan Xiong RCP  Outcome: Ongoing     Problem: OXYGENATION/RESPIRATORY FUNCTION  Goal: Patient will achieve/maintain normal respiratory rate/effort  Description: Respiratory rate and effort will be within normal limits for the patient  12/22/2021 0949 by Ryan Xiong RCP  Outcome: Ongoing     Problem: MECHANICAL VENTILATION  Goal: Patient will maintain patent airway  12/22/2021 0949 by Ryan Xiong RCP  Outcome: Ongoing     Problem: MECHANICAL VENTILATION  Goal: Oral health is maintained or improved  12/22/2021 0949 by Ryan Xiong RCP  Outcome: Ongoing     Problem: MECHANICAL VENTILATION  Goal: ET tube will be managed safely  12/22/2021 0949 by Ryan Xiong RCP  Outcome: Ongoing     Problem: MECHANICAL VENTILATION  Goal: Ability to express needs and understand communication  12/22/2021 0949 by Ryan Xiong RCP  Outcome: Ongoing     Problem: MECHANICAL VENTILATION  Goal: Mobility/activity is maintained at optimum level for patient  12/22/2021 0949 by Ryan Xiong RCP  Outcome: Ongoing     Problem: SKIN INTEGRITY  Goal: Skin integrity is maintained or improved  12/22/2021 0949 by Ryan Xiong RCP  Outcome: Ongoing

## 2021-12-22 NOTE — PROGRESS NOTES
[facility] - Progress Note   Note COVID 19 Patient  Today's Date and Time: 12/22/2021, 9:14 AM    Impression :     COVID 19 Confirmed Infection  Covid tests:  12/11/2021: Positive  Elevated inflammatory markers  Acute hypoxic respiratory failure  History of asthma  Diabetes mellitus  Essential hypertension  IRMA on CPAP  Patient has not received the Covid vaccination    Recommendations:   Antibiotic treatment:  The patient is having high-grade fevers and cultures have been sent.-No growth on cultures thus far  I will start the patient empirically on antibiotic therapy with cefepime on 12/18-fevers initially resolved but low grade fevers have resumed. Leukocytosis is improving  Covid Rx:    Remdesivir-out of window  Decadron-high-dose initiated  Actemra-ordered 12/12/2021  Monoclonal antibodies-out of window      Medical Decision Making/Summary/Discussion:12/22/2021     Patient admitted with COVID 19 infection  He may come out of isolation on 12/31/21    Infection Control Recommendations   [place] Precautions  Airborne isolation  Droplet Isolation    Antimicrobial Stewardship Recommendations     Discontinuation of therapy  Coordination of Outpatient Care:   Estimated Length of IV antimicrobials:TBD  Patient will need Midline Catheter Insertion: TBD  Patient will need PICC line Insertion: No  Patient will need: Home IV , [place],  SNF,  LTAC:TBD  Patient will need outpatient wound care:No    Chief complaint/reason for consultation:   Concern for COVID infection      History of Present Illness:   Omero Coto is a 60y.o.-year-old male who was initially admitted on 12/12/2021. Patient seen at the request of Dr. Amada Sawant:    Patient presented through [facility] ER on 12/11/2021 with complaints of worsening shortness of breath with associated generalized weakness and nonproductive cough over the past several days.     He was exposed to his son who was diagnosed with Covid last week and has not received the Covid vaccine. The patient was very hypoxic with an SPO2 in the high 50s on room air. Imaging revealed bilateral pulmonary opacities typical of COVID-19    Abnormal labs include:    Ferritin 2800      Patient has been intubated and transferred to [facility]  He has been started on high-dose Decadron and Actemra has been ordered    CT CHEST PULMONARY EMBOLISM W CONTRAST 12/11/2021   Final Result   1. No evidence of pulmonary embolism   2. Diffuse ground-glass opacities throughout the lungs, typical of COVID-19   pulmonary disease.           XR CHEST (SINGLE VIEW FRONTAL) 12/11/2021   Final Result   Multifocal hazy opacities throughout both lungs consistent with COVID   pneumonia and or pulmonary edema       Patient admitted because of concerns with COVID 19.    CURRENT EVALUATION : 12/22/2021    T-max 100.0 F  Vital signs stable off of vasopressors  Intermittent tachycardia    The patient remains on mechanical ventilation with 60% FiO2 and PEEP of 10. He is receiving fentanyl, ketamine, Versed and Precedex for sedation. Nimbex for paralytic    Leukocytosis continues at 18.6-->14.7  Cefepime was initiated 12/18    CRP is decreasing   CXR 12/22 is showing concern for pulmonary edema vs pneumonia-Left lung worse than right    Discussed with RN    Patient exhibiting respiratory distress. yes  Respiratory secretions: no    Patient receiving supplemental oxygen.   Mechanical ventilation  RR:  24-->28  02 sat: 92-->95    % FIO2: 90-->80-->85-->80-->65-->60  PEEP:   21-->16-->14-->12-->10    QTc:       NEWS Score: 0-4 Low risk group; 5-6: Medium risk group; 7 or above: High risk group  Parameters 3 2 1 0 1 2 3   Age    < 65   = 65   RR = 8  9-11 12-20  21-24 = 25   O2 Sats = 91 92-93 94-95 = 96      Suppl O2  Yes  No      SBP = 90  101-110 111-219   = 220   HR = 40  41-50 51-90  111-130 = 131   Consciousness    Alert Drowsiness, lethargy, or confusion   Temperature = 35.0 C (95.0 F)  35.1-36.0 C 95.1-96.9 F 36.1-38.0 C 97.0-100.4 F 38.1-39.0 C 100.5-102.3 F = 39.1 C = 102.4 F      NEWS Score:  12/12/2021: 13 high risk    Overall Daily Picture:     Unchanged    Presence of secondary bacterial Infection:    Cultures no growth  Additional antibiotics: 12/18 Cefepime for leukocytosis and fevers    Labs, X rays reviewed: 12/22/2021    BUN:32-->34-->35  Cr:0.5-->0.35-->0.42    WBC:13.5-->17-->16.8-->18.6-->14.7  Hb:14.8-->15.8-->15.5  Plat: 177-->228-->198    Absolute Neutrophils:3.73  Absolute Lymphocytes:0.29  Neutrophil/Lymphocyte Ratio: 12.8 high risk    CRP:293-->277-->137-->50.8-->23  Ferritin:2800  LDH: 838    Pro Calcitonin:      Cultures:  Urine:  12/18/21: No bacterial growth  Blood:  12/18/21: No growth thus far  Sputum :  12/18/21: Normal respiratory sherry  Wound:      CXR:     12/22/21 12/19/21      1221 diffuse bilateral infiltrates  CAT:      Discussed with patient, RN, CC, IM.      12-12-21:      I have personally reviewed the past medical history, past surgical history, medications, social history, and family history, and I have updated the database accordingly.   Past Medical History:     Past Medical History:   Diagnosis Date    Arthritis     Asthma     Diabetes mellitus ([address]     Edema     GERD (gastroesophageal reflux disease)     Hypertension     on lasix    Migraine     IRMA on CPAP     seldom use machine       Past Surgical  History:     Past Surgical History:   Procedure Laterality Date    APPENDECTOMY      ARTHROPLASTY Left 11/1/2019    LEFT FOOT 1ST MTPJ ARTHROPLASTY WITH PEREZ IMPLANT AND GROMMETS  [facility] performed by Carmen Carter MD at [address] [place] [address] Right 12/12/2019    RIGHT FOOT ARTHROPLASTY 1ST MTPJ (Right Foot)    ARTHROPLASTY Right 12/12/2019    RIGHT FOOT ARTHROPLASTY 1ST MTPJ performed by Carmen Carter MD at [address] Right    [address] CATHETERIZATION  2014    no blockage no stents    CHOLECYSTECTOMY      COLONOSCOPY      ENDOSCOPY, COLON, DIAGNOSTIC      TOE SURGERY Left 11/01/2019     LEFT FOOT 1ST MTPJ ARTHROPLASTY WITH PEREZ IMPLANT AND GROMMETS  [facility] (Left )    TONSILLECTOMY         Medications:      lactulose  10 g Oral TID    insulin lispro  0-6 Units SubCUTAneous Q6H    lansoprazole  30 mg Oral QAM AC    cefepime  2,000 mg IntraVENous Q12H    furosemide  40 mg IntraVENous Daily    insulin glargine  10 Units SubCUTAneous Nightly    sodium chloride flush  5-40 mL IntraVENous 2 times per day    enoxaparin  30 mg SubCUTAneous BID    Vitamin D  2,000 Units Oral Daily    dexamethasone  10 mg IntraVENous Q24H       Social History:     Social History     Socioeconomic History    Marital status:      Spouse name: Not on file    Number of children: Not on file    Years of education: Not on file    Highest education level: Not on file   Occupational History    Not on file   Tobacco Use    Smoking status: Former Smoker    Smokeless tobacco: Never Used   Vaping Use    Vaping Use: Never used   Substance and Sexual Activity    Alcohol use: Yes     Comment: every couple months    Drug use: Never    Sexual activity: Not on file   Other Topics Concern    Not on file   Social History Narrative    Not on file     Social Determinants of Health     Financial Resource Strain:     Difficulty of Paying Living Expenses: Not on file   Food Insecurity:     Worried About Running Out of Food in the Last Year: Not on file    Lucina of Food in the Last Year: Not on file   Transportation Needs:     Lack of Transportation (Medical): Not on file    Lack of Transportation (Non-Medical):  Not on file   Physical Activity:     Days of Exercise per Week: Not on file    Minutes of Exercise per Session: Not on file   Stress:     Feeling of Stress : Not on file   Social Connections:     Frequency of Communication with Friends and Family: Not on file    Frequency of Social Gatherings with Friends and Family: Not on file    Attends Zoroastrian Services: Not on file    Active Member of Clubs or Organizations: Not on file    Attends Club or Organization Meetings: Not on file    Marital Status: Not on file   Intimate Partner Violence:     Fear of Current or Ex-Partner: Not on file    Emotionally Abused: Not on file    Physically Abused: Not on file    Sexually Abused: Not on file   Housing Stability:     Unable to Pay for Housing in the Last Year: Not on file    Number of Places Lived in the Last Year: Not on file    Unstable Housing in the Last Year: Not on file       Family History:     Family History   Problem Relation Age of Onset    Heart Attack Sister 32    Diabetes Paternal Grandmother     Heart Disease Paternal Uncle     Heart Disease Paternal Uncle     Heart Disease Paternal Uncle     Cancer Maternal Aunt     Cancer Maternal Aunt     Cancer Maternal Uncle     Cancer Maternal Uncle         Allergies:   Nuts [peanut-containing drug products] and Sunflower oil     Review of Systems:     Unable to assess 12/22/2021  Intubated and sedated      Physical Examination :     Patient Vitals for the past 8 hrs:   BP Temp Temp src Pulse Resp SpO2   12/22/21 0800 101/73 99.9 °F (37.7 °C) Bladder 62 28 98 %   12/22/21 0700 100/71 -- -- 62 28 97 %   12/22/21 0600 (!) 83/58 99.3 °F (37.4 °C) Bladder 66 28 90 %   12/22/21 0500 104/60 99.1 °F (37.3 °C) Bladder 91 28 91 %   12/22/21 0400 (!) 155/88 99.3 °F (37.4 °C) Bladder 105 28 94 %   12/22/21 0355 -- -- -- 104 28 94 %   12/22/21 0300 (!) 141/86 -- Bladder 104 28 95 %   12/22/21 0200 111/80 100.2 °F (37.9 °C) Bladder 64 28 98 %     General Appearance: Intubated and sedated  Head:  Normocephalic, no trauma  ENT: Not well evaluated as the patient is orally intubated  Neck:Supple, without lymphadenopathy. Thyroid normal, No bruits.   Pulmonary/Chest: Diminished to auscultation, without wheezes, rales, or rhonchi. No dullness to percussion. Cardiovascular: Regular rate and rhythm without murmurs, rubs, or gallops. Abdomen: Soft, non tender. Bowel sounds normal. No organomegaly  All four Extremities: No cyanosis, clubbing, edema, or effusions. Neurologic: Intubated and sedated and paralyzed  Skin: Warm and dry with good turgor. No signs of peripheral arterial or venous insufficiency. No ulcerations. No open wounds. Medical Decision Making -Laboratory:   I have independently reviewed/ordered the following labs:    CBC with Differential:   Recent Labs     12/21/21 0421 12/22/21 0513   WBC 18.6* 14.7*   HGB 15.8 15.5   HCT 47.2 47.9    198   LYMPHOPCT 9* 9*   MONOPCT 5 9*     BMP:   Recent Labs     12/21/21 0421 12/22/21 0513    141   K 4.0 3.7    106   CO2 24 24   BUN 34* 35*   CREATININE 0.35* 0.42*     Hepatic Function Panel:   Recent Labs     12/21/21 0421 12/22/21  0513   PROT 5.6* 5.7*   LABALBU 3.1* 3.2*   BILITOT 0.51 0.37   ALKPHOS 67 66   ALT 62* 60*   AST 22 27     No results for input(s): RPR in the last 72 hours. No results for input(s): HIV in the last 72 hours. No results for input(s): BC in the last 72 hours. Lab Results   Component Value Date    MUCUS NOT REPORTED 12/18/2021    RBC 5.00 12/22/2021    TRICHOMONAS NOT REPORTED 12/18/2021    WBC 14.7 12/22/2021    YEAST NOT REPORTED 12/18/2021    TURBIDITY Clear 12/18/2021     Lab Results   Component Value Date    CREATININE 0.42 12/22/2021    GLUCOSE 156 12/22/2021       Medical Decision Making-Imaging:     Narrative   EXAMINATION:   CTA OF THE CHEST 12/11/2021 11:31 am       TECHNIQUE:   CTA of the chest was performed after the administration of intravenous   contrast.  Multiplanar reformatted images are provided for review.  MIP   images are provided for review.  Dose modulation, iterative reconstruction,   and/or weight based adjustment of the mA/kV was utilized to reduce the   radiation dose to as low as reasonably achievable.       COMPARISON:   Chest x-ray dated December 11, 2021       HISTORY:   ORDERING SYSTEM PROVIDED HISTORY: hypoxemia, covid positive, d-dimer-0.99   TECHNOLOGIST PROVIDED HISTORY:   hypoxemia, covid positive, d-dimer-0.99   Decision Support Exception - unselect if not a suspected or confirmed   emergency medical condition->Emergency Medical Condition (MA)   Reason for Exam: COVID positive, elevated D-Dimer       FINDINGS:   Pulmonary Arteries: Pulmonary arteries are adequately opacified for   evaluation.  No evidence of intraluminal filling defect to suggest pulmonary   embolism.  Main pulmonary artery is normal in caliber.       Mediastinum: Nonspecific mediastinal and hilar lymph nodes are present,   likely reactive. .  The heart and pericardium demonstrate no acute   abnormality.  There is no acute abnormality of the thoracic aorta.       Lungs/pleura: Diffuse ground-glass opacification is present throughout the   lungs, typical of COVID-19 pulmonary disease.  No pleural effusion or   pneumothorax is present.       Upper Abdomen: Images through the upper abdomen demonstrate a small hiatal   hernia.  Diffuse hepatic steatosis is present.  The gallbladder is surgically   absent.       Soft Tissues/Bones: No acute bone or soft tissue abnormality.           Impression   1. No evidence of pulmonary embolism   2. Diffuse ground-glass opacities throughout the lungs, typical of COVID-19   pulmonary disease.         Narrative   EXAMINATION:   ONE XRAY VIEW OF THE CHEST       12/11/2021 3:54 pm       COMPARISON:   November 13, 2012       HISTORY:   ORDERING SYSTEM PROVIDED HISTORY: hypoxemia, probable covid pneumonia   TECHNOLOGIST PROVIDED HISTORY:   hypoxemia, probable covid pneumonia       FINDINGS:   Multifocal hazy opacities throughout both lungs would be consistent with   history of COVID pneumonia.  Pulmonary edema could have a similar appearance. No pneumothorax or pleural effusion.  Cardiac size enlarged.  Mediastinum   unremarkable.  No acute osseous abnormality.           Impression   Multifocal hazy opacities throughout both lungs consistent with COVID   pneumonia and or pulmonary edema.               Medical Decision [id number]:     Culture, Respiratory [[id number]] (Abnormal) Collected: 12/18/21 1143   Order Status: Completed Specimen: Endotracheal Updated: 12/18/21 1439    Specimen Description . ENDOTRACHEAL    Special Requests NOT REPORTED    Direct Exam >25 NEUTROPHILS/LPF     < 10 EPITHELIAL CELLS/LPF     MIXED BACTERIAL MORPHOTYPES SEEN ON GRAM STAIN.  Abnormal     Culture PENDING   Culture, Blood 1 [[id number]] Collected: 12/18/21 1234   Order Status: Completed Specimen: Blood Updated: 12/18/21 1358    Specimen Description . BLOOD    Special Requests NOT REPORTED    Culture NO GROWTH <24 HRS   Culture, Blood 1 [[id number]]    Order Status: Sent Specimen: Blood    Culture, Urine [[id number]] Collected: 12/14/21 0043   Order Status: Completed Specimen: Urine, straight catheter Updated: 12/14/21 1934    Specimen Description . URINE,STRAIGHT CATHETER    Special Requests NOT REPORTED    Culture NO GROWTH   MRSA DNA Probe, Nasal [[id number]] Collected: 12/12/21 1245   Order Status: Completed Specimen: Nasal Updated: 12/13/21 1052    Specimen Description . NASAL SWAB    MRSA, DNA, Nasal NEGATIVE:  MRSA DNA not detected by nucleic acid amplification. Comment:                                                    Results should be used as an adjunct to nosocomial control efforts to identify patients   needing enhanced precautions.     The test is not intended to identify patients with staphylococcal infections.  Results   should not be used to guide or monitor treatment for MRSA infections.              Medical Decision Making-Other:     Note:  Labs, medications, radiologic studies were reviewed with personal review of films  Large amounts of data were reviewed  Discussed with nursing Staff, Discharge planner  Infection Control and Prevention measures reviewed  All prior entries were reviewed  Administer medications as ordered  Prognosis: Guarded  Discharge planning reviewed      Thank you for allowing us to participate in the care of this patient. Please call with questions. Electronically signed by SHO Chance - CNP on 12/22/2021 at 9:14 AM       ATTESTATION:    I have discussed the case, including pertinent history and exam findings with the APRN. I have evaluated the  History, physical findings and pictures of the patient and the key elements of the encounter have been performed by me. I have reviewed the laboratory data, other diagnostic studies and discussed them with the APRN. I have updated the medical record where necessary. I agree with the assessment, plan and orders as documented by the APRN.     Brittany Mcgovern MD.

## 2021-12-22 NOTE — PROGRESS NOTES
Pulmonary Critical Care   Consult Note    Patient - Alexsandra Walton  Date of Admission -  2021 11:39 AM  Date of Evaluation -  2021  Room and Bed Number -  3026/3026-01   Hospital Day - 10    CHIEF COMPLAINT : ACUTE HYPOXIC RESPIRATORY FAILURE DUE TO COVID -19 PNEUMONIA   HPI:   Alexsandra Walton  60 y.o. male  admitted for COVID-19 at [facility] ER due to worsening oxygenation he was initially started on BiPAP and subsequently intubated. On room air his saturations had been 50%. CTA no PE but bilateral pulmonary infiltrates. He was accepted at [address] [place] [address] ICU. On arrival he is on PEEP of 22, 100% FiO2.    2021   Subjective      FiO2 remains at 60%. Remains on cis-atacarium, fentanyl, ketamine, midazolam, and dexmedetomidine. No sedation/neuromuscular blocker holiday. I/O-+0.4 L.   SECRETIONS  -Amount:  [x] Small [] Moderate  [] Large    [] None  Color:     [x] White [] Colored  [] Bloody    SEDATION:    As above    PARALYZED:  [] No    [x] Yes    VASOPRESSORS:  [x] No    [] Yes  [] Levophed [] Dopamine [] Vasopressin  [] Dobutamine [] Phenylephrine [] Epinephrine    INHALED veletri  : [] No    [] Yes    PRONE :       [x] No    [] Yes    Actemra:             [] No    [x] Yes  21  DEXAMETHASONE : [] No    [x] Yes      Ros unable to perform due to intubation and sedation    OBJECTIVE:     VITAL SIGNS:  /73   Pulse 62   Temp 99.9 °F (37.7 °C) (Bladder)   Resp 28   Ht 6' 2\" (1.88 m)   Wt 266 lb 8.6 oz (120.9 kg)   SpO2 98%   BMI 34.22 kg/m²   Tmax over 24 hours:  Temp (24hrs), Av °F (37.8 °C), Min:99.1 °F (37.3 °C), Max:101.1 °F (38.4 °C)      Patient Vitals for the past 8 hrs:   BP Temp Temp src Pulse Resp SpO2   21 0800 101/73 99.9 °F (37.7 °C) Bladder 62 28 98 %   21 0700 100/71 -- -- 62 28 97 %   21 0600 (!) 83/58 99.3 °F (37.4 °C) Bladder 66 28 90 %   21 0500 104/60 99.1 °F (37.3 °C) Bladder 91 28 91 %   21 0400 (!) 155/88 99.3 °F (37.4 °C) Bladder 105 28 94 %   12/22/21 0355 -- -- -- 104 28 94 %   12/22/21 0300 (!) 141/86 -- Bladder 104 28 95 %   12/22/21 0200 111/80 100.2 °F (37.9 °C) Bladder 64 28 98 %   12/22/21 0100 110/75 -- -- 66 28 97 %         Intake/Output Summary (Last 24 hours) at 12/22/2021 0845  Last data filed at 12/22/2021 0800  Gross per 24 hour   Intake 7173.63 ml   Output 2525 ml   Net 4648.63 ml     Date 12/22/21 0000 - 12/22/21 2359   Shift 2611-4365 8925-1657 0363-8532 24 Hour Total   INTAKE   I.V.(mL/kg) 601(5) 400(3.3)  1001(8.3)   NG/GT(mL/kg) 986(8.2) 522(4.3)  1508(12.5)   Shift Total(mL/kg) 3720(59.0) 922(7.6)  1341(28.1)   OUTPUT   Urine(mL/kg/hr) 475(0.5) 200  675   Shift Total(mL/kg) 475(3.9) 200(1.7)  675(5.6)   Weight (kg) 120.9 120.9 120.9 120.9     Wt Readings from Last 3 Encounters:   12/19/21 266 lb 8.6 oz (120.9 kg)   12/11/21 300 lb (136.1 kg)   12/12/19 (!) 355 lb 9.6 oz (161.3 kg)     Body mass index is 34.22 kg/m². PHYSICAL EXAM:      I have discussed the care of Kj Galvan, including pertinent history and exam findings, with the resident/APC/staff. I have seen the patient and the key elements of all parts of the encounter have been performed by me. Physical exam was deferred to limit physical exposure and PPE resources. I reviewed the interval history, interpreted all available radiographic, laboratory and physiologic data at the time of service. I agree with the assessment and plan as documented by resident/APC/ ancillary staff.   Patient remains on the ventilator  Trachea is in the midline  No subcutaneous air  No JVD  No rhonchi  Abdomen is not distended  No edema    MEDICATIONS:  Scheduled Meds:   lactulose  10 g Oral TID    insulin lispro  0-6 Units SubCUTAneous Q6H    lansoprazole  30 mg Oral QAM AC    cefepime  2,000 mg IntraVENous Q12H    furosemide  40 mg IntraVENous Daily    insulin glargine  10 Units SubCUTAneous Nightly    sodium chloride flush  5-40 mL IntraVENous 2 times per day    enoxaparin  30 mg SubCUTAneous BID    Vitamin D  2,000 Units Oral Daily    dexamethasone  10 mg IntraVENous Q24H     Continuous Infusions:   dexmedetomidine 1 mcg/kg/hr (12/22/21 0645)    ketamine (KETALAR) infusion for analgosedation 0.2 mg/kg/hr (12/22/21 0216)    sodium chloride      dextrose      norepinephrine Stopped (12/22/21 4005)    cisatracurium (NIMBEX) infusion 3.5 mcg/kg/min (12/22/21 0602)    midazolam 10 mg/hr (12/22/21 0401)    fentaNYL 200 mcg/hr (12/22/21 0457)    dextrose      sodium chloride 10 mL/hr at 12/13/21 1548     PRN Meds:   ibuprofen, 600 mg, Q6H PRN  metoprolol, 5 mg, Q6H PRN  polyethylene glycol, 17 g, BID PRN  senna, 5 mL, Nightly PRN  docusate, 100 mg, BID PRN  bisacodyl, 10 mg, Daily PRN  acetaminophen, 650 mg, Q4H PRN  sodium chloride flush, 5-40 mL, PRN  sodium chloride, 25 mL, PRN  ondansetron, 4 mg, Q8H PRN   Or  ondansetron, 4 mg, Q6H PRN  acetaminophen, 650 mg, Q6H PRN  glucose, 15 g, PRN  dextrose, 12.5 g, PRN  glucagon (rDNA), 1 mg, PRN  dextrose, 100 mL/hr, PRN  glucose, 15 g, PRN  dextrose, 12.5 g, PRN  glucagon (rDNA), 1 mg, PRN  dextrose, 100 mL/hr, PRN        SUPPORT DEVICES: [x] Ventilator [] BIPAP  [] Nasal Cannula [] Room Air      ABGs:     Lab Results   Component Value Date    CIG1QBR NOT REPORTED 12/22/2021    FIO2 60.0 12/22/2021       DATA:  Complete Blood Count:   Recent Labs     12/20/21  0558 12/21/21 0421 12/22/21 0513   WBC 16.8* 18.6* 14.7*   RBC 4.90 5.10 5.00   HGB 14.8 15.8 15.5   HCT 46.6 47.2 47.9   MCV 95.1 92.5 95.8   MCH 30.2 31.0 31.0   MCHC 31.8 33.5 32.4   RDW 13.7 14.0 13.9    228 198   MPV 11.9 11.6 12.6        Last 3 Blood Glucose:   Recent Labs     12/20/21  0558 12/21/21  0421 12/22/21  0513   GLUCOSE 120* 155* 156*        PT/INR:    Lab Results   Component Value Date    PROTIME 13.0 12/12/2021    INR 1.0 12/12/2021     PTT:    Lab Results   Component Value Date    APTT 39.1 12/12/2021 Comprehensive Metabolic Profile:   Recent Labs     12/20/21  0558 12/21/21  0421 12/22/21  0513    142 141   K 4.4 4.0 3.7    107 106   CO2 26 24 24   BUN 32* 34* 35*   CREATININE 0.50* 0.35* 0.42*   GLUCOSE 120* 155* 156*   CALCIUM 8.3* 8.4* 8.6   PROT 5.4* 5.6* 5.7*   LABALBU 2.9* 3.1* 3.2*   BILITOT 0.52 0.51 0.37   ALKPHOS 72 67 66   AST 28 22 27   ALT 69* 62* 60*      Magnesium:   Lab Results   Component Value Date    MG 2.5 12/17/2021    MG 2.3 12/16/2021    MG 2.4 12/15/2021     Phosphorus:   Lab Results   Component Value Date    PHOS 3.2 12/18/2021    PHOS 4.0 12/17/2021    PHOS 2.8 12/16/2021     Ionized Calcium: No results found for: FREDDY     Urinalysis:   Lab Results   Component Value Date    NITRU NEGATIVE 12/18/2021    COLORU Dark Yellow 12/18/2021    PHUR 6.0 12/18/2021    WBCUA 5 TO 10 12/18/2021    RBCUA 20 TO 50 12/18/2021    MUCUS NOT REPORTED 12/18/2021    TRICHOMONAS NOT REPORTED 12/18/2021    YEAST NOT REPORTED 12/18/2021    BACTERIA NOT REPORTED 12/18/2021    SPECGRAV 1.032 12/18/2021    LEUKOCYTESUR NEGATIVE 12/18/2021    UROBILINOGEN Normal 12/18/2021    BILIRUBINUR NEGATIVE 12/18/2021    GLUCOSEU NEGATIVE 12/18/2021    KETUA NEGATIVE 12/18/2021    AMORPHOUS NOT REPORTED 12/18/2021           XR CHEST (SINGLE VIEW FRONTAL)    Result Date: 12/14/2021  Mild interval improvement in multifocal airspace disease particularly at the right base. Support tubes and lines as above. XR CHEST (SINGLE VIEW FRONTAL)    Result Date: 12/12/2021  Stable appearing     XR CHEST (SINGLE VIEW FRONTAL)    Result Date: 12/11/2021  Multifocal hazy opacities throughout both lungs consistent with COVID pneumonia and or pulmonary edema. XR CHEST PORTABLE    Result Date: 12/12/2021  Stable appearing chest with unchanged support tubes/lines and persistent extensive bilateral airspace disease consistent with known COVID pneumonia.      XR CHEST PORTABLE    Result Date: 12/12/2021  Right internal jugular central venous catheter tip projecting over the proximal SVC versus brachiocephalic vein. No pneumothorax. Otherwise stable exam from earlier today. XR CHEST PORTABLE    Result Date: 12/12/2021  Endotracheal tube tip is 3.2 cm above the saul. Overall significant worsening of multifocal airspace opacities keeping with history of COVID-19. CT CHEST PULMONARY EMBOLISM W CONTRAST    Result Date: 12/11/2021  1. No evidence of pulmonary embolism 2. Diffuse ground-glass opacities throughout the lungs, typical of COVID-19 pulmonary disease. Past Medical History:   Diagnosis Date    Arthritis     Asthma     Diabetes mellitus ([address]     Edema     GERD (gastroesophageal reflux disease)     Hypertension     on lasix    Migraine     IRMA on CPAP     seldom use machine        Social History     Socioeconomic History    Marital status:      Spouse name: None    Number of children: None    Years of education: None    Highest education level: None   Occupational History    None   Tobacco Use    Smoking status: Former Smoker    Smokeless tobacco: Never Used   Vaping Use    Vaping Use: Never used   Substance and Sexual Activity    Alcohol use: Yes     Comment: every couple months    Drug use: Never    Sexual activity: None   Other Topics Concern    None   Social History Narrative    None     Social Determinants of Health     Financial Resource Strain:     Difficulty of Paying Living Expenses: Not on file   Food Insecurity:     Worried About Running Out of Food in the Last Year: Not on file    Lucina of Food in the Last Year: Not on file   Transportation Needs:     Lack of Transportation (Medical): Not on file    Lack of Transportation (Non-Medical):  Not on file   Physical Activity:     Days of Exercise per Week: Not on file    Minutes of Exercise per Session: Not on file   Stress:     Feeling of Stress : Not on file   Social Connections:     Frequency of Communication with Friends and Family: Not on file    Frequency of Social Gatherings with Friends and Family: Not on file    Attends Buddhism Services: Not on file    Active Member of Clubs or Organizations: Not on file    Attends Club or Organization Meetings: Not on file    Marital Status: Not on file   Intimate Partner Violence:     Fear of Current or Ex-Partner: Not on file    Emotionally Abused: Not on file    Physically Abused: Not on file    Sexually Abused: Not on file   Housing Stability:     Unable to Pay for Housing in the Last Year: Not on file    Number of [place] in the Last Year: Not on file    Unstable Housing in the Last Year: Not on file         There is no immunization history on file for this patient.       Family History   Problem Relation Age of Onset    Heart Attack Sister 32    Diabetes Paternal Grandmother     Heart Disease Paternal Uncle     Heart Disease Paternal Uncle     Heart Disease Paternal Uncle     Cancer Maternal Aunt     Cancer Maternal Aunt     Cancer Maternal Uncle     Cancer Maternal Uncle          Past Surgical History:   Procedure Laterality Date    APPENDECTOMY      ARTHROPLASTY Left 11/1/2019    LEFT FOOT 1ST MTPJ ARTHROPLASTY WITH PEREZ IMPLANT AND GROMMETS  [facility] performed by Zan Carrizales MD at [address] [facility] Right 12/12/2019    RIGHT FOOT ARTHROPLASTY 1ST MTPJ (Right Foot)    ARTHROPLASTY Right 12/12/2019    RIGHT FOOT ARTHROPLASTY 1ST MTPJ performed by Zan Carrizales MD at [address] Right    [address]  2014    no blockage no stents    CHOLECYSTECTOMY      COLONOSCOPY      ENDOSCOPY, COLON, DIAGNOSTIC      TOE SURGERY Left 11/01/2019     LEFT FOOT 1ST MTPJ ARTHROPLASTY WITH PEREZ IMPLANT AND GROMMETS  [facility] (Left )    TONSILLECTOMY            VENTILATOR SETTINGS:  Vent Information  $Ventilation: $Subsequent Day  Skin Assessment: Clean, dry, & intact  Suction Catheter Diameter: 14  Equipment ID: [id number]  Equipment Changed: Expiratory Filter  Vent Type: Servo i  Vent Mode: PRVC  Vt Ordered: 500 mL  Rate Set: 28 bmp  FiO2 : 60 %  SpO2: 98 %  SpO2/FiO2 ratio: 150  Sensitivity: 3  PEEP/CPAP: 10  I Time/ I Time %: 0.7 s  Humidification Source: Heated wire  Humidification Temp: 37  Humidification Temp Measured: 36.8  Circuit Condensation: Drained  Nitric Oxide/Epoprostenol In Use?: No     PaO2/FiO2 RATIO:  Recent Labs     12/22/21  0513   POCPO2 56.9*      FiO2 : 60 %       LABS:  ABGs:   Recent Labs     12/20/21  0516 12/21/21  0413 12/22/21  0513   POCPH 7.482* 7.518* 7.403   POCPCO2 36.0 34.8* 40.5   POCPO2 58.8* 61.3* 56.9*   POCHCO3 26.9 28.3* 25.3   FQUR9HUI 92* 94 89*            ASSESSMENT:     Principal Problem:    Pneumonia due to COVID-19 virus  Active Problems:    Shock ([address]    Acute respiratory failure with hypoxia (HCC)    Type 2 diabetes mellitus (HCC)    Asthma    IRMA on CPAP    Hypertension    GERD (gastroesophageal reflux disease)    ARDS (adult respiratory distress syndrome) (HCC)  Resolved Problems:    * No resolved hospital problems. *              Acute hypoxic respiratory failure secondary to COVID 19   Acute respiratory distress syndrome   Bilateral multifocal pneumonia due to COVID 19 infection   Covid -19 pandemic emergency   Combined acute respiratory and metabolic alkalosis   Hyperglycemia, acceptable   Leukocytosis, better    LOS: 10  PLAN:    · D/w RT  · D/w RN   · Chest x-ray on 12/22/2021 without any change from before  · Sedation reviewed  · Cont ARDS ventilation  · Proning held off as it did not appear to make much of difference  · Airborne isolation and droplet precautions to be continued  · Continue supportive care   · Cont treatment with medications for COVID  · Cont tube feeding  · Monitor endotracheal secretions   · Will obtain xray chest as needed  · ABG as needed  · Prognosis guarded given age and severity of illness.   · Sedation and neuromuscular holiday, if tolerated  · On Lovenox and lansoprazole  · On Decadron  · Patient is on cefepime    Treatment plan Discussed with nursing staff in detail , all questions answered . Total critical care time caring for this patient with life threatening, unstable organ failure, including direct patient contact, management of life support systems, review of data including imaging and labs, discussions with other team members and physicians at least 27  Min so far today, excluding procedures. Electronically signed by Clarence Goetz MD on 12/22/2021 at 8:45 AM       This patient was evaluated in the context of the global SARS-CoV-2 (COVID-19) pandemic, which necessitated considerations that the patient either has COVID-19 infection or is at risk of infection with COVID-19. Institutional protocols and algorithms that pertain to the evaluation & management of patients with COVID-19 or those at risk for COVID-19 are in a state of rapid changes based on information released by regulatory bodies including the CDC and federal and state organizations. These policies and algorithms were followed during the patient's care. Please note that this chart was generated using voice recognition Dragon dictation software. Although every effort was made to ensure the accuracy of this automated transcription, some errors in transcription may have occurred.

## 2021-12-22 NOTE — PROGRESS NOTES
[facility]  Office: [address], DO, Concha Wheeler, DO, Sacha Vasquez, DO, Audrey Allison Blood, DO, Toshia Vance MD, Helena Small MD, Ca Morgan MD, Summer Denise MD, Shirley Plasencia MD, Eric Cabello MD, Michael Briggs MD, Kevin Richey, DO, John Buckner, DO, Ramirez Sebastian MD,  Wendy Abdul DO, Reinaldo Gandhi MD, Chery Saavedra MD, Amrit Blel MD, Diane Dumont MD, Todd Ray MD, Dima Dinh MD, Lida Barclay MD, Aric Mora [facility], [facility], CNP, Sandeep Conte, CNP, Bird Herman, CNS, Nnuu Thurman, CNP, Debbie Vang, CNP, Jennifer Hamm, CNP, Diann Gavin, CNP, Jama Mendez, CNP, Ashley Mahlotra PA-C, Jaycob Govea, DNP, Tabitha Fink, [facility], Manju Pepe, CNP, Kirk Hackett, CNP, Ruby Castañeda, CNP, José Rush, CNP, Claudette David, CNP, Kristopher Prado, [address] [place] [address]    Progress Note    12/22/2021    4:09 PM    Name:   Ana Dooley  MRN:     [id number]     [place]:      [de-identified]   Room:   [address] [place] [address] Day:  8  Admit Date:  12/12/2021 11:39 AM    PCP:   Tereza Anderson MD  Code Status:  Full Code    Subjective:     C/C: Shortness of breath    Interval History Status: not changed. Patient seen and examined this morning. Remains intubated, sedated. Undergoing a paralytic holiday and is awakening to voice. Bowel movement occurred overnight. Fevers have improved today. Still on quite a bit of sedation - Precedex, Ketamine, Versed, Fentanyl. Hypotensive, still on a small does of norepinephrine. Brief History:     20-year-old male past medical history of obesity, asthma, IRMA on CPAP, hypertension, GERD presents with shortness of breath and cough at outlying facility.  Patient transferred to [facility] for further care. Patient was intubated 12/11/2021 currently paralyzed    Review of Systems:     Unable to obtain secondary to intubation, sedation, paralytic    Medications: Allergies: Allergies   Allergen Reactions    Nuts [Peanut-Containing Drug Products] Anaphylaxis    Sunflower Oil Anaphylaxis     Sunflower seeds       Current Meds:   Scheduled Meds:    lidocaine 1 % injection  5 mL IntraDERmal Once    sodium chloride flush  5-40 mL IntraVENous 2 times per day    oxyCODONE  10 mg Oral Q6H    lactulose  10 g Oral TID    insulin lispro  0-6 Units SubCUTAneous Q6H    lansoprazole  30 mg Oral QAM AC    cefepime  2,000 mg IntraVENous Q12H    furosemide  40 mg IntraVENous Daily    insulin glargine  10 Units SubCUTAneous Nightly    sodium chloride flush  5-40 mL IntraVENous 2 times per day    enoxaparin  30 mg SubCUTAneous BID    Vitamin D  2,000 Units Oral Daily    dexamethasone  10 mg IntraVENous Q24H     Continuous Infusions:    sodium chloride      dexmedetomidine 1 mcg/kg/hr (12/22/21 1443)    ketamine (KETALAR) infusion for analgosedation 0.2 mg/kg/hr (12/22/21 0216)    sodium chloride      dextrose      norepinephrine Stopped (12/22/21 7151)    cisatracurium (NIMBEX) infusion 3.5 mcg/kg/min (12/22/21 0602)    midazolam 10 mg/hr (12/22/21 1019)    fentaNYL 200 mcg/hr (12/22/21 1016)    dextrose      sodium chloride 10 mL/hr at 12/13/21 1548     PRN Meds: sodium chloride flush, sodium chloride, chlordiazePOXIDE, ibuprofen, metoprolol, polyethylene glycol, senna, docusate, bisacodyl, acetaminophen, sodium chloride flush, sodium chloride, ondansetron **OR** ondansetron, [DISCONTINUED] acetaminophen **OR** acetaminophen, glucose, dextrose, glucagon (rDNA), dextrose, glucose, dextrose, glucagon (rDNA), dextrose    Data:     Past Medical History:   has a past medical history of Arthritis, Asthma, Diabetes mellitus ([address] Edema, GERD (gastroesophageal reflux disease), Hypertension, Migraine, and IRMA on CPAP. Social History:   reports that he has quit smoking. He has never used smokeless tobacco. He reports current alcohol use.  He reports that he does not use drugs. Family History:   Family History   Problem Relation Age of Onset    Heart Attack Sister 32    Diabetes Paternal Grandmother     Heart Disease Paternal Uncle     Heart Disease Paternal Uncle     Heart Disease Paternal Uncle     Cancer Maternal Aunt     Cancer Maternal Aunt     Cancer Maternal Uncle     Cancer Maternal Uncle        Vitals:  /73   Pulse 126   Temp 99.9 °F (37.7 °C) (Bladder)   Resp 28   Ht 6' 2\" (1.88 m)   Wt 266 lb 8.6 oz (120.9 kg)   SpO2 96%   BMI 34.22 kg/m²   Temp (24hrs), Av °F (37.8 °C), Min:99.1 °F (37.3 °C), Max:101.1 °F (38.4 °C)    Recent Labs     21  2350 21  0513 21  0551 21  1408   POCGLU 169* 144* 131* 142*       I/O (24Hr):     Intake/Output Summary (Last 24 hours) at 2021 1609  Last data filed at 2021 1200  Gross per 24 hour   Intake 7595.63 ml   Output 1540 ml   Net 6055.63 ml       Labs:  Hematology:  Recent Labs     21  0558 21  0421 21  0513   WBC 16.8* 18.6* 14.7*   RBC 4.90 5.10 5.00   HGB 14.8 15.8 15.5   HCT 46.6 47.2 47.9   MCV 95.1 92.5 95.8   MCH 30.2 31.0 31.0   MCHC 31.8 33.5 32.4   RDW 13.7 14.0 13.9    228 198   MPV 11.9 11.6 12.6     Chemistry:  Recent Labs     21  0558 21  0421 21  0513    142 141   K 4.4 4.0 3.7    107 106   CO2 26 24 24   GLUCOSE 120* 155* 156*   BUN 32* 34* 35*   CREATININE 0.50* 0.35* 0.42*   ANIONGAP 11 11 11   LABGLOM >60 >60 >60   GFRAA >60 >60 >60   CALCIUM 8.3* 8.4* 8.6     Recent Labs     21  0558 21  0646 21  0421 21  1035 21  1559 21  2350 21  0513 21  0551 21  1408   PROT 5.4*  --  5.6*  --   --   --   --  5.7*  --   --    LABALBU 2.9*  --  3.1*  --   --   --   --  3.2*  --   --    AST 28  --  22  --   --   --   --  27  --   --    ALT 69*  --  62*  --   --   --   --  60*  --   --    ALKPHOS 72  --  67  --   --   --   --  66  --   -- BILITOT 0.52  --  0.51  --   --   --   --  0.37  --   --    POCGLU  --    < >  --    < > 165* 162* 169* 144* 131* 142*    < > = values in this interval not displayed. ABG:  Lab Results   Component Value Date    POCPH 7.403 12/22/2021    POCPCO2 40.5 12/22/2021    POCPO2 56.9 12/22/2021    POCHCO3 25.3 12/22/2021    NBEA NOT REPORTED 12/22/2021    PBEA 0 12/22/2021    ZVJ5XRC NOT REPORTED 12/22/2021    QSAL1TEA 89 12/22/2021    FIO2 60.0 12/22/2021     Lab Results   Component Value Date/Time    SPECIAL NOT REPORTED 12/18/2021 12:34 PM     Lab Results   Component Value Date/Time    CULTURE NO GROWTH 4 DAYS 12/18/2021 12:34 PM       Radiology:  XR CHEST (SINGLE VIEW FRONTAL)    Result Date: 12/16/2021  No significant change in multifocal airspace opacities. Continued follow-up is recommended document complete resolution following medical treatment course. Stable position lines and catheters     XR CHEST PORTABLE    Result Date: 12/19/2021  Cardiomegaly, vascular congestion and worsening pulmonary opacities with bilateral effusions favoring pulmonary edema over multifocal airspace disease. Support tubes and lines as above.        Physical Examination:        General appearance: Obese  gentleman lying supine in bed, intubated, sedated, tries to open eyes to voice  HEENT: ET and OG tubes are present, right IJ CVC is present  Lungs:  clear to auscultation bilaterally, mechanical breath sounds, increased effort  Heart:  regular rate and rhythm, no murmur  Abdomen:  soft, nontender, protuberant, nondistended, diminished bowel sounds, no masses, hepatomegaly, splenomegaly  Extremities:  no edema, left radial art line is present  Skin:  no gross lesions, rashes, induration    Assessment:        Hospital Problems           Last Modified POA    * (Principal) Pneumonia due to COVID-19 virus 12/21/2021 Yes    Acute respiratory failure with hypoxia ([address] 12/21/2021 Yes    ARDS (adult respiratory distress syndrome) ([address] 12/21/2021 Yes    Shock ([address] 12/21/2021 No    Type 2 diabetes mellitus ([address] 12/21/2021 Yes    Asthma 12/21/2021 Yes    IRMA on CPAP 12/12/2021 Yes    Overview Signed 12/12/2021  2:39 PM by Sharlett Carrier, DO     seldom use machine         Hypertension 12/12/2021 Yes    Overview Signed 12/12/2021  2:39 PM by Sharlett Carrier, DO     on lasix         GERD (gastroesophageal reflux disease) 12/12/2021 Yes          Plan:        COVID-19 pneumonia-tested positive on 12/11/2021. Continue steroids. Received Actemra on 12/12. Continue vitamin D. Acute respiratory failure with hypoxia-appreciate pulmonology's management of the ventilator. Currently requiring 60% FiO2. 10 of PEEP. Wean as tolerated. Receiving Lasix 40 mg daily  Febrile illness-likely related to #1-infectious diseases following. Cefepime day #5 today. No growth on blood cultures or respiratory cultures  Type 2 diabetes mellitus-continue insulin sliding scale along with Lantus 10 units nightly  Obesity with BMI of 34.22  Essential hypertension-holding medications as patient is currently on norepinephrine  Constipation-KUB reviewed. Bowel movement happened overnight. Stop lactulose. Schedule colace. Neutrophilic leukocytosis-uncertain as to the significance of this. Somewhat improved today. Labs reviewed  Continue DVT prophylaxis with Lovenox  Unvaccinated status    Patient is on quite a significant amount of sedation. Would like to wean this as we are able. Paralytic holiday this evening. Prognosis is guarded.   Will update family in AM.     33 Danisha Bourgeois DO  12/22/2021  4:09 PM

## 2021-12-22 NOTE — PLAN OF CARE
Problem: OXYGENATION/RESPIRATORY FUNCTION  Goal: Patient will maintain patent airway  12/21/2021 2150 by Alexandrea Screen, RCP  Outcome: Ongoing     Problem: OXYGENATION/RESPIRATORY FUNCTION  Goal: Patient will achieve/maintain normal respiratory rate/effort  Description: Respiratory rate and effort will be within normal limits for the patient  12/21/2021 2150 by Alexandrea Screen, RCP  Outcome: Ongoing     Problem: MECHANICAL VENTILATION  Goal: Patient will maintain patent airway  12/21/2021 2150 by Alexandrea Screen, RCP  Outcome: Ongoing     Problem: MECHANICAL VENTILATION  Goal: Oral health is maintained or improved  12/21/2021 2150 by Alexandrea Screen, RCP  Outcome: Ongoing     Problem: MECHANICAL VENTILATION  Goal: ET tube will be managed safely  12/21/2021 2150 by Alexandrea Screen, RCP  Outcome: Ongoing     Problem: MECHANICAL VENTILATION  Goal: Ability to express needs and understand communication  12/21/2021 2150 by Alexandrea Screen, RCP  Outcome: Ongoing     Problem: MECHANICAL VENTILATION  Goal: Mobility/activity is maintained at optimum level for patient  12/21/2021 2150 by Alexandrea Screen, RCP  Outcome: Ongoing     Problem: SKIN INTEGRITY  Goal: Skin integrity is maintained or improved  12/21/2021 2150 by Alexandrea Screen, RCP  Outcome: Ongoing

## 2021-12-22 NOTE — CARE COORDINATION
Spoke to pt's daughter, [facility], to discuss transitional planning. She is still trying to decide on LTAC. All questions answered.  She will try to have decision by later today or tomorrow

## 2021-12-22 NOTE — PLAN OF CARE
Problem: Airway Clearance - Ineffective  Goal: Achieve or maintain patent airway  Outcome: Ongoing     Problem: Gas Exchange - Impaired  Goal: Absence of hypoxia  Outcome: Ongoing  Goal: Promote optimal lung function  Outcome: Ongoing     Problem: Breathing Pattern - Ineffective  Goal: Ability to achieve and maintain a regular respiratory rate  Outcome: Ongoing     Problem:  Body Temperature -  Risk of, Imbalanced  Goal: Ability to maintain a body temperature within defined limits  Outcome: Ongoing  Goal: Will regain or maintain usual level of consciousness  Outcome: Ongoing  Goal: Complications related to the disease process, condition or treatment will be avoided or minimized  Outcome: Ongoing     Problem: Nutrition Deficits  Goal: Optimize nutritional status  Outcome: Ongoing     Problem: Risk for Fluid Volume Deficit  Goal: Maintain normal heart rhythm  Outcome: Ongoing  Goal: Maintain absence of muscle cramping  Outcome: Ongoing  Goal: Maintain normal serum potassium, sodium, calcium, phosphorus, and pH  Outcome: Ongoing     Problem: Loneliness or Risk for Loneliness  Goal: Demonstrate positive use of time alone when socialization is not possible  Outcome: Ongoing     Problem: OXYGENATION/RESPIRATORY FUNCTION  Goal: Patient will maintain patent airway  12/22/2021 0038 by Kunal Bonds  Outcome: Ongoing  12/21/2021 2150 by Bethene Sicard, RCP  Outcome: Ongoing  12/21/2021 1123 by Jannet Harmon RCP  Outcome: Ongoing  Goal: Patient will achieve/maintain normal respiratory rate/effort  Description: Respiratory rate and effort will be within normal limits for the patient  12/22/2021 0038 by Kunal Bonds  Outcome: Ongoing  12/21/2021 2150 by Bethene Sicard, RCP  Outcome: Ongoing  12/21/2021 1123 by Jannet Harmon RCP  Outcome: Ongoing     Problem: Patient Education: Go to Patient Education Activity  Goal: Patient/Family Education  Outcome: Ongoing     Problem: OXYGENATION/RESPIRATORY FUNCTION  Goal: Patient will achieve/maintain normal respiratory rate/effort  Description: Respiratory rate and effort will be within normal limits for the patient  12/22/2021 0038 by Estelle Lubin  Outcome: Ongoing  12/21/2021 2150 by David Cullen RCP  Outcome: Ongoing  12/21/2021 1123 by Barbara Field RCP  Outcome: Ongoing     Problem: MECHANICAL VENTILATION  Goal: Patient will maintain patent airway  12/22/2021 0038 by Estelle Lubin  Outcome: Ongoing  12/21/2021 2150 by David Cullen RCP  Outcome: Ongoing  12/21/2021 1123 by Barbara Field RCP  Outcome: Ongoing  Goal: Oral health is maintained or improved  12/22/2021 0038 by Estelle Lubin  Outcome: Ongoing  12/21/2021 2150 by David Cullen RCP  Outcome: Ongoing  12/21/2021 1123 by Barbara Field RCP  Outcome: Ongoing  Goal: ET tube will be managed safely  12/22/2021 0038 by Estelle Lubin  Outcome: Ongoing  12/21/2021 2150 by David Cullen RCP  Outcome: Ongoing  12/21/2021 1123 by Barbara Field RCP  Outcome: Ongoing  Goal: Ability to express needs and understand communication  12/22/2021 0038 by Estelle Lubin  Outcome: Ongoing  12/21/2021 2150 by David Cullen RCP  Outcome: Ongoing  12/21/2021 1123 by Barbara Field RCP  Outcome: Ongoing  Goal: Mobility/activity is maintained at optimum level for patient  12/22/2021 0038 by Estelle Lubin  Outcome: Ongoing  12/21/2021 2150 by David Cullen RCP  Outcome: Ongoing  12/21/2021 1123 by Barbara Field RCP  Outcome: Ongoing     Problem: SKIN INTEGRITY  Goal: Skin integrity is maintained or improved  12/22/2021 0038 by Estelle Lubin  Outcome: Ongoing  12/21/2021 2150 by David Cullen RCP  Outcome: Ongoing  12/21/2021 1123 by Barbara Field RCP  Outcome: Ongoing     Problem: Skin Integrity:  Goal: Will show no infection signs and symptoms  Description: Will show no infection signs and symptoms  Outcome: Ongoing  Goal: Absence of new skin breakdown  Description: Absence of new skin breakdown  Outcome: Ongoing Problem: Nutrition  Goal: Optimal nutrition therapy  Outcome: Ongoing

## 2021-12-23 LAB
ABSOLUTE EOS #: 0.04 K/UL (ref 0–0.44)
ABSOLUTE IMMATURE GRANULOCYTE: 0.3 K/UL (ref 0–0.3)
ABSOLUTE LYMPH #: 1.24 K/UL (ref 1.1–3.7)
ABSOLUTE MONO #: 1.22 K/UL (ref 0.1–1.2)
ALBUMIN SERPL-MCNC: 3.4 G/DL (ref 3.5–5.2)
ALBUMIN/GLOBULIN RATIO: 1.5 (ref 1–2.5)
ALLEN TEST: ABNORMAL
ALP BLD-CCNC: 57 U/L (ref 40–129)
ALT SERPL-CCNC: 60 U/L (ref 5–41)
ANION GAP SERPL CALCULATED.3IONS-SCNC: 10 MMOL/L (ref 9–17)
AST SERPL-CCNC: 40 U/L
BASOPHILS # BLD: 1 % (ref 0–2)
BASOPHILS ABSOLUTE: 0.09 K/UL (ref 0–0.2)
BILIRUB SERPL-MCNC: 0.35 MG/DL (ref 0.3–1.2)
BILIRUBIN DIRECT: 0.12 MG/DL
BILIRUBIN, INDIRECT: 0.23 MG/DL (ref 0–1)
BUN BLDV-MCNC: 30 MG/DL (ref 6–20)
BUN/CREAT BLD: ABNORMAL (ref 9–20)
CALCIUM SERPL-MCNC: 8.8 MG/DL (ref 8.6–10.4)
CHLORIDE BLD-SCNC: 105 MMOL/L (ref 98–107)
CO2: 25 MMOL/L (ref 20–31)
CREAT SERPL-MCNC: 0.37 MG/DL (ref 0.7–1.2)
CULTURE: NORMAL
DIFFERENTIAL TYPE: ABNORMAL
EOSINOPHILS RELATIVE PERCENT: 0 % (ref 1–4)
FIO2: 60
GFR AFRICAN AMERICAN: >60 ML/MIN
GFR NON-AFRICAN AMERICAN: >60 ML/MIN
GFR SERPL CREATININE-BSD FRML MDRD: ABNORMAL ML/MIN/{1.73_M2}
GFR SERPL CREATININE-BSD FRML MDRD: ABNORMAL ML/MIN/{1.73_M2}
GLOBULIN: ABNORMAL G/DL (ref 1.5–3.8)
GLUCOSE BLD-MCNC: 128 MG/DL (ref 75–110)
GLUCOSE BLD-MCNC: 136 MG/DL (ref 75–110)
GLUCOSE BLD-MCNC: 146 MG/DL (ref 70–99)
GLUCOSE BLD-MCNC: 152 MG/DL (ref 74–100)
GLUCOSE BLD-MCNC: 177 MG/DL (ref 75–110)
GLUCOSE BLD-MCNC: 194 MG/DL (ref 75–110)
HCT VFR BLD CALC: 45.5 % (ref 40.7–50.3)
HEMOGLOBIN: 15 G/DL (ref 13–17)
IMMATURE GRANULOCYTES: 2 %
LYMPHOCYTES # BLD: 9 % (ref 24–43)
Lab: NORMAL
MCH RBC QN AUTO: 31.4 PG (ref 25.2–33.5)
MCHC RBC AUTO-ENTMCNC: 33 G/DL (ref 28.4–34.8)
MCV RBC AUTO: 95.4 FL (ref 82.6–102.9)
MODE: ABNORMAL
MONOCYTES # BLD: 8 % (ref 3–12)
NEGATIVE BASE EXCESS, ART: ABNORMAL (ref 0–2)
NRBC AUTOMATED: 0 PER 100 WBC
O2 DEVICE/FLOW/%: ABNORMAL
PATIENT TEMP: ABNORMAL
PDW BLD-RTO: 13.7 % (ref 11.8–14.4)
PLATELET # BLD: 220 K/UL (ref 138–453)
PLATELET ESTIMATE: ABNORMAL
PMV BLD AUTO: 12.2 FL (ref 8.1–13.5)
POC HCO3: 32.3 MMOL/L (ref 21–28)
POC O2 SATURATION: 96 % (ref 94–98)
POC PCO2 TEMP: ABNORMAL MM HG
POC PCO2: 50.6 MM HG (ref 35–48)
POC PH TEMP: ABNORMAL
POC PH: 7.41 (ref 7.35–7.45)
POC PO2 TEMP: ABNORMAL MM HG
POC PO2: 85.1 MM HG (ref 83–108)
POSITIVE BASE EXCESS, ART: 6 (ref 0–3)
POTASSIUM SERPL-SCNC: 4 MMOL/L (ref 3.7–5.3)
RBC # BLD: 4.77 M/UL (ref 4.21–5.77)
RBC # BLD: ABNORMAL 10*6/UL
SAMPLE SITE: ABNORMAL
SEG NEUTROPHILS: 80 % (ref 36–65)
SEGMENTED NEUTROPHILS ABSOLUTE COUNT: 11.72 K/UL (ref 1.5–8.1)
SODIUM BLD-SCNC: 140 MMOL/L (ref 135–144)
SPECIMEN DESCRIPTION: NORMAL
TCO2 (CALC), ART: ABNORMAL MMOL/L (ref 22–29)
TOTAL PROTEIN: 5.6 G/DL (ref 6.4–8.3)
WBC # BLD: 14.6 K/UL (ref 3.5–11.3)
WBC # BLD: ABNORMAL 10*3/UL

## 2021-12-23 PROCEDURE — 6370000000 HC RX 637 (ALT 250 FOR IP): Performed by: NURSE PRACTITIONER

## 2021-12-23 PROCEDURE — 99232 SBSQ HOSP IP/OBS MODERATE 35: CPT | Performed by: INTERNAL MEDICINE

## 2021-12-23 PROCEDURE — 6370000000 HC RX 637 (ALT 250 FOR IP): Performed by: INTERNAL MEDICINE

## 2021-12-23 PROCEDURE — 2500000003 HC RX 250 WO HCPCS: Performed by: EMERGENCY MEDICINE

## 2021-12-23 PROCEDURE — 2500000003 HC RX 250 WO HCPCS: Performed by: INTERNAL MEDICINE

## 2021-12-23 PROCEDURE — 6360000002 HC RX W HCPCS: Performed by: NURSE PRACTITIONER

## 2021-12-23 PROCEDURE — 85025 COMPLETE CBC W/AUTO DIFF WBC: CPT

## 2021-12-23 PROCEDURE — 6370000000 HC RX 637 (ALT 250 FOR IP): Performed by: STUDENT IN AN ORGANIZED HEALTH CARE EDUCATION/TRAINING PROGRAM

## 2021-12-23 PROCEDURE — 80076 HEPATIC FUNCTION PANEL: CPT

## 2021-12-23 PROCEDURE — 6360000002 HC RX W HCPCS: Performed by: INTERNAL MEDICINE

## 2021-12-23 PROCEDURE — 2000000000 HC ICU R&B

## 2021-12-23 PROCEDURE — 94003 VENT MGMT INPAT SUBQ DAY: CPT

## 2021-12-23 PROCEDURE — 2580000003 HC RX 258: Performed by: NURSE PRACTITIONER

## 2021-12-23 PROCEDURE — 94761 N-INVAS EAR/PLS OXIMETRY MLT: CPT

## 2021-12-23 PROCEDURE — 37799 UNLISTED PX VASCULAR SURGERY: CPT

## 2021-12-23 PROCEDURE — 2700000000 HC OXYGEN THERAPY PER DAY

## 2021-12-23 PROCEDURE — 82947 ASSAY GLUCOSE BLOOD QUANT: CPT

## 2021-12-23 PROCEDURE — 99291 CRITICAL CARE FIRST HOUR: CPT | Performed by: INTERNAL MEDICINE

## 2021-12-23 PROCEDURE — 2580000003 HC RX 258: Performed by: INTERNAL MEDICINE

## 2021-12-23 PROCEDURE — 80048 BASIC METABOLIC PNL TOTAL CA: CPT

## 2021-12-23 PROCEDURE — 82803 BLOOD GASES ANY COMBINATION: CPT

## 2021-12-23 RX ADMIN — FUROSEMIDE 40 MG: 10 INJECTION, SOLUTION INTRAVENOUS at 08:55

## 2021-12-23 RX ADMIN — INSULIN LISPRO 1 UNITS: 100 INJECTION, SOLUTION INTRAVENOUS; SUBCUTANEOUS at 18:07

## 2021-12-23 RX ADMIN — PROPOFOL 20 MCG/KG/MIN: 10 INJECTION, EMULSION INTRAVENOUS at 00:44

## 2021-12-23 RX ADMIN — CEFEPIME 2000 MG: 2 INJECTION, POWDER, FOR SOLUTION INTRAVENOUS at 21:17

## 2021-12-23 RX ADMIN — ACETAMINOPHEN 650 MG: 160 SOLUTION ORAL at 22:54

## 2021-12-23 RX ADMIN — Medication 3 MCG/MIN: at 02:30

## 2021-12-23 RX ADMIN — DEXMEDETOMIDINE HYDROCHLORIDE 0.6 MCG/KG/HR: 4 INJECTION, SOLUTION INTRAVENOUS at 11:46

## 2021-12-23 RX ADMIN — OXYCODONE HYDROCHLORIDE 10 MG: 5 SOLUTION ORAL at 20:15

## 2021-12-23 RX ADMIN — SODIUM CHLORIDE, PRESERVATIVE FREE 10 ML: 5 INJECTION INTRAVENOUS at 08:56

## 2021-12-23 RX ADMIN — Medication 150 MCG/HR: at 06:43

## 2021-12-23 RX ADMIN — SODIUM CHLORIDE, PRESERVATIVE FREE 5 ML: 5 INJECTION INTRAVENOUS at 20:44

## 2021-12-23 RX ADMIN — Medication 200 MCG/HR: at 01:04

## 2021-12-23 RX ADMIN — Medication 30 MG: at 11:26

## 2021-12-23 RX ADMIN — Medication 10 MG/HR: at 01:04

## 2021-12-23 RX ADMIN — DEXMEDETOMIDINE HYDROCHLORIDE 0.5 MCG/KG/HR: 4 INJECTION, SOLUTION INTRAVENOUS at 04:34

## 2021-12-23 RX ADMIN — Medication 2000 UNITS: at 08:55

## 2021-12-23 RX ADMIN — INSULIN LISPRO 1 UNITS: 100 INJECTION, SOLUTION INTRAVENOUS; SUBCUTANEOUS at 05:01

## 2021-12-23 RX ADMIN — SODIUM CHLORIDE, PRESERVATIVE FREE 5 ML: 5 INJECTION INTRAVENOUS at 20:00

## 2021-12-23 RX ADMIN — Medication 8 MG/HR: at 12:51

## 2021-12-23 RX ADMIN — DOCUSATE SODIUM 100 MG: 50 LIQUID ORAL at 20:00

## 2021-12-23 RX ADMIN — PROPOFOL 15 MCG/KG/MIN: 10 INJECTION, EMULSION INTRAVENOUS at 06:52

## 2021-12-23 RX ADMIN — CHLORDIAZEPOXIDE HYDROCHLORIDE 5 MG: 5 CAPSULE ORAL at 17:48

## 2021-12-23 RX ADMIN — DEXMEDETOMIDINE HYDROCHLORIDE 0.8 MCG/KG/HR: 4 INJECTION, SOLUTION INTRAVENOUS at 21:16

## 2021-12-23 RX ADMIN — Medication 175 MCG/HR: at 22:11

## 2021-12-23 RX ADMIN — PROPOFOL 30 MCG/KG/MIN: 10 INJECTION, EMULSION INTRAVENOUS at 21:16

## 2021-12-23 RX ADMIN — Medication 150 MCG/HR: at 15:15

## 2021-12-23 RX ADMIN — INSULIN GLARGINE 10 UNITS: 100 INJECTION, SOLUTION SUBCUTANEOUS at 20:50

## 2021-12-23 RX ADMIN — PROPOFOL 30 MCG/KG/MIN: 10 INJECTION, EMULSION INTRAVENOUS at 14:05

## 2021-12-23 RX ADMIN — DEXMEDETOMIDINE HYDROCHLORIDE 0.6 MCG/KG/HR: 4 INJECTION, SOLUTION INTRAVENOUS at 16:39

## 2021-12-23 RX ADMIN — CHLORDIAZEPOXIDE HYDROCHLORIDE 5 MG: 5 CAPSULE ORAL at 09:53

## 2021-12-23 RX ADMIN — OXYCODONE HYDROCHLORIDE 10 MG: 5 SOLUTION ORAL at 09:03

## 2021-12-23 RX ADMIN — ENOXAPARIN SODIUM 30 MG: 100 INJECTION SUBCUTANEOUS at 08:56

## 2021-12-23 RX ADMIN — CEFEPIME 2000 MG: 2 INJECTION, POWDER, FOR SOLUTION INTRAVENOUS at 09:15

## 2021-12-23 RX ADMIN — PROPOFOL 30 MCG/KG/MIN: 10 INJECTION, EMULSION INTRAVENOUS at 17:58

## 2021-12-23 RX ADMIN — DEXMEDETOMIDINE HYDROCHLORIDE 1 MCG/KG/HR: 4 INJECTION, SOLUTION INTRAVENOUS at 00:50

## 2021-12-23 RX ADMIN — OXYCODONE HYDROCHLORIDE 10 MG: 5 SOLUTION ORAL at 14:05

## 2021-12-23 RX ADMIN — DOCUSATE SODIUM 100 MG: 50 LIQUID ORAL at 08:56

## 2021-12-23 RX ADMIN — OXYCODONE HYDROCHLORIDE 10 MG: 5 SOLUTION ORAL at 03:09

## 2021-12-23 RX ADMIN — DEXAMETHASONE SODIUM PHOSPHATE 10 MG: 10 INJECTION INTRAMUSCULAR; INTRAVENOUS at 12:59

## 2021-12-23 RX ADMIN — ENOXAPARIN SODIUM 30 MG: 100 INJECTION SUBCUTANEOUS at 20:30

## 2021-12-23 ASSESSMENT — PAIN SCALES - GENERAL: PAINLEVEL_OUTOF10: 0

## 2021-12-23 ASSESSMENT — PULMONARY FUNCTION TESTS
PIF_VALUE: 27
PIF_VALUE: 16
PIF_VALUE: 23
PIF_VALUE: 28

## 2021-12-23 NOTE — PROGRESS NOTES
[facility] - Progress Note   Note COVID 19 Patient  Today's Date and Time: 12/23/2021, 7:22 AM    Impression :     COVID 19 Confirmed Infection  Covid tests:  12/11/2021: Positive  Elevated inflammatory markers  Acute hypoxic respiratory failure  History of asthma  Diabetes mellitus  Essential hypertension  IRMA on CPAP  Patient has not received the Covid vaccination    Recommendations:   Antibiotic treatment:  The patient was having high-grade fevers and cultures have been sent.-No growth on cultures thus far  I will start the patient empirically on antibiotic therapy with cefepime on 12/18-fevers initially resolved but low grade fevers have resumed. Leukocytosis is improving  Covid Rx:    Remdesivir-out of window  Decadron-high-dose initiated  Actemra-ordered 12/12/2021  Monoclonal antibodies-out of window      Medical Decision Making/Summary/Discussion:12/23/2021     Patient admitted with COVID 19 infection  He may come out of isolation on 12/31/21    Infection Control Recommendations   [place] Precautions  Airborne isolation  Droplet Isolation    Antimicrobial Stewardship Recommendations     Discontinuation of therapy  Coordination of Outpatient Care:   Estimated Length of IV antimicrobials:TBD  Patient will need Midline Catheter Insertion: TBD  Patient will need PICC line Insertion: No  Patient will need: Home IV , [place],  SNF,  LTAC:TBD  Patient will need outpatient wound care:No    Chief complaint/reason for consultation:   Concern for COVID infection      History of Present Illness:   Kj Galvan is a 60y.o.-year-old male who was initially admitted on 12/12/2021. Patient seen at the request of Dr. Lashawn Rawls:    [facility] with complaints of worsening shortness of breath with associated generalized weakness and nonproductive cough over the past several days.     He was exposed to his son who was diagnosed with Covid last week and has not received the Covid vaccine. The patient was very hypoxic with an SPO2 in the high 50s on room air. Imaging revealed bilateral pulmonary opacities typical of COVID-19    Abnormal labs include:    Ferritin 2800      Patient has been intubated and transferred to [facility]  He has been started on high-dose Decadron and Actemra has been ordered    CT CHEST PULMONARY EMBOLISM W CONTRAST 12/11/2021   Final Result   1. No evidence of pulmonary embolism   2. Diffuse ground-glass opacities throughout the lungs, typical of COVID-19   pulmonary disease.           XR CHEST (SINGLE VIEW FRONTAL) 12/11/2021   Final Result   Multifocal hazy opacities throughout both lungs consistent with COVID   pneumonia and or pulmonary edema       Patient admitted because of concerns with COVID 19.    CURRENT EVALUATION : 12/23/2021    T-max 100.2 F  Hypotension with intermittent use of low-dose Levophed    The patient remains on mechanical ventilation with 60% FiO2 and PEEP of 10. He is receiving fentanyl, ketamine, Versed and Precedex for sedation. Nimbex has been stopped     Fevers continue with the use of a cooling blanket and anti pyretics. Leukocytosis continues at 18.6-->14.7  Cefepime was initiated 12/18    CRP is decreasing   CXR 12/22 is showing concern for pulmonary edema vs pneumonia-Left lung worse than right    Discussed with RN    Patient exhibiting respiratory distress. yes  Respiratory secretions: no    Patient receiving supplemental oxygen.   Mechanical ventilation  RR:  24-->28  02 sat: 92-->96    % FIO2: 90-->80-->85-->80-->65-->60  PEEP:   21-->16-->14-->12-->10    QTc:       NEWS Score: 0-4 Low risk group; 5-6: Medium risk group; 7 or above: High risk group  Parameters 3 2 1 0 1 2 3   Age    < 65   = 65   RR = 8  9-11 12-20  21-24 = 25   O2 Sats = 91 92-93 94-95 = 96      Suppl O2  Yes  No      SBP = 90  101-110 111-219   = 220 HR = 40  41-50 51-90  111-130 = 131   Consciousness    Alert   Drowsiness, lethargy, or confusion   Temperature = 35.0 C (95.0 F)  35.1-36.0 C 95.1-96.9 F 36.1-38.0 C 97.0-100.4 F 38.1-39.0 C 100.5-102.3 F = 39.1 C = 102.4 F      NEWS Score:  12/12/2021: 13 high risk    Overall Daily Picture:     Unchanged    Presence of secondary bacterial Infection:    Cultures no growth  Additional antibiotics: 12/18 Cefepime for leukocytosis and fevers    Labs, X rays reviewed: 12/23/2021    BUN:30  Cr:0.37    WBC:13.5-->17-->16.8-->18.6-->14.7-->14.6  Hb:15  Plat:220    Absolute Neutrophils:3.73  Absolute Lymphocytes:0.29  Neutrophil/Lymphocyte Ratio: 12.8 high risk    CRP:293-->277-->137-->50.8-->23  Ferritin:2800  LDH: 838    Pro Calcitonin:      Cultures:  Urine:  12/18/21: No bacterial growth  Blood:  12/18/21: No growth thus far  Sputum :  12/18/21: Normal respiratory sherry  Wound:      CXR:     12/22/21 12/19/21      1221 diffuse bilateral infiltrates  CAT:      Discussed with patient, RN, CC, IM.      12-12-21:      I have personally reviewed the past medical history, past surgical history, medications, social history, and family history, and I have updated the database accordingly.   Past Medical History:     Past Medical History:   Diagnosis Date    Arthritis     Asthma     Diabetes mellitus ([address]     Edema     GERD (gastroesophageal reflux disease)     Hypertension     on lasix    Migraine     RIMA on CPAP     seldom use machine       Past Surgical  History:     Past Surgical History:   Procedure Laterality Date    APPENDECTOMY      ARTHROPLASTY Left 11/1/2019    LEFT FOOT 1ST MTPJ ARTHROPLASTY WITH PEREZ IMPLANT AND GROMMETS  [facility] performed by Ulises Coreas MD at [address] Right 12/12/2019    RIGHT FOOT ARTHROPLASTY 1ST MTPJ (Right Foot)    ARTHROPLASTY Right 12/12/2019    RIGHT FOOT ARTHROPLASTY 1ST MTPJ performed by Ulises Coreas MD at [address] Right     CARDIAC CATHETERIZATION  2014    no blockage no stents    CHOLECYSTECTOMY      COLONOSCOPY      ENDOSCOPY, COLON, DIAGNOSTIC      TOE SURGERY Left 11/01/2019     LEFT FOOT 1ST MTPJ ARTHROPLASTY WITH PEREZ IMPLANT AND GROMMETS  [facility] (Left )    TONSILLECTOMY         Medications:      lidocaine 1 % injection  5 mL IntraDERmal Once    sodium chloride flush  5-40 mL IntraVENous 2 times per day    oxyCODONE  10 mg Oral Q6H    docusate  100 mg Per NG tube BID    insulin lispro  0-6 Units SubCUTAneous Q6H    lansoprazole  30 mg Oral QAM AC    cefepime  2,000 mg IntraVENous Q12H    furosemide  40 mg IntraVENous Daily    insulin glargine  10 Units SubCUTAneous Nightly    sodium chloride flush  5-40 mL IntraVENous 2 times per day    enoxaparin  30 mg SubCUTAneous BID    Vitamin D  2,000 Units Oral Daily    dexamethasone  10 mg IntraVENous Q24H       Social History:     Social History     Socioeconomic History    Marital status:      Spouse name: Not on file    Number of children: Not on file    Years of education: Not on file    Highest education level: Not on file   Occupational History    Not on file   Tobacco Use    Smoking status: Former Smoker    Smokeless tobacco: Never Used   Vaping Use    Vaping Use: Never used   Substance and Sexual Activity    Alcohol use: Yes     Comment: every couple months    Drug use: Never    Sexual activity: Not on file   Other Topics Concern    Not on file   Social History Narrative    Not on file     Social Determinants of Health     Financial Resource Strain:     Difficulty of Paying Living Expenses: Not on file   Food Insecurity:     Worried About Running Out of Food in the Last Year: Not on file    Lucina of Food in the Last Year: Not on file   Transportation Needs:     Lack of Transportation (Medical): Not on file    Lack of Transportation (Non-Medical):  Not on file   Physical Activity:     Days of Exercise per Week: Not on file    Minutes of Exercise per Session: Not on file   Stress:     Feeling of Stress : Not on file   Social Connections:     Frequency of Communication with Friends and Family: Not on file    Frequency of Social Gatherings with Friends and Family: Not on file    Attends Sabianism Services: Not on file    Active Member of Clubs or Organizations: Not on file    Attends Club or Organization Meetings: Not on file    Marital Status: Not on file   Intimate Partner Violence:     Fear of Current or Ex-Partner: Not on file    Emotionally Abused: Not on file    Physically Abused: Not on file    Sexually Abused: Not on file   Housing Stability:     Unable to Pay for Housing in the Last Year: Not on file    Number of Places Lived in the Last Year: Not on file    Unstable Housing in the Last Year: Not on file       Family History:     Family History   Problem Relation Age of Onset    Heart Attack Sister 32    Diabetes Paternal Grandmother     Heart Disease Paternal Uncle     Heart Disease Paternal Uncle     Heart Disease Paternal Uncle     Cancer Maternal Aunt     Cancer Maternal Aunt     Cancer Maternal Uncle     Cancer Maternal Uncle         Allergies:   Nuts [peanut-containing drug products] and Sunflower oil     Review of Systems:     Unable to assess 12/23/2021  Intubated and sedated      Physical Examination :     Patient Vitals for the past 8 hrs:   BP Temp Temp src Pulse Resp SpO2 Weight   12/23/21 0700 111/70 -- -- 58 28 96 % --   12/23/21 0600 109/68 -- -- 78 28 95 % --   12/23/21 0500 102/67 100.2 °F (37.9 °C) Bladder 60 28 96 % --   12/23/21 0434 -- -- -- -- -- -- (!) 305 lb 5.4 oz (138.5 kg)   12/23/21 0400 107/67 100.2 °F (37.9 °C) Bladder 56 28 97 % --   12/23/21 0350 -- -- -- 58 24 98 % --   12/23/21 0300 110/72 -- -- 61 28 96 % --   12/23/21 0200 116/74 100.9 °F (38.3 °C) Bladder 61 28 97 % --   12/23/21 0100 125/78 -- -- 79 21 95 % --   12/23/21 0000 -- 100.9 °F (38.3 °C) Bladder 71 23 chest was performed after the administration of intravenous   contrast.  Multiplanar reformatted images are provided for review.  MIP   images are provided for review. Dose modulation, iterative reconstruction,   and/or weight based adjustment of the mA/kV was utilized to reduce the   radiation dose to as low as reasonably achievable.       COMPARISON:   Chest x-ray dated December 11, 2021       HISTORY:   ORDERING SYSTEM PROVIDED HISTORY: hypoxemia, covid positive, d-dimer-0.99   TECHNOLOGIST PROVIDED HISTORY:   hypoxemia, covid positive, d-dimer-0.99   Decision Support Exception - unselect if not a suspected or confirmed   emergency medical condition->Emergency Medical Condition (MA)   Reason for Exam: COVID positive, elevated D-Dimer       FINDINGS:   Pulmonary Arteries: Pulmonary arteries are adequately opacified for   evaluation.  No evidence of intraluminal filling defect to suggest pulmonary   embolism.  Main pulmonary artery is normal in caliber.       Mediastinum: Nonspecific mediastinal and hilar lymph nodes are present,   likely reactive. .  The heart and pericardium demonstrate no acute   abnormality.  There is no acute abnormality of the thoracic aorta.       Lungs/pleura: Diffuse ground-glass opacification is present throughout the   lungs, typical of COVID-19 pulmonary disease.  No pleural effusion or   pneumothorax is present.       Upper Abdomen: Images through the upper abdomen demonstrate a small hiatal   hernia.  Diffuse hepatic steatosis is present.  The gallbladder is surgically   absent.       Soft Tissues/Bones: No acute bone or soft tissue abnormality.           Impression   1. No evidence of pulmonary embolism   2.  Diffuse ground-glass opacities throughout the lungs, typical of COVID-19   pulmonary disease.         Narrative   EXAMINATION:   ONE XRAY VIEW OF THE CHEST       12/11/2021 3:54 pm       COMPARISON:   November 13, 2012       HISTORY:   ORDERING SYSTEM PROVIDED HISTORY: hypoxemia, probable covid pneumonia   TECHNOLOGIST PROVIDED HISTORY:   hypoxemia, probable covid pneumonia       FINDINGS:   Multifocal hazy opacities throughout both lungs would be consistent with   history of COVID pneumonia.  Pulmonary edema could have a similar appearance. No pneumothorax or pleural effusion.  Cardiac size enlarged.  Mediastinum   unremarkable.  No acute osseous abnormality.           Impression   Multifocal hazy opacities throughout both lungs consistent with COVID   pneumonia and or pulmonary edema.               Medical Decision [facility]ex[facility]:     Culture, Respiratory [[id number]] (Abnormal) Collected: 12/18/21 1143   Order Status: Completed Specimen: Endotracheal Updated: 12/18/21 1439    Specimen Description . ENDOTRACHEAL    Special Requests NOT REPORTED    Direct Exam >25 NEUTROPHILS/LPF     < 10 EPITHELIAL CELLS/LPF     MIXED BACTERIAL MORPHOTYPES SEEN ON GRAM STAIN.  Abnormal     Culture PENDING   Culture, Blood 1 [[id number]] Collected: 12/18/21 1234   Order Status: Completed Specimen: Blood Updated: 12/18/21 1358    Specimen Description . BLOOD    Special Requests NOT REPORTED    Culture NO GROWTH <24 HRS   Culture, Blood 1 [[id number]]    Order Status: Sent Specimen: Blood    Culture, Urine [[id number]] Collected: 12/14/21 0043   Order Status: Completed Specimen: Urine, straight catheter Updated: 12/14/21 1934    Specimen Description . URINE,STRAIGHT CATHETER    Special Requests NOT REPORTED    Culture NO GROWTH   MRSA DNA Probe, Nasal [[id number]] Collected: 12/12/21 1245   Order Status: Completed Specimen: Nasal Updated: 12/13/21 1052    Specimen Description . NASAL SWAB    MRSA, DNA, Nasal NEGATIVE:  MRSA DNA not detected by nucleic acid amplification.     Comment:                                                    Results should be used as an adjunct to nosocomial control efforts to identify patients   needing enhanced precautions.     The test is not intended to identify patients with staphylococcal infections.  Results   should not be used to guide or monitor treatment for MRSA infections. Medical Decision Making-Other:     Note:  Labs, medications, radiologic studies were reviewed with personal review of films  Large amounts of data were reviewed  Discussed with nursing Staff, Discharge planner  Infection Control and Prevention measures reviewed  All prior entries were reviewed  Administer medications as ordered  Prognosis: Guarded  Discharge planning reviewed      Thank you for allowing us to participate in the care of this patient. Please call with questions. Electronically signed by SHO Oh CNP on 12/23/2021 at 7:22 AM       ATTESTATION:    I have discussed the case, including pertinent history and exam findings with the APRN. I have evaluated the  History, physical findings and pictures of the patient and the key elements of the encounter have been performed by me. I have reviewed the laboratory data, other diagnostic studies and discussed them with the APRN. I have updated the medical record where necessary. I agree with the assessment, plan and orders as documented by the APRN.     Sil Eduardo MD.

## 2021-12-23 NOTE — PLAN OF CARE
Problem: Airway Clearance - Ineffective  Goal: Achieve or maintain patent airway  Outcome: Ongoing     Problem: Gas Exchange - Impaired  Goal: Absence of hypoxia  Outcome: Ongoing  Goal: Promote optimal lung function  Outcome: Ongoing     Problem: Breathing Pattern - Ineffective  Goal: Ability to achieve and maintain a regular respiratory rate  Outcome: Ongoing     Problem:  Body Temperature -  Risk of, Imbalanced  Goal: Ability to maintain a body temperature within defined limits  Outcome: Ongoing  Goal: Will regain or maintain usual level of consciousness  Outcome: Ongoing  Goal: Complications related to the disease process, condition or treatment will be avoided or minimized  Outcome: Ongoing     Problem: Isolation Precautions - Risk of Spread of Infection  Goal: Prevent transmission of infection  Outcome: Ongoing     Problem: Nutrition Deficits  Goal: Optimize nutritional status  Outcome: Ongoing     Problem: Risk for Fluid Volume Deficit  Goal: Maintain normal heart rhythm  Outcome: Ongoing  Goal: Maintain absence of muscle cramping  Outcome: Ongoing  Goal: Maintain normal serum potassium, sodium, calcium, phosphorus, and pH  Outcome: Ongoing     Problem: Loneliness or Risk for Loneliness  Goal: Demonstrate positive use of time alone when socialization is not possible  Outcome: Ongoing     Problem: Fatigue  Goal: Verbalize increase energy and improved vitality  Outcome: Ongoing     Problem: Patient Education: Go to Patient Education Activity  Goal: Patient/Family Education  Outcome: Ongoing     Problem: OXYGENATION/RESPIRATORY FUNCTION  Goal: Patient will maintain patent airway  12/22/2021 1949 by Kal Rolon RN  Outcome: Ongoing  12/22/2021 0949 by Thor Sequeira RCP  Outcome: Ongoing  Goal: Patient will achieve/maintain normal respiratory rate/effort  Description: Respiratory rate and effort will be within normal limits for the patient  12/22/2021 1949 by Kal Rolon RN  Outcome: Ongoing  12/22/2021 0949 by Saskia Thurman RCP  Outcome: Ongoing     Problem: MECHANICAL VENTILATION  Goal: Patient will maintain patent airway  12/22/2021 1949 by Adam Ji RN  Outcome: Ongoing  12/22/2021 0949 by Saskia Thurman RCP  Outcome: Ongoing  Goal: Oral health is maintained or improved  12/22/2021 1949 by Adam Ji RN  Outcome: Ongoing  12/22/2021 0949 by Saskia Thurman RCP  Outcome: Ongoing  Goal: ET tube will be managed safely  12/22/2021 1949 by Adam Ji RN  Outcome: Ongoing  12/22/2021 0949 by Saskia Thurman RCP  Outcome: Ongoing  Goal: Ability to express needs and understand communication  12/22/2021 1949 by Adam Ji RN  Outcome: Ongoing  12/22/2021 0949 by Saskia Thurman RCP  Outcome: Ongoing  Goal: Mobility/activity is maintained at optimum level for patient  12/22/2021 1949 by Adam Ji RN  Outcome: Ongoing  12/22/2021 0949 by Saskia Thurman RCP  Outcome: Ongoing     Problem: SKIN INTEGRITY  Goal: Skin integrity is maintained or improved  12/22/2021 1949 by Adam Ji RN  Outcome: Ongoing  12/22/2021 0949 by Saskia Thurman RCP  Outcome: Ongoing     Problem: Skin Integrity:  Goal: Will show no infection signs and symptoms  Description: Will show no infection signs and symptoms  Outcome: Ongoing  Goal: Absence of new skin breakdown  Description: Absence of new skin breakdown  Outcome: Ongoing     Problem: Falls - Risk of:  Goal: Will remain free from falls  Description: Will remain free from falls  Outcome: Ongoing  Goal: Absence of physical injury  Description: Absence of physical injury  Outcome: Ongoing     Problem: Nutrition  Goal: Optimal nutrition therapy  Outcome: Ongoing

## 2021-12-23 NOTE — PLAN OF CARE
Problem: Airway Clearance - Ineffective  Goal: Achieve or maintain patent airway  12/23/2021 0416 by Alicia Pino  Outcome: Ongoing  12/22/2021 1949 by Kal Rolon RN  Outcome: Ongoing     Problem: Gas Exchange - Impaired  Goal: Absence of hypoxia  12/23/2021 0416 by Alicia Pino  Outcome: Ongoing  12/22/2021 1949 by Kal Rolon RN  Outcome: Ongoing  Goal: Promote optimal lung function  12/23/2021 0416 by Alicia Pino  Outcome: Ongoing  12/22/2021 1949 by Kal Rolon RN  Outcome: Ongoing     Problem: Breathing Pattern - Ineffective  Goal: Ability to achieve and maintain a regular respiratory rate  12/23/2021 0416 by Alicia Pino  Outcome: Ongoing  12/22/2021 1949 by Kal Rolon RN  Outcome: Ongoing     Problem:  Body Temperature -  Risk of, Imbalanced  Goal: Ability to maintain a body temperature within defined limits  12/23/2021 0416 by Alicia Pino  Outcome: Ongoing  12/22/2021 1949 by Kal Rolon RN  Outcome: Ongoing  Goal: Will regain or maintain usual level of consciousness  12/23/2021 0416 by Alicia Pino  Outcome: Ongoing  12/22/2021 1949 by Kal Rolon RN  Outcome: Ongoing  Goal: Complications related to the disease process, condition or treatment will be avoided or minimized  12/23/2021 0416 by Alicia Pino  Outcome: Ongoing  12/22/2021 1949 by Kal Rolon RN  Outcome: Ongoing     Problem: Isolation Precautions - Risk of Spread of Infection  Goal: Prevent transmission of infection  12/23/2021 0416 by Alicia Pino  Outcome: Ongoing  12/22/2021 1949 by Kal Rolon RN  Outcome: Ongoing     Problem: Nutrition Deficits  Goal: Optimize nutritional status  12/23/2021 0416 by Alicia Pino  Outcome: Ongoing  12/22/2021 1949 by Kal Rolon RN  Outcome: Ongoing     Problem: Risk for Fluid Volume Deficit  Goal: Maintain normal heart rhythm  12/23/2021 0416 by Alicia Pino  Outcome: Ongoing  12/22/2021 1949 by Kal Rolon RN  Outcome: Ongoing  Goal: Maintain absence of muscle cramping  12/23/2021 0416 by Aida Cornelius  Outcome: Ongoing  12/22/2021 1949 by Jere Aguilar RN  Outcome: Ongoing  Goal: Maintain normal serum potassium, sodium, calcium, phosphorus, and pH  12/23/2021 0416 by Aida Cornelius  Outcome: Ongoing  12/22/2021 1949 by Jere Aguilar RN  Outcome: Ongoing     Problem: Patient Education: Go to Patient Education Activity  Goal: Patient/Family Education  12/23/2021 0416 by Aida Cornelius  Outcome: Ongoing  12/22/2021 1949 by Jere Aguilar RN  Outcome: Ongoing     Problem: OXYGENATION/RESPIRATORY FUNCTION  Goal: Patient will maintain patent airway  12/23/2021 0416 by Aida Cornelius  Outcome: Ongoing  12/22/2021 1949 by Jere Aguilar RN  Outcome: Ongoing  Goal: Patient will achieve/maintain normal respiratory rate/effort  Description: Respiratory rate and effort will be within normal limits for the patient  12/23/2021 0416 by Aida Cornelius  Outcome: Ongoing  12/22/2021 1949 by Jere Aguilar RN  Outcome: Ongoing     Problem: MECHANICAL VENTILATION  Goal: Patient will maintain patent airway  12/23/2021 0416 by Aida Cornelius  Outcome: Ongoing  12/22/2021 1949 by Jere Aguilar RN  Outcome: Ongoing  Goal: Oral health is maintained or improved  12/23/2021 0416 by Aida Cornelius  Outcome: Ongoing  12/22/2021 1949 by Jere Aguilar RN  Outcome: Ongoing  Goal: ET tube will be managed safely  12/23/2021 0416 by Aida Cornelius  Outcome: Ongoing  12/22/2021 1949 by Jere Aguilar RN  Outcome: Ongoing  Goal: Ability to express needs and understand communication  12/23/2021 0416 by Aida Cornelius  Outcome: Ongoing  12/22/2021 1949 by Jere Aguilar RN  Outcome: Ongoing  Goal: Mobility/activity is maintained at optimum level for patient  12/23/2021 0416 by Aida Cornelius  Outcome: Ongoing  12/22/2021 1949 by Jere Aguilar RN  Outcome: Ongoing     Problem: SKIN INTEGRITY  Goal: Skin integrity is maintained or improved  12/23/2021 0416 by Cesar Marroquin Martina  Outcome: Ongoing  12/22/2021 1949 by Lilo Abdul RN  Outcome: Ongoing     Problem: Skin Integrity:  Goal: Will show no infection signs and symptoms  Description: Will show no infection signs and symptoms  12/23/2021 0416 by Joann Landers  Outcome: Ongoing  12/22/2021 1949 by Lilo Abdul RN  Outcome: Ongoing  Goal: Absence of new skin breakdown  Description: Absence of new skin breakdown  12/23/2021 0416 by Joann Landers  Outcome: Ongoing  12/22/2021 1949 by Lilo Abdul RN  Outcome: Ongoing     Problem: Falls - Risk of:  Goal: Will remain free from falls  Description: Will remain free from falls  12/23/2021 0416 by Joann Landers  Outcome: Ongoing  12/22/2021 1949 by Lilo Abdul RN  Outcome: Ongoing  Goal: Absence of physical injury  Description: Absence of physical injury  12/23/2021 0416 by Joann Landers  Outcome: Ongoing  12/22/2021 1949 by Lilo Abdul RN  Outcome: Ongoing     Problem: Nutrition  Goal: Optimal nutrition therapy  12/23/2021 0416 by Joann Landers  Outcome: Ongoing  12/22/2021 1949 by Lilo Abdul RN  Outcome: Ongoing

## 2021-12-23 NOTE — PLAN OF CARE
Problem: Airway Clearance - Ineffective  Goal: Achieve or maintain patent airway  12/23/2021 1539 by Donna Goel RN  Outcome: Ongoing     Problem: Gas Exchange - Impaired  Goal: Absence of hypoxia  12/23/2021 1539 by Donna Goel RN  Outcome: Ongoing     Problem: Gas Exchange - Impaired  Goal: Promote optimal lung function  12/23/2021 1539 by Donna Goel RN  Outcome: Ongoing     Problem: Body Temperature -  Risk of, Imbalanced  Goal: Ability to maintain a body temperature within defined limits  12/23/2021 1539 by Donna Goel RN  Outcome: Ongoing     Problem:  Body Temperature -  Risk of, Imbalanced  Goal: Will regain or maintain usual level of consciousness  12/23/2021 1539 by Donna Goel RN  Outcome: Ongoing     Problem: OXYGENATION/RESPIRATORY FUNCTION  Goal: Patient will maintain patent airway  12/23/2021 1539 by Donna Goel RN  Outcome: Ongoing     Problem: OXYGENATION/RESPIRATORY FUNCTION  Goal: Patient will achieve/maintain normal respiratory rate/effort  Description: Respiratory rate and effort will be within normal limits for the patient  12/23/2021 1539 by Donna Goel RN  Outcome: Ongoing     Problem: MECHANICAL VENTILATION  Goal: Patient will maintain patent airway  12/23/2021 1539 by Donna Goel RN  Outcome: Ongoing     Problem: MECHANICAL VENTILATION  Goal: Oral health is maintained or improved  12/23/2021 1539 by Donna Goel RN  Outcome: Ongoing     Problem: MECHANICAL VENTILATION  Goal: ET tube will be managed safely  12/23/2021 1539 by Donna Goel RN  Outcome: Ongoing     Problem: SKIN INTEGRITY  Goal: Skin integrity is maintained or improved  12/23/2021 1539 by Donna Goel RN  Outcome: Ongoing     Problem: Skin Integrity:  Goal: Will show no infection signs and symptoms  Description: Will show no infection signs and symptoms  12/23/2021 1539 by Donna Goel RN  Outcome: Ongoing     Problem: Skin Integrity:  Goal: Absence of new skin breakdown  Description: Absence of new skin breakdown  12/23/2021 1539 by Safia Altamirano RN  Outcome: Ongoing     Problem: Falls - Risk of:  Goal: Will remain free from falls  Description: Will remain free from falls  12/23/2021 1539 by Safia Altamirano RN  Outcome: Ongoing     Problem: Falls - Risk of:  Goal: Absence of physical injury  Description: Absence of physical injury  12/23/2021 1539 by Safia Altamirano RN  Outcome: Ongoing

## 2021-12-23 NOTE — PROGRESS NOTES
Comprehensive Nutrition Assessment    Type and Reason for Visit:  Reassess    Nutrition Recommendations/Plan:   -Continue NPO status   -Continue tube feeding of Vital AF 1.2 (Peptide-Based) @ 50 mL/hr x 24 hrs + 2 protein modulars per day~> 1648 kcals, 142 gms protein, 973 mLs water (w/ current rate of propofol ~> 2126 kcals/d)              -Water flushes per MD discretion               -If Prone/supine ~>               -Run TF @ 20 mL/hr x 16 hrs while prone               -Run TF @ 110 mL/hr x 8 hrs while supine + 2 protein modulars per day  -Will continue to monitor EN, labs and weights     Nutrition Assessment:  Pt remains NPO, intubated and in droplet plus precaution room. Propofol is now running @ 18.1 mL/hr. BM noted on 12/23. Tube feeding running @ 50 mL/hr during time of visit and tolerating w/ minimal residuals + receiving 2 protein modulars per day. Pt noted w/ nuts and sunflower oil. No sunflower oil in Vital AF 1.2. No significant wt loss noted since admission. Will continue to monitor. Malnutrition Assessment:  Malnutrition Status:  Insufficient data    Context:  Acute Illness     Findings of the 6 clinical characteristics of malnutrition:  Energy Intake:  No significant decrease in energy intake  Weight Loss:  No significant weight loss     Body Fat Loss:  Unable to assess     Muscle Mass Loss:  Unable to assess    Fluid Accumulation:  1 - Mild Extremities,Generalized   Strength:  Not Performed    Estimated Daily Nutrient Needs:  Energy (kcal):  11-14 ~> 6310-4124 kcals/d; Weight Used for Energy Requirements:  Admission     Protein (g):  1.5-2.0 gm/kg ~>129-172 gms/d; Weight Used for Protein Requirements:  Ideal        Fluid (ml/day):  per MD;     Nutrition Related Findings:  BM 12/23; labs/meds reviewed      Wounds:  None       Current Nutrition Therapies:    Diet NPO  ADULT TUBE FEEDING; Orogastric; Peptide Based; Continuous; 10; Yes; 24; Q 4 hours; 50; 30; Q 4 hours; Protein;  Bolus 2 Proteinex modulars per day  Current Tube Feeding (TF) Orders:  · Feeding Route: Orogastric  · Formula: Peptide Based  · Schedule: Continuous  · Additives/Modulars: Protein (2x/d)  · Water Flushes: per MD   · Rate: 50 mL/hr   · Current TF & Flush Orders Provides: Vital AF 1.2 (Peptide-Based) @ 50 mL/hr x 24 hrs + 2 protein modulars per day~> 1648 kcals, 142 gms protein, 973 mLs water  + propofol kcals  · Goal TF & Flush Orders Provides: Vital AF 1.2 (Peptide-Based) @ 50 mL/hr x 24 hrs + 2 protein modulars per day~> 1648 kcals, 142 gms protein, 973 mLs water  + propofol kcals    Additional Calorie Sources:   propofol @ 18.1 mL/hr ~> 478 kcals/d    Anthropometric Measures:  · Height: 6' 2\" (188 cm)  · Current Body Weight: 305 lb (138.3 kg)   · Admission Body Weight: 306 lb (138.8 kg)    · Ideal Body Weight: 190 lbs; % Ideal Body Weight 160.5 %   · BMI: 39.1  · BMI Categories: Obese Class 2 (BMI 35.0 -39.9)       Nutrition Diagnosis:   · Inadequate oral intake related to impaired respiratory function as evidenced by NPO or clear liquid status due to medical condition,intubation,nutrition support - enteral nutrition      Nutrition Interventions:   Food and/or Nutrient Delivery:  Continue NPO,Continue Current Tube Feeding  Nutrition Education/Counseling:  Education not indicated   Coordination of Nutrition Care:  Continue to monitor while inpatient    Goals: Achieved   Pt to meet % of est'd needs daily via EN       Nutrition Monitoring and Evaluation:   Food/Nutrient Intake Outcomes:  Enteral Nutrition Intake/Tolerance  Physical Signs/Symptoms Outcomes:  Biochemical Data,Nutrition Focused Physical Findings,Skin,Weight,GI Status,Fluid Status or Edema,Hemodynamic Status     Discharge Planning:     Too soon to determine     Electronically signed by Alejo Madden RD, LD on 12/23/21 at 12:20 PM EST    Contact: [phone]

## 2021-12-23 NOTE — PROGRESS NOTES
Pulmonary Critical Care   Consult Note    Patient - Faraz Lynne  Date of Admission -  2021 11:39 AM  Date of Evaluation -  2021  Room and Bed Number -  3026/3026-01   Hospital Day - 11    CHIEF COMPLAINT : ACUTE HYPOXIC RESPIRATORY FAILURE DUE TO COVID -19 PNEUMONIA   HPI:   Faraz Lynne  60 y.o. male  admitted for COVID-19 at [facility] ER due to worsening oxygenation he was initially started on BiPAP and subsequently intubated. On room air his saturations had been 50%. CTA no PE but bilateral pulmonary infiltrates. He was accepted at [address] [place] [address] ICU. On arrival he is on PEEP of 22, 100% FiO2.    2021   Subjective      FiO2 remains at 60%. Remains on fentanyl, propofol, midazolam, and dexmedetomidine. Came off of the neuromuscular blockade. Did not tolerate decreasing sedation  I/O-+0.6 L.   Requiring small dose of norepinephrine    SECRETIONS  -Amount:  [x] Small [] Moderate  [] Large    [] None  Color:     [x] White [] Colored  [] Bloody    SEDATION:    As above    PARALYZED:  [] No    [x] Yes    VASOPRESSORS:  [] No    [x] Yes  [x] Levophed [] Dopamine [] Vasopressin  [] Dobutamine [] Phenylephrine [] Epinephrine    INHALED veletri  : [] No    [] Yes    PRONE :       [x] No    [] Yes    Actemra:             [] No    [x] Yes  21  DEXAMETHASONE : [] No    [x] Yes      Ros unable to perform due to intubation and sedation    OBJECTIVE:     VITAL SIGNS:  BP (!) 144/82   Pulse 103   Temp 100.4 °F (38 °C) (Bladder)   Resp 28   Ht 6' 2\" (1.88 m)   Wt (!) 305 lb 5.4 oz (138.5 kg) Comment: with cooling blanket  SpO2 (!) 89%   BMI 39.20 kg/m²   Tmax over 24 hours:  Temp (24hrs), Av.3 °F (37.9 °C), Min:99 °F (37.2 °C), Max:100.9 °F (38.3 °C)      Patient Vitals for the past 8 hrs:   BP Temp Temp src Pulse Resp SpO2 Height   21 1350 -- -- -- 103 28 (!) 89 % --   21 1345 -- -- -- 108 28 90 % --   21 1330 -- -- -- 117 30 90 % --   21 1315 -- -- -- 113 29 90 % --   12/23/21 1300 (!) 144/82 -- -- 125 29 90 % --   12/23/21 1245 -- -- -- 114 26 94 % --   12/23/21 1230 -- -- -- 104 26 95 % --   12/23/21 1215 -- -- -- 90 27 95 % 6' 2\" (1.88 m)   12/23/21 1200 128/84 100.4 °F (38 °C) Bladder 87 26 95 % --   12/23/21 1145 -- -- -- 85 26 95 % --   12/23/21 1130 -- -- -- 82 27 -- --   12/23/21 1115 -- -- -- 74 28 93 % --   12/23/21 1100 100/67 -- -- 75 27 93 % --   12/23/21 1045 -- -- -- 85 21 92 % --   12/23/21 1030 -- -- -- 90 17 92 % --   12/23/21 1015 -- -- -- 100 22 91 % --   12/23/21 1000 (!) 139/94 -- -- 108 24 92 % --   12/23/21 0945 -- -- -- 120 (!) 32 93 % --   12/23/21 0930 -- -- -- 104 28 93 % --   12/23/21 0924 -- -- -- 105 (!) 31 94 % --   12/23/21 0915 -- -- -- 107 29 95 % --   12/23/21 0900 (!) 148/90 -- -- 99 24 98 % --   12/23/21 0845 -- -- -- 89 25 98 % --   12/23/21 0830 -- -- -- 59 28 96 % --   12/23/21 0815 -- -- -- 59 28 96 % --   12/23/21 0800 106/65 100 °F (37.8 °C) Bladder 58 28 96 % --   12/23/21 0745 -- -- -- 59 28 96 % --   12/23/21 0730 -- -- -- 59 28 96 % --   12/23/21 0715 -- -- -- 59 28 96 % --   12/23/21 0700 111/70 -- -- 58 28 96 % --         Intake/Output Summary (Last 24 hours) at 12/23/2021 1432  Last data filed at 12/23/2021 1400  Gross per 24 hour   Intake 4221 ml   Output 3570 ml   Net 651 ml     Date 12/23/21 0000 - 12/23/21 2359   Shift 4786-0327 0255-3361 2079-8491 24 Hour Total   INTAKE   I.V.(mL/kg) 679(4.9) 209(1.5)  888(6.4)   NG/GT(mL/kg) 994(7.2) 487(3.5)  1481(10.7)   Shift Total(mL/kg) 3157(81.6) 696(5)  5128(16.7)   OUTPUT   Urine(mL/kg/hr) 550(0.5) 2170  2720   Shift Total(mL/kg) 550(4) 8956(43.0)  4898(61.6)   Weight (kg) 138.5 138.5 138.5 138.5     Wt Readings from Last 3 Encounters:   12/23/21 (!) 305 lb 5.4 oz (138.5 kg)   12/11/21 300 lb (136.1 kg)   12/12/19 (!) 355 lb 9.6 oz (161.3 kg)     Body mass index is 39.2 kg/m².         PHYSICAL EXAM:      I have discussed the care of Estefany Solorio, including pertinent history and exam findings, with the resident/APC/staff. I have seen the patient and the key elements of all parts of the encounter have been performed by me. Physical exam was deferred to limit physical exposure and PPE resources. I reviewed the interval history, interpreted all available radiographic, laboratory and physiologic data at the time of service. I agree with the assessment and plan as documented by resident/APC/ ancillary staff.   Patient remains on the ventilator  Trachea is in the midline  No subcutaneous air  No JVD  No rhonchi  Abdomen is not distended  No edema    MEDICATIONS:  Scheduled Meds:   lidocaine 1 % injection  5 mL IntraDERmal Once    sodium chloride flush  5-40 mL IntraVENous 2 times per day    oxyCODONE  10 mg Oral Q6H    docusate  100 mg Per NG tube BID    insulin lispro  0-6 Units SubCUTAneous Q6H    lansoprazole  30 mg Oral QAM AC    cefepime  2,000 mg IntraVENous Q12H    furosemide  40 mg IntraVENous Daily    insulin glargine  10 Units SubCUTAneous Nightly    sodium chloride flush  5-40 mL IntraVENous 2 times per day    enoxaparin  30 mg SubCUTAneous BID    Vitamin D  2,000 Units Oral Daily    dexamethasone  10 mg IntraVENous Q24H     Continuous Infusions:   sodium chloride      propofol 30 mcg/kg/min (12/23/21 1405)    dexmedetomidine 0.6 mcg/kg/hr (12/23/21 1146)    sodium chloride      dextrose      norepinephrine 1 mcg/min (12/23/21 1405)    cisatracurium (NIMBEX) infusion Stopped (12/22/21 1506)    midazolam 9 mg/hr (12/23/21 1301)    fentaNYL 150 mcg/hr (12/23/21 0851)    dextrose      sodium chloride 10 mL/hr at 12/13/21 1548     PRN Meds:   sodium chloride flush, 5-40 mL, PRN  sodium chloride, 25 mL, PRN  chlordiazePOXIDE, 5 mg, Q6H PRN  ibuprofen, 600 mg, Q6H PRN  metoprolol, 5 mg, Q6H PRN  polyethylene glycol, 17 g, BID PRN  senna, 5 mL, Nightly PRN  bisacodyl, 10 mg, Daily PRN  acetaminophen, 650 mg, Q4H PRN  sodium chloride flush, 5-40 mL, PRN  sodium chloride, 25 mL, PRN  ondansetron, 4 mg, Q8H PRN   Or  ondansetron, 4 mg, Q6H PRN  acetaminophen, 650 mg, Q6H PRN  glucose, 15 g, PRN  dextrose, 12.5 g, PRN  glucagon (rDNA), 1 mg, PRN  dextrose, 100 mL/hr, PRN  glucose, 15 g, PRN  dextrose, 12.5 g, PRN  glucagon (rDNA), 1 mg, PRN  dextrose, 100 mL/hr, PRN        SUPPORT DEVICES: [x] Ventilator [] BIPAP  [] Nasal Cannula [] Room Air      ABGs:     Lab Results   Component Value Date    [id number] NOT REPORTED 12/23/2021    FIO2 60.0 12/23/2021       DATA:  Complete Blood Count:   Recent Labs     12/21/21 0421 12/22/21  0513 12/23/21  0537   WBC 18.6* 14.7* 14.6*   RBC 5.10 5.00 4.77   HGB 15.8 15.5 15.0   HCT 47.2 47.9 45.5   MCV 92.5 95.8 95.4   MCH 31.0 31.0 31.4   MCHC 33.5 32.4 33.0   RDW 14.0 13.9 13.7    198 220   MPV 11.6 12.6 12.2        Last 3 Blood Glucose:   Recent Labs     12/21/21 0421 12/22/21  0513 12/23/21  0537   GLUCOSE 155* 156* 146*        PT/INR:    Lab Results   Component Value Date    PROTIME 13.0 12/12/2021    INR 1.0 12/12/2021     PTT:    Lab Results   Component Value Date    APTT 39.1 12/12/2021       Comprehensive Metabolic Profile:   Recent Labs     12/21/21 0421 12/22/21  0513 12/23/21  0537    141 140   K 4.0 3.7 4.0    106 105   CO2 24 24 25   BUN 34* 35* 30*   CREATININE 0.35* 0.42* 0.37*   GLUCOSE 155* 156* 146*   CALCIUM 8.4* 8.6 8.8   PROT 5.6* 5.7* 5.6*   LABALBU 3.1* 3.2* 3.4*   BILITOT 0.51 0.37 0.35   ALKPHOS 67 66 57   AST 22 27 40*   ALT 62* 60* 60*      Magnesium:   Lab Results   Component Value Date    MG 2.5 12/17/2021    MG 2.3 12/16/2021    MG 2.4 12/15/2021     Phosphorus:   Lab Results   Component Value Date    PHOS 3.2 12/18/2021    PHOS 4.0 12/17/2021    PHOS 2.8 12/16/2021     Ionized Calcium: No results found for: FREDDY     Urinalysis:   Lab Results   Component Value Date    NITRU NEGATIVE 12/18/2021    COLORU Dark Yellow 12/18/2021    PHUR 6.0 12/18/2021    WBCUA 5 TO 10 12/18/2021    RBCUA 20 TO 50 12/18/2021    MUCUS NOT REPORTED 12/18/2021    TRICHOMONAS NOT REPORTED 12/18/2021    YEAST NOT REPORTED 12/18/2021    BACTERIA NOT REPORTED 12/18/2021    SPECGRAV 1.032 12/18/2021    LEUKOCYTESUR NEGATIVE 12/18/2021    UROBILINOGEN Normal 12/18/2021    BILIRUBINUR NEGATIVE 12/18/2021    GLUCOSEU NEGATIVE 12/18/2021    KETUA NEGATIVE 12/18/2021    AMORPHOUS NOT REPORTED 12/18/2021           XR CHEST (SINGLE VIEW FRONTAL)    Result Date: 12/14/2021  Mild interval improvement in multifocal airspace disease particularly at the right base. Support tubes and lines as above. XR CHEST (SINGLE VIEW FRONTAL)    Result Date: 12/12/2021  Stable appearing     XR CHEST (SINGLE VIEW FRONTAL)    Result Date: 12/11/2021  Multifocal hazy opacities throughout both lungs consistent with COVID pneumonia and or pulmonary edema. XR CHEST PORTABLE    Result Date: 12/12/2021  Stable appearing chest with unchanged support tubes/lines and persistent extensive bilateral airspace disease consistent with known COVID pneumonia. XR CHEST PORTABLE    Result Date: 12/12/2021  Right internal jugular central venous catheter tip projecting over the proximal SVC versus brachiocephalic vein. No pneumothorax. Otherwise stable exam from earlier today. XR CHEST PORTABLE    Result Date: 12/12/2021  Endotracheal tube tip is 3.2 cm above the saul. Overall significant worsening of multifocal airspace opacities keeping with history of COVID-19. CT CHEST PULMONARY EMBOLISM W CONTRAST    Result Date: 12/11/2021  1. No evidence of pulmonary embolism 2. Diffuse ground-glass opacities throughout the lungs, typical of COVID-19 pulmonary disease.            Past Medical History:   Diagnosis Date    Arthritis     Asthma     Diabetes mellitus ([address]     Edema     GERD (gastroesophageal reflux disease)     Hypertension     on lasix    Migraine     IRMA on CPAP     seldom use machine        Social History Socioeconomic History    Marital status:      Spouse name: None    Number of children: None    Years of education: None    Highest education level: None   Occupational History    None   Tobacco Use    Smoking status: Former Smoker    Smokeless tobacco: Never Used   Vaping Use    Vaping Use: Never used   Substance and Sexual Activity    Alcohol use: Yes     Comment: every couple months    Drug use: Never    Sexual activity: None   Other Topics Concern    None   Social History Narrative    None     Social Determinants of Health     Financial Resource Strain:     Difficulty of Paying Living Expenses: Not on file   Food Insecurity:     Worried About Running Out of Food in the Last Year: Not on file    Lucina of Food in the Last Year: Not on file   Transportation Needs:     Lack of Transportation (Medical): Not on file    Lack of Transportation (Non-Medical): Not on file   Physical Activity:     Days of Exercise per Week: Not on file    Minutes of Exercise per Session: Not on file   Stress:     Feeling of Stress : Not on file   Social Connections:     Frequency of Communication with Friends and Family: Not on file    Frequency of Social Gatherings with Friends and Family: Not on file    Attends [employer] Services: Not on file    Active Member of [address] [place] [address] [employer] or Organizations: Not on file    Attends Club or Organization Meetings: Not on file    Marital Status: Not on file   Intimate Partner Violence:     Fear of Current or Ex-Partner: Not on file    Emotionally Abused: Not on file    Physically Abused: Not on file    Sexually Abused: Not on file   Housing Stability:     Unable to Pay for Housing in the Last Year: Not on file    Number of [place] in the Last Year: Not on file    Unstable Housing in the Last Year: Not on file         There is no immunization history on file for this patient.       Family History   Problem Relation Age of Onset    Heart Attack Sister 30    Diabetes Paternal Grandmother     Heart Disease Paternal Uncle     Heart Disease Paternal Uncle     Heart Disease Paternal Uncle     Cancer Maternal Aunt     Cancer Maternal Aunt     Cancer Maternal Uncle     Cancer Maternal Uncle          Past Surgical History:   Procedure Laterality Date    APPENDECTOMY      ARTHROPLASTY Left 11/1/2019    LEFT FOOT 1ST MTPJ ARTHROPLASTY WITH PEREZ IMPLANT AND GROMMETS  [facility] performed by Carmen Carter MD at [address] [facility] Right 12/12/2019    RIGHT FOOT ARTHROPLASTY 1ST MTPJ (Right Foot)    ARTHROPLASTY Right 12/12/2019    RIGHT FOOT ARTHROPLASTY 1ST MTPJ performed by Carmen Carter MD at [address] Right    [address]  2014    no blockage no stents    CHOLECYSTECTOMY      COLONOSCOPY      ENDOSCOPY, COLON, DIAGNOSTIC      TOE SURGERY Left 11/01/2019     LEFT FOOT 1ST MTPJ ARTHROPLASTY WITH PEREZ IMPLANT AND GROMMETS  [facility] (Left )    TONSILLECTOMY            VENTILATOR SETTINGS:  Vent Information  $Ventilation: $Subsequent Day  Skin Assessment: Clean, dry, & intact  Suction Catheter Diameter: 14  Equipment ID: [id number]  Equipment Changed: HME  Vent Type: Servo i  Vent Mode: PRVC  Vt Ordered: (S) 550 mL (increased to 7ml/kg, pt increased accessory muscle use)  Rate Set: (S) 24 bmp  FiO2 : 60 %  SpO2: (!) 89 %  SpO2/FiO2 ratio: 148.33  Sensitivity: 1  PEEP/CPAP: 10  I Time/ I Time %: 0.65 s  Humidification Source: HME  Humidification Temp: 37  Humidification Temp Measured: 36.8  Circuit Condensation: Drained  Nitric Oxide/Epoprostenol In Use?: No     PaO2/FiO2 RATIO:  Recent Labs     12/23/21  0425   POCPO2 85.1      FiO2 : 60 %       LABS:  ABGs:   Recent Labs     12/21/21  0413 12/22/21  0513 12/23/21  0425   POCPH 7.518* 7.403 7. 413   POCPCO2 34.8* 40.5 50.6*   POCPO2 61.3* 56.9* 85.1   POCHCO3 28.3* 25.3 32.3*   GIJH8OZJ 94 89* 96            ASSESSMENT:     Principal Problem:    Pneumonia due to COVID-19 virus  Active Problems:    Shock ([facility]    Acute respiratory failure with hypoxia (HCC)    Type 2 diabetes mellitus (HCC)    Asthma    IRMA on CPAP    Hypertension    GERD (gastroesophageal reflux disease)    ARDS (adult respiratory distress syndrome) ([facility]  Resolved Problems:    * No resolved hospital problems. *              Acute hypoxic respiratory failure secondary to COVID 19   Acute respiratory distress syndrome   Bilateral multifocal pneumonia due to COVID 19 infection   Covid -19 pandemic emergency   Post hypercarbic metabolic alkalosis   Hyperglycemia, acceptable   Leukocytosis, stable   Shock requiring norepinephrine? Sepsis    LOS: 11  PLAN:    · D/w RT  · D/w RN   · Chest x-ray on 12/22/2021 without any change from before  · Sedation reviewed  · Cont ARDS ventilation  · Proning held off as it did not appear to make much of difference  · Airborne isolation and droplet precautions to be continued  · Continue supportive care   · Cont treatment with medications for COVID  · Cont tube feeding  · Monitor endotracheal secretions   · Will obtain xray chest as needed  · ABG as needed  · Prognosis guarded given age and severity of illness. · Sedation and neuromuscular holiday, if tolerated  · On Lovenox and lansoprazole  · On Decadron  · Patient is on cefepime    Treatment plan Discussed with nursing staff in detail , all questions answered . Total critical care time caring for this patient with life threatening, unstable organ failure, including direct patient contact, management of life support systems, review of data including imaging and labs, discussions with other team members and physicians at least 27  Min so far today, excluding procedures.   Electronically signed by Arpita Winkler MD on 12/23/2021 at 2:32 PM       This patient was evaluated in the context of the global SARS-CoV-2 (COVID-19) pandemic, which necessitated considerations that the patient either has [id number] infection or is at risk of infection with COVID-19. Institutional protocols and algorithms that pertain to the evaluation & management of patients with COVID-19 or those at risk for COVID-19 are in a state of rapid changes based on information released by regulatory bodies including the CDC and federal and state organizations. These policies and algorithms were followed during the patient's care. Please note that this chart was generated using voice recognition Dragon dictation software. Although every effort was made to ensure the accuracy of this automated transcription, some errors in transcription may have occurred.

## 2021-12-23 NOTE — PROGRESS NOTES
[facility]  Office: [address], DO, Claribel Robles, DO, Richa Pé[place], DO, Marlo Kanner [facility], DO, Santos Troy MD, Arelis Bond MD, Alicia Sahu MD, Sofia Rodriguez MD, Sylvia Richey MD, Poppy Mcdermott MD, Ehsan Castro MD, Sandra Diaz, DO, Sanam Carrion, DO, Kojo Ann MD,  Rita Marin DO, Lilo Orozco MD, Debra Naik MD, Nica Sawyer MD, Nicolette Sumner MD, Adina Flanagan MD, Eros Romo MD, Seferino Trent MD, Demar Guevara [facility], [facility], CNP, Jorge Dooley, CNP, Marilou Conde, CNS, Bree Bray, CNP, Oksana Carver, CNP, Odessa Carson, CNP, Jeovanny Ayala, CNP, Raul Styles, CNP, Nat Oliver PA-C, Julio Cesar Allan [facility], Jose De Jesus Casillas [facility], Rachel Roger, CNP, Tonya Capps, CNP, Tone Spivey, CNP, Anna Lopez, CNP, Nilson Randolph CNP, David Chu, [address] [place] [address]    Progress Note    12/23/2021    2:25 PM    Name:   Acacia Upton  MRN:     [id number]     [place]:      [de-identified]   Room:   [address] [place] [address] Day:  6  Admit Date:  12/12/2021 11:39 AM    PCP:   Sanket Toth MD  Code Status:  Full Code    Subjective:     C/C: Shortness of breath    Interval History Status: not changed. Patient seen and examined this morning. Remains intubated, sedated. He has been weaned off of the paralytic. Does have episodes of hypertension and tachycardia, both relieved with administration of Librium. Tolerating tube feeds. Excellent urine output. Having bowel movements. Brief History:     49-year-old male past medical history of obesity, asthma, IRMA on CPAP, hypertension, GERD presents with shortness of breath and cough at outlying facility.  Patient transferred to [address] [facility] for further care. Patient was intubated 12/11/2021 currently paralyzed    Review of Systems:     Unable to obtain secondary to intubation, sedation, paralytic    Medications: Allergies:     Allergies Allergen Reactions    Nuts [Peanut-Containing Drug Products] Anaphylaxis    Sunflower Oil Anaphylaxis     Sunflower seeds       Current Meds:   Scheduled Meds:    lidocaine 1 % injection  5 mL IntraDERmal Once    sodium chloride flush  5-40 mL IntraVENous 2 times per day    oxyCODONE  10 mg Oral Q6H    docusate  100 mg Per NG tube BID    insulin lispro  0-6 Units SubCUTAneous Q6H    lansoprazole  30 mg Oral QAM AC    cefepime  2,000 mg IntraVENous Q12H    furosemide  40 mg IntraVENous Daily    insulin glargine  10 Units SubCUTAneous Nightly    sodium chloride flush  5-40 mL IntraVENous 2 times per day    enoxaparin  30 mg SubCUTAneous BID    Vitamin D  2,000 Units Oral Daily    dexamethasone  10 mg IntraVENous Q24H     Continuous Infusions:    sodium chloride      propofol 30 mcg/kg/min (12/23/21 1405)    dexmedetomidine 0.6 mcg/kg/hr (12/23/21 1146)    sodium chloride      dextrose      norepinephrine 1 mcg/min (12/23/21 1405)    cisatracurium (NIMBEX) infusion Stopped (12/22/21 1506)    midazolam 9 mg/hr (12/23/21 1301)    fentaNYL 150 mcg/hr (12/23/21 0851)    dextrose      sodium chloride 10 mL/hr at 12/13/21 1548     PRN Meds: sodium chloride flush, sodium chloride, chlordiazePOXIDE, ibuprofen, metoprolol, polyethylene glycol, senna, bisacodyl, acetaminophen, sodium chloride flush, sodium chloride, ondansetron **OR** ondansetron, [DISCONTINUED] acetaminophen **OR** acetaminophen, glucose, dextrose, glucagon (rDNA), dextrose, glucose, dextrose, glucagon (rDNA), dextrose    Data:     Past Medical History:   has a past medical history of Arthritis, Asthma, Diabetes mellitus ([address] Edema, GERD (gastroesophageal reflux disease), Hypertension, Migraine, and IRMA on CPAP. Social History:   reports that he has quit smoking. He has never used smokeless tobacco. He reports current alcohol use. He reports that he does not use drugs.      Family History:   Family History   Problem Relation Age of Onset    Heart Attack Sister 32    Diabetes Paternal Grandmother     Heart Disease Paternal Uncle     Heart Disease Paternal Uncle     Heart Disease Paternal Uncle     Cancer Maternal Aunt     Cancer Maternal Aunt     Cancer Maternal Uncle     Cancer Maternal Uncle        Vitals:  BP (!) 144/82   Pulse 103   Temp 100.4 °F (38 °C) (Bladder)   Resp 28   Ht 6' 2\" (1.88 m)   Wt (!) 305 lb 5.4 oz (138.5 kg) Comment: with cooling blanket  SpO2 (!) 89%   BMI 39.20 kg/m²   Temp (24hrs), Av.3 °F (37.9 °C), Min:99 °F (37.2 °C), Max:100.9 °F (38.3 °C)    Recent Labs     21  1811 21  0058 21  0425 21  1257   POCGLU 171* 128* 152* 136*       I/O (24Hr):     Intake/Output Summary (Last 24 hours) at 2021 1425  Last data filed at 2021 1400  Gross per 24 hour   Intake 4221 ml   Output 3570 ml   Net 651 ml       Labs:  Hematology:  Recent Labs     21  0421 21  0513 21  0537   WBC 18.6* 14.7* 14.6*   RBC 5.10 5.00 4.77   HGB 15.8 15.5 15.0   HCT 47.2 47.9 45.5   MCV 92.5 95.8 95.4   MCH 31.0 31.0 31.4   MCHC 33.5 32.4 33.0   RDW 14.0 13.9 13.7    198 220   MPV 11.6 12.6 12.2     Chemistry:  Recent Labs     21  0421 21  0513 21  0537    141 140   K 4.0 3.7 4.0    106 105   CO2 24 24 25   GLUCOSE 155* 156* 146*   BUN 34* 35* 30*   CREATININE 0.35* 0.42* 0.37*   ANIONGAP 11 11 10   LABGLOM >60 >60 >60   GFRAA >60 >60 >60   CALCIUM 8.4* 8.6 8.8     Recent Labs     21  0421 21  1035 21  0513 21  0513 21  0551 21  1408 21  1811 21  0058 21  0425 21  0537 21  1257   PROT 5.6*  --  5.7*  --   --   --   --   --   --  5.6*  --    LABALBU 3.1*  --  3.2*  --   --   --   --   --   --  3.4*  --    AST 22  --  27  --   --   --   --   --   --  40*  --    ALT 62*  --  60*  --   --   --   --   --   --  60*  --    ALKPHOS 67  --  66  --   --   --   --   --   --  57  -- BILITOT 0.51  --  0.37  --   --   --   --   --   --  0.35  --    BILIDIR  --   --   --   --   --   --   --   --   --  0.12  --    POCGLU  --    < > 144*   < > 131* 142* 171* 128* 152*  --  136*    < > = values in this interval not displayed. ABG:  Lab Results   Component Value Date    POCPH 7.413 12/23/2021    POCPCO2 50.6 12/23/2021    POCPO2 85.1 12/23/2021    POCHCO3 32.3 12/23/2021    NBEA NOT REPORTED 12/23/2021    PBEA 6 12/23/2021    LLY1KNI NOT REPORTED 12/23/2021    WPMK5JPS 96 12/23/2021    FIO2 60.0 12/23/2021     Lab Results   Component Value Date/Time    SPECIAL NOT REPORTED 12/18/2021 12:34 PM     Lab Results   Component Value Date/Time    CULTURE NO GROWTH 5 DAYS 12/18/2021 12:34 PM       Radiology:  XR CHEST (SINGLE VIEW FRONTAL)    Result Date: 12/16/2021  No significant change in multifocal airspace opacities. Continued follow-up is recommended document complete resolution following medical treatment course. Stable position lines and catheters     XR CHEST PORTABLE    Result Date: 12/19/2021  Cardiomegaly, vascular congestion and worsening pulmonary opacities with bilateral effusions favoring pulmonary edema over multifocal airspace disease. Support tubes and lines as above.        Physical Examination:        General appearance: Obese  gentleman lying supine in bed, intubated, sedated, tries to open eyes to voice  HEENT: ET and OG tubes are present  Lungs:  clear to auscultation bilaterally, mechanical breath sounds, increased effort  Heart:  regular rate and rhythm, no murmur  Abdomen:  soft, nontender, protuberant, nondistended, diminished bowel sounds, no masses, hepatomegaly, splenomegaly  Extremities:  no edema, left radial art line is present, right basilic vein picc present  Skin:  no gross lesions, rashes, induration    Assessment:        Hospital Problems           Last Modified POA    * (Principal) Pneumonia due to COVID-19 virus 12/21/2021 Yes    Acute respiratory failure with hypoxia ([address] 12/21/2021 Yes    ARDS (adult respiratory distress syndrome) ([facility] 12/21/2021 Yes    Shock ([address] 12/21/2021 No    Type 2 diabetes mellitus ([address] 12/21/2021 Yes    Asthma 12/21/2021 Yes    IRMA on CPAP 12/12/2021 Yes    Overview Signed 12/12/2021  2:39 PM by Danae Rivas DO     seldom use machine         Hypertension 12/12/2021 Yes    Overview Signed 12/12/2021  2:39 PM by Danae Rivas DO     on lasix         GERD (gastroesophageal reflux disease) 12/12/2021 Yes          Plan:        COVID-19 pneumonia-tested positive on 12/11/2021. Continue steroids. Received Actemra on 12/12. Continue vitamin D. Acute respiratory failure with hypoxia-appreciate pulmonology's management of the ventilator. Currently requiring 60% FiO2. 10 of PEEP. This has been stable for several days. Wean as tolerated. Receiving Lasix 40 mg daily. Great urine output. Sedation is being weaned. Nimbex has been discontinued. Febrile illness-likely related to #1-infectious diseases following. Cefepime day #6 today. No growth on blood cultures or respiratory cultures  Type 2 diabetes mellitus-continue insulin sliding scale along with Lantus 10 units nightly  Obesity with BMI of 34.22  Essential hypertension-holding medications as patient is currently on norepinephrine  Constipation-KUB reviewed. Scheduled colace. Neutrophilic leukocytosis - stable. Labs reviewed  Continue DVT prophylaxis with Lovenox  Unvaccinated status    Weaning sedation.  Off paralytic  Discussed with daughter this morning    ANNE LYNN DO  12/23/2021  2:25 PM

## 2021-12-23 NOTE — PLAN OF CARE
Problem: OXYGENATION/RESPIRATORY FUNCTION  Goal: Patient will maintain patent airway  12/23/2021 0923 by Braydon Carbone RCP  Outcome: Ongoing     Problem: OXYGENATION/RESPIRATORY FUNCTION  Goal: Patient will achieve/maintain normal respiratory rate/effort  Description: Respiratory rate and effort will be within normal limits for the patient  12/23/2021 9674 by Braydon Carbone RCP  Outcome: Ongoing     Problem: MECHANICAL VENTILATION  Goal: Patient will maintain patent airway  12/23/2021 0923 by Braydon Carbone RCP  Outcome: Ongoing     Problem: MECHANICAL VENTILATION  Goal: Oral health is maintained or improved  12/23/2021 0923 by Braydon Carbone RCP  Outcome: Ongoing     Problem: MECHANICAL VENTILATION  Goal: ET tube will be managed safely  12/23/2021 0923 by Braydon Carbone RCP  Outcome: Ongoing     Problem: MECHANICAL VENTILATION  Goal: Ability to express needs and understand communication  12/23/2021 0923 by Braydon Carbone RCP  Outcome: Ongoing     Problem: MECHANICAL VENTILATION  Goal: Mobility/activity is maintained at optimum level for patient  12/23/2021 1036 by Braydon Carbone RCP  Outcome: Ongoing     Problem: SKIN INTEGRITY  Goal: Skin integrity is maintained or improved  12/23/2021 0923 by Braydon Carbone RCP  Outcome: Ongoing

## 2021-12-24 ENCOUNTER — APPOINTMENT (OUTPATIENT)
Dept: GENERAL RADIOLOGY | Age: 56
DRG: 004 | End: 2021-12-24
Attending: INTERNAL MEDICINE
Payer: COMMERCIAL

## 2021-12-24 LAB
ABSOLUTE EOS #: 0 K/UL (ref 0–0.44)
ABSOLUTE IMMATURE GRANULOCYTE: 0.44 K/UL (ref 0–0.3)
ABSOLUTE LYMPH #: 1.74 K/UL (ref 1.1–3.7)
ABSOLUTE MONO #: 1.74 K/UL (ref 0.1–1.2)
ALBUMIN SERPL-MCNC: 3.4 G/DL (ref 3.5–5.2)
ALBUMIN/GLOBULIN RATIO: 1.7 (ref 1–2.5)
ALLEN TEST: ABNORMAL
ALP BLD-CCNC: 55 U/L (ref 40–129)
ALT SERPL-CCNC: 60 U/L (ref 5–41)
ANION GAP SERPL CALCULATED.3IONS-SCNC: 10 MMOL/L (ref 9–17)
AST SERPL-CCNC: 36 U/L
BASOPHILS # BLD: 0 % (ref 0–2)
BASOPHILS ABSOLUTE: 0 K/UL (ref 0–0.2)
BILIRUB SERPL-MCNC: 0.35 MG/DL (ref 0.3–1.2)
BILIRUBIN DIRECT: 0.1 MG/DL
BILIRUBIN, INDIRECT: 0.25 MG/DL (ref 0–1)
BUN BLDV-MCNC: 27 MG/DL (ref 6–20)
BUN/CREAT BLD: ABNORMAL (ref 9–20)
CALCIUM SERPL-MCNC: 8.6 MG/DL (ref 8.6–10.4)
CHLORIDE BLD-SCNC: 101 MMOL/L (ref 98–107)
CO2: 28 MMOL/L (ref 20–31)
CREAT SERPL-MCNC: 0.37 MG/DL (ref 0.7–1.2)
DIFFERENTIAL TYPE: ABNORMAL
EOSINOPHILS RELATIVE PERCENT: 0 % (ref 1–4)
FIO2: ABNORMAL
GFR AFRICAN AMERICAN: >60 ML/MIN
GFR NON-AFRICAN AMERICAN: >60 ML/MIN
GFR SERPL CREATININE-BSD FRML MDRD: ABNORMAL ML/MIN/{1.73_M2}
GFR SERPL CREATININE-BSD FRML MDRD: ABNORMAL ML/MIN/{1.73_M2}
GLOBULIN: ABNORMAL G/DL (ref 1.5–3.8)
GLUCOSE BLD-MCNC: 122 MG/DL (ref 75–110)
GLUCOSE BLD-MCNC: 127 MG/DL (ref 75–110)
GLUCOSE BLD-MCNC: 128 MG/DL (ref 70–99)
GLUCOSE BLD-MCNC: 139 MG/DL (ref 75–110)
GLUCOSE BLD-MCNC: 139 MG/DL (ref 75–110)
GLUCOSE BLD-MCNC: 151 MG/DL (ref 74–100)
GLUCOSE BLD-MCNC: 154 MG/DL (ref 75–110)
GLUCOSE BLD-MCNC: 188 MG/DL (ref 75–110)
HCT VFR BLD CALC: 43.1 % (ref 40.7–50.3)
HEMOGLOBIN: 14.2 G/DL (ref 13–17)
IMMATURE GRANULOCYTES: 2 %
LACTIC ACID, WHOLE BLOOD: 2.5 MMOL/L (ref 0.7–2.1)
LYMPHOCYTES # BLD: 8 % (ref 24–43)
MCH RBC QN AUTO: 31.2 PG (ref 25.2–33.5)
MCHC RBC AUTO-ENTMCNC: 32.9 G/DL (ref 28.4–34.8)
MCV RBC AUTO: 94.7 FL (ref 82.6–102.9)
MODE: ABNORMAL
MONOCYTES # BLD: 8 % (ref 3–12)
MORPHOLOGY: NORMAL
NEGATIVE BASE EXCESS, ART: ABNORMAL (ref 0–2)
NRBC AUTOMATED: 0 PER 100 WBC
O2 DEVICE/FLOW/%: ABNORMAL
PATIENT TEMP: 38.1
PDW BLD-RTO: 13.7 % (ref 11.8–14.4)
PLATELET # BLD: 262 K/UL (ref 138–453)
PLATELET ESTIMATE: ABNORMAL
PMV BLD AUTO: 12.3 FL (ref 8.1–13.5)
POC HCO3: 30.8 MMOL/L (ref 21–28)
POC LACTIC ACID: 1.51 MMOL/L (ref 0.56–1.39)
POC O2 SATURATION: 95 % (ref 94–98)
POC PCO2 TEMP: 47 MM HG
POC PCO2: 44.3 MM HG (ref 35–48)
POC PH TEMP: 7.43
POC PH: 7.45 (ref 7.35–7.45)
POC PO2 TEMP: 78 MM HG
POC PO2: 71.9 MM HG (ref 83–108)
POSITIVE BASE EXCESS, ART: 6 (ref 0–3)
POTASSIUM SERPL-SCNC: 3.9 MMOL/L (ref 3.7–5.3)
RBC # BLD: 4.55 M/UL (ref 4.21–5.77)
RBC # BLD: ABNORMAL 10*6/UL
SAMPLE SITE: ABNORMAL
SEG NEUTROPHILS: 82 % (ref 36–65)
SEGMENTED NEUTROPHILS ABSOLUTE COUNT: 17.88 K/UL (ref 1.5–8.1)
SODIUM BLD-SCNC: 139 MMOL/L (ref 135–144)
TCO2 (CALC), ART: ABNORMAL MMOL/L (ref 22–29)
TOTAL PROTEIN: 5.4 G/DL (ref 6.4–8.3)
WBC # BLD: 21.8 K/UL (ref 3.5–11.3)
WBC # BLD: ABNORMAL 10*3/UL

## 2021-12-24 PROCEDURE — 37799 UNLISTED PX VASCULAR SURGERY: CPT

## 2021-12-24 PROCEDURE — 2580000003 HC RX 258: Performed by: NURSE PRACTITIONER

## 2021-12-24 PROCEDURE — 85025 COMPLETE CBC W/AUTO DIFF WBC: CPT

## 2021-12-24 PROCEDURE — 99232 SBSQ HOSP IP/OBS MODERATE 35: CPT | Performed by: INTERNAL MEDICINE

## 2021-12-24 PROCEDURE — APPSS30 APP SPLIT SHARED TIME 16-30 MINUTES: Performed by: NURSE PRACTITIONER

## 2021-12-24 PROCEDURE — 36415 COLL VENOUS BLD VENIPUNCTURE: CPT

## 2021-12-24 PROCEDURE — 87205 SMEAR GRAM STAIN: CPT

## 2021-12-24 PROCEDURE — 83605 ASSAY OF LACTIC ACID: CPT

## 2021-12-24 PROCEDURE — 80076 HEPATIC FUNCTION PANEL: CPT

## 2021-12-24 PROCEDURE — 6370000000 HC RX 637 (ALT 250 FOR IP): Performed by: STUDENT IN AN ORGANIZED HEALTH CARE EDUCATION/TRAINING PROGRAM

## 2021-12-24 PROCEDURE — 82803 BLOOD GASES ANY COMBINATION: CPT

## 2021-12-24 PROCEDURE — 2580000003 HC RX 258: Performed by: INTERNAL MEDICINE

## 2021-12-24 PROCEDURE — 6370000000 HC RX 637 (ALT 250 FOR IP): Performed by: INTERNAL MEDICINE

## 2021-12-24 PROCEDURE — 89220 SPUTUM SPECIMEN COLLECTION: CPT

## 2021-12-24 PROCEDURE — 2500000003 HC RX 250 WO HCPCS: Performed by: EMERGENCY MEDICINE

## 2021-12-24 PROCEDURE — 99232 SBSQ HOSP IP/OBS MODERATE 35: CPT | Performed by: FAMILY MEDICINE

## 2021-12-24 PROCEDURE — 6360000002 HC RX W HCPCS: Performed by: INTERNAL MEDICINE

## 2021-12-24 PROCEDURE — 6360000002 HC RX W HCPCS: Performed by: NURSE PRACTITIONER

## 2021-12-24 PROCEDURE — 2500000003 HC RX 250 WO HCPCS: Performed by: INTERNAL MEDICINE

## 2021-12-24 PROCEDURE — 6370000000 HC RX 637 (ALT 250 FOR IP): Performed by: NURSE PRACTITIONER

## 2021-12-24 PROCEDURE — 94761 N-INVAS EAR/PLS OXIMETRY MLT: CPT

## 2021-12-24 PROCEDURE — 94003 VENT MGMT INPAT SUBQ DAY: CPT

## 2021-12-24 PROCEDURE — 87070 CULTURE OTHR SPECIMN AEROBIC: CPT

## 2021-12-24 PROCEDURE — 82947 ASSAY GLUCOSE BLOOD QUANT: CPT

## 2021-12-24 PROCEDURE — 2700000000 HC OXYGEN THERAPY PER DAY

## 2021-12-24 PROCEDURE — 87040 BLOOD CULTURE FOR BACTERIA: CPT

## 2021-12-24 PROCEDURE — 80048 BASIC METABOLIC PNL TOTAL CA: CPT

## 2021-12-24 PROCEDURE — 71045 X-RAY EXAM CHEST 1 VIEW: CPT

## 2021-12-24 PROCEDURE — 99291 CRITICAL CARE FIRST HOUR: CPT | Performed by: INTERNAL MEDICINE

## 2021-12-24 PROCEDURE — 2000000000 HC ICU R&B

## 2021-12-24 RX ORDER — CHLORDIAZEPOXIDE HYDROCHLORIDE 5 MG/1
10 CAPSULE, GELATIN COATED ORAL 4 TIMES DAILY
Status: DISCONTINUED | OUTPATIENT
Start: 2021-12-24 | End: 2022-01-02

## 2021-12-24 RX ORDER — PROPOFOL 10 MG/ML
5-50 INJECTION, EMULSION INTRAVENOUS
Status: DISCONTINUED | OUTPATIENT
Start: 2021-12-24 | End: 2021-12-31

## 2021-12-24 RX ADMIN — SODIUM CHLORIDE, PRESERVATIVE FREE 10 ML: 5 INJECTION INTRAVENOUS at 08:19

## 2021-12-24 RX ADMIN — PROPOFOL 30 MCG/KG/MIN: 10 INJECTION, EMULSION INTRAVENOUS at 05:23

## 2021-12-24 RX ADMIN — Medication 10 MG/HR: at 00:14

## 2021-12-24 RX ADMIN — PROPOFOL 40 MCG/KG/MIN: 10 INJECTION, EMULSION INTRAVENOUS at 13:46

## 2021-12-24 RX ADMIN — CHLORDIAZEPOXIDE HYDROCHLORIDE 10 MG: 5 CAPSULE ORAL at 14:17

## 2021-12-24 RX ADMIN — PROPOFOL 30 MCG/KG/MIN: 10 INJECTION, EMULSION INTRAVENOUS at 09:45

## 2021-12-24 RX ADMIN — Medication 5 MG/HR: at 09:46

## 2021-12-24 RX ADMIN — CEFEPIME 2000 MG: 2 INJECTION, POWDER, FOR SOLUTION INTRAVENOUS at 21:35

## 2021-12-24 RX ADMIN — OXYCODONE HYDROCHLORIDE 10 MG: 5 SOLUTION ORAL at 20:22

## 2021-12-24 RX ADMIN — CEFEPIME 2000 MG: 2 INJECTION, POWDER, FOR SOLUTION INTRAVENOUS at 08:52

## 2021-12-24 RX ADMIN — PROPOFOL 30 MCG/KG/MIN: 10 INJECTION, EMULSION INTRAVENOUS at 01:37

## 2021-12-24 RX ADMIN — Medication 200 MCG/HR: at 16:52

## 2021-12-24 RX ADMIN — PROPOFOL 20 MCG/KG/MIN: 10 INJECTION, EMULSION INTRAVENOUS at 23:27

## 2021-12-24 RX ADMIN — INSULIN LISPRO 1 UNITS: 100 INJECTION, SOLUTION INTRAVENOUS; SUBCUTANEOUS at 18:21

## 2021-12-24 RX ADMIN — CHLORDIAZEPOXIDE HYDROCHLORIDE 10 MG: 5 CAPSULE ORAL at 20:22

## 2021-12-24 RX ADMIN — Medication 2000 UNITS: at 08:21

## 2021-12-24 RX ADMIN — PROPOFOL 25 MCG/KG/MIN: 10 INJECTION, EMULSION INTRAVENOUS at 18:15

## 2021-12-24 RX ADMIN — OXYCODONE HYDROCHLORIDE 10 MG: 5 SOLUTION ORAL at 02:15

## 2021-12-24 RX ADMIN — DOCUSATE SODIUM 100 MG: 50 LIQUID ORAL at 08:18

## 2021-12-24 RX ADMIN — DEXAMETHASONE SODIUM PHOSPHATE 10 MG: 10 INJECTION INTRAMUSCULAR; INTRAVENOUS at 12:30

## 2021-12-24 RX ADMIN — OXYCODONE HYDROCHLORIDE 10 MG: 5 SOLUTION ORAL at 14:17

## 2021-12-24 RX ADMIN — DEXMEDETOMIDINE HYDROCHLORIDE 0.2 MCG/KG/HR: 4 INJECTION, SOLUTION INTRAVENOUS at 02:36

## 2021-12-24 RX ADMIN — DEXMEDETOMIDINE HYDROCHLORIDE 0.6 MCG/KG/HR: 4 INJECTION, SOLUTION INTRAVENOUS at 20:21

## 2021-12-24 RX ADMIN — Medication 10 MG/HR: at 21:07

## 2021-12-24 RX ADMIN — DEXMEDETOMIDINE HYDROCHLORIDE 0.5 MCG/KG/HR: 4 INJECTION, SOLUTION INTRAVENOUS at 16:51

## 2021-12-24 RX ADMIN — INSULIN GLARGINE 10 UNITS: 100 INJECTION, SOLUTION SUBCUTANEOUS at 21:39

## 2021-12-24 RX ADMIN — Medication 200 MCG/HR: at 09:45

## 2021-12-24 RX ADMIN — ENOXAPARIN SODIUM 30 MG: 100 INJECTION SUBCUTANEOUS at 08:18

## 2021-12-24 RX ADMIN — OXYCODONE HYDROCHLORIDE 10 MG: 5 SOLUTION ORAL at 08:18

## 2021-12-24 RX ADMIN — Medication 30 MG: at 08:18

## 2021-12-24 RX ADMIN — DOCUSATE SODIUM 100 MG: 50 LIQUID ORAL at 21:36

## 2021-12-24 RX ADMIN — Medication 200 MCG/HR: at 20:23

## 2021-12-24 RX ADMIN — ENOXAPARIN SODIUM 30 MG: 100 INJECTION SUBCUTANEOUS at 21:00

## 2021-12-24 RX ADMIN — Medication 200 MCG/HR: at 04:12

## 2021-12-24 RX ADMIN — FUROSEMIDE 40 MG: 10 INJECTION, SOLUTION INTRAVENOUS at 08:18

## 2021-12-24 RX ADMIN — ACETAMINOPHEN 650 MG: 160 SOLUTION ORAL at 04:59

## 2021-12-24 ASSESSMENT — PULMONARY FUNCTION TESTS
PIF_VALUE: 32
PIF_VALUE: 34
PIF_VALUE: 32

## 2021-12-24 NOTE — PROGRESS NOTES
Pulmonary Critical Care   Consult Note    Patient - Debbie Tamayo  Date of Admission -  2021 11:39 AM  Date of Evaluation -  2021  Room and Bed Number -  3026/3026-01   Hospital Day - 12    CHIEF COMPLAINT : ACUTE HYPOXIC RESPIRATORY FAILURE DUE TO COVID -19 PNEUMONIA   HPI:   Debbie Tamayo  60 y.o. male  admitted for COVID-19 at [facility] ER due to worsening oxygenation he was initially started on BiPAP and subsequently intubated. On room air his saturations had been 50%. CTA no PE but bilateral pulmonary infiltrates. He was accepted at [address] [place] [address] ICU. On arrival he is on PEEP of 22, 100% FiO2.    2021   Subjective      FiO2 remains at 70 %. Remains on fentanyl, propofol, midazolam, and dexmedetomidine. Came off of the neuromuscular blockade. Did not tolerate decreasing sedation  I/O-+0.6 L.   Requiring small dose of norepinephrine    SECRETIONS  -Amount:  [x] Small [] Moderate  [] Large    [] None  Color:     [x] White [] Colored  [] Bloody    SEDATION:    As above    PARALYZED:  [x] No    [] Yes    VASOPRESSORS:  [] No    [x] Yes  [x] Levophed [] Dopamine [] Vasopressin  [] Dobutamine [] Phenylephrine [] Epinephrine    INHALED veletri  : [] No    [] Yes    PRONE :       [x] No    [] Yes    Actemra:             [] No    [x] Yes  21  DEXAMETHASONE : [] No    [x] Yes      Ros unable to perform due to intubation and sedation    OBJECTIVE:     VITAL SIGNS:  /60   Pulse 65   Temp 100.4 °F (38 °C) (Bladder)   Resp 24   Ht 6' 2\" (1.88 m)   Wt (!) 305 lb 5.4 oz (138.5 kg) Comment: with cooling blanket  SpO2 94%   BMI 39.20 kg/m²   Tmax over 24 hours:  Temp (24hrs), Av.4 °F (38 °C), Min:100.2 °F (37.9 °C), Max:101.2 °F (38.4 °C)      Patient Vitals for the past 8 hrs:   BP Temp Temp src Pulse Resp SpO2   21 0809 -- -- -- 65 24 94 %   21 -- -- -- 60 24 94 %   21 -- -- -- 60 24 93 %   2145 -- -- --  24 93 %   21 -- -- -- 61 24 93 %   12/24/21 0615 -- -- -- 64 24 --   12/24/21 0600 -- 100.4 °F (38 °C) Bladder 72 19 95 %   12/24/21 0545 -- -- -- 60 24 --   12/24/21 0530 -- -- -- 62 24 94 %   12/24/21 0518 -- -- -- 65 24 94 %   12/24/21 0515 -- -- -- 68 24 94 %   12/24/21 0500 -- -- -- 63 24 93 %   12/24/21 0445 -- -- -- 63 24 93 %   12/24/21 0430 -- -- -- 63 24 --   12/24/21 0400 115/60 100.5 °F (38.1 °C) Bladder 71 24 91 %   12/24/21 0345 -- -- -- 75 24 91 %   12/24/21 0330 -- -- -- 85 21 92 %   12/24/21 0315 -- -- -- 77 25 --   12/24/21 0300 -- -- -- 61 23 --   12/24/21 0245 -- -- -- 62 24 93 %   12/24/21 0230 -- -- -- 60 24 --   12/24/21 0215 -- -- -- 76 24 --   12/24/21 0200 -- 101.2 °F (38.4 °C) Bladder 62 20 --   12/24/21 0145 -- -- -- 64 23 --   12/24/21 0130 -- -- -- 63 24 --   12/24/21 0115 -- -- -- 63 24 --   12/24/21 0100 -- -- -- 63 24 93 %   12/24/21 0045 -- -- -- 63 24 --   12/24/21 0030 -- -- -- 63 24 --   12/24/21 0015 -- -- -- 64 24 --         Intake/Output Summary (Last 24 hours) at 12/24/2021 0814  Last data filed at 12/24/2021 0600  Gross per 24 hour   Intake 4114 ml   Output 3735 ml   Net 379 ml     Date 12/24/21 0000 - 12/24/21 2359   Shift 3248-4435 0051-7744 6322-6894 24 Hour Total   INTAKE   I.V.(mL/kg) 811(5.9)   811(5.9)   NG/GT(mL/kg) 902(6.5)   902(6.5)   Shift Total(mL/kg) 1713(12.4)   1713(12.4)   OUTPUT   Urine(mL/kg/hr) 775(0.7)   775   Shift Total(mL/kg) 775(5.6)   775(5.6)   Weight (kg) 138.5 138.5 138.5 138.5     Wt Readings from Last 3 Encounters:   12/23/21 (!) 305 lb 5.4 oz (138.5 kg)   12/11/21 300 lb (136.1 kg)   12/12/19 (!) 355 lb 9.6 oz (161.3 kg)     Body mass index is 39.2 kg/m². PHYSICAL EXAM:      I have discussed the care of Karen Morales, including pertinent history and exam findings, with the resident/APC/staff. I have seen the patient and the key elements of all parts of the encounter have been performed by me.  Physical exam was deferred to limit physical exposure and PPE resources. I reviewed the interval history, interpreted all available radiographic, laboratory and physiologic data at the time of service. I agree with the assessment and plan as documented by resident/APC/ ancillary staff.   Patient remains on the ventilator  Trachea is in the midline  No subcutaneous air  No JVD  No rhonchi  Abdomen is not distended  No edema    MEDICATIONS:  Scheduled Meds:   lidocaine 1 % injection  5 mL IntraDERmal Once    sodium chloride flush  5-40 mL IntraVENous 2 times per day    oxyCODONE  10 mg Oral Q6H    docusate  100 mg Per NG tube BID    insulin lispro  0-6 Units SubCUTAneous Q6H    lansoprazole  30 mg Oral QAM AC    cefepime  2,000 mg IntraVENous Q12H    furosemide  40 mg IntraVENous Daily    insulin glargine  10 Units SubCUTAneous Nightly    sodium chloride flush  5-40 mL IntraVENous 2 times per day    enoxaparin  30 mg SubCUTAneous BID    Vitamin D  2,000 Units Oral Daily    dexamethasone  10 mg IntraVENous Q24H     Continuous Infusions:   sodium chloride      propofol 30 mcg/kg/min (12/24/21 0523)    dexmedetomidine 0.2 mcg/kg/hr (12/24/21 0600)    sodium chloride      dextrose      norepinephrine 3 mcg/min (12/24/21 0400)    midazolam 10 mg/hr (12/24/21 0014)    fentaNYL 200 mcg/hr (12/24/21 0412)    dextrose      sodium chloride 10 mL/hr at 12/13/21 1548     PRN Meds:   sodium chloride flush, 5-40 mL, PRN  sodium chloride, 25 mL, PRN  chlordiazePOXIDE, 5 mg, Q6H PRN  ibuprofen, 600 mg, Q6H PRN  metoprolol, 5 mg, Q6H PRN  polyethylene glycol, 17 g, BID PRN  senna, 5 mL, Nightly PRN  bisacodyl, 10 mg, Daily PRN  acetaminophen, 650 mg, Q4H PRN  sodium chloride flush, 5-40 mL, PRN  sodium chloride, 25 mL, PRN  ondansetron, 4 mg, Q8H PRN   Or  ondansetron, 4 mg, Q6H PRN  acetaminophen, 650 mg, Q6H PRN  glucose, 15 g, PRN  dextrose, 12.5 g, PRN  glucagon (rDNA), 1 mg, PRN  dextrose, 100 mL/hr, PRN  glucose, 15 g, PRN  dextrose, 12.5 g, PRN  glucagon (rDNA), 1 mg, PRN  dextrose, 100 mL/hr, PRN        SUPPORT DEVICES: [x] Ventilator [] BIPAP  [] Nasal Cannula [] Room Air      ABGs:     Lab Results   Component Value Date    TRI0JDC NOT REPORTED 12/24/2021    FIO2 NOT REPORTED 12/24/2021       DATA:  Complete Blood Count:   Recent Labs     12/22/21  0513 12/23/21  0537 12/24/21  0516   WBC 14.7* 14.6* 21.8*   RBC 5.00 4.77 4.55   HGB 15.5 15.0 14.2   HCT 47.9 45.5 43.1   MCV 95.8 95.4 94.7   MCH 31.0 31.4 31.2   MCHC 32.4 33.0 32.9   RDW 13.9 13.7 13.7    220 262   MPV 12.6 12.2 12.3        Last 3 Blood Glucose:   Recent Labs     12/22/21  0513 12/23/21  0537 12/24/21  0516   GLUCOSE 156* 146* 128*        PT/INR:    Lab Results   Component Value Date    PROTIME 13.0 12/12/2021    INR 1.0 12/12/2021     PTT:    Lab Results   Component Value Date    APTT 39.1 12/12/2021       Comprehensive Metabolic Profile:   Recent Labs     12/22/21  0513 12/23/21  0537 12/24/21  0516    140 139   K 3.7 4.0 3.9    105 101   CO2 24 25 28   BUN 35* 30* 27*   CREATININE 0.42* 0.37* 0.37*   GLUCOSE 156* 146* 128*   CALCIUM 8.6 8.8 8.6   PROT 5.7* 5.6* 5.4*   LABALBU 3.2* 3.4* 3.4*   BILITOT 0.37 0.35 0.35   ALKPHOS 66 57 55   AST 27 40* 36   ALT 60* 60* 60*      Magnesium:   Lab Results   Component Value Date    MG 2.5 12/17/2021    MG 2.3 12/16/2021    MG 2.4 12/15/2021     Phosphorus:   Lab Results   Component Value Date    PHOS 3.2 12/18/2021    PHOS 4.0 12/17/2021    PHOS 2.8 12/16/2021     Ionized Calcium: No results found for: CAION     Urinalysis:   Lab Results   Component Value Date    NITRU NEGATIVE 12/18/2021    COLORU Dark Yellow 12/18/2021    PHUR 6.0 12/18/2021    WBCUA 5 TO 10 12/18/2021    RBCUA 20 TO 50 12/18/2021    MUCUS NOT REPORTED 12/18/2021    TRICHOMONAS NOT REPORTED 12/18/2021    YEAST NOT REPORTED 12/18/2021    BACTERIA NOT REPORTED 12/18/2021    SPECGRAV 1.032 12/18/2021    LEUKOCYTESUR NEGATIVE 12/18/2021    UROBILINOGEN Normal 12/18/2021 BILIRUBINUR NEGATIVE 12/18/2021    GLUCOSEU NEGATIVE 12/18/2021    KETUA NEGATIVE 12/18/2021    AMORPHOUS NOT REPORTED 12/18/2021           XR CHEST (SINGLE VIEW FRONTAL)    Result Date: 12/14/2021  Mild interval improvement in multifocal airspace disease particularly at the right base. Support tubes and lines as above. XR CHEST (SINGLE VIEW FRONTAL)    Result Date: 12/12/2021  Stable appearing     XR CHEST (SINGLE VIEW FRONTAL)    Result Date: 12/11/2021  Multifocal hazy opacities throughout both lungs consistent with COVID pneumonia and or pulmonary edema. XR CHEST PORTABLE    Result Date: 12/12/2021  Stable appearing chest with unchanged support tubes/lines and persistent extensive bilateral airspace disease consistent with known COVID pneumonia. XR CHEST PORTABLE    Result Date: 12/12/2021  Right internal jugular central venous catheter tip projecting over the proximal SVC versus brachiocephalic vein. No pneumothorax. Otherwise stable exam from earlier today. XR CHEST PORTABLE    Result Date: 12/12/2021  Endotracheal tube tip is 3.2 cm above the saul. Overall significant worsening of multifocal airspace opacities keeping with history of COVID-19. CT CHEST PULMONARY EMBOLISM W CONTRAST    Result Date: 12/11/2021  1. No evidence of pulmonary embolism 2. Diffuse ground-glass opacities throughout the lungs, typical of COVID-19 pulmonary disease.            Past Medical History:   Diagnosis Date    Arthritis     Asthma     Diabetes mellitus ([address]     Edema     GERD (gastroesophageal reflux disease)     Hypertension     on lasix    Migraine     IRMA on CPAP     seldom use machine        Social History     Socioeconomic History    Marital status:      Spouse name: None    Number of children: None    Years of education: None    Highest education level: None   Occupational History    None   Tobacco Use    Smoking status: Former Smoker    Smokeless tobacco: Never Used Vaping Use    Vaping Use: Never used   Substance and Sexual Activity    Alcohol use: Yes     Comment: every couple months    Drug use: Never    Sexual activity: None   Other Topics Concern    None   Social History Narrative    None     Social Determinants of Health     Financial Resource Strain:     Difficulty of Paying Living Expenses: Not on file   Food Insecurity:     Worried About Running Out of Food in the Last Year: Not on file    Lucina of Food in the Last Year: Not on file   Transportation Needs:     Lack of Transportation (Medical): Not on file    Lack of Transportation (Non-Medical): Not on file   Physical Activity:     Days of Exercise per Week: Not on file    Minutes of Exercise per Session: Not on file   Stress:     Feeling of Stress : Not on file   Social Connections:     Frequency of Communication with Friends and Family: Not on file    Frequency of Social Gatherings with Friends and Family: Not on file    Attends Bahai Services: Not on file    Active Member of [address] [place] [address] or Organizations: Not on file    Attends Club or Organization Meetings: Not on file    Marital Status: Not on file   Intimate Partner Violence:     Fear of Current or Ex-Partner: Not on file    Emotionally Abused: Not on file    Physically Abused: Not on file    Sexually Abused: Not on file   Housing Stability:     Unable to Pay for Housing in the Last Year: Not on file    Number of [place] in the Last Year: Not on file    Unstable Housing in the Last Year: Not on file         There is no immunization history on file for this patient.       Family History   Problem Relation Age of Onset    Heart Attack Sister 32    Diabetes Paternal Grandmother     Heart Disease Paternal Uncle     Heart Disease Paternal Uncle     Heart Disease Paternal Uncle     Cancer Maternal Aunt     Cancer Maternal Aunt     Cancer Maternal Uncle     Cancer Maternal Uncle          Past Surgical History:   Procedure Laterality Date    APPENDECTOMY      ARTHROPLASTY Left 11/1/2019    LEFT FOOT 1ST MTPJ ARTHROPLASTY WITH PEREZ IMPLANT AND GROMMETS  [facility] performed by Ashley Lainez MD at [address] [facility] Right 12/12/2019    RIGHT FOOT ARTHROPLASTY 1ST MTPJ (Right Foot)    ARTHROPLASTY Right 12/12/2019    RIGHT FOOT ARTHROPLASTY 1ST MTPJ performed by Ashley Lainez MD at [address] Right    [address]  2014    no blockage no stents    CHOLECYSTECTOMY      COLONOSCOPY      ENDOSCOPY, COLON, DIAGNOSTIC      TOE SURGERY Left 11/01/2019     LEFT FOOT 1ST MTPJ ARTHROPLASTY WITH PEREZ IMPLANT AND GROMMETS  [facility] (Left )    TONSILLECTOMY            VENTILATOR SETTINGS:  Vent Information  $Ventilation: $Subsequent Day  Skin Assessment: Clean, dry, & intact  Suction Catheter Diameter: 14  Equipment ID: [id number]  Equipment Changed: (S) HME (expiratory filters, hirsch)  Vent Type: Servo i  Vent Mode: PRVC  Vt Ordered: 550 mL  Rate Set: 24 bmp  FiO2 : 60 %  SpO2: 94 %  SpO2/FiO2 ratio: 156.67  Sensitivity: 3  PEEP/CPAP: 10  I Time/ I Time %: 0.65 s  Humidification Source: HME  Humidification Temp: 37  Humidification Temp Measured: 36.8  Circuit Condensation: Drained  Nitric Oxide/Epoprostenol In Use?: No     PaO2/FiO2 RATIO:  Recent Labs     12/24/21  0553   POCPO2 71.9*      FiO2 : 60 %       LABS:  ABGs:   Recent Labs     12/22/21  0513 12/23/21  0425 12/24/21  0553   POCPH 7.403 7.413 7.449   POCPCO2 40.5 50.6* 44.3   POCPO2 56.9* 85.1 71.9*   POCHCO3 25.3 32.3* 30.8*   [id number] 89* 96 95            ASSESSMENT:     Principal Problem:    Pneumonia due to COVID-19 virus  Active Problems:    Shock (HCC)    Acute respiratory failure with hypoxia (HCC)    Type 2 diabetes mellitus (HCC)    Asthma    IRMA on CPAP    Hypertension    GERD (gastroesophageal reflux disease)    ARDS (adult respiratory distress syndrome) (HCC)  Resolved Problems:    * No resolved hospital problems. *              Acute hypoxic respiratory failure secondary to COVID 19   Acute respiratory distress syndrome   Bilateral multifocal pneumonia due to COVID 19 infection   Covid -19 pandemic emergency   Post hypercarbic metabolic alkalosis   Hyperglycemia, acceptable   Leukocytosis, stable   Shock requiring norepinephrine? Sepsis    LOS: 12  PLAN:    · D/w RT  · D/w RN   · Chest x-ray on 12/24/2021 with improving bilateral pulmonary infiltrates  · Sedation reviewed. We will hold off on any sedation holiday  · Cont ARDS ventilation  · Proning held off as it did not appear to make much of difference  · Airborne isolation and droplet precautions to be continued  · Continue supportive care   · Cont treatment with medications for COVID  · Cont tube feeding  · Monitor endotracheal secretions   · Will obtain xray chest as needed  · ABG as needed  · Prognosis guarded given age and severity of illness. · Sedation and neuromuscular holiday, if tolerated  · On Lovenox and lansoprazole  · On Decadron  · Patient is on cefepime    Treatment plan Discussed with nursing staff in detail , all questions answered . Total critical care time caring for this patient with life threatening, unstable organ failure, including direct patient contact, management of life support systems, review of data including imaging and labs, discussions with other team members and physicians at least 27  Min so far today, excluding procedures. Electronically signed by Robert Colon MD on 12/24/2021 at 8:14 AM       This patient was evaluated in the context of the global SARS-CoV-2 (COVID-19) pandemic, which necessitated considerations that the patient either has COVID-19 infection or is at risk of infection with COVID-19.  Institutional protocols and algorithms that pertain to the evaluation & management of patients with COVID-19 or those at risk for COVID-19 are in a state of rapid changes based on information released by regulatory bodies including the CDC and federal and state organizations. These policies and algorithms were followed during the patient's care. Please note that this chart was generated using voice recognition Dragon dictation software. Although every effort was made to ensure the accuracy of this automated transcription, some errors in transcription may have occurred.

## 2021-12-24 NOTE — PROGRESS NOTES
[facility]  Office: [address], DO, Margaret Cyr, DO, Marilynjose luis Jones, DO, [facility] Blood, DO, Nigel Henley MD, Angela Aguilera MD, Darrell Nance MD, Georgi Zuñiga MD, Perico Monique MD, Juanita Arenas MD, Baltazar Mayer MD, Amanda Tucker, DO, Susannah Nuno, DO, Harrietta Goldberg, MD,  Macario Nelson, DO, Nazanin Abdi MD, Byron Cordova MD, Grace Seay MD, Prateek Olson MD, Dereje Reyes MD, Christiano Marcus MD, Mark Albert MD, [facility], [facility], [facility], CNP, Tobi Benz, CNP, Saroj Romero, CNS, Loyda Luciano, CNP, Wyatt Carrillo, CNP, Isamar Guzman, CNP, Evelia Altamirano, CNP, Jethro Lovell, CNP, Calli Tian PA-C, Darlyn Hernadez, [facility], Matt Nickerson, [facility], Aston Duran, CNP, Ranulfo Plush, CNP, Abelardo Seo, [facility], Joselyn Knowles, CNP, Mary Harrison, CNP, Kendrick Yo, [address] [facility]      Daily Progress Note     Admit Date: 12/12/2021  Bed/Room No.  3026/3026-01  Admitting Physician : Darrell Nance MD  Code Status :Full Code  Hospital Day:  LOS: 12 days   Chief Complaint:     Breathing difficulty. Principal Problem:    Pneumonia due to COVID-19 virus  Active Problems:    Shock ([facility] [address]    Acute respiratory failure with hypoxia (HCC)    Type 2 diabetes mellitus (HCC)    Asthma    IRMA on CPAP    Hypertension    GERD (gastroesophageal reflux disease)    ARDS (adult respiratory distress syndrome) ([facility] [address]  Resolved Problems:    * No resolved hospital problems. *    Subjective : Interval History/Significant events :  12/24/21    Patient is having intermittent fevers. T-max 1 °F. Patient remains intubated on 70% FiO2 with PEEP of 14 on PRVC. Urine output is adequate. Patient is tolerating tube feed. Vitals - Unstable afebrile  Labs -blood sugar controlled. Creatinine stable 0.37. Nursing notes , Consults notes reviewed. Overnight events and updates discussed with Nursing staff .    Background History:         Yolette Rodriguez is 60 y.o. male Who was admitted to the hospital on 12/12/2021 for treatment of Pneumonia due to COVID-19 virus. Patient initially presented with shortness of breath at [facility].  He had to suggest positive for COVID-19 infection. Patient reported that his son had also COVID-19 infection. Patient was hypoxic in 50s on arrival and was treated with BiPAP however breathing worsened and patient was subsequently intubated. Patient was transferred to [facility]'s on 12/12/2021 as he was requiring high ventilator support 100% FiO2 with PEEP of 22. He was given Actemra and started on high-dose Decadron. Patient was started with he was started on Flolan and given paralytics for proning per ARDS protocol. Patient also received intermittent Lasix. Proning was stopped on 12/20/2021 and paralytics were stopped on 12/22/2021. Patient started to have fevers and was started on cefepime. Respiratory cultures were negative. PMH:  Past Medical History:   Diagnosis Date    Arthritis     Asthma     Diabetes mellitus ([address]     Edema     GERD (gastroesophageal reflux disease)     Hypertension     on lasix    Migraine     IRMA on CPAP     seldom use machine      Allergies:    Allergies   Allergen Reactions    Nuts [Peanut-Containing Drug Products] Anaphylaxis    Sunflower Oil Anaphylaxis     Sunflower seeds      Medications :  lidocaine 1 % injection, 5 mL, IntraDERmal, Once  sodium chloride flush, 5-40 mL, IntraVENous, 2 times per day  oxyCODONE, 10 mg, Oral, Q6H  docusate, 100 mg, Per NG tube, BID  insulin lispro, 0-6 Units, SubCUTAneous, Q6H  lansoprazole, 30 mg, Oral, QAM AC  cefepime, 2,000 mg, IntraVENous, Q12H  furosemide, 40 mg, IntraVENous, Daily  insulin glargine, 10 Units, SubCUTAneous, Nightly  sodium chloride flush, 5-40 mL, IntraVENous, 2 times per day  enoxaparin, 30 mg, SubCUTAneous, BID  Vitamin D, 2,000 Units, Oral, Daily  dexamethasone, 10 mg, IntraVENous, Q24H        Review of Systems   Review of Systems Unable to perform ROS: Intubated     Objective :      Current Vitals : Temp: 100.2 °F (37.9 °C),  Pulse: 112, Resp: 23, BP: 113/60, SpO2: (!) 88 %  Last 24 Hrs Vitals   Patient Vitals for the past 24 hrs:   BP Temp Temp src Pulse Resp SpO2 Height   12/24/21 0926 -- -- -- 112 23 (!) 88 % --   12/24/21 0915 -- -- -- 110 23 (!) 89 % --   12/24/21 0900 -- -- -- 109 26 (!) 89 % --   12/24/21 0845 -- -- -- 98 25 91 % --   12/24/21 0830 -- -- -- 79 24 93 % --   12/24/21 0815 -- -- -- 80 23 93 % --   12/24/21 0809 -- -- -- 65 24 94 % --   12/24/21 0800 113/60 100.2 °F (37.9 °C) Bladder 59 24 94 % --   12/24/21 0745 -- -- -- 59 24 94 % --   12/24/21 0730 -- -- -- 59 24 94 % --   12/24/21 0715 -- -- -- 60 24 94 % --   12/24/21 0700 -- -- -- 60 24 93 % --   12/24/21 0645 -- -- -- 61 24 93 % --   12/24/21 0630 -- -- -- 61 24 93 % --   12/24/21 0615 -- -- -- 64 24 -- --   12/24/21 0600 -- 100.4 °F (38 °C) Bladder 72 19 95 % --   12/24/21 0545 -- -- -- 60 24 -- --   12/24/21 0530 -- -- -- 62 24 94 % --   12/24/21 0518 -- -- -- 65 24 94 % --   12/24/21 0515 -- -- -- 68 24 94 % --   12/24/21 0500 -- -- -- 63 24 93 % --   12/24/21 0445 -- -- -- 63 24 93 % --   12/24/21 0430 -- -- -- 63 24 -- --   12/24/21 0400 115/60 100.5 °F (38.1 °C) Bladder 71 24 91 % --   12/24/21 0345 -- -- -- 75 24 91 % --   12/24/21 0330 -- -- -- 85 21 92 % --   12/24/21 0315 -- -- -- 77 25 -- --   12/24/21 0300 -- -- -- 61 23 -- --   12/24/21 0245 -- -- -- 62 24 93 % --   12/24/21 0230 -- -- -- 60 24 -- --   12/24/21 0215 -- -- -- 76 24 -- --   12/24/21 0200 -- 101.2 °F (38.4 °C) Bladder 62 20 -- --   12/24/21 0145 -- -- -- 64 23 -- --   12/24/21 0130 -- -- -- 63 24 -- --   12/24/21 0115 -- -- -- 63 24 -- --   12/24/21 0100 -- -- -- 63 24 93 % --   12/24/21 0045 -- -- -- 63 24 -- --   12/24/21 0030 -- -- -- 63 24 -- --   12/24/21 0015 -- -- -- 64 24 -- --   12/24/21 0000 101/62 100.2 °F (37.9 °C) Oral 64 24 94 % --   12/23/21 2345 -- -- -- 63 24 -- -- 12/23/21 2330 -- -- -- 62 24 -- --   12/23/21 2315 -- -- -- 63 24 -- --   12/23/21 2300 104/64 -- -- 62 24 94 % --   12/23/21 2245 -- -- -- 63 24 -- --   12/23/21 2230 -- -- -- 63 24 -- --   12/23/21 2215 -- -- -- 63 24 -- --   12/23/21 2200 101/61 100.4 °F (38 °C) Bladder 64 24 93 % --   12/23/21 2145 -- -- -- 66 24 -- --   12/23/21 2130 -- -- -- 67 24 94 % --   12/23/21 2100 113/72 -- -- 79 24 -- --   12/23/21 2045 -- -- -- 85 23 -- --   12/23/21 2030 -- -- -- 84 23 -- --   12/23/21 2015 -- -- -- 90 25 -- --   12/23/21 2000 128/75 100.2 °F (37.9 °C) Bladder 91 26 94 % --   12/23/21 1900 114/82 -- -- 89 24 94 % --   12/23/21 1845 -- -- -- 94 24 93 % --   12/23/21 1830 -- -- -- 97 25 93 % --   12/23/21 1815 -- -- -- 102 27 94 % --   12/23/21 1800 137/77 -- -- 106 27 93 % --   12/23/21 1745 -- -- -- 101 23 94 % --   12/23/21 1730 -- -- -- 91 21 95 % --   12/23/21 1715 -- -- -- 84 22 95 % --   12/23/21 1700 130/84 -- -- 79 24 96 % --   12/23/21 1645 -- -- -- 74 23 96 % --   12/23/21 1630 -- -- -- 63 24 97 % --   12/23/21 1615 -- -- -- 63 24 97 % --   12/23/21 1600 102/67 100.2 °F (37.9 °C) Bladder 64 24 96 % --   12/23/21 1545 -- -- -- 65 24 95 % --   12/23/21 1530 -- -- -- 70 24 93 % --   12/23/21 1515 -- -- -- 73 24 92 % --   12/23/21 1500 94/61 -- -- 75 24 91 % --   12/23/21 1445 -- -- -- 78 24 90 % --   12/23/21 1430 -- -- -- 93 22 90 % --   12/23/21 1415 -- -- -- 100 25 90 % --   12/23/21 1400 127/76 -- -- 102 25 90 % --   12/23/21 1350 -- -- -- 103 28 (!) 89 % --   12/23/21 1345 -- -- -- 108 28 90 % --   12/23/21 1330 -- -- -- 117 30 90 % --   12/23/21 1315 -- -- -- 113 29 90 % --   12/23/21 1300 (!) 144/82 -- -- 125 29 90 % --   12/23/21 1245 -- -- -- 114 26 94 % --   12/23/21 1230 -- -- -- 104 26 95 % --   12/23/21 1215 -- -- -- 90 27 95 % 6' 2\" (1.88 m)   12/23/21 1200 128/84 100.4 °F (38 °C) Bladder 87 26 95 % --   12/23/21 1145 -- -- -- 85 26 95 % --   12/23/21 1130 -- -- -- 82 27 -- --   12/23/21 1115 -- -- -- 74 28 93 % --   12/23/21 1100 100/67 -- -- 75 27 93 % --     Intake / output   12/23 0701 - 12/24 0700  In: 5602 [I.V.:1549]  Out: 6778 [Urine:3735]  Physical Exam:  Physical Exam  Vitals and nursing note reviewed. Constitutional:       Appearance: He is well-developed. He is ill-appearing. Interventions: He is sedated and intubated. Neck:      Thyroid: No thyroid mass. Cardiovascular:      Rate and Rhythm: Normal rate and regular rhythm. Pulses: Normal pulses. Heart sounds: Normal heart sounds. No murmur heard. Pulmonary:      Effort: He is intubated. Breath sounds: Normal breath sounds. Musculoskeletal:      Right lower leg: No edema. Left lower leg: No edema. Lymphadenopathy:      Cervical: No cervical adenopathy. Skin:     Capillary Refill: Capillary refill takes less than 2 seconds. Neurological:      Motor: No tremor or abnormal muscle tone. Laboratory findings:    Recent Labs     12/22/21  0513 12/23/21  0537 12/24/21  0516   WBC 14.7* 14.6* 21.8*   HGB 15.5 15.0 14.2   HCT 47.9 45.5 43.1    220 262     Recent Labs     12/22/21  0513 12/23/21  0537 12/24/21  0516    140 139   K 3.7 4.0 3.9    105 101   CO2 24 25 28   GLUCOSE 156* 146* 128*   BUN 35* 30* 27*   CREATININE 0.42* 0.37* 0.37*   CALCIUM 8.6 8.8 8.6     Recent Labs     12/22/21  0513 12/23/21  0537 12/24/21  0516   PROT 5.7* 5.6* 5.4*   LABALBU 3.2* 3.4* 3.4*   AST 27 40* 36   ALT 60* 60* 60*   ALKPHOS 66 57 54   BILITOT 0.37 0.35 0.35   BILIDIR  --  0.12 0.10          Specific Gravity, UA   Date Value Ref Range Status   12/18/2021 1.032 (H) 1.005 - 1.030 Final     Protein, UA   Date Value Ref Range Status   12/18/2021 1+ (A) NEGATIVE Final     RBC, UA   Date Value Ref Range Status   12/18/2021 20 TO 50 0 - 4 /HPF Final     Comment:     Reference range defined for non-centrifuged specimen.      Bacteria, UA   Date Value Ref Range Status   12/18/2021 NOT REPORTED None Final Nitrite, Urine   Date Value Ref Range Status   12/18/2021 NEGATIVE NEGATIVE Final     WBC, UA   Date Value Ref Range Status   12/18/2021 5 TO 10 0 - 5 /HPF Final     Leukocyte Esterase, Urine   Date Value Ref Range Status   12/18/2021 NEGATIVE NEGATIVE Final       Imaging / Clinical Data :-   XR CHEST (SINGLE VIEW FRONTAL)    Result Date: 12/22/2021  Stable mild cardiomegaly. Patchy airspace opacities, left more than right, may be related to pulmonary edema versus pneumonia. Nonspecific bowel gas pattern. XR ABDOMEN (KUB) (SINGLE AP VIEW)    Result Date: 12/22/2021  Stable mild cardiomegaly. Patchy airspace opacities, left more than right, may be related to pulmonary edema versus pneumonia. Nonspecific bowel gas pattern. XR CHEST PORTABLE    Result Date: 12/24/2021  1. Stable cardiomegaly. 2.  Patchy airspace opacities, stable in the right lower chest, slightly improved on the left. Findings may be related to pulmonary edema or improving pneumonia. 3.  Support tubes and catheters in good position. XR CHEST PORTABLE    Result Date: 12/19/2021  Cardiomegaly, vascular congestion and worsening pulmonary opacities with bilateral effusions favoring pulmonary edema over multifocal airspace disease. Support tubes and lines as above. Clinical Course : unchanged  Assessment and Plan  :        Acute respiratory failure with hypoxia due to ARDS from COVID-19 pneumonia -ventilator support, high-dose Decadron, s/p Actemra on 12/12/2021. Antibiotics per ID. Type 2 diabetes mellitus - on Lantus   Obesity BMI 39  Essential hypertension -on Levophed. IRMA -does not use CPAP at home       Continue to monitor vitals , Intake / output ,  Cell count , HGB , Kidney function, oxygenation  as indicated .      Plan discussed with Staff [facility]  RN     (Please note that portions of this note were completed with a voice recognition program. Efforts were made to edit the dictations but occasionally words are mis-transcribed.)      Andres Noel MD  12/24/2021

## 2021-12-24 NOTE — PROGRESS NOTES
Made RT aware of patients cuff leak. RT initially placed more air into cuff, ETT 26cm at lip. RT decided to advance to 27cm at the lip. Paged on call Dr. Willy Bryant, made aware of situation. See order for STAT chest XRAY.

## 2021-12-24 NOTE — PLAN OF CARE
Problem: Airway Clearance - Ineffective  Goal: Achieve or maintain patent airway  12/24/2021 0552 by Yesi Garcia RN  Outcome: Ongoing  12/23/2021 2159 by Kylee Curry RCP  Outcome: Ongoing     Problem: Gas Exchange - Impaired  Goal: Absence of hypoxia  12/24/2021 0552 by Yesi Garcia RN  Outcome: Ongoing  12/23/2021 2159 by Kylee Curry RCP  Outcome: Ongoing  Goal: Promote optimal lung function  12/24/2021 0552 by Yesi Garcia RN  Outcome: Ongoing  12/23/2021 2159 by Kylee Curry RCP  Outcome: Ongoing     Problem: Breathing Pattern - Ineffective  Goal: Ability to achieve and maintain a regular respiratory rate  12/24/2021 0552 by Yesi Garcia RN  Outcome: Ongoing  12/23/2021 2159 by Kylee Curry RCP  Outcome: Ongoing     Problem:  Body Temperature -  Risk of, Imbalanced  Goal: Ability to maintain a body temperature within defined limits  Outcome: Ongoing  Goal: Will regain or maintain usual level of consciousness  Outcome: Ongoing  Goal: Complications related to the disease process, condition or treatment will be avoided or minimized  Outcome: Ongoing     Problem: Isolation Precautions - Risk of Spread of Infection  Goal: Prevent transmission of infection  Outcome: Ongoing     Problem: Nutrition Deficits  Goal: Optimize nutritional status  Outcome: Ongoing     Problem: Risk for Fluid Volume Deficit  Goal: Maintain normal heart rhythm  Outcome: Ongoing  Goal: Maintain absence of muscle cramping  Outcome: Ongoing  Goal: Maintain normal serum potassium, sodium, calcium, phosphorus, and pH  Outcome: Ongoing     Problem: Loneliness or Risk for Loneliness  Goal: Demonstrate positive use of time alone when socialization is not possible  Outcome: Ongoing     Problem: Fatigue  Goal: Verbalize increase energy and improved vitality  Outcome: Ongoing     Problem: Patient Education: Go to Patient Education Activity  Goal: Patient/Family Education  Outcome: Ongoing     Problem: OXYGENATION/RESPIRATORY FUNCTION  Goal: Patient will maintain patent airway  12/24/2021 0552 by Gaby Arriaga RN  Outcome: Ongoing  12/23/2021 2159 by Leesa Spivey RCP  Outcome: Ongoing  Goal: Patient will achieve/maintain normal respiratory rate/effort  Description: Respiratory rate and effort will be within normal limits for the patient  12/24/2021 0552 by Gaby Arriaga RN  Outcome: Ongoing  12/23/2021 2159 by Leesa Spivey RCP  Outcome: Ongoing     Problem: MECHANICAL VENTILATION  Goal: Patient will maintain patent airway  12/24/2021 0552 by Gaby Arriaga RN  Outcome: Ongoing  12/23/2021 2159 by Leesa Spivey RCP  Outcome: Ongoing  Goal: Oral health is maintained or improved  12/24/2021 0552 by Gaby Arriaga RN  Outcome: Ongoing  12/23/2021 2159 by Leesa Spivey RCP  Outcome: Ongoing  Goal: ET tube will be managed safely  12/24/2021 0552 by Gaby Arriaga RN  Outcome: Ongoing  12/23/2021 2159 by Leesa Spivey RCP  Outcome: Ongoing  Goal: Ability to express needs and understand communication  Outcome: Ongoing  Goal: Mobility/activity is maintained at optimum level for patient  Outcome: Ongoing     Problem: SKIN INTEGRITY  Goal: Skin integrity is maintained or improved  Outcome: Ongoing     Problem: Skin Integrity:  Goal: Will show no infection signs and symptoms  Description: Will show no infection signs and symptoms  Outcome: Ongoing  Goal: Absence of new skin breakdown  Description: Absence of new skin breakdown  Outcome: Ongoing     Problem: Falls - Risk of:  Goal: Will remain free from falls  Description: Will remain free from falls  Outcome: Ongoing  Goal: Absence of physical injury  Description: Absence of physical injury  Outcome: Ongoing     Problem: Nutrition  Goal: Optimal nutrition therapy  Outcome: Ongoing

## 2021-12-24 NOTE — PLAN OF CARE
Problem: Airway Clearance - Ineffective  Goal: Achieve or maintain patent airway  12/24/2021 0734 by Emmie Viera RN  Outcome: Ongoing     Problem: Gas Exchange - Impaired  Goal: Absence of hypoxia  12/24/2021 0734 by Emmie Viera RN  Outcome: Ongoing     Problem: Gas Exchange - Impaired  Goal: Promote optimal lung function  12/24/2021 0734 by Emmie Viera RN  Outcome: Ongoing     Problem: Breathing Pattern - Ineffective  Goal: Ability to achieve and maintain a regular respiratory rate  12/24/2021 0734 by Emmie Viera RN  Outcome: Ongoing     Problem: Body Temperature -  Risk of, Imbalanced  Goal: Ability to maintain a body temperature within defined limits  12/24/2021 0734 by Emmie Viera RN  Outcome: Ongoing     Problem: Body Temperature -  Risk of, Imbalanced  Goal: Will regain or maintain usual level of consciousness  12/24/2021 0734 by Emmie Viera RN  Outcome: Ongoing     Problem:  Body Temperature -  Risk of, Imbalanced  Goal: Complications related to the disease process, condition or treatment will be avoided or minimized  12/24/2021 0734 by Emmie Viera RN  Outcome: Ongoing     Problem: Isolation Precautions - Risk of Spread of Infection  Goal: Prevent transmission of infection  12/24/2021 0734 by Emmie Viera RN  Outcome: Ongoing     Problem: Nutrition Deficits  Goal: Optimize nutritional status  12/24/2021 0734 by Emmie Viera RN  Outcome: Ongoing     Problem: Risk for Fluid Volume Deficit  Goal: Maintain normal heart rhythm  12/24/2021 0734 by Emmie Viera RN  Outcome: Ongoing     Problem: Patient Education: Go to Patient Education Activity  Goal: Patient/Family Education  12/24/2021 0734 by Emmie Viera RN  Outcome: Ongoing     Problem: OXYGENATION/RESPIRATORY FUNCTION  Goal: Patient will maintain patent airway  12/24/2021 0734 by Emmie Viera RN  Outcome: Ongoing     Problem: OXYGENATION/RESPIRATORY FUNCTION  Goal: Patient will achieve/maintain normal respiratory rate/effort  Description: Respiratory rate and effort will be within normal limits for the patient  12/24/2021 0734 by Carmen Mathias RN  Outcome: Ongoing     Problem: MECHANICAL VENTILATION  Goal: Oral health is maintained or improved  12/24/2021 0734 by Carmen Mathias RN  Outcome: Ongoing     Problem: MECHANICAL VENTILATION  Goal: ET tube will be managed safely  12/24/2021 0734 by Carmen Mathias RN  Outcome: Ongoing     Problem: SKIN INTEGRITY  Goal: Skin integrity is maintained or improved  12/24/2021 0734 by Carmen Mathias RN  Outcome: Ongoing     Problem: Skin Integrity:  Goal: Will show no infection signs and symptoms  Description: Will show no infection signs and symptoms  12/24/2021 0734 by Carmen Mathias RN  Outcome: Ongoing     Problem: Skin Integrity:  Goal: Absence of new skin breakdown  Description: Absence of new skin breakdown  12/24/2021 0734 by Carmen Mathias RN  Outcome: Ongoing     Problem: Falls - Risk of:  Goal: Will remain free from falls  Description: Will remain free from falls  12/24/2021 0734 by Carmen Mathias RN  Outcome: Ongoing     Problem: Falls - Risk of:  Goal: Absence of physical injury  Description: Absence of physical injury  12/24/2021 0734 by Carmen Mathias RN  Outcome: Ongoing     Problem: Nutrition  Goal: Optimal nutrition therapy  12/24/2021 0734 by Carmen Mathias RN  Outcome: Ongoing     Problem: Discharge Planning:  Goal: Participates in care planning  Description: Participates in care planning  Outcome: Ongoing     Problem: Discharge Planning:  Goal: Discharged to appropriate level of care  Description: Discharged to appropriate level of care  Outcome: Ongoing

## 2021-12-24 NOTE — PROGRESS NOTES
[facility] - Progress Note   Note COVID 19 Patient  Today's Date and Time: 12/24/2021, 7:29 AM    Impression :     COVID 19 Confirmed Infection  Covid tests:  12/11/2021: Positive  Elevated inflammatory markers  Acute hypoxic respiratory failure  History of asthma  Diabetes mellitus  Essential hypertension  IRMA on CPAP  Patient has not received the Covid vaccination    Recommendations:   Antibiotic treatment:  The patient was having high-grade fevers and cultures have been sent.-No growth on cultures thus far  I will start the patient empirically on antibiotic therapy with cefepime on 12/18-fevers initially resolved but low grade fevers have resumed. Peripheral IVs >1days old should be removed  Remove salgado and commence voiding trial no that patient is off paralytic  Covid Rx:    Remdesivir-out of window  Decadron-high-dose initiated  Actemra-ordered 12/12/2021  Monoclonal antibodies-out of window      Medical Decision Making/Summary/Discussion:12/24/2021     Patient admitted with COVID 19 infection  He may come out of isolation on 12/31/21    Infection Control Recommendations   [place] Precautions  Airborne isolation  Droplet Isolation    Antimicrobial Stewardship Recommendations     Discontinuation of therapy  Coordination of Outpatient Care:   Estimated Length of IV antimicrobials:TBD  Patient will need Midline Catheter Insertion: TBD  Patient will need PICC line Insertion: No  Patient will need: Home IV , [place],  SNF,  LTAC:TBD  Patient will need outpatient wound care:No    Chief complaint/reason for consultation:   Concern for COVID infection      History of Present Illness:   Lois Carreno is a 60y.o.-year-old male who was initially admitted on 12/12/2021.  Patient seen at the request of Dr. Lavon Sims:    Patient presented through [facility]'s ER on 12/11/2021 with complaints of worsening shortness of breath with associated generalized weakness and nonproductive cough over the past several days. He was exposed to his son who was diagnosed with Covid last week and has not received the Covid vaccine. The patient was very hypoxic with an SPO2 in the high 50s on room air. Imaging revealed bilateral pulmonary opacities typical of COVID-19    Abnormal labs include:    Ferritin 2800      Patient has been intubated and transferred to [facility]  He has been started on high-dose Decadron and Actemra has been ordered    CT CHEST PULMONARY EMBOLISM W CONTRAST 12/11/2021   Final Result   1. No evidence of pulmonary embolism   2. Diffuse ground-glass opacities throughout the lungs, typical of COVID-19   pulmonary disease.           XR CHEST (SINGLE VIEW FRONTAL) 12/11/2021   Final Result   Multifocal hazy opacities throughout both lungs consistent with COVID   pneumonia and or pulmonary edema       Patient admitted because of concerns with COVID 19.    CURRENT EVALUATION : 12/24/2021    T-max 100.4 F  Hypotension with intermittent use of low-dose Levophed    The patient remains on mechanical ventilation with 60% FiO2 and PEEP of 10. He is receiving fentanyl, ketamine, Versed and Precedex for sedation. Nimbex has been stopped     Fevers continue with the use of a cooling blanket and anti pyretics. Leukocytosis continues at 18.6-->14.7-->21.8  Cefepime was initiated 12/18    CRP is decreasing   CXR 12/22 is showing concern for pulmonary edema vs pneumonia-Left lung worse than right    Discussed with RN    Patient exhibiting respiratory distress. yes  Respiratory secretions: no    Patient receiving supplemental oxygen.   Mechanical ventilation  RR:  24-->28-->24  02 sat: 92-->96-->94    % FIO2: 90-->80-->85-->80-->65-->60  PEEP:   21-->16-->14-->12-->10    QTc:       NEWS Score: 0-4 Low risk group; 5-6: Medium risk group; 7 or above: High risk group  Parameters 3 2 1 0 1 2 3   Age    < 65   = 65   RR = 8  9-11 12-20 21-24 = 25   O2 Sats = 91 92-93 94-95 = 96      Suppl O2  Yes  No      SBP = 90  101-110 111-219   = 220   HR = 40  41-50 51-90  111-130 = 131   Consciousness    Alert   Drowsiness, lethargy, or confusion   Temperature = 35.0 C (95.0 F)  35.1-36.0 C 95.1-96.9 F 36.1-38.0 C 97.0-100.4 F 38.1-39.0 C 100.5-102.3 F = 39.1 C = 102.4 F      NEWS Score:  12/12/2021: 13 high risk    Overall Daily Picture:     Unchanged    Presence of secondary bacterial Infection:    Cultures no growth  Additional antibiotics: 12/18 Cefepime for leukocytosis and fevers    Labs, X rays reviewed: 12/24/2021    BUN:30-->27  Cr:0.37-->0.37    WBC:13.5-->17-->16.8-->18.6-->14.7-->14.6-->21.8  Hb:15-->14.2  Plat:220-->262    Absolute Neutrophils:3.73  Absolute Lymphocytes:0.29  Neutrophil/Lymphocyte Ratio: 12.8 high risk    CRP:293-->277-->137-->50.8-->23  Ferritin:2800  LDH: 838    Pro Calcitonin:      Cultures:  Urine:  12/18/21: No bacterial growth  Blood:  12/18/21: No growth thus far  Sputum :  12/18/21: Normal respiratory sherry  Wound:      CXR:     12/22/21 12/19/21      1221 diffuse bilateral infiltrates  CAT:      Discussed with patient, RN, CC, IM.      12-12-21:      I have personally reviewed the past medical history, past surgical history, medications, social history, and family history, and I have updated the database accordingly.   Past Medical History:     Past Medical History:   Diagnosis Date    Arthritis     Asthma     Diabetes mellitus ([address]     Edema     GERD (gastroesophageal reflux disease)     Hypertension     on lasix    Migraine     IRMA on CPAP     seldom use machine       Past Surgical  History:     Past Surgical History:   Procedure Laterality Date    APPENDECTOMY      ARTHROPLASTY Left 11/1/2019    LEFT FOOT 1ST MTPJ ARTHROPLASTY WITH PEREZ IMPLANT AND GROMMETS  [facility] performed by Mag Dasilva MD at [address] [facility] Right 12/12/2019    RIGHT FOOT ARTHROPLASTY 1ST MTPJ (Right Foot)    ARTHROPLASTY Right 12/12/2019    RIGHT FOOT ARTHROPLASTY 1ST MTPJ performed by Manpreet Portillo MD at [address] Right    [address]  2014    no blockage no stents    CHOLECYSTECTOMY      COLONOSCOPY      ENDOSCOPY, COLON, DIAGNOSTIC      TOE SURGERY Left 11/01/2019     LEFT FOOT 1ST MTPJ ARTHROPLASTY WITH PEREZ IMPLANT AND GROMMETS  [facility]EN [facility] (Left )    TONSILLECTOMY         Medications:      lidocaine 1 % injection  5 mL IntraDERmal Once    sodium chloride flush  5-40 mL IntraVENous 2 times per day    oxyCODONE  10 mg Oral Q6H    docusate  100 mg Per NG tube BID    insulin lispro  0-6 Units SubCUTAneous Q6H    lansoprazole  30 mg Oral QAM AC    cefepime  2,000 mg IntraVENous Q12H    furosemide  40 mg IntraVENous Daily    insulin glargine  10 Units SubCUTAneous Nightly    sodium chloride flush  5-40 mL IntraVENous 2 times per day    enoxaparin  30 mg SubCUTAneous BID    Vitamin D  2,000 Units Oral Daily    dexamethasone  10 mg IntraVENous Q24H       Social History:     Social History     Socioeconomic History    Marital status:      Spouse name: Not on file    Number of children: Not on file    Years of education: Not on file    Highest education level: Not on file   Occupational History    Not on file   Tobacco Use    Smoking status: Former Smoker    Smokeless tobacco: Never Used   Vaping Use    Vaping Use: Never used   Substance and Sexual Activity    Alcohol use: Yes     Comment: every couple months    Drug use: Never    Sexual activity: Not on file   Other Topics Concern    Not on file   Social History Narrative    Not on file     Social Determinants of Health     Financial Resource Strain:     Difficulty of Paying Living Expenses: Not on file   Food Insecurity:     Worried About Running Out of Food in the Last Year: Not on file    Lucina of Food in the Last Year: Not on file   Transportation Needs:     Lack of Transportation (Medical): Not on file    Lack of Transportation (Non-Medical):  Not on file   Physical Activity:     Days of Exercise per Week: Not on file    Minutes of Exercise per Session: Not on file   Stress:     Feeling of Stress : Not on file   Social Connections:     Frequency of Communication with Friends and Family: Not on file    Frequency of Social Gatherings with Friends and Family: Not on file    Attends Alevism Services: Not on file    Active Member of [address] [place] [address] [employer] or Organizations: Not on file    Attends Club or Organization Meetings: Not on file    Marital Status: Not on file   Intimate Partner Violence:     Fear of Current or Ex-Partner: Not on file    Emotionally Abused: Not on file    Physically Abused: Not on file    Sexually Abused: Not on file   Housing Stability:     Unable to Pay for Housing in the Last Year: Not on file    Number of [place] in the Last Year: Not on file    Unstable Housing in the Last Year: Not on file       Family History:     Family History   Problem Relation Age of Onset    Heart Attack Sister 32    Diabetes Paternal Grandmother     Heart Disease Paternal Uncle     Heart Disease Paternal Uncle     Heart Disease Paternal Uncle     Cancer Maternal Aunt     Cancer Maternal Aunt     Cancer Maternal Uncle     Cancer Maternal Uncle         Allergies:   Nuts [peanut-containing drug products] and Sunflower oil     Review of Systems:     Unable to assess 12/24/2021  Intubated and sedated      Physical Examination :     Patient Vitals for the past 8 hrs:   BP Temp Temp src Pulse Resp SpO2   12/24/21 0715 -- -- -- 60 24 94 %   12/24/21 0700 -- -- -- 60 24 93 %   12/24/21 0645 -- -- -- 61 24 93 %   12/24/21 0630 -- -- -- 61 24 93 %   12/24/21 0615 -- -- -- 64 24 --   12/24/21 0600 -- 100.4 °F (38 °C) Bladder 72 19 95 %   12/24/21 0545 -- -- -- 60 24 --   12/24/21 0530 -- -- -- 62 24 94 %   12/24/21 0518 -- -- -- 65 24 94 %   12/24/21 0515 -- -- -- 68 24 94 % 12/24/21 0500 -- -- -- 63 24 93 %   12/24/21 0445 -- -- -- 63 24 93 %   12/24/21 0430 -- -- -- 63 24 --   12/24/21 0400 115/60 100.5 °F (38.1 °C) Bladder 71 24 91 %   12/24/21 0345 -- -- -- 75 24 91 %   12/24/21 0330 -- -- -- 85 21 92 %   12/24/21 0315 -- -- -- 77 25 --   12/24/21 0300 -- -- -- 61 23 --   12/24/21 0245 -- -- -- 62 24 93 %   12/24/21 0230 -- -- -- 60 24 --   12/24/21 0215 -- -- -- 76 24 --   12/24/21 0200 -- 101.2 °F (38.4 °C) Bladder 62 20 --   12/24/21 0145 -- -- -- 64 23 --   12/24/21 0130 -- -- -- 63 24 --   12/24/21 0115 -- -- -- 63 24 --   12/24/21 0100 -- -- -- 63 24 93 %   12/24/21 0045 -- -- -- 63 24 --   12/24/21 0030 -- -- -- 63 24 --   12/24/21 0015 -- -- -- 64 24 --   12/24/21 0000 101/62 100.2 °F (37.9 °C) Oral 64 24 94 %   12/23/21 2345 -- -- -- 63 24 --   12/23/21 2330 -- -- -- 62 24 --     General Appearance: Intubated and sedated  Head:  Normocephalic, no trauma  ENT: Not well evaluated as the patient is orally intubated  Neck:Supple, without lymphadenopathy. Thyroid normal, No bruits. Pulmonary/Chest: Diminished to auscultation, without wheezes, rales, or rhonchi. No dullness to percussion. Cardiovascular: Regular rate and rhythm without murmurs, rubs, or gallops. Abdomen: Soft, non tender. Bowel sounds normal. No organomegaly  All four Extremities: No cyanosis, clubbing, edema, or effusions. Neurologic: Intubated and sedated and paralyzed  Skin: Warm and dry with good turgor. No signs of peripheral arterial or venous insufficiency. No ulcerations. No open wounds.     Medical Decision Making -Laboratory:   I have independently reviewed/ordered the following labs:    CBC with Differential:   Recent Labs     12/23/21  0537 12/24/21  0516   WBC 14.6* 21.8*   HGB 15.0 14.2   HCT 45.5 43.1    262   LYMPHOPCT 9* 8*   MONOPCT 8 8     BMP:   Recent Labs     12/23/21  0537 12/24/21  0516    139   K 4.0 3.9    101   CO2 25 28   BUN 30* 27*   CREATININE 0.37* 0.37* abnormality. Lenetta Cave is no acute abnormality of the thoracic aorta.       Lungs/pleura: Diffuse ground-glass opacification is present throughout the   lungs, typical of COVID-19 pulmonary disease.  No pleural effusion or   pneumothorax is present.       Upper Abdomen: Images through the upper abdomen demonstrate a small hiatal   hernia.  Diffuse hepatic steatosis is present.  The gallbladder is surgically   absent.       Soft Tissues/Bones: No acute bone or soft tissue abnormality.           Impression   1. No evidence of pulmonary embolism   2. Diffuse ground-glass opacities throughout the lungs, typical of COVID-19   pulmonary disease.         Narrative   EXAMINATION:   ONE XRAY VIEW OF THE CHEST       12/11/2021 3:54 pm       COMPARISON:   November 13, 2012       HISTORY:   ORDERING SYSTEM PROVIDED HISTORY: hypoxemia, probable covid pneumonia   TECHNOLOGIST PROVIDED HISTORY:   hypoxemia, probable covid pneumonia       FINDINGS:   Multifocal hazy opacities throughout both lungs would be consistent with   history of COVID pneumonia.  Pulmonary edema could have a similar appearance. No pneumothorax or pleural effusion.  Cardiac size enlarged.  Mediastinum   unremarkable.  No acute osseous abnormality.           Impression   Multifocal hazy opacities throughout both lungs consistent with COVID   pneumonia and or pulmonary edema.               Medical Decision T[facility]uiv-Wiixptjf-[facility]nhwa:     Culture, Respiratory [[id number]] (Abnormal) Collected: 12/18/21 1143   Order Status: Completed Specimen: Endotracheal Updated: 12/18/21 1439    Specimen Description . ENDOTRACHEAL    Special Requests NOT REPORTED    Direct Exam >25 NEUTROPHILS/LPF     < 10 EPITHELIAL CELLS/LPF     MIXED BACTERIAL MORPHOTYPES SEEN ON GRAM STAIN.  Abnormal     Culture PENDING   Culture, Blood 1 [[id number]] Collected: 12/18/21 1234   Order Status: Completed Specimen: Blood Updated: 12/18/21 1358    Specimen Description . BLOOD    Special Requests NOT REPORTED    Culture NO GROWTH <24 HRS   Culture, Blood 1 [[id number]]    Order Status: Sent Specimen: Blood    Culture, Urine [[id number]] Collected: 12/14/21 0043   Order Status: Completed Specimen: Urine, straight catheter Updated: 12/14/21 1934    Specimen Description . URINE,STRAIGHT CATHETER    Special Requests NOT REPORTED    Culture NO GROWTH   MRSA DNA Probe, Nasal [[id number]] Collected: 12/12/21 1245   Order Status: Completed Specimen: Nasal Updated: 12/13/21 1052    Specimen Description . NASAL SWAB    MRSA, DNA, Nasal NEGATIVE:  MRSA DNA not detected by nucleic acid amplification. Comment:                                                    Results should be used as an adjunct to nosocomial control efforts to identify patients   needing enhanced precautions.     The test is not intended to identify patients with staphylococcal infections.  Results   should not be used to guide or monitor treatment for MRSA infections. Medical Decision Making-Other:     Note:  Labs, medications, radiologic studies were reviewed with personal review of films  Large amounts of data were reviewed  Discussed with nursing Staff, Discharge planner  Infection Control and Prevention measures reviewed  All prior entries were reviewed  Administer medications as ordered  Prognosis: Guarded  Discharge planning reviewed      Thank you for allowing us to participate in the care of this patient. Please call with questions. Electronically signed by SHO Horner - CNP on 12/24/2021 at 7:29 AM     ATTESTATION:    I have discussed the case, including pertinent history and exam findings with the APRN. I have evaluated the  History, physical findings and pictures of the patient and the key elements of the encounter have been performed by me. I have reviewed the laboratory data, other diagnostic studies and discussed them with the APRN. I have updated the medical record where necessary.     I agree with the assessment, plan and orders as documented by the APRN.     Kenna Rubio MD.

## 2021-12-24 NOTE — PLAN OF CARE
Problem: OXYGENATION/RESPIRATORY FUNCTION  Goal: Patient will maintain patent airway  12/24/2021 0913 by Kinsey Palacios RCP  Outcome: Ongoing     Problem: OXYGENATION/RESPIRATORY FUNCTION  Goal: Patient will achieve/maintain normal respiratory rate/effort  Description: Respiratory rate and effort will be within normal limits for the patient  12/24/2021 0913 by Kinsey Palacios RCP  Outcome: Ongoing     Problem: MECHANICAL VENTILATION  Goal: Patient will maintain patent airway  12/24/2021 0913 by Kinsey Palacios RCP  Outcome: Ongoing     Problem: MECHANICAL VENTILATION  Goal: Oral health is maintained or improved  12/24/2021 0913 by Kinsey Palacios RCP  Outcome: Ongoing     Problem: MECHANICAL VENTILATION  Goal: ET tube will be managed safely  12/24/2021 0913 by Kinsey Palacios RCP  Outcome: Ongoing     Problem: MECHANICAL VENTILATION  Goal: Ability to express needs and understand communication  12/24/2021 0913 by Kinsey Palacios RCP  Outcome: Ongoing     Problem: MECHANICAL VENTILATION  Goal: Mobility/activity is maintained at optimum level for patient  12/24/2021 0913 by Kinsey Palacios RCP  Outcome: Ongoing     Problem: SKIN INTEGRITY  Goal: Skin integrity is maintained or improved  12/24/2021 0913 by Kinsey Palacios RCP  Outcome: Ongoing

## 2021-12-25 LAB
ABSOLUTE EOS #: <0.03 K/UL (ref 0–0.44)
ABSOLUTE IMMATURE GRANULOCYTE: 0.29 K/UL (ref 0–0.3)
ABSOLUTE LYMPH #: 1.37 K/UL (ref 1.1–3.7)
ABSOLUTE MONO #: 1.34 K/UL (ref 0.1–1.2)
ALBUMIN SERPL-MCNC: 3.2 G/DL (ref 3.5–5.2)
ALBUMIN/GLOBULIN RATIO: 1.4 (ref 1–2.5)
ALLEN TEST: ABNORMAL
ALP BLD-CCNC: 55 U/L (ref 40–129)
ALT SERPL-CCNC: 53 U/L (ref 5–41)
ANION GAP SERPL CALCULATED.3IONS-SCNC: 12 MMOL/L (ref 9–17)
AST SERPL-CCNC: 26 U/L
BASOPHILS # BLD: 0 % (ref 0–2)
BASOPHILS ABSOLUTE: 0.06 K/UL (ref 0–0.2)
BILIRUB SERPL-MCNC: 0.38 MG/DL (ref 0.3–1.2)
BILIRUBIN DIRECT: 0.11 MG/DL
BILIRUBIN, INDIRECT: 0.27 MG/DL (ref 0–1)
BUN BLDV-MCNC: 28 MG/DL (ref 6–20)
BUN/CREAT BLD: ABNORMAL (ref 9–20)
CALCIUM SERPL-MCNC: 8.6 MG/DL (ref 8.6–10.4)
CHLORIDE BLD-SCNC: 100 MMOL/L (ref 98–107)
CO2: 28 MMOL/L (ref 20–31)
CREAT SERPL-MCNC: 0.4 MG/DL (ref 0.7–1.2)
DIFFERENTIAL TYPE: ABNORMAL
EOSINOPHILS RELATIVE PERCENT: 0 % (ref 1–4)
FIO2: ABNORMAL
GFR AFRICAN AMERICAN: >60 ML/MIN
GFR NON-AFRICAN AMERICAN: >60 ML/MIN
GFR SERPL CREATININE-BSD FRML MDRD: ABNORMAL ML/MIN/{1.73_M2}
GFR SERPL CREATININE-BSD FRML MDRD: ABNORMAL ML/MIN/{1.73_M2}
GLOBULIN: ABNORMAL G/DL (ref 1.5–3.8)
GLUCOSE BLD-MCNC: 118 MG/DL (ref 70–99)
GLUCOSE BLD-MCNC: 119 MG/DL (ref 75–110)
GLUCOSE BLD-MCNC: 134 MG/DL (ref 74–100)
GLUCOSE BLD-MCNC: 145 MG/DL (ref 75–110)
GLUCOSE BLD-MCNC: 151 MG/DL (ref 75–110)
GLUCOSE BLD-MCNC: 157 MG/DL (ref 75–110)
GLUCOSE BLD-MCNC: 176 MG/DL (ref 75–110)
HCT VFR BLD CALC: 40.7 % (ref 40.7–50.3)
HEMOGLOBIN: 13.6 G/DL (ref 13–17)
IMMATURE GRANULOCYTES: 2 %
LYMPHOCYTES # BLD: 7 % (ref 24–43)
MCH RBC QN AUTO: 31.3 PG (ref 25.2–33.5)
MCHC RBC AUTO-ENTMCNC: 33.4 G/DL (ref 28.4–34.8)
MCV RBC AUTO: 93.8 FL (ref 82.6–102.9)
MODE: ABNORMAL
MONOCYTES # BLD: 7 % (ref 3–12)
NEGATIVE BASE EXCESS, ART: ABNORMAL (ref 0–2)
NRBC AUTOMATED: 0 PER 100 WBC
O2 DEVICE/FLOW/%: ABNORMAL
PATIENT TEMP: 38.3
PDW BLD-RTO: 13.8 % (ref 11.8–14.4)
PLATELET # BLD: 255 K/UL (ref 138–453)
PLATELET ESTIMATE: ABNORMAL
PMV BLD AUTO: 12 FL (ref 8.1–13.5)
POC HCO3: 36.8 MMOL/L (ref 21–28)
POC O2 SATURATION: 95 % (ref 94–98)
POC PCO2 TEMP: 54 MM HG
POC PCO2: 51.1 MM HG (ref 35–48)
POC PH TEMP: 7.45
POC PH: 7.46 (ref 7.35–7.45)
POC PO2 TEMP: 80 MM HG
POC PO2: 73.3 MM HG (ref 83–108)
POSITIVE BASE EXCESS, ART: 11 (ref 0–3)
POTASSIUM SERPL-SCNC: 4 MMOL/L (ref 3.7–5.3)
RBC # BLD: 4.34 M/UL (ref 4.21–5.77)
RBC # BLD: ABNORMAL 10*6/UL
SAMPLE SITE: ABNORMAL
SEG NEUTROPHILS: 84 % (ref 36–65)
SEGMENTED NEUTROPHILS ABSOLUTE COUNT: 15.86 K/UL (ref 1.5–8.1)
SODIUM BLD-SCNC: 140 MMOL/L (ref 135–144)
TCO2 (CALC), ART: ABNORMAL MMOL/L (ref 22–29)
TOTAL PROTEIN: 5.5 G/DL (ref 6.4–8.3)
WBC # BLD: 18.9 K/UL (ref 3.5–11.3)
WBC # BLD: ABNORMAL 10*3/UL

## 2021-12-25 PROCEDURE — 51702 INSERT TEMP BLADDER CATH: CPT

## 2021-12-25 PROCEDURE — 6370000000 HC RX 637 (ALT 250 FOR IP): Performed by: NURSE PRACTITIONER

## 2021-12-25 PROCEDURE — 6360000002 HC RX W HCPCS: Performed by: INTERNAL MEDICINE

## 2021-12-25 PROCEDURE — 6370000000 HC RX 637 (ALT 250 FOR IP): Performed by: INTERNAL MEDICINE

## 2021-12-25 PROCEDURE — 85025 COMPLETE CBC W/AUTO DIFF WBC: CPT

## 2021-12-25 PROCEDURE — 6360000002 HC RX W HCPCS: Performed by: NURSE PRACTITIONER

## 2021-12-25 PROCEDURE — 99232 SBSQ HOSP IP/OBS MODERATE 35: CPT | Performed by: FAMILY MEDICINE

## 2021-12-25 PROCEDURE — 2580000003 HC RX 258: Performed by: NURSE PRACTITIONER

## 2021-12-25 PROCEDURE — 94003 VENT MGMT INPAT SUBQ DAY: CPT

## 2021-12-25 PROCEDURE — 2000000000 HC ICU R&B

## 2021-12-25 PROCEDURE — 80076 HEPATIC FUNCTION PANEL: CPT

## 2021-12-25 PROCEDURE — 2500000003 HC RX 250 WO HCPCS: Performed by: INTERNAL MEDICINE

## 2021-12-25 PROCEDURE — 82803 BLOOD GASES ANY COMBINATION: CPT

## 2021-12-25 PROCEDURE — 80048 BASIC METABOLIC PNL TOTAL CA: CPT

## 2021-12-25 PROCEDURE — 2580000003 HC RX 258: Performed by: INTERNAL MEDICINE

## 2021-12-25 PROCEDURE — 99291 CRITICAL CARE FIRST HOUR: CPT | Performed by: INTERNAL MEDICINE

## 2021-12-25 PROCEDURE — 37799 UNLISTED PX VASCULAR SURGERY: CPT

## 2021-12-25 PROCEDURE — 6370000000 HC RX 637 (ALT 250 FOR IP): Performed by: STUDENT IN AN ORGANIZED HEALTH CARE EDUCATION/TRAINING PROGRAM

## 2021-12-25 PROCEDURE — 2700000000 HC OXYGEN THERAPY PER DAY

## 2021-12-25 PROCEDURE — 51798 US URINE CAPACITY MEASURE: CPT

## 2021-12-25 PROCEDURE — 94761 N-INVAS EAR/PLS OXIMETRY MLT: CPT

## 2021-12-25 PROCEDURE — 82947 ASSAY GLUCOSE BLOOD QUANT: CPT

## 2021-12-25 RX ADMIN — INSULIN LISPRO 1 UNITS: 100 INJECTION, SOLUTION INTRAVENOUS; SUBCUTANEOUS at 01:37

## 2021-12-25 RX ADMIN — INSULIN LISPRO 1 UNITS: 100 INJECTION, SOLUTION INTRAVENOUS; SUBCUTANEOUS at 23:55

## 2021-12-25 RX ADMIN — Medication 10 MG/HR: at 07:48

## 2021-12-25 RX ADMIN — Medication 200 MCG/HR: at 07:48

## 2021-12-25 RX ADMIN — Medication 200 MCG/HR: at 22:22

## 2021-12-25 RX ADMIN — DEXMEDETOMIDINE HYDROCHLORIDE 0.6 MCG/KG/HR: 4 INJECTION, SOLUTION INTRAVENOUS at 18:50

## 2021-12-25 RX ADMIN — Medication 200 MCG/HR: at 14:19

## 2021-12-25 RX ADMIN — OXYCODONE HYDROCHLORIDE 10 MG: 5 SOLUTION ORAL at 16:52

## 2021-12-25 RX ADMIN — SODIUM CHLORIDE, PRESERVATIVE FREE 10 ML: 5 INJECTION INTRAVENOUS at 10:00

## 2021-12-25 RX ADMIN — Medication 200 MCG/HR: at 01:38

## 2021-12-25 RX ADMIN — ENOXAPARIN SODIUM 30 MG: 100 INJECTION SUBCUTANEOUS at 21:29

## 2021-12-25 RX ADMIN — INSULIN LISPRO 1 UNITS: 100 INJECTION, SOLUTION INTRAVENOUS; SUBCUTANEOUS at 14:19

## 2021-12-25 RX ADMIN — PROPOFOL 20 MCG/KG/MIN: 10 INJECTION, EMULSION INTRAVENOUS at 05:09

## 2021-12-25 RX ADMIN — Medication 30 MG: at 09:59

## 2021-12-25 RX ADMIN — CHLORDIAZEPOXIDE HYDROCHLORIDE 10 MG: 5 CAPSULE ORAL at 10:12

## 2021-12-25 RX ADMIN — SODIUM CHLORIDE, PRESERVATIVE FREE 10 ML: 5 INJECTION INTRAVENOUS at 21:28

## 2021-12-25 RX ADMIN — INSULIN GLARGINE 10 UNITS: 100 INJECTION, SOLUTION SUBCUTANEOUS at 21:42

## 2021-12-25 RX ADMIN — DEXMEDETOMIDINE HYDROCHLORIDE 0.6 MCG/KG/HR: 4 INJECTION, SOLUTION INTRAVENOUS at 13:29

## 2021-12-25 RX ADMIN — Medication 2000 UNITS: at 09:59

## 2021-12-25 RX ADMIN — DOCUSATE SODIUM 100 MG: 50 LIQUID ORAL at 21:29

## 2021-12-25 RX ADMIN — Medication 200 MCG/HR: at 18:52

## 2021-12-25 RX ADMIN — INSULIN LISPRO 1 UNITS: 100 INJECTION, SOLUTION INTRAVENOUS; SUBCUTANEOUS at 18:15

## 2021-12-25 RX ADMIN — IBUPROFEN 600 MG: 100 SUSPENSION ORAL at 13:29

## 2021-12-25 RX ADMIN — OXYCODONE HYDROCHLORIDE 10 MG: 5 SOLUTION ORAL at 10:11

## 2021-12-25 RX ADMIN — ENOXAPARIN SODIUM 30 MG: 100 INJECTION SUBCUTANEOUS at 09:59

## 2021-12-25 RX ADMIN — DOCUSATE SODIUM 100 MG: 50 LIQUID ORAL at 10:00

## 2021-12-25 RX ADMIN — DEXMEDETOMIDINE HYDROCHLORIDE 0.6 MCG/KG/HR: 4 INJECTION, SOLUTION INTRAVENOUS at 01:38

## 2021-12-25 RX ADMIN — PROPOFOL 20 MCG/KG/MIN: 10 INJECTION, EMULSION INTRAVENOUS at 10:12

## 2021-12-25 RX ADMIN — OXYCODONE HYDROCHLORIDE 10 MG: 5 SOLUTION ORAL at 21:30

## 2021-12-25 RX ADMIN — ACETAMINOPHEN 650 MG: 160 SOLUTION ORAL at 05:08

## 2021-12-25 RX ADMIN — CEFEPIME 2000 MG: 2 INJECTION, POWDER, FOR SOLUTION INTRAVENOUS at 21:29

## 2021-12-25 RX ADMIN — DEXAMETHASONE SODIUM PHOSPHATE 10 MG: 10 INJECTION INTRAMUSCULAR; INTRAVENOUS at 13:19

## 2021-12-25 RX ADMIN — PROPOFOL 20 MCG/KG/MIN: 10 INJECTION, EMULSION INTRAVENOUS at 22:17

## 2021-12-25 RX ADMIN — FUROSEMIDE 40 MG: 10 INJECTION, SOLUTION INTRAVENOUS at 12:50

## 2021-12-25 RX ADMIN — DEXMEDETOMIDINE HYDROCHLORIDE 0.6 MCG/KG/HR: 4 INJECTION, SOLUTION INTRAVENOUS at 07:47

## 2021-12-25 RX ADMIN — CHLORDIAZEPOXIDE HYDROCHLORIDE 10 MG: 5 CAPSULE ORAL at 16:52

## 2021-12-25 RX ADMIN — Medication 10 MG/HR: at 18:09

## 2021-12-25 RX ADMIN — CEFEPIME 2000 MG: 2 INJECTION, POWDER, FOR SOLUTION INTRAVENOUS at 11:12

## 2021-12-25 RX ADMIN — ACETAMINOPHEN 650 MG: 160 SOLUTION ORAL at 13:29

## 2021-12-25 RX ADMIN — PROPOFOL 20 MCG/KG/MIN: 10 INJECTION, EMULSION INTRAVENOUS at 17:10

## 2021-12-25 RX ADMIN — CHLORDIAZEPOXIDE HYDROCHLORIDE 10 MG: 5 CAPSULE ORAL at 21:27

## 2021-12-25 ASSESSMENT — PULMONARY FUNCTION TESTS
PIF_VALUE: 34
PIF_VALUE: 38
PIF_VALUE: 31
PIF_VALUE: 31
PIF_VALUE: 28

## 2021-12-25 ASSESSMENT — PAIN SCALES - GENERAL
PAINLEVEL_OUTOF10: 0

## 2021-12-25 NOTE — PLAN OF CARE
Problem: Airway Clearance - Ineffective  Goal: Achieve or maintain patent airway  Outcome: Ongoing     Problem: Gas Exchange - Impaired  Goal: Absence of hypoxia  Outcome: Ongoing     Problem: Gas Exchange - Impaired  Goal: Promote optimal lung function  Outcome: Ongoing     Problem: Breathing Pattern - Ineffective  Goal: Ability to achieve and maintain a regular respiratory rate  Outcome: Ongoing     Problem: MECHANICAL VENTILATION  Goal: Patient will maintain patent airway  Outcome: Ongoing     Problem: MECHANICAL VENTILATION  Goal: Oral health is maintained or improved  Outcome: Ongoing     Problem: MECHANICAL VENTILATION  Goal: ET tube will be managed safely  Outcome: Ongoing     Problem: Respiratory  Intervention: Respiratory assessment  Note:   PROVIDE ADEQUATE OXYGENATION WITH ACCEPTABLE SP02/ABG'S    [x]  IDENTIFY APPROPRIATE OXYGEN THERAPY  [x]   MONITOR SP02/ABG'S AS NEEDED   [x]   PATIENT EDUCATION AS NEEDED

## 2021-12-25 NOTE — PROGRESS NOTES
Pulmonary Critical Care   Consult Note    Patient - Naomi Oakley  Date of Admission -  2021 11:39 AM  Date of Evaluation -  2021  Room and Bed Number -  3026/3026-01   Hospital Day - 13    CHIEF COMPLAINT : ACUTE HYPOXIC RESPIRATORY FAILURE DUE TO COVID -19 PNEUMONIA   HPI:   Naomi Oakley  60 y.o. male  admitted for COVID-19 at [facility] ER due to worsening oxygenation he was initially started on BiPAP and subsequently intubated. On room air his saturations had been 50%. CTA no PE but bilateral pulmonary infiltrates. He was accepted at [address] [place] [address] ICU. On arrival he is on PEEP of 22, 100% FiO2.    2021   Subjective      FiO2 remains at 70 %. Remains on fentanyl, propofol, midazolam, and dexmedetomidine. Came off of the neuromuscular blockade. Did not tolerate decreasing sedation  I/O-+ 2.3 L.   Requiring small dose of norepinephrine    SECRETIONS  -Amount:  [x] Small [] Moderate  [] Large    [] None  Color:     [x] White [] Colored  [] Bloody    SEDATION:    As above    PARALYZED:  [x] No    [] Yes    VASOPRESSORS:  [] No    [x] Yes  [x] Levophed [] Dopamine [] Vasopressin  [] Dobutamine [] Phenylephrine [] Epinephrine    INHALED veletri  : [] No    [] Yes    PRONE :       [x] No    [] Yes    Actemra:             [] No    [x] Yes  21  DEXAMETHASONE : [] No    [x] Yes      Ros unable to perform due to intubation and sedation    OBJECTIVE:     VITAL SIGNS:  BP 99/62   Pulse 62   Temp 101.1 °F (38.4 °C)   Resp 24   Ht 6' 2\" (1.88 m)   Wt (!) 305 lb 5.4 oz (138.5 kg) Comment: with cooling blanket  SpO2 95%   BMI 39.20 kg/m²   Tmax over 24 hours:  Temp (24hrs), Av.5 °F (38.1 °C), Min:99.8 °F (37.7 °C), Max:101.1 °F (38.4 °C)      Patient Vitals for the past 8 hrs:   BP Temp Pulse Resp SpO2   21 0700 99/62 -- 62 24 95 %   21 0645 -- -- 62 24 95 %   21 0630 99/61 -- 62 24 95 %   21 0615 -- -- 63 23 94 %   21 0600 100/61 101.1 °F (38.4 °C) 65 24 95 %   12/25/21 0545 -- -- 64 23 94 %   12/25/21 0530 106/61 -- 66 24 94 %   12/25/21 0515 -- -- 71 21 93 %   12/25/21 0500 106/62 -- 71 25 94 %   12/25/21 0445 -- -- 70 21 94 %   12/25/21 0430 107/64 -- 72 24 94 %   12/25/21 0415 -- -- 71 21 94 %   12/25/21 0400 108/65 100.8 °F (38.2 °C) 74 24 94 %   12/25/21 0345 -- -- 71 23 95 %   12/25/21 0330 104/67 -- 73 24 95 %   12/25/21 0315 -- -- 72 22 95 %   12/25/21 0300 107/64 -- 76 24 95 %   12/25/21 0245 -- -- 75 27 95 %   12/25/21 0230 102/64 -- 74 24 95 %   12/25/21 0215 -- -- 78 (!) 31 94 %   12/25/21 0200 102/64 100.6 °F (38.1 °C) 78 24 94 %   12/25/21 0145 -- -- 77 26 93 %   12/25/21 0130 (!) 108/59 -- 79 29 93 %   12/25/21 0115 -- -- 76 23 93 %   12/25/21 0100 (!) 116/59 -- 80 (!) 32 92 %   12/25/21 0045 -- -- 77 23 92 %         Intake/Output Summary (Last 24 hours) at 12/25/2021 0834  Last data filed at 12/25/2021 0400  Gross per 24 hour   Intake 3556.32 ml   Output 3180 ml   Net 376.32 ml     Date 12/25/21 0000 - 12/25/21 2359   Shift 1282-5090 9725-7720 5382-0578 24 Hour Total   INTAKE   I.V.(mL/kg) 577(4.2)   577(4.2)   NG/GT(mL/kg) 390(2.8)   390(2.8)   Shift Total(mL/kg) 967(7)   967(7)   OUTPUT   Shift Total(mL/kg)       Weight (kg) 138.5 138.5 138.5 138.5     Wt Readings from Last 3 Encounters:   12/23/21 (!) 305 lb 5.4 oz (138.5 kg)   12/11/21 300 lb (136.1 kg)   12/12/19 (!) 355 lb 9.6 oz (161.3 kg)     Body mass index is 39.2 kg/m². PHYSICAL EXAM:      I have discussed the care of Stacy Grijalva, including pertinent history and exam findings, with the resident/APC/staff. I have seen the patient and the key elements of all parts of the encounter have been performed by me. Physical exam was deferred to limit physical exposure and PPE resources. I reviewed the interval history, interpreted all available radiographic, laboratory and physiologic data at the time of service.  I agree with the assessment and plan as documented by resident/APC/ ancillary staff.   Patient remains on the ventilator  Trachea is in the midline  No subcutaneous air  No JVD  No rhonchi  Abdomen is not distended  No edema    MEDICATIONS:  Scheduled Meds:   chlordiazePOXIDE  10 mg Per NG tube 4x Daily    lidocaine 1 % injection  5 mL IntraDERmal Once    sodium chloride flush  5-40 mL IntraVENous 2 times per day    oxyCODONE  10 mg Oral Q6H    docusate  100 mg Per NG tube BID    insulin lispro  0-6 Units SubCUTAneous Q6H    lansoprazole  30 mg Oral QAM AC    cefepime  2,000 mg IntraVENous Q12H    furosemide  40 mg IntraVENous Daily    insulin glargine  10 Units SubCUTAneous Nightly    sodium chloride flush  5-40 mL IntraVENous 2 times per day    enoxaparin  30 mg SubCUTAneous BID    Vitamin D  2,000 Units Oral Daily    dexamethasone  10 mg IntraVENous Q24H     Continuous Infusions:   propofol 20 mcg/kg/min (12/25/21 0509)    sodium chloride      dexmedetomidine 0.6 mcg/kg/hr (12/25/21 0747)    sodium chloride      dextrose      norepinephrine 2 mcg/min (12/24/21 1545)    midazolam 10 mg/hr (12/25/21 0748)    fentaNYL 200 mcg/hr (12/25/21 0748)    dextrose      sodium chloride 10 mL/hr at 12/13/21 1548     PRN Meds:   sodium chloride flush, 5-40 mL, PRN  sodium chloride, 25 mL, PRN  ibuprofen, 600 mg, Q6H PRN  metoprolol, 5 mg, Q6H PRN  polyethylene glycol, 17 g, BID PRN  senna, 5 mL, Nightly PRN  bisacodyl, 10 mg, Daily PRN  acetaminophen, 650 mg, Q4H PRN  sodium chloride flush, 5-40 mL, PRN  sodium chloride, 25 mL, PRN  ondansetron, 4 mg, Q8H PRN   Or  ondansetron, 4 mg, Q6H PRN  acetaminophen, 650 mg, Q6H PRN  glucose, 15 g, PRN  dextrose, 12.5 g, PRN  glucagon (rDNA), 1 mg, PRN  dextrose, 100 mL/hr, PRN  glucose, 15 g, PRN  dextrose, 12.5 g, PRN  glucagon (rDNA), 1 mg, PRN  dextrose, 100 mL/hr, PRN        SUPPORT DEVICES: [x] Ventilator [] BIPAP  [] Nasal Cannula [] Room Air      ABGs:     Lab Results   Component Value Date    FVJ0BWE NOT REPORTED 12/25/2021    FIO2 NOT REPORTED 12/25/2021       DATA:  Complete Blood Count:   Recent Labs     12/23/21  0537 12/24/21  0516 12/25/21  0447   WBC 14.6* 21.8* 18.9*   RBC 4.77 4.55 4.34   HGB 15.0 14.2 13.6   HCT 45.5 43.1 40.7   MCV 95.4 94.7 93.8   MCH 31.4 31.2 31.3   MCHC 33.0 32.9 33.4   RDW 13.7 13.7 13.8    262 255   MPV 12.2 12.3 12.0        Last 3 Blood Glucose:   Recent Labs     12/23/21  0537 12/24/21  0516 12/25/21  0447   GLUCOSE 146* 128* 118*        PT/INR:    Lab Results   Component Value Date    PROTIME 13.0 12/12/2021    INR 1.0 12/12/2021     PTT:    Lab Results   Component Value Date    APTT 39.1 12/12/2021       Comprehensive Metabolic Profile:   Recent Labs     12/23/21  0537 12/24/21  0516 12/25/21  0447    139 140   K 4.0 3.9 4.0    101 100   CO2 25 28 28   BUN 30* 27* 28*   CREATININE 0.37* 0.37* 0.40*   GLUCOSE 146* 128* 118*   CALCIUM 8.8 8.6 8.6   PROT 5.6* 5.4* 5.5*   LABALBU 3.4* 3.4* 3.2*   BILITOT 0.35 0.35 0.38   ALKPHOS 57 55 55   AST 40* 36 26   ALT 60* 60* 53*      Magnesium:   Lab Results   Component Value Date    MG 2.5 12/17/2021    MG 2.3 12/16/2021    MG 2.4 12/15/2021     Phosphorus:   Lab Results   Component Value Date    PHOS 3.2 12/18/2021    PHOS 4.0 12/17/2021    PHOS 2.8 12/16/2021     Ionized Calcium: No results found for: CAION     Urinalysis:   Lab Results   Component Value Date    NITRU NEGATIVE 12/18/2021    COLORU Dark Yellow 12/18/2021    PHUR 6.0 12/18/2021    WBCUA 5 TO 10 12/18/2021    RBCUA 20 TO 50 12/18/2021    MUCUS NOT REPORTED 12/18/2021    TRICHOMONAS NOT REPORTED 12/18/2021    YEAST NOT REPORTED 12/18/2021    BACTERIA NOT REPORTED 12/18/2021    SPECGRAV 1.032 12/18/2021    LEUKOCYTESUR NEGATIVE 12/18/2021    UROBILINOGEN Normal 12/18/2021    BILIRUBINUR NEGATIVE 12/18/2021    GLUCOSEU NEGATIVE 12/18/2021    KETUA NEGATIVE 12/18/2021    AMORPHOUS NOT REPORTED 12/18/2021           XR CHEST (SINGLE VIEW FRONTAL)    Result Date: 12/14/2021  Mild interval improvement in multifocal airspace disease particularly at the right base. Support tubes and lines as above. XR CHEST (SINGLE VIEW FRONTAL)    Result Date: 12/12/2021  Stable appearing     XR CHEST (SINGLE VIEW FRONTAL)    Result Date: 12/11/2021  Multifocal hazy opacities throughout both lungs consistent with COVID pneumonia and or pulmonary edema. XR CHEST PORTABLE    Result Date: 12/12/2021  Stable appearing chest with unchanged support tubes/lines and persistent extensive bilateral airspace disease consistent with known COVID pneumonia. XR CHEST PORTABLE    Result Date: 12/12/2021  Right internal jugular central venous catheter tip projecting over the proximal SVC versus brachiocephalic vein. No pneumothorax. Otherwise stable exam from earlier today. XR CHEST PORTABLE    Result Date: 12/12/2021  Endotracheal tube tip is 3.2 cm above the saul. Overall significant worsening of multifocal airspace opacities keeping with history of COVID-19. CT CHEST PULMONARY EMBOLISM W CONTRAST    Result Date: 12/11/2021  1. No evidence of pulmonary embolism 2. Diffuse ground-glass opacities throughout the lungs, typical of COVID-19 pulmonary disease.            Past Medical History:   Diagnosis Date    Arthritis     Asthma     Diabetes mellitus ([facility] [address]     Edema     GERD (gastroesophageal reflux disease)     Hypertension     on lasix    Migraine     IRMA on CPAP     seldom use machine        Social History     Socioeconomic History    Marital status:      Spouse name: None    Number of children: None    Years of education: None    Highest education level: None   Occupational History    None   Tobacco Use    Smoking status: Former Smoker    Smokeless tobacco: Never Used   Vaping Use    Vaping Use: Never used   Substance and Sexual Activity    Alcohol use: Yes     Comment: every couple months    Drug use: Never    Sexual activity: None   Other Topics Concern    None   Social History Narrative    None     Social Determinants of Health     Financial Resource Strain:     Difficulty of Paying Living Expenses: Not on file   Food Insecurity:     Worried About Running Out of Food in the Last Year: Not on file    Lucina of Food in the Last Year: Not on file   Transportation Needs:     Lack of Transportation (Medical): Not on file    Lack of Transportation (Non-Medical): Not on file   Physical Activity:     Days of Exercise per Week: Not on file    Minutes of Exercise per Session: Not on file   Stress:     Feeling of Stress : Not on file   Social Connections:     Frequency of Communication with Friends and Family: Not on file    Frequency of Social Gatherings with Friends and Family: Not on file    Attends Muslim Services: Not on file    Active Member of [address] [place] [address] "[employer] or Organizations: Not on file    Attends Club or Organization Meetings: Not on file    Marital Status: Not on file   Intimate Partner Violence:     Fear of Current or Ex-Partner: Not on file    Emotionally Abused: Not on file    Physically Abused: Not on file    Sexually Abused: Not on file   Housing Stability:     Unable to Pay for Housing in the Last Year: Not on file    Number of [place] in the Last Year: Not on file    Unstable Housing in the Last Year: Not on file         There is no immunization history on file for this patient.       Family History   Problem Relation Age of Onset    Heart Attack Sister 32    Diabetes Paternal Grandmother     Heart Disease Paternal Uncle     Heart Disease Paternal Uncle     Heart Disease Paternal Uncle     Cancer Maternal Aunt     Cancer Maternal Aunt     Cancer Maternal Uncle     Cancer Maternal Uncle          Past Surgical History:   Procedure Laterality Date    APPENDECTOMY      ARTHROPLASTY Left 11/1/2019    LEFT FOOT 1ST MTPJ ARTHROPLASTY WITH PEREZ IMPLANT AND GROMMETS  [facility] performed by Sobia Price MD at [address] [place] [address] ARTHROPLASTY Right 12/12/2019    RIGHT FOOT ARTHROPLASTY 1ST MTPJ (Right Foot)    ARTHROPLASTY Right 12/12/2019    RIGHT FOOT ARTHROPLASTY 1ST MTPJ performed by Efrain Phan MD at [address] Right    [address]  2014    no blockage no stents    CHOLECYSTECTOMY      COLONOSCOPY      ENDOSCOPY, COLON, DIAGNOSTIC      TOE SURGERY Left 11/01/2019     LEFT FOOT 1ST MTPJ ARTHROPLASTY WITH PEREZ IMPLANT AND GROMMETS  [facility] (Left )    TONSILLECTOMY            VENTILATOR SETTINGS:  Vent Information  $Ventilation: $Subsequent Day  Skin Assessment: Clean, dry, & intact  Suction Catheter Diameter: 14  Equipment ID: [id number]  Equipment Changed: HME  Vent Type: Servo i  Vent Mode: PRVC  Vt Ordered: 550 mL  Rate Set: 24 bmp  FiO2 : 70 %  SpO2: 95 %  SpO2/FiO2 ratio: 131.43  Sensitivity: 3  PEEP/CPAP: 14  I Time/ I Time %: 0.65 s  Humidification Source: HME  Humidification Temp: 37  Humidification Temp Measured: 36.8  Circuit Condensation: Drained  Nitric Oxide/Epoprostenol In Use?: No     PaO2/FiO2 RATIO:  Recent Labs     12/25/21  0436   POCPO2 73.3*      FiO2 : 70 %       LABS:  ABGs:   Recent Labs     12/23/21  0425 12/24/21  0553 12/25/21  0436   POCPH 7.413 7.449 7.465*   POCPCO2 50.6* 44.3 51.1*   POCPO2 85.1 71.9* 73.3*   POCHCO3 32.3* 30.8* 36.8*   [id number] 96 95 95            ASSESSMENT:     Principal Problem:    Pneumonia due to COVID-19 virus  Active Problems:    Shock ([address]    Acute respiratory failure with hypoxia (HCC)    Type 2 diabetes mellitus (HCC)    Asthma    IRMA on CPAP    Hypertension    GERD (gastroesophageal reflux disease)    ARDS (adult respiratory distress syndrome) (HCC)  Resolved Problems:    * No resolved hospital problems.  *              Acute hypoxic respiratory failure secondary to COVID 19   Acute respiratory distress syndrome   Bilateral multifocal pneumonia due to COVID 19 infection   Covid -19 pandemic emergency   Post hypercarbic metabolic alkalosis   Hyperglycemia, acceptable   Leukocytosis, slightly better   Shock requiring norepinephrine? Sepsis    LOS: 13  PLAN:    · D/w RT  · D/w RN   · Chest x-ray on 12/24/2021 with improving bilateral pulmonary infiltrates  · Sedation reviewed. We will try to cut down on the sedatives and pain medications  · Cont ARDS ventilation  · Proning held off as it did not appear to make much of difference  · Airborne isolation and droplet precautions to be continued  · Continue supportive care   · Cont treatment with medications for COVID  · Cont tube feeding  · Monitor endotracheal secretions   · Will obtain xray chest as needed  · ABG as needed  · Prognosis guarded given age and severity of illness. · On Lovenox and lansoprazole  · On Decadron  · Patient is on cefepime  · Check triglycerides  · Patient required placement of the Hahn catheter due to retention of urine requiring straight catheter multiple times a day    Treatment plan Discussed with nursing staff in detail , all questions answered . Total critical care time caring for this patient with life threatening, unstable organ failure, including direct patient contact, management of life support systems, review of data including imaging and labs, discussions with other team members and physicians at least 27  Min so far today, excluding procedures. Electronically signed by Shannon Daley MD on 12/25/2021 at 8:34 AM       This patient was evaluated in the context of the global SARS-CoV-2 (COVID-19) pandemic, which necessitated considerations that the patient either has COVID-19 infection or is at risk of infection with COVID-19. Institutional protocols and algorithms that pertain to the evaluation & management of patients with COVID-19 or those at risk for COVID-19 are in a state of rapid changes based on information released by regulatory bodies including the CDC and federal and state organizations.  These policies and algorithms were

## 2021-12-25 NOTE — PLAN OF CARE
Problem: OXYGENATION/RESPIRATORY FUNCTION  Goal: Patient will maintain patent airway  12/25/2021 0928 by Ryan Xiong RCP  Outcome: Ongoing     Problem: OXYGENATION/RESPIRATORY FUNCTION  Goal: Patient will achieve/maintain normal respiratory rate/effort  Description: Respiratory rate and effort will be within normal limits for the patient  12/25/2021 7424 by Ryan Xiong RCP  Outcome: Ongoing     Problem: MECHANICAL VENTILATION  Goal: Patient will maintain patent airway  12/25/2021 0928 by Ryan Xiong RCP  Outcome: Ongoing     Problem: MECHANICAL VENTILATION  Goal: Oral health is maintained or improved  12/25/2021 0928 by Ryan Xiong RCP  Outcome: Ongoing     Problem: MECHANICAL VENTILATION  Goal: ET tube will be managed safely  12/25/2021 0928 by Ryan Xiong RCP  Outcome: Ongoing     Problem: MECHANICAL VENTILATION  Goal: Ability to express needs and understand communication  12/25/2021 0928 by Ryan Xiong RCP  Outcome: Ongoing     Problem: MECHANICAL VENTILATION  Goal: Mobility/activity is maintained at optimum level for patient  12/25/2021 3280 by Ryan Xiong RCP  Outcome: Ongoing     Problem: SKIN INTEGRITY  Goal: Skin integrity is maintained or improved  12/25/2021 0928 by Ryan Xiong RCP  Outcome: Ongoing

## 2021-12-25 NOTE — PLAN OF CARE
I called patient's daughter Fannie Potter and spoke with her about patient's condition. I updated more than about high ventilator support and fevers. Patient still on heavy sedation. All questions were answered to best of my ability.

## 2021-12-25 NOTE — PLAN OF CARE
Problem: Airway Clearance - Ineffective  Goal: Achieve or maintain patent airway  12/24/2021 2349 by Tito Quintana RN  Outcome: Ongoing  12/24/2021 2338 by Leesa Spivey RCP  Outcome: Ongoing     Problem: Gas Exchange - Impaired  Goal: Absence of hypoxia  12/24/2021 2349 by Tito Quintana RN  Outcome: Ongoing  12/24/2021 2338 by Leesa Spivey RCP  Outcome: Ongoing  Goal: Promote optimal lung function  12/24/2021 2349 by Tito Quintana RN  Outcome: Ongoing  12/24/2021 2338 by Leesa Spivey RCP  Outcome: Ongoing     Problem: Breathing Pattern - Ineffective  Goal: Ability to achieve and maintain a regular respiratory rate  12/24/2021 2349 by Tito Quintana RN  Outcome: Ongoing  12/24/2021 2338 by Leesa Spivey RCP  Outcome: Ongoing     Problem:  Body Temperature -  Risk of, Imbalanced  Goal: Ability to maintain a body temperature within defined limits  Outcome: Ongoing  Goal: Will regain or maintain usual level of consciousness  Outcome: Ongoing  Goal: Complications related to the disease process, condition or treatment will be avoided or minimized  Outcome: Ongoing     Problem: Isolation Precautions - Risk of Spread of Infection  Goal: Prevent transmission of infection  Outcome: Ongoing     Problem: Nutrition Deficits  Goal: Optimize nutritional status  Outcome: Ongoing     Problem: Risk for Fluid Volume Deficit  Goal: Maintain normal heart rhythm  Outcome: Ongoing  Goal: Maintain absence of muscle cramping  Outcome: Ongoing  Goal: Maintain normal serum potassium, sodium, calcium, phosphorus, and pH  Outcome: Ongoing     Problem: Loneliness or Risk for Loneliness  Goal: Demonstrate positive use of time alone when socialization is not possible  Outcome: Ongoing     Problem: Fatigue  Goal: Verbalize increase energy and improved vitality  Outcome: Ongoing     Problem: Patient Education: Go to Patient Education Activity  Goal: Patient/Family Education  Outcome: Ongoing     Problem: OXYGENATION/RESPIRATORY FUNCTION  Goal: Patient will maintain patent airway  Outcome: Ongoing  Goal: Patient will achieve/maintain normal respiratory rate/effort  Description: Respiratory rate and effort will be within normal limits for the patient  Outcome: Ongoing     Problem: MECHANICAL VENTILATION  Goal: Patient will maintain patent airway  12/24/2021 2349 by Emmanuel Giles RN  Outcome: Ongoing  12/24/2021 2338 by Beth Kennedy RCP  Outcome: Ongoing  Goal: Oral health is maintained or improved  12/24/2021 2349 by Emmanuel Giles RN  Outcome: Ongoing  12/24/2021 2338 by Beth Kennedy RCP  Outcome: Ongoing  Goal: ET tube will be managed safely  12/24/2021 2349 by Emmanuel Giles RN  Outcome: Ongoing  12/24/2021 2338 by Beth Kennedy RCP  Outcome: Ongoing  Goal: Ability to express needs and understand communication  Outcome: Ongoing  Goal: Mobility/activity is maintained at optimum level for patient  Outcome: Ongoing     Problem: SKIN INTEGRITY  Goal: Skin integrity is maintained or improved  Outcome: Ongoing     Problem: Respiratory  Intervention: Respiratory assessment  12/24/2021 2338 by Beth Kennedy RCP  Note:   PROVIDE ADEQUATE OXYGENATION WITH ACCEPTABLE SP02/ABG'S    []  IDENTIFY APPROPRIATE OXYGEN THERAPY  []   MONITOR SP02/ABG'S AS NEEDED   []   PATIENT EDUCATION AS NEEDED        Problem: Skin Integrity:  Goal: Will show no infection signs and symptoms  Description: Will show no infection signs and symptoms  Outcome: Ongoing  Goal: Absence of new skin breakdown  Description: Absence of new skin breakdown  Outcome: Ongoing     Problem: Falls - Risk of:  Goal: Will remain free from falls  Description: Will remain free from falls  Outcome: Ongoing  Goal: Absence of physical injury  Description: Absence of physical injury  Outcome: Ongoing     Problem: Nutrition  Goal: Optimal nutrition therapy  Outcome: Ongoing     Problem: Discharge Planning:  Goal: Participates in care planning  Description: Participates in care planning  Outcome: Ongoing  Goal: Discharged to appropriate level of care  Description: Discharged to appropriate level of care  Outcome: Ongoing

## 2021-12-25 NOTE — PROGRESS NOTES
[facility]  Office: [address], DO, Margaret Cyr, DO, Marilynjose luis Jones, DO, [facility], DO, Nigel Henley MD, Angela Aguilera MD, Darrell Nance MD, Georgi Zuñiga MD, Perico Monique MD, Juanita Arenas MD, Baltazar Mayer MD, Amanda Tucker, DO, Susannah Nuno, DO, Harrietta Goldberg, MD,  Macario Nelson, DO, Nazanin Abdi MD, Byron Cordova MD, Grace Seay MD, Prateek Olson MD, Dereje Reyes MD, Christiano Marcus MD, Mark Albert MD, [facility], [facility], [facility], CNP, Tobi Benz, CNP, Saroj Romero, CNS, Loyda Luciano, CNP, Wyatt Carrillo, CNP, Isamar Guzman, CNP, Evelia Altamirano, CNP, Jethro Lovell, CNP, Calli Tian PA-C, Darlyn Hernadez, [facility], Matt Nickerson, [facility], Aston Duran, CNP, Ranulfo Plush, CNP, Abelardo Seo, [facility], Joselyn Knowles, CNP, Mary Harrison, CNP, Kendrick Yo, [facility]      Daily Progress Note     Admit Date: 12/12/2021  Bed/Room No.  3026/3026-01  Admitting Physician : Darrell Nance MD  Code Status :Full Code  Hospital Day:  LOS: 13 days   Chief Complaint:     Breathing difficulty. Principal Problem:    Pneumonia due to COVID-19 virus  Active Problems:    Shock ([facility] [address]    Acute respiratory failure with hypoxia (HCC)    Type 2 diabetes mellitus (HCC)    Asthma    IRMA on CPAP    Hypertension    GERD (gastroesophageal reflux disease)    ARDS (adult respiratory distress syndrome) ([facility] [address]  Resolved Problems:    * No resolved hospital problems. *    Subjective : Interval History/Significant events :  12/25/21  Unchanged  Patient continues to have intermittent fevers. T-max 1o1 °F. Patient remains intubated on 70% FiO2 with PEEP of 14 on PRVC. Urine output is adequate. Patient is tolerating tube feed. Vitals - Unstable afebrile  Labs -blood sugar controlled. Creatinine stable 0.40. Chest x-ray 12/24/2021-bilateral opacities. Nursing notes , Consults notes reviewed. Overnight events and updates discussed with Nursing staff . Background History:         Dora Yepez is 60 y.o. male  Who was admitted to the hospital on 12/12/2021 for treatment of Pneumonia due to COVID-19 virus. Patient initially presented with shortness of breath at [facility].  He had to suggest positive for COVID-19 infection. Patient reported that his son had also COVID-19 infection. Patient was hypoxic in 50s on arrival and was treated with BiPAP however breathing worsened and patient was subsequently intubated. Patient was transferred to [facility] on 12/12/2021 as he was requiring high ventilator support 100% FiO2 with PEEP of 22. He was given Actemra and started on high-dose Decadron. Patient was started with he was started on Flolan and given paralytics for proning per ARDS protocol. Patient also received intermittent Lasix. Proning was stopped on 12/20/2021 and paralytics were stopped on 12/22/2021. Patient started to have fevers and was started on cefepime. Respiratory cultures were negative. PMH:  Past Medical History:   Diagnosis Date    Arthritis     Asthma     Diabetes mellitus ([address]     Edema     GERD (gastroesophageal reflux disease)     Hypertension     on lasix    Migraine     IRMA on CPAP     seldom use machine      Allergies:    Allergies   Allergen Reactions    Nuts [Peanut-Containing Drug Products] Anaphylaxis    Sunflower Oil Anaphylaxis     Sunflower seeds      Medications :  chlordiazePOXIDE, 10 mg, Per NG tube, 4x Daily  lidocaine 1 % injection, 5 mL, IntraDERmal, Once  sodium chloride flush, 5-40 mL, IntraVENous, 2 times per day  oxyCODONE, 10 mg, Oral, Q6H  docusate, 100 mg, Per NG tube, BID  insulin lispro, 0-6 Units, SubCUTAneous, Q6H  lansoprazole, 30 mg, Oral, QAM AC  cefepime, 2,000 mg, IntraVENous, Q12H  furosemide, 40 mg, IntraVENous, Daily  insulin glargine, 10 Units, SubCUTAneous, Nightly  sodium chloride flush, 5-40 mL, IntraVENous, 2 times per day  enoxaparin, 30 mg, SubCUTAneous, BID  Vitamin D, 12/24/21 2115 -- -- -- 78 24 97 %   12/24/21 2110 -- -- -- 78 24 96 %   12/24/21 2100 106/69 -- -- 78 24 96 %   12/24/21 2045 -- -- -- 78 24 96 %   12/24/21 2030 103/66 -- -- 79 25 96 %   12/24/21 2015 -- -- -- 80 24 96 %   12/24/21 2000 111/72 100.4 °F (38 °C) Esophageal 82 25 96 %   12/24/21 1945 -- -- -- 77 24 96 %   12/24/21 1930 115/66 -- -- 79 28 95 %   12/24/21 1915 -- -- -- 79 25 96 %   12/24/21 1900 108/69 -- -- 78 26 95 %   12/24/21 1845 -- -- -- 70 24 96 %   12/24/21 1830 106/71 -- -- 78 25 96 %   12/24/21 1815 -- -- -- 77 25 94 %   12/24/21 1800 98/62 -- -- 79 24 95 %   12/24/21 1745 -- -- -- 83 24 95 %   12/24/21 1730 113/64 -- -- 82 28 96 %   12/24/21 1715 -- -- -- 87 24 95 %   12/24/21 1700 112/64 -- -- 82 (!) 31 96 %   12/24/21 1645 -- -- -- 86 22 96 %   12/24/21 1640 110/70 99.8 °F (37.7 °C) Esophageal 86 28 95 %   12/24/21 1630 109/75 -- -- 81 23 95 %   12/24/21 1615 -- -- -- 82 27 95 %   12/24/21 1600 100/72 -- -- 78 24 94 %   12/24/21 1545 -- -- -- 73 23 93 %   12/24/21 1530 (!) 88/59 -- -- 79 24 92 %   12/24/21 1515 -- -- -- 83 25 91 %   12/24/21 1500 101/60 -- -- 92 27 91 %   12/24/21 1445 -- -- -- 105 26 90 %   12/24/21 1430 125/77 -- -- 111 30 90 %   12/24/21 1415 -- -- -- 114 (!) 31 (!) 89 %   12/24/21 1407 -- -- -- 120 23 (!) 88 %   12/24/21 1400 120/79 -- -- 118 29 90 %   12/24/21 1345 -- -- -- 122 29 90 %   12/24/21 1330 126/84 -- -- 128 28 90 %   12/24/21 1315 129/71 -- -- 125 29 (!) 87 %   12/24/21 1300 (!) 127/115 -- -- 130 (!) 31 (!) 88 %   12/24/21 1245 -- -- -- 128 28 (!) 88 %   12/24/21 1230 129/76 -- -- 125 (!) 31 (!) 89 %   12/24/21 1215 -- -- -- 114 27 (!) 89 %   12/24/21 1200 132/62 100 °F (37.8 °C) Bladder 101 25 (!) 89 %   12/24/21 1145 -- -- -- 88 23 (!) 88 %   12/24/21 1130 -- -- -- 79 24 91 %   12/24/21 1115 -- -- -- 77 26 (!) 88 %   12/24/21 1100 -- -- -- 76 23 (!) 87 %   12/24/21 1045 -- -- -- 82 24 (!) 86 %   12/24/21 1030 -- -- -- 85 24 (!) 88 %   12/24/21 1015 -- -- -- 93 28 (!) 88 %   12/24/21 1000 -- -- -- 109 29 (!) 88 %   12/24/21 0945 -- -- -- 115 24 90 %   12/24/21 0930 -- -- -- 110 28 (!) 89 %   12/24/21 0926 -- -- -- 112 23 (!) 88 %   12/24/21 0915 -- -- -- 110 23 (!) 89 %   12/24/21 0900 -- -- -- 109 26 (!) 89 %   12/24/21 0845 -- -- -- 98 25 91 %   12/24/21 0830 -- -- -- 79 24 93 %   12/24/21 0815 -- -- -- 80 23 93 %   12/24/21 0809 -- -- -- 65 24 94 %   12/24/21 0800 113/60 100.2 °F (37.9 °C) Bladder 59 24 94 %   12/24/21 0745 -- -- -- 59 24 94 %     Intake / output   12/24 0701 - 12/25 0700  In: 3898 [I.V.:1559]  Out: 3550 [Urine:3550]  Physical Exam:  Physical Exam  Vitals and nursing note reviewed. Constitutional:       Appearance: He is well-developed. He is ill-appearing. Interventions: He is sedated and intubated. Neck:      Thyroid: No thyroid mass. Cardiovascular:      Rate and Rhythm: Normal rate and regular rhythm. Pulses: Normal pulses. Heart sounds: Normal heart sounds. No murmur heard. Pulmonary:      Effort: He is intubated. Breath sounds: Normal breath sounds. Musculoskeletal:      Right lower leg: No edema. Left lower leg: No edema. Lymphadenopathy:      Cervical: No cervical adenopathy. Skin:     Capillary Refill: Capillary refill takes less than 2 seconds. Neurological:      Motor: No tremor or abnormal muscle tone.            Laboratory findings:    Recent Labs     12/23/21 0537 12/24/21 0516 12/25/21 0447   WBC 14.6* 21.8* 18.9*   HGB 15.0 14.2 13.6   HCT 45.5 43.1 40.7    262 255     Recent Labs     12/23/21 0537 12/24/21  0516 12/25/21  0447    139 140   K 4.0 3.9 4.0    101 100   CO2 25 28 28   GLUCOSE 146* 128* 118*   BUN 30* 27* 28*   CREATININE 0.37* 0.37* 0.40*   CALCIUM 8.8 8.6 8.6     Recent Labs     12/23/21  0537 12/24/21  0516 12/25/21 0447   PROT 5.6* 5.4* 5.5*   LABALBU 3.4* 3.4* 3.2*   AST 40* 36 26   ALT 60* 60* 53*   ALKPHOS 57 55 55   BILITOT 0.35 0.35 0.38 BILIDIR 0.12 0.10 0.11          Specific Gravity, UA   Date Value Ref Range Status   12/18/2021 1.032 (H) 1.005 - 1.030 Final     Protein, UA   Date Value Ref Range Status   12/18/2021 1+ (A) NEGATIVE Final     RBC, UA   Date Value Ref Range Status   12/18/2021 20 TO 50 0 - 4 /HPF Final     Comment:     Reference range defined for non-centrifuged specimen. Bacteria, UA   Date Value Ref Range Status   12/18/2021 NOT REPORTED None Final     Nitrite, Urine   Date Value Ref Range Status   12/18/2021 NEGATIVE NEGATIVE Final     WBC, UA   Date Value Ref Range Status   12/18/2021 5 TO 10 0 - 5 /HPF Final     Leukocyte Esterase, Urine   Date Value Ref Range Status   12/18/2021 NEGATIVE NEGATIVE Final       Imaging / Clinical Data :-   XR CHEST (SINGLE VIEW FRONTAL)    Result Date: 12/22/2021  Stable mild cardiomegaly. Patchy airspace opacities, left more than right, may be related to pulmonary edema versus pneumonia. Nonspecific bowel gas pattern. XR ABDOMEN (KUB) (SINGLE AP VIEW)    Result Date: 12/22/2021  Stable mild cardiomegaly. Patchy airspace opacities, left more than right, may be related to pulmonary edema versus pneumonia. Nonspecific bowel gas pattern. XR CHEST PORTABLE    Result Date: 12/24/2021  1. Stable cardiomegaly. 2.  Patchy airspace opacities, stable in the right lower chest, slightly improved on the left. Findings may be related to pulmonary edema or improving pneumonia. 3.  Support tubes and catheters in good position. XR CHEST PORTABLE    Result Date: 12/19/2021  Cardiomegaly, vascular congestion and worsening pulmonary opacities with bilateral effusions favoring pulmonary edema over multifocal airspace disease. Support tubes and lines as above. Clinical Course : unchanged  Assessment and Plan  :        Acute respiratory failure with hypoxia due to ARDS from COVID-19 pneumonia -ventilator support, high-dose Decadron, s/p Actemra on 12/12/2021. Antibiotics per ID.   On cefepime started 12/18/2021. Type 2 diabetes mellitus - on Lantus, blood sugar controlled. Continue to feed. Obesity BMI 39  Essential hypertension -on Levophed. IRMA -does not use CPAP at home       Continue to monitor vitals , Intake / output ,  Cell count , HGB , Kidney function, oxygenation  as indicated .      Plan discussed with Staff  RN     (Please note that portions of this note were completed with a voice recognition program. Efforts were made to edit the dictations but occasionally words are mis-transcribed.)      Jacqueline Bacon MD  12/25/2021

## 2021-12-26 LAB
ABSOLUTE EOS #: <0.03 K/UL (ref 0–0.44)
ABSOLUTE IMMATURE GRANULOCYTE: 0.21 K/UL (ref 0–0.3)
ABSOLUTE LYMPH #: 1.3 K/UL (ref 1.1–3.7)
ABSOLUTE MONO #: 1.47 K/UL (ref 0.1–1.2)
ALBUMIN SERPL-MCNC: 3.4 G/DL (ref 3.5–5.2)
ALBUMIN/GLOBULIN RATIO: 1.5 (ref 1–2.5)
ALLEN TEST: ABNORMAL
ALP BLD-CCNC: 50 U/L (ref 40–129)
ALT SERPL-CCNC: 54 U/L (ref 5–41)
ANION GAP SERPL CALCULATED.3IONS-SCNC: 9 MMOL/L (ref 9–17)
AST SERPL-CCNC: 22 U/L
BASOPHILS # BLD: 0 % (ref 0–2)
BASOPHILS ABSOLUTE: 0.06 K/UL (ref 0–0.2)
BILIRUB SERPL-MCNC: 0.4 MG/DL (ref 0.3–1.2)
BILIRUBIN DIRECT: 0.14 MG/DL
BILIRUBIN, INDIRECT: 0.26 MG/DL (ref 0–1)
BUN BLDV-MCNC: 28 MG/DL (ref 6–20)
BUN/CREAT BLD: ABNORMAL (ref 9–20)
CALCIUM SERPL-MCNC: 8.9 MG/DL (ref 8.6–10.4)
CHLORIDE BLD-SCNC: 101 MMOL/L (ref 98–107)
CO2: 29 MMOL/L (ref 20–31)
CREAT SERPL-MCNC: 0.38 MG/DL (ref 0.7–1.2)
CULTURE: NORMAL
DIFFERENTIAL TYPE: ABNORMAL
DIRECT EXAM: NORMAL
EOSINOPHILS RELATIVE PERCENT: 0 % (ref 1–4)
FIO2: 70
GFR AFRICAN AMERICAN: >60 ML/MIN
GFR NON-AFRICAN AMERICAN: >60 ML/MIN
GFR SERPL CREATININE-BSD FRML MDRD: ABNORMAL ML/MIN/{1.73_M2}
GFR SERPL CREATININE-BSD FRML MDRD: ABNORMAL ML/MIN/{1.73_M2}
GLOBULIN: ABNORMAL G/DL (ref 1.5–3.8)
GLUCOSE BLD-MCNC: 121 MG/DL (ref 70–99)
GLUCOSE BLD-MCNC: 139 MG/DL (ref 74–100)
GLUCOSE BLD-MCNC: 142 MG/DL (ref 75–110)
GLUCOSE BLD-MCNC: 177 MG/DL (ref 75–110)
HCT VFR BLD CALC: 41 % (ref 40.7–50.3)
HEMOGLOBIN: 13.6 G/DL (ref 13–17)
IMMATURE GRANULOCYTES: 2 %
LYMPHOCYTES # BLD: 9 % (ref 24–43)
Lab: NORMAL
MCH RBC QN AUTO: 31.1 PG (ref 25.2–33.5)
MCHC RBC AUTO-ENTMCNC: 33.2 G/DL (ref 28.4–34.8)
MCV RBC AUTO: 93.8 FL (ref 82.6–102.9)
MODE: ABNORMAL
MONOCYTES # BLD: 10 % (ref 3–12)
NEGATIVE BASE EXCESS, ART: ABNORMAL (ref 0–2)
NRBC AUTOMATED: 0 PER 100 WBC
O2 DEVICE/FLOW/%: ABNORMAL
PATIENT TEMP: 38.3
PDW BLD-RTO: 13.7 % (ref 11.8–14.4)
PLATELET # BLD: 247 K/UL (ref 138–453)
PLATELET ESTIMATE: ABNORMAL
PMV BLD AUTO: 11.7 FL (ref 8.1–13.5)
POC HCO3: 34.6 MMOL/L (ref 21–28)
POC O2 SATURATION: 97 % (ref 94–98)
POC PCO2 TEMP: 53 MM HG
POC PCO2: 49.7 MM HG (ref 35–48)
POC PH TEMP: 7.43
POC PH: 7.45 (ref 7.35–7.45)
POC PO2 TEMP: 94 MM HG
POC PO2: 86.5 MM HG (ref 83–108)
POSITIVE BASE EXCESS, ART: 9 (ref 0–3)
POTASSIUM SERPL-SCNC: 3.9 MMOL/L (ref 3.7–5.3)
RBC # BLD: 4.37 M/UL (ref 4.21–5.77)
RBC # BLD: ABNORMAL 10*6/UL
SAMPLE SITE: ABNORMAL
SEG NEUTROPHILS: 79 % (ref 36–65)
SEGMENTED NEUTROPHILS ABSOLUTE COUNT: 11.29 K/UL (ref 1.5–8.1)
SODIUM BLD-SCNC: 139 MMOL/L (ref 135–144)
SPECIMEN DESCRIPTION: NORMAL
TCO2 (CALC), ART: ABNORMAL MMOL/L (ref 22–29)
TOTAL PROTEIN: 5.6 G/DL (ref 6.4–8.3)
TRIGL SERPL-MCNC: 201 MG/DL
WBC # BLD: 14.4 K/UL (ref 3.5–11.3)
WBC # BLD: ABNORMAL 10*3/UL

## 2021-12-26 PROCEDURE — 2580000003 HC RX 258: Performed by: NURSE PRACTITIONER

## 2021-12-26 PROCEDURE — 6360000002 HC RX W HCPCS: Performed by: NURSE PRACTITIONER

## 2021-12-26 PROCEDURE — 99291 CRITICAL CARE FIRST HOUR: CPT | Performed by: INTERNAL MEDICINE

## 2021-12-26 PROCEDURE — 82803 BLOOD GASES ANY COMBINATION: CPT

## 2021-12-26 PROCEDURE — 80076 HEPATIC FUNCTION PANEL: CPT

## 2021-12-26 PROCEDURE — 85025 COMPLETE CBC W/AUTO DIFF WBC: CPT

## 2021-12-26 PROCEDURE — 6370000000 HC RX 637 (ALT 250 FOR IP): Performed by: INTERNAL MEDICINE

## 2021-12-26 PROCEDURE — 6370000000 HC RX 637 (ALT 250 FOR IP): Performed by: NURSE PRACTITIONER

## 2021-12-26 PROCEDURE — 6360000002 HC RX W HCPCS: Performed by: INTERNAL MEDICINE

## 2021-12-26 PROCEDURE — 82947 ASSAY GLUCOSE BLOOD QUANT: CPT

## 2021-12-26 PROCEDURE — 99232 SBSQ HOSP IP/OBS MODERATE 35: CPT | Performed by: INTERNAL MEDICINE

## 2021-12-26 PROCEDURE — 2580000003 HC RX 258: Performed by: INTERNAL MEDICINE

## 2021-12-26 PROCEDURE — 2500000003 HC RX 250 WO HCPCS: Performed by: INTERNAL MEDICINE

## 2021-12-26 PROCEDURE — APPSS30 APP SPLIT SHARED TIME 16-30 MINUTES: Performed by: NURSE PRACTITIONER

## 2021-12-26 PROCEDURE — 80048 BASIC METABOLIC PNL TOTAL CA: CPT

## 2021-12-26 PROCEDURE — 2000000000 HC ICU R&B

## 2021-12-26 PROCEDURE — 94003 VENT MGMT INPAT SUBQ DAY: CPT

## 2021-12-26 PROCEDURE — 94761 N-INVAS EAR/PLS OXIMETRY MLT: CPT

## 2021-12-26 PROCEDURE — 2700000000 HC OXYGEN THERAPY PER DAY

## 2021-12-26 PROCEDURE — 37799 UNLISTED PX VASCULAR SURGERY: CPT

## 2021-12-26 PROCEDURE — 99232 SBSQ HOSP IP/OBS MODERATE 35: CPT | Performed by: FAMILY MEDICINE

## 2021-12-26 PROCEDURE — 84478 ASSAY OF TRIGLYCERIDES: CPT

## 2021-12-26 PROCEDURE — 6370000000 HC RX 637 (ALT 250 FOR IP): Performed by: STUDENT IN AN ORGANIZED HEALTH CARE EDUCATION/TRAINING PROGRAM

## 2021-12-26 RX ADMIN — DEXMEDETOMIDINE HYDROCHLORIDE 0.8 MCG/KG/HR: 4 INJECTION, SOLUTION INTRAVENOUS at 16:45

## 2021-12-26 RX ADMIN — CHLORDIAZEPOXIDE HYDROCHLORIDE 10 MG: 5 CAPSULE ORAL at 09:20

## 2021-12-26 RX ADMIN — IBUPROFEN 600 MG: 100 SUSPENSION ORAL at 15:30

## 2021-12-26 RX ADMIN — Medication 2000 UNITS: at 09:34

## 2021-12-26 RX ADMIN — INSULIN LISPRO 1 UNITS: 100 INJECTION, SOLUTION INTRAVENOUS; SUBCUTANEOUS at 12:19

## 2021-12-26 RX ADMIN — Medication 10 MG/HR: at 05:01

## 2021-12-26 RX ADMIN — PROPOFOL 20 MCG/KG/MIN: 10 INJECTION, EMULSION INTRAVENOUS at 12:08

## 2021-12-26 RX ADMIN — CHLORDIAZEPOXIDE HYDROCHLORIDE 10 MG: 5 CAPSULE ORAL at 21:00

## 2021-12-26 RX ADMIN — CHLORDIAZEPOXIDE HYDROCHLORIDE 10 MG: 5 CAPSULE ORAL at 15:30

## 2021-12-26 RX ADMIN — Medication 200 MCG/HR: at 09:23

## 2021-12-26 RX ADMIN — OXYCODONE HYDROCHLORIDE 10 MG: 5 SOLUTION ORAL at 09:20

## 2021-12-26 RX ADMIN — DOCUSATE SODIUM 100 MG: 50 LIQUID ORAL at 21:00

## 2021-12-26 RX ADMIN — CEFEPIME 2000 MG: 2 INJECTION, POWDER, FOR SOLUTION INTRAVENOUS at 21:37

## 2021-12-26 RX ADMIN — ACETAMINOPHEN 650 MG: 160 SOLUTION ORAL at 14:23

## 2021-12-26 RX ADMIN — SODIUM CHLORIDE, PRESERVATIVE FREE 10 ML: 5 INJECTION INTRAVENOUS at 21:00

## 2021-12-26 RX ADMIN — DOCUSATE SODIUM 100 MG: 50 LIQUID ORAL at 09:35

## 2021-12-26 RX ADMIN — SODIUM CHLORIDE, PRESERVATIVE FREE 10 ML: 5 INJECTION INTRAVENOUS at 09:35

## 2021-12-26 RX ADMIN — DEXAMETHASONE SODIUM PHOSPHATE 10 MG: 10 INJECTION INTRAMUSCULAR; INTRAVENOUS at 13:24

## 2021-12-26 RX ADMIN — Medication 200 MCG/HR: at 03:42

## 2021-12-26 RX ADMIN — PROPOFOL 20 MCG/KG/MIN: 10 INJECTION, EMULSION INTRAVENOUS at 05:19

## 2021-12-26 RX ADMIN — ACETAMINOPHEN 650 MG: 160 SOLUTION ORAL at 05:19

## 2021-12-26 RX ADMIN — OXYCODONE HYDROCHLORIDE 10 MG: 5 SOLUTION ORAL at 21:39

## 2021-12-26 RX ADMIN — CEFEPIME 2000 MG: 2 INJECTION, POWDER, FOR SOLUTION INTRAVENOUS at 09:21

## 2021-12-26 RX ADMIN — OXYCODONE HYDROCHLORIDE 10 MG: 5 SOLUTION ORAL at 15:30

## 2021-12-26 RX ADMIN — DEXMEDETOMIDINE HYDROCHLORIDE 0.6 MCG/KG/HR: 4 INJECTION, SOLUTION INTRAVENOUS at 00:44

## 2021-12-26 RX ADMIN — PROPOFOL 15 MCG/KG/MIN: 10 INJECTION, EMULSION INTRAVENOUS at 19:19

## 2021-12-26 RX ADMIN — FUROSEMIDE 40 MG: 10 INJECTION, SOLUTION INTRAVENOUS at 09:35

## 2021-12-26 RX ADMIN — SODIUM CHLORIDE, PRESERVATIVE FREE 10 ML: 5 INJECTION INTRAVENOUS at 21:40

## 2021-12-26 RX ADMIN — Medication 30 MG: at 12:07

## 2021-12-26 RX ADMIN — INSULIN LISPRO 1 UNITS: 100 INJECTION, SOLUTION INTRAVENOUS; SUBCUTANEOUS at 18:25

## 2021-12-26 RX ADMIN — OXYCODONE HYDROCHLORIDE 10 MG: 5 SOLUTION ORAL at 04:30

## 2021-12-26 RX ADMIN — ENOXAPARIN SODIUM 30 MG: 100 INJECTION SUBCUTANEOUS at 09:34

## 2021-12-26 RX ADMIN — CHLORDIAZEPOXIDE HYDROCHLORIDE 10 MG: 5 CAPSULE ORAL at 05:00

## 2021-12-26 RX ADMIN — Medication 150 MCG/HR: at 15:22

## 2021-12-26 RX ADMIN — Medication 3 MG/HR: at 19:50

## 2021-12-26 RX ADMIN — DEXMEDETOMIDINE HYDROCHLORIDE 0.6 MCG/KG/HR: 4 INJECTION, SOLUTION INTRAVENOUS at 12:07

## 2021-12-26 RX ADMIN — INSULIN GLARGINE 10 UNITS: 100 INJECTION, SOLUTION SUBCUTANEOUS at 22:23

## 2021-12-26 RX ADMIN — ENOXAPARIN SODIUM 30 MG: 100 INJECTION SUBCUTANEOUS at 21:00

## 2021-12-26 RX ADMIN — IBUPROFEN 600 MG: 100 SUSPENSION ORAL at 09:19

## 2021-12-26 ASSESSMENT — PULMONARY FUNCTION TESTS
PIF_VALUE: 31
PIF_VALUE: 25
PIF_VALUE: 29
PIF_VALUE: 26
PIF_VALUE: 30
PIF_VALUE: 25

## 2021-12-26 ASSESSMENT — PAIN SCALES - GENERAL
PAINLEVEL_OUTOF10: 0

## 2021-12-26 NOTE — PROGRESS NOTES
[facility]  Office: [address] [facility], DO, Lacy Abarca, DO, Alfredo Sanchez, DO, Taylor Morris, DO, Gemma Grant MD, Aparna Nash MD, Yarelis Hernández MD, Austin Richardson MD, Oksana Wong MD, Zuleima Urena MD, Edie Sebastian MD, July Wallace, DO, Danae Rivas, DO, Margaux Pandey MD,  Liza Gardiner DO, Vertell Moritz, MD, Brook Mak MD, Marya Oseguera MD, Brent Valdez MD, Any Little MD, Fady Hill MD, Taco Shay MD, Amina Shaver, [facility], [facility], CNP, Refugio Agee, CNP, Cash Contras, CNS, Richard Appl, CNP, Lluvia Watters, CNP, Irene Fresh, CNP, Onofre Chowdhury, CNP, Elisa Seo, CNP, Audrey Mckeon PA-C, Terry Mock, [facility], Wes Kern, [facility], Preet Cabrera, CNP, Liv Hollis, CNP, Dawayne Oppenheim, CNP, Ashley Madsen, CNP, Patricia Miller, [facility], [employer], [facility]      Daily Progress Note     Admit Date: 12/12/2021  Bed/Room No.  3026/3026-01  Admitting Physician : Yarelis Hernández MD  Code Status :Full Code  Hospital Day:  LOS: 14 days   Chief Complaint:     Breathing difficulty. Principal Problem:    Pneumonia due to COVID-19 virus  Active Problems:    Shock ([facility] [address]    Acute respiratory failure with hypoxia (HCC)    Type 2 diabetes mellitus (HCC)    Asthma    IRMA on CPAP    Hypertension    GERD (gastroesophageal reflux disease)    ARDS (adult respiratory distress syndrome) ([facility] [address]  Resolved Problems:    * No resolved hospital problems. *    Subjective : Interval History/Significant events :  12/26/21  Unchanged  Patient continues to have intermittent fevers. T-max 100 °F. Patient remains intubated on 50% FiO2 with PEEP of 10 on PRVC. Urine output is adequate. Patient is tolerating tube feed. Vitals - Unstable afebrile  Labs -blood sugar controlled. Creatinine stable 0.40. Chest x-ray 12/24/2021-bilateral opacities. Nursing notes , Consults notes reviewed. Overnight events and updates discussed with Nursing staff . Background History:         Natalie Olsen is 60 y.o. male  Who was admitted to the hospital on 12/12/2021 for treatment of Pneumonia due to COVID-19 virus. Patient initially presented with shortness of breath at [facility].  He had to suggest positive for COVID-19 infection. Patient reported that his son had also COVID-19 infection. Patient was hypoxic in 50s on arrival and was treated with BiPAP however breathing worsened and patient was subsequently intubated. Patient was transferred to [facility]'s on 12/12/2021 as he was requiring high ventilator support 100% FiO2 with PEEP of 22. He was given Actemra and started on high-dose Decadron. Patient was started with he was started on Flolan and given paralytics for proning per ARDS protocol. Patient also received intermittent Lasix. Proning was stopped on 12/20/2021 and paralytics were stopped on 12/22/2021. Patient started to have fevers and was started on cefepime. Respiratory cultures were negative. PMH:  Past Medical History:   Diagnosis Date    Arthritis     Asthma     Diabetes mellitus ([address]     Edema     GERD (gastroesophageal reflux disease)     Hypertension     on lasix    Migraine     IRMA on CPAP     seldom use machine      Allergies:    Allergies   Allergen Reactions    Nuts [Peanut-Containing Drug Products] Anaphylaxis    Sunflower Oil Anaphylaxis     Sunflower seeds      Medications :  chlordiazePOXIDE, 10 mg, Per NG tube, 4x Daily  lidocaine 1 % injection, 5 mL, IntraDERmal, Once  sodium chloride flush, 5-40 mL, IntraVENous, 2 times per day  oxyCODONE, 10 mg, Oral, Q6H  docusate, 100 mg, Per NG tube, BID  insulin lispro, 0-6 Units, SubCUTAneous, Q6H  lansoprazole, 30 mg, Oral, QAM AC  cefepime, 2,000 mg, IntraVENous, Q12H  furosemide, 40 mg, IntraVENous, Daily  insulin glargine, 10 Units, SubCUTAneous, Nightly  sodium chloride flush, 5-40 mL, IntraVENous, 2 times per day  enoxaparin, 30 mg, SubCUTAneous, BID  Vitamin D, 2,000 Units, Oral, Daily  dexamethasone, 10 mg, IntraVENous, Q24H        Review of Systems   Review of Systems   Unable to perform ROS: Intubated     Objective :      Current Vitals : Temp: 100.9 °F (38.3 °C),  Pulse: 79, Resp: 25, BP: 110/67, SpO2: 95 %  Last 24 Hrs Vitals   Patient Vitals for the past 24 hrs:   BP Temp Temp src Pulse Resp SpO2   12/26/21 1430 110/67 -- -- 79 25 95 %   12/26/21 1415 107/65 -- -- 71 19 94 %   12/26/21 1400 109/69 100.9 °F (38.3 °C) Bladder 66 21 95 %   12/26/21 1345 108/73 -- -- 65 19 95 %   12/26/21 1330 99/65 -- -- 62 20 94 %   12/26/21 1315 98/63 -- -- 64 20 94 %   12/26/21 1300 101/65 -- -- 67 22 94 %   12/26/21 1245 95/62 -- -- 64 19 94 %   12/26/21 1230 98/65 -- -- 70 20 94 %   12/26/21 1215 97/66 -- -- 65 19 93 %   12/26/21 1200 111/73 100.8 °F (38.2 °C) Bladder 67 20 92 %   12/26/21 1145 (!) 82/53 -- -- 66 20 (!) 89 %   12/26/21 1134 -- -- -- 76 26 90 %   12/26/21 1130 100/62 -- -- 71 25 91 %   12/26/21 1115 105/67 -- -- 72 22 92 %   12/26/21 1100 102/63 -- -- 65 18 91 %   12/26/21 1045 109/68 -- -- 71 22 91 %   12/26/21 1030 109/71 -- -- 73 25 92 %   12/26/21 1015 113/70 -- -- 71 19 91 %   12/26/21 1000 113/72 100 °F (37.8 °C) -- 93 18 95 %   12/26/21 0945 115/74 -- -- 87 20 94 %   12/26/21 0930 111/75 -- -- 102 20 94 %   12/26/21 0915 110/74 -- -- 87 17 94 %   12/26/21 0900 111/75 -- -- 92 20 94 %   12/26/21 0845 120/82 -- -- 96 19 94 %   12/26/21 0837 -- -- -- -- 20 95 %   12/26/21 0830 124/81 -- -- 99 20 94 %   12/26/21 0826 -- -- -- 99 20 96 %   12/26/21 0815 120/86 -- -- 89 24 96 %   12/26/21 0800 123/84 100.4 °F (38 °C) Bladder 94 24 96 %   12/26/21 0745 114/75 -- -- 75 24 96 %   12/26/21 0730 122/82 -- -- 93 24 96 %   12/26/21 0715 116/76 -- -- 76 24 95 %   12/26/21 0700 122/80 -- -- 92 24 96 %   12/26/21 0600 120/79 -- -- 89 24 97 %   12/26/21 0500 (!) 86/55 -- -- 61 24 94 %   12/26/21 0400 116/74 -- -- 89 24 95 %   12/26/21 0327 -- -- -- 74 22 97 %   12/26/21 0324 -- -- -- -- 24 97 %   12/26/21 0227 -- -- -- 68 24 98 %   12/26/21 0200 116/76 -- -- 73 24 98 %   12/26/21 0100 115/73 -- -- 70 24 98 %   12/26/21 0000 115/71 -- -- 68 24 98 %   12/25/21 2300 113/69 -- -- 68 24 99 %   12/25/21 2200 102/60 -- -- 62 24 97 %   12/25/21 2130 116/73 -- -- 73 24 99 %   12/25/21 2115 118/73 -- -- 71 24 98 %   12/25/21 2100 117/73 -- -- 73 24 98 %   12/25/21 2045 115/68 -- -- 72 24 98 %   12/25/21 2030 115/71 -- -- 74 24 98 %   12/25/21 2018 -- -- -- 76 24 98 %   12/25/21 2015 114/72 -- -- 76 24 98 %   12/25/21 2000 113/69 99.1 °F (37.3 °C) Bladder 74 24 98 %   12/25/21 1945 117/71 -- -- 79 19 98 %   12/25/21 1930 115/70 -- -- 73 24 98 %   12/25/21 1915 115/70 -- -- 79 22 98 %   12/25/21 1900 116/69 -- -- 77 28 98 %   12/25/21 1845 113/69 -- -- 77 24 98 %   12/25/21 1830 110/70 -- -- 79 24 98 %   12/25/21 1815 107/68 -- -- 74 24 98 %   12/25/21 1800 112/66 101.5 °F (38.6 °C) Bladder 73 23 98 %   12/25/21 1745 107/68 -- -- 74 24 98 %   12/25/21 1730 (!) 80/54 -- -- 69 20 95 %   12/25/21 1715 105/66 -- -- 68 22 95 %   12/25/21 1700 108/69 -- -- 69 24 95 %   12/25/21 1645 104/64 -- -- 63 24 95 %   12/25/21 1630 104/69 -- -- 64 24 94 %   12/25/21 1615 105/63 -- -- 70 24 94 %   12/25/21 1600 103/66 102 °F (38.9 °C) Bladder 69 24 94 %   12/25/21 1545 98/60 -- -- 76 24 93 %     Intake / output   12/25 1501 - 12/26 1500  In: 3667.9 [I.V.:2116.9]  Out: 2632 [Urine:2632]  Physical Exam:  Physical Exam  Vitals and nursing note reviewed. Constitutional:       Appearance: He is well-developed. He is ill-appearing. Interventions: He is sedated and intubated. Neck:      Thyroid: No thyroid mass. Cardiovascular:      Rate and Rhythm: Normal rate and regular rhythm. Pulses: Normal pulses. Heart sounds: Normal heart sounds. No murmur heard. Pulmonary:      Effort: He is intubated. Breath sounds: Normal breath sounds. Musculoskeletal:      Right lower leg: No edema.       Left lower leg: No edema. Lymphadenopathy:      Cervical: No cervical adenopathy. Skin:     Capillary Refill: Capillary refill takes less than 2 seconds. Neurological:      Motor: No tremor or abnormal muscle tone. Laboratory findings:    Recent Labs     12/24/21  0516 12/25/21 0447 12/26/21 0417   WBC 21.8* 18.9* 14.4*   HGB 14.2 13.6 13.6   HCT 43.1 40.7 41.0    255 247     Recent Labs     12/24/21  0516 12/25/21 0447 12/26/21  0417    140 139   K 3.9 4.0 3.9    100 101   CO2 28 28 29   GLUCOSE 128* 118* 121*   BUN 27* 28* 28*   CREATININE 0.37* 0.40* 0.38*   CALCIUM 8.6 8.6 8.9     Recent Labs     12/24/21  0516 12/25/21 0447 12/26/21 0417   PROT 5.4* 5.5* 5.6*   LABALBU 3.4* 3.2* 3.4*   AST 36 26 22   ALT 60* 53* 54*   ALKPHOS 55 55 50   BILITOT 0.35 0.38 0.40   BILIDIR 0.10 0.11 0.14   TRIG  --   --  201*          Specific Gravity, UA   Date Value Ref Range Status   12/18/2021 1.032 (H) 1.005 - 1.030 Final     Protein, UA   Date Value Ref Range Status   12/18/2021 1+ (A) NEGATIVE Final     RBC, UA   Date Value Ref Range Status   12/18/2021 20 TO 50 0 - 4 /HPF Final     Comment:     Reference range defined for non-centrifuged specimen. Bacteria, UA   Date Value Ref Range Status   12/18/2021 NOT REPORTED None Final     Nitrite, Urine   Date Value Ref Range Status   12/18/2021 NEGATIVE NEGATIVE Final     WBC, UA   Date Value Ref Range Status   12/18/2021 5 TO 10 0 - 5 /HPF Final     Leukocyte Esterase, Urine   Date Value Ref Range Status   12/18/2021 NEGATIVE NEGATIVE Final       Imaging / Clinical Data :-   XR CHEST (SINGLE VIEW FRONTAL)    Result Date: 12/22/2021  Stable mild cardiomegaly. Patchy airspace opacities, left more than right, may be related to pulmonary edema versus pneumonia. Nonspecific bowel gas pattern. XR ABDOMEN (KUB) (SINGLE AP VIEW)    Result Date: 12/22/2021  Stable mild cardiomegaly.  Patchy airspace opacities, left more than right, may be related to pulmonary edema versus pneumonia. Nonspecific bowel gas pattern. XR CHEST PORTABLE    Result Date: 12/24/2021  1. Stable cardiomegaly. 2.  Patchy airspace opacities, stable in the right lower chest, slightly improved on the left. Findings may be related to pulmonary edema or improving pneumonia. 3.  Support tubes and catheters in good position. XR CHEST PORTABLE    Result Date: 12/19/2021  Cardiomegaly, vascular congestion and worsening pulmonary opacities with bilateral effusions favoring pulmonary edema over multifocal airspace disease. Support tubes and lines as above. Clinical Course : unchanged  Assessment and Plan  :        Acute respiratory failure with hypoxia due to ARDS from COVID-19 pneumonia -ventilator support, high-dose Decadron, s/p Actemra on 12/12/2021. Antibiotics per ID. On cefepime started 12/18/2021. Type 2 diabetes mellitus - on Lantus, blood sugar controlled. Continue tube feed. Obesity BMI 39  Essential hypertension -on Levophed. IRMA -was noncompliant with CPAP at home     Continue current treatment. Continue to monitor vitals , Intake / output ,  Cell count , HGB , Kidney function, oxygenation  as indicated .      Plan discussed with Staff  RN     (Please note that portions of this note were completed with a voice recognition program. Efforts were made to edit the dictations but occasionally words are mis-transcribed.)      Kaylan Umana MD  12/26/2021

## 2021-12-26 NOTE — PLAN OF CARE
Problem: Airway Clearance - Ineffective  Goal: Achieve or maintain patent airway  12/25/2021 2120 by Eris Nguyen RN  Outcome: Ongoing     Problem: Gas Exchange - Impaired  Goal: Absence of hypoxia  12/25/2021 2120 by Eris Nguyen RN  Outcome: Ongoing     Problem: Gas Exchange - Impaired  Goal: Promote optimal lung function  12/25/2021 2120 by Eris Nguyen RN  Outcome: Ongoing     Problem: Breathing Pattern - Ineffective  Goal: Ability to achieve and maintain a regular respiratory rate  12/25/2021 2120 by Eris Nguyen RN  Outcome: Ongoing     Problem: Body Temperature -  Risk of, Imbalanced  Goal: Ability to maintain a body temperature within defined limits  12/25/2021 2120 by Eris Nguyen RN  Outcome: Ongoing     Problem:  Body Temperature -  Risk of, Imbalanced  Goal: Complications related to the disease process, condition or treatment will be avoided or minimized  12/25/2021 2120 by Eris Nguyen RN  Outcome: Ongoing     Problem: Isolation Precautions - Risk of Spread of Infection  Goal: Prevent transmission of infection  12/25/2021 2120 by Eris Nguyen RN  Outcome: Ongoing     Problem: Nutrition Deficits  Goal: Optimize nutritional status  12/25/2021 2120 by Eris Nguyen RN  Outcome: Ongoing     Problem: Risk for Fluid Volume Deficit  Goal: Maintain normal heart rhythm  12/25/2021 2120 by Eris Nguyen RN  Outcome: Ongoing     Problem: Risk for Fluid Volume Deficit  Goal: Maintain absence of muscle cramping  12/25/2021 2120 by Eris Nguyen RN  Outcome: Ongoing     Problem: Risk for Fluid Volume Deficit  Goal: Maintain normal serum potassium, sodium, calcium, phosphorus, and pH  12/25/2021 2120 by Eris Nguyen RN  Outcome: Ongoing     Problem: Loneliness or Risk for Loneliness  Goal: Demonstrate positive use of time alone when socialization is not possible  12/25/2021 2120 by Eris Nguyen RN  Outcome: Ongoing     Problem: Fatigue  Goal: Verbalize increase energy and improved vitality  12/25/2021 2120 by Emmie Viera RN  Outcome: Ongoing     Problem: Patient Education: Go to Patient Education Activity  Goal: Patient/Family Education  12/25/2021 2120 by Emmie Viera RN  Outcome: Ongoing     Problem: OXYGENATION/RESPIRATORY FUNCTION  Goal: Patient will maintain patent airway  12/25/2021 2120 by Emmie Viera RN  Outcome: Ongoing     Problem: OXYGENATION/RESPIRATORY FUNCTION  Goal: Patient will achieve/maintain normal respiratory rate/effort  Description: Respiratory rate and effort will be within normal limits for the patient  12/25/2021 2120 by Emmie Viera RN  Outcome: Ongoing     Problem: MECHANICAL VENTILATION  Goal: Patient will maintain patent airway  12/25/2021 2120 by Emmie Viera RN  Outcome: Ongoing     Problem: MECHANICAL VENTILATION  Goal: Oral health is maintained or improved  12/25/2021 2120 by Emmie Viera RN  Outcome: Ongoing     Problem: MECHANICAL VENTILATION  Goal: ET tube will be managed safely  12/25/2021 2120 by Emmie Viera RN  Outcome: Ongoing     Problem: MECHANICAL VENTILATION  Goal: Ability to express needs and understand communication  12/25/2021 2120 by Emmie Viera RN  Outcome: Ongoing     Problem: MECHANICAL VENTILATION  Goal: Mobility/activity is maintained at optimum level for patient  12/25/2021 2120 by Emmie Viera RN  Outcome: Ongoing     Problem: SKIN INTEGRITY  Goal: Skin integrity is maintained or improved  12/25/2021 2120 by Emmie Viera RN  Outcome: Ongoing     Problem: Skin Integrity:  Goal: Will show no infection signs and symptoms  Description: Will show no infection signs and symptoms  12/25/2021 2120 by Emmie Viera RN  Outcome: Ongoing     Problem: Skin Integrity:  Goal: Absence of new skin breakdown  Description: Absence of new skin breakdown  12/25/2021 2120 by Emmie Viera RN  Outcome: Ongoing     Problem: Falls - Risk of:  Goal: Will remain free from falls  Description: Will remain free from falls  12/25/2021 2120 by Marjorie Seay RN  Outcome: Ongoing     Problem: Falls - Risk of:  Goal: Absence of physical injury  Description: Absence of physical injury  12/25/2021 2120 by Marjorie Seay RN  Outcome: Ongoing     Problem: Nutrition  Goal: Optimal nutrition therapy  12/25/2021 2120 by Marjorie Seay RN  Outcome: Ongoing     Problem: Discharge Planning:  Goal: Participates in care planning  Description: Participates in care planning  12/25/2021 2120 by Marjorie Seay RN  Outcome: Ongoing     Problem: Discharge Planning:  Goal: Discharged to appropriate level of care  Description: Discharged to appropriate level of care  12/25/2021 2120 by Marjorie Seay RN  Outcome: Ongoing

## 2021-12-26 NOTE — PLAN OF CARE
Problem: Airway Clearance - Ineffective  Goal: Achieve or maintain patent airway  Outcome: Ongoing     Problem: Gas Exchange - Impaired  Goal: Absence of hypoxia  Outcome: Ongoing  Goal: Promote optimal lung function  Outcome: Ongoing     Problem: Breathing Pattern - Ineffective  Goal: Ability to achieve and maintain a regular respiratory rate  Outcome: Ongoing     Problem:  Body Temperature -  Risk of, Imbalanced  Goal: Ability to maintain a body temperature within defined limits  Outcome: Ongoing  Goal: Will regain or maintain usual level of consciousness  Outcome: Ongoing  Goal: Complications related to the disease process, condition or treatment will be avoided or minimized  Outcome: Ongoing     Problem: Isolation Precautions - Risk of Spread of Infection  Goal: Prevent transmission of infection  Outcome: Ongoing     Problem: Nutrition Deficits  Goal: Optimize nutritional status  Outcome: Ongoing     Problem: Risk for Fluid Volume Deficit  Goal: Maintain normal heart rhythm  Outcome: Ongoing  Goal: Maintain absence of muscle cramping  Outcome: Ongoing  Goal: Maintain normal serum potassium, sodium, calcium, phosphorus, and pH  Outcome: Ongoing     Problem: Loneliness or Risk for Loneliness  Goal: Demonstrate positive use of time alone when socialization is not possible  Outcome: Ongoing     Problem: Fatigue  Goal: Verbalize increase energy and improved vitality  Outcome: Ongoing     Problem: Patient Education: Go to Patient Education Activity  Goal: Patient/Family Education  Outcome: Ongoing     Problem: OXYGENATION/RESPIRATORY FUNCTION  Goal: Patient will maintain patent airway  12/25/2021 1915 by Deirdre Hernandez RN  Outcome: Ongoing  12/25/2021 0928 by Francis Dwyer RCP  Outcome: Ongoing  Goal: Patient will achieve/maintain normal respiratory rate/effort  Description: Respiratory rate and effort will be within normal limits for the patient  12/25/2021 1915 by Deirdre Hernandez RN  Outcome: Ongoing  12/25/2021 0928 by Sneha Gibson RCP  Outcome: Ongoing     Problem: MECHANICAL VENTILATION  Goal: Patient will maintain patent airway  12/25/2021 1915 by Jace Calvert RN  Outcome: Ongoing  12/25/2021 0928 by Sneha Gibson RCP  Outcome: Ongoing  Goal: Oral health is maintained or improved  12/25/2021 1915 by Jace Calvert RN  Outcome: Ongoing  12/25/2021 0928 by Sneha Gibson RCP  Outcome: Ongoing  Goal: ET tube will be managed safely  12/25/2021 1915 by Jace Calvert RN  Outcome: Ongoing  12/25/2021 0928 by Sneha Gibson RCP  Outcome: Ongoing  Goal: Ability to express needs and understand communication  12/25/2021 1915 by Jace Calvert RN  Outcome: Ongoing  12/25/2021 0928 by Sneha Gibson RCP  Outcome: Ongoing  Goal: Mobility/activity is maintained at optimum level for patient  12/25/2021 1915 by aJce Calvert RN  Outcome: Ongoing  12/25/2021 0928 by Sneha Gibson RCP  Outcome: Ongoing     Problem: SKIN INTEGRITY  Goal: Skin integrity is maintained or improved  12/25/2021 1915 by Jace Calvert RN  Outcome: Ongoing  12/25/2021 0928 by Sneha Gibson RCP  Outcome: Ongoing     Problem: Skin Integrity:  Goal: Will show no infection signs and symptoms  Description: Will show no infection signs and symptoms  Outcome: Ongoing  Goal: Absence of new skin breakdown  Description: Absence of new skin breakdown  Outcome: Ongoing     Problem: Falls - Risk of:  Goal: Will remain free from falls  Description: Will remain free from falls  Outcome: Ongoing  Goal: Absence of physical injury  Description: Absence of physical injury  Outcome: Ongoing     Problem: Nutrition  Goal: Optimal nutrition therapy  Outcome: Ongoing     Problem: Discharge Planning:  Goal: Participates in care planning  Description: Participates in care planning  Outcome: Ongoing  Goal: Discharged to appropriate level of care  Description: Discharged to appropriate level of care  Outcome: Ongoing

## 2021-12-26 NOTE — PLAN OF CARE
Problem: Airway Clearance - Ineffective  Goal: Achieve or maintain patent airway  12/26/2021 0845 by Roman Brito RN  Outcome: Ongoing     Problem: Gas Exchange - Impaired  Goal: Absence of hypoxia  12/26/2021 0845 by Roman Brito RN  Outcome: Ongoing     Problem: Gas Exchange - Impaired  Goal: Promote optimal lung function  12/26/2021 0845 by Roman Brito RN  Outcome: Ongoing     Problem: Breathing Pattern - Ineffective  Goal: Ability to achieve and maintain a regular respiratory rate  12/26/2021 0845 by Roman Brito RN  Outcome: Ongoing     Problem: Body Temperature -  Risk of, Imbalanced  Goal: Ability to maintain a body temperature within defined limits  12/26/2021 0845 by Roman Brito RN  Outcome: Ongoing     Problem: Body Temperature -  Risk of, Imbalanced  Goal: Will regain or maintain usual level of consciousness  12/26/2021 0845 by Roman Brito RN  Outcome: Ongoing     Problem:  Body Temperature -  Risk of, Imbalanced  Goal: Complications related to the disease process, condition or treatment will be avoided or minimized  12/26/2021 0845 by Roman Brito RN  Outcome: Ongoing     Problem: Isolation Precautions - Risk of Spread of Infection  Goal: Prevent transmission of infection  12/26/2021 0845 by Roman Briot RN  Outcome: Ongoing     Problem: Nutrition Deficits  Goal: Optimize nutritional status  12/26/2021 0845 by Roman Brito RN  Outcome: Ongoing     Problem: Risk for Fluid Volume Deficit  Goal: Maintain normal heart rhythm  12/26/2021 0845 by Roman Brito RN  Outcome: Ongoing     Problem: Risk for Fluid Volume Deficit  Goal: Maintain absence of muscle cramping  12/26/2021 0845 by Roman Brito RN  Outcome: Ongoing     Problem: Risk for Fluid Volume Deficit  Goal: Maintain normal serum potassium, sodium, calcium, phosphorus, and pH  12/26/2021 0845 by Roman Brito RN  Outcome: Ongoing     Problem: Loneliness or Risk for Loneliness  Goal: Demonstrate positive use of time alone when socialization is not possible  12/25/2021 2120 by Tyler Mccabe RN  Outcome: Ongoing     Problem: Fatigue  Goal: Verbalize increase energy and improved vitality  12/25/2021 2120 by Tyler Mccabe RN  Outcome: Ongoing     Problem: Patient Education: Go to Patient Education Activity  Goal: Patient/Family Education  12/26/2021 0845 by Tyler Mccabe RN  Outcome: Ongoing     Problem: OXYGENATION/RESPIRATORY FUNCTION  Goal: Patient will maintain patent airway  12/26/2021 0845 by Tyler Mccabe RN  Outcome: Ongoing     Problem: OXYGENATION/RESPIRATORY FUNCTION  Goal: Patient will achieve/maintain normal respiratory rate/effort  Description: Respiratory rate and effort will be within normal limits for the patient  12/25/2021 2120 by Tyler Mccabe RN  Outcome: Ongoing     Problem: MECHANICAL VENTILATION  Goal: Patient will maintain patent airway  12/26/2021 0845 by Tyler Mccabe RN  Outcome: Ongoing     Problem: MECHANICAL VENTILATION  Goal: Oral health is maintained or improved  12/26/2021 0845 by Tyler Mccabe RN  Outcome: Ongoing     Problem: MECHANICAL VENTILATION  Goal: ET tube will be managed safely  12/26/2021 0845 by Tyler Mccabe RN  Outcome: Ongoing     Problem: MECHANICAL VENTILATION  Goal: Ability to express needs and understand communication  12/26/2021 0845 by Tyler Mccabe RN  Outcome: Ongoing     Problem: MECHANICAL VENTILATION  Goal: Mobility/activity is maintained at optimum level for patient  12/25/2021 2120 by Tyler Mccabe RN  Outcome: Ongoing     Problem: SKIN INTEGRITY  Goal: Skin integrity is maintained or improved  12/26/2021 0845 by Tyler Mccabe RN  Outcome: Ongoing     Problem: Skin Integrity:  Goal: Will show no infection signs and symptoms  Description: Will show no infection signs and symptoms  12/26/2021 0845 by Tyler Mccabe RN  Outcome: Ongoing     Problem: Skin Integrity:  Goal: Absence of new skin breakdown  Description: Absence of new skin breakdown  12/26/2021 0845 by Teodoro Boykin RN  Outcome: Ongoing     Problem: Falls - Risk of:  Goal: Will remain free from falls  Description: Will remain free from falls  12/26/2021 0845 by Teodoro Boykin RN  Outcome: Ongoing     Problem: Falls - Risk of:  Goal: Absence of physical injury  Description: Absence of physical injury  12/26/2021 0845 by Teodoro Boykin RN  Outcome: Ongoing     Problem: Nutrition  Goal: Optimal nutrition therapy  12/26/2021 0845 by Teodoro Boykin RN  Outcome: Ongoing     Problem: Discharge Planning:  Goal: Participates in care planning  Description: Participates in care planning  12/26/2021 0845 by Teodoro Boykin RN  Outcome: Ongoing     Problem: Discharge Planning:  Goal: Discharged to appropriate level of care  Description: Discharged to appropriate level of care  12/26/2021 0845 by Teodoro Boykin RN  Outcome: Ongoing

## 2021-12-26 NOTE — PROGRESS NOTES
[facility] - Progress Note   Note COVID 19 Patient  Today's Date and Time: 12/26/2021, 8:16 AM    Impression :     COVID 19 Confirmed Infection  Covid tests:  12/11/2021: Positive  Elevated inflammatory markers  Acute hypoxic respiratory failure  History of asthma  Diabetes mellitus  Essential hypertension  IRMA on CPAP  Patient has not received the Covid vaccination    Recommendations:   Antibiotic treatment:  The patient was having high-grade fevers and cultures have been sent.-No growth on cultures thus far  I will start the patient empirically on antibiotic therapy with cefepime on 12/18-fevers initially resolved but low grade fevers have resumed. Peripheral IVs >1days old should be removed  Remove salgado and commence voiding trial no that patient is off paralytic  Covid Rx:    Remdesivir-out of window  Decadron-high-dose initiated  Actemra-ordered 12/12/2021  Monoclonal antibodies-out of window      Medical Decision Making/Summary/Discussion:12/26/2021     Patient admitted with COVID 19 infection  He may come out of isolation on 12/31/21    Infection Control Recommendations   Dracut Precautions  Airborne isolation  Droplet Isolation    Antimicrobial Stewardship Recommendations     Discontinuation of therapy  Coordination of Outpatient Care:   Estimated Length of IV antimicrobials:TBD  Patient will need Midline Catheter Insertion: TBD  Patient will need PICC line Insertion: No  Patient will need: Home IV , [place],  SNF,  LTAC:TBD  Patient will need outpatient wound care:No    Chief complaint/reason for consultation:   Concern for COVID infection      History of Present Illness:   Lois Carreno is a 60y.o.-year-old male who was initially admitted on 12/12/2021.  Patient seen at the request of Dr. Lavon Sims:    Patient presented through [facility]'s ER on 12/11/2021 with complaints of worsening shortness of breath with associated generalized weakness and nonproductive cough over the past several days. He was exposed to his son who was diagnosed with Covid last week and has not received the Covid vaccine. The patient was very hypoxic with an SPO2 in the high 50s on room air. Imaging revealed bilateral pulmonary opacities typical of COVID-19    Abnormal labs include:    Ferritin 2800      Patient has been intubated and transferred to [facility]  He has been started on high-dose Decadron and Actemra has been ordered    CT CHEST PULMONARY EMBOLISM W CONTRAST 12/11/2021   Final Result   1. No evidence of pulmonary embolism   2. Diffuse ground-glass opacities throughout the lungs, typical of COVID-19   pulmonary disease.           XR CHEST (SINGLE VIEW FRONTAL) 12/11/2021   Final Result   Multifocal hazy opacities throughout both lungs consistent with COVID   pneumonia and or pulmonary edema       Patient admitted because of concerns with COVID 19.    CURRENT EVALUATION : 12/26/2021    T-max 101.0 F  VS stable    The patient remains on mechanical ventilation with 60% FiO2 and PEEP of 10. He is receiving fentanyl, Versed and Precedex for sedation. Nimbex has been stopped     Leukocytosis is improving 18.6-->14.7-->21.8-->18.9-->14.4  Cefepime was initiated 12/18  Cultures remain negative    CRP is decreasing   CXR 12/24 is showing slight improvement    Patient exhibiting respiratory distress. yes  Respiratory secretions: no    Patient receiving supplemental oxygen.   Mechanical ventilation  RR: 24  02 sat:96    % FIO2: 90-->80-->85-->80-->65-->60  PEEP:   21-->16-->14-->12-->10    QTc:       NEWS Score: 0-4 Low risk group; 5-6: Medium risk group; 7 or above: High risk group  Parameters 3 2 1 0 1 2 3   Age    < 65   = 65   RR = 8  9-11 12-20  21-24 = 25   O2 Sats = 91 92-93 94-95 = 96      Suppl O2  Yes  No      SBP = 90  101-110 111-219   = 220   HR = 40  41-50 51-90  111-130 = 131   Consciousness    Alert Drowsiness, lethargy, or confusion   Temperature = 35.0 C (95.0 F)  35.1-36.0 C 95.1-96.9 F 36.1-38.0 C 97.0-100.4 F 38.1-39.0 C 100.5-102.3 F = 39.1 C = 102.4 F      NEWS Score:  12/12/2021: 13 high risk    Overall Daily Picture:     Unchanged    Presence of secondary bacterial Infection:    Cultures no growth  Additional antibiotics: 12/18 Cefepime for leukocytosis and fevers    Labs, X rays reviewed: 12/26/2021    BUN:28  Cr:0.0.38    WBC:21.8-->18.9-->14.4  Hb:13.6  Plat:247    Absolute Neutrophils:3.73  Absolute Lymphocytes:0.29  Neutrophil/Lymphocyte Ratio: 12.8 high risk    CRP:293-->277-->137-->50.8-->23  Ferritin:2800  LDH: 838    Pro Calcitonin:      Cultures:  Urine:  12/18/21: No bacterial growth  Blood:  12/18/21: No growth thus far  Sputum :  12/18/21: Normal respiratory sherry  Wound:      CXR:     12/22/21 12/19/21      1221 diffuse bilateral infiltrates  CAT:      Discussed with patient, RN, CC, IM.      12-12-21:      I have personally reviewed the past medical history, past surgical history, medications, social history, and family history, and I have updated the database accordingly.   Past Medical History:     Past Medical History:   Diagnosis Date    Arthritis     Asthma     Diabetes mellitus ([address]     Edema     GERD (gastroesophageal reflux disease)     Hypertension     on lasix    Migraine     IRMA on CPAP     seldom use machine       Past Surgical  History:     Past Surgical History:   Procedure Laterality Date    APPENDECTOMY      ARTHROPLASTY Left 11/1/2019    LEFT FOOT 1ST MTPJ ARTHROPLASTY WITH PEREZ IMPLANT AND GROMMETS  [facility] performed by Marley Encinas MD at [address] [facility] Right 12/12/2019    RIGHT FOOT ARTHROPLASTY 1ST MTPJ (Right Foot)    ARTHROPLASTY Right 12/12/2019    RIGHT FOOT ARTHROPLASTY 1ST MTPJ performed by Marley Encinas MD at [address] Right    [address]  2014    no blockage no stents    CHOLECYSTECTOMY  COLONOSCOPY      ENDOSCOPY, COLON, DIAGNOSTIC      TOE SURGERY Left 11/01/2019     LEFT FOOT 1ST MTPJ ARTHROPLASTY WITH PEREZ IMPLANT AND GROMMETS  [facility] (Left )    TONSILLECTOMY         Medications:      chlordiazePOXIDE  10 mg Per NG tube 4x Daily    lidocaine 1 % injection  5 mL IntraDERmal Once    sodium chloride flush  5-40 mL IntraVENous 2 times per day    oxyCODONE  10 mg Oral Q6H    docusate  100 mg Per NG tube BID    insulin lispro  0-6 Units SubCUTAneous Q6H    lansoprazole  30 mg Oral QAM AC    cefepime  2,000 mg IntraVENous Q12H    furosemide  40 mg IntraVENous Daily    insulin glargine  10 Units SubCUTAneous Nightly    sodium chloride flush  5-40 mL IntraVENous 2 times per day    enoxaparin  30 mg SubCUTAneous BID    Vitamin D  2,000 Units Oral Daily    dexamethasone  10 mg IntraVENous Q24H       Social History:     Social History     Socioeconomic History    Marital status:      Spouse name: Not on file    Number of children: Not on file    Years of education: Not on file    Highest education level: Not on file   Occupational History    Not on file   Tobacco Use    Smoking status: Former Smoker    Smokeless tobacco: Never Used   Vaping Use    Vaping Use: Never used   Substance and Sexual Activity    Alcohol use: Yes     Comment: every couple months    Drug use: Never    Sexual activity: Not on file   Other Topics Concern    Not on file   Social History Narrative    Not on file     Social Determinants of Health     Financial Resource Strain:     Difficulty of Paying Living Expenses: Not on file   Food Insecurity:     Worried About Running Out of Food in the Last Year: Not on file    Lucina of Food in the Last Year: Not on file   Transportation Needs:     Lack of Transportation (Medical): Not on file    Lack of Transportation (Non-Medical):  Not on file   Physical Activity:     Days of Exercise per Week: Not on file    Minutes of Exercise per Session: Not on file   Stress:     Feeling of Stress : Not on file   Social Connections:     Frequency of Communication with Friends and Family: Not on file    Frequency of Social Gatherings with Friends and Family: Not on file    Attends Confucianism Services: Not on file    Active Member of [address] [place] [address] or Organizations: Not on file    Attends Club or Organization Meetings: Not on file    Marital Status: Not on file   Intimate Partner Violence:     Fear of Current or Ex-Partner: Not on file    Emotionally Abused: Not on file    Physically Abused: Not on file    Sexually Abused: Not on file   Housing Stability:     Unable to Pay for Housing in the Last Year: Not on file    Number of [place] in the Last Year: Not on file    Unstable Housing in the Last Year: Not on file       Family History:     Family History   Problem Relation Age of Onset    Heart Attack Sister 32    Diabetes Paternal Grandmother     Heart Disease Paternal Uncle     Heart Disease Paternal Uncle     Heart Disease Paternal Uncle     Cancer Maternal Aunt     Cancer Maternal Aunt     Cancer Maternal Uncle     Cancer Maternal Uncle         Allergies:   Nuts [peanut-containing drug products] and Sunflower oil     Review of Systems:     Unable to assess 12/26/2021  Intubated and sedated      Physical Examination :     Patient Vitals for the past 8 hrs:   BP Pulse Resp SpO2   12/26/21 0700 122/80 92 24 96 %   12/26/21 0600 120/79 89 24 97 %   12/26/21 0500 (!) 86/55 61 24 94 %   12/26/21 0400 116/74 89 24 95 %   12/26/21 0327 -- 74 22 97 %   12/26/21 0326 -- -- 24 97 %   12/26/21 0227 -- 68 24 98 %   12/26/21 0200 116/76 73 24 98 %   12/26/21 0100 115/73 70 24 98 %     General Appearance: Intubated and sedated  Head:  Normocephalic, no trauma  ENT: Not well evaluated as the patient is orally intubated  Neck:Supple, without lymphadenopathy. Thyroid normal, No bruits.   Pulmonary/Chest: Diminished to auscultation, without wheezes, rales, or rhonchi. No dullness to percussion. Cardiovascular: Regular rate and rhythm without murmurs, rubs, or gallops. Abdomen: Soft, non tender. Bowel sounds normal. No organomegaly  All four Extremities: No cyanosis, clubbing, edema, or effusions. Neurologic: Intubated and sedated and paralyzed  Skin: Warm and dry with good turgor. No signs of peripheral arterial or venous insufficiency. No ulcerations. No open wounds. Medical Decision Making -Laboratory:   I have independently reviewed/ordered the following labs:    CBC with Differential:   Recent Labs     12/25/21 0447 12/26/21 0417   WBC 18.9* 14.4*   HGB 13.6 13.6   HCT 40.7 41.0    247   LYMPHOPCT 7* 9*   MONOPCT 7 10     BMP:   Recent Labs     12/25/21 0447 12/26/21 0417    139   K 4.0 3.9    101   CO2 28 29   BUN 28* 28*   CREATININE 0.40* 0.38*     Hepatic Function Panel:   Recent Labs     12/25/21 0447 12/26/21 0417   PROT 5.5* 5.6*   LABALBU 3.2* 3.4*   BILIDIR 0.11 0.14   IBILI 0.27 0.26   BILITOT 0.38 0.40   ALKPHOS 55 50   ALT 53* 54*   AST 26 22     No results for input(s): RPR in the last 72 hours. No results for input(s): HIV in the last 72 hours. No results for input(s): BC in the last 72 hours. Lab Results   Component Value Date    MUCUS NOT REPORTED 12/18/2021    RBC 4.37 12/26/2021    TRICHOMONAS NOT REPORTED 12/18/2021    WBC 14.4 12/26/2021    YEAST NOT REPORTED 12/18/2021    TURBIDITY Clear 12/18/2021     Lab Results   Component Value Date    CREATININE 0.38 12/26/2021    GLUCOSE 121 12/26/2021       Medical Decision Making-Imaging:     Narrative   EXAMINATION:   CTA OF THE CHEST 12/11/2021 11:31 am       TECHNIQUE:   CTA of the chest was performed after the administration of intravenous   contrast.  Multiplanar reformatted images are provided for review.  MIP   images are provided for review.  Dose modulation, iterative reconstruction,   and/or weight based adjustment of the mA/kV was utilized to reduce the radiation dose to as low as reasonably achievable.       COMPARISON:   Chest x-ray dated December 11, 2021       HISTORY:   ORDERING SYSTEM PROVIDED HISTORY: hypoxemia, covid positive, d-dimer-0.99   TECHNOLOGIST PROVIDED HISTORY:   hypoxemia, covid positive, d-dimer-0.99   Decision Support Exception - unselect if not a suspected or confirmed   emergency medical condition->Emergency Medical Condition (MA)   Reason for Exam: COVID positive, elevated D-Dimer       FINDINGS:   Pulmonary Arteries: Pulmonary arteries are adequately opacified for   evaluation.  No evidence of intraluminal filling defect to suggest pulmonary   embolism.  Main pulmonary artery is normal in caliber.       Mediastinum: Nonspecific mediastinal and hilar lymph nodes are present,   likely reactive. .  The heart and pericardium demonstrate no acute   abnormality.  There is no acute abnormality of the thoracic aorta.       Lungs/pleura: Diffuse ground-glass opacification is present throughout the   lungs, typical of COVID-19 pulmonary disease.  No pleural effusion or   pneumothorax is present.       Upper Abdomen: Images through the upper abdomen demonstrate a small hiatal   hernia.  Diffuse hepatic steatosis is present.  The gallbladder is surgically   absent.       Soft Tissues/Bones: No acute bone or soft tissue abnormality.           Impression   1. No evidence of pulmonary embolism   2. Diffuse ground-glass opacities throughout the lungs, typical of COVID-19   pulmonary disease.         Narrative   EXAMINATION:   ONE XRAY VIEW OF THE CHEST       12/11/2021 3:54 pm       COMPARISON:   November 13, 2012       HISTORY:   ORDERING SYSTEM PROVIDED HISTORY: hypoxemia, probable covid pneumonia   TECHNOLOGIST PROVIDED HISTORY:   hypoxemia, probable covid pneumonia       FINDINGS:   Multifocal hazy opacities throughout both lungs would be consistent with   history of COVID pneumonia.  Pulmonary edema could have a similar appearance.    No pneumothorax or pleural effusion.  Cardiac size enlarged.  Mediastinum   unremarkable.  No acute osseous abnormality.           Impression   Multifocal hazy opacities throughout both lungs consistent with COVID   pneumonia and or pulmonary edema.               Medical Decision Peeyjz-Iamacwdu-Miavc:     Culture, Respiratory [[id number]] (Abnormal) Collected: 12/18/21 1143   Order Status: Completed Specimen: Endotracheal Updated: 12/18/21 1439    Specimen Description . ENDOTRACHEAL    Special Requests NOT REPORTED    Direct Exam >25 NEUTROPHILS/LPF     < 10 EPITHELIAL CELLS/LPF     MIXED BACTERIAL MORPHOTYPES SEEN ON GRAM STAIN.  Abnormal     Culture PENDING   Culture, Blood 1 [[id number]] Collected: 12/18/21 1234   Order Status: Completed Specimen: Blood Updated: 12/18/21 1358    Specimen Description . BLOOD    Special Requests NOT REPORTED    Culture NO GROWTH <24 HRS   Culture, Blood 1 [[id number]]    Order Status: Sent Specimen: Blood    Culture, Urine [[id number]] Collected: 12/14/21 0043   Order Status: Completed Specimen: Urine, straight catheter Updated: 12/14/21 1934    Specimen Description . URINE,STRAIGHT CATHETER    Special Requests NOT REPORTED    Culture NO GROWTH   MRSA DNA Probe, Nasal [[id number]] Collected: 12/12/21 1245   Order Status: Completed Specimen: Nasal Updated: 12/13/21 1052    Specimen Description . NASAL SWAB    MRSA, DNA, Nasal NEGATIVE:  MRSA DNA not detected by nucleic acid amplification. Comment:                                                    Results should be used as an adjunct to nosocomial control efforts to identify patients   needing enhanced precautions.     The test is not intended to identify patients with staphylococcal infections.  Results   should not be used to guide or monitor treatment for MRSA infections.              Medical Decision Making-Other:     Note:  Labs, medications, radiologic studies were reviewed with personal review of films  Large amounts of data were reviewed  Discussed with nursing Staff, Discharge planner  Infection Control and Prevention measures reviewed  All prior entries were reviewed  Administer medications as ordered  Prognosis: Guarded  Discharge planning reviewed      Thank you for allowing us to participate in the care of this patient. Please call with questions. Electronically signed by SHO Randhawa CNP on 12/26/2021 at 8:16 AM         ATTESTATION:    I have discussed the case, including pertinent history and exam findings with the APRN. I have evaluated the  History, physical findings and pictures of the patient and the key elements of the encounter have been performed by me. I have reviewed the laboratory data, other diagnostic studies and discussed them with the APRN. I have updated the medical record where necessary. I agree with the assessment, plan and orders as documented by the APRN.     Andrew Galarza MD.

## 2021-12-26 NOTE — PLAN OF CARE
Problem: Airway Clearance - Ineffective  Goal: Achieve or maintain patent airway  12/25/2021 2106 by Ha Yeager RCP  Outcome: Ongoing     Problem: Gas Exchange - Impaired  Goal: Absence of hypoxia  12/25/2021 2106 by Ha Yeager RCP  Outcome: Ongoing     Problem: Gas Exchange - Impaired  Goal: Promote optimal lung function  12/25/2021 2106 by Ha Yeager RCP  Outcome: Ongoing     Problem: Breathing Pattern - Ineffective  Goal: Ability to achieve and maintain a regular respiratory rate  12/25/2021 2106 by Ha Yeager RCP  Outcome: Ongoing     Problem: OXYGENATION/RESPIRATORY FUNCTION  Goal: Patient will maintain patent airway  12/25/2021 2106 by Ha Yeager RCP  Outcome: Ongoing     Problem: OXYGENATION/RESPIRATORY FUNCTION  Goal: Patient will achieve/maintain normal respiratory rate/effort  Description: Respiratory rate and effort will be within normal limits for the patient  12/25/2021 2106 by Ha Yeager RCP  Outcome: Ongoing     Problem: MECHANICAL VENTILATION  Goal: Patient will maintain patent airway  12/25/2021 2106 by Ha Yeager RCP  Outcome: Ongoing     Problem: MECHANICAL VENTILATION  Goal: Oral health is maintained or improved  12/25/2021 2106 by Ha Yeager RCP  Outcome: Ongoing     Problem: MECHANICAL VENTILATION  Goal: ET tube will be managed safely  12/25/2021 2106 by Ha Yeager RCP  Outcome: Ongoing     Problem: MECHANICAL VENTILATION  Goal: Ability to express needs and understand communication  12/25/2021 2106 by Ha Yeager RCP  Outcome: Ongoing     Problem: MECHANICAL VENTILATION  Goal: Mobility/activity is maintained at optimum level for patient  12/25/2021 2106 by Ha Yeager RCP  Outcome: Ongoing     Problem: SKIN INTEGRITY  Goal: Skin integrity is maintained or improved  12/25/2021 2106 by Ha Yeager RCP  Outcome: Ongoing

## 2021-12-26 NOTE — PROGRESS NOTES
Pulmonary Critical Care   Consult Note    Patient - Alyce Norton  Date of Admission -  2021 11:39 AM  Date of Evaluation -  2021  Room and Bed Number -  3026/3026-01   Hospital Day - 14    CHIEF COMPLAINT : ACUTE HYPOXIC RESPIRATORY FAILURE DUE TO COVID -19 PNEUMONIA   HPI:   Alyce Norton  60 y.o. male  admitted for COVID-19 at [facility] ER due to worsening oxygenation he was initially started on BiPAP and subsequently intubated. On room air his saturations had been 50%. CTA no PE but bilateral pulmonary infiltrates. He was accepted at [address] [place] [address] ICU. On arrival he is on PEEP of 22, 100% FiO2.    2021   Subjective      FiO2 remains at 50 %. Remains on medications for sedation and analgesia but not requiring any neuromuscular blockade  Make an effort to decrease his sedatives  I/O-+ 3.3 L.   Requiring small dose of norepinephrine    SECRETIONS  -Amount:  [x] Small [] Moderate  [] Large    [] None  Color:     [x] White [] Colored  [] Bloody    SEDATION:    As above    PARALYZED:  [x] No    [] Yes    VASOPRESSORS:  [] No    [x] Yes  [x] Levophed [] Dopamine [] Vasopressin  [] Dobutamine [] Phenylephrine [] Epinephrine    INHALED veletri  : [] No    [] Yes    PRONE :       [x] No    [] Yes    Actemra:             [] No    [x] Yes  21  DEXAMETHASONE : [] No    [x] Yes      Ros unable to perform due to intubation and sedation    OBJECTIVE:     VITAL SIGNS:  /80   Pulse 92   Temp 99.1 °F (37.3 °C) (Bladder)   Resp 24   Ht 6' 2\" (1.88 m)   Wt (!) 305 lb 5.4 oz (138.5 kg) Comment: with cooling blanket  SpO2 96%   BMI 39.20 kg/m²   Tmax over 24 hours:  Temp (24hrs), Av.3 °F (38.5 °C), Min:99.1 °F (37.3 °C), Max:102 °F (38.9 °C)      Patient Vitals for the past 8 hrs:   BP Pulse Resp SpO2   21 0700 122/80 92 24 96 %   21 0600 120/79 89 24 97 %   21 0500 (!) 86/55 61 24 94 %   21 0400 116/74 89 24 95 %   21 0327 -- 74 22 97 % 12/26/21 0326 -- -- 24 97 %   12/26/21 0227 -- 68 24 98 %   12/26/21 0200 116/76 73 24 98 %   12/26/21 0100 115/73 70 24 98 %         Intake/Output Summary (Last 24 hours) at 12/26/2021 0824  Last data filed at 12/26/2021 0600  Gross per 24 hour   Intake 2006 ml   Output 2155 ml   Net -149 ml     Date 12/26/21 0000 - 12/26/21 2359   Shift 3724-1174 6873-6182 8935-7819 24 Hour Total   INTAKE   Shift Total(mL/kg)       OUTPUT   Urine(mL/kg/hr) 350(0.3)   350   Shift Total(mL/kg) 350(2.5)   350(2.5)   Weight (kg) 138.5 138.5 138.5 138.5     Wt Readings from Last 3 Encounters:   12/23/21 (!) 305 lb 5.4 oz (138.5 kg)   12/11/21 300 lb (136.1 kg)   12/12/19 (!) 355 lb 9.6 oz (161.3 kg)     Body mass index is 39.2 kg/m². PHYSICAL EXAM:      I have discussed the care of Thang Garnica, including pertinent history and exam findings, with the resident/APC/staff. I have seen the patient and the key elements of all parts of the encounter have been performed by me. Physical exam was deferred to limit physical exposure and PPE resources. I reviewed the interval history, interpreted all available radiographic, laboratory and physiologic data at the time of service. I agree with the assessment and plan as documented by resident/APC/ ancillary staff.   Patient remains on the ventilator  Trachea is in the midline  No subcutaneous air  No JVD  No rhonchi  Abdomen is not distended  No edema    MEDICATIONS:  Scheduled Meds:   chlordiazePOXIDE  10 mg Per NG tube 4x Daily    lidocaine 1 % injection  5 mL IntraDERmal Once    sodium chloride flush  5-40 mL IntraVENous 2 times per day    oxyCODONE  10 mg Oral Q6H    docusate  100 mg Per NG tube BID    insulin lispro  0-6 Units SubCUTAneous Q6H    lansoprazole  30 mg Oral QAM AC    cefepime  2,000 mg IntraVENous Q12H    furosemide  40 mg IntraVENous Daily    insulin glargine  10 Units SubCUTAneous Nightly    sodium chloride flush  5-40 mL IntraVENous 2 times per day    enoxaparin  30 mg SubCUTAneous BID    Vitamin D  2,000 Units Oral Daily    dexamethasone  10 mg IntraVENous Q24H     Continuous Infusions:   propofol 20 mcg/kg/min (12/26/21 0519)    sodium chloride      dexmedetomidine 0.6 mcg/kg/hr (12/26/21 0044)    sodium chloride      dextrose      norepinephrine Stopped (12/25/21 2130)    midazolam 10 mg/hr (12/26/21 0501)    fentaNYL 200 mcg/hr (12/26/21 0342)    dextrose      sodium chloride 10 mL/hr at 12/13/21 1548     PRN Meds:   sodium chloride flush, 5-40 mL, PRN  sodium chloride, 25 mL, PRN  ibuprofen, 600 mg, Q6H PRN  metoprolol, 5 mg, Q6H PRN  polyethylene glycol, 17 g, BID PRN  senna, 5 mL, Nightly PRN  bisacodyl, 10 mg, Daily PRN  acetaminophen, 650 mg, Q4H PRN  sodium chloride flush, 5-40 mL, PRN  sodium chloride, 25 mL, PRN  ondansetron, 4 mg, Q8H PRN   Or  ondansetron, 4 mg, Q6H PRN  acetaminophen, 650 mg, Q6H PRN  glucose, 15 g, PRN  dextrose, 12.5 g, PRN  glucagon (rDNA), 1 mg, PRN  dextrose, 100 mL/hr, PRN  glucose, 15 g, PRN  dextrose, 12.5 g, PRN  glucagon (rDNA), 1 mg, PRN  dextrose, 100 mL/hr, PRN        SUPPORT DEVICES: [x] Ventilator [] BIPAP  [] Nasal Cannula [] Room Air      ABGs:     Lab Results   Component Value Date    QWN5HED NOT REPORTED 12/26/2021    FIO2 70.0 12/26/2021       DATA:  Complete Blood Count:   Recent Labs     12/24/21  0516 12/25/21  0447 12/26/21  0417   WBC 21.8* 18.9* 14.4*   RBC 4.55 4.34 4.37   HGB 14.2 13.6 13.6   HCT 43.1 40.7 41.0   MCV 94.7 93.8 93.8   MCH 31.2 31.3 31.1   MCHC 32.9 33.4 33.2   RDW 13.7 13.8 13.7    255 247   MPV 12.3 12.0 11.7        Last 3 Blood Glucose:   Recent Labs     12/24/21  0516 12/25/21  0447 12/26/21  0417   GLUCOSE 128* 118* 121*        PT/INR:    Lab Results   Component Value Date    PROTIME 13.0 12/12/2021    INR 1.0 12/12/2021     PTT:    Lab Results   Component Value Date    APTT 39.1 12/12/2021       Comprehensive Metabolic Profile:   Recent Labs     12/24/21  0516 12/25/21  0447 12/26/21  0417    140 139   K 3.9 4.0 3.9    100 101   CO2 28 28 29   BUN 27* 28* 28*   CREATININE 0.37* 0.40* 0.38*   GLUCOSE 128* 118* 121*   CALCIUM 8.6 8.6 8.9   PROT 5.4* 5.5* 5.6*   LABALBU 3.4* 3.2* 3.4*   BILITOT 0.35 0.38 0.40   ALKPHOS 55 55 50   AST 36 26 22   ALT 60* 53* 54*      Magnesium:   Lab Results   Component Value Date    MG 2.5 12/17/2021    MG 2.3 12/16/2021    MG 2.4 12/15/2021     Phosphorus:   Lab Results   Component Value Date    PHOS 3.2 12/18/2021    PHOS 4.0 12/17/2021    PHOS 2.8 12/16/2021     Ionized Calcium: No results found for: CAION     Urinalysis:   Lab Results   Component Value Date    NITRU NEGATIVE 12/18/2021    COLORU Dark Yellow 12/18/2021    PHUR 6.0 12/18/2021    WBCUA 5 TO 10 12/18/2021    RBCUA 20 TO 50 12/18/2021    MUCUS NOT REPORTED 12/18/2021    TRICHOMONAS NOT REPORTED 12/18/2021    YEAST NOT REPORTED 12/18/2021    BACTERIA NOT REPORTED 12/18/2021    SPECGRAV 1.032 12/18/2021    LEUKOCYTESUR NEGATIVE 12/18/2021    UROBILINOGEN Normal 12/18/2021    BILIRUBINUR NEGATIVE 12/18/2021    GLUCOSEU NEGATIVE 12/18/2021    KETUA NEGATIVE 12/18/2021    AMORPHOUS NOT REPORTED 12/18/2021           XR CHEST (SINGLE VIEW FRONTAL)    Result Date: 12/14/2021  Mild interval improvement in multifocal airspace disease particularly at the right base. Support tubes and lines as above. XR CHEST (SINGLE VIEW FRONTAL)    Result Date: 12/12/2021  Stable appearing     XR CHEST (SINGLE VIEW FRONTAL)    Result Date: 12/11/2021  Multifocal hazy opacities throughout both lungs consistent with COVID pneumonia and or pulmonary edema. XR CHEST PORTABLE    Result Date: 12/12/2021  Stable appearing chest with unchanged support tubes/lines and persistent extensive bilateral airspace disease consistent with known COVID pneumonia.      XR CHEST PORTABLE    Result Date: 12/12/2021  Right internal jugular central venous catheter tip projecting over the proximal SVC versus brachiocephalic vein. No pneumothorax. Otherwise stable exam from earlier today. XR CHEST PORTABLE    Result Date: 12/12/2021  Endotracheal tube tip is 3.2 cm above the saul. Overall significant worsening of multifocal airspace opacities keeping with history of COVID-19. CT CHEST PULMONARY EMBOLISM W CONTRAST    Result Date: 12/11/2021  1. No evidence of pulmonary embolism 2. Diffuse ground-glass opacities throughout the lungs, typical of COVID-19 pulmonary disease. Past Medical History:   Diagnosis Date    Arthritis     Asthma     Diabetes mellitus ([address]     Edema     GERD (gastroesophageal reflux disease)     Hypertension     on lasix    Migraine     IRMA on CPAP     seldom use machine        Social History     Socioeconomic History    Marital status:      Spouse name: None    Number of children: None    Years of education: None    Highest education level: None   Occupational History    None   Tobacco Use    Smoking status: Former Smoker    Smokeless tobacco: Never Used   Vaping Use    Vaping Use: Never used   Substance and Sexual Activity    Alcohol use: Yes     Comment: every couple months    Drug use: Never    Sexual activity: None   Other Topics Concern    None   Social History Narrative    None     Social Determinants of Health     Financial Resource Strain:     Difficulty of Paying Living Expenses: Not on file   Food Insecurity:     Worried About Running Out of Food in the Last Year: Not on file    Lucina of Food in the Last Year: Not on file   Transportation Needs:     Lack of Transportation (Medical): Not on file    Lack of Transportation (Non-Medical):  Not on file   Physical Activity:     Days of Exercise per Week: Not on file    Minutes of Exercise per Session: Not on file   Stress:     Feeling of Stress : Not on file   Social Connections:     Frequency of Communication with Friends and Family: Not on file    Frequency of Social Gatherings with Friends and Family: Not on file    Attends Church Services: Not on file    Active Member of Clubs or Organizations: Not on file    Attends Club or Organization Meetings: Not on file    Marital Status: Not on file   Intimate Partner Violence:     Fear of Current or Ex-Partner: Not on file    Emotionally Abused: Not on file    Physically Abused: Not on file    Sexually Abused: Not on file   Housing Stability:     Unable to Pay for Housing in the Last Year: Not on file    Number of [place] in the Last Year: Not on file    Unstable Housing in the Last Year: Not on file         There is no immunization history on file for this patient.       Family History   Problem Relation Age of Onset    Heart Attack Sister 32    Diabetes Paternal Grandmother     Heart Disease Paternal Uncle     Heart Disease Paternal Uncle     Heart Disease Paternal Uncle     Cancer Maternal Aunt     Cancer Maternal Aunt     Cancer Maternal Uncle     Cancer Maternal Uncle          Past Surgical History:   Procedure Laterality Date    APPENDECTOMY      ARTHROPLASTY Left 11/1/2019    LEFT FOOT 1ST MTPJ ARTHROPLASTY WITH PEREZ IMPLANT AND GROMMETS  [facility] performed by Ciera Doshi MD at [address] [facility] Right 12/12/2019    RIGHT FOOT ARTHROPLASTY 1ST MTPJ (Right Foot)    ARTHROPLASTY Right 12/12/2019    RIGHT FOOT ARTHROPLASTY 1ST MTPJ performed by Ciera Doshi MD at [address] Right    [address]  2014    no blockage no stents    CHOLECYSTECTOMY      COLONOSCOPY      ENDOSCOPY, COLON, DIAGNOSTIC      TOE SURGERY Left 11/01/2019     LEFT FOOT 1ST MTPJ ARTHROPLASTY WITH PEREZ IMPLANT AND GROMMETS  [facility] (Left )    TONSILLECTOMY            VENTILATOR SETTINGS:  Vent Information  $Ventilation: $Subsequent Day  Skin Assessment: Clean, dry, & intact  Suction Catheter Diameter: 14  Equipment ID: [id number]  Equipment Changed: HME  Vent Type: Servo i  Vent Mode: PRVC  Vt Ordered: 550 mL  Rate Set: 24 bmp  FiO2 : (S) 60 % (Post-ABG)  SpO2: 96 %  SpO2/FiO2 ratio: 161.67  Sensitivity: 3  PEEP/CPAP: 10  I Time/ I Time %: 0.7 s  Humidification Source: HME  Humidification Temp: 37  Humidification Temp Measured: 36.8  Circuit Condensation: Drained  Nitric Oxide/Epoprostenol In Use?: No     PaO2/FiO2 RATIO:  Recent Labs     12/26/21  0325   POCPO2 86.5      FiO2 : (S) 60 % (Post-ABG)       LABS:  ABGs:   Recent Labs     12/24/21  0553 12/25/21  0436 12/26/21  0325   POCPH 7.449 7.465* 7.451*   POCPCO2 44.3 51.1* 49.7*   POCPO2 71.9* 73.3* 86.5   POCHCO3 30.8* 36.8* 34.6*   CNQV3SRK 95 95 97            ASSESSMENT:     Principal Problem:    Pneumonia due to COVID-19 virus  Active Problems:    Shock ([address]    Acute respiratory failure with hypoxia (HCC)    Type 2 diabetes mellitus (HCC)    Asthma    IRMA on CPAP    Hypertension    GERD (gastroesophageal reflux disease)    ARDS (adult respiratory distress syndrome) (HCC)  Resolved Problems:    * No resolved hospital problems. *              Acute hypoxic respiratory failure secondary to COVID 19   Acute respiratory distress syndrome   Bilateral multifocal pneumonia due to COVID 19 infection   Covid -19 pandemic emergency   Post hypercarbic metabolic alkalosis   Hyperglycemia, better   Leukocytosis, slightly better   Shock requiring norepinephrine? Sepsis    LOS: 14  PLAN:    · D/w RT  · D/w RN   · Chest x-ray on 12/24/2021 with improving bilateral pulmonary infiltrates  · Sedation reviewed.  We will try to cut down on the sedatives and pain medications  · Cont ARDS ventilation  · Proning held off as it did not appear to make much of difference  · Airborne isolation and droplet precautions to be continued  · Continue supportive care   · Cont treatment with medications for COVID  · Cont tube feeding  · Monitor endotracheal secretions   · Will obtain xray chest as needed  · ABG as needed  · Prognosis guarded given age and severity of illness. · On Lovenox and lansoprazole  · On Decadron  · Patient is on cefepime  · Check triglycerides as long as the patient is needing propofol  · Patient required placement of the Hahn catheter due to retention of urine requiring straight catheter multiple times a day    Treatment plan Discussed with nursing staff in detail , all questions answered . Total critical care time caring for this patient with life threatening, unstable organ failure, including direct patient contact, management of life support systems, review of data including imaging and labs, discussions with other team members and physicians at least 27  Min so far today, excluding procedures. Electronically signed by Shana Manrique MD on 12/26/2021 at 8:24 AM       This patient was evaluated in the context of the global SARS-CoV-2 (COVID-19) pandemic, which necessitated considerations that the patient either has COVID-19 infection or is at risk of infection with COVID-19. Institutional protocols and algorithms that pertain to the evaluation & management of patients with COVID-19 or those at risk for COVID-19 are in a state of rapid changes based on information released by regulatory bodies including the CDC and federal and state organizations. These policies and algorithms were followed during the patient's care. Please note that this chart was generated using voice recognition Dragon dictation software. Although every effort was made to ensure the accuracy of this automated transcription, some errors in transcription may have occurred.

## 2021-12-27 LAB
ABSOLUTE EOS #: <0.03 K/UL (ref 0–0.44)
ABSOLUTE IMMATURE GRANULOCYTE: 0.21 K/UL (ref 0–0.3)
ABSOLUTE LYMPH #: 0.96 K/UL (ref 1.1–3.7)
ABSOLUTE MONO #: 1.35 K/UL (ref 0.1–1.2)
ALBUMIN SERPL-MCNC: 3.4 G/DL (ref 3.5–5.2)
ALBUMIN/GLOBULIN RATIO: 1.3 (ref 1–2.5)
ALLEN TEST: ABNORMAL
ALP BLD-CCNC: 56 U/L (ref 40–129)
ALT SERPL-CCNC: 94 U/L (ref 5–41)
ANION GAP SERPL CALCULATED.3IONS-SCNC: 10 MMOL/L (ref 9–17)
AST SERPL-CCNC: 40 U/L
BASOPHILS # BLD: 0 % (ref 0–2)
BASOPHILS ABSOLUTE: 0.06 K/UL (ref 0–0.2)
BILIRUB SERPL-MCNC: 0.42 MG/DL (ref 0.3–1.2)
BILIRUBIN DIRECT: 0.11 MG/DL
BILIRUBIN, INDIRECT: 0.31 MG/DL (ref 0–1)
BUN BLDV-MCNC: 27 MG/DL (ref 6–20)
BUN/CREAT BLD: ABNORMAL (ref 9–20)
CALCIUM SERPL-MCNC: 9.1 MG/DL (ref 8.6–10.4)
CHLORIDE BLD-SCNC: 102 MMOL/L (ref 98–107)
CO2: 28 MMOL/L (ref 20–31)
CREAT SERPL-MCNC: 0.3 MG/DL (ref 0.7–1.2)
DIFFERENTIAL TYPE: ABNORMAL
EOSINOPHILS RELATIVE PERCENT: 0 % (ref 1–4)
FIO2: 50
GFR AFRICAN AMERICAN: >60 ML/MIN
GFR NON-AFRICAN AMERICAN: >60 ML/MIN
GFR SERPL CREATININE-BSD FRML MDRD: ABNORMAL ML/MIN/{1.73_M2}
GFR SERPL CREATININE-BSD FRML MDRD: ABNORMAL ML/MIN/{1.73_M2}
GLOBULIN: ABNORMAL G/DL (ref 1.5–3.8)
GLUCOSE BLD-MCNC: 129 MG/DL (ref 70–99)
GLUCOSE BLD-MCNC: 142 MG/DL (ref 75–110)
GLUCOSE BLD-MCNC: 143 MG/DL (ref 74–100)
GLUCOSE BLD-MCNC: 146 MG/DL (ref 75–110)
GLUCOSE BLD-MCNC: 159 MG/DL (ref 75–110)
GLUCOSE BLD-MCNC: 186 MG/DL (ref 75–110)
HCT VFR BLD CALC: 43.7 % (ref 40.7–50.3)
HEMOGLOBIN: 14.5 G/DL (ref 13–17)
IMMATURE GRANULOCYTES: 2 %
LYMPHOCYTES # BLD: 7 % (ref 24–43)
MCH RBC QN AUTO: 31.3 PG (ref 25.2–33.5)
MCHC RBC AUTO-ENTMCNC: 33.2 G/DL (ref 28.4–34.8)
MCV RBC AUTO: 94.2 FL (ref 82.6–102.9)
MODE: ABNORMAL
MONOCYTES # BLD: 9 % (ref 3–12)
NEGATIVE BASE EXCESS, ART: ABNORMAL (ref 0–2)
NRBC AUTOMATED: 0 PER 100 WBC
O2 DEVICE/FLOW/%: ABNORMAL
PATIENT TEMP: ABNORMAL
PDW BLD-RTO: 13.6 % (ref 11.8–14.4)
PLATELET # BLD: 239 K/UL (ref 138–453)
PLATELET ESTIMATE: ABNORMAL
PMV BLD AUTO: 11.9 FL (ref 8.1–13.5)
POC HCO3: 33.3 MMOL/L (ref 21–28)
POC LACTIC ACID: 1.38 MMOL/L (ref 0.56–1.39)
POC O2 SATURATION: 94 % (ref 94–98)
POC PCO2 TEMP: ABNORMAL MM HG
POC PCO2: 48.5 MM HG (ref 35–48)
POC PH TEMP: ABNORMAL
POC PH: 7.45 (ref 7.35–7.45)
POC PO2 TEMP: ABNORMAL MM HG
POC PO2: 67.9 MM HG (ref 83–108)
POSITIVE BASE EXCESS, ART: 8 (ref 0–3)
POTASSIUM SERPL-SCNC: 3.9 MMOL/L (ref 3.7–5.3)
RBC # BLD: 4.64 M/UL (ref 4.21–5.77)
RBC # BLD: ABNORMAL 10*6/UL
SAMPLE SITE: ABNORMAL
SEG NEUTROPHILS: 82 % (ref 36–65)
SEGMENTED NEUTROPHILS ABSOLUTE COUNT: 11.82 K/UL (ref 1.5–8.1)
SODIUM BLD-SCNC: 140 MMOL/L (ref 135–144)
TCO2 (CALC), ART: ABNORMAL MMOL/L (ref 22–29)
TOTAL PROTEIN: 6.1 G/DL (ref 6.4–8.3)
WBC # BLD: 14.4 K/UL (ref 3.5–11.3)
WBC # BLD: ABNORMAL 10*3/UL

## 2021-12-27 PROCEDURE — 2700000000 HC OXYGEN THERAPY PER DAY

## 2021-12-27 PROCEDURE — 83605 ASSAY OF LACTIC ACID: CPT

## 2021-12-27 PROCEDURE — 6370000000 HC RX 637 (ALT 250 FOR IP): Performed by: NURSE PRACTITIONER

## 2021-12-27 PROCEDURE — 82803 BLOOD GASES ANY COMBINATION: CPT

## 2021-12-27 PROCEDURE — 37799 UNLISTED PX VASCULAR SURGERY: CPT

## 2021-12-27 PROCEDURE — 6360000002 HC RX W HCPCS: Performed by: NURSE PRACTITIONER

## 2021-12-27 PROCEDURE — 6370000000 HC RX 637 (ALT 250 FOR IP): Performed by: INTERNAL MEDICINE

## 2021-12-27 PROCEDURE — 2580000003 HC RX 258: Performed by: NURSE PRACTITIONER

## 2021-12-27 PROCEDURE — 2500000003 HC RX 250 WO HCPCS: Performed by: INTERNAL MEDICINE

## 2021-12-27 PROCEDURE — 99232 SBSQ HOSP IP/OBS MODERATE 35: CPT | Performed by: FAMILY MEDICINE

## 2021-12-27 PROCEDURE — 82947 ASSAY GLUCOSE BLOOD QUANT: CPT

## 2021-12-27 PROCEDURE — 6360000002 HC RX W HCPCS: Performed by: INTERNAL MEDICINE

## 2021-12-27 PROCEDURE — 99233 SBSQ HOSP IP/OBS HIGH 50: CPT | Performed by: INTERNAL MEDICINE

## 2021-12-27 PROCEDURE — 2000000000 HC ICU R&B

## 2021-12-27 PROCEDURE — 85025 COMPLETE CBC W/AUTO DIFF WBC: CPT

## 2021-12-27 PROCEDURE — 94761 N-INVAS EAR/PLS OXIMETRY MLT: CPT

## 2021-12-27 PROCEDURE — 2580000003 HC RX 258: Performed by: INTERNAL MEDICINE

## 2021-12-27 PROCEDURE — 94003 VENT MGMT INPAT SUBQ DAY: CPT

## 2021-12-27 PROCEDURE — 80048 BASIC METABOLIC PNL TOTAL CA: CPT

## 2021-12-27 PROCEDURE — 99291 CRITICAL CARE FIRST HOUR: CPT | Performed by: INTERNAL MEDICINE

## 2021-12-27 PROCEDURE — 80076 HEPATIC FUNCTION PANEL: CPT

## 2021-12-27 PROCEDURE — 6370000000 HC RX 637 (ALT 250 FOR IP): Performed by: STUDENT IN AN ORGANIZED HEALTH CARE EDUCATION/TRAINING PROGRAM

## 2021-12-27 RX ORDER — DEXMEDETOMIDINE HYDROCHLORIDE 4 UG/ML
.2-1.4 INJECTION, SOLUTION INTRAVENOUS CONTINUOUS
Status: DISCONTINUED | OUTPATIENT
Start: 2021-12-27 | End: 2022-01-06

## 2021-12-27 RX ADMIN — ACETAMINOPHEN 650 MG: 160 SOLUTION ORAL at 08:59

## 2021-12-27 RX ADMIN — PROPOFOL 15 MCG/KG/MIN: 10 INJECTION, EMULSION INTRAVENOUS at 12:20

## 2021-12-27 RX ADMIN — DEXMEDETOMIDINE HYDROCHLORIDE 1 MCG/KG/HR: 4 INJECTION, SOLUTION INTRAVENOUS at 11:04

## 2021-12-27 RX ADMIN — SODIUM CHLORIDE, PRESERVATIVE FREE 10 ML: 5 INJECTION INTRAVENOUS at 20:15

## 2021-12-27 RX ADMIN — OXYCODONE HYDROCHLORIDE 10 MG: 5 SOLUTION ORAL at 09:35

## 2021-12-27 RX ADMIN — OXYCODONE HYDROCHLORIDE 10 MG: 5 SOLUTION ORAL at 15:37

## 2021-12-27 RX ADMIN — DEXMEDETOMIDINE HYDROCHLORIDE 0.9 MCG/KG/HR: 4 INJECTION, SOLUTION INTRAVENOUS at 14:36

## 2021-12-27 RX ADMIN — OXYCODONE HYDROCHLORIDE 10 MG: 5 SOLUTION ORAL at 04:00

## 2021-12-27 RX ADMIN — DOCUSATE SODIUM 100 MG: 50 LIQUID ORAL at 20:55

## 2021-12-27 RX ADMIN — DEXAMETHASONE SODIUM PHOSPHATE 10 MG: 10 INJECTION INTRAMUSCULAR; INTRAVENOUS at 12:35

## 2021-12-27 RX ADMIN — DEXMEDETOMIDINE HYDROCHLORIDE 0.9 MCG/KG/HR: 4 INJECTION, SOLUTION INTRAVENOUS at 00:07

## 2021-12-27 RX ADMIN — INSULIN LISPRO 1 UNITS: 100 INJECTION, SOLUTION INTRAVENOUS; SUBCUTANEOUS at 05:30

## 2021-12-27 RX ADMIN — INSULIN LISPRO 1 UNITS: 100 INJECTION, SOLUTION INTRAVENOUS; SUBCUTANEOUS at 18:03

## 2021-12-27 RX ADMIN — Medication 2000 UNITS: at 08:49

## 2021-12-27 RX ADMIN — DEXMEDETOMIDINE HYDROCHLORIDE 0.9 MCG/KG/HR: 4 INJECTION, SOLUTION INTRAVENOUS at 07:42

## 2021-12-27 RX ADMIN — Medication 125 MCG/HR: at 18:35

## 2021-12-27 RX ADMIN — CEFEPIME 2000 MG: 2 INJECTION, POWDER, FOR SOLUTION INTRAVENOUS at 22:08

## 2021-12-27 RX ADMIN — INSULIN LISPRO 1 UNITS: 100 INJECTION, SOLUTION INTRAVENOUS; SUBCUTANEOUS at 12:39

## 2021-12-27 RX ADMIN — DEXMEDETOMIDINE HYDROCHLORIDE 0.2 MCG/KG/HR: 4 INJECTION, SOLUTION INTRAVENOUS at 22:04

## 2021-12-27 RX ADMIN — CEFEPIME 2000 MG: 2 INJECTION, POWDER, FOR SOLUTION INTRAVENOUS at 08:50

## 2021-12-27 RX ADMIN — Medication 125 MCG/HR: at 20:45

## 2021-12-27 RX ADMIN — Medication 30 MG: at 08:49

## 2021-12-27 RX ADMIN — FUROSEMIDE 40 MG: 10 INJECTION, SOLUTION INTRAVENOUS at 08:49

## 2021-12-27 RX ADMIN — SODIUM CHLORIDE, PRESERVATIVE FREE 10 ML: 5 INJECTION INTRAVENOUS at 09:35

## 2021-12-27 RX ADMIN — INSULIN GLARGINE 10 UNITS: 100 INJECTION, SOLUTION SUBCUTANEOUS at 20:58

## 2021-12-27 RX ADMIN — OXYCODONE HYDROCHLORIDE 10 MG: 5 SOLUTION ORAL at 22:09

## 2021-12-27 RX ADMIN — INSULIN LISPRO 1 UNITS: 100 INJECTION, SOLUTION INTRAVENOUS; SUBCUTANEOUS at 00:34

## 2021-12-27 RX ADMIN — ENOXAPARIN SODIUM 30 MG: 100 INJECTION SUBCUTANEOUS at 08:50

## 2021-12-27 RX ADMIN — Medication 6 MG/HR: at 19:20

## 2021-12-27 RX ADMIN — CHLORDIAZEPOXIDE HYDROCHLORIDE 10 MG: 5 CAPSULE ORAL at 09:00

## 2021-12-27 RX ADMIN — PROPOFOL 25 MCG/KG/MIN: 10 INJECTION, EMULSION INTRAVENOUS at 22:06

## 2021-12-27 RX ADMIN — ENOXAPARIN SODIUM 30 MG: 100 INJECTION SUBCUTANEOUS at 20:55

## 2021-12-27 RX ADMIN — CHLORDIAZEPOXIDE HYDROCHLORIDE 10 MG: 5 CAPSULE ORAL at 04:00

## 2021-12-27 RX ADMIN — Medication 7 MG/HR: at 20:13

## 2021-12-27 RX ADMIN — Medication 125 MCG/HR: at 08:59

## 2021-12-27 RX ADMIN — Medication 8 MG/HR: at 22:41

## 2021-12-27 RX ADMIN — CHLORDIAZEPOXIDE HYDROCHLORIDE 10 MG: 5 CAPSULE ORAL at 15:37

## 2021-12-27 RX ADMIN — ACETAMINOPHEN 650 MG: 160 SOLUTION ORAL at 15:38

## 2021-12-27 RX ADMIN — CHLORDIAZEPOXIDE HYDROCHLORIDE 10 MG: 5 CAPSULE ORAL at 22:08

## 2021-12-27 RX ADMIN — Medication 125 MCG/HR: at 03:13

## 2021-12-27 ASSESSMENT — PAIN SCALES - GENERAL: PAINLEVEL_OUTOF10: 0

## 2021-12-27 ASSESSMENT — PULMONARY FUNCTION TESTS
PIF_VALUE: 27
PIF_VALUE: 27
PIF_VALUE: 26
PIF_VALUE: 22
PIF_VALUE: 26

## 2021-12-27 NOTE — PLAN OF CARE
Problem: OXYGENATION/RESPIRATORY FUNCTION  Goal: Patient will maintain patent airway  12/27/2021 0810 by Irina Monterroso RCP  Outcome: Ongoing     Problem: OXYGENATION/RESPIRATORY FUNCTION  Goal: Patient will achieve/maintain normal respiratory rate/effort  Description: Respiratory rate and effort will be within normal limits for the patient  12/27/2021 0810 by Irina Monterroso RCP  Outcome: Ongoing     Problem: MECHANICAL VENTILATION  Goal: Patient will maintain patent airway  12/27/2021 0810 by Irina Monterroso RCP  Outcome: Ongoing     Problem: MECHANICAL VENTILATION  Goal: Oral health is maintained or improved  12/27/2021 0810 by Irina Monterroso RCP  Outcome: Ongoing       Problem: MECHANICAL VENTILATION  Goal: Ability to express needs and understand communication  12/27/2021 0810 by Irina Monterroso RCP  Outcome: Ongoing     Problem: MECHANICAL VENTILATION  Goal: Mobility/activity is maintained at optimum level for patient  12/27/2021 0810 by Irina Monterroso RCP  Outcome: Ongoing     Problem: SKIN INTEGRITY  Goal: Skin integrity is maintained or improved  12/27/2021 0810 by Irina Monterroso RCP  Outcome: Ongoing   Problem: MECHANICAL VENTILATION  Goal: ET tube will be managed safely  12/27/2021 0810 by Irina Monterroso RCP  Outcome: Ongoing Notification Instructions: Patient will be notified of biopsy results. However, patient instructed to call the office if not contacted within 2 weeks.

## 2021-12-27 NOTE — PLAN OF CARE
Problem: Airway Clearance - Ineffective  Goal: Achieve or maintain patent airway  12/26/2021 1926 by Anisa Diaz RN  Outcome: Ongoing  12/26/2021 0845 by Charla Shone, RN  Outcome: Ongoing     Problem: Gas Exchange - Impaired  Goal: Absence of hypoxia  12/26/2021 1926 by Anisa Diaz RN  Outcome: Ongoing  12/26/2021 0845 by Charla Shone, RN  Outcome: Ongoing  Goal: Promote optimal lung function  12/26/2021 1926 by Anisa Diaz RN  Outcome: Ongoing  12/26/2021 0845 by Charla Shone, RN  Outcome: Ongoing     Problem: Breathing Pattern - Ineffective  Goal: Ability to achieve and maintain a regular respiratory rate  12/26/2021 1926 by Anisa Diaz RN  Outcome: Ongoing  12/26/2021 0845 by Charla Shone, RN  Outcome: Ongoing     Problem:  Body Temperature -  Risk of, Imbalanced  Goal: Ability to maintain a body temperature within defined limits  12/26/2021 1926 by Anisa Diaz RN  Outcome: Ongoing  12/26/2021 0845 by Charla Shone, RN  Outcome: Ongoing  Goal: Will regain or maintain usual level of consciousness  12/26/2021 1926 by Anisa Diaz RN  Outcome: Ongoing  12/26/2021 0845 by Charla Shone, RN  Outcome: Ongoing  Goal: Complications related to the disease process, condition or treatment will be avoided or minimized  12/26/2021 1926 by Anisa Diaz RN  Outcome: Ongoing  12/26/2021 0845 by Charla Shone, RN  Outcome: Ongoing     Problem: Isolation Precautions - Risk of Spread of Infection  Goal: Prevent transmission of infection  12/26/2021 1926 by Anisa Diaz RN  Outcome: Ongoing  12/26/2021 0845 by Charla Shone, RN  Outcome: Ongoing     Problem: Nutrition Deficits  Goal: Optimize nutritional status  12/26/2021 1926 by Anisa Diaz RN  Outcome: Ongoing  12/26/2021 0845 by Charla Shone, RN  Outcome: Ongoing     Problem: Risk for Fluid Volume Deficit  Goal: Maintain normal heart rhythm  12/26/2021 1926 by Anisa Diaz RN  Outcome: Ongoing  12/26/2021 0845 by Tawnya Naranjo RN  Outcome: Ongoing  Goal: Maintain absence of muscle cramping  12/26/2021 1926 by Elaine Umanzor RN  Outcome: Ongoing  12/26/2021 0845 by Tawnya Naranjo RN  Outcome: Ongoing  Goal: Maintain normal serum potassium, sodium, calcium, phosphorus, and pH  12/26/2021 1926 by Elaine Umanzor RN  Outcome: Ongoing  12/26/2021 0845 by Tawnya Naarnjo RN  Outcome: Ongoing     Problem: Loneliness or Risk for Loneliness  Goal: Demonstrate positive use of time alone when socialization is not possible  Outcome: Ongoing     Problem: Fatigue  Goal: Verbalize increase energy and improved vitality  Outcome: Ongoing     Problem: Patient Education: Go to Patient Education Activity  Goal: Patient/Family Education  12/26/2021 1926 by Elaine Umanzor RN  Outcome: Ongoing  12/26/2021 0845 by Tawnya Naranjo RN  Outcome: Ongoing     Problem: OXYGENATION/RESPIRATORY FUNCTION  Goal: Patient will maintain patent airway  12/26/2021 1926 by Elaine Umanzor RN  Outcome: Ongoing  12/26/2021 0845 by Tawnya Naranjo RN  Outcome: Ongoing  Goal: Patient will achieve/maintain normal respiratory rate/effort  Description: Respiratory rate and effort will be within normal limits for the patient  Outcome: Ongoing     Problem: MECHANICAL VENTILATION  Goal: Patient will maintain patent airway  12/26/2021 1926 by Elaine Umanzor RN  Outcome: Ongoing  12/26/2021 0845 by Tawnya Naranjo RN  Outcome: Ongoing  Goal: Oral health is maintained or improved  12/26/2021 1926 by Elaine Umanzro RN  Outcome: Ongoing  12/26/2021 0845 by Tawnya Naranjo RN  Outcome: Ongoing  Goal: ET tube will be managed safely  12/26/2021 1926 by Elaine Umanzor RN  Outcome: Ongoing  12/26/2021 0845 by Tawnya Naranjo RN  Outcome: Ongoing  Goal: Ability to express needs and understand communication  12/26/2021 1926 by Elaine Umanzor RN  Outcome: Ongoing  12/26/2021 0845 by Tawnya Naranjo RN  Outcome: Ongoing  Goal: Mobility/activity is maintained at optimum level for patient  Outcome: Ongoing     Problem: SKIN INTEGRITY  Goal: Skin integrity is maintained or improved  12/26/2021 1926 by Kari Holder RN  Outcome: Ongoing  12/26/2021 0845 by Ramirez Mathews RN  Outcome: Ongoing     Problem: Skin Integrity:  Goal: Will show no infection signs and symptoms  Description: Will show no infection signs and symptoms  12/26/2021 1926 by Kari Holder RN  Outcome: Ongoing  12/26/2021 0845 by Ramirez Mathews RN  Outcome: Ongoing  Goal: Absence of new skin breakdown  Description: Absence of new skin breakdown  12/26/2021 1926 by Kari Holder RN  Outcome: Ongoing  12/26/2021 0845 by Ramirez Mathews RN  Outcome: Ongoing     Problem: Falls - Risk of:  Goal: Will remain free from falls  Description: Will remain free from falls  12/26/2021 1926 by Kari Holder RN  Outcome: Ongoing  12/26/2021 0845 by Ramirez Mathews RN  Outcome: Ongoing  Goal: Absence of physical injury  Description: Absence of physical injury  12/26/2021 1926 by Kari Holder RN  Outcome: Ongoing  12/26/2021 0845 by Ramirez Mathews RN  Outcome: Ongoing     Problem: Nutrition  Goal: Optimal nutrition therapy  12/26/2021 1926 by Kari Holder RN  Outcome: Ongoing  12/26/2021 0845 by Ramirez Mathews RN  Outcome: Ongoing     Problem: Discharge Planning:  Goal: Participates in care planning  Description: Participates in care planning  12/26/2021 1926 by Kari Holder RN  Outcome: Ongoing  12/26/2021 0845 by Ramirez Mathews RN  Outcome: Ongoing  Goal: Discharged to appropriate level of care  Description: Discharged to appropriate level of care  12/26/2021 1926 by Kari Holder RN  Outcome: Ongoing  12/26/2021 0845 by Ramirez Mathews RN  Outcome: Ongoing

## 2021-12-27 NOTE — PROGRESS NOTES
Comprehensive Nutrition Assessment    Type and Reason for Visit:  Reassess    Nutrition Recommendations/Plan:   -Continue NPO status   -Restart tube feeding as able of Vital AF 1.2 (Peptide-Based) @ 50 mL/hr x 24 hrs + 2 protein modulars per day~> 1648 kcals, 142 gms protein, 973 mLs water (w/ current rate of propofol ~> 1978  kcals/d)              -Water flushes per MD discretion               -If Prone/supine ~>               -Run TF @ 20 mL/hr x 16 hrs while prone               -Run TF @ 110 mL/hr x 8 hrs while supine + 2 protein modulars per day  -Will monitor for restart of EN    Nutrition Assessment:  Pt remains NPO, intubated, sedated and in droplet plus precaution room. Propofol is now running @ 12.5 mL/hr. BM noted on 12/24. Tube feeding currently held during time of visit. Tube feeding was running earlier this morning @ goal rate of 50 mL/hr and tolerating w/ minimal residuals + receiving 2 protein modulars per day. Pt noted w/ nuts and sunflower oil. No sunflower oil in Vital AF 1.2. No significant wt loss noted since admission. Will monitor for restart of EN.      Malnutrition Assessment:  Malnutrition Status:  Insufficient data    Context:  Acute Illness     Findings of the 6 clinical characteristics of malnutrition:  Energy Intake:  Mild decrease in energy intake (Comment)  Weight Loss:  No significant weight loss     Body Fat Loss:  Unable to assess     Muscle Mass Loss:  Unable to assess    Fluid Accumulation:  1 - Mild Extremities,Generalized   Strength:  Not Performed    Estimated Daily Nutrient Needs:  Energy (kcal):  11-14 ~> 4913-1536 kcals/d; Weight Used for Energy Requirements:  Admission     Protein (g):  1.5-2.0 gm/kg ~>129-172 gms/d; Weight Used for Protein Requirements:  Ideal        Fluid (ml/day):  per MD;     Nutrition Related Findings:  BM 12/14; labs/meds reviewed      Wounds:  None       Current Nutrition Therapies:    Diet NPO  ADULT TUBE FEEDING; Orogastric; Peptide Based; Continuous; 10; Yes; 24; Q 4 hours; 50; 30; Q 4 hours; Protein; Bolus 2 Proteinex modulars per day  Current Tube Feeding (TF) Orders:  · Feeding Route: Orogastric  · Formula: Peptide Based  · Schedule: Continuous  · Additives/Modulars: Protein (2x/d)   · Rate: held  · Water Flushes: per MD  · Goal TF & Flush Orders Provides: Vital AF 1.2 (Peptide-Based) @ 50 mL/hr x 24 hrs + 2 protein modulars per day~> 1648 kcals, 142 gms protein, 973 mLs water  + propofol kcals    Additional Calorie Sources:   propofol @ 12.5 mL/hr ~> 330 kcals/d    Anthropometric Measures:  · Height: 6' 2\" (188 cm)  · Current Body Weight: 305 lb (138.3 kg)   · Admission Body Weight: 306 lb (138.8 kg)    · Ideal Body Weight: 190 lbs; % Ideal Body Weight 160.5 %   · BMI: 39.1  · BMI Categories: Obese Class 2 (BMI 35.0 -39.9)       Nutrition Diagnosis:   · Inadequate oral intake related to impaired respiratory function as evidenced by NPO or clear liquid status due to medical condition,intubation,nutrition support - enteral nutrition      Nutrition Interventions:   Food and/or Nutrient Delivery:  Continue NPO (Restart tube feeding as able)  Nutrition Education/Counseling:  Education not indicated   Coordination of Nutrition Care:  Continue to monitor while inpatient    Goals: Progress towards goals declining   Pt to meet % of est'd needs daily via EN       Nutrition Monitoring and Evaluation:   Food/Nutrient Intake Outcomes:  Diet Advancement/Tolerance,Enteral Nutrition Intake/Tolerance  Physical Signs/Symptoms Outcomes:  Biochemical Data,Nutrition Focused Physical Findings,Skin,Weight,GI Status,Fluid Status or Edema,Hemodynamic Status     Discharge Planning:     Too soon to determine     Electronically signed by Praful Castellon RD, LD on 12/27/21 at 12:46 PM EST    Contact: [phone]

## 2021-12-27 NOTE — PROGRESS NOTES
[facility]  Office: [address], DO, Eliseo Reilly, DO, Chris Norton, DO, Ryann Pineda Blood, DO, Krista Real MD, Tima Portillo MD, Yasmeen Viramontes MD, Sanam Rico MD, Caro Roberts MD, Janes Estes MD, Rancho Singh MD, Haskell Leventhal, DO, Abelardo Romero, DO, Cheryle Bumpers, MD,  Yves Kothari, DO, Liat Cosme MD, Kay Barahona MD, Jamal Abarca MD, Darius Rivera MD, Gordon Cruz MD, Kalvin Spurling, MD, [place], MD, Pavel Jennings, [facility], [facility], [facility], Nancy Modi, CNP, Talia Rice, CNS, Elías Saldaña, CNP, Farzana Flores, CNP, Joy Barber, CNP, Georgia Abraham, CNP, Sia Barnes, CNP, Kiara Alba PA-C, Patricia Adhikari, DNP, Wilian Jack, DNP, Staci Askew, CNP, Ihsan Hopkins, CNP, Ariane Carter, CNP, Trupti Venegas, CNP, Brigette Varma, CNP, Nicole June, [facility]      Daily Progress Note     Admit Date: 12/12/2021  Bed/Room No.  3026/3026-01  Admitting Physician : Yasmeen Viramontes MD  Code Status :Full Code  Hospital Day:  LOS: 15 days   Chief Complaint:     Breathing difficulty. Principal Problem:    Pneumonia due to COVID-19 virus  Active Problems:    Shock ([facility] [address]    Acute respiratory failure with hypoxia (HCC)    Type 2 diabetes mellitus (HCC)    Asthma    IRMA on CPAP    Hypertension    GERD (gastroesophageal reflux disease)    ARDS (adult respiratory distress syndrome) ([facility] [address]  Resolved Problems:    * No resolved hospital problems. *    Subjective : Interval History/Significant events :  12/27/21  Unchanged  Patient is tolerating tube feed. He is off Levophed. Patient remains sedated with Versed, Precedex. He is on ventilator support FiO2 70% with PEEP of 14. Vitals - Unstable afebrile  Labs -blood sugar controlled. Creatinine stable 0.40. Chest x-ray 12/24/2021-bilateral opacities. Nursing notes , Consults notes reviewed. Overnight events and updates discussed with Nursing staff .    Background History: Omero Coto is 60 y.o. male  Who was admitted to the hospital on 12/12/2021 for treatment of Pneumonia due to COVID-19 virus. Patient initially presented with shortness of breath at [facility].  He had to suggest positive for COVID-19 infection. Patient reported that his son had also COVID-19 infection. Patient was hypoxic in 50s on arrival and was treated with BiPAP however breathing worsened and patient was subsequently intubated. Patient was transferred to [facility] on 12/12/2021 as he was requiring high ventilator support 100% FiO2 with PEEP of 22. He was given Actemra and started on high-dose Decadron. Patient was started with he was started on Flolan and given paralytics for proning per ARDS protocol. Patient also received intermittent Lasix. Proning was stopped on 12/20/2021 and paralytics were stopped on 12/22/2021. Patient started to have fevers and was started on cefepime. Respiratory cultures were negative. PMH:  Past Medical History:   Diagnosis Date    Arthritis     Asthma     Diabetes mellitus ([address]     Edema     GERD (gastroesophageal reflux disease)     Hypertension     on lasix    Migraine     IRMA on CPAP     seldom use machine      Allergies:    Allergies   Allergen Reactions    Nuts [Peanut-Containing Drug Products] Anaphylaxis    Sunflower Oil Anaphylaxis     Sunflower seeds      Medications :  chlordiazePOXIDE, 10 mg, Per NG tube, 4x Daily  lidocaine 1 % injection, 5 mL, IntraDERmal, Once  sodium chloride flush, 5-40 mL, IntraVENous, 2 times per day  oxyCODONE, 10 mg, Oral, Q6H  docusate, 100 mg, Per NG tube, BID  insulin lispro, 0-6 Units, SubCUTAneous, Q6H  lansoprazole, 30 mg, Oral, QAM AC  cefepime, 2,000 mg, IntraVENous, Q12H  furosemide, 40 mg, IntraVENous, Daily  insulin glargine, 10 Units, SubCUTAneous, Nightly  sodium chloride flush, 5-40 mL, IntraVENous, 2 times per day  enoxaparin, 30 mg, SubCUTAneous, BID  Vitamin D, 2,000 Units, Oral, Daily  dexamethasone, 10 mg, IntraVENous, Q24H        Review of Systems   Review of Systems   Unable to perform ROS: Intubated     Objective :      Current Vitals : Temp: 99.3 °F (37.4 °C),  Pulse: 97, Resp: 21, BP: 123/83, SpO2: 94 %  Last 24 Hrs Vitals   Patient Vitals for the past 24 hrs:   BP Temp Temp src Pulse Resp SpO2 Height   12/27/21 1500 123/83 -- -- 97 21 94 % --   12/27/21 1400 (!) 125/91 -- -- 95 20 95 % --   12/27/21 1300 113/78 -- -- 93 26 95 % --   12/27/21 1244 -- -- -- -- -- -- 6' 2\" (1.88 m)   12/27/21 1233 -- -- -- 88 23 94 % --   12/27/21 1200 108/75 99.3 °F (37.4 °C) Bladder 94 28 93 % --   12/27/21 1100 114/73 -- -- 108 (!) 32 94 % --   12/27/21 1000 135/83 -- -- 103 (!) 32 92 % --   12/27/21 0939 -- -- -- 96 26 93 % --   12/27/21 0900 (!) 142/86 -- -- 96 24 91 % --   12/27/21 0807 -- -- -- 81 28 92 % --   12/27/21 0800 131/89 98.8 °F (37.1 °C) Bladder 79 29 91 % --   12/27/21 0700 109/74 -- -- 71 22 94 % --   12/27/21 0600 (!) 131/90 -- -- 87 19 92 % --   12/27/21 0500 125/83 -- -- 81 24 92 % --   12/27/21 0400 116/77 99.1 °F (37.3 °C) Bladder 75 22 92 % --   12/27/21 0335 -- -- -- 80 22 90 % --   12/27/21 0300 121/81 -- -- 78 23 92 % --   12/27/21 0200 133/89 -- -- 84 23 92 % --   12/27/21 0100 127/82 -- -- 86 22 92 % --   12/27/21 0000 129/78 99.8 °F (37.7 °C) Bladder 93 22 93 % --   12/26/21 2338 -- -- -- 74 19 93 % --   12/26/21 2300 98/65 -- -- 61 21 93 % --   12/26/21 2200 122/81 -- -- 79 23 93 % --   12/26/21 2100 136/83 -- -- 82 21 93 % --   12/26/21 2000 130/85 99.7 °F (37.6 °C) Bladder 81 22 94 % --   12/26/21 1945 132/83 -- -- 82 21 94 % --   12/26/21 1930 133/86 -- -- 93 24 94 % --   12/26/21 1915 131/85 -- -- 86 22 94 % --   12/26/21 1900 134/84 -- -- 95 22 94 % --   12/26/21 1845 129/83 -- -- 96 24 94 % --   12/26/21 1830 126/79 -- -- 89 24 95 % --   12/26/21 1815 117/80 -- -- 77 21 95 % --   12/26/21 1800 116/73 100 °F (37.8 °C) -- 78 20 94 % --   12/26/21 1745 119/78 -- -- 83 23 93 % --   12/26/21 1730 118/75 -- -- 83 23 92 % --   12/26/21 1715 105/79 -- -- 81 22 92 % --   12/26/21 1700 115/75 -- -- 93 24 91 % --   12/26/21 1645 106/72 -- -- 98 24 91 % --   12/26/21 1630 117/79 -- -- 96 24 91 % --     Intake / output   12/26 1501 - 12/27 1500  In: 2712.8 [I.V.:1250.8]  Out: 4455 [Urine:4455]  Physical Exam:  Physical Exam  Vitals and nursing note reviewed. Constitutional:       Appearance: He is well-developed. He is ill-appearing. Interventions: He is sedated and intubated. Neck:      Thyroid: No thyroid mass. Cardiovascular:      Rate and Rhythm: Normal rate and regular rhythm. Pulses: Normal pulses. Heart sounds: Normal heart sounds. No murmur heard. Pulmonary:      Effort: He is intubated. Breath sounds: Normal breath sounds. Musculoskeletal:      Right lower leg: No edema. Left lower leg: No edema. Lymphadenopathy:      Cervical: No cervical adenopathy. Skin:     Capillary Refill: Capillary refill takes less than 2 seconds. Neurological:      Motor: No tremor or abnormal muscle tone.            Laboratory findings:    Recent Labs     12/25/21 0447 12/26/21 0417 12/27/21  0330   WBC 18.9* 14.4* 14.4*   HGB 13.6 13.6 14.5   HCT 40.7 41.0 43.7    247 239     Recent Labs     12/25/21 0447 12/26/21 0417 12/27/21  0330    139 140   K 4.0 3.9 3.9    101 102   CO2 28 29 28   GLUCOSE 118* 121* 129*   BUN 28* 28* 27*   CREATININE 0.40* 0.38* 0.30*   CALCIUM 8.6 8.9 9.1     Recent Labs     12/25/21 0447 12/26/21 0417 12/27/21  0330   PROT 5.5* 5.6* 6.1*   LABALBU 3.2* 3.4* 3.4*   AST 26 22 40*   ALT 53* 54* 94*   ALKPHOS 55 50 56   BILITOT 0.38 0.40 0.42   BILIDIR 0.11 0.14 0.11   TRIG  --  201*  --           Specific Gravity, UA   Date Value Ref Range Status   12/18/2021 1.032 (H) 1.005 - 1.030 Final     Protein, UA   Date Value Ref Range Status   12/18/2021 1+ (A) NEGATIVE Final     RBC, UA   Date Value Ref Range Status 12/18/2021 20 TO 50 0 - 4 /HPF Final     Comment:     Reference range defined for non-centrifuged specimen. Bacteria, UA   Date Value Ref Range Status   12/18/2021 NOT REPORTED None Final     Nitrite, Urine   Date Value Ref Range Status   12/18/2021 NEGATIVE NEGATIVE Final     WBC, UA   Date Value Ref Range Status   12/18/2021 5 TO 10 0 - 5 /HPF Final     Leukocyte Esterase, Urine   Date Value Ref Range Status   12/18/2021 NEGATIVE NEGATIVE Final       Imaging / Clinical Data :-   XR CHEST (SINGLE VIEW FRONTAL)    Result Date: 12/22/2021  Stable mild cardiomegaly. Patchy airspace opacities, left more than right, may be related to pulmonary edema versus pneumonia. Nonspecific bowel gas pattern. XR ABDOMEN (KUB) (SINGLE AP VIEW)    Result Date: 12/22/2021  Stable mild cardiomegaly. Patchy airspace opacities, left more than right, may be related to pulmonary edema versus pneumonia. Nonspecific bowel gas pattern. XR CHEST PORTABLE    Result Date: 12/24/2021  1. Stable cardiomegaly. 2.  Patchy airspace opacities, stable in the right lower chest, slightly improved on the left. Findings may be related to pulmonary edema or improving pneumonia. 3.  Support tubes and catheters in good position. XR CHEST PORTABLE    Result Date: 12/19/2021  Cardiomegaly, vascular congestion and worsening pulmonary opacities with bilateral effusions favoring pulmonary edema over multifocal airspace disease. Support tubes and lines as above. Clinical Course : unchanged  Assessment and Plan  :        Acute respiratory failure with hypoxia due to ARDS from COVID-19 pneumonia -ventilator support, high-dose Decadron, s/p Actemra on 12/12/2021. Antibiotics per ID. On cefepime started 12/18/2021, ID following. Type 2 diabetes mellitus - on Lantus, blood sugar controlled. Continue tube feed. Obesity BMI 39  Essential hypertension -on Levophed.   IRMA -was noncompliant with CPAP at home     Continue current

## 2021-12-27 NOTE — PROGRESS NOTES
[facility] - Progress Note   Note COVID 19 Patient  Today's Date and Time: 12/27/2021, 7:30 AM    Impression :     COVID 19 Confirmed Infection  Covid tests:  12/11/2021: Positive  Elevated inflammatory markers  Acute hypoxic respiratory failure  History of asthma  Diabetes mellitus  Essential hypertension  IRMA on CPAP  Patient has not received the Covid vaccination    Recommendations:   Antibiotic treatment:  The patient was having high-grade fevers and cultures have been sent.-No growth on cultures thus far  I will start the patient empirically on antibiotic therapy with cefepime on 12/18-fevers initially resolved but low grade fevers have resumed. Leukocytosis is improving    Covid Rx:    Remdesivir-out of window  Decadron-high-dose initiated  Actemra-ordered 12/12/2021  Monoclonal antibodies-out of window      Medical Decision Making/Summary/Discussion:12/27/2021     Patient admitted with COVID 19 infection  He may come out of isolation on 12/31/21    Infection Control Recommendations   [place] Precautions  Airborne isolation  Droplet Isolation    Antimicrobial Stewardship Recommendations     Discontinuation of therapy  Coordination of Outpatient Care:   Estimated Length of IV antimicrobials:TBD  Patient will need Midline Catheter Insertion: TBD  Patient will need PICC line Insertion: No  Patient will need: Home IV , [place],  SNF,  LTAC:TBD  Patient will need outpatient wound care:No    Chief complaint/reason for consultation:   Concern for COVID infection      History of Present Illness:   Natalie Olsen is a 60y.o.-year-old male who was initially admitted on 12/12/2021. Patient seen at the request of Dr. Beulah Tyler:    [facility] with complaints of worsening shortness of breath with associated generalized weakness and nonproductive cough over the past several days.     He was exposed to his son Temperature = 35.0 C (95.0 F)  35.1-36.0 C 95.1-96.9 F 36.1-38.0 C 97.0-100.4 F 38.1-39.0 C 100.5-102.3 F = 39.1 C = 102.4 F      NEWS Score:  12/12/2021: 13 high risk    Overall Daily Picture:     Slowly improving    Presence of secondary bacterial Infection:    Cultures no growth  Additional antibiotics: 12/18 Cefepime for leukocytosis and fevers    Labs, X rays reviewed: 12/27/2021    BUN:28->27  Cr:0.0.38->0.30    WBC:21.8-->18.9-->14.4->14.4  Hb: 14.5  Plat: 239    Absolute Neutrophils:3.73  Absolute Lymphocytes:0.29  Neutrophil/Lymphocyte Ratio: 12.8 high risk    CRP:293-->277-->137-->50.8-->23  Ferritin:2800  LDH: 838    Pro Calcitonin:      Cultures:  Urine:  12/18/21: No bacterial growth  Blood:  12/18/21: No growth thus far  Sputum :  12/18/21: Normal respiratory sherry  Wound:      CXR:     12/22/21 12/19/21      1221 diffuse bilateral infiltrates  CAT:      Discussed with patient, RN, CC, IM.      12-12-21:      I have personally reviewed the past medical history, past surgical history, medications, social history, and family history, and I have updated the database accordingly.   Past Medical History:     Past Medical History:   Diagnosis Date    Arthritis     Asthma     Diabetes mellitus ([address]     Edema     GERD (gastroesophageal reflux disease)     Hypertension     on lasix    Migraine     IRMA on CPAP     seldom use machine       Past Surgical  History:     Past Surgical History:   Procedure Laterality Date    APPENDECTOMY      ARTHROPLASTY Left 11/1/2019    LEFT FOOT 1ST MTPJ ARTHROPLASTY WITH PEREZ IMPLANT AND GROMMETS  [facility] performed by Pham Smith MD at [address] [facility] Right 12/12/2019    RIGHT FOOT ARTHROPLASTY 1ST MTPJ (Right Foot)    ARTHROPLASTY Right 12/12/2019    RIGHT FOOT ARTHROPLASTY 1ST MTPJ performed by Pham Smith MD at [address] Right    [address]  2014    no blockage no stents    CHOLECYSTECTOMY      COLONOSCOPY      ENDOSCOPY, COLON, DIAGNOSTIC      TOE SURGERY Left 11/01/2019     LEFT FOOT 1ST MTPJ ARTHROPLASTY WITH PEREZ IMPLANT AND GROMMETS  [facility] (Left )    TONSILLECTOMY         Medications:      chlordiazePOXIDE  10 mg Per NG tube 4x Daily    lidocaine 1 % injection  5 mL IntraDERmal Once    sodium chloride flush  5-40 mL IntraVENous 2 times per day    oxyCODONE  10 mg Oral Q6H    docusate  100 mg Per NG tube BID    insulin lispro  0-6 Units SubCUTAneous Q6H    lansoprazole  30 mg Oral QAM AC    cefepime  2,000 mg IntraVENous Q12H    furosemide  40 mg IntraVENous Daily    insulin glargine  10 Units SubCUTAneous Nightly    sodium chloride flush  5-40 mL IntraVENous 2 times per day    enoxaparin  30 mg SubCUTAneous BID    Vitamin D  2,000 Units Oral Daily    dexamethasone  10 mg IntraVENous Q24H       Social History:     Social History     Socioeconomic History    Marital status:      Spouse name: Not on file    Number of children: Not on file    Years of education: Not on file    Highest education level: Not on file   Occupational History    Not on file   Tobacco Use    Smoking status: Former Smoker    Smokeless tobacco: Never Used   Vaping Use    Vaping Use: Never used   Substance and Sexual Activity    Alcohol use: Yes     Comment: every couple months    Drug use: Never    Sexual activity: Not on file   Other Topics Concern    Not on file   Social History Narrative    Not on file     Social Determinants of Health     Financial Resource Strain:     Difficulty of Paying Living Expenses: Not on file   Food Insecurity:     Worried About Running Out of Food in the Last Year: Not on file    Lucina of Food in the Last Year: Not on file   Transportation Needs:     Lack of Transportation (Medical): Not on file    Lack of Transportation (Non-Medical):  Not on file   Physical Activity:     Days of Exercise per Week: Not on file    Minutes of Exercise per Session: Not on file   Stress:     Feeling of Stress : Not on file   Social Connections:     Frequency of Communication with Friends and Family: Not on file    Frequency of Social Gatherings with Friends and Family: Not on file    Attends Judaism Services: Not on file    Active Member of Clubs or Organizations: Not on file    Attends Club or Organization Meetings: Not on file    Marital Status: Not on file   Intimate Partner Violence:     Fear of Current or Ex-Partner: Not on file    Emotionally Abused: Not on file    Physically Abused: Not on file    Sexually Abused: Not on file   Housing Stability:     Unable to Pay for Housing in the Last Year: Not on file    Number of [place] in the Last Year: Not on file    Unstable Housing in the Last Year: Not on file       Family History:     Family History   Problem Relation Age of Onset    Heart Attack Sister 32    Diabetes Paternal Grandmother     Heart Disease Paternal Uncle     Heart Disease Paternal Uncle     Heart Disease Paternal Uncle     Cancer Maternal Aunt     Cancer Maternal Aunt     Cancer Maternal Uncle     Cancer Maternal Uncle         Allergies:   Nuts [peanut-containing drug products] and Sunflower oil     Review of Systems:     Unable to assess 12/27/2021  Intubated and sedated      Physical Examination :     Patient Vitals for the past 8 hrs:   BP Temp Temp src Pulse Resp SpO2   12/27/21 0700 109/74 -- -- 71 22 94 %   12/27/21 0600 (!) 131/90 -- -- 87 19 92 %   12/27/21 0500 125/83 -- -- 81 24 92 %   12/27/21 0400 116/77 99.1 °F (37.3 °C) Bladder 75 22 92 %   12/27/21 0335 -- -- -- 80 22 90 %   12/27/21 0300 121/81 -- -- 78 23 92 %   12/27/21 0200 133/89 -- -- 84 23 92 %   12/27/21 0100 127/82 -- -- 86 22 92 %   12/27/21 0000 129/78 99.8 °F (37.7 °C) Bladder 93 22 93 %   12/26/21 2338 -- -- -- 74 19 93 %     General Appearance: Intubated and sedated  Head:  Normocephalic, no trauma  ENT: Not well evaluated as the patient is orally intubated  Neck:Supple, without lymphadenopathy. Thyroid normal, No bruits. Pulmonary/Chest: Diminished to auscultation, without wheezes, rales, or rhonchi. No dullness to percussion. Cardiovascular: Regular rate and rhythm without murmurs, rubs, or gallops. Abdomen: Soft, non tender. Bowel sounds normal. No organomegaly  All four Extremities: No cyanosis, clubbing, edema, or effusions. Neurologic: Intubated and sedated and paralyzed  Skin: Warm and dry with good turgor. No signs of peripheral arterial or venous insufficiency. No ulcerations. No open wounds. Medical Decision Making -Laboratory:   I have independently reviewed/ordered the following labs:    CBC with Differential:   Recent Labs     12/26/21 0417 12/27/21 0330   WBC 14.4* 14.4*   HGB 13.6 14.5   HCT 41.0 43.7    239   LYMPHOPCT 9* 7*   MONOPCT 10 9     BMP:   Recent Labs     12/26/21 0417 12/27/21  0330    140   K 3.9 3.9    102   CO2 29 28   BUN 28* 27*   CREATININE 0.38* 0.30*     Hepatic Function Panel:   Recent Labs     12/26/21 0417 12/27/21  0330   PROT 5.6* 6.1*   LABALBU 3.4* 3.4*   BILIDIR 0.14 0.11   IBILI 0.26 0.31   BILITOT 0.40 0.42   ALKPHOS 50 56   ALT 54* 94*   AST 22 40*     No results for input(s): RPR in the last 72 hours. No results for input(s): HIV in the last 72 hours. No results for input(s): BC in the last 72 hours. Lab Results   Component Value Date    MUCUS NOT REPORTED 12/18/2021    RBC 4.64 12/27/2021    TRICHOMONAS NOT REPORTED 12/18/2021    WBC 14.4 12/27/2021    YEAST NOT REPORTED 12/18/2021    TURBIDITY Clear 12/18/2021     Lab Results   Component Value Date    CREATININE 0.30 12/27/2021    GLUCOSE 129 12/27/2021       Medical Decision Making-Imaging:     Narrative   EXAMINATION:   CTA OF THE CHEST 12/11/2021 11:31 am       TECHNIQUE:   CTA of the chest was performed after the administration of intravenous   contrast.  Multiplanar reformatted images are provided for review.   MIP images are provided for review. Dose modulation, iterative reconstruction,   and/or weight based adjustment of the mA/kV was utilized to reduce the   radiation dose to as low as reasonably achievable.       COMPARISON:   Chest x-ray dated December 11, 2021       HISTORY:   ORDERING SYSTEM PROVIDED HISTORY: hypoxemia, covid positive, d-dimer-0.99   TECHNOLOGIST PROVIDED HISTORY:   hypoxemia, covid positive, d-dimer-0.99   Decision Support Exception - unselect if not a suspected or confirmed   emergency medical condition->Emergency Medical Condition (MA)   Reason for Exam: COVID positive, elevated D-Dimer       FINDINGS:   Pulmonary Arteries: Pulmonary arteries are adequately opacified for   evaluation.  No evidence of intraluminal filling defect to suggest pulmonary   embolism.  Main pulmonary artery is normal in caliber.       Mediastinum: Nonspecific mediastinal and hilar lymph nodes are present,   likely reactive. .  The heart and pericardium demonstrate no acute   abnormality.  There is no acute abnormality of the thoracic aorta.       Lungs/pleura: Diffuse ground-glass opacification is present throughout the   lungs, typical of COVID-19 pulmonary disease.  No pleural effusion or   pneumothorax is present.       Upper Abdomen: Images through the upper abdomen demonstrate a small hiatal   hernia.  Diffuse hepatic steatosis is present.  The gallbladder is surgically   absent.       Soft Tissues/Bones: No acute bone or soft tissue abnormality.           Impression   1. No evidence of pulmonary embolism   2.  Diffuse ground-glass opacities throughout the lungs, typical of COVID-19   pulmonary disease.         Narrative   EXAMINATION:   ONE XRAY VIEW OF THE CHEST       12/11/2021 3:54 pm       COMPARISON:   November 13, 2012       HISTORY:   ORDERING SYSTEM PROVIDED HISTORY: hypoxemia, probable covid pneumonia   TECHNOLOGIST PROVIDED HISTORY:   hypoxemia, probable covid pneumonia       FINDINGS:   Multifocal hazy opacities throughout both lungs would be consistent with   history of COVID pneumonia.  Pulmonary edema could have a similar appearance. No pneumothorax or pleural effusion.  Cardiac size enlarged.  Mediastinum   unremarkable.  No acute osseous abnormality.           Impression   Multifocal hazy opacities throughout both lungs consistent with COVID   pneumonia and or pulmonary edema.               Medical Decision Wllgbb-Egjuyqur-Htoti:     Culture, Respiratory [[id number]] (Abnormal) Collected: 12/18/21 1143   Order Status: Completed Specimen: Endotracheal Updated: 12/18/21 1439    Specimen Description . ENDOTRACHEAL    Special Requests NOT REPORTED    Direct Exam >25 NEUTROPHILS/LPF     < 10 EPITHELIAL CELLS/LPF     MIXED BACTERIAL MORPHOTYPES SEEN ON GRAM STAIN.  Abnormal     Culture PENDING   Culture, Blood 1 [[id number]] Collected: 12/18/21 1234   Order Status: Completed Specimen: Blood Updated: 12/18/21 1358    Specimen Description . BLOOD    Special Requests NOT REPORTED    Culture NO GROWTH <24 HRS   Culture, Blood 1 [[id number]]    Order Status: Sent Specimen: Blood    Culture, Urine [[id number]] Collected: 12/14/21 0043   Order Status: Completed Specimen: Urine, straight catheter Updated: 12/14/21 1934    Specimen Description . URINE,STRAIGHT CATHETER    Special Requests NOT REPORTED    Culture NO GROWTH   MRSA DNA Probe, Nasal [[id number]] Collected: 12/12/21 1245   Order Status: Completed Specimen: Nasal Updated: 12/13/21 1052    Specimen Description . NASAL SWAB    MRSA, DNA, Nasal NEGATIVE:  MRSA DNA not detected by nucleic acid amplification. Comment:                                                    Results should be used as an adjunct to nosocomial control efforts to identify patients   needing enhanced precautions.     The test is not intended to identify patients with staphylococcal infections.  Results   should not be used to guide or monitor treatment for MRSA infections.              Medical Decision Making-Other:     Note:  Labs, medications, radiologic studies were reviewed with personal review of films  Large amounts of data were reviewed  Discussed with nursing Staff, Discharge planner  Infection Control and Prevention measures reviewed  All prior entries were reviewed  Administer medications as ordered  Prognosis: Guarded  Discharge planning reviewed      Thank you for allowing us to participate in the care of this patient. Please call with questions. Electronically signed by SHO Chavez CNP on 12/27/2021 at 7:30 AM     ATTESTATION:    I have discussed the case, including pertinent history and exam findings with the APRN. I have evaluated the  History, physical findings and pictures of the patient and the key elements of the encounter have been performed by me. I have reviewed the laboratory data, other diagnostic studies and discussed them with the APRN. I have updated the medical record where necessary. I agree with the assessment, plan and orders as documented by the APRN.     Tahir Metzger MD.

## 2021-12-27 NOTE — PLAN OF CARE
Problem: Airway Clearance - Ineffective  Goal: Achieve or maintain patent airway  Outcome: Ongoing     Problem: Gas Exchange - Impaired  Goal: Absence of hypoxia  Outcome: Ongoing  Goal: Promote optimal lung function  Outcome: Ongoing     Problem: Breathing Pattern - Ineffective  Goal: Ability to achieve and maintain a regular respiratory rate  Outcome: Ongoing     Problem:  Body Temperature -  Risk of, Imbalanced  Goal: Ability to maintain a body temperature within defined limits  Outcome: Ongoing  Goal: Will regain or maintain usual level of consciousness  Outcome: Ongoing  Goal: Complications related to the disease process, condition or treatment will be avoided or minimized  Outcome: Ongoing     Problem: Isolation Precautions - Risk of Spread of Infection  Goal: Prevent transmission of infection  Outcome: Ongoing     Problem: Nutrition Deficits  Goal: Optimize nutritional status  Outcome: Ongoing     Problem: Risk for Fluid Volume Deficit  Goal: Maintain normal heart rhythm  Outcome: Ongoing  Goal: Maintain absence of muscle cramping  Outcome: Ongoing  Goal: Maintain normal serum potassium, sodium, calcium, phosphorus, and pH  Outcome: Ongoing     Problem: Loneliness or Risk for Loneliness  Goal: Demonstrate positive use of time alone when socialization is not possible  Outcome: Ongoing     Problem: Fatigue  Goal: Verbalize increase energy and improved vitality  Outcome: Ongoing     Problem: Patient Education: Go to Patient Education Activity  Goal: Patient/Family Education  Outcome: Ongoing     Problem: OXYGENATION/RESPIRATORY FUNCTION  Goal: Patient will maintain patent airway  12/27/2021 1630 by Jovita Johansen RN  Outcome: Ongoing  12/27/2021 0810 by Ryan Xiong RCP  Outcome: Ongoing  Goal: Patient will achieve/maintain normal respiratory rate/effort  Description: Respiratory rate and effort will be within normal limits for the patient  12/27/2021 1630 by Jovita Johansen RN  Outcome: Ongoing  12/27/2021 0810 by Braydon Carbone RCP  Outcome: Ongoing     Problem: MECHANICAL VENTILATION  Goal: Patient will maintain patent airway  12/27/2021 1630 by Zuleima Chowdhury RN  Outcome: Ongoing  12/27/2021 0810 by Braydon Carbone RCP  Outcome: Ongoing  Goal: Oral health is maintained or improved  12/27/2021 1630 by Zuleima Chowdhury RN  Outcome: Ongoing  12/27/2021 0810 by Braydon Carbone RCP  Outcome: Ongoing  Goal: ET tube will be managed safely  12/27/2021 1630 by Zuleima Chowdhury RN  Outcome: Ongoing  12/27/2021 0810 by Braydon Carbone RCP  Outcome: Ongoing  Goal: Ability to express needs and understand communication  12/27/2021 1630 by Zuleima Chowdhury RN  Outcome: Ongoing  12/27/2021 0810 by Braydon Carbone RCP  Outcome: Ongoing  Goal: Mobility/activity is maintained at optimum level for patient  12/27/2021 1630 by Zuleima Chowdhury RN  Outcome: Ongoing  12/27/2021 0810 by Braydon Carbone RCP  Outcome: Ongoing     Problem: SKIN INTEGRITY  Goal: Skin integrity is maintained or improved  12/27/2021 1630 by Zuleima Chowdhury RN  Outcome: Ongoing  12/27/2021 0810 by Braydon Carbone RCP  Outcome: Ongoing     Problem: Skin Integrity:  Goal: Will show no infection signs and symptoms  Description: Will show no infection signs and symptoms  Outcome: Ongoing  Goal: Absence of new skin breakdown  Description: Absence of new skin breakdown  Outcome: Ongoing     Problem: Falls - Risk of:  Goal: Will remain free from falls  Description: Will remain free from falls  Outcome: Ongoing  Goal: Absence of physical injury  Description: Absence of physical injury  Outcome: Ongoing     Problem: Nutrition  Goal: Optimal nutrition therapy  12/27/2021 1630 by Zuleima Chowdhury RN  Outcome: Ongoing  12/27/2021 1252 by Duane Ferrer RD, LD  Outcome: Ongoing  Note: Nutrition Problem #1: Inadequate oral intake  Intervention: Food and/or Nutrient Delivery: Continue NPO (Restart tube feeding as able)  Nutritional Goals: Pt to meet % of est'd needs daily via EN      Problem: Discharge Planning:  Goal: Participates in care planning  Description: Participates in care planning  Outcome: Ongoing  Goal: Discharged to appropriate level of care  Description: Discharged to appropriate level of care  Outcome: Ongoing

## 2021-12-27 NOTE — PLAN OF CARE
Problem: OXYGENATION/RESPIRATORY FUNCTION  Goal: Patient will maintain patent airway  12/27/2021 0225 by Brenda Portillo RCP  Outcome: Ongoing     Problem: OXYGENATION/RESPIRATORY FUNCTION  Goal: Patient will achieve/maintain normal respiratory rate/effort  Description: Respiratory rate and effort will be within normal limits for the patient  12/27/2021 0225 by Brenda Portillo RCP  Outcome: Ongoing     Problem: MECHANICAL VENTILATION  Goal: Patient will maintain patent airway  12/27/2021 0225 by Brenda Portillo RCP  Outcome: Ongoing     Problem: MECHANICAL VENTILATION  Goal: Oral health is maintained or improved  12/27/2021 0225 by Brenda Portillo RCP  Outcome: Ongoing     Problem: MECHANICAL VENTILATION  Goal: ET tube will be managed safely  12/27/2021 0225 by Brenda Portillo RCP  Outcome: Ongoing     Problem: MECHANICAL VENTILATION  Goal: Ability to express needs and understand communication  12/27/2021 0225 by Brenad Portillo RCP  Outcome: Ongoing     Problem: MECHANICAL VENTILATION  Goal: Mobility/activity is maintained at optimum level for patient  12/27/2021 0225 by Brenda Portillo RCP  Outcome: Ongoing     Problem: SKIN INTEGRITY  Goal: Skin integrity is maintained or improved  12/27/2021 0225 by Brenda Portillo RCP  Outcome: Ongoing

## 2021-12-28 ENCOUNTER — APPOINTMENT (OUTPATIENT)
Dept: GENERAL RADIOLOGY | Age: 56
DRG: 004 | End: 2021-12-28
Attending: INTERNAL MEDICINE
Payer: COMMERCIAL

## 2021-12-28 LAB
ABSOLUTE EOS #: 0.04 K/UL (ref 0–0.44)
ABSOLUTE IMMATURE GRANULOCYTE: 0.2 K/UL (ref 0–0.3)
ABSOLUTE LYMPH #: 1.13 K/UL (ref 1.1–3.7)
ABSOLUTE MONO #: 1.48 K/UL (ref 0.1–1.2)
ALBUMIN SERPL-MCNC: 3.5 G/DL (ref 3.5–5.2)
ALBUMIN/GLOBULIN RATIO: 1.4 (ref 1–2.5)
ALLEN TEST: ABNORMAL
ALP BLD-CCNC: 58 U/L (ref 40–129)
ALT SERPL-CCNC: 71 U/L (ref 5–41)
ANION GAP SERPL CALCULATED.3IONS-SCNC: 12 MMOL/L (ref 9–17)
AST SERPL-CCNC: 22 U/L
BASOPHILS # BLD: 0 % (ref 0–2)
BASOPHILS ABSOLUTE: 0.06 K/UL (ref 0–0.2)
BILIRUB SERPL-MCNC: 0.4 MG/DL (ref 0.3–1.2)
BILIRUBIN DIRECT: 0.14 MG/DL
BILIRUBIN, INDIRECT: 0.26 MG/DL (ref 0–1)
BUN BLDV-MCNC: 24 MG/DL (ref 6–20)
BUN/CREAT BLD: ABNORMAL (ref 9–20)
CALCIUM SERPL-MCNC: 9.1 MG/DL (ref 8.6–10.4)
CHLORIDE BLD-SCNC: 102 MMOL/L (ref 98–107)
CO2: 27 MMOL/L (ref 20–31)
CREAT SERPL-MCNC: 0.36 MG/DL (ref 0.7–1.2)
DIFFERENTIAL TYPE: ABNORMAL
EOSINOPHILS RELATIVE PERCENT: 0 % (ref 1–4)
FIO2: 60
GFR AFRICAN AMERICAN: >60 ML/MIN
GFR NON-AFRICAN AMERICAN: >60 ML/MIN
GFR SERPL CREATININE-BSD FRML MDRD: ABNORMAL ML/MIN/{1.73_M2}
GFR SERPL CREATININE-BSD FRML MDRD: ABNORMAL ML/MIN/{1.73_M2}
GLOBULIN: ABNORMAL G/DL (ref 1.5–3.8)
GLUCOSE BLD-MCNC: 115 MG/DL (ref 70–99)
GLUCOSE BLD-MCNC: 129 MG/DL (ref 74–100)
GLUCOSE BLD-MCNC: 132 MG/DL (ref 75–110)
GLUCOSE BLD-MCNC: 141 MG/DL (ref 75–110)
GLUCOSE BLD-MCNC: 159 MG/DL (ref 75–110)
GLUCOSE BLD-MCNC: 171 MG/DL (ref 75–110)
HCT VFR BLD CALC: 47 % (ref 40.7–50.3)
HEMOGLOBIN: 15.4 G/DL (ref 13–17)
IMMATURE GRANULOCYTES: 1 %
LYMPHOCYTES # BLD: 7 % (ref 24–43)
MAGNESIUM: 2.2 MG/DL (ref 1.6–2.6)
MCH RBC QN AUTO: 30.9 PG (ref 25.2–33.5)
MCHC RBC AUTO-ENTMCNC: 32.8 G/DL (ref 28.4–34.8)
MCV RBC AUTO: 94.4 FL (ref 82.6–102.9)
MODE: ABNORMAL
MONOCYTES # BLD: 9 % (ref 3–12)
NEGATIVE BASE EXCESS, ART: ABNORMAL (ref 0–2)
NRBC AUTOMATED: 0 PER 100 WBC
O2 DEVICE/FLOW/%: ABNORMAL
PATIENT TEMP: ABNORMAL
PDW BLD-RTO: 14.4 % (ref 11.8–14.4)
PLATELET # BLD: 254 K/UL (ref 138–453)
PLATELET ESTIMATE: ABNORMAL
PMV BLD AUTO: 11.5 FL (ref 8.1–13.5)
POC HCO3: 31.4 MMOL/L (ref 21–28)
POC O2 SATURATION: 89 % (ref 94–98)
POC PCO2 TEMP: ABNORMAL MM HG
POC PCO2: 48.5 MM HG (ref 35–48)
POC PH TEMP: ABNORMAL
POC PH: 7.42 (ref 7.35–7.45)
POC PO2 TEMP: ABNORMAL MM HG
POC PO2: 56.9 MM HG (ref 83–108)
POSITIVE BASE EXCESS, ART: 6 (ref 0–3)
POTASSIUM SERPL-SCNC: 3.3 MMOL/L (ref 3.7–5.3)
RBC # BLD: 4.98 M/UL (ref 4.21–5.77)
RBC # BLD: ABNORMAL 10*6/UL
SAMPLE SITE: ABNORMAL
SEG NEUTROPHILS: 83 % (ref 36–65)
SEGMENTED NEUTROPHILS ABSOLUTE COUNT: 14.35 K/UL (ref 1.5–8.1)
SODIUM BLD-SCNC: 141 MMOL/L (ref 135–144)
TCO2 (CALC), ART: ABNORMAL MMOL/L (ref 22–29)
TOTAL PROTEIN: 6 G/DL (ref 6.4–8.3)
WBC # BLD: 17.3 K/UL (ref 3.5–11.3)
WBC # BLD: ABNORMAL 10*3/UL

## 2021-12-28 PROCEDURE — 71045 X-RAY EXAM CHEST 1 VIEW: CPT

## 2021-12-28 PROCEDURE — 2700000000 HC OXYGEN THERAPY PER DAY

## 2021-12-28 PROCEDURE — 82803 BLOOD GASES ANY COMBINATION: CPT

## 2021-12-28 PROCEDURE — 6360000002 HC RX W HCPCS: Performed by: NURSE PRACTITIONER

## 2021-12-28 PROCEDURE — 6370000000 HC RX 637 (ALT 250 FOR IP): Performed by: NURSE PRACTITIONER

## 2021-12-28 PROCEDURE — 2000000000 HC ICU R&B

## 2021-12-28 PROCEDURE — 2580000003 HC RX 258: Performed by: NURSE PRACTITIONER

## 2021-12-28 PROCEDURE — 37799 UNLISTED PX VASCULAR SURGERY: CPT

## 2021-12-28 PROCEDURE — 2580000003 HC RX 258: Performed by: INTERNAL MEDICINE

## 2021-12-28 PROCEDURE — 99291 CRITICAL CARE FIRST HOUR: CPT | Performed by: INTERNAL MEDICINE

## 2021-12-28 PROCEDURE — 83735 ASSAY OF MAGNESIUM: CPT

## 2021-12-28 PROCEDURE — 6360000002 HC RX W HCPCS: Performed by: FAMILY MEDICINE

## 2021-12-28 PROCEDURE — 6360000002 HC RX W HCPCS: Performed by: INTERNAL MEDICINE

## 2021-12-28 PROCEDURE — 94003 VENT MGMT INPAT SUBQ DAY: CPT

## 2021-12-28 PROCEDURE — 99232 SBSQ HOSP IP/OBS MODERATE 35: CPT | Performed by: FAMILY MEDICINE

## 2021-12-28 PROCEDURE — APPSS30 APP SPLIT SHARED TIME 16-30 MINUTES: Performed by: NURSE PRACTITIONER

## 2021-12-28 PROCEDURE — 99232 SBSQ HOSP IP/OBS MODERATE 35: CPT | Performed by: INTERNAL MEDICINE

## 2021-12-28 PROCEDURE — 6370000000 HC RX 637 (ALT 250 FOR IP): Performed by: STUDENT IN AN ORGANIZED HEALTH CARE EDUCATION/TRAINING PROGRAM

## 2021-12-28 PROCEDURE — 85025 COMPLETE CBC W/AUTO DIFF WBC: CPT

## 2021-12-28 PROCEDURE — 6370000000 HC RX 637 (ALT 250 FOR IP): Performed by: INTERNAL MEDICINE

## 2021-12-28 PROCEDURE — 2500000003 HC RX 250 WO HCPCS: Performed by: INTERNAL MEDICINE

## 2021-12-28 PROCEDURE — 94761 N-INVAS EAR/PLS OXIMETRY MLT: CPT

## 2021-12-28 PROCEDURE — 82947 ASSAY GLUCOSE BLOOD QUANT: CPT

## 2021-12-28 PROCEDURE — 80048 BASIC METABOLIC PNL TOTAL CA: CPT

## 2021-12-28 PROCEDURE — 80076 HEPATIC FUNCTION PANEL: CPT

## 2021-12-28 RX ORDER — CISATRACURIUM BESYLATE 10 MG/ML
0.15 INJECTION, SOLUTION INTRAVENOUS ONCE
Status: COMPLETED | OUTPATIENT
Start: 2021-12-28 | End: 2021-12-28

## 2021-12-28 RX ORDER — POTASSIUM CHLORIDE 29.8 MG/ML
20 INJECTION INTRAVENOUS ONCE
Status: COMPLETED | OUTPATIENT
Start: 2021-12-28 | End: 2021-12-28

## 2021-12-28 RX ORDER — FUROSEMIDE 10 MG/ML
40 INJECTION INTRAMUSCULAR; INTRAVENOUS 2 TIMES DAILY
Status: COMPLETED | OUTPATIENT
Start: 2021-12-29 | End: 2021-12-31

## 2021-12-28 RX ADMIN — OXYCODONE HYDROCHLORIDE 10 MG: 5 SOLUTION ORAL at 16:00

## 2021-12-28 RX ADMIN — INSULIN GLARGINE 10 UNITS: 100 INJECTION, SOLUTION SUBCUTANEOUS at 20:51

## 2021-12-28 RX ADMIN — MEROPENEM 1000 MG: 1 INJECTION, POWDER, FOR SOLUTION INTRAVENOUS at 11:04

## 2021-12-28 RX ADMIN — SODIUM CHLORIDE, PRESERVATIVE FREE 10 ML: 5 INJECTION INTRAVENOUS at 20:10

## 2021-12-28 RX ADMIN — ACETAMINOPHEN 650 MG: 160 SOLUTION ORAL at 19:24

## 2021-12-28 RX ADMIN — DEXMEDETOMIDINE HYDROCHLORIDE 0.6 MCG/KG/HR: 4 INJECTION, SOLUTION INTRAVENOUS at 17:33

## 2021-12-28 RX ADMIN — ACETAMINOPHEN 650 MG: 160 SOLUTION ORAL at 23:54

## 2021-12-28 RX ADMIN — OXYCODONE HYDROCHLORIDE 10 MG: 5 SOLUTION ORAL at 21:03

## 2021-12-28 RX ADMIN — CISATRACURIUM BESYLATE 19 MG: 10 INJECTION, SOLUTION INTRAVENOUS at 08:17

## 2021-12-28 RX ADMIN — OXYCODONE HYDROCHLORIDE 10 MG: 5 SOLUTION ORAL at 04:02

## 2021-12-28 RX ADMIN — CISATRACURIUM BESYLATE 2 MCG/KG/MIN: 10 INJECTION, SOLUTION INTRAVENOUS at 08:17

## 2021-12-28 RX ADMIN — MEROPENEM 1000 MG: 1 INJECTION, POWDER, FOR SOLUTION INTRAVENOUS at 20:11

## 2021-12-28 RX ADMIN — POTASSIUM CHLORIDE 20 MEQ: 29.8 INJECTION INTRAVENOUS at 19:18

## 2021-12-28 RX ADMIN — Medication 150 MCG/HR: at 20:51

## 2021-12-28 RX ADMIN — ENOXAPARIN SODIUM 30 MG: 100 INJECTION SUBCUTANEOUS at 21:09

## 2021-12-28 RX ADMIN — DEXMEDETOMIDINE HYDROCHLORIDE 0.7 MCG/KG/HR: 4 INJECTION, SOLUTION INTRAVENOUS at 21:45

## 2021-12-28 RX ADMIN — CHLORDIAZEPOXIDE HYDROCHLORIDE 10 MG: 5 CAPSULE ORAL at 11:04

## 2021-12-28 RX ADMIN — Medication 7 MG/HR: at 13:20

## 2021-12-28 RX ADMIN — CHLORDIAZEPOXIDE HYDROCHLORIDE 10 MG: 5 CAPSULE ORAL at 16:00

## 2021-12-28 RX ADMIN — INSULIN LISPRO 1 UNITS: 100 INJECTION, SOLUTION INTRAVENOUS; SUBCUTANEOUS at 17:59

## 2021-12-28 RX ADMIN — OXYCODONE HYDROCHLORIDE 10 MG: 5 SOLUTION ORAL at 11:04

## 2021-12-28 RX ADMIN — PROPOFOL 20 MCG/KG/MIN: 10 INJECTION, EMULSION INTRAVENOUS at 02:38

## 2021-12-28 RX ADMIN — CHLORDIAZEPOXIDE HYDROCHLORIDE 10 MG: 5 CAPSULE ORAL at 21:03

## 2021-12-28 RX ADMIN — PROPOFOL 20 MCG/KG/MIN: 10 INJECTION, EMULSION INTRAVENOUS at 19:23

## 2021-12-28 RX ADMIN — ENOXAPARIN SODIUM 30 MG: 100 INJECTION SUBCUTANEOUS at 07:43

## 2021-12-28 RX ADMIN — DEXMEDETOMIDINE HYDROCHLORIDE 0.6 MCG/KG/HR: 4 INJECTION, SOLUTION INTRAVENOUS at 11:05

## 2021-12-28 RX ADMIN — DEXAMETHASONE SODIUM PHOSPHATE 10 MG: 10 INJECTION INTRAMUSCULAR; INTRAVENOUS at 13:58

## 2021-12-28 RX ADMIN — PROPOFOL 25 MCG/KG/MIN: 10 INJECTION, EMULSION INTRAVENOUS at 15:18

## 2021-12-28 RX ADMIN — CHLORDIAZEPOXIDE HYDROCHLORIDE 10 MG: 5 CAPSULE ORAL at 04:02

## 2021-12-28 RX ADMIN — DEXMEDETOMIDINE HYDROCHLORIDE 0.6 MCG/KG/HR: 4 INJECTION, SOLUTION INTRAVENOUS at 05:49

## 2021-12-28 RX ADMIN — CISATRACURIUM BESYLATE 2 MCG/KG/MIN: 10 INJECTION, SOLUTION INTRAVENOUS at 20:50

## 2021-12-28 RX ADMIN — FUROSEMIDE 40 MG: 10 INJECTION, SOLUTION INTRAVENOUS at 07:44

## 2021-12-28 RX ADMIN — PROPOFOL 25 MCG/KG/MIN: 10 INJECTION, EMULSION INTRAVENOUS at 09:28

## 2021-12-28 RX ADMIN — Medication 2000 UNITS: at 07:45

## 2021-12-28 RX ADMIN — Medication 150 MCG/HR: at 17:59

## 2021-12-28 RX ADMIN — Medication 30 MG: at 11:03

## 2021-12-28 ASSESSMENT — PULMONARY FUNCTION TESTS
PIF_VALUE: 27
PIF_VALUE: 33
PIF_VALUE: 33
PIF_VALUE: 35
PIF_VALUE: 31
PIF_VALUE: 34
PIF_VALUE: 32

## 2021-12-28 NOTE — PLAN OF CARE
Problem: OXYGENATION/RESPIRATORY FUNCTION  Goal: Patient will maintain patent airway  2021 075 by Gautam Connelly RCP  Outcome: Ongoing     Problem: OXYGENATION/RESPIRATORY FUNCTION  Goal: Patient will achieve/maintain normal respiratory rate/effort  Description: Respiratory rate and effort will be within normal limits for the patient  2021 by Gautam Connelly RCP  Outcome: Ongoing     Problem: MECHANICAL VENTILATION  Goal: Patient will maintain patent airway  2021 075 by Gautam Connelly RCP  Outcome: Ongoing     Problem: MECHANICAL VENTILATION  Goal: Oral health is maintained or improved  2021 by Gautam Connelly RCP  Outcome: Ongoing     Problem: MECHANICAL VENTILATION  Goal: ET tube will be managed safely  2021 by Gautam Connelly RCP  Outcome: Ongoing     Problem: MECHANICAL VENTILATION  Goal: Ability to express needs and understand communication  2021 by Gautam Connelly RCP  Outcome: Ongoing     Problem: MECHANICAL VENTILATION  Goal: Mobility/activity is maintained at optimum level for patient  2021 by Gautam Connelly RCP  Outcome: Ongoing     Problem: SKIN INTEGRITY  Goal: Skin integrity is maintained or improved  2021 by Gautam Connelly RCP  Outcome: Ongoing

## 2021-12-28 NOTE — PLAN OF CARE
Problem: OXYGENATION/RESPIRATORY FUNCTION  Goal: Patient will maintain patent airway  12/27/2021 2105 by Lisa Medina RCP  Outcome: Ongoing     Problem: OXYGENATION/RESPIRATORY FUNCTION  Goal: Patient will achieve/maintain normal respiratory rate/effort  Description: Respiratory rate and effort will be within normal limits for the patient  12/27/2021 2105 by Lisa Medina RCP  Outcome: Ongoing     Problem: MECHANICAL VENTILATION  Goal: Patient will maintain patent airway  12/27/2021 2105 by Lisa Medina RCP  Outcome: Ongoing     Problem: MECHANICAL VENTILATION  Goal: Oral health is maintained or improved  12/27/2021 2105 by Lisa Medina RCP  Outcome: Ongoing     Problem: MECHANICAL VENTILATION  Goal: ET tube will be managed safely  12/27/2021 2105 by Lisa Medina RCP  Outcome: Ongoing     Problem: MECHANICAL VENTILATION  Goal: Ability to express needs and understand communication  12/27/2021 2105 by Lisa Medina RCP  Outcome: Ongoing     Problem: MECHANICAL VENTILATION  Goal: Mobility/activity is maintained at optimum level for patient  12/27/2021 2105 by Lisa Medina RCP  Outcome: Ongoing     Problem: SKIN INTEGRITY  Goal: Skin integrity is maintained or improved  12/27/2021 2105 by Lisa Medina RCP  Outcome: Ongoing

## 2021-12-28 NOTE — PROGRESS NOTES
Pulmonary Critical Care   Progress Note    Patient - Lois Carreno  Date of Admission -  2021 11:39 AM  Date of Evaluation -  2021  Room and Bed Number -  3026/3026-01   Hospital Day - 15    CHIEF COMPLAINT : ACUTE HYPOXIC RESPIRATORY FAILURE DUE TO COVID -19 PNEUMONIA   HPI:   Lois Carreno  60 y.o. male  admitted for COVID-19 at [facility] ER due to worsening oxygenation he was initially started on BiPAP and subsequently intubated. On room air his saturations had been 50%. CTA no PE but bilateral pulmonary infiltrates. He was accepted at [address] [place] [address] ICU. On arrival he is on PEEP of 22, 100% FiO2.    2021   Subjective   Respiratory therapist report yellowish secretions. Cultures normal oral sherry. Agitated off sedation. I/O+ 2.7 L. FiO2 65%. Sedated with fentanyl, propofol, midazolam, and dexmedetomidine.       SECRETIONS  -Amount:  [x] Small [] Moderate  [] Large    [] None  Color:     [x] White [] Colored  [] Bloody    SEDATION:    As above    PARALYZED:  [x] No    [] Yes    VASOPRESSORS:  [] No    [x] Yes  [x] Levophed [] Dopamine [] Vasopressin  [] Dobutamine [] Phenylephrine [] Epinephrine    INHALED veletri  : [] No    [] Yes    PRONE :       [x] No    [] Yes    Actemra:             [] No    [x] Yes  21  DEXAMETHASONE : [] No    [x] Yes      Ros unable to perform due to intubation and sedation    OBJECTIVE:     VITAL SIGNS:  /89   Pulse 111   Temp 99.9 °F (37.7 °C) (Bladder)   Resp 26   Ht 6' 2\" (1.88 m)   Wt (!) 305 lb 5.4 oz (138.5 kg) Comment: with cooling blanket  SpO2 94%   BMI 39.20 kg/m²   Tmax over 24 hours:  Temp (24hrs), Av.5 °F (37.5 °C), Min:98.8 °F (37.1 °C), Max:99.9 °F (37.7 °C)      Patient Vitals for the past 8 hrs:   BP Temp Temp src Pulse Resp SpO2   21 2300 125/89 -- -- 111 26 94 %   210 (!) 157/92 -- -- 133 (!) 31 94 %   21 2100 (!) 146/94 -- -- 101 25 95 %   21 -- -- -- 95 27 97 %   21 2000 (!) 121/91 -- -- 95 27 96 %   12/27/21 1900 (!) 134/94 99.9 °F (37.7 °C) Bladder 98 24 96 %   12/27/21 1800 (!) 130/90 -- -- 99 25 96 %   12/27/21 1705 -- -- -- 102 21 97 %   12/27/21 1700 (!) 146/93 -- -- 102 29 97 %   12/27/21 1600 (!) 144/94 99.9 °F (37.7 °C) Bladder 97 29 95 %         Intake/Output Summary (Last 24 hours) at 12/27/2021 2352  Last data filed at 12/27/2021 2300  Gross per 24 hour   Intake 2840.65 ml   Output 4510 ml   Net -1669.35 ml     Date 12/27/21 0000 - 12/27/21 2359   Shift 0895-9297 4353-7569 5236-6000 24 Hour Total   INTAKE   I.V.(mL/kg)  1250.8(9) 214.9(1.6) 1465. 7(10.6)   NG/GT(mL/kg) 564(4.1) 523(3.8) 288(2.1) 1375(9.9)   Shift Total(mL/kg) 564(4.1) 1773.8(12.8) 502. 9(3.6) 2840. 7(20.5)   OUTPUT   Urine(mL/kg/hr) 820(0.7) 2860(2.6) 830 4510   Shift Total(mL/kg) 820(5.9) 2860(20.7) 830(6) 4510(32.6)   Weight (kg) 138.5 138.5 138.5 138.5     Wt Readings from Last 3 Encounters:   12/23/21 (!) 305 lb 5.4 oz (138.5 kg)   12/11/21 300 lb (136.1 kg)   12/12/19 (!) 355 lb 9.6 oz (161.3 kg)     Body mass index is 39.2 kg/m². PHYSICAL EXAM:      I have discussed the care of Joel Pak, including pertinent history and exam findings, with the resident/APC/staff. I have seen the patient and the key elements of all parts of the encounter have been performed by me. Physical exam was deferred to limit physical exposure and PPE resources. I reviewed the interval history, interpreted all available radiographic, laboratory and physiologic data at the time of service. I agree with the assessment and plan as documented by resident/APC/ ancillary staff.   Patient remains on the ventilator  Trachea is in the midline  No subcutaneous air  No JVD  No rhonchi  Abdomen is not distended  No edema    MEDICATIONS:  Scheduled Meds:   chlordiazePOXIDE  10 mg Per NG tube 4x Daily    lidocaine 1 % injection  5 mL IntraDERmal Once    sodium chloride flush  5-40 mL IntraVENous 2 times per day    oxyCODONE  10 mg Oral Q6H    docusate  100 mg Per NG tube BID    insulin lispro  0-6 Units SubCUTAneous Q6H    lansoprazole  30 mg Oral QAM AC    cefepime  2,000 mg IntraVENous Q12H    furosemide  40 mg IntraVENous Daily    insulin glargine  10 Units SubCUTAneous Nightly    sodium chloride flush  5-40 mL IntraVENous 2 times per day    enoxaparin  30 mg SubCUTAneous BID    Vitamin D  2,000 Units Oral Daily    dexamethasone  10 mg IntraVENous Q24H     Continuous Infusions:   dexmedetomidine 0.3 mcg/kg/hr (12/27/21 2237)    propofol 25 mcg/kg/min (12/27/21 2206)    sodium chloride      sodium chloride      dextrose      norepinephrine Stopped (12/26/21 2000)    midazolam 8 mg/hr (12/27/21 2119)    fentaNYL 125 mcg/hr (12/27/21 2045)    dextrose      sodium chloride 10 mL/hr at 12/13/21 1548     PRN Meds:   sodium chloride flush, 5-40 mL, PRN  sodium chloride, 25 mL, PRN  ibuprofen, 600 mg, Q6H PRN  metoprolol, 5 mg, Q6H PRN  polyethylene glycol, 17 g, BID PRN  senna, 5 mL, Nightly PRN  bisacodyl, 10 mg, Daily PRN  acetaminophen, 650 mg, Q4H PRN  sodium chloride flush, 5-40 mL, PRN  sodium chloride, 25 mL, PRN  ondansetron, 4 mg, Q8H PRN   Or  ondansetron, 4 mg, Q6H PRN  acetaminophen, 650 mg, Q6H PRN  glucose, 15 g, PRN  dextrose, 12.5 g, PRN  glucagon (rDNA), 1 mg, PRN  dextrose, 100 mL/hr, PRN  glucose, 15 g, PRN  dextrose, 12.5 g, PRN  glucagon (rDNA), 1 mg, PRN  dextrose, 100 mL/hr, PRN        SUPPORT DEVICES: [x] Ventilator [] BIPAP  [] Nasal Cannula [] Room Air      ABGs:     Lab Results   Component Value Date    OEC3RDA NOT REPORTED 12/27/2021    FIO2 50.0 12/27/2021       DATA:  Complete Blood Count:   Recent Labs     12/25/21  0447 12/26/21  0417 12/27/21  0330   WBC 18.9* 14.4* 14.4*   RBC 4.34 4.37 4.64   HGB 13.6 13.6 14.5   HCT 40.7 41.0 43.7   MCV 93.8 93.8 94.2   MCH 31.3 31.1 31.3   MCHC 33.4 33.2 33.2   RDW 13.8 13.7 13.6    247 239   MPV 12.0 11.7 11.9        Last 3 Blood Glucose:   Recent Labs     12/25/21  0447 12/26/21  0417 12/27/21  0330   GLUCOSE 118* 121* 129*        PT/INR:    Lab Results   Component Value Date    PROTIME 13.0 12/12/2021    INR 1.0 12/12/2021     PTT:    Lab Results   Component Value Date    APTT 39.1 12/12/2021       Comprehensive Metabolic Profile:   Recent Labs     12/25/21  0447 12/26/21  0417 12/27/21  0330    139 140   K 4.0 3.9 3.9    101 102   CO2 28 29 28   BUN 28* 28* 27*   CREATININE 0.40* 0.38* 0.30*   GLUCOSE 118* 121* 129*   CALCIUM 8.6 8.9 9.1   PROT 5.5* 5.6* 6.1*   LABALBU 3.2* 3.4* 3.4*   BILITOT 0.38 0.40 0.42   ALKPHOS 55 50 56   AST 26 22 40*   ALT 53* 54* 94*      Magnesium:   Lab Results   Component Value Date    MG 2.5 12/17/2021    MG 2.3 12/16/2021    MG 2.4 12/15/2021     Phosphorus:   Lab Results   Component Value Date    PHOS 3.2 12/18/2021    PHOS 4.0 12/17/2021    PHOS 2.8 12/16/2021     Ionized Calcium: No results found for: CAION     Urinalysis:   Lab Results   Component Value Date    NITRU NEGATIVE 12/18/2021    COLORU Dark Yellow 12/18/2021    PHUR 6.0 12/18/2021    WBCUA 5 TO 10 12/18/2021    RBCUA 20 TO 50 12/18/2021    MUCUS NOT REPORTED 12/18/2021    TRICHOMONAS NOT REPORTED 12/18/2021    YEAST NOT REPORTED 12/18/2021    BACTERIA NOT REPORTED 12/18/2021    SPECGRAV 1.032 12/18/2021    LEUKOCYTESUR NEGATIVE 12/18/2021    UROBILINOGEN Normal 12/18/2021    BILIRUBINUR NEGATIVE 12/18/2021    GLUCOSEU NEGATIVE 12/18/2021    KETUA NEGATIVE 12/18/2021    AMORPHOUS NOT REPORTED 12/18/2021           XR CHEST (SINGLE VIEW FRONTAL)    Result Date: 12/14/2021  Mild interval improvement in multifocal airspace disease particularly at the right base. Support tubes and lines as above.      XR CHEST (SINGLE VIEW FRONTAL)    Result Date: 12/12/2021  Stable appearing     XR CHEST (SINGLE VIEW FRONTAL)    Result Date: 12/11/2021  Multifocal hazy opacities throughout both lungs consistent with COVID pneumonia and or pulmonary edema.     XR CHEST PORTABLE    Result Date: 12/12/2021  Stable appearing chest with unchanged support tubes/lines and persistent extensive bilateral airspace disease consistent with known COVID pneumonia. XR CHEST PORTABLE    Result Date: 12/12/2021  Right internal jugular central venous catheter tip projecting over the proximal SVC versus brachiocephalic vein. No pneumothorax. Otherwise stable exam from earlier today. XR CHEST PORTABLE    Result Date: 12/12/2021  Endotracheal tube tip is 3.2 cm above the saul. Overall significant worsening of multifocal airspace opacities keeping with history of COVID-19. CT CHEST PULMONARY EMBOLISM W CONTRAST    Result Date: 12/11/2021  1. No evidence of pulmonary embolism 2. Diffuse ground-glass opacities throughout the lungs, typical of COVID-19 pulmonary disease. Past Medical History:   Diagnosis Date    Arthritis     Asthma     Diabetes mellitus ([address]     Edema     GERD (gastroesophageal reflux disease)     Hypertension     on lasix    Migraine     IRMA on CPAP     seldom use machine        Social History     Socioeconomic History    Marital status:      Spouse name: None    Number of children: None    Years of education: None    Highest education level: None   Occupational History    None   Tobacco Use    Smoking status: Former Smoker    Smokeless tobacco: Never Used   Vaping Use    Vaping Use: Never used   Substance and Sexual Activity    Alcohol use: Yes     Comment: every couple months    Drug use: Never    Sexual activity: None   Other Topics Concern    None   Social History Narrative    None     Social Determinants of Health     Financial Resource Strain:     Difficulty of Paying Living Expenses: Not on file   Food Insecurity:     Worried About Running Out of Food in the Last Year: Not on file    Lucina of Food in the Last Year: Not on file   Transportation Needs:     Lack of Transportation (Medical):  Not on file  Lack of Transportation (Non-Medical): Not on file   Physical Activity:     Days of Exercise per Week: Not on file    Minutes of Exercise per Session: Not on file   Stress:     Feeling of Stress : Not on file   Social Connections:     Frequency of Communication with Friends and Family: Not on file    Frequency of Social Gatherings with Friends and Family: Not on file    Attends Yazidi Services: Not on file    Active Member of [address] [place] [address] or Organizations: Not on file    Attends Club or Organization Meetings: Not on file    Marital Status: Not on file   Intimate Partner Violence:     Fear of Current or Ex-Partner: Not on file    Emotionally Abused: Not on file    Physically Abused: Not on file    Sexually Abused: Not on file   Housing Stability:     Unable to Pay for Housing in the Last Year: Not on file    Number of [place] in the Last Year: Not on file    Unstable Housing in the Last Year: Not on file         There is no immunization history on file for this patient.       Family History   Problem Relation Age of Onset    Heart Attack Sister 32    Diabetes Paternal Grandmother     Heart Disease Paternal Uncle     Heart Disease Paternal Uncle     Heart Disease Paternal Uncle     Cancer Maternal Aunt     Cancer Maternal Aunt     Cancer Maternal Uncle     Cancer Maternal Uncle          Past Surgical History:   Procedure Laterality Date    APPENDECTOMY      ARTHROPLASTY Left 11/1/2019    LEFT FOOT 1ST MTPJ ARTHROPLASTY WITH PEREZ IMPLANT AND GROMMETS  [facility] performed by Frances Webb MD at [address] [facility] Right 12/12/2019    RIGHT FOOT ARTHROPLASTY 1ST MTPJ (Right Foot)    ARTHROPLASTY Right 12/12/2019    RIGHT FOOT ARTHROPLASTY 1ST MTPJ performed by Frances Webb MD at [address] Right    [address]  2014    no blockage no stents    CHOLECYSTECTOMY      COLONOSCOPY      ENDOSCOPY, COLON, DIAGNOSTIC      TOE SURGERY Left 11/01/2019     LEFT FOOT 1ST MTPJ ARTHROPLASTY WITH PEREZ IMPLANT AND GROMMETS  [facility] (Left )    TONSILLECTOMY            VENTILATOR SETTINGS:  Vent Information  $Ventilation: $Subsequent Day  Skin Assessment: Clean, dry, & intact  Suction Catheter Diameter: 14  Equipment ID: [id number]  Equipment Changed: HME  Vent Type: Servo i  Vent Mode: PRVC  Vt Ordered: 550 mL  Rate Set: 20 bmp  FiO2 : 65 %  SpO2: 94 %  SpO2/FiO2 ratio: 146.15  Sensitivity: 3  PEEP/CPAP: 12  I Time/ I Time %: 0.8 s  Humidification Source: HME  Humidification Temp: 37  Humidification Temp Measured: 36.8  Circuit Condensation: Drained  Nitric Oxide/Epoprostenol In Use?: No     PaO2/FiO2 RATIO:  Recent Labs     12/27/21  0331   POCPO2 67.9*      FiO2 : 65 %       LABS:  ABGs:   Recent Labs     12/25/21  0436 12/26/21  0325 12/27/21  0331   POCPH 7.465* 7.451* 7.445   POCPCO2 51.1* 49.7* 48.5*   POCPO2 73.3* 86.5 67.9*   POCHCO3 36.8* 34.6* 33.3*   WYHV2ZJL 95 97 94            ASSESSMENT:     Principal Problem:    Pneumonia due to COVID-19 virus  Active Problems:    Shock (HCC)    Acute respiratory failure with hypoxia (HCC)    Type 2 diabetes mellitus (HCC)    Asthma    IRMA on CPAP    Hypertension    GERD (gastroesophageal reflux disease)    ARDS (adult respiratory distress syndrome) (HCC)  Resolved Problems:    * No resolved hospital problems. *              Acute hypoxic respiratory failure secondary to COVID 19   Acute respiratory distress syndrome   Bilateral multifocal pneumonia due to COVID 19 infection   Covid -19 pandemic emergency   Post hypercarbic metabolic alkalosis   Hyperglycemia, better   Leukocytosis, slightly better   Shock requiring norepinephrine? Sepsis    LOS: 15  PLAN:    · D/w RT  · D/w RN   · Chest x-ray on 12/24/2021 with improving bilateral pulmonary infiltrates  · Sedation reviewed.  We will try to cut down on the sedatives and pain medications  · Cont ARDS ventilation  · Proning held off as it did not appear to make much of difference  · Airborne isolation and droplet precautions to be continued  · Continue supportive care   · Cont treatment with medications for COVID  · Cont tube feeding  · Monitor endotracheal secretions   · Will obtain xray chest as needed  · ABG as needed  · Prognosis guarded given age and severity of illness. · On Lovenox and lansoprazole  · On Decadron  · Patient is on cefepime  · Check triglycerides as long as the patient is needing propofol  · Patient required placement of the Ahhn catheter due to retention of urine requiring straight catheter multiple times a day    Treatment plan Discussed with nursing staff in detail , all questions answered . Total critical care time caring for this patient with life threatening, unstable organ failure, including direct patient contact, management of life support systems, review of data including imaging and labs, discussions with other team members and physicians at least 27  Min so far today, excluding procedures. Electronically signed by Kodi Senior DO on 12/27/2021 at 11:52 PM       This patient was evaluated in the context of the global SARS-CoV-2 (COVID-19) pandemic, which necessitated considerations that the patient either has COVID-19 infection or is at risk of infection with COVID-19. Institutional protocols and algorithms that pertain to the evaluation & management of patients with COVID-19 or those at risk for COVID-19 are in a state of rapid changes based on information released by regulatory bodies including the CDC and federal and state organizations. These policies and algorithms were followed during the patient's care. Please note that this chart was generated using voice recognition Dragon dictation software. Although every effort was made to ensure the accuracy of this automated transcription, some errors in transcription may have occurred.

## 2021-12-28 NOTE — PLAN OF CARE
Adriane Muñiz RN  Outcome: Ongoing  Goal: Maintain absence of muscle cramping  12/27/2021 1935 by Coral Beatty RN  Outcome: Ongoing  12/27/2021 1630 by Adriane Muñiz RN  Outcome: Ongoing  Goal: Maintain normal serum potassium, sodium, calcium, phosphorus, and pH  12/27/2021 1935 by Coral Beatty RN  Outcome: Ongoing  12/27/2021 1630 by Adriane Muñiz RN  Outcome: Ongoing     Problem: Loneliness or Risk for Loneliness  Goal: Demonstrate positive use of time alone when socialization is not possible  12/27/2021 1935 by Coral Beatty RN  Outcome: Ongoing  12/27/2021 1630 by Adriane Muñiz RN  Outcome: Ongoing     Problem: Fatigue  Goal: Verbalize increase energy and improved vitality  12/27/2021 1935 by Coral Beatty RN  Outcome: Ongoing  12/27/2021 1630 by Adriane Muñiz RN  Outcome: Ongoing     Problem: Patient Education: Go to Patient Education Activity  Goal: Patient/Family Education  12/27/2021 1935 by Coral Beatty RN  Outcome: Ongoing  12/27/2021 1630 by Adriane Muñiz RN  Outcome: Ongoing     Problem: OXYGENATION/RESPIRATORY FUNCTION  Goal: Patient will maintain patent airway  12/27/2021 1935 by Coral Beatty RN  Outcome: Ongoing  12/27/2021 1630 by Adriane Muñiz RN  Outcome: Ongoing  12/27/2021 0810 by Thor Sequeira RCP  Outcome: Ongoing  Goal: Patient will achieve/maintain normal respiratory rate/effort  Description: Respiratory rate and effort will be within normal limits for the patient  12/27/2021 1935 by Coral Beatty RN  Outcome: Ongoing  12/27/2021 1630 by Adriane Muñiz RN  Outcome: Ongoing  12/27/2021 0810 by Thor Sequeira RCP  Outcome: Ongoing     Problem: MECHANICAL VENTILATION  Goal: Patient will maintain patent airway  12/27/2021 1935 by Coral Beatty RN  Outcome: Ongoing  12/27/2021 1630 by Adriane Muñiz RN  Outcome: Ongoing  12/27/2021 0810 by Thor Sequeira RCP  Outcome: Ongoing  Goal: Oral health is maintained or improved  12/27/2021 1935 by Sherrie Mcdermott Yasmeen Victor RN  Outcome: Ongoing  12/27/2021 1630 by Miryam Chacon RN  Outcome: Ongoing  12/27/2021 0810 by Christiano Gonzalez RCP  Outcome: Ongoing  Goal: ET tube will be managed safely  12/27/2021 1935 by Corina Perez RN  Outcome: Ongoing  12/27/2021 1630 by Miryam Chacon RN  Outcome: Ongoing  12/27/2021 0810 by Christiano Gonzalez RCP  Outcome: Ongoing  Goal: Ability to express needs and understand communication  12/27/2021 1935 by Corina Perez RN  Outcome: Ongoing  12/27/2021 1630 by Miryam Chacon RN  Outcome: Ongoing  12/27/2021 0810 by Christiano Gonzalez RCP  Outcome: Ongoing  Goal: Mobility/activity is maintained at optimum level for patient  12/27/2021 1935 by Corina Perez RN  Outcome: Ongoing  12/27/2021 1630 by Miryam Chacon RN  Outcome: Ongoing  12/27/2021 0810 by Christiano Gonzalez RCP  Outcome: Ongoing     Problem: SKIN INTEGRITY  Goal: Skin integrity is maintained or improved  12/27/2021 1935 by Corina Perez RN  Outcome: Ongoing  12/27/2021 1630 by Miryam Chacon RN  Outcome: Ongoing  12/27/2021 0810 by Christiano Gonzalez RCP  Outcome: Ongoing     Problem: Skin Integrity:  Goal: Will show no infection signs and symptoms  Description: Will show no infection signs and symptoms  12/27/2021 1935 by Corina Perez RN  Outcome: Ongoing  12/27/2021 1630 by Miryam Chacon RN  Outcome: Ongoing  Goal: Absence of new skin breakdown  Description: Absence of new skin breakdown  12/27/2021 1935 by Corina Perez RN  Outcome: Ongoing  12/27/2021 1630 by Miryam Chacon RN  Outcome: Ongoing     Problem: Falls - Risk of:  Goal: Will remain free from falls  Description: Will remain free from falls  12/27/2021 1935 by Corina Perez RN  Outcome: Ongoing  12/27/2021 1630 by Miryam Chacon RN  Outcome: Ongoing  Goal: Absence of physical injury  Description: Absence of physical injury  12/27/2021 1935 by Corina Perez, RN  Outcome: Ongoing  12/27/2021 1630 by Miryam Chacon RN  Outcome: Ongoing Problem: Nutrition  Goal: Optimal nutrition therapy  12/27/2021 1935 by Angelia Hernández RN  Outcome: Ongoing  12/27/2021 1630 by Hank Chase RN  Outcome: Ongoing  12/27/2021 1252 by Cori José RD, LD  Outcome: Ongoing  Note: Nutrition Problem #1: Inadequate oral intake  Intervention: Food and/or Nutrient Delivery: Continue NPO (Restart tube feeding as able)  Nutritional Goals: Pt to meet % of est'd needs daily via EN      Problem: Discharge Planning:  Goal: Participates in care planning  Description: Participates in care planning  12/27/2021 1935 by Angelia Hernández RN  Outcome: Ongoing  12/27/2021 1630 by Hank Chase RN  Outcome: Ongoing  Goal: Discharged to appropriate level of care  Description: Discharged to appropriate level of care  12/27/2021 1935 by Angelia Hernández RN  Outcome: Ongoing  12/27/2021 1630 by Hank Chase RN  Outcome: Ongoing

## 2021-12-28 NOTE — PROGRESS NOTES
[facility]  Office: [address], DO, Carlos Misty, DO, Amy Galindo, DO, Yasmine Bowers Blood, DO, Hollie Saini MD, Brenna Awad MD, Lan Brady MD, Tracy Pepe MD, Esau Zavaleta MD, Tom Harper MD, Gildardo Jaimes MD, Leon Goode, DO, Danielle Verma, DO, Cordelia Barthel, MD,  Zackary Jamison, DO, Clinton Florence MD, Hola Leiva MD, Mj Salcedo MD, Angela Tillman MD, Hai Centeno MD, Alek Guzman MD, Huong Aguirre MD, Justus Gamble, [facility], [facility], CNP, Sarah Walton, CNP, Amber Roth, CNS, Yessenia Garcia, CNP, Kenji Lock, CNP, Rach Coombs, CNP, Eloise Hernadez, CNP, Marbella Lennon, CNP, Rhina Maldonado PA-C, [place], [facility], [facility], [facility], Basim Real, CNP, Spencer Russlel, CNP, Jessica Abdul, CNP, Rosalee Rosales, CNP, Nilson Llamas, CNP, Jenny Barkley, [address] [facility] [place]      Daily Progress Note     Admit Date: 12/12/2021  Bed/Room No.  3026/3026-01  Admitting Physician : Lan Brady MD  Code Status :Full Code  Hospital Day:  LOS: 16 days   Chief Complaint:     Breathing difficulty. Principal Problem:    Pneumonia due to COVID-19 virus  Active Problems:    Shock ([facility] [address]    Acute respiratory failure with hypoxia (HCC)    Type 2 diabetes mellitus (HCC)    Asthma    IRMA on CPAP    Hypertension    GERD (gastroesophageal reflux disease)    ARDS (adult respiratory distress syndrome) ([facility] [address]  Resolved Problems:    * No resolved hospital problems. *    Subjective : Interval History/Significant events :  12/28/21  Unchanged  Patient continues to have intermittent fevers, T-max 100 °F.  Patient is on fentanyl, propofol, Precedex. He remains on ventilator support 70% FiO2 with PEEP of 12. Urine output is adequate. Patient is tolerating tube feed. Vitals - Unstable afebrile  Labs -hypokalemia 3.3, glucose 115, leukocytosis 17.3. Chest x-ray 12/24/2021-bilateral opacities. Nursing notes , Consults notes reviewed.  Overnight events and updates discussed with Nursing staff . Background History:         Tamara Rosen is 60 y.o. male  Who was admitted to the hospital on 12/12/2021 for treatment of Pneumonia due to COVID-19 virus. Patient initially presented with shortness of breath at [facility].  He had to suggest positive for COVID-19 infection. Patient reported that his son had also COVID-19 infection. Patient was hypoxic in 50s on arrival and was treated with BiPAP however breathing worsened and patient was subsequently intubated. Patient was transferred to [facility] on 12/12/2021 as he was requiring high ventilator support 100% FiO2 with PEEP of 22. He was given Actemra and started on high-dose Decadron. Patient was started with he was started on Flolan and given paralytics for proning per ARDS protocol. Patient also received intermittent Lasix. Proning was stopped on 12/20/2021 and paralytics were stopped on 12/22/2021. Patient started to have fevers and was started on cefepime. Respiratory cultures were negative. PMH:  Past Medical History:   Diagnosis Date    Arthritis     Asthma     Diabetes mellitus ([address]     Edema     GERD (gastroesophageal reflux disease)     Hypertension     on lasix    Migraine     IRMA on CPAP     seldom use machine      Allergies:    Allergies   Allergen Reactions    Nuts [Peanut-Containing Drug Products] Anaphylaxis    Sunflower Oil Anaphylaxis     Sunflower seeds      Medications :  meropenem, 1,000 mg, IntraVENous, Q8H  chlordiazePOXIDE, 10 mg, Per NG tube, 4x Daily  lidocaine 1 % injection, 5 mL, IntraDERmal, Once  sodium chloride flush, 5-40 mL, IntraVENous, 2 times per day  oxyCODONE, 10 mg, Oral, Q6H  docusate, 100 mg, Per NG tube, BID  insulin lispro, 0-6 Units, SubCUTAneous, Q6H  lansoprazole, 30 mg, Oral, QAM AC  furosemide, 40 mg, IntraVENous, Daily  insulin glargine, 10 Units, SubCUTAneous, Nightly  sodium chloride flush, 5-40 mL, IntraVENous, 2 times per day  enoxaparin, 30 mg, SubCUTAneous, BID  Vitamin D, 2,000 Units, Oral, Daily  dexamethasone, 10 mg, IntraVENous, Q24H        Review of Systems   Review of Systems   Unable to perform ROS: Intubated     Objective :      Current Vitals : Temp: 100 °F (37.8 °C),  Pulse: 93, Resp: 24, BP: 98/78, SpO2: 92 %  Last 24 Hrs Vitals   Patient Vitals for the past 24 hrs:   BP Temp Temp src Pulse Resp SpO2 Weight   12/28/21 1700 98/78 100 °F (37.8 °C) Bladder 93 24 92 % --   12/28/21 1645 -- -- -- 99 24 92 % --   12/28/21 1630 -- -- -- 105 24 93 % --   12/28/21 1615 -- -- -- 110 24 92 % --   12/28/21 1600 122/88 99.7 °F (37.6 °C) Bladder 107 24 92 % --   12/28/21 1515 -- -- -- 111 24 91 % --   12/28/21 1500 132/78 -- -- 118 24 91 % --   12/28/21 1445 -- -- -- 121 24 92 % --   12/28/21 1432 -- -- -- 116 24 92 % --   12/28/21 1430 -- -- -- 116 24 93 % --   12/28/21 1415 -- -- -- 114 24 92 % --   12/28/21 1400 129/81 101 °F (38.3 °C) Bladder 115 24 93 % --   12/28/21 1345 -- -- -- 106 24 94 % --   12/28/21 1330 -- -- -- 97 24 94 % --   12/28/21 1315 -- -- -- 91 24 94 % --   12/28/21 1300 96/83 -- -- 87 24 94 % --   12/28/21 1245 -- -- -- 86 24 93 % --   12/28/21 1230 -- -- -- 85 24 93 % --   12/28/21 1215 -- -- -- 85 24 93 % --   12/28/21 1200 90/66 100.8 °F (38.2 °C) Bladder 86 24 93 % --   12/28/21 1145 -- -- -- 86 24 93 % --   12/28/21 1130 -- -- -- 86 24 93 % --   12/28/21 1115 -- -- -- 85 24 93 % --   12/28/21 1100 86/70 -- -- 86 24 92 % --   12/28/21 1045 -- -- -- 87 24 93 % --   12/28/21 1030 -- -- -- 88 24 93 % --   12/28/21 1015 -- -- -- 89 24 92 % --   12/28/21 1000 90/69 99.7 °F (37.6 °C) Bladder 90 24 92 % --   12/28/21 0945 -- -- -- 91 24 91 % --   12/28/21 0930 -- -- -- 92 24 91 % --   12/28/21 0915 -- -- -- 93 24 92 % --   12/28/21 0900 95/73 -- -- 92 24 92 % --   12/28/21 0845 -- -- -- 93 24 92 % --   12/28/21 0830 -- -- -- 93 24 93 % --   12/28/21 0815 -- -- -- 92 24 93 % --   12/28/21 0800 (!) 106/90 100.2 °F Musculoskeletal:      Right lower leg: No edema. Left lower leg: No edema. Lymphadenopathy:      Cervical: No cervical adenopathy. Skin:     Capillary Refill: Capillary refill takes less than 2 seconds. Neurological:      Motor: No tremor or abnormal muscle tone. Laboratory findings:    Recent Labs     12/26/21 0417 12/27/21 0330 12/28/21  0529   WBC 14.4* 14.4* 17.3*   HGB 13.6 14.5 15.4   HCT 41.0 43.7 47.0    239 254     Recent Labs     12/26/21 0417 12/27/21 0330 12/28/21  0529    140 141   K 3.9 3.9 3.3*    102 102   CO2 29 28 27   GLUCOSE 121* 129* 115*   BUN 28* 27* 24*   CREATININE 0.38* 0.30* 0.36*   MG  --   --  2.2   CALCIUM 8.9 9.1 9.1     Recent Labs     12/26/21 0417 12/27/21 0330 12/28/21  0529   PROT 5.6* 6.1* 6.0*   LABALBU 3.4* 3.4* 3.5   AST 22 40* 22   ALT 54* 94* 71*   ALKPHOS 50 56 58   BILITOT 0.40 0.42 0.40   BILIDIR 0.14 0.11 0.14   TRIG 201*  --   --           Specific Gravity, UA   Date Value Ref Range Status   12/18/2021 1.032 (H) 1.005 - 1.030 Final     Protein, UA   Date Value Ref Range Status   12/18/2021 1+ (A) NEGATIVE Final     RBC, UA   Date Value Ref Range Status   12/18/2021 20 TO 50 0 - 4 /HPF Final     Comment:     Reference range defined for non-centrifuged specimen. Bacteria, UA   Date Value Ref Range Status   12/18/2021 NOT REPORTED None Final     Nitrite, Urine   Date Value Ref Range Status   12/18/2021 NEGATIVE NEGATIVE Final     WBC, UA   Date Value Ref Range Status   12/18/2021 5 TO 10 0 - 5 /HPF Final     Leukocyte Esterase, Urine   Date Value Ref Range Status   12/18/2021 NEGATIVE NEGATIVE Final       Imaging / Clinical Data :-   XR CHEST (SINGLE VIEW FRONTAL)    Result Date: 12/22/2021  Stable mild cardiomegaly. Patchy airspace opacities, left more than right, may be related to pulmonary edema versus pneumonia. Nonspecific bowel gas pattern.      XR ABDOMEN (KUB) (SINGLE AP VIEW)    Result Date: 12/22/2021  Stable mild cardiomegaly. Patchy airspace opacities, left more than right, may be related to pulmonary edema versus pneumonia. Nonspecific bowel gas pattern. XR CHEST PORTABLE    Result Date: 12/24/2021  1. Stable cardiomegaly. 2.  Patchy airspace opacities, stable in the right lower chest, slightly improved on the left. Findings may be related to pulmonary edema or improving pneumonia. 3.  Support tubes and catheters in good position. XR CHEST PORTABLE    Result Date: 12/19/2021  Cardiomegaly, vascular congestion and worsening pulmonary opacities with bilateral effusions favoring pulmonary edema over multifocal airspace disease. Support tubes and lines as above. Clinical Course : unchanged  Assessment and Plan  :        Acute respiratory failure with hypoxia due to ARDS from COVID-19 pneumonia -ventilator support, high-dose Decadron, s/p Actemra on 12/12/2021. Antibiotics per ID. Treated with 10 days of  cefepime  12/18/2021 - 12/28/21, meropenem started on 12/28/2021 due to high fevers. ID following. Type 2 diabetes mellitus - on Lantus, blood sugar controlled. Continue tube feed. Obesity BMI 39  Essential hypertension -  Stable , off levophed. .  IRMA -was noncompliant with CPAP at home       Continue DVT prophylaxis, GI prophylaxis. Replace potassium    Continue to monitor vitals , Intake / output ,  Cell count , HGB , Kidney function, oxygenation  as indicated .      Plan discussed with Staff Gregory Bryan RN     (Please note that portions of this note were completed with a voice recognition program. Efforts were made to edit the dictations but occasionally words are mis-transcribed.)      Yariel Madden MD  12/28/2021

## 2021-12-28 NOTE — PROGRESS NOTES
[facility] - Progress Note   Note COVID 19 Patient  Today's Date and Time: 12/28/2021, 9:48 AM    Impression :     COVID 19 Confirmed Infection  Covid tests:  12/11/2021: Positive  Elevated inflammatory markers  Acute hypoxic respiratory failure  History of asthma  Diabetes mellitus  Essential hypertension  IRMA on CPAP  Patient has not received the Covid vaccination    Recommendations:   Antibiotic treatment:  The patient was having high-grade fevers and cultures have been sent.-No growth on cultures thus far  I will start the patient empirically on antibiotic therapy with cefepime on 12/18-fevers initially resolved but low grade fevers have resumed. Cefepime discontinued 12/28 and Meropenem initiated 12/28 for worsening leukocytosis and fevers     Covid Rx:    Remdesivir-out of window  Decadron-high-dose initiated  Actemra-ordered 12/12/2021  Monoclonal antibodies-out of window      Medical Decision Making/Summary/Discussion:12/28/2021     Patient admitted with COVID 19 infection  He may come out of isolation on 12/31/21    Infection Control Recommendations   [place] Precautions  Airborne isolation  Droplet Isolation    Antimicrobial Stewardship Recommendations     Discontinuation of therapy  Coordination of Outpatient Care:   Estimated Length of IV antimicrobials:TBD  Patient will need Midline Catheter Insertion: TBD  Patient will need PICC line Insertion: No  Patient will need: Home IV , [place],  SNF,  LTAC:TBD  Patient will need outpatient wound care:No    Chief complaint/reason for consultation:   Concern for COVID infection      History of Present Illness:   Nevaeh Toussaint is a 60y.o.-year-old male who was initially admitted on 12/12/2021.  Patient seen at the request of Dr. Emir Arboleda:    Patient presented through [facility]'s ER on 12/11/2021 with complaints of worsening shortness of breath with associated generalized weakness and nonproductive cough over the past several days. He was exposed to his son who was diagnosed with Covid last week and has not received the Covid vaccine. The patient was very hypoxic with an SPO2 in the high 50s on room air. Imaging revealed bilateral pulmonary opacities typical of COVID-19    Abnormal labs include:    Ferritin 2800      Patient has been intubated and transferred to [facility]  He has been started on high-dose Decadron and Actemra has been ordered    CT CHEST PULMONARY EMBOLISM W CONTRAST 12/11/2021   Final Result   1. No evidence of pulmonary embolism   2. Diffuse ground-glass opacities throughout the lungs, typical of COVID-19   pulmonary disease.           XR CHEST (SINGLE VIEW FRONTAL) 12/11/2021   Final Result   Multifocal hazy opacities throughout both lungs consistent with COVID   pneumonia and or pulmonary edema       Patient admitted because of concerns with COVID 19.    CURRENT EVALUATION : 12/28/2021    TMAX 100.5  VS stable    The patient remains on mechanical ventilation with 75 % FiO2 and PEEP of 12. He is receiving fentanyl, Versed, propofol and Precedex for sedation. Nimbex has been reinitiated for increased ventilator asynchrony    Thick yellow nasal drainage present    Leukocytosis is worsening   Cefepime was initiated 12/18-discontinued 12/28  Meropenem initiated 12/28  Cultures remain negative    CRP is decreasing   CXR 12/24 is showing slight improvement    Patient exhibiting respiratory distress. yes  Respiratory secretions: no    Patient receiving supplemental oxygen.   Mechanical ventilation  RR: 24-->20  02 sat:96-->92-->96    % FIO2: 90-->80-->85-->80-->65-->60->50-->75  PEEP:   21-->16-->14-->12-->10->8-->12    QTc:       NEWS Score: 0-4 Low risk group; 5-6: Medium risk group; 7 or above: High risk group  Parameters 3 2 1 0 1 2 3   Age    < 65   = 65   RR = 8  9-11 12-20  21-24 = 25   O2 Sats = 91 92-93 94-95 = 96      Suppl O2  Yes No      SBP = 90  101-110 111-219   = 220   HR = 40  41-50 51-90  111-130 = 131   Consciousness    Alert   Drowsiness, lethargy, or confusion   Temperature = 35.0 C (95.0 F)  35.1-36.0 C 95.1-96.9 F 36.1-38.0 C 97.0-100.4 F 38.1-39.0 C 100.5-102.3 F = 39.1 C = 102.4 F      NEWS Score:  12/12/2021: 13 high risk    Overall Daily Picture:     worsening    Presence of secondary bacterial Infection:    Cultures no growth  Additional antibiotics: 12/18 Cefepime for leukocytosis and fevers-discontinued 12/28 and Meropenem restarted    Labs, X rays reviewed: 12/28/2021    BUN:28->27-->24  Cr:0.0.38->0.30-->0.36    WBC:21.8-->18.9-->14.4->14.4-->17.3  Hb: 14.5-->15.4  Plat: 239-->254    Absolute Neutrophils:3.73  Absolute Lymphocytes:0.29  Neutrophil/Lymphocyte Ratio: 12.8 high risk    CRP:293-->277-->137-->50.8-->23  Ferritin:2800  LDH: 838    Pro Calcitonin:      Cultures:  Urine:  12/18/21: No bacterial growth  Blood:  12/18/21: No growth thus far  Sputum :  12/18/21: Normal respiratory sherry  Wound:      CXR:     12/22/21 12/19/21      1221 diffuse bilateral infiltrates  CAT:      Discussed with patient, RN, CC, IM.      12-12-21:      I have personally reviewed the past medical history, past surgical history, medications, social history, and family history, and I have updated the database accordingly.   Past Medical History:     Past Medical History:   Diagnosis Date    Arthritis     Asthma     Diabetes mellitus ([address]     Edema     GERD (gastroesophageal reflux disease)     Hypertension     on lasix    Migraine     IRMA on CPAP     seldom use machine       Past Surgical  History:     Past Surgical History:   Procedure Laterality Date    APPENDECTOMY      ARTHROPLASTY Left 11/1/2019    LEFT FOOT 1ST MTPJ ARTHROPLASTY WITH PEREZ IMPLANT AND GROMMETS  [facility] performed by Ulises Coreas MD at [address] [place] [address] Right 12/12/2019    RIGHT FOOT ARTHROPLASTY 1ST MTPJ (Right Foot)    ARTHROPLASTY Right 12/12/2019    RIGHT FOOT ARTHROPLASTY 1ST MTPJ performed by Donna Cordova MD at [address] Right    [address]  2014    no blockage no stents    CHOLECYSTECTOMY      COLONOSCOPY      ENDOSCOPY, COLON, DIAGNOSTIC      TOE SURGERY Left 11/01/2019     LEFT FOOT 1ST MTPJ ARTHROPLASTY WITH PEREZ IMPLANT AND GROMMETS  [facility] (Left )    TONSILLECTOMY         Medications:      chlordiazePOXIDE  10 mg Per NG tube 4x Daily    lidocaine 1 % injection  5 mL IntraDERmal Once    sodium chloride flush  5-40 mL IntraVENous 2 times per day    oxyCODONE  10 mg Oral Q6H    docusate  100 mg Per NG tube BID    insulin lispro  0-6 Units SubCUTAneous Q6H    lansoprazole  30 mg Oral QAM AC    cefepime  2,000 mg IntraVENous Q12H    furosemide  40 mg IntraVENous Daily    insulin glargine  10 Units SubCUTAneous Nightly    sodium chloride flush  5-40 mL IntraVENous 2 times per day    enoxaparin  30 mg SubCUTAneous BID    Vitamin D  2,000 Units Oral Daily    dexamethasone  10 mg IntraVENous Q24H       Social History:     Social History     Socioeconomic History    Marital status:      Spouse name: Not on file    Number of children: Not on file    Years of education: Not on file    Highest education level: Not on file   Occupational History    Not on file   Tobacco Use    Smoking status: Former Smoker    Smokeless tobacco: Never Used   Vaping Use    Vaping Use: Never used   Substance and Sexual Activity    Alcohol use: Yes     Comment: every couple months    Drug use: Never    Sexual activity: Not on file   Other Topics Concern    Not on file   Social History Narrative    Not on file     Social Determinants of Health     Financial Resource Strain:     Difficulty of Paying Living Expenses: Not on file   Food Insecurity:     Worried About Running Out of Food in the Last Year: Not on file    Lucina of Food in the Last Year: Not on file Transportation Needs:     Lack of Transportation (Medical): Not on file    Lack of Transportation (Non-Medical):  Not on file   Physical Activity:     Days of Exercise per Week: Not on file    Minutes of Exercise per Session: Not on file   Stress:     Feeling of Stress : Not on file   Social Connections:     Frequency of Communication with Friends and Family: Not on file    Frequency of Social Gatherings with Friends and Family: Not on file    Attends Quaker Services: Not on file    Active Member of [address] [place] [address] [employer] or Organizations: Not on file    Attends Club or Organization Meetings: Not on file    Marital Status: Not on file   Intimate Partner Violence:     Fear of Current or Ex-Partner: Not on file    Emotionally Abused: Not on file    Physically Abused: Not on file    Sexually Abused: Not on file   Housing Stability:     Unable to Pay for Housing in the Last Year: Not on file    Number of [place] in the Last Year: Not on file    Unstable Housing in the Last Year: Not on file       Family History:     Family History   Problem Relation Age of Onset    Heart Attack Sister 32    Diabetes Paternal Grandmother     Heart Disease Paternal Uncle     Heart Disease Paternal Uncle     Heart Disease Paternal Uncle     Cancer Maternal Aunt     Cancer Maternal Aunt     Cancer Maternal Uncle     Cancer Maternal Uncle         Allergies:   Nuts [peanut-containing drug products] and Sunflower oil     Review of Systems:     Unable to assess 12/28/2021  Intubated and sedated      Physical Examination :     Patient Vitals for the past 8 hrs:   BP Temp Temp src Pulse Resp SpO2 Weight   12/28/21 0759 -- -- -- 91 20 96 % --   12/28/21 0756 -- -- -- -- 20 95 % --   12/28/21 0700 118/85 -- -- 93 28 91 % --   12/28/21 0602 -- -- -- 93 27 90 % --   12/28/21 0601 -- -- -- -- -- (!) 89 % --   12/28/21 0600 120/78 -- -- 95 30 90 % --   12/28/21 0552 -- -- -- -- 29 (!) 88 % --   12/28/21 0515 -- -- -- 96 (!) 32 (!) 89 % --   12/28/21 0500 124/70 -- -- 94 29 (!) 88 % --   12/28/21 0445 -- -- -- 95 (!) 31 90 % --   12/28/21 0400 113/85 99.7 °F (37.6 °C) Bladder 90 30 92 % --   12/28/21 0300 85/68 -- -- 88 20 94 % --   12/28/21 0200 105/76 -- -- 107 (!) 32 93 % 284 lb 6.3 oz (129 kg)     General Appearance: Intubated and sedated  Head:  Normocephalic, no trauma  ENT: Not well evaluated as the patient is orally intubated  Neck:Supple, without lymphadenopathy. Thyroid normal, No bruits. Pulmonary/Chest: Diminished to auscultation, without wheezes, rales, or rhonchi. No dullness to percussion. Cardiovascular: Regular rate and rhythm without murmurs, rubs, or gallops. Abdomen: Soft, non tender. Bowel sounds normal. No organomegaly  All four Extremities: No cyanosis, clubbing, edema, or effusions. Neurologic: Intubated and sedated and paralyzed  Skin: Warm and dry with good turgor. No signs of peripheral arterial or venous insufficiency. No ulcerations. No open wounds. Medical Decision Making -Laboratory:   I have independently reviewed/ordered the following labs:    CBC with Differential:   Recent Labs     12/27/21 0330 12/28/21  0529   WBC 14.4* 17.3*   HGB 14.5 15.4   HCT 43.7 47.0    254   LYMPHOPCT 7* 7*   MONOPCT 9 9     BMP:   Recent Labs     12/27/21 0330 12/28/21  0529    141   K 3.9 3.3*    102   CO2 28 27   BUN 27* 24*   CREATININE 0.30* 0.36*   MG  --  2.2     Hepatic Function Panel:   Recent Labs     12/27/21 0330 12/28/21  0529   PROT 6.1* 6.0*   LABALBU 3.4* 3.5   BILIDIR 0.11 0.14   IBILI 0.31 0.26   BILITOT 0.42 0.40   ALKPHOS 56 58   ALT 94* 71*   AST 40* 22     No results for input(s): RPR in the last 72 hours. No results for input(s): HIV in the last 72 hours. No results for input(s): BC in the last 72 hours.   Lab Results   Component Value Date    MUCUS NOT REPORTED 12/18/2021    RBC 4.98 12/28/2021    TRICHOMONAS NOT REPORTED 12/18/2021    WBC 17.3 12/28/2021    YEAST NOT REPORTED 12/18/2021    TURBIDITY Clear 12/18/2021     Lab Results   Component Value Date    CREATININE 0.36 12/28/2021    GLUCOSE 115 12/28/2021       Medical Decision Making-Imaging:     Narrative   EXAMINATION:   CTA OF THE CHEST 12/11/2021 11:31 am       TECHNIQUE:   CTA of the chest was performed after the administration of intravenous   contrast.  Multiplanar reformatted images are provided for review.  MIP   images are provided for review. Dose modulation, iterative reconstruction,   and/or weight based adjustment of the mA/kV was utilized to reduce the   radiation dose to as low as reasonably achievable.       COMPARISON:   Chest x-ray dated December 11, 2021       HISTORY:   ORDERING SYSTEM PROVIDED HISTORY: hypoxemia, covid positive, d-dimer-0.99   TECHNOLOGIST PROVIDED HISTORY:   hypoxemia, covid positive, d-dimer-0.99   Decision Support Exception - unselect if not a suspected or confirmed   emergency medical condition->Emergency Medical Condition (MA)   Reason for Exam: COVID positive, elevated D-Dimer       FINDINGS:   Pulmonary Arteries: Pulmonary arteries are adequately opacified for   evaluation.  No evidence of intraluminal filling defect to suggest pulmonary   embolism.  Main pulmonary artery is normal in caliber.       Mediastinum: Nonspecific mediastinal and hilar lymph nodes are present,   likely reactive. .  The heart and pericardium demonstrate no acute   abnormality.  There is no acute abnormality of the thoracic aorta.       Lungs/pleura: Diffuse ground-glass opacification is present throughout the   lungs, typical of COVID-19 pulmonary disease.  No pleural effusion or   pneumothorax is present.       Upper Abdomen: Images through the upper abdomen demonstrate a small hiatal   hernia.  Diffuse hepatic steatosis is present.  The gallbladder is surgically   absent.       Soft Tissues/Bones: No acute bone or soft tissue abnormality.           Impression   1. No evidence of pulmonary embolism   2.  Diffuse ground-glass opacities throughout the lungs, typical of COVID-19   pulmonary disease.         Narrative   EXAMINATION:   ONE XRAY VIEW OF THE CHEST       12/11/2021 3:54 pm       COMPARISON:   November 13, 2012       HISTORY:   ORDERING SYSTEM PROVIDED HISTORY: hypoxemia, probable covid pneumonia   TECHNOLOGIST PROVIDED HISTORY:   hypoxemia, probable covid pneumonia       FINDINGS:   Multifocal hazy opacities throughout both lungs would be consistent with   history of COVID pneumonia.  Pulmonary edema could have a similar appearance. No pneumothorax or pleural effusion.  Cardiac size enlarged.  Mediastinum   unremarkable.  No acute osseous abnormality.           Impression   Multifocal hazy opacities throughout both lungs consistent with COVID   pneumonia and or pulmonary edema.               Medical Decision Hmfyyr-Ayxecqcu-Tdfbf:     Culture, Respiratory [[id number]] (Abnormal) Collected: 12/18/21 1143   Order Status: Completed Specimen: Endotracheal Updated: 12/18/21 1439    Specimen Description . ENDOTRACHEAL    Special Requests NOT REPORTED    Direct Exam >25 NEUTROPHILS/LPF     < 10 EPITHELIAL CELLS/LPF     MIXED BACTERIAL MORPHOTYPES SEEN ON GRAM STAIN.  Abnormal     Culture PENDING   Culture, Blood 1 [[id number]] Collected: 12/18/21 1234   Order Status: Completed Specimen: Blood Updated: 12/18/21 1358    Specimen Description . BLOOD    Special Requests NOT REPORTED    Culture NO GROWTH <24 HRS   Culture, Blood 1 [[id number]]    Order Status: Sent Specimen: Blood    Culture, Urine [[id number]] Collected: 12/14/21 0043   Order Status: Completed Specimen: Urine, straight catheter Updated: 12/14/21 1934    Specimen Description . URINE,STRAIGHT CATHETER    Special Requests NOT REPORTED    Culture NO GROWTH   MRSA DNA Probe, Nasal [[id number]] Collected: 12/12/21 1245   Order Status: Completed Specimen: Nasal Updated: 12/13/21 1052    Specimen Description . NASAL SWAB    MRSA, DNA, Nasal NEGATIVE:  MRSA DNA not detected by nucleic acid amplification. Comment:                                                    Results should be used as an adjunct to nosocomial control efforts to identify patients   needing enhanced precautions.     The test is not intended to identify patients with staphylococcal infections.  Results   should not be used to guide or monitor treatment for MRSA infections. Medical Decision Making-Other:     Note:  Labs, medications, radiologic studies were reviewed with personal review of films  Large amounts of data were reviewed  Discussed with nursing Staff, Discharge planner  Infection Control and Prevention measures reviewed  All prior entries were reviewed  Administer medications as ordered  Prognosis: Guarded  Discharge planning reviewed      Thank you for allowing us to participate in the care of this patient. Please call with questions. Electronically signed by SHO Berry - CNP on 12/28/2021 at 9:48 AM       ATTESTATION:    I have discussed the case, including pertinent history and exam findings with the APRN. I have evaluated the  History, physical findings and pictures of the patient and the key elements of the encounter have been performed by me. I have reviewed the laboratory data, other diagnostic studies and discussed them with the APRN. I have updated the medical record where necessary. I agree with the assessment, plan and orders as documented by the APRN.     Dior Thomson MD.

## 2021-12-28 NOTE — CARE COORDINATION
Spoke to pt's daughter, Meagan Yuen. She has chosen [facility] as [facility] of choice.  Referral sent

## 2021-12-29 LAB
-: ABNORMAL
ABSOLUTE EOS #: <0.03 K/UL (ref 0–0.44)
ABSOLUTE IMMATURE GRANULOCYTE: 0.15 K/UL (ref 0–0.3)
ABSOLUTE LYMPH #: 0.98 K/UL (ref 1.1–3.7)
ABSOLUTE MONO #: 1.02 K/UL (ref 0.1–1.2)
ALBUMIN SERPL-MCNC: 3.1 G/DL (ref 3.5–5.2)
ALBUMIN/GLOBULIN RATIO: 1.3 (ref 1–2.5)
ALLEN TEST: ABNORMAL
ALP BLD-CCNC: 54 U/L (ref 40–129)
ALT SERPL-CCNC: 54 U/L (ref 5–41)
AMORPHOUS: ABNORMAL
ANION GAP SERPL CALCULATED.3IONS-SCNC: 10 MMOL/L (ref 9–17)
AST SERPL-CCNC: 15 U/L
BACTERIA: ABNORMAL
BASOPHILS # BLD: 0 % (ref 0–2)
BASOPHILS ABSOLUTE: 0.06 K/UL (ref 0–0.2)
BILIRUB SERPL-MCNC: 0.38 MG/DL (ref 0.3–1.2)
BILIRUBIN DIRECT: 0.12 MG/DL
BILIRUBIN URINE: NEGATIVE
BILIRUBIN, INDIRECT: 0.26 MG/DL (ref 0–1)
BUN BLDV-MCNC: 30 MG/DL (ref 6–20)
BUN/CREAT BLD: ABNORMAL (ref 9–20)
CALCIUM SERPL-MCNC: 9.1 MG/DL (ref 8.6–10.4)
CASTS UA: ABNORMAL /LPF (ref 0–2)
CASTS UA: ABNORMAL /LPF (ref 0–2)
CHLORIDE BLD-SCNC: 104 MMOL/L (ref 98–107)
CO2: 27 MMOL/L (ref 20–31)
COLOR: YELLOW
CREAT SERPL-MCNC: 0.42 MG/DL (ref 0.7–1.2)
CRYSTALS, UA: ABNORMAL /HPF
CRYSTALS, UA: ABNORMAL /HPF
CULTURE: NORMAL
CULTURE: NORMAL
DIFFERENTIAL TYPE: ABNORMAL
EOSINOPHILS RELATIVE PERCENT: 0 % (ref 1–4)
EPITHELIAL CELLS UA: ABNORMAL /HPF (ref 0–5)
FIO2: 60
GFR AFRICAN AMERICAN: >60 ML/MIN
GFR NON-AFRICAN AMERICAN: >60 ML/MIN
GFR SERPL CREATININE-BSD FRML MDRD: ABNORMAL ML/MIN/{1.73_M2}
GFR SERPL CREATININE-BSD FRML MDRD: ABNORMAL ML/MIN/{1.73_M2}
GLOBULIN: ABNORMAL G/DL (ref 1.5–3.8)
GLUCOSE BLD-MCNC: 112 MG/DL (ref 75–110)
GLUCOSE BLD-MCNC: 113 MG/DL (ref 75–110)
GLUCOSE BLD-MCNC: 128 MG/DL (ref 70–99)
GLUCOSE BLD-MCNC: 130 MG/DL (ref 75–110)
GLUCOSE BLD-MCNC: 137 MG/DL (ref 74–100)
GLUCOSE BLD-MCNC: 155 MG/DL (ref 75–110)
GLUCOSE BLD-MCNC: 180 MG/DL (ref 75–110)
GLUCOSE URINE: NEGATIVE
HCT VFR BLD CALC: 41.8 % (ref 40.7–50.3)
HEMOGLOBIN: 13.6 G/DL (ref 13–17)
IMMATURE GRANULOCYTES: 1 %
KETONES, URINE: NEGATIVE
LEUKOCYTE ESTERASE, URINE: NEGATIVE
LYMPHOCYTES # BLD: 7 % (ref 24–43)
Lab: NORMAL
Lab: NORMAL
MCH RBC QN AUTO: 31.3 PG (ref 25.2–33.5)
MCHC RBC AUTO-ENTMCNC: 32.5 G/DL (ref 28.4–34.8)
MCV RBC AUTO: 96.1 FL (ref 82.6–102.9)
MODE: ABNORMAL
MONOCYTES # BLD: 8 % (ref 3–12)
MUCUS: ABNORMAL
NEGATIVE BASE EXCESS, ART: ABNORMAL (ref 0–2)
NITRITE, URINE: NEGATIVE
NRBC AUTOMATED: 0 PER 100 WBC
O2 DEVICE/FLOW/%: ABNORMAL
OTHER OBSERVATIONS UA: ABNORMAL
PATIENT TEMP: 38.6
PDW BLD-RTO: 14.4 % (ref 11.8–14.4)
PH UA: 6 (ref 5–8)
PLATELET # BLD: 179 K/UL (ref 138–453)
PLATELET ESTIMATE: ABNORMAL
PMV BLD AUTO: 11.5 FL (ref 8.1–13.5)
POC HCO3: 33.6 MMOL/L (ref 21–28)
POC O2 SATURATION: 92 % (ref 94–98)
POC PCO2 TEMP: 56 MM HG
POC PCO2: 52.1 MM HG (ref 35–48)
POC PH TEMP: 7.39
POC PH: 7.42 (ref 7.35–7.45)
POC PO2 TEMP: 72 MM HG
POC PO2: 64 MM HG (ref 83–108)
POSITIVE BASE EXCESS, ART: 7 (ref 0–3)
POTASSIUM SERPL-SCNC: 4.3 MMOL/L (ref 3.7–5.3)
PROTEIN UA: ABNORMAL
RBC # BLD: 4.35 M/UL (ref 4.21–5.77)
RBC # BLD: ABNORMAL 10*6/UL
RBC UA: ABNORMAL /HPF (ref 0–2)
RENAL EPITHELIAL, UA: ABNORMAL /HPF
SAMPLE SITE: ABNORMAL
SEG NEUTROPHILS: 84 % (ref 36–65)
SEGMENTED NEUTROPHILS ABSOLUTE COUNT: 11.42 K/UL (ref 1.5–8.1)
SODIUM BLD-SCNC: 141 MMOL/L (ref 135–144)
SPECIFIC GRAVITY UA: 1.04 (ref 1–1.03)
SPECIMEN DESCRIPTION: NORMAL
SPECIMEN DESCRIPTION: NORMAL
TCO2 (CALC), ART: ABNORMAL MMOL/L (ref 22–29)
TOTAL PROTEIN: 5.4 G/DL (ref 6.4–8.3)
TRICHOMONAS: ABNORMAL
TURBIDITY: CLEAR
URINE HGB: ABNORMAL
UROBILINOGEN, URINE: NORMAL
WBC # BLD: 13.7 K/UL (ref 3.5–11.3)
WBC # BLD: ABNORMAL 10*3/UL
WBC UA: ABNORMAL /HPF (ref 0–5)
YEAST: ABNORMAL

## 2021-12-29 PROCEDURE — 6360000002 HC RX W HCPCS: Performed by: INTERNAL MEDICINE

## 2021-12-29 PROCEDURE — 6370000000 HC RX 637 (ALT 250 FOR IP): Performed by: NURSE PRACTITIONER

## 2021-12-29 PROCEDURE — 82803 BLOOD GASES ANY COMBINATION: CPT

## 2021-12-29 PROCEDURE — 6370000000 HC RX 637 (ALT 250 FOR IP): Performed by: INTERNAL MEDICINE

## 2021-12-29 PROCEDURE — 36415 COLL VENOUS BLD VENIPUNCTURE: CPT

## 2021-12-29 PROCEDURE — 80076 HEPATIC FUNCTION PANEL: CPT

## 2021-12-29 PROCEDURE — 99232 SBSQ HOSP IP/OBS MODERATE 35: CPT | Performed by: FAMILY MEDICINE

## 2021-12-29 PROCEDURE — 6360000002 HC RX W HCPCS: Performed by: NURSE PRACTITIONER

## 2021-12-29 PROCEDURE — 2500000003 HC RX 250 WO HCPCS: Performed by: INTERNAL MEDICINE

## 2021-12-29 PROCEDURE — 2580000003 HC RX 258: Performed by: INTERNAL MEDICINE

## 2021-12-29 PROCEDURE — 99291 CRITICAL CARE FIRST HOUR: CPT | Performed by: INTERNAL MEDICINE

## 2021-12-29 PROCEDURE — 99232 SBSQ HOSP IP/OBS MODERATE 35: CPT | Performed by: INTERNAL MEDICINE

## 2021-12-29 PROCEDURE — 80048 BASIC METABOLIC PNL TOTAL CA: CPT

## 2021-12-29 PROCEDURE — 2000000000 HC ICU R&B

## 2021-12-29 PROCEDURE — 37799 UNLISTED PX VASCULAR SURGERY: CPT

## 2021-12-29 PROCEDURE — 2580000003 HC RX 258: Performed by: NURSE PRACTITIONER

## 2021-12-29 PROCEDURE — 87186 SC STD MICRODIL/AGAR DIL: CPT

## 2021-12-29 PROCEDURE — 87040 BLOOD CULTURE FOR BACTERIA: CPT

## 2021-12-29 PROCEDURE — 6370000000 HC RX 637 (ALT 250 FOR IP): Performed by: STUDENT IN AN ORGANIZED HEALTH CARE EDUCATION/TRAINING PROGRAM

## 2021-12-29 PROCEDURE — 94003 VENT MGMT INPAT SUBQ DAY: CPT

## 2021-12-29 PROCEDURE — 94761 N-INVAS EAR/PLS OXIMETRY MLT: CPT

## 2021-12-29 PROCEDURE — 86403 PARTICLE AGGLUT ANTBDY SCRN: CPT

## 2021-12-29 PROCEDURE — 85025 COMPLETE CBC W/AUTO DIFF WBC: CPT

## 2021-12-29 PROCEDURE — 87205 SMEAR GRAM STAIN: CPT

## 2021-12-29 PROCEDURE — 2700000000 HC OXYGEN THERAPY PER DAY

## 2021-12-29 PROCEDURE — 87070 CULTURE OTHR SPECIMN AEROBIC: CPT

## 2021-12-29 PROCEDURE — 82947 ASSAY GLUCOSE BLOOD QUANT: CPT

## 2021-12-29 PROCEDURE — 81001 URINALYSIS AUTO W/SCOPE: CPT

## 2021-12-29 RX ADMIN — Medication 6 MG/HR: at 20:39

## 2021-12-29 RX ADMIN — SODIUM CHLORIDE, PRESERVATIVE FREE 10 ML: 5 INJECTION INTRAVENOUS at 09:59

## 2021-12-29 RX ADMIN — OXYCODONE HYDROCHLORIDE 10 MG: 5 SOLUTION ORAL at 21:44

## 2021-12-29 RX ADMIN — DEXMEDETOMIDINE HYDROCHLORIDE 0.6 MCG/KG/HR: 4 INJECTION, SOLUTION INTRAVENOUS at 17:53

## 2021-12-29 RX ADMIN — SODIUM CHLORIDE, PRESERVATIVE FREE 10 ML: 5 INJECTION INTRAVENOUS at 20:37

## 2021-12-29 RX ADMIN — ACETAMINOPHEN 650 MG: 160 SOLUTION ORAL at 12:21

## 2021-12-29 RX ADMIN — IBUPROFEN 600 MG: 200 SUSPENSION ORAL at 21:44

## 2021-12-29 RX ADMIN — CHLORDIAZEPOXIDE HYDROCHLORIDE 10 MG: 5 CAPSULE ORAL at 21:44

## 2021-12-29 RX ADMIN — Medication 2 MCG/MIN: at 11:11

## 2021-12-29 RX ADMIN — DEXMEDETOMIDINE HYDROCHLORIDE 0.6 MCG/KG/HR: 4 INJECTION, SOLUTION INTRAVENOUS at 01:53

## 2021-12-29 RX ADMIN — ACETAMINOPHEN 650 MG: 160 SOLUTION ORAL at 05:17

## 2021-12-29 RX ADMIN — Medication 2000 UNITS: at 09:58

## 2021-12-29 RX ADMIN — PROPOFOL 40 MCG/KG/MIN: 10 INJECTION, EMULSION INTRAVENOUS at 18:00

## 2021-12-29 RX ADMIN — CHLORDIAZEPOXIDE HYDROCHLORIDE 10 MG: 5 CAPSULE ORAL at 15:27

## 2021-12-29 RX ADMIN — MEROPENEM 1000 MG: 1 INJECTION, POWDER, FOR SOLUTION INTRAVENOUS at 11:25

## 2021-12-29 RX ADMIN — DOCUSATE SODIUM 100 MG: 50 LIQUID ORAL at 09:59

## 2021-12-29 RX ADMIN — SODIUM CHLORIDE, PRESERVATIVE FREE 10 ML: 5 INJECTION INTRAVENOUS at 20:42

## 2021-12-29 RX ADMIN — CHLORDIAZEPOXIDE HYDROCHLORIDE 10 MG: 5 CAPSULE ORAL at 09:58

## 2021-12-29 RX ADMIN — PROPOFOL 40 MCG/KG/MIN: 10 INJECTION, EMULSION INTRAVENOUS at 15:50

## 2021-12-29 RX ADMIN — INSULIN LISPRO 1 UNITS: 100 INJECTION, SOLUTION INTRAVENOUS; SUBCUTANEOUS at 18:20

## 2021-12-29 RX ADMIN — ACETAMINOPHEN 650 MG: 160 SOLUTION ORAL at 20:31

## 2021-12-29 RX ADMIN — IBUPROFEN 600 MG: 200 SUSPENSION ORAL at 15:22

## 2021-12-29 RX ADMIN — INSULIN GLARGINE 10 UNITS: 100 INJECTION, SOLUTION SUBCUTANEOUS at 20:37

## 2021-12-29 RX ADMIN — PROPOFOL 40 MCG/KG/MIN: 10 INJECTION, EMULSION INTRAVENOUS at 23:34

## 2021-12-29 RX ADMIN — Medication 150 MCG/HR: at 15:38

## 2021-12-29 RX ADMIN — MEROPENEM 1000 MG: 1 INJECTION, POWDER, FOR SOLUTION INTRAVENOUS at 18:22

## 2021-12-29 RX ADMIN — ACETAMINOPHEN 650 MG: 160 SOLUTION ORAL at 17:58

## 2021-12-29 RX ADMIN — Medication 150 MCG/HR: at 08:15

## 2021-12-29 RX ADMIN — Medication 6 MG/HR: at 04:49

## 2021-12-29 RX ADMIN — DEXMEDETOMIDINE HYDROCHLORIDE 0.6 MCG/KG/HR: 4 INJECTION, SOLUTION INTRAVENOUS at 07:39

## 2021-12-29 RX ADMIN — MEROPENEM 1000 MG: 1 INJECTION, POWDER, FOR SOLUTION INTRAVENOUS at 04:07

## 2021-12-29 RX ADMIN — FUROSEMIDE 40 MG: 10 INJECTION, SOLUTION INTRAMUSCULAR; INTRAVENOUS at 17:58

## 2021-12-29 RX ADMIN — ENOXAPARIN SODIUM 30 MG: 100 INJECTION SUBCUTANEOUS at 20:41

## 2021-12-29 RX ADMIN — DEXMEDETOMIDINE HYDROCHLORIDE 0.6 MCG/KG/HR: 4 INJECTION, SOLUTION INTRAVENOUS at 12:44

## 2021-12-29 RX ADMIN — PROPOFOL 25 MCG/KG/MIN: 10 INJECTION, EMULSION INTRAVENOUS at 00:20

## 2021-12-29 RX ADMIN — Medication 150 MCG/HR: at 20:39

## 2021-12-29 RX ADMIN — CISATRACURIUM BESYLATE 2 MCG/KG/MIN: 10 INJECTION, SOLUTION INTRAVENOUS at 20:38

## 2021-12-29 RX ADMIN — DEXAMETHASONE SODIUM PHOSPHATE 10 MG: 10 INJECTION INTRAMUSCULAR; INTRAVENOUS at 13:57

## 2021-12-29 RX ADMIN — PROPOFOL 25 MCG/KG/MIN: 10 INJECTION, EMULSION INTRAVENOUS at 05:55

## 2021-12-29 RX ADMIN — PROPOFOL 25 MCG/KG/MIN: 10 INJECTION, EMULSION INTRAVENOUS at 04:01

## 2021-12-29 RX ADMIN — OXYCODONE HYDROCHLORIDE 10 MG: 5 SOLUTION ORAL at 15:27

## 2021-12-29 RX ADMIN — CISATRACURIUM BESYLATE 2 MCG/KG/MIN: 10 INJECTION, SOLUTION INTRAVENOUS at 08:51

## 2021-12-29 RX ADMIN — FUROSEMIDE 40 MG: 10 INJECTION, SOLUTION INTRAMUSCULAR; INTRAVENOUS at 09:59

## 2021-12-29 RX ADMIN — CHLORDIAZEPOXIDE HYDROCHLORIDE 10 MG: 5 CAPSULE ORAL at 03:57

## 2021-12-29 RX ADMIN — OXYCODONE HYDROCHLORIDE 10 MG: 5 SOLUTION ORAL at 03:57

## 2021-12-29 RX ADMIN — ENOXAPARIN SODIUM 30 MG: 100 INJECTION SUBCUTANEOUS at 09:58

## 2021-12-29 RX ADMIN — OXYCODONE HYDROCHLORIDE 10 MG: 5 SOLUTION ORAL at 09:59

## 2021-12-29 RX ADMIN — PROPOFOL 30 MCG/KG/MIN: 10 INJECTION, EMULSION INTRAVENOUS at 12:02

## 2021-12-29 RX ADMIN — Medication 30 MG: at 09:59

## 2021-12-29 ASSESSMENT — PULMONARY FUNCTION TESTS
PIF_VALUE: 34
PIF_VALUE: 33
PIF_VALUE: 31
PIF_VALUE: 36
PIF_VALUE: 31
PIF_VALUE: 34

## 2021-12-29 NOTE — CARE COORDINATION
Pt intubated 60% FiO2, sedated, and paralyzed. Spoke to [facility] at [facility].  She received referral and will continue to follow

## 2021-12-29 NOTE — PROGRESS NOTES
[facility] - Progress Note   Note COVID 19 Patient  Today's Date and Time: 12/29/2021, 10:31 AM    Impression :     COVID 19 Confirmed Infection  Covid tests:  12/11/2021: Positive  Elevated inflammatory markers  Acute hypoxic respiratory failure  History of asthma  Diabetes mellitus  Essential hypertension  IRMA on CPAP  Patient has not received the Covid vaccination    Recommendations:   Antibiotic treatment:  The patient was having high-grade fevers and cultures have been sent.-No growth on cultures thus far  I will start the patient empirically on antibiotic therapy with cefepime on 12/18-fevers initially resolved but low grade fevers have resumed. Cefepime discontinued 12/28 and Meropenem initiated 12/28 for worsening leukocytosis and fevers     Covid Rx:    Remdesivir-out of window  Decadron-high-dose initiated  Actemra-ordered 12/12/2021  Monoclonal antibodies-out of window      Medical Decision Making/Summary/Discussion:12/29/2021     Patient admitted with COVID 19 infection  He may come out of isolation on 12/31/21    Infection Control Recommendations   [place] Precautions  Airborne isolation  Droplet Isolation    Antimicrobial Stewardship Recommendations     Discontinuation of therapy  Coordination of Outpatient Care:   Estimated Length of IV antimicrobials:TBD  Patient will need Midline Catheter Insertion: TBD  Patient will need PICC line Insertion: No  Patient will need: Home IV , [place],  SNF,  LTAC:TBD  Patient will need outpatient wound care:No    Chief complaint/reason for consultation:   Concern for COVID infection      History of Present Illness:   Lois Carreno is a 60y.o.-year-old male who was initially admitted on 12/12/2021.  Patient seen at the request of Dr. Lavon Sims:    Patient presented through [facility] ER on 12/11/2021 with complaints of worsening shortness of breath with associated generalized weakness and nonproductive cough over the past several days. He was exposed to his son who was diagnosed with Covid last week and has not received the Covid vaccine. The patient was very hypoxic with an SPO2 in the high 50s on room air. Imaging revealed bilateral pulmonary opacities typical of COVID-19    Abnormal labs include:    Ferritin 2800      Patient has been intubated and transferred to [facility]  He has been started on high-dose Decadron and Actemra has been ordered    CT CHEST PULMONARY EMBOLISM W CONTRAST 12/11/2021   Final Result   1. No evidence of pulmonary embolism   2. Diffuse ground-glass opacities throughout the lungs, typical of COVID-19   pulmonary disease.           XR CHEST (SINGLE VIEW FRONTAL) 12/11/2021   Final Result   Multifocal hazy opacities throughout both lungs consistent with COVID   pneumonia and or pulmonary edema       Patient admitted because of concerns with COVID 19.    CURRENT EVALUATION : 12/29/2021    TMAX 101.5  VS stable    The patient remains on mechanical ventilation with 75-->60 % FiO2 and PEEP of 12. He is receiving fentanyl, Versed, propofol and Precedex for sedation. Nimbex was reinitiated for increased ventilator asynchrony 12/27    Thick yellow nasal drainage present    Cefepime was initiated 12/18-discontinued 12/28  Meropenem initiated 12/28  Cultures remain negative     CRP is decreasing   CXR 12/24 is showing slight improvement    Patient exhibiting respiratory distress. yes  Respiratory secretions: no    Patient receiving supplemental oxygen.   Mechanical ventilation  RR: 24  02 sat:93    % FIO2: 90-->80-->85-->80-->65-->60->50-->75-->60  PEEP:   21-->16-->14-->12-->10->8-->12    QTc:       NEWS Score: 0-4 Low risk group; 5-6: Medium risk group; 7 or above: High risk group  Parameters 3 2 1 0 1 2 3   Age    < 65   = 65   RR = 8  9-11 12-20  21-24 = 25   O2 Sats = 91 92-93 94-95 = 96      Suppl O2  Yes  No      SBP = 90  101-110 111-219   = 220   HR = 40  41-50 51-90  111-130 = 131   Consciousness    Alert   Drowsiness, lethargy, or confusion   Temperature = 35.0 C (95.0 F)  35.1-36.0 C 95.1-96.9 F 36.1-38.0 C 97.0-100.4 F 38.1-39.0 C 100.5-102.3 F = 39.1 C = 102.4 F      NEWS Score:  12/12/2021: 13 high risk    Overall Daily Picture:     Unchanged    Presence of secondary bacterial Infection:    Cultures no growth  Additional antibiotics: 12/18 Cefepime for leukocytosis and fevers-discontinued 12/28 and Meropenem restarted    Labs, X rays reviewed: 12/29/2021    BUN:30  Cr:0.42    WBC:21.8-->18.9-->14.4->14.4-->17.3-->13.7  Hb: 14.5-->15.4-->13.6   Plat: 239-->254-->179    Absolute Neutrophils:3.73  Absolute Lymphocytes:0.29  Neutrophil/Lymphocyte Ratio: 12.8 high risk    CRP:293-->277-->137-->50.8-->23  Ferritin:2800  LDH: 838    Pro Calcitonin:      Cultures:  Urine:  12/18/21: No bacterial growth  Blood:  12/18/21: No growth thus far  Sputum :  12/18/21: Normal respiratory sherry  Wound:      CXR:     12/22/21 12/19/21      1221 diffuse bilateral infiltrates  CAT:      Discussed with patient, RN, CC, IM.      12-12-21:      I have personally reviewed the past medical history, past surgical history, medications, social history, and family history, and I have updated the database accordingly.   Past Medical History:     Past Medical History:   Diagnosis Date    Arthritis     Asthma     Diabetes mellitus ([address]     Edema     GERD (gastroesophageal reflux disease)     Hypertension     on lasix    Migraine     IRMA on CPAP     seldom use machine       Past Surgical  History:     Past Surgical History:   Procedure Laterality Date    APPENDECTOMY      ARTHROPLASTY Left 11/1/2019    LEFT FOOT 1ST MTPJ ARTHROPLASTY WITH PEREZ IMPLANT AND GROMMETS  [facility] performed by Sobia Price MD at [address] [place] [address] Right 12/12/2019    RIGHT FOOT ARTHROPLASTY 1ST MTPJ (Right Foot)    ARTHROPLASTY Right 12/12/2019    RIGHT FOOT ARTHROPLASTY 1ST MTPJ performed by Ramu Clemons MD at [address] Right    [address]  2014    no blockage no stents    CHOLECYSTECTOMY      COLONOSCOPY      ENDOSCOPY, COLON, DIAGNOSTIC      TOE SURGERY Left 11/01/2019     LEFT FOOT 1ST MTPJ ARTHROPLASTY WITH PEREZ IMPLANT AND GROMMETS  [facility] (Left )    TONSILLECTOMY         Medications:      meropenem  1,000 mg IntraVENous Q8H    furosemide  40 mg IntraVENous BID    chlordiazePOXIDE  10 mg Per NG tube 4x Daily    lidocaine 1 % injection  5 mL IntraDERmal Once    sodium chloride flush  5-40 mL IntraVENous 2 times per day    oxyCODONE  10 mg Oral Q6H    docusate  100 mg Per NG tube BID    insulin lispro  0-6 Units SubCUTAneous Q6H    lansoprazole  30 mg Oral QAM AC    insulin glargine  10 Units SubCUTAneous Nightly    sodium chloride flush  5-40 mL IntraVENous 2 times per day    enoxaparin  30 mg SubCUTAneous BID    Vitamin D  2,000 Units Oral Daily    dexamethasone  10 mg IntraVENous Q24H       Social History:     Social History     Socioeconomic History    Marital status:      Spouse name: Not on file    Number of children: Not on file    Years of education: Not on file    Highest education level: Not on file   Occupational History    Not on file   Tobacco Use    Smoking status: Former Smoker    Smokeless tobacco: Never Used   Vaping Use    Vaping Use: Never used   Substance and Sexual Activity    Alcohol use: Yes     Comment: every couple months    Drug use: Never    Sexual activity: Not on file   Other Topics Concern    Not on file   Social History Narrative    Not on file     Social Determinants of Health     Financial Resource Strain:     Difficulty of Paying Living Expenses: Not on file   Food Insecurity:     Worried About Running Out of Food in the Last Year: Not on file    Lucina of Food in the Last Year: Not on file   Transportation Needs:     Lack of Transportation (Medical): Not on file    Lack of Transportation (Non-Medical):  Not on file   Physical Activity:     Days of Exercise per Week: Not on file    Minutes of Exercise per Session: Not on file   Stress:     Feeling of Stress : Not on file   Social Connections:     Frequency of Communication with Friends and Family: Not on file    Frequency of Social Gatherings with Friends and Family: Not on file    Attends Holiness Services: Not on file    Active Member of [address] [place] [address] [employer] or Organizations: Not on file    Attends Club or Organization Meetings: Not on file    Marital Status: Not on file   Intimate Partner Violence:     Fear of Current or Ex-Partner: Not on file    Emotionally Abused: Not on file    Physically Abused: Not on file    Sexually Abused: Not on file   Housing Stability:     Unable to Pay for Housing in the Last Year: Not on file    Number of [place] in the Last Year: Not on file    Unstable Housing in the Last Year: Not on file       Family History:     Family History   Problem Relation Age of Onset    Heart Attack Sister 32    Diabetes Paternal Grandmother     Heart Disease Paternal Uncle     Heart Disease Paternal Uncle     Heart Disease Paternal Uncle     Cancer Maternal Aunt     Cancer Maternal Aunt     Cancer Maternal Uncle     Cancer Maternal Uncle         Allergies:   Nuts [peanut-containing drug products] and Sunflower oil     Review of Systems:     Unable to assess 12/29/2021  Intubated and sedated      Physical Examination :     Patient Vitals for the past 8 hrs:   BP Temp Temp src Pulse Resp SpO2   12/29/21 0945 -- -- -- 106 23 --   12/29/21 0930 -- -- -- 109 24 --   12/29/21 0915 -- -- -- 113 24 --   12/29/21 0904 -- -- -- 119 24 93 %   12/29/21 0700 127/78 -- -- 95 27 94 %   12/29/21 0600 97/68 -- -- 67 24 95 %   12/29/21 0500 90/69 -- -- 67 24 95 %   12/29/21 0400 95/70 101.5 °F (38.6 °C) Bladder 67 24 95 %   12/29/21 0330 -- -- -- -- 24 96 %   12/29/21 0329 -- -- -- 67 24 95 %   12/29/21 0300 91/67 -- -- 68 24 96 %     General Appearance: Intubated and sedated  Head:  Normocephalic, no trauma  ENT: Not well evaluated as the patient is orally intubated  Neck:Supple, without lymphadenopathy. Thyroid normal, No bruits. Pulmonary/Chest: Diminished to auscultation, without wheezes, rales, or rhonchi. No dullness to percussion. Cardiovascular: Regular rate and rhythm without murmurs, rubs, or gallops. Abdomen: Soft, non tender. Bowel sounds normal. No organomegaly  All four Extremities: No cyanosis, clubbing, edema, or effusions. Neurologic: Intubated and sedated and paralyzed  Skin: Warm and dry with good turgor. No signs of peripheral arterial or venous insufficiency. No ulcerations. No open wounds. Medical Decision Making -Laboratory:   I have independently reviewed/ordered the following labs:    CBC with Differential:   Recent Labs     12/28/21 0529 12/29/21  0511   WBC 17.3* 13.7*   HGB 15.4 13.6   HCT 47.0 41.8    179   LYMPHOPCT 7* 7*   MONOPCT 9 8     BMP:   Recent Labs     12/28/21  0529 12/29/21  0511    141   K 3.3* 4.3    104   CO2 27 27   BUN 24* 30*   CREATININE 0.36* 0.42*   MG 2.2  --      Hepatic Function Panel:   Recent Labs     12/28/21  0529 12/29/21  0511   PROT 6.0* 5.4*   LABALBU 3.5 3.1*   BILIDIR 0.14 0.12   IBILI 0.26 0.26   BILITOT 0.40 0.38   ALKPHOS 58 54   ALT 71* 54*   AST 22 15     No results for input(s): RPR in the last 72 hours. No results for input(s): HIV in the last 72 hours. No results for input(s): BC in the last 72 hours.   Lab Results   Component Value Date    MUCUS NOT REPORTED 12/18/2021    RBC 4.35 12/29/2021    TRICHOMONAS NOT REPORTED 12/18/2021    WBC 13.7 12/29/2021    YEAST NOT REPORTED 12/18/2021    TURBIDITY Clear 12/18/2021     Lab Results   Component Value Date    CREATININE 0.42 12/29/2021    GLUCOSE 128 12/29/2021       Medical Decision Making-Imaging:     Narrative   EXAMINATION:   CTA OF THE CHEST 12/11/2021 11:31 am       TECHNIQUE:   CTA of the chest was performed after the administration of intravenous   contrast.  Multiplanar reformatted images are provided for review.  MIP   images are provided for review. Dose modulation, iterative reconstruction,   and/or weight based adjustment of the mA/kV was utilized to reduce the   radiation dose to as low as reasonably achievable.       COMPARISON:   Chest x-ray dated December 11, 2021       HISTORY:   ORDERING SYSTEM PROVIDED HISTORY: hypoxemia, covid positive, d-dimer-0.99   TECHNOLOGIST PROVIDED HISTORY:   hypoxemia, covid positive, d-dimer-0.99   Decision Support Exception - unselect if not a suspected or confirmed   emergency medical condition->Emergency Medical Condition (MA)   Reason for Exam: COVID positive, elevated D-Dimer       FINDINGS:   Pulmonary Arteries: Pulmonary arteries are adequately opacified for   evaluation.  No evidence of intraluminal filling defect to suggest pulmonary   embolism.  Main pulmonary artery is normal in caliber.       Mediastinum: Nonspecific mediastinal and hilar lymph nodes are present,   likely reactive. .  The heart and pericardium demonstrate no acute   abnormality.  There is no acute abnormality of the thoracic aorta.       Lungs/pleura: Diffuse ground-glass opacification is present throughout the   lungs, typical of COVID-19 pulmonary disease.  No pleural effusion or   pneumothorax is present.       Upper Abdomen: Images through the upper abdomen demonstrate a small hiatal   hernia.  Diffuse hepatic steatosis is present.  The gallbladder is surgically   absent.       Soft Tissues/Bones: No acute bone or soft tissue abnormality.           Impression   1. No evidence of pulmonary embolism   2.  Diffuse ground-glass opacities throughout the lungs, typical of COVID-19   pulmonary disease.         Narrative   EXAMINATION:   ONE XRAY VIEW OF THE CHEST       12/11/2021 3:54 pm       COMPARISON:   November 13, 2012     HISTORY:   ORDERING SYSTEM PROVIDED HISTORY: hypoxemia, probable covid pneumonia   TECHNOLOGIST PROVIDED HISTORY:   hypoxemia, probable covid pneumonia       FINDINGS:   Multifocal hazy opacities throughout both lungs would be consistent with   history of COVID pneumonia.  Pulmonary edema could have a similar appearance. No pneumothorax or pleural effusion.  Cardiac size enlarged.  Mediastinum   unremarkable.  No acute osseous abnormality.           Impression   Multifocal hazy opacities throughout both lungs consistent with COVID   pneumonia and or pulmonary edema.               Medical Decision Kfhzrs-Polormy[facility]-Wqfha:     Culture, Respiratory [[id number]] (Abnormal) Collected: 12/18/21 1143   Order Status: Completed Specimen: Endotracheal Updated: 12/18/21 1439    Specimen Description . ENDOTRACHEAL    Special Requests NOT REPORTED    Direct Exam >25 NEUTROPHILS/LPF     < 10 EPITHELIAL CELLS/LPF     MIXED BACTERIAL MORPHOTYPES SEEN ON GRAM STAIN.  Abnormal     Culture PENDING   Culture, Blood 1 [[id number]] Collected: 12/18/21 1234   Order Status: Completed Specimen: Blood Updated: 12/18/21 1358    Specimen Description . BLOOD    Special Requests NOT REPORTED    Culture NO GROWTH <24 HRS   Culture, Blood 1 [[id number]]    Order Status: Sent Specimen: Blood    Culture, Urine [[id number]] Collected: 12/14/21 0043   Order Status: Completed Specimen: Urine, straight catheter Updated: 12/14/21 1934    Specimen Description . URINE,STRAIGHT CATHETER    Special Requests NOT REPORTED    Culture NO GROWTH   MRSA DNA Probe, Nasal [[id number]] Collected: 12/12/21 1245   Order Status: Completed Specimen: Nasal Updated: 12/13/21 1052    Specimen Description . NASAL SWAB    MRSA, DNA, Nasal NEGATIVE:  MRSA DNA not detected by nucleic acid amplification.     Comment:                                                    Results should be used as an adjunct to nosocomial control efforts to identify patients   needing enhanced precautions.     The test is not intended to identify patients with staphylococcal infections.  Results   should not be used to guide or monitor treatment for MRSA infections. Medical Decision Making-Other:     Note:  Labs, medications, radiologic studies were reviewed with personal review of films  Large amounts of data were reviewed  Discussed with nursing Staff, Discharge planner  Infection Control and Prevention measures reviewed  All prior entries were reviewed  Administer medications as ordered  Prognosis: Guarded  Discharge planning reviewed      Thank you for allowing us to participate in the care of this patient. Please call with questions. Electronically signed by SHO Gonzalez - CNP on 12/29/2021 at 10:31 AM       ATTESTATION:    I have discussed the case, including pertinent history and exam findings with the APRN. I have evaluated the  History, physical findings and pictures of the patient and the key elements of the encounter have been performed by me. I have reviewed the laboratory data, other diagnostic studies and discussed them with the APRN. I have updated the medical record where necessary. I agree with the assessment, plan and orders as documented by the APRN.     Yolanda Loyd MD.

## 2021-12-29 NOTE — PROGRESS NOTES
[facility]  Office: [address], DO, Tish Eagles, DO, Amalia Antis, DO, Denisa Blood, DO, Maynor Winters MD, Kunal Alcaraz MD, Ibis Borrero MD, Alison Oreilly MD, Yasmani Youssef MD, Valeri Malhotra MD, Tobi Jamison MD, Daniel Llanos, DO, Becca Adler, DO, Netta Gonzalez MD,  Ainsley Adair DO, Ginger Kern MD, Frieda Garcia MD, Lupe Lima MD, Von Dela Cruz MD, Magali Chery MD, Renae Engle MD, Adela Pal MD, Nilson Isbell [facility], [facility], [facility], Brenda Winchester, [facility], Flo Amador, CNS, Tawanna Richey, CNP, Len Yanes, CNP, Jamel Harper, CNP, Roderick Bautista, CNP, Viki Forte, CNP, Crystal Harrington PA-C, Artemio Primrose, DNP, Mani Rosen, [facility], Sarah Davey, CNP, Leydi Amin, CNP, Frieda Gonzalez, CNP, Massiel Easton, [facility], Lazarus Mais, CNP, Shobha Michelle, [facility]      Daily Progress Note     Admit Date: 12/12/2021  Bed/Room No.  3026/3026-01  Admitting Physician : Ibis Borrero MD  Code Status :Full Code  Hospital Day:  LOS: 17 days   Chief Complaint:     Breathing difficulty. Principal Problem:    Pneumonia due to COVID-19 virus  Active Problems:    Shock ([facility] [address]    Acute respiratory failure with hypoxia (HCC)    Type 2 diabetes mellitus (HCC)    Asthma    IRMA on CPAP    Hypertension    GERD (gastroesophageal reflux disease)    ARDS (adult respiratory distress syndrome) ([facility] [address]  Resolved Problems:    * No resolved hospital problems. *    Subjective : Interval History/Significant events :  12/29/21  Unchanged  Patient continues to have intermittent fevers T-max 101.7. He remains on propofol, fentanyl, Precedex. Patient is on 50% FiO2, PEEP of 12. Urine output is fair. Vitals - Unstable afebrile  Labs -hypokalemia 3.3, glucose 115, leukocytosis 17.3. Chest x-ray 12/24/2021-bilateral opacities. Nursing notes , Consults notes reviewed. Overnight events and updates discussed with Nursing staff .    Background History: Rj Ponce is 60 y.o. male  Who was admitted to the hospital on 12/12/2021 for treatment of Pneumonia due to COVID-19 virus. Patient initially presented with shortness of breath at [facility].  He had to suggest positive for COVID-19 infection. Patient reported that his son had also COVID-19 infection. Patient was hypoxic in 50s on arrival and was treated with BiPAP however breathing worsened and patient was subsequently intubated. Patient was transferred to [facility] on 12/12/2021 as he was requiring high ventilator support 100% FiO2 with PEEP of 22. He was given Actemra and started on high-dose Decadron. Patient was started with he was started on Flolan and given paralytics for proning per ARDS protocol. Patient also received intermittent Lasix. Proning was stopped on 12/20/2021 and paralytics were stopped on 12/22/2021. Patient started to have fevers and was started on cefepime. Respiratory cultures were negative. Patient to remain in isolation till 12/31/2021. PMH:  Past Medical History:   Diagnosis Date    Arthritis     Asthma     Diabetes mellitus ([facility] [address]     Edema     GERD (gastroesophageal reflux disease)     Hypertension     on lasix    Migraine     IRMA on CPAP     seldom use machine      Allergies:    Allergies   Allergen Reactions    Nuts [Peanut-Containing Drug Products] Anaphylaxis    Sunflower Oil Anaphylaxis     Sunflower seeds      Medications :  meropenem, 1,000 mg, IntraVENous, Q8H  furosemide, 40 mg, IntraVENous, BID  chlordiazePOXIDE, 10 mg, Per NG tube, 4x Daily  lidocaine 1 % injection, 5 mL, IntraDERmal, Once  sodium chloride flush, 5-40 mL, IntraVENous, 2 times per day  oxyCODONE, 10 mg, Oral, Q6H  docusate, 100 mg, Per NG tube, BID  insulin lispro, 0-6 Units, SubCUTAneous, Q6H  lansoprazole, 30 mg, Oral, QAM AC  insulin glargine, 10 Units, SubCUTAneous, Nightly  sodium chloride flush, 5-40 mL, IntraVENous, 2 times per day  enoxaparin, 30 mg, SubCUTAneous, BID  Vitamin D, 2,000 Units, Oral, Daily  dexamethasone, 10 mg, IntraVENous, Q24H        Review of Systems   Review of Systems   Unable to perform ROS: Intubated     Objective :      Current Vitals : Temp: 100.8 °F (38.2 °C),  Pulse: 67, Resp: 24, BP: 97/68, SpO2: 95 %  Last 24 Hrs Vitals   Patient Vitals for the past 24 hrs:   BP Temp Temp src Pulse Resp SpO2   12/29/21 0600 97/68 -- -- 67 24 95 %   12/29/21 0500 90/69 -- -- 67 24 95 %   12/29/21 0400 95/70 -- -- 67 24 95 %   12/29/21 0330 -- -- -- -- 24 96 %   12/29/21 0329 -- -- -- 67 24 95 %   12/29/21 0300 91/67 -- -- 68 24 96 %   12/29/21 0200 90/66 -- -- 67 24 96 %   12/29/21 0100 86/67 -- -- 67 24 97 %   12/29/21 0000 83/61 100.8 °F (38.2 °C) Bladder 66 24 95 %   12/28/21 2341 -- -- -- 66 24 97 %   12/28/21 2300 86/61 -- -- 73 24 94 %   12/28/21 2200 117/73 -- -- 101 24 93 %   12/28/21 2100 (!) 142/79 -- -- 110 24 94 %   12/28/21 2020 -- -- -- 82 24 97 %   12/28/21 2000 91/65 -- -- 71 24 96 %   12/28/21 1900 85/61 100.6 °F (38.1 °C) Bladder 67 23 95 %   12/28/21 1700 98/78 100 °F (37.8 °C) Bladder 93 24 92 %   12/28/21 1645 -- -- -- 99 24 92 %   12/28/21 1630 -- -- -- 105 24 93 %   12/28/21 1615 -- -- -- 110 24 92 %   12/28/21 1600 122/88 99.7 °F (37.6 °C) Bladder 107 24 92 %   12/28/21 1515 -- -- -- 111 24 91 %   12/28/21 1500 132/78 -- -- 118 24 91 %   12/28/21 1445 -- -- -- 121 24 92 %   12/28/21 1432 -- -- -- 116 24 92 %   12/28/21 1430 -- -- -- 116 24 93 %   12/28/21 1415 -- -- -- 114 24 92 %   12/28/21 1400 129/81 101 °F (38.3 °C) Bladder 115 24 93 %   12/28/21 1345 -- -- -- 106 24 94 %   12/28/21 1330 -- -- -- 97 24 94 %   12/28/21 1315 -- -- -- 91 24 94 %   12/28/21 1300 96/83 -- -- 87 24 94 %   12/28/21 1245 -- -- -- 86 24 93 %   12/28/21 1230 -- -- -- 85 24 93 %   12/28/21 1215 -- -- -- 85 24 93 %   12/28/21 1200 90/66 100.8 °F (38.2 °C) Bladder 86 24 93 %   12/28/21 1145 -- -- -- 86 24 93 %   12/28/21 1130 -- -- -- 86 24 93 % 12/28/21 1115 -- -- -- 85 24 93 %   12/28/21 1100 86/70 -- -- 86 24 92 %   12/28/21 1045 -- -- -- 87 24 93 %   12/28/21 1030 -- -- -- 88 24 93 %   12/28/21 1015 -- -- -- 89 24 92 %   12/28/21 1000 90/69 99.7 °F (37.6 °C) Bladder 90 24 92 %   12/28/21 0945 -- -- -- 91 24 91 %   12/28/21 0930 -- -- -- 92 24 91 %   12/28/21 0915 -- -- -- 93 24 92 %   12/28/21 0900 95/73 -- -- 92 24 92 %   12/28/21 0845 -- -- -- 93 24 92 %   12/28/21 0830 -- -- -- 93 24 93 %   12/28/21 0815 -- -- -- 92 24 93 %   12/28/21 0800 (!) 106/90 100.2 °F (37.9 °C) Bladder 92 20 96 %   12/28/21 0759 -- -- -- 91 20 96 %   12/28/21 0756 -- -- -- -- 20 95 %   12/28/21 0745 -- -- -- 87 20 90 %   12/28/21 0730 -- -- -- 85 21 91 %   12/28/21 0715 -- -- -- 91 27 91 %     Intake / output   12/28 0701 - 12/29 0700  In: 3205.4 [I.V.:1608.4]  Out: 2545 [Urine:2345]  Physical Exam:  Physical Exam  Vitals and nursing note reviewed. Constitutional:       Appearance: He is well-developed. He is ill-appearing. Interventions: He is sedated and intubated. Neck:      Thyroid: No thyroid mass. Cardiovascular:      Rate and Rhythm: Normal rate and regular rhythm. Pulses: Normal pulses. Heart sounds: Normal heart sounds. No murmur heard. Pulmonary:      Effort: He is intubated. Breath sounds: Normal breath sounds. Musculoskeletal:      Right lower leg: No edema. Left lower leg: No edema. Lymphadenopathy:      Cervical: No cervical adenopathy. Skin:     Capillary Refill: Capillary refill takes less than 2 seconds. Neurological:      Motor: No tremor or abnormal muscle tone.            Laboratory findings:    Recent Labs     12/27/21  0330 12/28/21  0529 12/29/21  0511   WBC 14.4* 17.3* 13.7*   HGB 14.5 15.4 13.6   HCT 43.7 47.0 41.8    254 179     Recent Labs     12/27/21  0330 12/28/21  0529 12/29/21  0511    141 141   K 3.9 3.3* 4.3    102 104   CO2 28 27 27   GLUCOSE 129* 115* 128*   BUN 27* 24* 30* CREATININE 0.30* 0.36* 0.42*   MG  --  2.2  --    CALCIUM 9.1 9.1 9.1     Recent Labs     12/27/21  0330 12/28/21  0529 12/29/21  0511   PROT 6.1* 6.0* 5.4*   LABALBU 3.4* 3.5 3.1*   AST 40* 22 15   ALT 94* 71* 54*   ALKPHOS 56 58 54   BILITOT 0.42 0.40 0.38   BILIDIR 0.11 0.14 0.12          Specific Gravity, UA   Date Value Ref Range Status   12/18/2021 1.032 (H) 1.005 - 1.030 Final     Protein, UA   Date Value Ref Range Status   12/18/2021 1+ (A) NEGATIVE Final     RBC, UA   Date Value Ref Range Status   12/18/2021 20 TO 50 0 - 4 /HPF Final     Comment:     Reference range defined for non-centrifuged specimen. Bacteria, UA   Date Value Ref Range Status   12/18/2021 NOT REPORTED None Final     Nitrite, Urine   Date Value Ref Range Status   12/18/2021 NEGATIVE NEGATIVE Final     WBC, UA   Date Value Ref Range Status   12/18/2021 5 TO 10 0 - 5 /HPF Final     Leukocyte Esterase, Urine   Date Value Ref Range Status   12/18/2021 NEGATIVE NEGATIVE Final       Imaging / Clinical Data :-   XR CHEST (SINGLE VIEW FRONTAL)    Result Date: 12/22/2021  Stable mild cardiomegaly. Patchy airspace opacities, left more than right, may be related to pulmonary edema versus pneumonia. Nonspecific bowel gas pattern. XR ABDOMEN (KUB) (SINGLE AP VIEW)    Result Date: 12/22/2021  Stable mild cardiomegaly. Patchy airspace opacities, left more than right, may be related to pulmonary edema versus pneumonia. Nonspecific bowel gas pattern. XR CHEST PORTABLE    Result Date: 12/24/2021  1. Stable cardiomegaly. 2.  Patchy airspace opacities, stable in the right lower chest, slightly improved on the left. Findings may be related to pulmonary edema or improving pneumonia. 3.  Support tubes and catheters in good position. XR CHEST PORTABLE    Result Date: 12/19/2021  Cardiomegaly, vascular congestion and worsening pulmonary opacities with bilateral effusions favoring pulmonary edema over multifocal airspace disease.   Support tubes and lines as above. Clinical Course : unchanged  Assessment and Plan  :        Acute respiratory failure with hypoxia due to ARDS from COVID-19 pneumonia -ventilator support, high-dose Decadron, s/p Actemra on 12/12/2021. Antibiotics per ID. Treated with 10 days of  cefepime  12/18/2021 - 12/28/21, meropenem started on 12/28/2021 due to high fevers. ID following. Type 2 diabetes mellitus - on Lantus, blood sugar controlled. Continue tube feed. Obesity BMI 39  Essential hypertension -  Stable , off levophed. .  IRMA -was noncompliant with CPAP at home     I spoke with patient's daughter Angella Dejesus and updated her about patient's condition. Continue DVT prophylaxis, GI prophylaxis. Patient to remain in isolation till 12/31/2021. Continue to monitor vitals , Intake / output ,  Cell count , HGB , Kidney function, oxygenation  as indicated .      Plan discussed with Staff  angeline TERAN     (Please note that portions of this note were completed with a voice recognition program. Efforts were made to edit the dictations but occasionally words are mis-transcribed.)      Sandra Castillo MD  12/29/2021

## 2021-12-29 NOTE — PLAN OF CARE
Problem: OXYGENATION/RESPIRATORY FUNCTION  Goal: Patient will maintain patent airway  12/29/2021 0908 by Gabriel Caban RCP  Outcome: Ongoing     Problem: OXYGENATION/RESPIRATORY FUNCTION  Goal: Patient will achieve/maintain normal respiratory rate/effort  Description: Respiratory rate and effort will be within normal limits for the patient  12/29/2021 0908 by Gabriel Caban RCP  Outcome: Ongoing     Problem: MECHANICAL VENTILATION  Goal: Patient will maintain patent airway  12/29/2021 0908 by Gabriel Caban RCP  Outcome: Ongoing     Problem: MECHANICAL VENTILATION  Goal: Oral health is maintained or improved  12/29/2021 0908 by Gabriel Caban RCP  Outcome: Ongoing     Problem: MECHANICAL VENTILATION  Goal: ET tube will be managed safely  12/29/2021 0908 by Gabriel Caban RCP  Outcome: Ongoing     Problem: MECHANICAL VENTILATION  Goal: Ability to express needs and understand communication  12/29/2021 0908 by Gabriel Caban RCP  Outcome: Ongoing     Problem: MECHANICAL VENTILATION  Goal: Mobility/activity is maintained at optimum level for patient  12/29/2021 0908 by Gabriel Caban RCP  Outcome: Ongoing     Problem: SKIN INTEGRITY  Goal: Skin integrity is maintained or improved  12/29/2021 0908 by Gabriel Caban RCP  Outcome: Ongoing

## 2021-12-29 NOTE — PLAN OF CARE
Ongoing  12/28/2021 0756 by Ti Vega RCP  Outcome: Ongoing     Problem: MECHANICAL VENTILATION  Goal: Patient will maintain patent airway  12/28/2021 1955 by Stefano Manuel RN  Outcome: Ongoing  12/28/2021 0756 by Ti Vega RCP  Outcome: Ongoing  Goal: Oral health is maintained or improved  12/28/2021 1955 by Stefano Manuel RN  Outcome: Ongoing  12/28/2021 0756 by Ti Vega RCP  Outcome: Ongoing  Goal: ET tube will be managed safely  12/28/2021 1955 by Stefano Manuel RN  Outcome: Ongoing  12/28/2021 0756 by Ti Vega RCP  Outcome: Ongoing  Goal: Ability to express needs and understand communication  12/28/2021 1955 by Stefano Manuel RN  Outcome: Ongoing  12/28/2021 0756 by Ti Vega RCP  Outcome: Ongoing  Goal: Mobility/activity is maintained at optimum level for patient  12/28/2021 1955 by Stefano Manuel RN  Outcome: Ongoing  12/28/2021 0756 by Ti Vega RCP  Outcome: Ongoing     Problem: SKIN INTEGRITY  Goal: Skin integrity is maintained or improved  12/28/2021 1955 by Stefano Manuel RN  Outcome: Ongoing  12/28/2021 0756 by Ti Vega RCP  Outcome: Ongoing     Problem: Skin Integrity:  Goal: Will show no infection signs and symptoms  Description: Will show no infection signs and symptoms  Outcome: Ongoing  Goal: Absence of new skin breakdown  Description: Absence of new skin breakdown  Outcome: Ongoing     Problem: Falls - Risk of:  Goal: Will remain free from falls  Description: Will remain free from falls  Outcome: Ongoing  Goal: Absence of physical injury  Description: Absence of physical injury  Outcome: Ongoing     Problem: Nutrition  Goal: Optimal nutrition therapy  Outcome: Ongoing     Problem: Discharge Planning:  Goal: Participates in care planning  Description: Participates in care planning  Outcome: Ongoing  Goal: Discharged to appropriate level of care  Description: Discharged to appropriate level of care  Outcome: Ongoing

## 2021-12-29 NOTE — PROGRESS NOTES
Pulmonary Critical Care   Progress Note    Patient - Elmer Cedillo  Date of Admission -  2021 11:39 AM  Date of Evaluation -  2021  Room and Bed Number -  3026/3026-01   Hospital Day - 16    CHIEF COMPLAINT : ACUTE HYPOXIC RESPIRATORY FAILURE DUE TO COVID -19 PNEUMONIA   HPI:   Elmer Cedillo  60 y.o. male  admitted for COVID-19 at [facility] ER due to worsening oxygenation he was initially started on BiPAP and subsequently intubated. On room air his saturations had been 50%. CTA no PE but bilateral pulmonary infiltrates. He was accepted at [address] [place] [address] ICU. On arrival he is on PEEP of 22, 100% FiO2.    2021   Subjective   . Back on cis-atacarium last night because of patient ventilator dyssynchrony. High dose sedation with fentanyl, propofol, midazolam, and dexmedetomidine. Does not tolerate being off dexmedetomidine despite multiple other sedative hypnotics. Laboratory studies reasonable. I/O+ 3.6 L. Would diurese.       SECRETIONS  -Amount:  [x] Small [] Moderate  [] Large    [] None  Color:     [x] White [] Colored  [] Bloody    SEDATION:    As above    PARALYZED:  [x] No    [] Yes    VASOPRESSORS:  [] No    [x] Yes  [x] Levophed [] Dopamine [] Vasopressin  [] Dobutamine [] Phenylephrine [] Epinephrine    INHALED veletri  : [] No    [] Yes    PRONE :       [x] No    [] Yes    Actemra:             [] No    [x] Yes  21  DEXAMETHASONE : [] No    [x] Yes      Ros unable to perform due to intubation and sedation    OBJECTIVE:     VITAL SIGNS:  BP 86/61   Pulse 73   Temp 100.6 °F (38.1 °C) (Bladder)   Resp 24   Ht 6' 2\" (1.88 m)   Wt 284 lb 6.3 oz (129 kg)   SpO2 94%   BMI 36.51 kg/m²   Tmax over 24 hours:  Temp (24hrs), Av.2 °F (37.9 °C), Min:99.7 °F (37.6 °C), Max:101 °F (38.3 °C)      Patient Vitals for the past 8 hrs:   BP Temp Temp src Pulse Resp SpO2   21 2300 86/61 -- -- 73 24 94 %   21 2200 117/73 -- -- 101 24 93 %   21 (!) 142/79 -- -- 110 24 94 %   12/28/21 2020 -- -- -- 82 24 97 %   12/28/21 2000 91/65 -- -- 71 24 96 %   12/28/21 1900 85/61 100.6 °F (38.1 °C) Bladder 67 23 95 %   12/28/21 1700 98/78 100 °F (37.8 °C) Bladder 93 24 92 %   12/28/21 1645 -- -- -- 99 24 92 %   12/28/21 1630 -- -- -- 105 24 93 %   12/28/21 1615 -- -- -- 110 24 92 %   12/28/21 1600 122/88 99.7 °F (37.6 °C) Bladder 107 24 92 %   12/28/21 1515 -- -- -- 111 24 91 %         Intake/Output Summary (Last 24 hours) at 12/28/2021 2306  Last data filed at 12/28/2021 2200  Gross per 24 hour   Intake 3783.4 ml   Output 2710 ml   Net 1073.4 ml     Date 12/28/21 0000 - 12/28/21 2359   Shift 0971-5887 9726-1208 9249-4347 24 Hour Total   INTAKE   I.V.(mL/kg) 913(7.1) 485.7(3.8) 302.7(2.3) 1701.4(13.2)   NG/GT(mL/kg) 610(4.7) 860(6.7) 512(4) 9607(98.0)   IV Piggyback(mL/kg) 100(0.8)   100(0.8)   Shift Total(mL/kg) 2114(56.5) 1345. 7(10.4) 814. 7(6.3) 3783.4(29.3)   OUTPUT   Urine(mL/kg/hr) 415(0.4) 1050(1) 745 2210   Stool(mL/kg) 350(2.7)  150(1.2) 500(3.9)   Shift Total(mL/kg) 765(5.9) 1050(8.1) 895(6.9) 2710(21)   Weight (kg) 129 129 129 129     Wt Readings from Last 3 Encounters:   12/28/21 284 lb 6.3 oz (129 kg)   12/11/21 300 lb (136.1 kg)   12/12/19 (!) 355 lb 9.6 oz (161.3 kg)     Body mass index is 36.51 kg/m². PHYSICAL EXAM:      I have discussed the care of Naomi Oakley, including pertinent history and exam findings, with the resident/APC/staff. I have seen the patient and the key elements of all parts of the encounter have been performed by me. Physical exam was deferred to limit physical exposure and PPE resources. I reviewed the interval history, interpreted all available radiographic, laboratory and physiologic data at the time of service. I agree with the assessment and plan as documented by resident/APC/ ancillary staff.   Patient remains on the ventilator  Trachea is in the midline  No subcutaneous air  No JVD  No rhonchi  Abdomen is not distended  No edema    MEDICATIONS:  Scheduled Meds:   meropenem  1,000 mg IntraVENous Q8H    chlordiazePOXIDE  10 mg Per NG tube 4x Daily    lidocaine 1 % injection  5 mL IntraDERmal Once    sodium chloride flush  5-40 mL IntraVENous 2 times per day    oxyCODONE  10 mg Oral Q6H    docusate  100 mg Per NG tube BID    insulin lispro  0-6 Units SubCUTAneous Q6H    lansoprazole  30 mg Oral QAM AC    furosemide  40 mg IntraVENous Daily    insulin glargine  10 Units SubCUTAneous Nightly    sodium chloride flush  5-40 mL IntraVENous 2 times per day    enoxaparin  30 mg SubCUTAneous BID    Vitamin D  2,000 Units Oral Daily    dexamethasone  10 mg IntraVENous Q24H     Continuous Infusions:   cisatracurium (NIMBEX) infusion 2 mcg/kg/min (12/28/21 2050)    dexmedetomidine 0.7 mcg/kg/hr (12/28/21 2145)    propofol 30 mcg/kg/min (12/28/21 2147)    sodium chloride      sodium chloride      dextrose      norepinephrine Stopped (12/26/21 2000)    midazolam 7 mg/hr (12/28/21 1320)    fentaNYL 150 mcg/hr (12/28/21 2051)    dextrose      sodium chloride 10 mL/hr at 12/13/21 1548     PRN Meds:   sodium chloride flush, 5-40 mL, PRN  sodium chloride, 25 mL, PRN  metoprolol, 5 mg, Q6H PRN  polyethylene glycol, 17 g, BID PRN  senna, 5 mL, Nightly PRN  bisacodyl, 10 mg, Daily PRN  acetaminophen, 650 mg, Q4H PRN  sodium chloride flush, 5-40 mL, PRN  sodium chloride, 25 mL, PRN  ondansetron, 4 mg, Q8H PRN   Or  ondansetron, 4 mg, Q6H PRN  acetaminophen, 650 mg, Q6H PRN  glucose, 15 g, PRN  dextrose, 12.5 g, PRN  glucagon (rDNA), 1 mg, PRN  dextrose, 100 mL/hr, PRN  glucose, 15 g, PRN  dextrose, 12.5 g, PRN  glucagon (rDNA), 1 mg, PRN  dextrose, 100 mL/hr, PRN        SUPPORT DEVICES: [x] Ventilator [] BIPAP  [] Nasal Cannula [] Room Air      ABGs:     Lab Results   Component Value Date    MBL0SNB NOT REPORTED 12/28/2021    FIO2 60.0 12/28/2021       DATA:  Complete Blood Count:   Recent Labs     12/26/21  0417 12/27/21  0334 12/28/21  0529   WBC 14.4* 14.4* 17.3*   RBC 4.37 4.64 4.98   HGB 13.6 14.5 15.4   HCT 41.0 43.7 47.0   MCV 93.8 94.2 94.4   MCH 31.1 31.3 30.9   MCHC 33.2 33.2 32.8   RDW 13.7 13.6 14.4    239 254   MPV 11.7 11.9 11.5        Last 3 Blood Glucose:   Recent Labs     12/26/21  0417 12/27/21  0330 12/28/21  0529   GLUCOSE 121* 129* 115*        PT/INR:    Lab Results   Component Value Date    PROTIME 13.0 12/12/2021    INR 1.0 12/12/2021     PTT:    Lab Results   Component Value Date    APTT 39.1 12/12/2021       Comprehensive Metabolic Profile:   Recent Labs     12/26/21 0417 12/27/21  0330 12/28/21  0529    140 141   K 3.9 3.9 3.3*    102 102   CO2 29 28 27   BUN 28* 27* 24*   CREATININE 0.38* 0.30* 0.36*   GLUCOSE 121* 129* 115*   CALCIUM 8.9 9.1 9.1   PROT 5.6* 6.1* 6.0*   LABALBU 3.4* 3.4* 3.5   BILITOT 0.40 0.42 0.40   ALKPHOS 50 56 58   AST 22 40* 22   ALT 54* 94* 71*      Magnesium:   Lab Results   Component Value Date    MG 2.2 12/28/2021    MG 2.5 12/17/2021    MG 2.3 12/16/2021     Phosphorus:   Lab Results   Component Value Date    PHOS 3.2 12/18/2021    PHOS 4.0 12/17/2021    PHOS 2.8 12/16/2021     Ionized Calcium: No results found for: CAION     Urinalysis:   Lab Results   Component Value Date    NITRU NEGATIVE 12/18/2021    COLORU Dark Yellow 12/18/2021    PHUR 6.0 12/18/2021    WBCUA 5 TO 10 12/18/2021    RBCUA 20 TO 50 12/18/2021    MUCUS NOT REPORTED 12/18/2021    TRICHOMONAS NOT REPORTED 12/18/2021    YEAST NOT REPORTED 12/18/2021    BACTERIA NOT REPORTED 12/18/2021    SPECGRAV 1.032 12/18/2021    LEUKOCYTESUR NEGATIVE 12/18/2021    UROBILINOGEN Normal 12/18/2021    BILIRUBINUR NEGATIVE 12/18/2021    GLUCOSEU NEGATIVE 12/18/2021    KETUA NEGATIVE 12/18/2021    AMORPHOUS NOT REPORTED 12/18/2021           XR CHEST (SINGLE VIEW FRONTAL)    Result Date: 12/14/2021  Mild interval improvement in multifocal airspace disease particularly at the right base.   Support tubes and lines as above.     XR CHEST (SINGLE VIEW FRONTAL)    Result Date: 12/12/2021  Stable appearing     XR CHEST (SINGLE VIEW FRONTAL)    Result Date: 12/11/2021  Multifocal hazy opacities throughout both lungs consistent with COVID pneumonia and or pulmonary edema. XR CHEST PORTABLE    Result Date: 12/12/2021  Stable appearing chest with unchanged support tubes/lines and persistent extensive bilateral airspace disease consistent with known COVID pneumonia. XR CHEST PORTABLE    Result Date: 12/12/2021  Right internal jugular central venous catheter tip projecting over the proximal SVC versus brachiocephalic vein. No pneumothorax. Otherwise stable exam from earlier today. XR CHEST PORTABLE    Result Date: 12/12/2021  Endotracheal tube tip is 3.2 cm above the saul. Overall significant worsening of multifocal airspace opacities keeping with history of COVID-19. CT CHEST PULMONARY EMBOLISM W CONTRAST    Result Date: 12/11/2021  1. No evidence of pulmonary embolism 2. Diffuse ground-glass opacities throughout the lungs, typical of COVID-19 pulmonary disease.            Past Medical History:   Diagnosis Date    Arthritis     Asthma     Diabetes mellitus ([address]     Edema     GERD (gastroesophageal reflux disease)     Hypertension     on lasix    Migraine     IRMA on CPAP     seldom use machine        Social History     Socioeconomic History    Marital status:      Spouse name: None    Number of children: None    Years of education: None    Highest education level: None   Occupational History    None   Tobacco Use    Smoking status: Former Smoker    Smokeless tobacco: Never Used   Vaping Use    Vaping Use: Never used   Substance and Sexual Activity    Alcohol use: Yes     Comment: every couple months    Drug use: Never    Sexual activity: None   Other Topics Concern    None   Social History Narrative    None     Social Determinants of Health     Financial Resource Strain:     Difficulty of Paying Living Expenses: Not on file   Food Insecurity:     Worried About Running Out of Food in the Last Year: Not on file    Ran Out of Food in the Last Year: Not on file   Transportation Needs:     Lack of Transportation (Medical): Not on file    Lack of Transportation (Non-Medical): Not on file   Physical Activity:     Days of Exercise per Week: Not on file    Minutes of Exercise per Session: Not on file   Stress:     Feeling of Stress : Not on file   Social Connections:     Frequency of Communication with Friends and Family: Not on file    Frequency of Social Gatherings with Friends and Family: Not on file    Attends [employer] Services: Not on file    Active Member of [address] [place] [address] [employer] or Organizations: Not on file    Attends Club or Organization Meetings: Not on file    Marital Status: Not on file   Intimate Partner Violence:     Fear of Current or Ex-Partner: Not on file    Emotionally Abused: Not on file    Physically Abused: Not on file    Sexually Abused: Not on file   Housing Stability:     Unable to Pay for Housing in the Last Year: Not on file    Number of [place] in the Last Year: Not on file    Unstable Housing in the Last Year: Not on file         There is no immunization history on file for this patient.       Family History   Problem Relation Age of Onset    Heart Attack Sister 32    Diabetes Paternal Grandmother     Heart Disease Paternal Uncle     Heart Disease Paternal Uncle     Heart Disease Paternal Uncle     Cancer Maternal Aunt     Cancer Maternal Aunt     Cancer Maternal Uncle     Cancer Maternal Uncle          Past Surgical History:   Procedure Laterality Date    APPENDECTOMY      ARTHROPLASTY Left 11/1/2019    LEFT FOOT 1ST MTPJ ARTHROPLASTY WITH PEREZ IMPLANT AND GROMMETS  [facility] performed by Dario Espino MD at [address] [place] [address] Right 12/12/2019    RIGHT FOOT ARTHROPLASTY 1ST MTPJ (Right Foot)    ARTHROPLASTY Right 12/12/2019    RIGHT FOOT ARTHROPLASTY 1ST MTPJ performed by Nolberto Fernandes MD at [address] Right    [address]  2014    no blockage no stents    CHOLECYSTECTOMY      COLONOSCOPY      ENDOSCOPY, COLON, DIAGNOSTIC      TOE SURGERY Left 11/01/2019     LEFT FOOT 1ST MTPJ ARTHROPLASTY WITH PEREZ IMPLANT AND GROMMETS  [facility] (Left )    TONSILLECTOMY            VENTILATOR SETTINGS:  Vent Information  $Ventilation: $Subsequent Day  Skin Assessment: Clean, dry, & intact  Suction Catheter Diameter: 14  Equipment ID: [id number]  Equipment Changed: Airway securing device  Vent Type: Servo i  Vent Mode: PRVC  Vt Ordered: 550 mL  Rate Set: 24 bmp  FiO2 : 60 %  SpO2: 94 %  SpO2/FiO2 ratio: 156.67  Sensitivity: 3  PEEP/CPAP: 12  I Time/ I Time %: 0.7 s  Humidification Source: HME  Humidification Temp: 37  Humidification Temp Measured: 36.8  Circuit Condensation: Drained  Nitric Oxide/Epoprostenol In Use?: No     PaO2/FiO2 RATIO:  Recent Labs     12/28/21  0512   POCPO2 56.9*      FiO2 : 60 %       LABS:  ABGs:   Recent Labs     12/26/21  0325 12/27/21  0331 12/28/21  0512   POCPH 7.451* 7.445 7.419   POCPCO2 49.7* 48.5* 48.5*   POCPO2 86.5 67.9* 56.9*   POCHCO3 34.6* 33.3* 31.4*   TJOO8GMC 97 94 89*            ASSESSMENT:     Principal Problem:    Pneumonia due to COVID-19 virus  Active Problems:    Shock (HCC)    Acute respiratory failure with hypoxia (HCC)    Type 2 diabetes mellitus (HCC)    Asthma    IRMA on CPAP    Hypertension    GERD (gastroesophageal reflux disease)    ARDS (adult respiratory distress syndrome) (HCC)  Resolved Problems:    * No resolved hospital problems.  *              Acute hypoxic respiratory failure secondary to COVID 19   Acute respiratory distress syndrome   Bilateral multifocal pneumonia due to COVID 19 infection   Covid -19 pandemic emergency   Post hypercarbic metabolic alkalosis   Hyperglycemia, better   Leukocytosis, slightly better   Shock requiring norepinephrine? Sepsis    LOS: 16  PLAN:    · D/w RT  · D/w RN   · Chest x-ray on 12/24/2021 with improving bilateral pulmonary infiltrates  · Sedation reviewed. We will try to cut down on the sedatives and pain medications  · Cont ARDS ventilation  · Proning held off as it did not appear to make much of difference  · Airborne isolation and droplet precautions to be continued  · Continue supportive care   · Cont treatment with medications for COVID  · Cont tube feeding  · Monitor endotracheal secretions   · Will obtain xray chest as needed  · ABG as needed  · Prognosis guarded given age and severity of illness. · On Lovenox and lansoprazole  · On Decadron  · Patient is on cefepime  · Check triglycerides as long as the patient is needing propofol  · Patient required placement of the Hahn catheter due to retention of urine requiring straight catheter multiple times a day  · Diuresis Lasix 40 mg twice daily for 3 days. Restrictive fluid strategy. Treatment plan Discussed with nursing staff in detail , all questions answered . Total critical care time caring for this patient with life threatening, unstable organ failure, including direct patient contact, management of life support systems, review of data including imaging and labs, discussions with other team members and physicians at least 27  Min so far today, excluding procedures. Electronically signed by Bertis Mcburney, DO on 12/28/2021 at 11:06 PM       This patient was evaluated in the context of the global SARS-CoV-2 (COVID-19) pandemic, which necessitated considerations that the patient either has COVID-19 infection or is at risk of infection with COVID-19. Institutional protocols and algorithms that pertain to the evaluation & management of patients with COVID-19 or those at risk for COVID-19 are in a state of rapid changes based on information released by regulatory bodies including the CDC and federal and state organizations.  These policies and

## 2021-12-30 LAB
ABSOLUTE EOS #: <0.03 K/UL (ref 0–0.44)
ABSOLUTE IMMATURE GRANULOCYTE: 0.14 K/UL (ref 0–0.3)
ABSOLUTE LYMPH #: 1.25 K/UL (ref 1.1–3.7)
ABSOLUTE MONO #: 1.14 K/UL (ref 0.1–1.2)
ALBUMIN SERPL-MCNC: 3 G/DL (ref 3.5–5.2)
ALBUMIN/GLOBULIN RATIO: 1.2 (ref 1–2.5)
ALLEN TEST: ABNORMAL
ALP BLD-CCNC: 50 U/L (ref 40–129)
ALT SERPL-CCNC: 47 U/L (ref 5–41)
ANION GAP SERPL CALCULATED.3IONS-SCNC: 11 MMOL/L (ref 9–17)
AST SERPL-CCNC: 15 U/L
BASOPHILS # BLD: 0 % (ref 0–2)
BASOPHILS ABSOLUTE: 0.05 K/UL (ref 0–0.2)
BILIRUB SERPL-MCNC: 0.34 MG/DL (ref 0.3–1.2)
BILIRUBIN DIRECT: 0.15 MG/DL
BILIRUBIN, INDIRECT: 0.19 MG/DL (ref 0–1)
BUN BLDV-MCNC: 29 MG/DL (ref 6–20)
BUN/CREAT BLD: ABNORMAL (ref 9–20)
CALCIUM SERPL-MCNC: 9 MG/DL (ref 8.6–10.4)
CHLORIDE BLD-SCNC: 104 MMOL/L (ref 98–107)
CO2: 27 MMOL/L (ref 20–31)
CREAT SERPL-MCNC: 0.38 MG/DL (ref 0.7–1.2)
D-DIMER QUANTITATIVE: 3.82 MG/L FEU
DIFFERENTIAL TYPE: ABNORMAL
EOSINOPHILS RELATIVE PERCENT: 0 % (ref 1–4)
FIO2: 50
GFR AFRICAN AMERICAN: >60 ML/MIN
GFR NON-AFRICAN AMERICAN: >60 ML/MIN
GFR SERPL CREATININE-BSD FRML MDRD: ABNORMAL ML/MIN/{1.73_M2}
GFR SERPL CREATININE-BSD FRML MDRD: ABNORMAL ML/MIN/{1.73_M2}
GLOBULIN: ABNORMAL G/DL (ref 1.5–3.8)
GLUCOSE BLD-MCNC: 115 MG/DL (ref 75–110)
GLUCOSE BLD-MCNC: 125 MG/DL (ref 70–99)
GLUCOSE BLD-MCNC: 133 MG/DL (ref 75–110)
GLUCOSE BLD-MCNC: 141 MG/DL (ref 74–100)
GLUCOSE BLD-MCNC: 141 MG/DL (ref 75–110)
GLUCOSE BLD-MCNC: 143 MG/DL (ref 75–110)
GLUCOSE BLD-MCNC: 164 MG/DL (ref 75–110)
GLUCOSE BLD-MCNC: 196 MG/DL (ref 75–110)
HCT VFR BLD CALC: 39.7 % (ref 40.7–50.3)
HEMOGLOBIN: 13 G/DL (ref 13–17)
IMMATURE GRANULOCYTES: 1 %
LACTIC ACID, WHOLE BLOOD: 2 MMOL/L (ref 0.7–2.1)
LIPASE: 25 U/L (ref 13–60)
LYMPHOCYTES # BLD: 10 % (ref 24–43)
MCH RBC QN AUTO: 31.2 PG (ref 25.2–33.5)
MCHC RBC AUTO-ENTMCNC: 32.7 G/DL (ref 28.4–34.8)
MCV RBC AUTO: 95.2 FL (ref 82.6–102.9)
MODE: ABNORMAL
MONOCYTES # BLD: 9 % (ref 3–12)
NEGATIVE BASE EXCESS, ART: ABNORMAL (ref 0–2)
NRBC AUTOMATED: 0 PER 100 WBC
O2 DEVICE/FLOW/%: ABNORMAL
PATIENT TEMP: ABNORMAL
PDW BLD-RTO: 14.4 % (ref 11.8–14.4)
PLATELET # BLD: 178 K/UL (ref 138–453)
PLATELET ESTIMATE: ABNORMAL
PMV BLD AUTO: 11.5 FL (ref 8.1–13.5)
POC HCO3: 32.6 MMOL/L (ref 21–28)
POC O2 SATURATION: 95 % (ref 94–98)
POC PCO2 TEMP: ABNORMAL MM HG
POC PCO2: 48.3 MM HG (ref 35–48)
POC PH TEMP: ABNORMAL
POC PH: 7.44 (ref 7.35–7.45)
POC PO2 TEMP: ABNORMAL MM HG
POC PO2: 73.7 MM HG (ref 83–108)
POSITIVE BASE EXCESS, ART: 7 (ref 0–3)
POTASSIUM SERPL-SCNC: 3.8 MMOL/L (ref 3.7–5.3)
RBC # BLD: 4.17 M/UL (ref 4.21–5.77)
RBC # BLD: ABNORMAL 10*6/UL
SAMPLE SITE: ABNORMAL
SEG NEUTROPHILS: 80 % (ref 36–65)
SEGMENTED NEUTROPHILS ABSOLUTE COUNT: 10.52 K/UL (ref 1.5–8.1)
SODIUM BLD-SCNC: 142 MMOL/L (ref 135–144)
TCO2 (CALC), ART: ABNORMAL MMOL/L (ref 22–29)
TOTAL PROTEIN: 5.5 G/DL (ref 6.4–8.3)
WBC # BLD: 13.1 K/UL (ref 3.5–11.3)
WBC # BLD: ABNORMAL 10*3/UL

## 2021-12-30 PROCEDURE — 2700000000 HC OXYGEN THERAPY PER DAY

## 2021-12-30 PROCEDURE — 99233 SBSQ HOSP IP/OBS HIGH 50: CPT | Performed by: INTERNAL MEDICINE

## 2021-12-30 PROCEDURE — 83690 ASSAY OF LIPASE: CPT

## 2021-12-30 PROCEDURE — 82947 ASSAY GLUCOSE BLOOD QUANT: CPT

## 2021-12-30 PROCEDURE — 6370000000 HC RX 637 (ALT 250 FOR IP): Performed by: INTERNAL MEDICINE

## 2021-12-30 PROCEDURE — 6360000002 HC RX W HCPCS: Performed by: NURSE PRACTITIONER

## 2021-12-30 PROCEDURE — 93970 EXTREMITY STUDY: CPT

## 2021-12-30 PROCEDURE — 37799 UNLISTED PX VASCULAR SURGERY: CPT

## 2021-12-30 PROCEDURE — 80076 HEPATIC FUNCTION PANEL: CPT

## 2021-12-30 PROCEDURE — 94761 N-INVAS EAR/PLS OXIMETRY MLT: CPT

## 2021-12-30 PROCEDURE — 6370000000 HC RX 637 (ALT 250 FOR IP): Performed by: NURSE PRACTITIONER

## 2021-12-30 PROCEDURE — 6370000000 HC RX 637 (ALT 250 FOR IP): Performed by: STUDENT IN AN ORGANIZED HEALTH CARE EDUCATION/TRAINING PROGRAM

## 2021-12-30 PROCEDURE — 6360000002 HC RX W HCPCS: Performed by: INTERNAL MEDICINE

## 2021-12-30 PROCEDURE — 82803 BLOOD GASES ANY COMBINATION: CPT

## 2021-12-30 PROCEDURE — 99291 CRITICAL CARE FIRST HOUR: CPT | Performed by: INTERNAL MEDICINE

## 2021-12-30 PROCEDURE — 83605 ASSAY OF LACTIC ACID: CPT

## 2021-12-30 PROCEDURE — 2000000000 HC ICU R&B

## 2021-12-30 PROCEDURE — 94003 VENT MGMT INPAT SUBQ DAY: CPT

## 2021-12-30 PROCEDURE — 85379 FIBRIN DEGRADATION QUANT: CPT

## 2021-12-30 PROCEDURE — 99232 SBSQ HOSP IP/OBS MODERATE 35: CPT | Performed by: FAMILY MEDICINE

## 2021-12-30 PROCEDURE — 80048 BASIC METABOLIC PNL TOTAL CA: CPT

## 2021-12-30 PROCEDURE — 2500000003 HC RX 250 WO HCPCS: Performed by: INTERNAL MEDICINE

## 2021-12-30 PROCEDURE — 85025 COMPLETE CBC W/AUTO DIFF WBC: CPT

## 2021-12-30 PROCEDURE — 2580000003 HC RX 258: Performed by: NURSE PRACTITIONER

## 2021-12-30 RX ADMIN — DOCUSATE SODIUM 100 MG: 50 LIQUID ORAL at 09:23

## 2021-12-30 RX ADMIN — ACETAMINOPHEN 650 MG: 160 SOLUTION ORAL at 05:57

## 2021-12-30 RX ADMIN — INSULIN LISPRO 1 UNITS: 100 INJECTION, SOLUTION INTRAVENOUS; SUBCUTANEOUS at 00:05

## 2021-12-30 RX ADMIN — Medication 6 MG/HR: at 12:51

## 2021-12-30 RX ADMIN — ENOXAPARIN SODIUM 30 MG: 100 INJECTION SUBCUTANEOUS at 20:39

## 2021-12-30 RX ADMIN — FUROSEMIDE 40 MG: 10 INJECTION, SOLUTION INTRAMUSCULAR; INTRAVENOUS at 09:23

## 2021-12-30 RX ADMIN — CHLORDIAZEPOXIDE HYDROCHLORIDE 10 MG: 5 CAPSULE ORAL at 14:30

## 2021-12-30 RX ADMIN — DEXMEDETOMIDINE HYDROCHLORIDE 0.6 MCG/KG/HR: 4 INJECTION, SOLUTION INTRAVENOUS at 00:35

## 2021-12-30 RX ADMIN — Medication 150 MCG/HR: at 18:43

## 2021-12-30 RX ADMIN — OXYCODONE HYDROCHLORIDE 10 MG: 5 SOLUTION ORAL at 23:48

## 2021-12-30 RX ADMIN — ACETAMINOPHEN 650 MG: 160 SOLUTION ORAL at 17:35

## 2021-12-30 RX ADMIN — MEROPENEM 1000 MG: 1 INJECTION, POWDER, FOR SOLUTION INTRAVENOUS at 02:38

## 2021-12-30 RX ADMIN — IBUPROFEN 600 MG: 200 SUSPENSION ORAL at 14:22

## 2021-12-30 RX ADMIN — MEROPENEM 1000 MG: 1 INJECTION, POWDER, FOR SOLUTION INTRAVENOUS at 18:31

## 2021-12-30 RX ADMIN — DEXMEDETOMIDINE HYDROCHLORIDE 0.6 MCG/KG/HR: 4 INJECTION, SOLUTION INTRAVENOUS at 09:29

## 2021-12-30 RX ADMIN — OXYCODONE HYDROCHLORIDE 10 MG: 5 SOLUTION ORAL at 14:30

## 2021-12-30 RX ADMIN — PROPOFOL 25 MCG/KG/MIN: 10 INJECTION, EMULSION INTRAVENOUS at 13:27

## 2021-12-30 RX ADMIN — PROPOFOL 40 MCG/KG/MIN: 10 INJECTION, EMULSION INTRAVENOUS at 03:28

## 2021-12-30 RX ADMIN — PROPOFOL 45 MCG/KG/MIN: 10 INJECTION, EMULSION INTRAVENOUS at 07:35

## 2021-12-30 RX ADMIN — ENOXAPARIN SODIUM 30 MG: 100 INJECTION SUBCUTANEOUS at 09:23

## 2021-12-30 RX ADMIN — CHLORDIAZEPOXIDE HYDROCHLORIDE 10 MG: 5 CAPSULE ORAL at 09:22

## 2021-12-30 RX ADMIN — INSULIN GLARGINE 10 UNITS: 100 INJECTION, SOLUTION SUBCUTANEOUS at 20:39

## 2021-12-30 RX ADMIN — FUROSEMIDE 40 MG: 10 INJECTION, SOLUTION INTRAMUSCULAR; INTRAVENOUS at 17:35

## 2021-12-30 RX ADMIN — OXYCODONE HYDROCHLORIDE 10 MG: 5 SOLUTION ORAL at 09:24

## 2021-12-30 RX ADMIN — DEXMEDETOMIDINE HYDROCHLORIDE 0.6 MCG/KG/HR: 4 INJECTION, SOLUTION INTRAVENOUS at 19:46

## 2021-12-30 RX ADMIN — PROPOFOL 45 MCG/KG/MIN: 10 INJECTION, EMULSION INTRAVENOUS at 09:29

## 2021-12-30 RX ADMIN — ACETAMINOPHEN 650 MG: 160 SOLUTION ORAL at 10:45

## 2021-12-30 RX ADMIN — Medication 150 MCG/HR: at 07:36

## 2021-12-30 RX ADMIN — DEXMEDETOMIDINE HYDROCHLORIDE 0.6 MCG/KG/HR: 4 INJECTION, SOLUTION INTRAVENOUS at 15:00

## 2021-12-30 RX ADMIN — MEROPENEM 1000 MG: 1 INJECTION, POWDER, FOR SOLUTION INTRAVENOUS at 11:07

## 2021-12-30 RX ADMIN — CHLORDIAZEPOXIDE HYDROCHLORIDE 10 MG: 5 CAPSULE ORAL at 23:48

## 2021-12-30 RX ADMIN — Medication 150 MCG/HR: at 12:11

## 2021-12-30 RX ADMIN — IBUPROFEN 600 MG: 200 SUSPENSION ORAL at 07:27

## 2021-12-30 RX ADMIN — OXYCODONE HYDROCHLORIDE 10 MG: 5 SOLUTION ORAL at 05:57

## 2021-12-30 RX ADMIN — DEXAMETHASONE SODIUM PHOSPHATE 10 MG: 10 INJECTION INTRAMUSCULAR; INTRAVENOUS at 12:21

## 2021-12-30 RX ADMIN — DEXMEDETOMIDINE HYDROCHLORIDE 0.6 MCG/KG/HR: 4 INJECTION, SOLUTION INTRAVENOUS at 03:56

## 2021-12-30 RX ADMIN — Medication 30 MG: at 11:09

## 2021-12-30 RX ADMIN — PROPOFOL 40 MCG/KG/MIN: 10 INJECTION, EMULSION INTRAVENOUS at 02:39

## 2021-12-30 RX ADMIN — Medication 2000 UNITS: at 09:22

## 2021-12-30 RX ADMIN — INSULIN LISPRO 1 UNITS: 100 INJECTION, SOLUTION INTRAVENOUS; SUBCUTANEOUS at 17:43

## 2021-12-30 RX ADMIN — CHLORDIAZEPOXIDE HYDROCHLORIDE 10 MG: 5 CAPSULE ORAL at 05:57

## 2021-12-30 ASSESSMENT — PULMONARY FUNCTION TESTS
PIF_VALUE: 31
PIF_VALUE: 33
PIF_VALUE: 30
PIF_VALUE: 31

## 2021-12-30 ASSESSMENT — PAIN SCALES - GENERAL
PAINLEVEL_OUTOF10: 0
PAINLEVEL_OUTOF10: 0

## 2021-12-30 NOTE — PROGRESS NOTES
[facility] - Progress Note   Note COVID 19 Patient  Today's Date and Time: 12/30/2021, 6:54 AM    Impression :     COVID 19 Confirmed Infection  Covid tests:  12/11/2021: Positive  Elevated inflammatory markers  Acute hypoxic respiratory failure  History of asthma  Diabetes mellitus  Essential hypertension  IRMA on CPAP  Patient has not received the Covid vaccination    Recommendations:   Antibiotic treatment:  The patient was having high-grade fevers and cultures have been sent.-No growth on cultures thus far  I will start the patient empirically on antibiotic therapy with cefepime on 12/18-fevers initially resolved but low grade fevers have resumed. Cefepime discontinued 12/28 and Meropenem initiated 12/28 for worsening leukocytosis and fevers     Covid Rx:    Remdesivir-out of window  Decadron-high-dose initiated  Actemra-ordered 12/12/2021  Monoclonal antibodies-out of window      Medical Decision Making/Summary/Discussion:12/30/2021     Patient admitted with COVID 19 infection  He may come out of isolation on 12/31/21    Infection Control Recommendations   [place] Precautions  Airborne isolation  Droplet Isolation    Antimicrobial Stewardship Recommendations     Discontinuation of therapy  Coordination of Outpatient Care:   Estimated Length of IV antimicrobials:TBD  Patient will need Midline Catheter Insertion: TBD  Patient will need PICC line Insertion: No  Patient will need: Home IV , [place],  SNF,  LTAC:TBD  Patient will need outpatient wound care:No    Chief complaint/reason for consultation:   Concern for COVID infection      History of Present Illness:   Noy Anthony is a 60y.o.-year-old male who was initially admitted on 12/12/2021.  Patient seen at the request of Dr. Chelle Li:    Patient presented through [facility] ER on 12/11/2021 with complaints of worsening shortness of breath with associated generalized weakness and nonproductive cough over the past several days. He was exposed to his son who was diagnosed with Covid last week and has not received the Covid vaccine. The patient was very hypoxic with an SPO2 in the high 50s on room air. Imaging revealed bilateral pulmonary opacities typical of COVID-19    Abnormal labs include:    Ferritin 2800      Patient has been intubated and transferred to [facility]  He has been started on high-dose Decadron and Actemra has been ordered    CT CHEST PULMONARY EMBOLISM W CONTRAST 12/11/2021   Final Result   1. No evidence of pulmonary embolism   2. Diffuse ground-glass opacities throughout the lungs, typical of COVID-19   pulmonary disease.           XR CHEST (SINGLE VIEW FRONTAL) 12/11/2021   Final Result   Multifocal hazy opacities throughout both lungs consistent with COVID   pneumonia and or pulmonary edema       Patient admitted because of concerns with COVID 19.    CURRENT EVALUATION : 12/30/2021    TMAX 101.7  VS stable    The patient remains on mechanical ventilation with 75-->60-->50 % FiO2 and PEEP of 12-->10. He is receiving fentanyl, Versed, propofol and Precedex for sedation. Nimbex was reinitiated for increased ventilator asynchrony 12/27    Fevers continue  Repeat cultures sent 12/29/21  No growth thus far    Cefepime was initiated 12/18-discontinued 12/28  Meropenem initiated 12/28    CXR stable 12/29/21    Patient exhibiting respiratory distress. yes  Respiratory secretions: no    Patient receiving supplemental oxygen.   Mechanical ventilation  RR: 24  02 sat:93-->94    % FIO2: 90-->80-->85-->80-->65-->60->50-->75-->60-->50  PEEP:   21-->16-->14-->12-->10->8-->12-->10    QTc:       NEWS Score: 0-4 Low risk group; 5-6: Medium risk group; 7 or above: High risk group  Parameters 3 2 1 0 1 2 3   Age    < 65   = 65   RR = 8  9-11 12-20  21-24 = 25   O2 Sats = 91 92-93 94-95 = 96      Suppl O2  Yes  No      SBP = 90  101-110 111-219 = 220   HR = 40  41-50 51-90  111-130 = 131   Consciousness    Alert   Drowsiness, lethargy, or confusion   Temperature = 35.0 C (95.0 F)  35.1-36.0 C 95.1-96.9 F 36.1-38.0 C 97.0-100.4 F 38.1-39.0 C 100.5-102.3 F = 39.1 C = 102.4 F      NEWS Score:  12/12/2021: 13 high risk    Overall Daily Picture:     Unchanged    Presence of secondary bacterial Infection:    Cultures no growth  Additional antibiotics: 12/18 Cefepime for leukocytosis and fevers-discontinued 12/28 and Meropenem restarted    Labs, X rays reviewed: 12/30/2021    BUN:30  Cr:0.42    WBC:17.3-->13.7-->13.1  Hb:13   Plat: 178    Absolute Neutrophils:3.73  Absolute Lymphocytes:0.29  Neutrophil/Lymphocyte Ratio: 12.8 high risk    CRP:293-->277-->137-->50.8-->23  Ferritin:2800  LDH: 838    Pro Calcitonin:      Cultures:  Urine:  12/18/21: No bacterial growth  Blood:  12/18/21: No growth thus far  Sputum :  12/18/21: Normal respiratory sherry  Wound:      CXR:     12/29/21 12/22/21 12/19/21      1221 diffuse bilateral infiltrates  CAT:      Discussed with patient, RN, CC, IM.      12-12-21:      I have personally reviewed the past medical history, past surgical history, medications, social history, and family history, and I have updated the database accordingly.   Past Medical History:     Past Medical History:   Diagnosis Date    Arthritis     Asthma     Diabetes mellitus ([address]     Edema     GERD (gastroesophageal reflux disease)     Hypertension     on lasix    Migraine     IRMA on CPAP     seldom use machine       Past Surgical  History:     Past Surgical History:   Procedure Laterality Date    APPENDECTOMY      ARTHROPLASTY Left 11/1/2019    LEFT FOOT 1ST MTPJ ARTHROPLASTY WITH PEREZ IMPLANT AND GROMMETS  [facility] performed by Ulises Coreas MD at [address] [place] [address] Right 12/12/2019    RIGHT FOOT ARTHROPLASTY 1ST MTPJ (Right Foot)    ARTHROPLASTY Right 12/12/2019    RIGHT FOOT ARTHROPLASTY 1ST MTPJ performed by Gregory Butler Otto Triana MD at [address] Right    [address]  2014    no blockage no stents    CHOLECYSTECTOMY      COLONOSCOPY      ENDOSCOPY, COLON, DIAGNOSTIC      TOE SURGERY Left 11/01/2019     LEFT FOOT 1ST MTPJ ARTHROPLASTY WITH PEREZ IMPLANT AND GROMMETS  [facility] (Left )    TONSILLECTOMY         Medications:      meropenem  1,000 mg IntraVENous Q8H    furosemide  40 mg IntraVENous BID    chlordiazePOXIDE  10 mg Per NG tube 4x Daily    lidocaine 1 % injection  5 mL IntraDERmal Once    sodium chloride flush  5-40 mL IntraVENous 2 times per day    oxyCODONE  10 mg Oral Q6H    docusate  100 mg Per NG tube BID    insulin lispro  0-6 Units SubCUTAneous Q6H    lansoprazole  30 mg Oral QAM AC    insulin glargine  10 Units SubCUTAneous Nightly    sodium chloride flush  5-40 mL IntraVENous 2 times per day    enoxaparin  30 mg SubCUTAneous BID    Vitamin D  2,000 Units Oral Daily    dexamethasone  10 mg IntraVENous Q24H       Social History:     Social History     Socioeconomic History    Marital status:      Spouse name: Not on file    Number of children: Not on file    Years of education: Not on file    Highest education level: Not on file   Occupational History    Not on file   Tobacco Use    Smoking status: Former Smoker    Smokeless tobacco: Never Used   Vaping Use    Vaping Use: Never used   Substance and Sexual Activity    Alcohol use: Yes     Comment: every couple months    Drug use: Never    Sexual activity: Not on file   Other Topics Concern    Not on file   Social History Narrative    Not on file     Social Determinants of Health     Financial Resource Strain:     Difficulty of Paying Living Expenses: Not on file   Food Insecurity:     Worried About Running Out of Food in the Last Year: Not on file    Lucina of Food in the Last Year: Not on file   Transportation Needs:     Lack of Transportation (Medical):  Not on file    Lack of Transportation (Non-Medical):  Not on file   Physical Activity:     Days of Exercise per Week: Not on file    Minutes of Exercise per Session: Not on file   Stress:     Feeling of Stress : Not on file   Social Connections:     Frequency of Communication with Friends and Family: Not on file    Frequency of Social Gatherings with Friends and Family: Not on file    Attends Restorationist Services: Not on file    Active Member of [address] [place] [address] [employer] or Organizations: Not on file    Attends Club or Organization Meetings: Not on file    Marital Status: Not on file   Intimate Partner Violence:     Fear of Current or Ex-Partner: Not on file    Emotionally Abused: Not on file    Physically Abused: Not on file    Sexually Abused: Not on file   Housing Stability:     Unable to Pay for Housing in the Last Year: Not on file    Number of [place] in the Last Year: Not on file    Unstable Housing in the Last Year: Not on file       Family History:     Family History   Problem Relation Age of Onset    Heart Attack Sister 32    Diabetes Paternal Grandmother     Heart Disease Paternal Uncle     Heart Disease Paternal Uncle     Heart Disease Paternal Uncle     Cancer Maternal Aunt     Cancer Maternal Aunt     Cancer Maternal Uncle     Cancer Maternal Uncle         Allergies:   Nuts [peanut-containing drug products] and Sunflower oil     Review of Systems:     Unable to assess 12/30/2021  Intubated and sedated      Physical Examination :     Patient Vitals for the past 8 hrs:   BP Temp Temp src Pulse Resp SpO2 Weight   12/30/21 0615 -- -- -- 56 24 94 % --   12/30/21 0600 98/65 -- -- 56 24 94 % --   12/30/21 0545 -- -- -- 56 24 94 % --   12/30/21 0530 -- -- -- 56 24 94 % --   12/30/21 0515 -- -- -- 56 24 95 % --   12/30/21 0500 101/67 -- -- 56 24 94 % --   12/30/21 0445 -- -- -- 56 24 94 % --   12/30/21 0430 -- -- -- 57 24 94 % --   12/30/21 0415 -- -- -- 57 24 95 % --   12/30/21 0400 99/66 -- -- 57 24 94 % --   12/30/21 0342 -- -- -- -- 24 95 % --   12/30/21 0300 98/67 -- -- 57 24 95 % --   12/30/21 0245 -- -- -- 57 24 95 % --   12/30/21 0230 -- -- -- 57 24 96 % --   12/30/21 0215 -- -- -- 57 24 96 % --   12/30/21 0200 97/66 -- -- 58 24 96 % --   12/30/21 0145 98/65 -- -- 59 24 -- --   12/30/21 0130 -- -- -- 59 24 -- --   12/30/21 0115 -- -- -- 60 24 -- --   12/30/21 0100 -- -- -- 63 21 95 % --   12/30/21 0045 -- -- -- 62 25 98 % --   12/30/21 0043 -- -- -- -- -- -- 283 lb 4.7 oz (128.5 kg)   12/30/21 0030 -- -- -- 62 -- 97 % --   12/30/21 0015 -- -- -- 61 -- 98 % --   12/30/21 0000 -- 101.7 °F (38.7 °C) Bladder 61 24 97 % --   12/29/21 2345 -- -- -- 63 -- 99 % --   12/29/21 2337 -- -- -- -- 24 98 % --   12/29/21 2330 -- -- -- 61 -- 98 % --   12/29/21 2315 -- -- -- 61 -- 98 % --   12/29/21 2300 -- -- -- 62 24 97 % --     General Appearance: Intubated and sedated  Head:  Normocephalic, no trauma  ENT: Not well evaluated as the patient is orally intubated  Neck:Supple, without lymphadenopathy. Thyroid normal, No bruits. Pulmonary/Chest: Diminished to auscultation, without wheezes, rales, or rhonchi. No dullness to percussion. Cardiovascular: Regular rate and rhythm without murmurs, rubs, or gallops. Abdomen: Soft, non tender. Bowel sounds normal. No organomegaly  All four Extremities: No cyanosis, clubbing, edema, or effusions. Neurologic: Intubated and sedated and paralyzed  Skin: Warm and dry with good turgor. No signs of peripheral arterial or venous insufficiency. No ulcerations. No open wounds.     Medical Decision Making -Laboratory:   I have independently reviewed/ordered the following labs:    CBC with Differential:   Recent Labs     12/29/21  0511 12/30/21  0600   WBC 13.7* 13.1*   HGB 13.6 13.0   HCT 41.8 39.7*    178   LYMPHOPCT 7* 10*   MONOPCT 8 9     BMP:   Recent Labs     12/28/21  0529 12/29/21  0511    141   K 3.3* 4.3    104   CO2 27 27   BUN 24* 30*   CREATININE 0.36* 0.42*   MG 2.2  -- Hepatic Function Panel:   Recent Labs     12/28/21  0529 12/29/21  0511   PROT 6.0* 5.4*   LABALBU 3.5 3.1*   BILIDIR 0.14 0.12   IBILI 0.26 0.26   BILITOT 0.40 0.38   ALKPHOS 58 54   ALT 71* 54*   AST 22 15     No results for input(s): RPR in the last 72 hours. No results for input(s): HIV in the last 72 hours. No results for input(s): BC in the last 72 hours. Lab Results   Component Value Date    MUCUS 3+ 12/29/2021    RBC 4.17 12/30/2021    TRICHOMONAS NOT REPORTED 12/29/2021    WBC 13.1 12/30/2021    YEAST NOT REPORTED 12/29/2021    TURBIDITY Clear 12/29/2021     Lab Results   Component Value Date    CREATININE 0.42 12/29/2021    GLUCOSE 128 12/29/2021       Medical Decision Making-Imaging:     Narrative   EXAMINATION:   CTA OF THE CHEST 12/11/2021 11:31 am       TECHNIQUE:   CTA of the chest was performed after the administration of intravenous   contrast.  Multiplanar reformatted images are provided for review.  MIP   images are provided for review. Dose modulation, iterative reconstruction,   and/or weight based adjustment of the mA/kV was utilized to reduce the   radiation dose to as low as reasonably achievable.       COMPARISON:   Chest x-ray dated December 11, 2021       HISTORY:   ORDERING SYSTEM PROVIDED HISTORY: hypoxemia, covid positive, d-dimer-0.99   TECHNOLOGIST PROVIDED HISTORY:   hypoxemia, covid positive, d-dimer-0.99   Decision Support Exception - unselect if not a suspected or confirmed   emergency medical condition->Emergency Medical Condition (MA)   Reason for Exam: COVID positive, elevated D-Dimer       FINDINGS:   Pulmonary Arteries: Pulmonary arteries are adequately opacified for   evaluation.  No evidence of intraluminal filling defect to suggest pulmonary   embolism.  Main pulmonary artery is normal in caliber.       Mediastinum: Nonspecific mediastinal and hilar lymph nodes are present,   likely reactive. .  The heart and pericardium demonstrate no acute   abnormality. Marcella Mejía is no acute abnormality of the thoracic aorta.       Lungs/pleura: Diffuse ground-glass opacification is present throughout the   lungs, typical of COVID-19 pulmonary disease.  No pleural effusion or   pneumothorax is present.       Upper Abdomen: Images through the upper abdomen demonstrate a small hiatal   hernia.  Diffuse hepatic steatosis is present.  The gallbladder is surgically   absent.       Soft Tissues/Bones: No acute bone or soft tissue abnormality.           Impression   1. No evidence of pulmonary embolism   2. Diffuse ground-glass opacities throughout the lungs, typical of COVID-19   pulmonary disease.         Narrative   EXAMINATION:   ONE XRAY VIEW OF THE CHEST       12/11/2021 3:54 pm       COMPARISON:   November 13, 2012       HISTORY:   ORDERING SYSTEM PROVIDED HISTORY: hypoxemia, probable covid pneumonia   TECHNOLOGIST PROVIDED HISTORY:   hypoxemia, probable covid pneumonia       FINDINGS:   Multifocal hazy opacities throughout both lungs would be consistent with   history of COVID pneumonia.  Pulmonary edema could have a similar appearance. No pneumothorax or pleural effusion.  Cardiac size enlarged.  Mediastinum   unremarkable.  No acute osseous abnormality.           Impression   Multifocal hazy opacities throughout both lungs consistent with COVID   pneumonia and or pulmonary edema.               Medical Decision Rgvfop-Rdoejomq-Gzyli:     Culture, Respiratory [[id number]] (Abnormal) Collected: 12/18/21 1143   Order Status: Completed Specimen: Endotracheal Updated: 12/18/21 1439    Specimen Description . ENDOTRACHEAL    Special Requests NOT REPORTED    Direct Exam >25 NEUTROPHILS/LPF     < 10 EPITHELIAL CELLS/LPF     MIXED BACTERIAL MORPHOTYPES SEEN ON GRAM STAIN.  Abnormal     Culture PENDING   Culture, Blood 1 [[id number]] Collected: 12/18/21 1234   Order Status: Completed Specimen: Blood Updated: 12/18/21 1358    Specimen Description . BLOOD    Special Requests NOT REPORTED    Culture NO GROWTH <24 HRS   Culture, Blood 1 [[id number]]    Order Status: Sent Specimen: Blood    Culture, Urine [[id number]] Collected: 12/14/21 0043   Order Status: Completed Specimen: Urine, straight catheter Updated: 12/14/21 1934    Specimen Description . URINE,STRAIGHT CATHETER    Special Requests NOT REPORTED    Culture NO GROWTH   MRSA DNA Probe, Nasal [[id number]] Collected: 12/12/21 1245   Order Status: Completed Specimen: Nasal Updated: 12/13/21 1052    Specimen Description . NASAL SWAB    MRSA, DNA, Nasal NEGATIVE:  MRSA DNA not detected by nucleic acid amplification. Comment:                                                    Results should be used as an adjunct to nosocomial control efforts to identify patients   needing enhanced precautions.     The test is not intended to identify patients with staphylococcal infections.  Results   should not be used to guide or monitor treatment for MRSA infections. Medical Decision Making-Other:     Note:  Labs, medications, radiologic studies were reviewed with personal review of films  Large amounts of data were reviewed  Discussed with nursing Staff, Discharge planner  Infection Control and Prevention measures reviewed  All prior entries were reviewed  Administer medications as ordered  Prognosis: Guarded  Discharge planning reviewed      Thank you for allowing us to participate in the care of this patient. Please call with questions. Electronically signed by SHO Chavez CNP on 12/30/2021 at 6:54 AM       ATTESTATION:    I have discussed the case, including pertinent history and exam findings with the APRN. I have evaluated the  History, physical findings and pictures of the patient and the key elements of the encounter have been performed by me. I have reviewed the laboratory data, other diagnostic studies and discussed them with the APRN. I have updated the medical record where necessary.     I agree with the assessment, plan and orders as documented by the APRN.     Winston Rg MD.

## 2021-12-30 NOTE — PLAN OF CARE
Problem: Airway Clearance - Ineffective  Goal: Achieve or maintain patent airway  Outcome: Ongoing     Problem: Gas Exchange - Impaired  Goal: Absence of hypoxia  Outcome: Ongoing  Goal: Promote optimal lung function  Outcome: Ongoing     Problem: Breathing Pattern - Ineffective  Goal: Ability to achieve and maintain a regular respiratory rate  Outcome: Ongoing     Problem:  Body Temperature -  Risk of, Imbalanced  Goal: Ability to maintain a body temperature within defined limits  Outcome: Ongoing  Goal: Will regain or maintain usual level of consciousness  Outcome: Ongoing  Goal: Complications related to the disease process, condition or treatment will be avoided or minimized  Outcome: Ongoing     Problem: Isolation Precautions - Risk of Spread of Infection  Goal: Prevent transmission of infection  Outcome: Ongoing     Problem: Nutrition Deficits  Goal: Optimize nutritional status  Outcome: Ongoing     Problem: Risk for Fluid Volume Deficit  Goal: Maintain normal heart rhythm  Outcome: Ongoing  Goal: Maintain absence of muscle cramping  Outcome: Ongoing  Goal: Maintain normal serum potassium, sodium, calcium, phosphorus, and pH  Outcome: Ongoing     Problem: Loneliness or Risk for Loneliness  Goal: Demonstrate positive use of time alone when socialization is not possible  Outcome: Ongoing     Problem: Fatigue  Goal: Verbalize increase energy and improved vitality  Outcome: Ongoing     Problem: Patient Education: Go to Patient Education Activity  Goal: Patient/Family Education  Outcome: Ongoing     Problem: OXYGENATION/RESPIRATORY FUNCTION  Goal: Patient will maintain patent airway  12/29/2021 1932 by Jason Foss RN  Outcome: Ongoing  12/29/2021 0908 by Eric Valverde RCP  Outcome: Ongoing  Goal: Patient will achieve/maintain normal respiratory rate/effort  Description: Respiratory rate and effort will be within normal limits for the patient  12/29/2021 1932 by Jason Foss RN  Outcome: Ongoing  12/29/2021 [id number] by Renee Howe RCP  Outcome: Ongoing     Problem: MECHANICAL VENTILATION  Goal: Patient will maintain patent airway  12/29/2021 1932 by Brian Jorgensen RN  Outcome: Ongoing  12/29/2021 0908 by Renee Howe RCP  Outcome: Ongoing  Goal: Oral health is maintained or improved  12/29/2021 1932 by Brian Jorgensen RN  Outcome: Ongoing  12/29/2021 0908 by Renee Howe RCP  Outcome: Ongoing  Goal: ET tube will be managed safely  12/29/2021 1932 by Brian Jorgensen RN  Outcome: Ongoing  12/29/2021 0908 by Renee Howe RCP  Outcome: Ongoing  Goal: Ability to express needs and understand communication  12/29/2021 1932 by Brian Jorgensen RN  Outcome: Ongoing  12/29/2021 0908 by Renee Howe RCP  Outcome: Ongoing  Goal: Mobility/activity is maintained at optimum level for patient  12/29/2021 1932 by Brian Jorgensen RN  Outcome: Ongoing  12/29/2021 0908 by Renee Howe RCP  Outcome: Ongoing     Problem: SKIN INTEGRITY  Goal: Skin integrity is maintained or improved  12/29/2021 1932 by Brian Jorgensen RN  Outcome: Ongoing  12/29/2021 0908 by Renee Howe RCP  Outcome: Ongoing     Problem: Skin Integrity:  Goal: Will show no infection signs and symptoms  Description: Will show no infection signs and symptoms  Outcome: Ongoing  Goal: Absence of new skin breakdown  Description: Absence of new skin breakdown  Outcome: Ongoing     Problem: Falls - Risk of:  Goal: Will remain free from falls  Description: Will remain free from falls  Outcome: Ongoing  Goal: Absence of physical injury  Description: Absence of physical injury  Outcome: Ongoing     Problem: Nutrition  Goal: Optimal nutrition therapy  Outcome: Ongoing     Problem: Discharge Planning:  Goal: Participates in care planning  Description: Participates in care planning  Outcome: Ongoing  Goal: Discharged to appropriate level of care  Description: Discharged to appropriate level of care  Outcome: Ongoing

## 2021-12-30 NOTE — PLAN OF CARE
Problem: OXYGENATION/RESPIRATORY FUNCTION  Goal: Patient will maintain patent airway  12/29/2021 2010 by Miryam Craft RCP  Outcome: Ongoing     Problem: OXYGENATION/RESPIRATORY FUNCTION  Goal: Patient will achieve/maintain normal respiratory rate/effort  Description: Respiratory rate and effort will be within normal limits for the patient  12/29/2021 2010 by Miryam Craft RCP  Outcome: Ongoing     Problem: MECHANICAL VENTILATION  Goal: Patient will maintain patent airway  12/29/2021 2010 by Miryam Craft RCP  Outcome: Ongoing     Problem: MECHANICAL VENTILATION  Goal: Oral health is maintained or improved  12/29/2021 2010 by Miryam Craft RCP  Outcome: Ongoing     Problem: MECHANICAL VENTILATION  Goal: ET tube will be managed safely  12/29/2021 2010 by Miryam Craft RCP  Outcome: Ongoing     Problem: MECHANICAL VENTILATION  Goal: Ability to express needs and understand communication  12/29/2021 2010 by Miryam Craft RCP  Outcome: Ongoing     Problem: MECHANICAL VENTILATION  Goal: Mobility/activity is maintained at optimum level for patient  12/29/2021 2010 by Miryam Craft RCP  Outcome: Ongoing

## 2021-12-30 NOTE — PROGRESS NOTES
Comprehensive Nutrition Assessment    Type and Reason for Visit:  Reassess    Nutrition Recommendations/Plan:   - Continue Peptide Based TF at 50 mL/hr along with 2 Proteinex protein modulars per day. Monitor TF tolerance/adequacy of intakes and rate of propofol - modify as needed. - Will monitor FMS output, labs, weights, and plan of care. - Consider starting a probiotic to help with loose stools. Nutrition Assessment:  Pt remains intubated and sedated. Propofol running at 37.4 mL/hr at visit - RN reports slowly decreasing rate as pt started on paralytics. Peptide Based TF running at goal rate of 50 mL/hr - RN reports pt tolerating well but has been having some residuals (180 mL noted). Pt also receiving 2 Proteinex protein modulars per day. Weight fluctuations since admission noted. FMS in place d/t loose stools. Labs reviewed. Meds include: Decadron, Colace, Lasix, Lantus, Humalog SS, Vit D. Malnutrition Assessment:  Malnutrition Status:  Insufficient data    Context:  Acute Illness       Estimated Daily Nutrient Needs:  Energy (kcal):  14-16 kcal/kg = 9685-1750 kcals/day; Weight Used for Energy Requirements:  Admission     Protein (g):  1.5-2.0 gm/kg ~>129-172 gms/d; Weight Used for Protein Requirements:  Ideal        Fluid (ml/day):  per MD    Nutrition Related Findings:  Labs/Meds reviewed. FMS in place d/t loose stools. Wounds:  None       Current Nutrition Therapies:    Diet NPO  ADULT TUBE FEEDING; Orogastric; Peptide Based; Continuous; 10; Yes; 24; Q 4 hours; 50; 30; Q 4 hours; Protein;  Bolus 2 Proteinex modulars per day  Current Tube Feeding (TF) Orders:  · Feeding Route: Orogastric  · Formula: Peptide Based  · Schedule: Continuous  · Additives/Modulars: Protein (2x/d)  · Water Flushes: per MD  · Current TF & Flush Orders Provides: Peptide-Based at 50 mL/hr + 2 protein modulars per day~> 1648 kcals, 142 gms protein, 973 mLs water + propofol kcals  · Goal TF & Flush Orders Provides: Peptide-Based at 50 mL/hr + 2 protein modulars per day~> 1648 kcals, 142 gms protein, 973 mLs water + propofol kcals    Additional Calorie Sources:   Propofol at 37.4 mL/hr = 987 kcal/day - weaning down, recently at 20.8 mL/hr = 549 kcal/day    Anthropometric Measures:  · Height: 6' 2\" (188 cm)  · Current Body Weight: 283 lb 4.7 oz (128.5 kg)   · Admission Body Weight: 306 lb 7 oz (139 kg)    · Usual Body Weight: 300 lb (136.1 kg) (stated per chart review)     · Ideal Body Weight: 190 lbs; % Ideal Body Weight 149.1 %   · BMI: 36.4  · BMI Categories: Obese Class 2 (BMI 35.0 -39.9)       Nutrition Diagnosis:   · Inadequate oral intake related to impaired respiratory function as evidenced by NPO or clear liquid status due to medical condition,nutrition support - enteral nutrition    Nutrition Interventions:   Food and/or Nutrient Delivery:  Continue Current Tube Feeding (Continue Peptide Based TF at 50 mL/hr + 2 Proteinex protein modulars per day - monitor rate of propofol and modify TF goal rate as/if needed.)  Nutrition Education/Counseling:  No recommendation at this time   Coordination of Nutrition Care:  Continue to monitor while inpatient    Goals:  Pt to meet % of est'd needs daily via EN       Nutrition Monitoring and Evaluation:   Behavioral-Environmental Outcomes:  None Identified   Food/Nutrient Intake Outcomes:  Enteral Nutrition Intake/Tolerance  Physical Signs/Symptoms Outcomes:  Biochemical Data,GI Status,Diarrhea,Fluid Status or Edema,Hemodynamic Status,Nutrition Focused Physical Findings,Skin,Weight     Discharge Planning:     Too soon to determine     Electronically signed by Severiano Roch, RD, LD on 12/30/21 at 3:39 PM EST    Contact: [phone]

## 2021-12-30 NOTE — PROGRESS NOTES
Pulmonary Critical Care   Progress Note    Patient - Janet Gastelum  Date of Admission -  2021 11:39 AM  Date of Evaluation -  2021  Room and Bed Number -  3026/3026-01   Hospital Day - 17    CHIEF COMPLAINT : ACUTE HYPOXIC RESPIRATORY FAILURE DUE TO COVID -19 PNEUMONIA   HPI:   Janet Gastelum  60 y.o. male  admitted for COVID-19 at [facility] ER due to worsening oxygenation he was initially started on BiPAP and subsequently intubated. On room air his saturations had been 50%. CTA no PE but bilateral pulmonary infiltrates. He was accepted at [address] [place] [address] ICU. On arrival he is on PEEP of 22, 100% FiO2.    2021   Subjective   FiO2 down to 50% which is improved. Remains on cis-atacarium, dexmedetomidine, midazolam, propofol, and fentanyl. Becomes very agitated with any decrease. Low-grade fever noted, but WBC is actually decreasing. Secretions not purulent.       SECRETIONS  -Amount:  [x] Small [] Moderate  [] Large    [] None  Color:     [x] White [] Colored  [] Bloody    SEDATION:    As above    PARALYZED:  [x] No    [] Yes    VASOPRESSORS:  [] No    [x] Yes  [x] Levophed [] Dopamine [] Vasopressin  [] Dobutamine [] Phenylephrine [] Epinephrine    INHALED veletri  : [] No    [] Yes    PRONE :       [x] No    [] Yes    Actemra:             [] No    [x] Yes  21  DEXAMETHASONE : [] No    [x] Yes      Ros unable to perform due to intubation and sedation    OBJECTIVE:     VITAL SIGNS:  BP 98/73   Pulse 62   Temp 101.7 °F (38.7 °C) (Bladder)   Resp 24   Ht 6' 2\" (1.88 m)   Wt 284 lb 6.3 oz (129 kg)   SpO2 97%   BMI 36.51 kg/m²   Tmax over 24 hours:  Temp (24hrs), Av.2 °F (38.4 °C), Min:100.4 °F (38 °C), Max:101.7 °F (38.7 °C)      Patient Vitals for the past 8 hrs:   Temp Temp src Pulse Resp SpO2   21 2300 -- -- 62 24 97 %   21 2200 -- -- 64 24 94 %   21 -- -- 66 24 96 %   21 -- -- 69 24 97 %   21 -- -- 73 20 97 %   21 1900 101.7 °F (38.7 °C) Bladder 65 24 98 %   12/29/21 1830 -- -- 66 24 --   12/29/21 1800 -- -- 65 23 96 %   12/29/21 1730 -- -- 77 24 93 %   12/29/21 1700 -- -- 86 23 92 %   12/29/21 1639 -- -- -- 24 94 %   12/29/21 1630 -- -- 95 24 93 %   12/29/21 1600 101.7 °F (38.7 °C) Bladder 85 24 94 %   12/29/21 1530 -- -- 69 24 95 %         Intake/Output Summary (Last 24 hours) at 12/29/2021 2316  Last data filed at 12/29/2021 2300  Gross per 24 hour   Intake 3176.61 ml   Output 3320 ml   Net -143.39 ml     Date 12/29/21 0000 - 12/29/21 2359   Shift 2009-9248 1161-5305 9100-2249 24 Hour Total   INTAKE   I.V.(mL/kg) 820(6.4)  986.6(7.6) 1806.6(14)   NG/GT(mL/kg)  1000(7.8) 145(1.1) 1145(8.9)   IV Piggyback(mL/kg) 225(1.7)   225(1.7)   Shift Total(mL/kg) 1045(8.1) 1000(7.8) 1131.6(8.8) 3176.6(24.6)   OUTPUT   Urine(mL/kg/hr) 550(0.5) 1160(1.1) 1160 2870   Stool(mL/kg) 50(0.4)  400(3.1) 450(3.5)   Shift Total(mL/kg) 600(4.7) 1160(9) 1560(12.1) 3320(25.7)   Weight (kg) 129 129 129 129     Wt Readings from Last 3 Encounters:   12/28/21 284 lb 6.3 oz (129 kg)   12/11/21 300 lb (136.1 kg)   12/12/19 (!) 355 lb 9.6 oz (161.3 kg)     Body mass index is 36.51 kg/m². PHYSICAL EXAM:      I have discussed the care of Fletcher Malhotra, including pertinent history and exam findings, with the resident/APC/staff. I have seen the patient and the key elements of all parts of the encounter have been performed by me. Physical exam was deferred to limit physical exposure and PPE resources. I reviewed the interval history, interpreted all available radiographic, laboratory and physiologic data at the time of service. I agree with the assessment and plan as documented by resident/APC/ ancillary staff.   Patient remains on the ventilator  Trachea is in the midline  No subcutaneous air  No JVD  No rhonchi  Abdomen is not distended  No edema    MEDICATIONS:  Scheduled Meds:   meropenem  1,000 mg IntraVENous Q8H    furosemide  40 mg IntraVENous BID    chlordiazePOXIDE  10 mg Per NG tube 4x Daily    lidocaine 1 % injection  5 mL IntraDERmal Once    sodium chloride flush  5-40 mL IntraVENous 2 times per day    oxyCODONE  10 mg Oral Q6H    docusate  100 mg Per NG tube BID    insulin lispro  0-6 Units SubCUTAneous Q6H    lansoprazole  30 mg Oral QAM AC    insulin glargine  10 Units SubCUTAneous Nightly    sodium chloride flush  5-40 mL IntraVENous 2 times per day    enoxaparin  30 mg SubCUTAneous BID    Vitamin D  2,000 Units Oral Daily    dexamethasone  10 mg IntraVENous Q24H     Continuous Infusions:   cisatracurium (NIMBEX) infusion 2 mcg/kg/min (12/29/21 2038)    dexmedetomidine 0.6 mcg/kg/hr (12/29/21 1753)    propofol 40 mcg/kg/min (12/29/21 1800)    sodium chloride      sodium chloride      dextrose      norepinephrine 1 mcg/min (12/29/21 2300)    midazolam 6 mg/hr (12/29/21 2039)    fentaNYL 150 mcg/hr (12/29/21 2039)    dextrose      sodium chloride 10 mL/hr at 12/13/21 1548     PRN Meds:   ibuprofen, 600 mg, Q6H PRN  sodium chloride flush, 5-40 mL, PRN  sodium chloride, 25 mL, PRN  metoprolol, 5 mg, Q6H PRN  polyethylene glycol, 17 g, BID PRN  senna, 5 mL, Nightly PRN  bisacodyl, 10 mg, Daily PRN  acetaminophen, 650 mg, Q4H PRN  sodium chloride flush, 5-40 mL, PRN  sodium chloride, 25 mL, PRN  ondansetron, 4 mg, Q8H PRN   Or  ondansetron, 4 mg, Q6H PRN  acetaminophen, 650 mg, Q6H PRN  glucose, 15 g, PRN  dextrose, 12.5 g, PRN  glucagon (rDNA), 1 mg, PRN  dextrose, 100 mL/hr, PRN  glucose, 15 g, PRN  dextrose, 12.5 g, PRN  glucagon (rDNA), 1 mg, PRN  dextrose, 100 mL/hr, PRN        SUPPORT DEVICES: [x] Ventilator [] BIPAP  [] Nasal Cannula [] Room Air      ABGs:     Lab Results   Component Value Date    XQV6IEC NOT REPORTED 12/29/2021    FIO2 60.0 12/29/2021       DATA:  Complete Blood Count:   Recent Labs     12/27/21  0330 12/28/21  0529 12/29/21  0511   WBC 14.4* 17.3* 13.7*   RBC 4.64 4.98 4.35   HGB 14.5 15.4 13.6   HCT 43.7 47.0 41.8   MCV 94.2 94.4 96.1   MCH 31.3 30.9 31.3   MCHC 33.2 32.8 32.5   RDW 13.6 14.4 14.4    254 179   MPV 11.9 11.5 11.5        Last 3 Blood Glucose:   Recent Labs     12/27/21  0330 12/28/21  0529 12/29/21  0511   GLUCOSE 129* 115* 128*        PT/INR:    Lab Results   Component Value Date    PROTIME 13.0 12/12/2021    INR 1.0 12/12/2021     PTT:    Lab Results   Component Value Date    APTT 39.1 12/12/2021       Comprehensive Metabolic Profile:   Recent Labs     12/27/21  0330 12/28/21  0529 12/29/21  0511    141 141   K 3.9 3.3* 4.3    102 104   CO2 28 27 27   BUN 27* 24* 30*   CREATININE 0.30* 0.36* 0.42*   GLUCOSE 129* 115* 128*   CALCIUM 9.1 9.1 9.1   PROT 6.1* 6.0* 5.4*   LABALBU 3.4* 3.5 3.1*   BILITOT 0.42 0.40 0.38   ALKPHOS 56 58 54   AST 40* 22 15   ALT 94* 71* 54*      Magnesium:   Lab Results   Component Value Date    MG 2.2 12/28/2021    MG 2.5 12/17/2021    MG 2.3 12/16/2021     Phosphorus:   Lab Results   Component Value Date    PHOS 3.2 12/18/2021    PHOS 4.0 12/17/2021    PHOS 2.8 12/16/2021     Ionized Calcium: No results found for: CAION     Urinalysis:   Lab Results   Component Value Date    NITRU NEGATIVE 12/29/2021    COLORU Yellow 12/29/2021    PHUR 6.0 12/29/2021    WBCUA 2 TO 5 12/29/2021    RBCUA TOO NUMEROUS TO COUNT 12/29/2021    MUCUS 3+ 12/29/2021    TRICHOMONAS NOT REPORTED 12/29/2021    YEAST NOT REPORTED 12/29/2021    BACTERIA NOT REPORTED 12/29/2021    SPECGRAV 1.039 12/29/2021    LEUKOCYTESUR NEGATIVE 12/29/2021    UROBILINOGEN Normal 12/29/2021    BILIRUBINUR NEGATIVE 12/29/2021    GLUCOSEU NEGATIVE 12/29/2021    KETUA NEGATIVE 12/29/2021    AMORPHOUS NOT REPORTED 12/29/2021           XR CHEST (SINGLE VIEW FRONTAL)    Result Date: 12/14/2021  Mild interval improvement in multifocal airspace disease particularly at the right base. Support tubes and lines as above.      XR CHEST (SINGLE VIEW FRONTAL)    Result Date: 12/12/2021  Stable appearing     XR CHEST (SINGLE VIEW FRONTAL)    Result Date: 12/11/2021  Multifocal hazy opacities throughout both lungs consistent with COVID pneumonia and or pulmonary edema. XR CHEST PORTABLE    Result Date: 12/12/2021  Stable appearing chest with unchanged support tubes/lines and persistent extensive bilateral airspace disease consistent with known COVID pneumonia. XR CHEST PORTABLE    Result Date: 12/12/2021  Right internal jugular central venous catheter tip projecting over the proximal SVC versus brachiocephalic vein. No pneumothorax. Otherwise stable exam from earlier today. XR CHEST PORTABLE    Result Date: 12/12/2021  Endotracheal tube tip is 3.2 cm above the saul. Overall significant worsening of multifocal airspace opacities keeping with history of COVID-19. CT CHEST PULMONARY EMBOLISM W CONTRAST    Result Date: 12/11/2021  1. No evidence of pulmonary embolism 2. Diffuse ground-glass opacities throughout the lungs, typical of COVID-19 pulmonary disease.            Past Medical History:   Diagnosis Date    Arthritis     Asthma     Diabetes mellitus ([address]     Edema     GERD (gastroesophageal reflux disease)     Hypertension     on lasix    Migraine     IRMA on CPAP     seldom use machine        Social History     Socioeconomic History    Marital status:      Spouse name: None    Number of children: None    Years of education: None    Highest education level: None   Occupational History    None   Tobacco Use    Smoking status: Former Smoker    Smokeless tobacco: Never Used   Vaping Use    Vaping Use: Never used   Substance and Sexual Activity    Alcohol use: Yes     Comment: every couple months    Drug use: Never    Sexual activity: None   Other Topics Concern    None   Social History Narrative    None     Social Determinants of Health     Financial Resource Strain:     Difficulty of Paying Living Expenses: Not on file   Food Insecurity:     Worried About Running Out of Food in the Last Year: Not on file    Ran Out of Food in the Last Year: Not on file   Transportation Needs:     Lack of Transportation (Medical): Not on file    Lack of Transportation (Non-Medical): Not on file   Physical Activity:     Days of Exercise per Week: Not on file    Minutes of Exercise per Session: Not on file   Stress:     Feeling of Stress : Not on file   Social Connections:     Frequency of Communication with Friends and Family: Not on file    Frequency of Social Gatherings with Friends and Family: Not on file    Attends [employer] Services: Not on file    Active Member of [employer] or Organizations: Not on file    Attends Club or Organization Meetings: Not on file    Marital Status: Not on file   Intimate Partner Violence:     Fear of Current or Ex-Partner: Not on file    Emotionally Abused: Not on file    Physically Abused: Not on file    Sexually Abused: Not on file   Housing Stability:     Unable to Pay for Housing in the Last Year: Not on file    Number of [place] in the Last Year: Not on file    Unstable Housing in the Last Year: Not on file         There is no immunization history on file for this patient.       Family History   Problem Relation Age of Onset    Heart Attack Sister 32    Diabetes Paternal Grandmother     Heart Disease Paternal Uncle     Heart Disease Paternal Uncle     Heart Disease Paternal Uncle     Cancer Maternal Aunt     Cancer Maternal Aunt     Cancer Maternal Uncle     Cancer Maternal Uncle          Past Surgical History:   Procedure Laterality Date    APPENDECTOMY      ARTHROPLASTY Left 11/1/2019    LEFT FOOT 1ST MTPJ ARTHROPLASTY WITH PEREZ IMPLANT AND GROMMETS  [facility] performed by Bambi An MD at [address] Right 12/12/2019    RIGHT FOOT ARTHROPLASTY 1ST MTPJ (Right Foot)    ARTHROPLASTY Right 12/12/2019    RIGHT FOOT ARTHROPLASTY 1ST MTPJ performed by Bambi An MD at [address] [place] [address] REPAIR Right     CARDIAC CATHETERIZATION  2014    no blockage no stents    CHOLECYSTECTOMY      COLONOSCOPY      ENDOSCOPY, COLON, DIAGNOSTIC      TOE SURGERY Left 11/01/2019     LEFT FOOT 1ST MTPJ ARTHROPLASTY WITH PEREZ IMPLANT AND GROMMETS  [facility] (Left )    TONSILLECTOMY            VENTILATOR SETTINGS:  Vent Information  $Ventilation: $Subsequent Day  Skin Assessment: Clean, dry, & intact  Suction Catheter Diameter: 14  Equipment ID: [id number]  Equipment Changed: Airway securing device  Vent Type: Servo i  Vent Mode: PRVC  Vt Ordered: 550 mL  Rate Set: 24 bmp  FiO2 : 50 %  SpO2: 97 %  SpO2/FiO2 ratio: 188  Sensitivity: 3  PEEP/CPAP: (S) 10  I Time/ I Time %: 0.7 s  Humidification Source: HME  Humidification Temp: 37  Humidification Temp Measured: 36.8  Circuit Condensation: Drained  Nitric Oxide/Epoprostenol In Use?: No     PaO2/FiO2 RATIO:  Recent Labs     12/29/21 0328   POCPO2 64.0*      FiO2 : 50 %       LABS:  ABGs:   Recent Labs     12/27/21  0331 12/28/21  0512 12/29/21  0328   POCPH 7.445 7.419 7.418   POCPCO2 48.5* 48.5* 52.1*   POCPO2 67.9* 56.9* 64.0*   POCHCO3 33.3* 31.4* 33.6*   [id number] 94 89* 92*            ASSESSMENT:     Principal Problem:    Pneumonia due to COVID-19 virus  Active Problems:    Shock (HCC)    Acute respiratory failure with hypoxia (HCC)    Type 2 diabetes mellitus (HCC)    Asthma    IRMA on CPAP    Hypertension    GERD (gastroesophageal reflux disease)    ARDS (adult respiratory distress syndrome) (HCC)  Resolved Problems:    * No resolved hospital problems. *              Acute hypoxic respiratory failure secondary to COVID 19   Acute respiratory distress syndrome   Bilateral multifocal pneumonia due to COVID 19 infection   Covid -19 pandemic emergency   Post hypercarbic metabolic alkalosis   Hyperglycemia, better   Leukocytosis, slightly better   Shock requiring norepinephrine?   Sepsis    LOS: 17  PLAN:    · D/w RT  · D/w RN   · Chest x-ray on 12/24/2021 with improving bilateral pulmonary infiltrates  · Sedation reviewed. We will try to cut down on the sedatives and pain medications  · Cont ARDS ventilation  · Proning held off as it did not appear to make much of difference  · Airborne isolation and droplet precautions to be continued  · Continue supportive care   · Cont treatment with medications for COVID  · Cont tube feeding  · Monitor endotracheal secretions   · Will obtain xray chest as needed  · ABG as needed  · Prognosis guarded given age and severity of illness. · On Lovenox and lansoprazole  · On Decadron  · Patient is on cefepime  · Check triglycerides as long as the patient is needing propofol  · Patient required placement of the Hahn catheter due to retention of urine requiring straight catheter multiple times a day  · Diuresis Lasix 40 mg twice daily for 3 days. Restrictive fluid strategy  · Treatment plan Discussed with nursing staff in detail , all questions answered . · Work on decreasing sedation. Total critical care time caring for this patient with life threatening, unstable organ failure, including direct patient contact, management of life support systems, review of data including imaging and labs, discussions with other team members and physicians at least 27  Min so far today, excluding procedures. Electronically signed by Carlyn Luna DO on 12/29/2021 at 11:16 PM       This patient was evaluated in the context of the global SARS-CoV-2 (COVID-19) pandemic, which necessitated considerations that the patient either has COVID-19 infection or is at risk of infection with COVID-19. Institutional protocols and algorithms that pertain to the evaluation & management of patients with COVID-19 or those at risk for COVID-19 are in a state of rapid changes based on information released by regulatory bodies including the CDC and federal and state organizations. These policies and algorithms were followed during the patient's care.   Please note that this chart was generated using voice recognition Dragon dictation software. Although every effort was made to ensure the accuracy of this automated transcription, some errors in transcription may have occurred.

## 2021-12-30 NOTE — PROGRESS NOTES
[facility]  Office: [address], DO, Domenic Fernandez, DO, Dotty [facility], DO, Richie Anders Blood, DO, Mustapha Dickinson MD, Nir Kilgore MD, Yariel Madden MD, Wang House MD, Seb Fierro MD, César Cummings MD, Suzi Lange MD, Sincere Laboy, DO, Justen Stevenson, DO, Patricia Cobos MD,  Alfredo Marr, DO, Stefany Hoffman MD, Christiane Main MD, Lakeshia Palomo MD, Vin Davies MD, Rayne Kraus MD, Catherine Cabrera MD, Jayjay Bates MD, Vida Meyer [facility], [facility], CNP, Magdalena Giordano, CNP, Roman Melendez, CNS, Raquel Guaman, CNP, Cynthia Fulton, CNP, Sandor Son, CNP, Francisca Epley, CNP, Roly Rajput, CNP, Kilo Hoover PA-C, Suhas Marcelo, DNP, Surjit Bailey, DNP, Margret Morales, CNP, Sherwin Olguin, CNP, Dick Sharp, CNP, Zack Lerma, CNP, Adelaida Cardenas, [facility], Hipolito Dailey, [facility]4 [facility]      Daily Progress Note     Admit Date: 12/12/2021  Bed/Room No.  3026/3026-01  Admitting Physician : Yariel Madden MD  Code Status :Full Code  Hospital Day:  LOS: 18 days   Chief Complaint:     Breathing difficulty. Principal Problem:    Pneumonia due to COVID-19 virus  Active Problems:    Shock ([facility] [address]    Acute respiratory failure with hypoxia (HCC)    Type 2 diabetes mellitus (HCC)    Asthma    IRMA on CPAP    Hypertension    GERD (gastroesophageal reflux disease)    ARDS (adult respiratory distress syndrome) ([facility] [address]  Resolved Problems:    * No resolved hospital problems. *    Subjective : Interval History/Significant events :  12/30/21  Unchanged  Patient continues to have intermittent fevers. T-max 102 due to Fahrenheit. He remains on low-dose Levophed 2 mcg. Patient remains on ventilator 2% FiO2 with PEEP of 10 and RR 24. Vitals - Unstable afebrile  Labs -normal electrolytes, creatinine 0.38  Nursing notes , Consults notes reviewed. Overnight events and updates discussed with Nursing staff . Background History:         Stacy Grijalva is 60 y.o. male  Who was admitted to the hospital on 12/12/2021 for treatment of Pneumonia due to COVID-19 virus. Patient initially presented with shortness of breath at [facility].  He had to suggest positive for COVID-19 infection. Patient reported that his son had also COVID-19 infection. Patient was hypoxic in 50s on arrival and was treated with BiPAP however breathing worsened and patient was subsequently intubated. Patient was transferred to [facility]'s on 12/12/2021 as he was requiring high ventilator support 100% FiO2 with PEEP of 22. He was given Actemra and started on high-dose Decadron. Patient was started with he was started on Flolan and given paralytics for proning per ARDS protocol. Patient also received intermittent Lasix. Proning was stopped on 12/20/2021 and paralytics were stopped on 12/22/2021. Patient started to have fevers and was started on cefepime. Respiratory cultures were negative. Patient to remain in isolation till 12/31/2021. PMH:  Past Medical History:   Diagnosis Date    Arthritis     Asthma     [facility] mellitus ([facility] [address]     Edema     GERD (gastroesophageal reflux disease)     Hypertension     on lasix    Migraine     IRMA on CPAP     seldom use machine      Allergies:    Allergies   Allergen Reactions    Nuts [Peanut-Containing Drug Products] Anaphylaxis    Sunflower Oil Anaphylaxis     Sunflower seeds      Medications :  meropenem, 1,000 mg, IntraVENous, Q8H  furosemide, 40 mg, IntraVENous, BID  chlordiazePOXIDE, 10 mg, Per NG tube, 4x Daily  lidocaine 1 % injection, 5 mL, IntraDERmal, Once  sodium chloride flush, 5-40 mL, IntraVENous, 2 times per day  oxyCODONE, 10 mg, Oral, Q6H  docusate, 100 mg, Per NG tube, BID  insulin lispro, 0-6 Units, SubCUTAneous, Q6H  lansoprazole, 30 mg, Oral, QAM AC  insulin glargine, 10 Units, SubCUTAneous, Nightly  sodium chloride flush, 5-40 mL, IntraVENous, 2 times per day  enoxaparin, 30 mg, SubCUTAneous, BID  Vitamin D, 2,000 Units, Oral, Daily  dexamethasone, 10 mg, IntraVENous, Q24H        Review of Systems   Review of Systems   Unable to perform ROS: Intubated     Objective :      Current Vitals : Temp: 102 °F (38.9 °C),  Pulse: 67, Resp: 24, BP: 122/73, SpO2: 95 %  Last 24 Hrs Vitals   Patient Vitals for the past 24 hrs:   BP Temp Temp src Pulse Resp SpO2 Height Weight   12/30/21 1600 122/73 102 °F (38.9 °C) Bladder 67 24 95 % -- --   12/30/21 1530 -- -- -- 69 24 95 % 6' 2\" (1.88 m) --   12/30/21 1500 -- -- -- 72 14 95 % -- --   12/30/21 1430 -- 102 °F (38.9 °C) Bladder 70 20 93 % -- --   12/30/21 1400 119/66 -- -- 66 22 94 % -- --   12/30/21 1330 -- -- -- 64 24 94 % -- --   12/30/21 1300 105/63 -- -- 63 24 94 % -- --   12/30/21 1230 -- -- -- 65 24 94 % -- --   12/30/21 1200 112/62 101.5 °F (38.6 °C) Bladder 58 24 94 % -- --   12/30/21 1130 -- -- -- 57 24 94 % -- --   12/30/21 1125 -- -- -- 58 24 93 % -- --   12/30/21 1120 -- -- -- 57 24 93 % -- --   12/30/21 1115 -- -- -- 58 24 93 % -- --   12/30/21 1100 103/65 -- -- 57 24 93 % -- --   12/30/21 1030 -- -- -- 57 24 93 % -- --   12/30/21 1000 110/78 -- -- 56 24 95 % -- --   12/30/21 0930 -- -- -- 56 23 93 % -- --   12/30/21 0900 97/65 -- -- 56 24 93 % -- --   12/30/21 0830 -- -- -- 56 24 94 % -- --   12/30/21 0816 -- -- -- -- 24 94 % -- --   12/30/21 0800 99/68 101.3 °F (38.5 °C) Bladder 56 24 94 % -- --   12/30/21 0730 -- -- -- 57 24 93 % -- --   12/30/21 0700 99/70 -- -- 56 24 94 % -- --   12/30/21 0615 -- -- -- 56 24 94 % -- --   12/30/21 0600 98/65 -- -- 56 24 94 % -- --   12/30/21 0545 -- -- -- 56 24 94 % -- --   12/30/21 0530 -- -- -- 56 24 94 % -- --   12/30/21 0515 -- -- -- 56 24 95 % -- --   12/30/21 0500 101/67 -- -- 56 24 94 % -- --   12/30/21 0445 -- -- -- 56 24 94 % -- --   12/30/21 0430 -- -- -- 57 24 94 % -- --   12/30/21 0415 -- -- -- 57 24 95 % -- --   12/30/21 0400 99/66 -- -- 57 24 94 % -- --   12/30/21 0342 -- -- -- -- 24 95 % -- --   12/30/21 0300 98/67 -- -- 57 24 95 % -- --   12/30/21 0245 -- -- -- 57 24 95 % -- --   12/30/21 0230 -- -- -- 57 24 96 % -- --   12/30/21 0215 -- -- -- 57 24 96 % -- --   12/30/21 0200 97/66 -- -- 58 24 96 % -- --   12/30/21 0145 98/65 -- -- 59 24 -- -- --   12/30/21 0130 -- -- -- 59 24 -- -- --   12/30/21 0115 -- -- -- 60 24 -- -- --   12/30/21 0100 -- -- -- 63 21 95 % -- --   12/30/21 0045 -- -- -- 62 25 98 % -- --   12/30/21 0043 -- -- -- -- -- -- -- 283 lb 4.7 oz (128.5 kg)   12/30/21 0030 -- -- -- 62 -- 97 % -- --   12/30/21 0015 -- -- -- 61 -- 98 % -- --   12/30/21 0000 -- 101.7 °F (38.7 °C) Bladder 61 24 97 % -- --   12/29/21 2345 -- -- -- 63 -- 99 % -- --   12/29/21 2337 -- -- -- -- 24 98 % -- --   12/29/21 2330 -- -- -- 61 -- 98 % -- --   12/29/21 2315 -- -- -- 61 -- 98 % -- --   12/29/21 2300 -- -- -- 62 24 97 % -- --   12/29/21 2200 -- -- -- 64 24 94 % -- --   12/29/21 2100 -- -- -- 66 24 96 % -- --   12/29/21 2006 -- -- -- 69 24 97 % -- --   12/29/21 2000 -- -- -- 73 20 97 % -- --   12/29/21 1900 -- 101.7 °F (38.7 °C) Bladder 65 24 98 % -- --   12/29/21 1830 -- -- -- 66 24 -- -- --   12/29/21 1800 -- -- -- 65 23 96 % -- --   12/29/21 1730 -- -- -- 77 24 93 % -- --   12/29/21 1700 -- -- -- 86 23 92 % -- --   12/29/21 1639 -- -- -- -- 24 94 % -- --   12/29/21 1630 -- -- -- 95 24 93 % -- --     Intake / output   12/29 1501 - 12/30 1500  In: 3694.1 [I.V.:2019.1]  Out: 3075 [Urine:1213]  Physical Exam:  Physical Exam  Vitals and nursing note reviewed. Constitutional:       Appearance: He is well-developed. He is ill-appearing. Interventions: He is sedated and intubated. Neck:      Thyroid: No thyroid mass. Cardiovascular:      Rate and Rhythm: Normal rate and regular rhythm. Pulses: Normal pulses. Heart sounds: Normal heart sounds. No murmur heard. Pulmonary:      Effort: He is intubated. Breath sounds: Normal breath sounds. Musculoskeletal:      Right lower leg: No edema. Left lower leg: No edema. Lymphadenopathy:      Cervical: No cervical adenopathy. Skin:     Capillary Refill: Capillary refill takes less than 2 seconds. Neurological:      Motor: No tremor or abnormal muscle tone. Laboratory findings:    Recent Labs     12/28/21  0529 12/29/21  0511 12/30/21  0600   WBC 17.3* 13.7* 13.1*   HGB 15.4 13.6 13.0   HCT 47.0 41.8 39.7*    179 178     Recent Labs     12/28/21  0529 12/29/21  0511 12/30/21  0600    141 142   K 3.3* 4.3 3.8    104 104   CO2 27 27 27   GLUCOSE 115* 128* 125*   BUN 24* 30* 29*   CREATININE 0.36* 0.42* 0.38*   MG 2.2  --   --    CALCIUM 9.1 9.1 9.0     Recent Labs     12/28/21  0529 12/29/21  0511 12/30/21  0600   PROT 6.0* 5.4* 5.5*   LABALBU 3.5 3.1* 3.0*   AST 22 15 15   ALT 71* 54* 47*   ALKPHOS 58 54 50   BILITOT 0.40 0.38 0.34   BILIDIR 0.14 0.12 0.15          Specific Gravity, UA   Date Value Ref Range Status   12/29/2021 1.039 (H) 1.005 - 1.030 Final     Protein, UA   Date Value Ref Range Status   12/29/2021 1+ (A) NEGATIVE Final     RBC, UA   Date Value Ref Range Status   12/29/2021 TOO NUMEROUS TO COUNT 0 - 2 /HPF Final     Bacteria, UA   Date Value Ref Range Status   12/29/2021 NOT REPORTED None Final     Nitrite, Urine   Date Value Ref Range Status   12/29/2021 NEGATIVE NEGATIVE Final     WBC, UA   Date Value Ref Range Status   12/29/2021 2 TO 5 0 - 5 /HPF Final     Leukocyte Esterase, Urine   Date Value Ref Range Status   12/29/2021 NEGATIVE NEGATIVE Final       Imaging / Clinical Data :-   XR CHEST (SINGLE VIEW FRONTAL)    Result Date: 12/22/2021  Stable mild cardiomegaly. Patchy airspace opacities, left more than right, may be related to pulmonary edema versus pneumonia. Nonspecific bowel gas pattern. XR ABDOMEN (KUB) (SINGLE AP VIEW)    Result Date: 12/22/2021  Stable mild cardiomegaly. Patchy airspace opacities, left more than right, may be related to pulmonary edema versus pneumonia. Nonspecific bowel gas pattern.      XR CHEST PORTABLE    Result Date: 12/24/2021  1. Stable cardiomegaly. 2.  Patchy airspace opacities, stable in the right lower chest, slightly improved on the left. Findings may be related to pulmonary edema or improving pneumonia. 3.  Support tubes and catheters in good position. XR CHEST PORTABLE    Result Date: 12/19/2021  Cardiomegaly, vascular congestion and worsening pulmonary opacities with bilateral effusions favoring pulmonary edema over multifocal airspace disease. Support tubes and lines as above. Clinical Course : unchanged  Assessment and Plan  :        Acute respiratory failure with hypoxia due to ARDS from COVID-19 pneumonia - ventilator support, high-dose Decadron, s/p Actemra on 12/12/2021. Antibiotics per ID. Treated with 10 days of  cefepime  12/18/2021 - 12/28/21, meropenem started on 12/28/2021 due to high fevers. ID following. Type 2 diabetes mellitus - on Lantus, blood sugar controlled. Continue tube feed. Obesity BMI 39  Essential hypertension -  Stable , off levophed. .  IRMA -was noncompliant with CPAP at home     Recurrent fevers-check lower extremity Doppler replace left Art Line     Continue DVT prophylaxis, GI prophylaxis. Patient to remain in isolation till 12/31/2021. Continue to monitor vitals , Intake / output ,  Cell count , HGB , Kidney function, oxygenation  as indicated .      Plan discussed with Staff RN     (Please note that portions of this note were completed with a voice recognition program. Efforts were made to edit the dictations but occasionally words are mis-transcribed.)      Madeleine Blake MD  12/30/2021

## 2021-12-31 LAB
ABSOLUTE EOS #: <0.03 K/UL (ref 0–0.44)
ABSOLUTE IMMATURE GRANULOCYTE: 0.1 K/UL (ref 0–0.3)
ABSOLUTE LYMPH #: 0.95 K/UL (ref 1.1–3.7)
ABSOLUTE MONO #: 0.8 K/UL (ref 0.1–1.2)
ALBUMIN SERPL-MCNC: 3.1 G/DL (ref 3.5–5.2)
ALBUMIN/GLOBULIN RATIO: 1.3 (ref 1–2.5)
ALLEN TEST: ABNORMAL
ALP BLD-CCNC: 50 U/L (ref 40–129)
ALT SERPL-CCNC: 65 U/L (ref 5–41)
ANION GAP SERPL CALCULATED.3IONS-SCNC: 9 MMOL/L (ref 9–17)
AST SERPL-CCNC: 24 U/L
BASOPHILS # BLD: 0 % (ref 0–2)
BASOPHILS ABSOLUTE: <0.03 K/UL (ref 0–0.2)
BILIRUB SERPL-MCNC: 0.42 MG/DL (ref 0.3–1.2)
BILIRUBIN DIRECT: 0.13 MG/DL
BILIRUBIN, INDIRECT: 0.29 MG/DL (ref 0–1)
BUN BLDV-MCNC: 32 MG/DL (ref 6–20)
BUN/CREAT BLD: ABNORMAL (ref 9–20)
CALCIUM SERPL-MCNC: 8.9 MG/DL (ref 8.6–10.4)
CHLORIDE BLD-SCNC: 103 MMOL/L (ref 98–107)
CO2: 29 MMOL/L (ref 20–31)
CREAT SERPL-MCNC: 0.22 MG/DL (ref 0.7–1.2)
DIFFERENTIAL TYPE: ABNORMAL
EOSINOPHILS RELATIVE PERCENT: 0 % (ref 1–4)
FIO2: 50
GFR AFRICAN AMERICAN: >60 ML/MIN
GFR NON-AFRICAN AMERICAN: >60 ML/MIN
GFR SERPL CREATININE-BSD FRML MDRD: ABNORMAL ML/MIN/{1.73_M2}
GFR SERPL CREATININE-BSD FRML MDRD: ABNORMAL ML/MIN/{1.73_M2}
GLOBULIN: ABNORMAL G/DL (ref 1.5–3.8)
GLUCOSE BLD-MCNC: 114 MG/DL (ref 70–99)
GLUCOSE BLD-MCNC: 121 MG/DL (ref 75–110)
GLUCOSE BLD-MCNC: 124 MG/DL (ref 74–100)
GLUCOSE BLD-MCNC: 128 MG/DL (ref 75–110)
GLUCOSE BLD-MCNC: 129 MG/DL (ref 75–110)
GLUCOSE BLD-MCNC: 163 MG/DL (ref 75–110)
GLUCOSE BLD-MCNC: 173 MG/DL (ref 75–110)
HCT VFR BLD CALC: 37.9 % (ref 40.7–50.3)
HEMOGLOBIN: 13 G/DL (ref 13–17)
IMMATURE GRANULOCYTES: 1 %
LYMPHOCYTES # BLD: 9 % (ref 24–43)
MAGNESIUM: 2.1 MG/DL (ref 1.6–2.6)
MCH RBC QN AUTO: 32.1 PG (ref 25.2–33.5)
MCHC RBC AUTO-ENTMCNC: 34.3 G/DL (ref 28.4–34.8)
MCV RBC AUTO: 93.6 FL (ref 82.6–102.9)
MODE: ABNORMAL
MONOCYTES # BLD: 7 % (ref 3–12)
NEGATIVE BASE EXCESS, ART: ABNORMAL (ref 0–2)
NRBC AUTOMATED: 0 PER 100 WBC
O2 DEVICE/FLOW/%: ABNORMAL
PATIENT TEMP: ABNORMAL
PDW BLD-RTO: 14.1 % (ref 11.8–14.4)
PLATELET # BLD: 146 K/UL (ref 138–453)
PLATELET ESTIMATE: ABNORMAL
PMV BLD AUTO: 11.2 FL (ref 8.1–13.5)
POC HCO3: 33.4 MMOL/L (ref 21–28)
POC LACTIC ACID: 1.32 MMOL/L (ref 0.56–1.39)
POC O2 SATURATION: 95 % (ref 94–98)
POC PCO2 TEMP: ABNORMAL MM HG
POC PCO2: 38.4 MM HG (ref 35–48)
POC PH TEMP: ABNORMAL
POC PH: 7.55 (ref 7.35–7.45)
POC PO2 TEMP: ABNORMAL MM HG
POC PO2: 65.7 MM HG (ref 83–108)
POSITIVE BASE EXCESS, ART: 10 (ref 0–3)
POTASSIUM SERPL-SCNC: 3.4 MMOL/L (ref 3.7–5.3)
RBC # BLD: 4.05 M/UL (ref 4.21–5.77)
RBC # BLD: ABNORMAL 10*6/UL
SAMPLE SITE: ABNORMAL
SEG NEUTROPHILS: 83 % (ref 36–65)
SEGMENTED NEUTROPHILS ABSOLUTE COUNT: 8.91 K/UL (ref 1.5–8.1)
SODIUM BLD-SCNC: 141 MMOL/L (ref 135–144)
TCO2 (CALC), ART: ABNORMAL MMOL/L (ref 22–29)
TOTAL PROTEIN: 5.5 G/DL (ref 6.4–8.3)
TRIGL SERPL-MCNC: 128 MG/DL
WBC # BLD: 10.8 K/UL (ref 3.5–11.3)
WBC # BLD: ABNORMAL 10*3/UL

## 2021-12-31 PROCEDURE — 2500000003 HC RX 250 WO HCPCS: Performed by: INTERNAL MEDICINE

## 2021-12-31 PROCEDURE — 80076 HEPATIC FUNCTION PANEL: CPT

## 2021-12-31 PROCEDURE — 6360000002 HC RX W HCPCS: Performed by: NURSE PRACTITIONER

## 2021-12-31 PROCEDURE — 82803 BLOOD GASES ANY COMBINATION: CPT

## 2021-12-31 PROCEDURE — 6370000000 HC RX 637 (ALT 250 FOR IP): Performed by: INTERNAL MEDICINE

## 2021-12-31 PROCEDURE — 6370000000 HC RX 637 (ALT 250 FOR IP): Performed by: STUDENT IN AN ORGANIZED HEALTH CARE EDUCATION/TRAINING PROGRAM

## 2021-12-31 PROCEDURE — 83605 ASSAY OF LACTIC ACID: CPT

## 2021-12-31 PROCEDURE — 6370000000 HC RX 637 (ALT 250 FOR IP): Performed by: NURSE PRACTITIONER

## 2021-12-31 PROCEDURE — 80048 BASIC METABOLIC PNL TOTAL CA: CPT

## 2021-12-31 PROCEDURE — 37799 UNLISTED PX VASCULAR SURGERY: CPT

## 2021-12-31 PROCEDURE — 6370000000 HC RX 637 (ALT 250 FOR IP): Performed by: FAMILY MEDICINE

## 2021-12-31 PROCEDURE — 2580000003 HC RX 258: Performed by: INTERNAL MEDICINE

## 2021-12-31 PROCEDURE — 6360000002 HC RX W HCPCS: Performed by: INTERNAL MEDICINE

## 2021-12-31 PROCEDURE — 94761 N-INVAS EAR/PLS OXIMETRY MLT: CPT

## 2021-12-31 PROCEDURE — 84478 ASSAY OF TRIGLYCERIDES: CPT

## 2021-12-31 PROCEDURE — 2700000000 HC OXYGEN THERAPY PER DAY

## 2021-12-31 PROCEDURE — 83735 ASSAY OF MAGNESIUM: CPT

## 2021-12-31 PROCEDURE — 99232 SBSQ HOSP IP/OBS MODERATE 35: CPT | Performed by: INTERNAL MEDICINE

## 2021-12-31 PROCEDURE — 2580000003 HC RX 258: Performed by: NURSE PRACTITIONER

## 2021-12-31 PROCEDURE — 99232 SBSQ HOSP IP/OBS MODERATE 35: CPT | Performed by: FAMILY MEDICINE

## 2021-12-31 PROCEDURE — 94003 VENT MGMT INPAT SUBQ DAY: CPT

## 2021-12-31 PROCEDURE — 82947 ASSAY GLUCOSE BLOOD QUANT: CPT

## 2021-12-31 PROCEDURE — 6360000002 HC RX W HCPCS

## 2021-12-31 PROCEDURE — 2000000000 HC ICU R&B

## 2021-12-31 PROCEDURE — 99291 CRITICAL CARE FIRST HOUR: CPT | Performed by: INTERNAL MEDICINE

## 2021-12-31 PROCEDURE — 85025 COMPLETE CBC W/AUTO DIFF WBC: CPT

## 2021-12-31 RX ORDER — FUROSEMIDE 10 MG/ML
INJECTION INTRAMUSCULAR; INTRAVENOUS
Status: COMPLETED
Start: 2021-12-31 | End: 2021-12-31

## 2021-12-31 RX ORDER — OXYCODONE HCL 5 MG/5 ML
10 SOLUTION, ORAL ORAL EVERY 4 HOURS
Status: DISCONTINUED | OUTPATIENT
Start: 2022-01-01 | End: 2022-01-02

## 2021-12-31 RX ORDER — POTASSIUM CHLORIDE 20 MEQ/1
40 TABLET, EXTENDED RELEASE ORAL PRN
Status: DISCONTINUED | OUTPATIENT
Start: 2021-12-31 | End: 2022-01-07 | Stop reason: HOSPADM

## 2021-12-31 RX ORDER — BACLOFEN 10 MG/1
5 TABLET ORAL 3 TIMES DAILY
Status: DISCONTINUED | OUTPATIENT
Start: 2021-12-31 | End: 2022-01-05

## 2021-12-31 RX ORDER — POTASSIUM CHLORIDE 7.45 MG/ML
10 INJECTION INTRAVENOUS PRN
Status: DISCONTINUED | OUTPATIENT
Start: 2021-12-31 | End: 2022-01-07 | Stop reason: HOSPADM

## 2021-12-31 RX ADMIN — DEXMEDETOMIDINE HYDROCHLORIDE 0.6 MCG/KG/HR: 4 INJECTION, SOLUTION INTRAVENOUS at 09:44

## 2021-12-31 RX ADMIN — DEXAMETHASONE SODIUM PHOSPHATE 10 MG: 10 INJECTION INTRAMUSCULAR; INTRAVENOUS at 12:47

## 2021-12-31 RX ADMIN — MEROPENEM 1000 MG: 1 INJECTION, POWDER, FOR SOLUTION INTRAVENOUS at 21:37

## 2021-12-31 RX ADMIN — PROPOFOL 50 MCG/KG/MIN: 10 INJECTION, EMULSION INTRAVENOUS at 17:13

## 2021-12-31 RX ADMIN — BACLOFEN 5 MG: 10 TABLET ORAL at 21:40

## 2021-12-31 RX ADMIN — INSULIN LISPRO 1 UNITS: 100 INJECTION, SOLUTION INTRAVENOUS; SUBCUTANEOUS at 23:26

## 2021-12-31 RX ADMIN — KETAMINE HYDROCHLORIDE 0.2 MG/KG/HR: 50 INJECTION INTRAMUSCULAR; INTRAVENOUS at 21:20

## 2021-12-31 RX ADMIN — CHLORDIAZEPOXIDE HYDROCHLORIDE 10 MG: 5 CAPSULE ORAL at 04:22

## 2021-12-31 RX ADMIN — MEROPENEM 1000 MG: 1 INJECTION, POWDER, FOR SOLUTION INTRAVENOUS at 12:45

## 2021-12-31 RX ADMIN — FUROSEMIDE 40 MG: 10 INJECTION, SOLUTION INTRAMUSCULAR; INTRAVENOUS at 10:39

## 2021-12-31 RX ADMIN — DEXMEDETOMIDINE HYDROCHLORIDE 0.6 MCG/KG/HR: 4 INJECTION, SOLUTION INTRAVENOUS at 01:55

## 2021-12-31 RX ADMIN — CHLORDIAZEPOXIDE HYDROCHLORIDE 10 MG: 5 CAPSULE ORAL at 09:44

## 2021-12-31 RX ADMIN — DEXMEDETOMIDINE HYDROCHLORIDE 0.8 MCG/KG/HR: 4 INJECTION, SOLUTION INTRAVENOUS at 05:45

## 2021-12-31 RX ADMIN — Medication 30 MG: at 10:38

## 2021-12-31 RX ADMIN — Medication 150 MCG/HR: at 01:51

## 2021-12-31 RX ADMIN — Medication 175 MCG/HR: at 14:11

## 2021-12-31 RX ADMIN — Medication 6 MG/HR: at 04:30

## 2021-12-31 RX ADMIN — CHLORDIAZEPOXIDE HYDROCHLORIDE 10 MG: 5 CAPSULE ORAL at 21:37

## 2021-12-31 RX ADMIN — FUROSEMIDE 40 MG: 10 INJECTION, SOLUTION INTRAMUSCULAR; INTRAVENOUS at 17:20

## 2021-12-31 RX ADMIN — ENOXAPARIN SODIUM 30 MG: 100 INJECTION SUBCUTANEOUS at 09:43

## 2021-12-31 RX ADMIN — DEXMEDETOMIDINE HYDROCHLORIDE 1 MCG/KG/HR: 4 INJECTION, SOLUTION INTRAVENOUS at 14:47

## 2021-12-31 RX ADMIN — DOCUSATE SODIUM 100 MG: 50 LIQUID ORAL at 21:36

## 2021-12-31 RX ADMIN — OXYCODONE HYDROCHLORIDE 10 MG: 5 SOLUTION ORAL at 15:30

## 2021-12-31 RX ADMIN — POTASSIUM BICARBONATE 40 MEQ: 782 TABLET, EFFERVESCENT ORAL at 05:43

## 2021-12-31 RX ADMIN — PROPOFOL 10 MCG/KG/MIN: 10 INJECTION, EMULSION INTRAVENOUS at 14:58

## 2021-12-31 RX ADMIN — OXYCODONE HYDROCHLORIDE 10 MG: 5 SOLUTION ORAL at 09:44

## 2021-12-31 RX ADMIN — Medication 200 MCG/HR: at 20:44

## 2021-12-31 RX ADMIN — OXYCODONE HYDROCHLORIDE 10 MG: 5 SOLUTION ORAL at 21:36

## 2021-12-31 RX ADMIN — MEROPENEM 1000 MG: 1 INJECTION, POWDER, FOR SOLUTION INTRAVENOUS at 01:52

## 2021-12-31 RX ADMIN — CHLORDIAZEPOXIDE HYDROCHLORIDE 10 MG: 5 CAPSULE ORAL at 15:16

## 2021-12-31 RX ADMIN — DEXMEDETOMIDINE HYDROCHLORIDE 0.6 MCG/KG/HR: 4 INJECTION, SOLUTION INTRAVENOUS at 20:31

## 2021-12-31 RX ADMIN — BACLOFEN 5 MG: 10 TABLET ORAL at 17:19

## 2021-12-31 RX ADMIN — INSULIN LISPRO 1 UNITS: 100 INJECTION, SOLUTION INTRAVENOUS; SUBCUTANEOUS at 18:36

## 2021-12-31 RX ADMIN — Medication 2000 UNITS: at 09:44

## 2021-12-31 RX ADMIN — INSULIN GLARGINE 10 UNITS: 100 INJECTION, SOLUTION SUBCUTANEOUS at 23:28

## 2021-12-31 RX ADMIN — OXYCODONE HYDROCHLORIDE 10 MG: 5 SOLUTION ORAL at 04:22

## 2021-12-31 RX ADMIN — ENOXAPARIN SODIUM 30 MG: 100 INJECTION SUBCUTANEOUS at 21:37

## 2021-12-31 RX ADMIN — Medication 175 MCG/HR: at 10:52

## 2021-12-31 ASSESSMENT — PULMONARY FUNCTION TESTS
PIF_VALUE: 28
PIF_VALUE: 28
PIF_VALUE: 27
PIF_VALUE: 18
PIF_VALUE: 24
PIF_VALUE: 35

## 2021-12-31 ASSESSMENT — PAIN SCALES - GENERAL: PAINLEVEL_OUTOF10: 0

## 2021-12-31 NOTE — PROGRESS NOTES
[facility]  Office: [address], DO, Beckieshy Adolph, DO, Rell Bliss, DO, Yajaira Morris, DO, Gautam Andrews MD, Carlos Villar MD, Gideon Vance MD, Lucio Justice MD, Donell Cantu MD, Carolynn Snellen, MD, Valerio Fuller MD, Nasir Monahan, DO, Dinah Ward, DO, Karen Dowd MD,  Grisel Harmon, DO, Sy Pool MD, Kelli Milton MD, Osiel Benz MD, Caro Whitten MD, Yao Young MD, Navi Maza MD, Cassy Scott MD, Gilmar Lee [facility], [facility], CNP, Trixie Pinon, CNP, Kb Nevarez, CNS, Cheri Travis, CNP, Xiang May, CNP, Jairo Foster, CNP, Governor Winters, CNP, Christofer Greene CNP, Karo Steward PA-C, Victor Manuel Yeboah, [facility], Cole Silverio, [facility], Yaneli Eden, CNP, Omero Busby, CNP, Darlene Mar, CNP, Nani Solis, CNP, Janeth Borrero, CNP, [facility], [facility]      Daily Progress Note     Admit Date: 12/12/2021  Bed/Room No.  3026/3026-01  Admitting Physician : Gideon Vance MD  Code Status :Full Code  Hospital Day:  LOS: 19 days   Chief Complaint:     Breathing difficulty. Principal Problem:    Pneumonia due to COVID-19 virus  Active Problems:    Shock ([facility] [address]    Acute respiratory failure with hypoxia (HCC)    Type 2 diabetes mellitus (HCC)    Asthma    IRMA on CPAP    Hypertension    GERD (gastroesophageal reflux disease)    ARDS (adult respiratory distress syndrome) ([facility] [address]  Resolved Problems:    * No resolved hospital problems. *    Subjective : Interval History/Significant events :  12/31/21  Unchanged  Patient remains afebrile. He is still remains on ventilatory support currently at 40% FiO2, PEEP of 10. Patient's temperature has improved. He is responding to pain stimulus. Unable to flex neck. Vitals - Unstable afebrile  Labs -normal electrolytes, creatinine 0.38  Nursing notes , Consults notes reviewed. Overnight events and updates discussed with Nursing staff .    Background History:         Dossie Duane Lloyd Nicole is 60 y.o. male  Who was admitted to the hospital on 12/12/2021 for treatment of Pneumonia due to COVID-19 virus. Patient initially presented with shortness of breath at [facility].  He had to suggest positive for COVID-19 infection. Patient reported that his son had also COVID-19 infection. Patient was hypoxic in 50s on arrival and was treated with BiPAP however breathing worsened and patient was subsequently intubated. Patient was transferred to [facility]'s on 12/12/2021 as he was requiring high ventilator support 100% FiO2 with PEEP of 22. He was given Actemra and started on high-dose Decadron. Patient was started with he was started on Flolan and given paralytics for proning per ARDS protocol. Patient also received intermittent Lasix. Proning was stopped on 12/20/2021 and paralytics were stopped on 12/22/2021. Patient started to have fevers and was started on cefepime. Respiratory cultures were negative. Patient to remain in isolation till 12/31/2021. PMH:  Past Medical History:   Diagnosis Date    Arthritis     Asthma     Diabetes mellitus ([facility] [address]     Edema     GERD (gastroesophageal reflux disease)     Hypertension     on lasix    Migraine     IRMA on CPAP     seldom use machine      Allergies:    Allergies   Allergen Reactions    Nuts [Peanut-Containing Drug Products] Anaphylaxis    Sunflower Oil Anaphylaxis     Sunflower seeds      Medications :  meropenem, 1,000 mg, IntraVENous, Q8H  furosemide, 40 mg, IntraVENous, BID  chlordiazePOXIDE, 10 mg, Per NG tube, 4x Daily  lidocaine 1 % injection, 5 mL, IntraDERmal, Once  sodium chloride flush, 5-40 mL, IntraVENous, 2 times per day  oxyCODONE, 10 mg, Oral, Q6H  docusate, 100 mg, Per NG tube, BID  insulin lispro, 0-6 Units, SubCUTAneous, Q6H  lansoprazole, 30 mg, Oral, QAM AC  insulin glargine, 10 Units, SubCUTAneous, Nightly  sodium chloride flush, 5-40 mL, IntraVENous, 2 times per day  enoxaparin, 30 mg, SubCUTAneous, BID  Vitamin D, 2,000 Units, Oral, Daily  dexamethasone, 10 mg, IntraVENous, Q24H        Review of Systems   Review of Systems   Unable to perform ROS: Intubated     Objective :      Current Vitals : Temp: 98.4 °F (36.9 °C),  Pulse: 73, Resp: 20, BP: 103/61, SpO2: 92 %  Last 24 Hrs Vitals   Patient Vitals for the past 24 hrs:   BP Temp Temp src Pulse Resp SpO2 Height   12/31/21 1300 103/61 -- -- 73 20 92 % --   12/31/21 1230 -- -- -- 73 16 93 % --   12/31/21 1200 110/67 -- -- 71 26 96 % --   12/31/21 1130 -- -- -- 56 22 97 % --   12/31/21 1100 106/75 -- -- 53 19 96 % --   12/31/21 1030 -- -- -- (!) 49 22 95 % --   12/31/21 1000 95/61 -- -- 50 23 96 % --   12/31/21 0900 97/62 -- -- (!) 48 24 94 % --   12/31/21 0830 -- -- -- 50 24 93 % --   12/31/21 0800 102/63 -- -- 51 24 95 % --   12/31/21 0730 -- -- -- 51 24 95 % --   12/31/21 0715 -- -- -- 56 21 -- --   12/31/21 0700 99/68 -- -- 53 24 96 % --   12/31/21 0630 -- -- -- 51 24 97 % --   12/31/21 0600 104/79 -- -- 52 24 97 % --   12/31/21 0500 -- -- -- 56 21 97 % --   12/31/21 0400 105/73 -- -- 52 24 93 % --   12/31/21 0300 105/67 -- -- 58 24 (!) 88 % --   12/31/21 0227 -- -- -- 74 19 95 % --   12/31/21 0200 121/75 -- -- 67 19 95 % --   12/31/21 0100 103/69 -- -- 53 23 92 % --   12/31/21 0000 118/77 -- -- 59 22 93 % --   12/30/21 2300 114/76 -- -- 63 21 92 % --   12/30/21 2200 118/72 98.4 °F (36.9 °C) -- 69 15 96 % --   12/30/21 2100 99/61 -- -- 63 24 94 % --   12/30/21 2005 -- -- -- 77 22 96 % --   12/30/21 2000 124/72 99 °F (37.2 °C) Bladder 79 23 96 % --   12/30/21 1900 129/75 -- -- 80 22 95 % --   12/30/21 1830 -- -- -- 72 21 95 % --   12/30/21 1800 124/75 -- -- 70 25 95 % --   12/30/21 1730 -- -- -- 61 24 95 % --   12/30/21 1700 125/73 -- -- 66 23 96 % --   12/30/21 1630 -- -- -- 64 24 95 % --   12/30/21 1600 122/73 102 °F (38.9 °C) Bladder 67 24 95 % --   12/30/21 1530 -- -- -- 69 24 95 % 6' 2\" (1.88 m)   12/30/21 1500 -- -- -- 72 14 95 % --   12/30/21 1430 -- 102 °F (38.9 °C) Bladder 70 20 93 % --   12/30/21 1400 119/66 -- -- 66 22 94 % --   12/30/21 1330 -- -- -- 64 24 94 % --     Intake / output   12/30 0701 - 12/31 0700  In: 2490.1 [I.V.:1108.5]  Out: 3358 [Urine:3080]  Physical Exam:  Physical Exam  Vitals and nursing note reviewed. Constitutional:       Appearance: He is well-developed. He is ill-appearing. Interventions: He is sedated and intubated. Neck:      Thyroid: No thyroid mass. Cardiovascular:      Rate and Rhythm: Normal rate and regular rhythm. Pulses: Normal pulses. Heart sounds: Normal heart sounds. No murmur heard. Pulmonary:      Effort: He is intubated. Breath sounds: Normal breath sounds. Musculoskeletal:      Right lower leg: No edema. Left lower leg: No edema. Lymphadenopathy:      Cervical: No cervical adenopathy. Skin:     Capillary Refill: Capillary refill takes less than 2 seconds. Neurological:      Motor: No tremor or abnormal muscle tone.            Laboratory findings:    Recent Labs     12/29/21  0511 12/30/21  0600 12/31/21  0438   WBC 13.7* 13.1* 10.8   HGB 13.6 13.0 13.0   HCT 41.8 39.7* 37.9*    178 146     Recent Labs     12/29/21  0511 12/30/21  0600 12/31/21  0438    142 141   K 4.3 3.8 3.4*    104 103   CO2 27 27 29   GLUCOSE 128* 125* 114*   BUN 30* 29* 32*   CREATININE 0.42* 0.38* 0.22*   MG  --   --  2.1   CALCIUM 9.1 9.0 8.9     Recent Labs     12/29/21  0511 12/30/21  0600 12/30/21  1802 12/31/21  0438   PROT 5.4* 5.5*  --  5.5*   LABALBU 3.1* 3.0*  --  3.1*   AST 15 15  --  24   ALT 54* 47*  --  65*   ALKPHOS 54 50  --  50   BILITOT 0.38 0.34  --  0.42   BILIDIR 0.12 0.15  --  0.13   LIPASE  --   --  25  --    TRIG  --   --   --  128          Specific Gravity, UA   Date Value Ref Range Status   12/29/2021 1.039 (H) 1.005 - 1.030 Final     Protein, UA   Date Value Ref Range Status   12/29/2021 1+ (A) NEGATIVE Final     RBC, UA   Date Value Ref Range Status 12/29/2021 TOO NUMEROUS TO COUNT 0 - 2 /HPF Final     Bacteria, UA   Date Value Ref Range Status   12/29/2021 NOT REPORTED None Final     Nitrite, Urine   Date Value Ref Range Status   12/29/2021 NEGATIVE NEGATIVE Final     WBC, UA   Date Value Ref Range Status   12/29/2021 2 TO 5 0 - 5 /HPF Final     Leukocyte Esterase, Urine   Date Value Ref Range Status   12/29/2021 NEGATIVE NEGATIVE Final       Imaging / Clinical Data :-   XR CHEST (SINGLE VIEW FRONTAL)    Result Date: 12/22/2021  Stable mild cardiomegaly. Patchy airspace opacities, left more than right, may be related to pulmonary edema versus pneumonia. Nonspecific bowel gas pattern. XR ABDOMEN (KUB) (SINGLE AP VIEW)    Result Date: 12/22/2021  Stable mild cardiomegaly. Patchy airspace opacities, left more than right, may be related to pulmonary edema versus pneumonia. Nonspecific bowel gas pattern. XR CHEST PORTABLE    Result Date: 12/24/2021  1. Stable cardiomegaly. 2.  Patchy airspace opacities, stable in the right lower chest, slightly improved on the left. Findings may be related to pulmonary edema or improving pneumonia. 3.  Support tubes and catheters in good position. XR CHEST PORTABLE    Result Date: 12/19/2021  Cardiomegaly, vascular congestion and worsening pulmonary opacities with bilateral effusions favoring pulmonary edema over multifocal airspace disease. Support tubes and lines as above. Clinical Course : unchanged  Assessment and Plan  :        Acute respiratory failure with hypoxia due to ARDS from COVID-19 pneumonia - ventilator support, high-dose Decadron, s/p Actemra on 12/12/2021. Antibiotics per ID. Treated with 10 days of  cefepime  12/18/2021 - 12/28/21, meropenem started on 12/28/2021 due to high fevers. Artline was removed on 12/30/21. Type 2 diabetes mellitus - on Lantus, blood sugar controlled. Continue tube feed. Obesity BMI 39  Essential hypertension -  Stable , off levophed.  .  IRMA -was noncompliant with CPAP at home     Continue current antibiotics. Cultures have been negative so far. Resume muscle relaxants. Continue DVT prophylaxis, GI prophylaxis. Patient to remain in isolation till 12/31/2021. Continue to monitor vitals , Intake / output ,  Cell count , HGB , Kidney function, oxygenation  as indicated .      Plan discussed with Staff Andrea Hoang RN     (Please note that portions of this note were completed with a voice recognition program. Efforts were made to edit the dictations but occasionally words are mis-transcribed.)      Ibis Borrero MD  12/31/2021

## 2021-12-31 NOTE — PROGRESS NOTES
[facility] - Progress Note   Note COVID 19 Patient  Today's Date and Time: 12/31/2021, 10:16 AM    Impression :     COVID 19 Confirmed Infection  Covid tests:  12/11/2021: Positive  Elevated inflammatory markers  Acute hypoxic respiratory failure  History of asthma  Diabetes mellitus  Essential hypertension  IRMA on CPAP  Patient has not received the Covid vaccination    Recommendations:   Antibiotic treatment:  The patient was having high-grade fevers and cultures have been sent.-No growth on cultures thus far  I will start the patient empirically on antibiotic therapy with cefepime on 12/18-fevers initially resolved but low grade fevers have resumed. Cefepime discontinued 12/28 and Meropenem initiated 12/28 for worsening leukocytosis and fevers     Covid Rx:    Remdesivir-out of window  Decadron-high-dose initiated  Actemra-ordered 12/12/2021  Monoclonal antibodies-out of window      Medical Decision Making/Summary/Discussion:12/31/2021     Patient admitted with COVID 19 infection  He may come out of isolation on 12/31/21    Infection Control Recommendations   [place] Precautions  Airborne isolation  Droplet Isolation    Antimicrobial Stewardship Recommendations     Discontinuation of therapy  Coordination of Outpatient Care:   Estimated Length of IV antimicrobials:TBD  Patient will need Midline Catheter Insertion: TBD  Patient will need PICC line Insertion: No  Patient will need: Home IV , [place],  SNF,  LTAC:TBD  Patient will need outpatient wound care:No    Chief complaint/reason for consultation:   Concern for COVID infection      History of Present Illness:   Karen Morales is a 60y.o.-year-old male who was initially admitted on 12/12/2021.  Patient seen at the request of Dr. Val Lefort:    [facility] with complaints of worsening shortness of breath with associated generalized weakness and nonproductive cough over the past several days. He was exposed to his son who was diagnosed with Covid last week and has not received the Covid vaccine. The patient was very hypoxic with an SPO2 in the high 50s on room air. Imaging revealed bilateral pulmonary opacities typical of COVID-19    Abnormal labs include:    Ferritin 2800      Patient has been intubated and transferred to [facility]  He has been started on high-dose Decadron and Actemra has been ordered    CT CHEST PULMONARY EMBOLISM W CONTRAST 12/11/2021   Final Result   1. No evidence of pulmonary embolism   2. Diffuse ground-glass opacities throughout the lungs, typical of COVID-19   pulmonary disease.           XR CHEST (SINGLE VIEW FRONTAL) 12/11/2021   Final Result   Multifocal hazy opacities throughout both lungs consistent with COVID   pneumonia and or pulmonary edema       Patient admitted because of concerns with COVID 19.    CURRENT EVALUATION : 12/31/2021    Afebrile  VS stable    The patient remains on mechanical ventilation with 50% FiO2 and PEEP of 10. He is receiving fentanyl, Versed and Precedex for sedation. Nimbex was discontinued 12/31/21  He is opening his eyes to stimuli but not responding to command. Cefepime was initiated 12/18-discontinued 12/28  Meropenem initiated 12/28    CXR stable 12/29/21    Discussed with RN and Dr. Phill Parra    Patient exhibiting respiratory distress. yes  Respiratory secretions: no    Patient receiving supplemental oxygen.   Mechanical ventilation  RR: 24  02 sat:94    % FIO2: 90-->80-->85-->80-->65-->60->50-->75-->60-->50  PEEP:   21-->16-->14-->12-->10->8-->12-->10    QTc:       NEWS Score: 0-4 Low risk group; 5-6: Medium risk group; 7 or above: High risk group  Parameters 3 2 1 0 1 2 3   Age    < 65   = 65   RR = 8  9-11 12-20  21-24 = 25   O2 Sats = 91 92-93 94-95 = 96      Suppl O2  Yes  No      SBP = 90  101-110 111-219   = 220   HR = 40  41-50 51-90 by Zan Carrizales MD at [address] Right    [address]  2014    no blockage no stents    CHOLECYSTECTOMY      COLONOSCOPY      ENDOSCOPY, COLON, DIAGNOSTIC      TOE SURGERY Left 11/01/2019     LEFT FOOT 1ST MTPJ ARTHROPLASTY WITH PEREZ IMPLANT AND GROMMETS  ALLEN MED (Left )    TONSILLECTOMY         Medications:      meropenem  1,000 mg IntraVENous Q8H    furosemide  40 mg IntraVENous BID    chlordiazePOXIDE  10 mg Per NG tube 4x Daily    lidocaine 1 % injection  5 mL IntraDERmal Once    sodium chloride flush  5-40 mL IntraVENous 2 times per day    oxyCODONE  10 mg Oral Q6H    docusate  100 mg Per NG tube BID    insulin lispro  0-6 Units SubCUTAneous Q6H    lansoprazole  30 mg Oral QAM AC    insulin glargine  10 Units SubCUTAneous Nightly    sodium chloride flush  5-40 mL IntraVENous 2 times per day    enoxaparin  30 mg SubCUTAneous BID    Vitamin D  2,000 Units Oral Daily    dexamethasone  10 mg IntraVENous Q24H       Social History:     Social History     Socioeconomic History    Marital status:      Spouse name: Not on file    Number of children: Not on file    Years of education: Not on file    Highest education level: Not on file   Occupational History    Not on file   Tobacco Use    Smoking status: Former Smoker    Smokeless tobacco: Never Used   Vaping Use    Vaping Use: Never used   Substance and Sexual Activity    Alcohol use: Yes     Comment: every couple months    Drug use: Never    Sexual activity: Not on file   Other Topics Concern    Not on file   Social History Narrative    Not on file     Social Determinants of Health     Financial Resource Strain:     Difficulty of Paying Living Expenses: Not on file   Food Insecurity:     Worried About Running Out of Food in the Last Year: Not on file    Lucina of Food in the Last Year: Not on file   Transportation Needs:     Lack of Transportation (Medical):  Not on file    Lack of Transportation (Non-Medical):  Not on file   Physical Activity:     Days of Exercise per Week: Not on file    Minutes of Exercise per Session: Not on file   Stress:     Feeling of Stress : Not on file   Social Connections:     Frequency of Communication with Friends and Family: Not on file    Frequency of Social Gatherings with Friends and Family: Not on file    Attends Yazidi Services: Not on file    Active Member of [address] [place] [address] [employer] or Organizations: Not on file    Attends Club or Organization Meetings: Not on file    Marital Status: Not on file   Intimate Partner Violence:     Fear of Current or Ex-Partner: Not on file    Emotionally Abused: Not on file    Physically Abused: Not on file    Sexually Abused: Not on file   Housing Stability:     Unable to Pay for Housing in the Last Year: Not on file    Number of [place] in the Last Year: Not on file    Unstable Housing in the Last Year: Not on file       Family History:     Family History   Problem Relation Age of Onset    Heart Attack Sister 32    Diabetes Paternal Grandmother     Heart Disease Paternal Uncle     Heart Disease Paternal Uncle     Heart Disease Paternal Uncle     Cancer Maternal Aunt     Cancer Maternal Aunt     Cancer Maternal Uncle     Cancer Maternal Uncle         Allergies:   Nuts [peanut-containing drug products] and Sunflower oil     Review of Systems:     Unable to assess 12/31/2021  Intubated and sedated      Physical Examination :     Patient Vitals for the past 8 hrs:   BP Pulse Resp SpO2   12/31/21 0900 97/62 (!) 48 24 94 %   12/31/21 0830 -- 50 24 93 %   12/31/21 0800 102/63 51 24 95 %   12/31/21 0730 -- 51 24 95 %   12/31/21 0715 -- 56 21 --   12/31/21 0700 99/68 53 24 96 %   12/31/21 0630 -- 51 24 97 %   12/31/21 0600 104/79 52 24 97 %   12/31/21 0500 -- 56 21 97 %   12/31/21 0400 105/73 52 24 93 %   12/31/21 0300 105/67 58 24 (!) 88 %   12/31/21 0227 -- 74 19 95 %     General Appearance: Intubated and sedated  Head:  Normocephalic, no trauma  ENT: Not well evaluated as the patient is orally intubated  Neck:Supple, without lymphadenopathy. Thyroid normal, No bruits. Pulmonary/Chest: Diminished to auscultation, without wheezes, rales, or rhonchi. No dullness to percussion. Cardiovascular: Regular rate and rhythm without murmurs, rubs, or gallops. Abdomen: Soft, non tender. Bowel sounds normal. No organomegaly  All four Extremities: No cyanosis, clubbing, edema, or effusions. Neurologic: Intubated and sedated and paralyzed  Skin: Warm and dry with good turgor. No signs of peripheral arterial or venous insufficiency. No ulcerations. No open wounds. Medical Decision Making -Laboratory:   I have independently reviewed/ordered the following labs:    CBC with Differential:   Recent Labs     12/30/21  0600 12/31/21  0438   WBC 13.1* 10.8   HGB 13.0 13.0   HCT 39.7* 37.9*    146   LYMPHOPCT 10* 9*   MONOPCT 9 7     BMP:   Recent Labs     12/30/21  0600 12/31/21  0438    141   K 3.8 3.4*    103   CO2 27 29   BUN 29* 32*   CREATININE 0.38* 0.22*   MG  --  2.1     Hepatic Function Panel:   Recent Labs     12/30/21  0600 12/31/21  0438   PROT 5.5* 5.5*   LABALBU 3.0* 3.1*   BILIDIR 0.15 0.13   IBILI 0.19 0.29   BILITOT 0.34 0.42   ALKPHOS 50 50   ALT 47* 65*   AST 15 24     No results for input(s): RPR in the last 72 hours. No results for input(s): HIV in the last 72 hours. No results for input(s): BC in the last 72 hours.   Lab Results   Component Value Date    MUCUS 3+ 12/29/2021    RBC 4.05 12/31/2021    TRICHOMONAS NOT REPORTED 12/29/2021    WBC 10.8 12/31/2021    YEAST NOT REPORTED 12/29/2021    TURBIDITY Clear 12/29/2021     Lab Results   Component Value Date    CREATININE 0.22 12/31/2021    GLUCOSE 114 12/31/2021       Medical Decision Making-Imaging:     Narrative   EXAMINATION:   CTA OF THE CHEST 12/11/2021 11:31 am       TECHNIQUE:   CTA of the chest was performed after the administration of intravenous   contrast.  Multiplanar reformatted images are provided for review.  MIP   images are provided for review. Dose modulation, iterative reconstruction,   and/or weight based adjustment of the mA/kV was utilized to reduce the   radiation dose to as low as reasonably achievable.       COMPARISON:   Chest x-ray dated December 11, 2021       HISTORY:   ORDERING SYSTEM PROVIDED HISTORY: hypoxemia, covid positive, d-dimer-0.99   TECHNOLOGIST PROVIDED HISTORY:   hypoxemia, covid positive, d-dimer-0.99   Decision Support Exception - unselect if not a suspected or confirmed   emergency medical condition->Emergency Medical Condition (MA)   Reason for Exam: COVID positive, elevated D-Dimer       FINDINGS:   Pulmonary Arteries: Pulmonary arteries are adequately opacified for   evaluation.  No evidence of intraluminal filling defect to suggest pulmonary   embolism.  Main pulmonary artery is normal in caliber.       Mediastinum: Nonspecific mediastinal and hilar lymph nodes are present,   likely reactive. .  The heart and pericardium demonstrate no acute   abnormality.  There is no acute abnormality of the thoracic aorta.       Lungs/pleura: Diffuse ground-glass opacification is present throughout the   lungs, typical of COVID-19 pulmonary disease.  No pleural effusion or   pneumothorax is present.       Upper Abdomen: Images through the upper abdomen demonstrate a small hiatal   hernia.  Diffuse hepatic steatosis is present.  The gallbladder is surgically   absent.       Soft Tissues/Bones: No acute bone or soft tissue abnormality.           Impression   1. No evidence of pulmonary embolism   2.  Diffuse ground-glass opacities throughout the lungs, typical of COVID-19   pulmonary disease.         Narrative   EXAMINATION:   ONE XRAY VIEW OF THE CHEST       12/11/2021 3:54 pm       COMPARISON:   November 13, 2012       HISTORY:   ORDERING SYSTEM PROVIDED HISTORY: hypoxemia, probable covid pneumonia   TECHNOLOGIST PROVIDED HISTORY:   hypoxemia, probable covid pneumonia       FINDINGS:   Multifocal hazy opacities throughout both lungs would be consistent with   history of COVID pneumonia.  Pulmonary edema could have a similar appearance. No pneumothorax or pleural effusion.  Cardiac size enlarged.  Mediastinum   unremarkable.  No acute osseous abnormality.           Impression   Multifocal hazy opacities throughout both lungs consistent with COVID   pneumonia and or pulmonary edema.               Medical Decision [facility]:     Culture, Respiratory [[id number]] (Abnormal) Collected: 12/18/21 1143   Order Status: Completed Specimen: Endotracheal Updated: 12/18/21 1439    Specimen Description . ENDOTRACHEAL    Special Requests NOT REPORTED    Direct Exam >25 NEUTROPHILS/LPF     < 10 EPITHELIAL CELLS/LPF     MIXED BACTERIAL MORPHOTYPES SEEN ON GRAM STAIN.  Abnormal     Culture PENDING   Culture, Blood 1 [[id number]] Collected: 12/18/21 1234   Order Status: Completed Specimen: Blood Updated: 12/18/21 1358    Specimen Description . BLOOD    Special Requests NOT REPORTED    Culture NO GROWTH <24 HRS   Culture, Blood 1 [[id number]]    Order Status: Sent Specimen: Blood    Culture, Urine [[id number]] Collected: 12/14/21 0043   Order Status: Completed Specimen: Urine, straight catheter Updated: 12/14/21 1934    Specimen Description . URINE,STRAIGHT CATHETER    Special Requests NOT REPORTED    Culture NO GROWTH   MRSA DNA Probe, Nasal [[id number]] Collected: 12/12/21 1245   Order Status: Completed Specimen: Nasal Updated: 12/13/21 1052    Specimen Description . NASAL SWAB    MRSA, DNA, Nasal NEGATIVE:  MRSA DNA not detected by nucleic acid amplification.     Comment:                                                    Results should be used as an adjunct to nosocomial control efforts to identify patients   needing enhanced precautions.     The test is not intended to identify patients with staphylococcal infections.  Results should not be used to guide or monitor treatment for MRSA infections. Medical Decision Making-Other:     Note:  Labs, medications, radiologic studies were reviewed with personal review of films  Large amounts of data were reviewed  Discussed with nursing Staff, Discharge planner  Infection Control and Prevention measures reviewed  All prior entries were reviewed  Administer medications as ordered  Prognosis: Guarded  Discharge planning reviewed      Thank you for allowing us to participate in the care of this patient. Please call with questions. Electronically signed by SHO Randhawa CNP on 12/31/2021 at 10:16 AM       ATTESTATION:    I have discussed the case, including pertinent history and exam findings with the APRN. I have evaluated the  History, physical findings and pictures of the patient and the key elements of the encounter have been performed by me. I have reviewed the laboratory data, other diagnostic studies and discussed them with the APRN. I have updated the medical record where necessary. I agree with the assessment, plan and orders as documented by the APRN.     Andrew Galarza MD.

## 2021-12-31 NOTE — PROGRESS NOTES
Pulmonary Critical Care   Progress Note    Patient - Elmer Cedillo  Date of Admission -  2021 11:39 AM  Date of Evaluation -  2021  Room and Bed Number -  3026/3026-01   Hospital Day - 18    CHIEF COMPLAINT : ACUTE HYPOXIC RESPIRATORY FAILURE DUE TO COVID -19 PNEUMONIA   HPI:   Elmer Cedillo  60 y.o. male  admitted for COVID-19 at [facility] due to worsening oxygenation he was initially started on BiPAP and subsequently intubated. On room air his saturations had been 50%. CTA no PE but bilateral pulmonary infiltrates. He was accepted at [address] [place] [address] ICU. On arrival he is on PEEP of 22, 100% FiO2.    2021   Subjective   Becomes agitated with any decrease in sedation. Currently off neuromuscular blockers. FiO2 50%. Secretions small amount and not purulent. Intermittent fever persists. Etiology not clear.     SECRETIONS  -Amount:  [x] Small [] Moderate  [] Large    [] None  Color:     [x] White [] Colored  [] Bloody    SEDATION:    As above    PARALYZED:  [x] No    [] Yes    VASOPRESSORS:  [] No    [x] Yes  [x] Levophed [] Dopamine [] Vasopressin  [] Dobutamine [] Phenylephrine [] Epinephrine    INHALED veletri  : [] No    [] Yes    PRONE :       [x] No    [] Yes    Actemra:             [] No    [x] Yes  21  DEXAMETHASONE : [] No    [x] Yes      Ros unable to perform due to intubation and sedation    OBJECTIVE:     VITAL SIGNS:  BP 99/61   Pulse 63   Temp 99 °F (37.2 °C) (Bladder)   Resp 24   Ht 6' 2\" (1.88 m)   Wt 283 lb 4.7 oz (128.5 kg)   SpO2 94%   BMI 36.37 kg/m²   Tmax over 24 hours:  Temp (24hrs), Av.3 °F (38.5 °C), Min:99 °F (37.2 °C), Max:102 °F (38.9 °C)      Patient Vitals for the past 8 hrs:   BP Temp Temp src Pulse Resp SpO2 Height   21 99/61 -- -- 63 24 94 % --   21 -- -- -- 77 22 96 % --   21 124/72 99 °F (37.2 °C) Bladder 79 23 96 % --   21 1900 129/75 -- -- 80 22 95 % --   21 1830 -- -- -- 72  % --   12/30/21 1800 124/75 -- -- 70 25 95 % --   12/30/21 1730 -- -- -- 61 24 95 % --   12/30/21 1700 125/73 -- -- 66 23 96 % --   12/30/21 1630 -- -- -- 64 24 95 % --   12/30/21 1600 122/73 102 °F (38.9 °C) Bladder 67 24 95 % --   12/30/21 1530 -- -- -- 69 24 95 % 6' 2\" (1.88 m)         Intake/Output Summary (Last 24 hours) at 12/30/2021 2304  Last data filed at 12/30/2021 2042  Gross per 24 hour   Intake 2797.85 ml   Output 3465 ml   Net -667.15 ml     Date 12/30/21 0000 - 12/30/21 2359   Shift 8373-6908 6334-3954 8178-9584 24 Hour Total   INTAKE   I.V.(mL/kg) 1010(7.9) 22.5(0.2) 797.4(6.2) 1829. 9(14.2)   NG/GT(mL/kg) 230(1.8) 100(0.8) 438(3.4) 768(6)   IV Piggyback(mL/kg) 200(1.6)   200(1.6)   Shift Total(mL/kg) 3378(97.4) 122.5(1) 1235.4(9.6) 2797. 9(21.8)   OUTPUT   Urine(mL/kg/hr) 335(0.3) 1180(1.1) 1350 2865   Stool(mL/kg)   600(4.7) 600(4.7)   Shift Total(mL/kg) 335(2.6) 1180(9.2) 0862(23.2) 9507(23)   Weight (kg) 128.5 128.5 128.5 128.5     Wt Readings from Last 3 Encounters:   12/30/21 283 lb 4.7 oz (128.5 kg)   12/11/21 300 lb (136.1 kg)   12/12/19 (!) 355 lb 9.6 oz (161.3 kg)     Body mass index is 36.37 kg/m². PHYSICAL EXAM:      I have discussed the care of Lois Carreno, including pertinent history and exam findings, with the resident/APC/staff. I have seen the patient and the key elements of all parts of the encounter have been performed by me. Physical exam was deferred to limit physical exposure and PPE resources. I reviewed the interval history, interpreted all available radiographic, laboratory and physiologic data at the time of service. I agree with the assessment and plan as documented by resident/APC/ ancillary staff.   Patient remains on the ventilator  Trachea is in the midline  No subcutaneous air  No JVD  No rhonchi  Abdomen is not distended  No edema    MEDICATIONS:  Scheduled Meds:   meropenem  1,000 mg IntraVENous Q8H    furosemide  40 mg IntraVENous BID    chlordiazePOXIDE  10 mg Per NG tube 4x Daily    lidocaine 1 % injection  5 mL IntraDERmal Once    sodium chloride flush  5-40 mL IntraVENous 2 times per day    oxyCODONE  10 mg Oral Q6H    docusate  100 mg Per NG tube BID    insulin lispro  0-6 Units SubCUTAneous Q6H    lansoprazole  30 mg Oral QAM AC    insulin glargine  10 Units SubCUTAneous Nightly    sodium chloride flush  5-40 mL IntraVENous 2 times per day    enoxaparin  30 mg SubCUTAneous BID    Vitamin D  2,000 Units Oral Daily    dexamethasone  10 mg IntraVENous Q24H     Continuous Infusions:   cisatracurium (NIMBEX) infusion Stopped (12/30/21 1042)    dexmedetomidine 0.6 mcg/kg/hr (12/30/21 1946)    propofol Stopped (12/30/21 1735)    sodium chloride      sodium chloride      dextrose      norepinephrine Stopped (12/30/21 1800)    midazolam 6 mg/hr (12/30/21 1251)    fentaNYL 150 mcg/hr (12/30/21 1843)    dextrose      sodium chloride 10 mL/hr at 12/13/21 1548     PRN Meds:   ibuprofen, 600 mg, Q6H PRN  sodium chloride flush, 5-40 mL, PRN  sodium chloride, 25 mL, PRN  metoprolol, 5 mg, Q6H PRN  polyethylene glycol, 17 g, BID PRN  senna, 5 mL, Nightly PRN  bisacodyl, 10 mg, Daily PRN  acetaminophen, 650 mg, Q4H PRN  sodium chloride flush, 5-40 mL, PRN  sodium chloride, 25 mL, PRN  ondansetron, 4 mg, Q8H PRN   Or  ondansetron, 4 mg, Q6H PRN  acetaminophen, 650 mg, Q6H PRN  glucose, 15 g, PRN  dextrose, 12.5 g, PRN  glucagon (rDNA), 1 mg, PRN  dextrose, 100 mL/hr, PRN  glucose, 15 g, PRN  dextrose, 12.5 g, PRN  glucagon (rDNA), 1 mg, PRN  dextrose, 100 mL/hr, PRN        SUPPORT DEVICES: [x] Ventilator [] BIPAP  [] Nasal Cannula [] Room Air      ABGs:     Lab Results   Component Value Date    SYZ5MPA NOT REPORTED 12/30/2021    FIO2 50.0 12/30/2021       DATA:  Complete Blood Count:   Recent Labs     12/28/21  0529 12/29/21  0511 12/30/21  0600   WBC 17.3* 13.7* 13.1*   RBC 4.98 4.35 4.17*   HGB 15.4 13.6 13.0   HCT 47.0 41.8 39.7*   MCV 94.4 96.1 95.2   MCH 30.9 31.3 31.2   MCHC 32.8 32.5 32.7   RDW 14.4 14.4 14.4    179 178   MPV 11.5 11.5 11.5        Last 3 Blood Glucose:   Recent Labs     12/28/21  0529 12/29/21  0511 12/30/21  0600   GLUCOSE 115* 128* 125*        PT/INR:    Lab Results   Component Value Date    PROTIME 13.0 12/12/2021    INR 1.0 12/12/2021     PTT:    Lab Results   Component Value Date    APTT 39.1 12/12/2021       Comprehensive Metabolic Profile:   Recent Labs     12/28/21  0529 12/29/21  0511 12/30/21  0600    141 142   K 3.3* 4.3 3.8    104 104   CO2 27 27 27   BUN 24* 30* 29*   CREATININE 0.36* 0.42* 0.38*   GLUCOSE 115* 128* 125*   CALCIUM 9.1 9.1 9.0   PROT 6.0* 5.4* 5.5*   LABALBU 3.5 3.1* 3.0*   BILITOT 0.40 0.38 0.34   ALKPHOS 58 54 50   AST 22 15 15   ALT 71* 54* 47*      Magnesium:   Lab Results   Component Value Date    MG 2.2 12/28/2021    MG 2.5 12/17/2021    MG 2.3 12/16/2021     Phosphorus:   Lab Results   Component Value Date    PHOS 3.2 12/18/2021    PHOS 4.0 12/17/2021    PHOS 2.8 12/16/2021     Ionized Calcium: No results found for: CAION     Urinalysis:   Lab Results   Component Value Date    NITRU NEGATIVE 12/29/2021    COLORU Yellow 12/29/2021    PHUR 6.0 12/29/2021    WBCUA 2 TO 5 12/29/2021    RBCUA TOO NUMEROUS TO COUNT 12/29/2021    MUCUS 3+ 12/29/2021    TRICHOMONAS NOT REPORTED 12/29/2021    YEAST NOT REPORTED 12/29/2021    BACTERIA NOT REPORTED 12/29/2021    SPECGRAV 1.039 12/29/2021    LEUKOCYTESUR NEGATIVE 12/29/2021    UROBILINOGEN Normal 12/29/2021    BILIRUBINUR NEGATIVE 12/29/2021    GLUCOSEU NEGATIVE 12/29/2021    KETUA NEGATIVE 12/29/2021    AMORPHOUS NOT REPORTED 12/29/2021           XR CHEST (SINGLE VIEW FRONTAL)    Result Date: 12/14/2021  Mild interval improvement in multifocal airspace disease particularly at the right base. Support tubes and lines as above.      XR CHEST (SINGLE VIEW FRONTAL)    Result Date: 12/12/2021  Stable appearing     XR CHEST (SINGLE VIEW FRONTAL)    Result Date: 12/11/2021  Multifocal hazy opacities throughout both lungs consistent with COVID pneumonia and or pulmonary edema. XR CHEST PORTABLE    Result Date: 12/12/2021  Stable appearing chest with unchanged support tubes/lines and persistent extensive bilateral airspace disease consistent with known COVID pneumonia. XR CHEST PORTABLE    Result Date: 12/12/2021  Right internal jugular central venous catheter tip projecting over the proximal SVC versus brachiocephalic vein. No pneumothorax. Otherwise stable exam from earlier today. XR CHEST PORTABLE    Result Date: 12/12/2021  Endotracheal tube tip is 3.2 cm above the saul. Overall significant worsening of multifocal airspace opacities keeping with history of COVID-19. CT CHEST PULMONARY EMBOLISM W CONTRAST    Result Date: 12/11/2021  1. No evidence of pulmonary embolism 2. Diffuse ground-glass opacities throughout the lungs, typical of COVID-19 pulmonary disease.            Past Medical History:   Diagnosis Date    Arthritis     Asthma     Diabetes mellitus ([address]     Edema     GERD (gastroesophageal reflux disease)     Hypertension     on lasix    Migraine     IRMA on CPAP     seldom use machine        Social History     Socioeconomic History    Marital status:      Spouse name: None    Number of children: None    Years of education: None    Highest education level: None   Occupational History    None   Tobacco Use    Smoking status: Former Smoker    Smokeless tobacco: Never Used   Vaping Use    Vaping Use: Never used   Substance and Sexual Activity    Alcohol use: Yes     Comment: every couple months    Drug use: Never    Sexual activity: None   Other Topics Concern    None   Social History Narrative    None     Social Determinants of Health     Financial Resource Strain:     Difficulty of Paying Living Expenses: Not on file   Food Insecurity:     Worried About Running Out of Food in the Last Year: CATHETERIZATION  2014    no blockage no stents    CHOLECYSTECTOMY      COLONOSCOPY      ENDOSCOPY, COLON, DIAGNOSTIC      TOE SURGERY Left 11/01/2019     LEFT FOOT 1ST MTPJ ARTHROPLASTY WITH PEREZ IMPLANT AND GROMMETS  ALLEN MED (Left )    TONSILLECTOMY            VENTILATOR SETTINGS:  Vent Information  $Ventilation: $Subsequent Day  Skin Assessment: Clean, dry, & intact  Suction Catheter Diameter: 14  Equipment ID: [id number]  Equipment Changed: Airway securing device  Vent Type: Servo i  Vent Mode: PRVC  Vt Ordered: 550 mL  Rate Set: 24 bmp  FiO2 : 50 %  SpO2: 94 %  SpO2/FiO2 ratio: 192  Sensitivity: 3  PEEP/CPAP: 10  I Time/ I Time %: 0.7 s  Humidification Source: HME  Humidification Temp: 37  Humidification Temp Measured: 36.8  Circuit Condensation: Drained  Nitric Oxide/Epoprostenol In Use?: No     PaO2/FiO2 RATIO:  Recent Labs     12/30/21  0339   POCPO2 73.7*      FiO2 : 50 %       LABS:  ABGs:   Recent Labs     12/28/21  0512 12/29/21  0328 12/30/21  0339   POCPH 7.419 7.418 7.437   POCPCO2 48.5* 52.1* 48.3*   POCPO2 56.9* 64.0* 73.7*   POCHCO3 31.4* 33.6* 32.6*   DOFK1DXR 89* 92* 95            ASSESSMENT:     Principal Problem:    Pneumonia due to COVID-19 virus  Active Problems:    Shock (HCC)    Acute respiratory failure with hypoxia (HCC)    Type 2 diabetes mellitus (HCC)    Asthma    IRMA on CPAP    Hypertension    GERD (gastroesophageal reflux disease)    ARDS (adult respiratory distress syndrome) (HCC)  Resolved Problems:    * No resolved hospital problems. *              Acute hypoxic respiratory failure secondary to COVID 19   Acute respiratory distress syndrome   Bilateral multifocal pneumonia due to COVID 19 infection   Covid -19 pandemic emergency   Post hypercarbic metabolic alkalosis   Hyperglycemia, better   Leukocytosis, slightly better   Shock requiring norepinephrine?   Sepsis    LOS: 18  PLAN:    · D/w RT  · D/w RN   · Chest x-ray on 12/24/2021 with improving bilateral pulmonary infiltrates  · Sedation reviewed. We will try to cut down on the sedatives and pain medications  · Cont ARDS ventilation  · Proning held off as it did not appear to make much of difference  · Airborne isolation and droplet precautions to be continued  · Continue supportive care   · Cont treatment with medications for COVID  · Cont tube feeding  · Monitor endotracheal secretions   · Will obtain xray chest as needed  · ABG as needed  · Prognosis guarded given age and severity of illness. · On Lovenox and lansoprazole  · On Decadron  · Patient is on cefepime  · Check triglycerides as long as the patient is needing propofol  · Patient required placement of the Hahn catheter due to retention of urine requiring straight catheter multiple times a day  · Diuresis Lasix 40 mg twice daily for 3 days. Restrictive fluid strategy  · Treatment plan Discussed with nursing staff in detail , all questions answered . · Work on decreasing sedation. Total critical care time caring for this patient with life threatening, unstable organ failure, including direct patient contact, management of life support systems, review of data including imaging and labs, discussions with other team members and physicians at least 27  Min so far today, excluding procedures. Electronically signed by Ramon Jon DO on 12/30/2021 at 11:04 PM       This patient was evaluated in the context of the global SARS-CoV-2 (COVID-19) pandemic, which necessitated considerations that the patient either has COVID-19 infection or is at risk of infection with COVID-19. Institutional protocols and algorithms that pertain to the evaluation & management of patients with COVID-19 or those at risk for COVID-19 are in a state of rapid changes based on information released by regulatory bodies including the CDC and federal and state organizations. These policies and algorithms were followed during the patient's care.   Please note that this chart was generated using voice recognition Dragon dictation software. Although every effort was made to ensure the accuracy of this automated transcription, some errors in transcription may have occurred.

## 2021-12-31 NOTE — PLAN OF CARE
Problem: OXYGENATION/RESPIRATORY FUNCTION  Goal: Patient will maintain patent airway  12/31/2021 0921 by Jabari Watt RCP  Outcome: Ongoing     Problem: OXYGENATION/RESPIRATORY FUNCTION  Goal: Patient will achieve/maintain normal respiratory rate/effort  Description: Respiratory rate and effort will be within normal limits for the patient  12/31/2021 0921 by Jabari Watt RCP  Outcome: Ongoing     Problem: MECHANICAL VENTILATION  Goal: Patient will maintain patent airway  12/31/2021 0921 by Jabari Watt RCP  Outcome: Ongoing     Problem: MECHANICAL VENTILATION  Goal: Oral health is maintained or improved  12/31/2021 0921 by Jabari Watt RCP  Outcome: Ongoing     Problem: MECHANICAL VENTILATION  Goal: ET tube will be managed safely  12/31/2021 0921 by Jabari Watt RCP  Outcome: Ongoing     Problem: MECHANICAL VENTILATION  Goal: Ability to express needs and understand communication  12/31/2021 0921 by Jabari Watt RCP  Outcome: Ongoing     Problem: MECHANICAL VENTILATION  Goal: Mobility/activity is maintained at optimum level for patient  12/31/2021 3473 by Jabari Watt RCP  Outcome: Ongoing     Problem: SKIN INTEGRITY  Goal: Skin integrity is maintained or improved  12/31/2021 0921 by Jabari Watt RCP  Outcome: Ongoing

## 2022-01-01 LAB
ABSOLUTE EOS #: 0.04 K/UL (ref 0–0.44)
ABSOLUTE IMMATURE GRANULOCYTE: 0.07 K/UL (ref 0–0.3)
ABSOLUTE LYMPH #: 1.17 K/UL (ref 1.1–3.7)
ABSOLUTE MONO #: 0.6 K/UL (ref 0.1–1.2)
ALBUMIN SERPL-MCNC: 3.1 G/DL (ref 3.5–5.2)
ALBUMIN/GLOBULIN RATIO: 1.3 (ref 1–2.5)
ALLEN TEST: ABNORMAL
ALP BLD-CCNC: 53 U/L (ref 40–129)
ALT SERPL-CCNC: 57 U/L (ref 5–41)
ANION GAP SERPL CALCULATED.3IONS-SCNC: 8 MMOL/L (ref 9–17)
AST SERPL-CCNC: 20 U/L
BASOPHILS # BLD: 0 % (ref 0–2)
BASOPHILS ABSOLUTE: 0.03 K/UL (ref 0–0.2)
BILIRUB SERPL-MCNC: 0.35 MG/DL (ref 0.3–1.2)
BILIRUBIN DIRECT: 0.11 MG/DL
BILIRUBIN, INDIRECT: 0.24 MG/DL (ref 0–1)
BUN BLDV-MCNC: 30 MG/DL (ref 6–20)
BUN/CREAT BLD: ABNORMAL (ref 9–20)
CALCIUM SERPL-MCNC: 8.7 MG/DL (ref 8.6–10.4)
CHLORIDE BLD-SCNC: 103 MMOL/L (ref 98–107)
CO2: 32 MMOL/L (ref 20–31)
CREAT SERPL-MCNC: 0.29 MG/DL (ref 0.7–1.2)
CULTURE: ABNORMAL
CULTURE: ABNORMAL
DIFFERENTIAL TYPE: ABNORMAL
DIRECT EXAM: ABNORMAL
EOSINOPHILS RELATIVE PERCENT: 0 % (ref 1–4)
FIO2: 40
GFR AFRICAN AMERICAN: >60 ML/MIN
GFR NON-AFRICAN AMERICAN: >60 ML/MIN
GFR SERPL CREATININE-BSD FRML MDRD: ABNORMAL ML/MIN/{1.73_M2}
GFR SERPL CREATININE-BSD FRML MDRD: ABNORMAL ML/MIN/{1.73_M2}
GLOBULIN: ABNORMAL G/DL (ref 1.5–3.8)
GLUCOSE BLD-MCNC: 106 MG/DL (ref 75–110)
GLUCOSE BLD-MCNC: 107 MG/DL (ref 74–100)
GLUCOSE BLD-MCNC: 121 MG/DL (ref 75–110)
GLUCOSE BLD-MCNC: 141 MG/DL (ref 75–110)
GLUCOSE BLD-MCNC: 144 MG/DL (ref 75–110)
GLUCOSE BLD-MCNC: 97 MG/DL (ref 70–99)
HCT VFR BLD CALC: 38.3 % (ref 40.7–50.3)
HEMOGLOBIN: 12.8 G/DL (ref 13–17)
IMMATURE GRANULOCYTES: 1 %
LYMPHOCYTES # BLD: 11 % (ref 24–43)
Lab: ABNORMAL
MCH RBC QN AUTO: 31.6 PG (ref 25.2–33.5)
MCHC RBC AUTO-ENTMCNC: 33.4 G/DL (ref 28.4–34.8)
MCV RBC AUTO: 94.6 FL (ref 82.6–102.9)
MODE: ABNORMAL
MONOCYTES # BLD: 6 % (ref 3–12)
NEGATIVE BASE EXCESS, ART: ABNORMAL (ref 0–2)
NRBC AUTOMATED: 0 PER 100 WBC
O2 DEVICE/FLOW/%: ABNORMAL
PATIENT TEMP: 37.1
PDW BLD-RTO: 14.2 % (ref 11.8–14.4)
PLATELET # BLD: ABNORMAL K/UL (ref 138–453)
PLATELET ESTIMATE: ABNORMAL
PLATELET, FLUORESCENCE: 135 K/UL (ref 138–453)
PLATELET, IMMATURE FRACTION: 4.9 % (ref 1.1–10.3)
PMV BLD AUTO: ABNORMAL FL (ref 8.1–13.5)
POC HCO3: 33.5 MMOL/L (ref 21–28)
POC O2 SATURATION: 93 % (ref 94–98)
POC PCO2 TEMP: ABNORMAL MM HG
POC PCO2: 49.7 MM HG (ref 35–48)
POC PH TEMP: ABNORMAL
POC PH: 7.44 (ref 7.35–7.45)
POC PO2 TEMP: ABNORMAL MM HG
POC PO2: 66 MM HG (ref 83–108)
POSITIVE BASE EXCESS, ART: 8 (ref 0–3)
POTASSIUM SERPL-SCNC: 3.9 MMOL/L (ref 3.7–5.3)
RBC # BLD: 4.05 M/UL (ref 4.21–5.77)
RBC # BLD: ABNORMAL 10*6/UL
SAMPLE SITE: ABNORMAL
SEG NEUTROPHILS: 82 % (ref 36–65)
SEGMENTED NEUTROPHILS ABSOLUTE COUNT: 8.56 K/UL (ref 1.5–8.1)
SODIUM BLD-SCNC: 143 MMOL/L (ref 135–144)
SPECIMEN DESCRIPTION: ABNORMAL
TCO2 (CALC), ART: ABNORMAL MMOL/L (ref 22–29)
TOTAL PROTEIN: 5.5 G/DL (ref 6.4–8.3)
URIC ACID: 3.2 MG/DL (ref 3.4–7)
WBC # BLD: 10.5 K/UL (ref 3.5–11.3)
WBC # BLD: ABNORMAL 10*3/UL

## 2022-01-01 PROCEDURE — 6360000002 HC RX W HCPCS: Performed by: NURSE PRACTITIONER

## 2022-01-01 PROCEDURE — 82803 BLOOD GASES ANY COMBINATION: CPT

## 2022-01-01 PROCEDURE — 37799 UNLISTED PX VASCULAR SURGERY: CPT

## 2022-01-01 PROCEDURE — 6360000002 HC RX W HCPCS: Performed by: INTERNAL MEDICINE

## 2022-01-01 PROCEDURE — 82947 ASSAY GLUCOSE BLOOD QUANT: CPT

## 2022-01-01 PROCEDURE — 94761 N-INVAS EAR/PLS OXIMETRY MLT: CPT

## 2022-01-01 PROCEDURE — 6370000000 HC RX 637 (ALT 250 FOR IP): Performed by: INTERNAL MEDICINE

## 2022-01-01 PROCEDURE — 2700000000 HC OXYGEN THERAPY PER DAY

## 2022-01-01 PROCEDURE — 85055 RETICULATED PLATELET ASSAY: CPT

## 2022-01-01 PROCEDURE — 80076 HEPATIC FUNCTION PANEL: CPT

## 2022-01-01 PROCEDURE — 6370000000 HC RX 637 (ALT 250 FOR IP): Performed by: NURSE PRACTITIONER

## 2022-01-01 PROCEDURE — 94003 VENT MGMT INPAT SUBQ DAY: CPT

## 2022-01-01 PROCEDURE — 99232 SBSQ HOSP IP/OBS MODERATE 35: CPT | Performed by: FAMILY MEDICINE

## 2022-01-01 PROCEDURE — 2500000003 HC RX 250 WO HCPCS: Performed by: INTERNAL MEDICINE

## 2022-01-01 PROCEDURE — 99291 CRITICAL CARE FIRST HOUR: CPT | Performed by: INTERNAL MEDICINE

## 2022-01-01 PROCEDURE — 2580000003 HC RX 258: Performed by: NURSE PRACTITIONER

## 2022-01-01 PROCEDURE — 2000000000 HC ICU R&B

## 2022-01-01 PROCEDURE — 80048 BASIC METABOLIC PNL TOTAL CA: CPT

## 2022-01-01 PROCEDURE — 6370000000 HC RX 637 (ALT 250 FOR IP): Performed by: FAMILY MEDICINE

## 2022-01-01 PROCEDURE — 85025 COMPLETE CBC W/AUTO DIFF WBC: CPT

## 2022-01-01 PROCEDURE — 6370000000 HC RX 637 (ALT 250 FOR IP): Performed by: STUDENT IN AN ORGANIZED HEALTH CARE EDUCATION/TRAINING PROGRAM

## 2022-01-01 PROCEDURE — 84550 ASSAY OF BLOOD/URIC ACID: CPT

## 2022-01-01 RX ORDER — PREDNISONE 20 MG/1
20 TABLET ORAL DAILY
Status: COMPLETED | OUTPATIENT
Start: 2022-01-01 | End: 2022-01-05

## 2022-01-01 RX ORDER — COLCHICINE 0.6 MG/1
1.2 TABLET ORAL ONCE
Status: COMPLETED | OUTPATIENT
Start: 2022-01-01 | End: 2022-01-01

## 2022-01-01 RX ORDER — PREDNISONE 20 MG/1
20 TABLET ORAL DAILY
Status: DISCONTINUED | OUTPATIENT
Start: 2022-01-01 | End: 2022-01-01

## 2022-01-01 RX ORDER — COLCHICINE 0.6 MG/1
0.6 TABLET ORAL DAILY
Status: DISCONTINUED | OUTPATIENT
Start: 2022-01-02 | End: 2022-01-07 | Stop reason: HOSPADM

## 2022-01-01 RX ADMIN — BACLOFEN 5 MG: 10 TABLET ORAL at 21:06

## 2022-01-01 RX ADMIN — ACETAMINOPHEN 650 MG: 160 SOLUTION ORAL at 11:10

## 2022-01-01 RX ADMIN — DEXMEDETOMIDINE HYDROCHLORIDE 1.2 MCG/KG/HR: 4 INJECTION, SOLUTION INTRAVENOUS at 21:39

## 2022-01-01 RX ADMIN — DEXMEDETOMIDINE HYDROCHLORIDE 1.2 MCG/KG/HR: 4 INJECTION, SOLUTION INTRAVENOUS at 19:00

## 2022-01-01 RX ADMIN — BACLOFEN 5 MG: 10 TABLET ORAL at 14:47

## 2022-01-01 RX ADMIN — MEROPENEM 1000 MG: 1 INJECTION, POWDER, FOR SOLUTION INTRAVENOUS at 11:10

## 2022-01-01 RX ADMIN — Medication 2000 UNITS: at 08:49

## 2022-01-01 RX ADMIN — DEXMEDETOMIDINE HYDROCHLORIDE 0.6 MCG/KG/HR: 4 INJECTION, SOLUTION INTRAVENOUS at 11:10

## 2022-01-01 RX ADMIN — OXYCODONE HYDROCHLORIDE 10 MG: 5 SOLUTION ORAL at 05:04

## 2022-01-01 RX ADMIN — DOCUSATE SODIUM 100 MG: 50 LIQUID ORAL at 20:36

## 2022-01-01 RX ADMIN — Medication 200 MCG/HR: at 12:49

## 2022-01-01 RX ADMIN — IBUPROFEN 600 MG: 200 SUSPENSION ORAL at 21:07

## 2022-01-01 RX ADMIN — ACETAMINOPHEN 650 MG: 160 SOLUTION ORAL at 18:59

## 2022-01-01 RX ADMIN — ENOXAPARIN SODIUM 30 MG: 100 INJECTION SUBCUTANEOUS at 08:49

## 2022-01-01 RX ADMIN — DEXMEDETOMIDINE HYDROCHLORIDE 0.8 MCG/KG/HR: 4 INJECTION, SOLUTION INTRAVENOUS at 01:19

## 2022-01-01 RX ADMIN — Medication 200 MCG/HR: at 19:03

## 2022-01-01 RX ADMIN — Medication 200 MCG/HR: at 09:15

## 2022-01-01 RX ADMIN — COLCHICINE 1.2 MG: 0.6 TABLET, FILM COATED ORAL at 12:48

## 2022-01-01 RX ADMIN — CHLORDIAZEPOXIDE HYDROCHLORIDE 10 MG: 5 CAPSULE ORAL at 05:04

## 2022-01-01 RX ADMIN — OXYCODONE HYDROCHLORIDE 10 MG: 5 SOLUTION ORAL at 18:44

## 2022-01-01 RX ADMIN — OXYCODONE HYDROCHLORIDE 10 MG: 5 SOLUTION ORAL at 20:35

## 2022-01-01 RX ADMIN — PREDNISONE 20 MG: 20 TABLET ORAL at 12:48

## 2022-01-01 RX ADMIN — DOCUSATE SODIUM 100 MG: 50 LIQUID ORAL at 08:50

## 2022-01-01 RX ADMIN — OXYCODONE HYDROCHLORIDE 10 MG: 5 SOLUTION ORAL at 01:20

## 2022-01-01 RX ADMIN — CHLORDIAZEPOXIDE HYDROCHLORIDE 10 MG: 5 CAPSULE ORAL at 16:26

## 2022-01-01 RX ADMIN — Medication 200 MCG/HR: at 02:59

## 2022-01-01 RX ADMIN — MEROPENEM 1000 MG: 1 INJECTION, POWDER, FOR SOLUTION INTRAVENOUS at 18:44

## 2022-01-01 RX ADMIN — Medication 30 MG: at 07:00

## 2022-01-01 RX ADMIN — DEXMEDETOMIDINE HYDROCHLORIDE 1.1 MCG/KG/HR: 4 INJECTION, SOLUTION INTRAVENOUS at 15:52

## 2022-01-01 RX ADMIN — CHLORDIAZEPOXIDE HYDROCHLORIDE 10 MG: 5 CAPSULE ORAL at 20:36

## 2022-01-01 RX ADMIN — INSULIN GLARGINE 10 UNITS: 100 INJECTION, SOLUTION SUBCUTANEOUS at 20:35

## 2022-01-01 RX ADMIN — DEXMEDETOMIDINE HYDROCHLORIDE 0.6 MCG/KG/HR: 4 INJECTION, SOLUTION INTRAVENOUS at 05:38

## 2022-01-01 RX ADMIN — OXYCODONE HYDROCHLORIDE 10 MG: 5 SOLUTION ORAL at 14:46

## 2022-01-01 RX ADMIN — OXYCODONE HYDROCHLORIDE 10 MG: 5 SOLUTION ORAL at 09:30

## 2022-01-01 RX ADMIN — CHLORDIAZEPOXIDE HYDROCHLORIDE 10 MG: 5 CAPSULE ORAL at 09:13

## 2022-01-01 RX ADMIN — INSULIN LISPRO 1 UNITS: 100 INJECTION, SOLUTION INTRAVENOUS; SUBCUTANEOUS at 19:01

## 2022-01-01 RX ADMIN — BACLOFEN 5 MG: 10 TABLET ORAL at 08:49

## 2022-01-01 RX ADMIN — ENOXAPARIN SODIUM 30 MG: 100 INJECTION SUBCUTANEOUS at 21:07

## 2022-01-01 RX ADMIN — IBUPROFEN 600 MG: 200 SUSPENSION ORAL at 12:34

## 2022-01-01 RX ADMIN — MEROPENEM 1000 MG: 1 INJECTION, POWDER, FOR SOLUTION INTRAVENOUS at 03:00

## 2022-01-01 RX ADMIN — Medication 5 MG/HR: at 00:36

## 2022-01-01 ASSESSMENT — PAIN SCALES - GENERAL
PAINLEVEL_OUTOF10: 3
PAINLEVEL_OUTOF10: 0
PAINLEVEL_OUTOF10: 0
PAINLEVEL_OUTOF10: 3

## 2022-01-01 ASSESSMENT — PULMONARY FUNCTION TESTS
PIF_VALUE: 15
PIF_VALUE: 23
PIF_VALUE: 22
PIF_VALUE: 19

## 2022-01-01 NOTE — PLAN OF CARE
Problem: OXYGENATION/RESPIRATORY FUNCTION  Goal: Patient will maintain patent airway  12/31/2021 1957 by Reji Johnson RCP  Outcome: Ongoing  12/31/2021 0921 by Satnam Marie RCP  Outcome: Ongoing  Goal: Patient will achieve/maintain normal respiratory rate/effort  Description: Respiratory rate and effort will be within normal limits for the patient  12/31/2021 1957 by Reji Johnson RCP  Outcome: Ongoing  12/31/2021 0921 by Satnam Marie RCP  Outcome: Ongoing     Problem: MECHANICAL VENTILATION  Goal: Patient will maintain patent airway  12/31/2021 1957 by Reji Johnson RCP  Outcome: Ongoing  12/31/2021 0921 by Satnam Marie RCP  Outcome: Ongoing  Goal: Oral health is maintained or improved  12/31/2021 1957 by Reji Johnson RCP  Outcome: Ongoing  12/31/2021 0921 by Satnam Marie RCP  Outcome: Ongoing  Goal: ET tube will be managed safely  12/31/2021 1957 by Reji Johnson RCP  Outcome: Ongoing  12/31/2021 0921 by Satnam Marie RCP  Outcome: Ongoing  Goal: Ability to express needs and understand communication  12/31/2021 1957 by Reji Johnson RCP  Outcome: Ongoing  12/31/2021 0921 by Satnam Marie RCP  Outcome: Ongoing  Goal: Mobility/activity is maintained at optimum level for patient  12/31/2021 1957 by Reji Johnson RCP  Outcome: Ongoing  12/31/2021 0921 by Satnam Marie RCP  Outcome: Ongoing     Problem: SKIN INTEGRITY  Goal: Skin integrity is maintained or improved  12/31/2021 1957 by Reji Johnson RCP  Outcome: Ongoing  12/31/2021 0921 by Satnam Marie RCP  Outcome: Ongoing

## 2022-01-01 NOTE — CONSULTS
General Surgery  Consult    PATIENT NAME: Tamara Rosen  AGE: 60 y.o. MEDICAL RECORD NO. [id number]  DATE: 1/1/2022  PRIMARY CARE PHYSICIAN: Pancho Molina MD    Patient evaluated at the request of  Dr. Lisseth Blackwell DO  Reason for evaluation: trach and peg consult    Patient information was obtained from past medical records. History/Exam limitations: due to condition. IMPRESSION:     Patient Active Problem List   Diagnosis    Acute respiratory failure with hypoxia (HCC)    Pneumonia due to COVID-19 virus    Type 2 diabetes mellitus ([address]    Asthma    IRMA on CPAP    Hypertension    GERD (gastroesophageal reflux disease)    ARDS (adult respiratory distress syndrome) (HCC)    Shock ([address]   Acute respiratory failure secondary to Covid pneumonia      PLAN:   Surgical team to discuss possible trach and PEG  No emergent surgical plans at this time  Rest per primary    HISTORY:   History of Chief Complaint:    Tamara Rosen is a 60 y.o. male who is currently out of Covid isolation and has required intubation secondary to acute hypoxic respiratory failure due to Covid pneumonia. Patient has been difficult to wean off the vent therefore trauma surgery was consulted for possible trach and PEG. Patient has had prolonged steroid course. Past Medical History   has a past medical history of Arthritis, Asthma, Diabetes mellitus ([address] Edema, GERD (gastroesophageal reflux disease), Hypertension, Migraine, and IRMA on CPAP. Past Surgical History   has a past surgical history that includes Cardiac catheterization (2014); Endoscopy, colon, diagnostic; Colonoscopy; Tonsillectomy; Cholecystectomy; Appendectomy; Biceps tendon repair (Right); Toe Surgery (Left, 11/01/2019); arthroplasty (Left, 11/1/2019); arthroplasty (Right, 12/12/2019); and arthroplasty (Right, 12/12/2019). Medications  Prior to Admission medications    Medication Sig Start Date End Date Taking?  Authorizing Provider   budesonide-formoterol (SYMBICORT) 160-4.5 MCG/ACT AERO Inhale 2 puffs into the lungs 2 times daily    Historical Provider, MD   albuterol (PROVENTIL) (2.5 MG/3ML) 0.083% nebulizer solution Take 2.5 mg by nebulization every 6 hours as needed for Wheezing    Historical Provider, MD   SUMAtriptan (IMITREX) 100 MG tablet Take 100 mg by mouth once as needed for Migraine    Historical Provider, MD   omeprazole (PRILOSEC) 40 MG delayed release capsule Take 40 mg by mouth daily    Historical Provider, MD   furosemide (LASIX) 40 MG tablet Take 40 mg by mouth daily    Historical Provider, MD   montelukast (SINGULAIR) 10 MG tablet Take 10 mg by mouth daily    Historical Provider, MD   fexofenadine (ALLEGRA) 180 MG tablet Take 180 mg by mouth daily    Historical Provider, MD   metFORMIN (GLUCOPHAGE) 500 MG tablet Take 500 mg by mouth 2 times daily (with meals)    Historical Provider, MD    Scheduled Meds:   baclofen  5 mg Per NG tube TID    oxyCODONE  10 mg Oral Q4H    meropenem  1,000 mg IntraVENous Q8H    chlordiazePOXIDE  10 mg Per NG tube 4x Daily    lidocaine 1 % injection  5 mL IntraDERmal Once    sodium chloride flush  5-40 mL IntraVENous 2 times per day    docusate  100 mg Per NG tube BID    insulin lispro  0-6 Units SubCUTAneous Q6H    lansoprazole  30 mg Oral QAM AC    insulin glargine  10 Units SubCUTAneous Nightly    sodium chloride flush  5-40 mL IntraVENous 2 times per day    enoxaparin  30 mg SubCUTAneous BID    Vitamin D  2,000 Units Oral Daily    dexamethasone  10 mg IntraVENous Q24H     Continuous Infusions:   ketamine (KETALAR) infusion for analgosedation 0.2 mg/kg/hr (12/31/21 2120)    cisatracurium (NIMBEX) infusion Stopped (12/30/21 1042)    dexmedetomidine 0.6 mcg/kg/hr (01/01/22 0538)    sodium chloride      sodium chloride      dextrose      norepinephrine 2 mcg/min (01/01/22 0900)    midazolam 3 mg/hr (01/01/22 0915)    fentaNYL 200 mcg/hr (01/01/22 0915)    dextrose      sodium chloride 10 mL/hr at 12/13/21 1548     PRN Meds:.potassium chloride **OR** potassium alternative oral replacement **OR** potassium chloride, ibuprofen, sodium chloride flush, sodium chloride, metoprolol, polyethylene glycol, senna, bisacodyl, acetaminophen, sodium chloride flush, sodium chloride, ondansetron **OR** ondansetron, [DISCONTINUED] acetaminophen **OR** acetaminophen, glucose, dextrose, glucagon (rDNA), dextrose, glucose, dextrose, glucagon (rDNA), dextrose  Allergies  is allergic to nuts [peanut-containing drug products] and sunflower oil. Family History  family history includes Cancer in his maternal aunt, maternal aunt, maternal uncle, and maternal uncle; Diabetes in his paternal grandmother; Heart Attack (age of onset: 32) in his sister; Heart Disease in his paternal uncle, paternal uncle, and paternal uncle. Social History   reports that he has quit smoking. He has never used smokeless tobacco.   reports current alcohol use. reports no history of drug use. Review of Systems    Unable to perform as patient is intubated and sedated    PHYSICAL:   VITALS:  height is 6' 2\" (1.88 m) and weight is 283 lb 4.7 oz (128.5 kg). His bladder temperature is 98.6 °F (37 °C). His blood pressure is 87/34 (abnormal) and his pulse is 65. His respiration is 18 and oxygen saturation is 93%. CONSTITUTIONAL: intubated and sedated  HEENT: Head is normocephalic, atraumatic. PERRLA  NECK: Soft, trachea midline and straight  LUNGS: Chest expands equally bilaterally upon respiration, no accessory muscle used. Ausculation reveals no wheezes, rales or rhonchi. CARDIOVASCULAR: Heart sounds are normal.  Regular rate and rhythm without murmur, gallop or rub. ABDOMEN: soft, nontender, nondistended, no masses or organomegaly, no hernias palpable, no guarding or peritoneal signs. Bowel sounds are present in all four quadrants Scars are consistent with previous surgical history.   NEUROLOGIC: No obvious facial droop  EXTREMITIES: no cyanosis,

## 2022-01-01 NOTE — PLAN OF CARE
Problem: Airway Clearance - Ineffective  Goal: Achieve or maintain patent airway  Outcome: Ongoing     Problem: Gas Exchange - Impaired  Goal: Absence of hypoxia  Outcome: Ongoing     Problem: Gas Exchange - Impaired  Goal: Promote optimal lung function  Outcome: Ongoing     Problem: Breathing Pattern - Ineffective  Goal: Ability to achieve and maintain a regular respiratory rate  Outcome: Ongoing     Problem: Body Temperature -  Risk of, Imbalanced  Goal: Ability to maintain a body temperature within defined limits  Outcome: Ongoing     Problem: Body Temperature -  Risk of, Imbalanced  Goal: Will regain or maintain usual level of consciousness  Outcome: Ongoing     Problem:  Body Temperature -  Risk of, Imbalanced  Goal: Complications related to the disease process, condition or treatment will be avoided or minimized  Outcome: Ongoing     Problem: Isolation Precautions - Risk of Spread of Infection  Goal: Prevent transmission of infection  Outcome: Ongoing     Problem: Nutrition Deficits  Goal: Optimize nutritional status  Outcome: Ongoing     Problem: Risk for Fluid Volume Deficit  Goal: Maintain normal heart rhythm  Outcome: Ongoing     Problem: Risk for Fluid Volume Deficit  Goal: Maintain absence of muscle cramping  Outcome: Ongoing     Problem: Risk for Fluid Volume Deficit  Goal: Maintain normal serum potassium, sodium, calcium, phosphorus, and pH  Outcome: Ongoing     Problem: Loneliness or Risk for Loneliness  Goal: Demonstrate positive use of time alone when socialization is not possible  Outcome: Ongoing     Problem: Fatigue  Goal: Verbalize increase energy and improved vitality  Outcome: Ongoing     Problem: Patient Education: Go to Patient Education Activity  Goal: Patient/Family Education  Outcome: Ongoing     Problem: OXYGENATION/RESPIRATORY FUNCTION  Goal: Patient will maintain patent airway  1/1/2022 0306 by Emery Srinivasan RN  Outcome: Ongoing     Problem: OXYGENATION/RESPIRATORY FUNCTION  Goal: Patient will achieve/maintain normal respiratory rate/effort  Description: Respiratory rate and effort will be within normal limits for the patient  1/1/2022 0306 by Tierney Lee RN  Outcome: Ongoing     Problem: SKIN INTEGRITY  Goal: Skin integrity is maintained or improved  1/1/2022 0306 by Tierney Lee RN  Outcome: Ongoing     Problem: Skin Integrity:  Goal: Will show no infection signs and symptoms  Description: Will show no infection signs and symptoms  Outcome: Ongoing     Problem: Skin Integrity:  Goal: Absence of new skin breakdown  Description: Absence of new skin breakdown  Outcome: Ongoing     Problem: Falls - Risk of:  Goal: Will remain free from falls  Description: Will remain free from falls  Outcome: Ongoing     Problem: Falls - Risk of:  Goal: Absence of physical injury  Description: Absence of physical injury  Outcome: Ongoing     Problem: Nutrition  Goal: Optimal nutrition therapy  Outcome: Ongoing     Problem: Discharge Planning:  Goal: Participates in care planning  Description: Participates in care planning  Outcome: Ongoing     Problem: Discharge Planning:  Goal: Discharged to appropriate level of care  Description: Discharged to appropriate level of care  Outcome: Ongoing     Problem: Non-Violent Restraints  Goal: Removal from restraints as soon as assessed to be safe  Outcome: Ongoing     Problem: Non-Violent Restraints  Goal: No harm/injury to patient while restraints in use  Outcome: Ongoing     Problem: Non-Violent Restraints  Goal: Patient's dignity will be maintained  Outcome: Ongoing

## 2022-01-01 NOTE — PROGRESS NOTES
Pulmonary Critical Care   Progress Note    Patient - Ira Aguirre  Date of Admission -  2021 11:39 AM  Date of Evaluation -  2021  Room and Bed Number -  3026/3026-01   Hospital Day -     CHIEF COMPLAINT : ACUTE HYPOXIC RESPIRATORY FAILURE DUE TO COVID -19 PNEUMONIA   HPI:   Ira Aguirre  60 y.o. male  admitted for COVID-19 at [facility] ER due to worsening oxygenation he was initially started on BiPAP and subsequently intubated. On room air his saturations had been 50%. CTA no PE but bilateral pulmonary infiltrates. He was accepted at [address] [place] [address] ICU. On arrival he is on PEEP of 22, 100% FiO2.    2021   Subjective   Sedation remains a major issue. Becomes very agitated with any decrease in dexmedetomidine, midazolam, propofol, ketamine, or fentanyl. Reported history of previous drug use. Oxygenation much better. FiO2 40%. Saturations in the low 90s. Fever better. I/O- 1.1 L laboratory values reasonable without serious abnormalities.     SECRETIONS  -Amount:  [x] Small [] Moderate  [] Large    [] None  Color:     [x] White [] Colored  [] Bloody    SEDATION:    As above    PARALYZED:  [x] No    [] Yes    VASOPRESSORS:  [] No    [x] Yes  [x] Levophed [] Dopamine [] Vasopressin  [] Dobutamine [] Phenylephrine [] Epinephrine    INHALED veletri  : [] No    [] Yes    PRONE :       [x] No    [] Yes    Actemra:             [] No    [x] Yes  21  DEXAMETHASONE : [] No    [x] Yes      Ros unable to perform due to intubation and sedation    OBJECTIVE:     VITAL SIGNS:  /76   Pulse 100   Temp 98.6 °F (37 °C) (Bladder)   Resp 24   Ht 6' 2\" (1.88 m)   Wt 283 lb 4.7 oz (128.5 kg)   SpO2 90%   BMI 36.37 kg/m²   Tmax over 24 hours:  Temp (24hrs), Av.5 °F (36.9 °C), Min:98.4 °F (36.9 °C), Max:98.6 °F (37 °C)      Patient Vitals for the past 8 hrs:   BP Temp Temp src Pulse Resp SpO2   21 2200 123/76 -- -- 100 24 90 %   21 2100 119/71 -- -- 56 21 93 % 12/31/21 2000 -- -- -- -- -- 94 %   12/31/21 1954 -- -- -- 53 19 94 %   12/31/21 1900 124/74 -- -- 58 20 93 %   12/31/21 1830 -- -- -- 59 24 94 %   12/31/21 1800 118/73 -- -- (!) 47 18 94 %   12/31/21 1730 -- -- -- (!) 48 18 94 %   12/31/21 1700 113/70 -- -- (!) 48 18 94 %   12/31/21 1630 -- -- -- (!) 45 19 94 %   12/31/21 1620 -- -- -- (!) 45 18 93 %   12/31/21 1600 106/70 98.6 °F (37 °C) Bladder (!) 44 18 90 %   12/31/21 1535 -- -- -- (!) 44 21 90 %   12/31/21 1530 -- -- -- (!) 47 21 (!) 89 %   12/31/21 1525 -- -- -- 51 18 (!) 88 %   12/31/21 1520 -- -- -- 58 20 (!) 89 %         Intake/Output Summary (Last 24 hours) at 12/31/2021 2305  Last data filed at 12/31/2021 2150  Gross per 24 hour   Intake 3374.92 ml   Output 4140 ml   Net -765.08 ml     Date 12/31/21 0000 - 12/31/21 2359   Shift 6281-0014 7284-5422 3256-5095 24 Hour Total   INTAKE   I.V.(mL/kg) 288.7(2.2)  1426.6(11.1) 1715. 3(13.3)   NG/GT(mL/kg) 550(4.3)  816(6.4) 1366(10.6)   IV Piggyback(mL/kg) 615.9(3.8)   293.6(2.3)   Shift Total(mL/kg) 0948.8(7.0)  2242. 6(17.5) 3374. 9(26.3)   OUTPUT   Urine(mL/kg/hr) 550(0.5) 1310(1.3) 1580 3440   Stool(mL/kg)   700(5.4) 700(5.4)   Shift Total(mL/kg) 550(4.3) 1310(10.2) 9653(41.9) 4140(32.2)   Weight (kg) 128.5 128.5 128.5 128.5     Wt Readings from Last 3 Encounters:   12/30/21 283 lb 4.7 oz (128.5 kg)   12/11/21 300 lb (136.1 kg)   12/12/19 (!) 355 lb 9.6 oz (161.3 kg)     Body mass index is 36.37 kg/m². PHYSICAL EXAM:      I have discussed the care of Elmer Cedillo, including pertinent history and exam findings, with the resident/APC/staff. I have seen the patient and the key elements of all parts of the encounter have been performed by me. Physical exam was deferred to limit physical exposure and PPE resources. I reviewed the interval history, interpreted all available radiographic, laboratory and physiologic data at the time of service.  I agree with the assessment and plan as documented by resident/APC/ ancillary staff.   Patient remains on the ventilator  Trachea is in the midline  No subcutaneous air  No JVD  No rhonchi  Abdomen is not distended  No edema    MEDICATIONS:  Scheduled Meds:   baclofen  5 mg Per NG tube TID    [START ON 1/1/2022] oxyCODONE  10 mg Oral Q4H    meropenem  1,000 mg IntraVENous Q8H    chlordiazePOXIDE  10 mg Per NG tube 4x Daily    lidocaine 1 % injection  5 mL IntraDERmal Once    sodium chloride flush  5-40 mL IntraVENous 2 times per day    docusate  100 mg Per NG tube BID    insulin lispro  0-6 Units SubCUTAneous Q6H    lansoprazole  30 mg Oral QAM AC    insulin glargine  10 Units SubCUTAneous Nightly    sodium chloride flush  5-40 mL IntraVENous 2 times per day    enoxaparin  30 mg SubCUTAneous BID    Vitamin D  2,000 Units Oral Daily    dexamethasone  10 mg IntraVENous Q24H     Continuous Infusions:   ketamine (KETALAR) infusion for analgosedation 0.2 mg/kg/hr (12/31/21 2120)    cisatracurium (NIMBEX) infusion Stopped (12/30/21 1042)    dexmedetomidine 0.6 mcg/kg/hr (12/31/21 2231)    sodium chloride      sodium chloride      dextrose      norepinephrine Stopped (12/31/21 2216)    midazolam 5 mg/hr (12/31/21 1000)    fentaNYL 200 mcg/hr (12/31/21 2044)    dextrose      sodium chloride 10 mL/hr at 12/13/21 1548     PRN Meds:   potassium chloride, 40 mEq, PRN   Or  potassium alternative oral replacement, 40 mEq, PRN   Or  potassium chloride, 10 mEq, PRN  ibuprofen, 600 mg, Q6H PRN  sodium chloride flush, 5-40 mL, PRN  sodium chloride, 25 mL, PRN  metoprolol, 5 mg, Q6H PRN  polyethylene glycol, 17 g, BID PRN  senna, 5 mL, Nightly PRN  bisacodyl, 10 mg, Daily PRN  acetaminophen, 650 mg, Q4H PRN  sodium chloride flush, 5-40 mL, PRN  sodium chloride, 25 mL, PRN  ondansetron, 4 mg, Q8H PRN   Or  ondansetron, 4 mg, Q6H PRN  acetaminophen, 650 mg, Q6H PRN  glucose, 15 g, PRN  dextrose, 12.5 g, PRN  glucagon (rDNA), 1 mg, PRN  dextrose, 100 mL/hr, PRN  glucose, 15 g, PRN  dextrose, 12.5 g, PRN  glucagon (rDNA), 1 mg, PRN  dextrose, 100 mL/hr, PRN        SUPPORT DEVICES: [x] Ventilator [] BIPAP  [] Nasal Cannula [] Room Air      ABGs:     Lab Results   Component Value Date    UBH0ZFX NOT REPORTED 12/31/2021    FIO2 50.0 12/31/2021       DATA:  Complete Blood Count:   Recent Labs     12/29/21  0511 12/30/21  0600 12/31/21  0438   WBC 13.7* 13.1* 10.8   RBC 4.35 4.17* 4.05*   HGB 13.6 13.0 13.0   HCT 41.8 39.7* 37.9*   MCV 96.1 95.2 93.6   MCH 31.3 31.2 32.1   MCHC 32.5 32.7 34.3   RDW 14.4 14.4 14.1    178 146   MPV 11.5 11.5 11.2        Last 3 Blood Glucose:   Recent Labs     12/29/21  0511 12/30/21  0600 12/31/21  0438   GLUCOSE 128* 125* 114*        PT/INR:    Lab Results   Component Value Date    PROTIME 13.0 12/12/2021    INR 1.0 12/12/2021     PTT:    Lab Results   Component Value Date    APTT 39.1 12/12/2021       Comprehensive Metabolic Profile:   Recent Labs     12/29/21  0511 12/30/21  0600 12/31/21  0438    142 141   K 4.3 3.8 3.4*    104 103   CO2 27 27 29   BUN 30* 29* 32*   CREATININE 0.42* 0.38* 0.22*   GLUCOSE 128* 125* 114*   CALCIUM 9.1 9.0 8.9   PROT 5.4* 5.5* 5.5*   LABALBU 3.1* 3.0* 3.1*   BILITOT 0.38 0.34 0.42   ALKPHOS 54 50 50   AST 15 15 24   ALT 54* 47* 65*      Magnesium:   Lab Results   Component Value Date    MG 2.1 12/31/2021    MG 2.2 12/28/2021    MG 2.5 12/17/2021     Phosphorus:   Lab Results   Component Value Date    PHOS 3.2 12/18/2021    PHOS 4.0 12/17/2021    PHOS 2.8 12/16/2021     Ionized Calcium: No results found for: FREDDY     Urinalysis:   Lab Results   Component Value Date    NITRU NEGATIVE 12/29/2021    COLORU Yellow 12/29/2021    PHUR 6.0 12/29/2021    WBCUA 2 TO 5 12/29/2021    RBCUA TOO NUMEROUS TO COUNT 12/29/2021    MUCUS 3+ 12/29/2021    TRICHOMONAS NOT REPORTED 12/29/2021    YEAST NOT REPORTED 12/29/2021    BACTERIA NOT REPORTED 12/29/2021    SPECGRAV 1.039 12/29/2021    LEUKOCYTESUR NEGATIVE 12/29/2021    UROBILINOGEN Normal 12/29/2021    BILIRUBINUR NEGATIVE 12/29/2021    GLUCOSEU NEGATIVE 12/29/2021    KETUA NEGATIVE 12/29/2021    AMORPHOUS NOT REPORTED 12/29/2021           XR CHEST (SINGLE VIEW FRONTAL)    Result Date: 12/14/2021  Mild interval improvement in multifocal airspace disease particularly at the right base. Support tubes and lines as above. XR CHEST (SINGLE VIEW FRONTAL)    Result Date: 12/12/2021  Stable appearing     XR CHEST (SINGLE VIEW FRONTAL)    Result Date: 12/11/2021  Multifocal hazy opacities throughout both lungs consistent with COVID pneumonia and or pulmonary edema. XR CHEST PORTABLE    Result Date: 12/12/2021  Stable appearing chest with unchanged support tubes/lines and persistent extensive bilateral airspace disease consistent with known COVID pneumonia. XR CHEST PORTABLE    Result Date: 12/12/2021  Right internal jugular central venous catheter tip projecting over the proximal SVC versus brachiocephalic vein. No pneumothorax. Otherwise stable exam from earlier today. XR CHEST PORTABLE    Result Date: 12/12/2021  Endotracheal tube tip is 3.2 cm above the saul. Overall significant worsening of multifocal airspace opacities keeping with history of COVID-19. CT CHEST PULMONARY EMBOLISM W CONTRAST    Result Date: 12/11/2021  1. No evidence of pulmonary embolism 2. Diffuse ground-glass opacities throughout the lungs, typical of COVID-19 pulmonary disease.            Past Medical History:   Diagnosis Date    Arthritis     Asthma     Diabetes mellitus ([address]     Edema     GERD (gastroesophageal reflux disease)     Hypertension     on lasix    Migraine     IRMA on CPAP     seldom use machine        Social History     Socioeconomic History    Marital status:      Spouse name: None    Number of children: None    Years of education: None    Highest education level: None   Occupational History    None   Tobacco Use    Smoking status: Former Smoker    Smokeless tobacco: Never Used   Vaping Use    Vaping Use: Never used   Substance and Sexual Activity    Alcohol use: Yes     Comment: every couple months    Drug use: Never    Sexual activity: None   Other Topics Concern    None   Social History Narrative    None     Social Determinants of Health     Financial Resource Strain:     Difficulty of Paying Living Expenses: Not on file   Food Insecurity:     Worried About Running Out of Food in the Last Year: Not on file    Lucina of Food in the Last Year: Not on file   Transportation Needs:     Lack of Transportation (Medical): Not on file    Lack of Transportation (Non-Medical): Not on file   Physical Activity:     Days of Exercise per Week: Not on file    Minutes of Exercise per Session: Not on file   Stress:     Feeling of Stress : Not on file   Social Connections:     Frequency of Communication with Friends and Family: Not on file    Frequency of Social Gatherings with Friends and Family: Not on file    Attends Druze Services: Not on file    Active Member of [address] [place] [address] or Organizations: Not on file    Attends Club or Organization Meetings: Not on file    Marital Status: Not on file   Intimate Partner Violence:     Fear of Current or Ex-Partner: Not on file    Emotionally Abused: Not on file    Physically Abused: Not on file    Sexually Abused: Not on file   Housing Stability:     Unable to Pay for Housing in the Last Year: Not on file    Number of [place] in the Last Year: Not on file    Unstable Housing in the Last Year: Not on file         There is no immunization history on file for this patient.       Family History   Problem Relation Age of Onset    Heart Attack Sister 32    Diabetes Paternal Grandmother     Heart Disease Paternal Uncle     Heart Disease Paternal Uncle     Heart Disease Paternal Uncle     Cancer Maternal Aunt     Cancer Maternal Aunt     Cancer Maternal Uncle     Cancer Maternal Uncle Past Surgical History:   Procedure Laterality Date    APPENDECTOMY      ARTHROPLASTY Left 11/1/2019    LEFT FOOT 1ST MTPJ ARTHROPLASTY WITH PEREZ IMPLANT AND GROMMETS  [facility] performed by Sobia Price MD at [address] [facility] Right 12/12/2019    RIGHT FOOT ARTHROPLASTY 1ST MTPJ (Right Foot)    ARTHROPLASTY Right 12/12/2019    RIGHT FOOT ARTHROPLASTY 1ST MTPJ performed by Sobia Price MD at [address] Right    [address]  2014    no blockage no stents    CHOLECYSTECTOMY      COLONOSCOPY      ENDOSCOPY, COLON, DIAGNOSTIC      TOE SURGERY Left 11/01/2019     LEFT FOOT 1ST MTPJ ARTHROPLASTY WITH PEREZ IMPLANT AND GROMMETS  [facility] (Left )    TONSILLECTOMY            VENTILATOR SETTINGS:  Vent Information  $Ventilation: $Subsequent Day  Skin Assessment: Clean, dry, & intact  Suction Catheter Diameter: 14  Equipment ID: [id number]  Equipment Changed: HME  Vent Type: Servo i  Vent Mode: PRVC  Vt Ordered: 540 mL  Rate Set: (S) 16 bmp  FiO2 : 40 %  SpO2: 90 %  SpO2/FiO2 ratio: 235  Sensitivity: 1  PEEP/CPAP: (S) 8  I Time/ I Time %: 0.9 s  Humidification Source: HME  Humidification Temp: 37  Humidification Temp Measured: 36.8  Circuit Condensation: Drained  Nitric Oxide/Epoprostenol In Use?: No     PaO2/FiO2 RATIO:  Recent Labs     12/31/21  0545   POCPO2 65.7*      FiO2 : 40 %       LABS:  ABGs:   Recent Labs     12/29/21  0328 12/30/21  0339 12/31/21  0545   POCPH 7.418 7.437 7.548*   POCPCO2 52.1* 48.3* 38.4   POCPO2 64.0* 73.7* 65.7*   POCHCO3 33.6* 32.6* 33.4*   DNDE8SIG 92* 95 95            ASSESSMENT:     Principal Problem:    Pneumonia due to COVID-19 virus  Active Problems:    Shock (HCC)    Acute respiratory failure with hypoxia (HCC)    Type 2 diabetes mellitus (HCC)    Asthma    IRMA on CPAP    Hypertension    GERD (gastroesophageal reflux disease)    ARDS (adult respiratory distress syndrome) (HCC)  Resolved Problems:    * No resolved hospital problems. *              Acute hypoxic respiratory failure secondary to COVID 19   Acute respiratory distress syndrome   Bilateral multifocal pneumonia due to COVID 19 infection   Covid -19 pandemic emergency   Post hypercarbic metabolic alkalosis   Hyperglycemia, better   Leukocytosis, slightly better   Shock requiring norepinephrine? Sepsis    LOS: 19  PLAN:    · D/w RT  · D/w RN   · Chest x-ray on 12/24/2021 with improving bilateral pulmonary infiltrates  · Sedation reviewed. We will try to cut down on the sedatives and pain medications  · Cont ARDS ventilation  · Continue supportive care   · Cont treatment with medications for COVID  · Cont tube feeding  · Monitor endotracheal secretions   · Will obtain xray chest as needed  · ABG as needed  · Prognosis guarded given age and severity of illness. · On Lovenox and lansoprazole  · On Decadron  · Patient is on cefepime  · Check triglycerides as long as the patient is needing propofol  · Patient required placement of the Hahn catheter due to retention of urine requiring straight catheter multiple times a day  · Diuresis Lasix 40 mg twice daily for 3 days. Restrictive fluid strategy  · Treatment plan Discussed with nursing staff in detail , all questions answered . · Would proceed with trach and PEG is weaning likely to be prolonged and complicated. Total critical care time caring for this patient with life threatening, unstable organ failure, including direct patient contact, management of life support systems, review of data including imaging and labs, discussions with other team members and physicians at least 28  Min so far today, excluding procedures. Electronically signed by Pa Pagan DO on 12/31/2021 at 11:05 PM       This patient was evaluated in the context of the global SARS-CoV-2 (COVID-19) pandemic, which necessitated considerations that the patient either has COVID-19 infection or is at risk of infection with COVID-19. Institutional protocols and algorithms that pertain to the evaluation & management of patients with COVID-19 or those at risk for COVID-19 are in a state of rapid changes based on information released by regulatory bodies including the CDC and federal and state organizations. These policies and algorithms were followed during the patient's care. Please note that this chart was generated using voice recognition Dragon dictation software. Although every effort was made to ensure the accuracy of this automated transcription, some errors in transcription may have occurred.

## 2022-01-01 NOTE — PROGRESS NOTES
Dr. Shasha Infante with Critical Care on unit and notified patient holiday and did not withdrawal from pain nor any non-purposeful movement. Notified also writer restarted sedation due to patient getting agitated, increase HR note and spo2 drop down to 89%. Writer notify physician ketamine drip started, propofol off, versed at 5 mg/hr, fentanyl drip at 200 mcg/hr and precedex at 0.5 mcg/min. Physician states ok to increase rate of precedex. If able to wean down versed drip. No new orders at this time.

## 2022-01-01 NOTE — PROGRESS NOTES
[facility]  Office: [address], DO, Lacy Abarca, DO, Alfredo Sanchez, DO, Taylor Morris, DO, Gemma Grant MD, Aparna Nash MD, Yarelis Hernández MD, Austin Richardson MD, Oksana Wong MD, Zuleima Urena MD, Edie Sebastian MD, July Wallace, DO, Danae Rivas DO, Margaux Pandey MD,  Liza Gardiner DO, Vertell Moritz, MD, Brook Mak MD, Marya Oseguera MD, Brent Valdez MD, Any Little MD, Fady Hill MD, Taco Shay MD, Amina Shaver, [facility], [facility], CNP, Refugio Agee, CNP, Cash Contras, CNS, Richard Appl, CNP, Lluvia Watters, CNP, Irene Fresh, CNP, Onofre Chowdhury, CNP, Elisa Seo, CNP, Audrey Mckeon PA-C, Terry Mock, [facility], Wes Kern, [facility], [place] Deborah, CNP, Liv Hollis, CNP, Dawayne Oppenheim, CNP, Ashley Madsen, CNP, Patricia Miller, [facility], [employer], [address] [facility]      Daily Progress Note     Admit Date: 12/12/2021  Bed/Room No.  3026/3026-01  Admitting Physician : Yarelis Hernández MD  Code Status :Full Code  Hospital Day:  LOS: 20 days   Chief Complaint:     Breathing difficulty. Principal Problem:    Pneumonia due to COVID-19 virus  Active Problems:    Shock ([facility] [address]    Acute respiratory failure with hypoxia (HCC)    Type 2 diabetes mellitus (HCC)    Asthma    IRMA on CPAP    Hypertension    GERD (gastroesophageal reflux disease)    ARDS (adult respiratory distress syndrome) ([facility]  Resolved Problems:    * No resolved hospital problems. *    Subjective : Interval History/Significant events :  01/01/22    Patient remains on ventilator support. Patient is currently on 40% FiO2 PEEP of 8. He is following simple commands off high opening and closure otherwise no change. No handgrip or toe movement. Patient is sedated on fentanyl and Precedex. He has been having intermittent fever. Patient also has redness in left foot. He had prior foot surgery.   Vitals - Unstable afebrile  Labs -normal kidney function creatinine 0.29, glucose 107. Electrolytes normal.  Nursing notes , Consults notes reviewed. Overnight events and updates discussed with Nursing staff . Background History:         Chris Fu is 60 y.o. male  Who was admitted to the hospital on 12/12/2021 for treatment of Pneumonia due to COVID-19 virus. Patient initially presented with shortness of breath at [facility].  He had to suggest positive for COVID-19 infection. Patient reported that his son had also COVID-19 infection. Patient was hypoxic in 50s on arrival and was treated with BiPAP however breathing worsened and patient was subsequently intubated. Patient was transferred to [facility] on 12/12/2021 as he was requiring high ventilator support 100% FiO2 with PEEP of 22. He was given Actemra and started on high-dose Decadron. Patient was started with he was started on Flolan and given paralytics for proning per ARDS protocol. Patient also received intermittent Lasix. Proning was stopped on 12/20/2021 and paralytics were stopped on 12/22/2021. Patient started to have fevers and was started on cefepime. Respiratory cultures were negative. Airborne isolation was removed on 12/31/2021. PMH:  Past Medical History:   Diagnosis Date    Arthritis     Asthma     Diabetes mellitus ([address]     Edema     GERD (gastroesophageal reflux disease)     Hypertension     on lasix    Migraine     IRMA on CPAP     seldom use machine      Allergies:    Allergies   Allergen Reactions    Nuts [Peanut-Containing Drug Products] Anaphylaxis    Sunflower Oil Anaphylaxis     Sunflower seeds      Medications :  baclofen, 5 mg, Per NG tube, TID  oxyCODONE, 10 mg, Oral, Q4H  meropenem, 1,000 mg, IntraVENous, Q8H  chlordiazePOXIDE, 10 mg, Per NG tube, 4x Daily  lidocaine 1 % injection, 5 mL, IntraDERmal, Once  sodium chloride flush, 5-40 mL, IntraVENous, 2 times per day  docusate, 100 mg, Per NG tube, BID  insulin lispro, 0-6 Units, SubCUTAneous, Q6H  lansoprazole, 30 mg, Oral, QAM AC  insulin glargine, 10 Units, SubCUTAneous, Nightly  sodium chloride flush, 5-40 mL, IntraVENous, 2 times per day  enoxaparin, 30 mg, SubCUTAneous, BID  Vitamin D, 2,000 Units, Oral, Daily  dexamethasone, 10 mg, IntraVENous, Q24H        Review of Systems   Review of Systems   Unable to perform ROS: Intubated     Objective :      Current Vitals : Temp: 98.6 °F (37 °C),  Pulse: 57, Resp: 16, BP: 92/62, SpO2: 93 %  Last 24 Hrs Vitals   Patient Vitals for the past 24 hrs:   BP Temp Temp src Pulse Resp SpO2   01/01/22 0600 92/62 -- -- 57 16 93 %   01/01/22 0545 -- -- -- 59 20 94 %   01/01/22 0540 -- -- -- 59 17 93 %   01/01/22 0535 -- -- -- 56 18 93 %   01/01/22 0530 -- -- -- 56 20 94 %   01/01/22 0525 -- -- -- 56 17 94 %   01/01/22 0520 -- -- -- 54 16 94 %   01/01/22 0515 -- -- -- 55 17 93 %   01/01/22 0510 -- -- -- 53 15 94 %   01/01/22 0505 -- -- -- 52 14 93 %   01/01/22 0500 (!) 85/57 -- -- 51 14 93 %   01/01/22 0455 -- -- -- 52 14 93 %   01/01/22 0400 93/63 -- -- 52 16 94 %   01/01/22 0332 -- -- -- 54 -- --   01/01/22 0300 88/62 -- -- 53 16 93 %   01/01/22 0200 (!) 94/59 -- -- 57 15 92 %   01/01/22 0100 103/62 -- -- 69 20 92 %   01/01/22 0000 113/67 -- -- 76 20 93 %   12/31/21 2355 -- -- -- -- 20 93 %   12/31/21 2300 (!) 93/55 -- -- 82 23 90 %   12/31/21 2200 123/76 -- -- 100 24 90 %   12/31/21 2100 119/71 -- -- 56 21 93 %   12/31/21 2000 -- -- -- -- -- 94 %   12/31/21 1954 -- -- -- 53 19 94 %   12/31/21 1900 124/74 -- -- 58 20 93 %   12/31/21 1830 -- -- -- 59 24 94 %   12/31/21 1800 118/73 -- -- (!) 47 18 94 %   12/31/21 1730 -- -- -- (!) 48 18 94 %   12/31/21 1700 113/70 -- -- (!) 48 18 94 %   12/31/21 1630 -- -- -- (!) 45 19 94 %   12/31/21 1620 -- -- -- (!) 45 18 93 %   12/31/21 1600 106/70 98.6 °F (37 °C) Bladder (!) 44 18 90 %   12/31/21 1535 -- -- -- (!) 44 21 90 %   12/31/21 1530 -- -- -- (!) 47 21 (!) 89 %   12/31/21 1525 -- -- -- 51 18 (!) 88 %   12/31/21 1520 -- -- -- 58 20 (!) 89 % LABALBU 3.0*  --  3.1* 3.1*   AST 15  --  24 20   ALT 47*  --  65* 57*   ALKPHOS 50  --  50 53   BILITOT 0.34  --  0.42 0.35   BILIDIR 0.15  --  0.13 0.11   LIPASE  --  25  --   --    TRIG  --   --  128  --           Specific Gravity, UA   Date Value Ref Range Status   12/29/2021 1.039 (H) 1.005 - 1.030 Final     Protein, UA   Date Value Ref Range Status   12/29/2021 1+ (A) NEGATIVE Final     RBC, UA   Date Value Ref Range Status   12/29/2021 TOO NUMEROUS TO COUNT 0 - 2 /HPF Final     Bacteria, UA   Date Value Ref Range Status   12/29/2021 NOT REPORTED None Final     Nitrite, Urine   Date Value Ref Range Status   12/29/2021 NEGATIVE NEGATIVE Final     WBC, UA   Date Value Ref Range Status   12/29/2021 2 TO 5 0 - 5 /HPF Final     Leukocyte Esterase, Urine   Date Value Ref Range Status   12/29/2021 NEGATIVE NEGATIVE Final       Imaging / Clinical Data :-   XR CHEST (SINGLE VIEW FRONTAL)    Result Date: 12/22/2021  Stable mild cardiomegaly. Patchy airspace opacities, left more than right, may be related to pulmonary edema versus pneumonia. Nonspecific bowel gas pattern. XR ABDOMEN (KUB) (SINGLE AP VIEW)    Result Date: 12/22/2021  Stable mild cardiomegaly. Patchy airspace opacities, left more than right, may be related to pulmonary edema versus pneumonia. Nonspecific bowel gas pattern. XR CHEST PORTABLE    Result Date: 12/24/2021  1. Stable cardiomegaly. 2.  Patchy airspace opacities, stable in the right lower chest, slightly improved on the left. Findings may be related to pulmonary edema or improving pneumonia. 3.  Support tubes and catheters in good position. XR CHEST PORTABLE    Result Date: 12/19/2021  Cardiomegaly, vascular congestion and worsening pulmonary opacities with bilateral effusions favoring pulmonary edema over multifocal airspace disease. Support tubes and lines as above.         Clinical Course : unchanged  Assessment and Plan  :        Acute respiratory failure with hypoxia due to ARDS from COVID-19 pneumonia - ventilator support, high-dose Decadron, s/p Actemra on 12/12/2021. Antibiotics per ID. Treated with 10 days of  cefepime  12/18/2021 - 12/28/21, meropenem started on 12/28/2021 due to high fevers. ID following. Airborne isolation removed on 12/31/2021. Type 2 diabetes mellitus - on Lantus, blood sugar controlled. Continue tube feed. Obesity BMI 39  Essential hypertension -  Stable , off levophed. .  IRMA -was noncompliant with CPAP at home   Left foot redness suggestive of cellulitis versus gout-check uric acid, start colchicine. Start Decadron and switch to prednisone. Continue DVT prophylaxis, GI prophylaxis. Continue to monitor vitals , Intake / output ,  Cell count , HGB , Kidney function, oxygenation  as indicated .      Plan discussed with Staff Gloria Robles RN     (Please note that portions of this note were completed with a voice recognition program. Efforts were made to edit the dictations but occasionally words are mis-transcribed.)      Alexis Disla MD  1/1/2022

## 2022-01-02 LAB
ABSOLUTE EOS #: 0.06 K/UL (ref 0–0.44)
ABSOLUTE IMMATURE GRANULOCYTE: 0.08 K/UL (ref 0–0.3)
ABSOLUTE LYMPH #: 1.39 K/UL (ref 1.1–3.7)
ABSOLUTE MONO #: 0.61 K/UL (ref 0.1–1.2)
ALBUMIN SERPL-MCNC: 2.9 G/DL (ref 3.5–5.2)
ALBUMIN/GLOBULIN RATIO: 1.2 (ref 1–2.5)
ALLEN TEST: ABNORMAL
ALP BLD-CCNC: 56 U/L (ref 40–129)
ALT SERPL-CCNC: 64 U/L (ref 5–41)
ANION GAP SERPL CALCULATED.3IONS-SCNC: 9 MMOL/L (ref 9–17)
AST SERPL-CCNC: 21 U/L
BASOPHILS # BLD: 0 % (ref 0–2)
BASOPHILS ABSOLUTE: 0.03 K/UL (ref 0–0.2)
BILIRUB SERPL-MCNC: 0.35 MG/DL (ref 0.3–1.2)
BILIRUBIN DIRECT: 0.15 MG/DL
BILIRUBIN, INDIRECT: 0.2 MG/DL (ref 0–1)
BUN BLDV-MCNC: 29 MG/DL (ref 6–20)
BUN/CREAT BLD: ABNORMAL (ref 9–20)
CALCIUM SERPL-MCNC: 9 MG/DL (ref 8.6–10.4)
CHLORIDE BLD-SCNC: 105 MMOL/L (ref 98–107)
CO2: 29 MMOL/L (ref 20–31)
CREAT SERPL-MCNC: 0.37 MG/DL (ref 0.7–1.2)
DIFFERENTIAL TYPE: ABNORMAL
EOSINOPHILS RELATIVE PERCENT: 1 % (ref 1–4)
FIO2: 40
GFR AFRICAN AMERICAN: >60 ML/MIN
GFR NON-AFRICAN AMERICAN: >60 ML/MIN
GFR SERPL CREATININE-BSD FRML MDRD: ABNORMAL ML/MIN/{1.73_M2}
GFR SERPL CREATININE-BSD FRML MDRD: ABNORMAL ML/MIN/{1.73_M2}
GLOBULIN: ABNORMAL G/DL (ref 1.5–3.8)
GLUCOSE BLD-MCNC: 101 MG/DL (ref 75–110)
GLUCOSE BLD-MCNC: 117 MG/DL (ref 75–110)
GLUCOSE BLD-MCNC: 121 MG/DL (ref 70–99)
GLUCOSE BLD-MCNC: 127 MG/DL (ref 74–100)
HCT VFR BLD CALC: 38 % (ref 40.7–50.3)
HCT VFR BLD CALC: 39 % (ref 40.7–50.3)
HEMOGLOBIN: 12.2 G/DL (ref 13–17)
HEMOGLOBIN: 12.2 G/DL (ref 13–17)
IMMATURE GRANULOCYTES: 1 %
LACTIC ACID, SEPSIS WHOLE BLOOD: 1.4 MMOL/L (ref 0.5–1.9)
LACTIC ACID, SEPSIS: NORMAL MMOL/L (ref 0.5–1.9)
LYMPHOCYTES # BLD: 20 % (ref 24–43)
MCH RBC QN AUTO: 30.9 PG (ref 25.2–33.5)
MCH RBC QN AUTO: 31.2 PG (ref 25.2–33.5)
MCHC RBC AUTO-ENTMCNC: 31.3 G/DL (ref 28.4–34.8)
MCHC RBC AUTO-ENTMCNC: 32.1 G/DL (ref 28.4–34.8)
MCV RBC AUTO: 97.2 FL (ref 82.6–102.9)
MCV RBC AUTO: 98.7 FL (ref 82.6–102.9)
MODE: ABNORMAL
MONOCYTES # BLD: 9 % (ref 3–12)
NEGATIVE BASE EXCESS, ART: ABNORMAL (ref 0–2)
NRBC AUTOMATED: 0 PER 100 WBC
NRBC AUTOMATED: 0 PER 100 WBC
O2 DEVICE/FLOW/%: ABNORMAL
PATIENT TEMP: ABNORMAL
PDW BLD-RTO: 14.5 % (ref 11.8–14.4)
PDW BLD-RTO: 14.6 % (ref 11.8–14.4)
PLATELET # BLD: 147 K/UL (ref 138–453)
PLATELET # BLD: ABNORMAL K/UL (ref 138–453)
PLATELET ESTIMATE: ABNORMAL
PLATELET, FLUORESCENCE: 134 K/UL (ref 138–453)
PLATELET, IMMATURE FRACTION: 4 % (ref 1.1–10.3)
PMV BLD AUTO: 11.5 FL (ref 8.1–13.5)
PMV BLD AUTO: ABNORMAL FL (ref 8.1–13.5)
POC HCO3: 33.3 MMOL/L (ref 21–28)
POC O2 SATURATION: 92 % (ref 94–98)
POC PCO2 TEMP: ABNORMAL MM HG
POC PCO2: 49.7 MM HG (ref 35–48)
POC PH TEMP: ABNORMAL
POC PH: 7.43 (ref 7.35–7.45)
POC PO2 TEMP: ABNORMAL MM HG
POC PO2: 62.9 MM HG (ref 83–108)
POSITIVE BASE EXCESS, ART: 8 (ref 0–3)
POTASSIUM SERPL-SCNC: 4.2 MMOL/L (ref 3.7–5.3)
RBC # BLD: 3.91 M/UL (ref 4.21–5.77)
RBC # BLD: 3.95 M/UL (ref 4.21–5.77)
RBC # BLD: ABNORMAL 10*6/UL
SAMPLE SITE: ABNORMAL
SEG NEUTROPHILS: 69 % (ref 36–65)
SEGMENTED NEUTROPHILS ABSOLUTE COUNT: 4.76 K/UL (ref 1.5–8.1)
SODIUM BLD-SCNC: 143 MMOL/L (ref 135–144)
TCO2 (CALC), ART: ABNORMAL MMOL/L (ref 22–29)
TOTAL PROTEIN: 5.4 G/DL (ref 6.4–8.3)
WBC # BLD: 6.8 K/UL (ref 3.5–11.3)
WBC # BLD: 6.9 K/UL (ref 3.5–11.3)
WBC # BLD: ABNORMAL 10*3/UL

## 2022-01-02 PROCEDURE — 6360000002 HC RX W HCPCS: Performed by: INTERNAL MEDICINE

## 2022-01-02 PROCEDURE — 6370000000 HC RX 637 (ALT 250 FOR IP): Performed by: INTERNAL MEDICINE

## 2022-01-02 PROCEDURE — 6370000000 HC RX 637 (ALT 250 FOR IP): Performed by: FAMILY MEDICINE

## 2022-01-02 PROCEDURE — 2700000000 HC OXYGEN THERAPY PER DAY

## 2022-01-02 PROCEDURE — 85025 COMPLETE CBC W/AUTO DIFF WBC: CPT

## 2022-01-02 PROCEDURE — 94761 N-INVAS EAR/PLS OXIMETRY MLT: CPT

## 2022-01-02 PROCEDURE — 80076 HEPATIC FUNCTION PANEL: CPT

## 2022-01-02 PROCEDURE — 94003 VENT MGMT INPAT SUBQ DAY: CPT

## 2022-01-02 PROCEDURE — 6360000002 HC RX W HCPCS: Performed by: NURSE PRACTITIONER

## 2022-01-02 PROCEDURE — 82947 ASSAY GLUCOSE BLOOD QUANT: CPT

## 2022-01-02 PROCEDURE — 83605 ASSAY OF LACTIC ACID: CPT

## 2022-01-02 PROCEDURE — 80048 BASIC METABOLIC PNL TOTAL CA: CPT

## 2022-01-02 PROCEDURE — 2580000003 HC RX 258: Performed by: INTERNAL MEDICINE

## 2022-01-02 PROCEDURE — 85055 RETICULATED PLATELET ASSAY: CPT

## 2022-01-02 PROCEDURE — 2580000003 HC RX 258: Performed by: NURSE PRACTITIONER

## 2022-01-02 PROCEDURE — 2000000000 HC ICU R&B

## 2022-01-02 PROCEDURE — 6370000000 HC RX 637 (ALT 250 FOR IP): Performed by: NURSE PRACTITIONER

## 2022-01-02 PROCEDURE — 6370000000 HC RX 637 (ALT 250 FOR IP): Performed by: STUDENT IN AN ORGANIZED HEALTH CARE EDUCATION/TRAINING PROGRAM

## 2022-01-02 PROCEDURE — 2500000003 HC RX 250 WO HCPCS: Performed by: INTERNAL MEDICINE

## 2022-01-02 PROCEDURE — 87040 BLOOD CULTURE FOR BACTERIA: CPT

## 2022-01-02 PROCEDURE — 82803 BLOOD GASES ANY COMBINATION: CPT

## 2022-01-02 PROCEDURE — 85027 COMPLETE CBC AUTOMATED: CPT

## 2022-01-02 PROCEDURE — 36415 COLL VENOUS BLD VENIPUNCTURE: CPT

## 2022-01-02 PROCEDURE — 37799 UNLISTED PX VASCULAR SURGERY: CPT

## 2022-01-02 PROCEDURE — 99233 SBSQ HOSP IP/OBS HIGH 50: CPT | Performed by: INTERNAL MEDICINE

## 2022-01-02 PROCEDURE — 99291 CRITICAL CARE FIRST HOUR: CPT | Performed by: INTERNAL MEDICINE

## 2022-01-02 RX ORDER — CHLORDIAZEPOXIDE HYDROCHLORIDE 5 MG/1
5 CAPSULE, GELATIN COATED ORAL ONCE
Status: COMPLETED | OUTPATIENT
Start: 2022-01-02 | End: 2022-01-02

## 2022-01-02 RX ORDER — LACTULOSE 10 G/15ML
20 SOLUTION ORAL 3 TIMES DAILY
Status: DISCONTINUED | OUTPATIENT
Start: 2022-01-02 | End: 2022-01-04

## 2022-01-02 RX ORDER — OXYCODONE HCL 5 MG/5 ML
15 SOLUTION, ORAL ORAL EVERY 4 HOURS
Status: DISCONTINUED | OUTPATIENT
Start: 2022-01-02 | End: 2022-01-03

## 2022-01-02 RX ORDER — CHLORDIAZEPOXIDE HYDROCHLORIDE 5 MG/1
15 CAPSULE, GELATIN COATED ORAL 4 TIMES DAILY
Status: DISCONTINUED | OUTPATIENT
Start: 2022-01-02 | End: 2022-01-03

## 2022-01-02 RX ADMIN — ENOXAPARIN SODIUM 30 MG: 100 INJECTION SUBCUTANEOUS at 08:56

## 2022-01-02 RX ADMIN — CHLORDIAZEPOXIDE HYDROCHLORIDE 10 MG: 5 CAPSULE ORAL at 09:00

## 2022-01-02 RX ADMIN — INSULIN GLARGINE 10 UNITS: 100 INJECTION, SOLUTION SUBCUTANEOUS at 20:56

## 2022-01-02 RX ADMIN — Medication 6 MG/HR: at 01:31

## 2022-01-02 RX ADMIN — Medication 6 MG/HR: at 18:35

## 2022-01-02 RX ADMIN — IBUPROFEN 600 MG: 200 SUSPENSION ORAL at 08:55

## 2022-01-02 RX ADMIN — DEXMEDETOMIDINE HYDROCHLORIDE 1.1 MCG/KG/HR: 4 INJECTION, SOLUTION INTRAVENOUS at 08:30

## 2022-01-02 RX ADMIN — DOCUSATE SODIUM 100 MG: 50 LIQUID ORAL at 20:23

## 2022-01-02 RX ADMIN — DEXMEDETOMIDINE HYDROCHLORIDE 1.2 MCG/KG/HR: 4 INJECTION, SOLUTION INTRAVENOUS at 03:15

## 2022-01-02 RX ADMIN — OXYCODONE HYDROCHLORIDE 15 MG: 5 SOLUTION ORAL at 13:30

## 2022-01-02 RX ADMIN — Medication 30 MG: at 05:36

## 2022-01-02 RX ADMIN — OXYCODONE HYDROCHLORIDE 15 MG: 5 SOLUTION ORAL at 17:30

## 2022-01-02 RX ADMIN — DEXMEDETOMIDINE HYDROCHLORIDE 0.7 MCG/KG/HR: 4 INJECTION, SOLUTION INTRAVENOUS at 20:18

## 2022-01-02 RX ADMIN — LACTULOSE 20 G: 20 SOLUTION ORAL at 13:23

## 2022-01-02 RX ADMIN — COLCHICINE 0.6 MG: 0.6 TABLET, FILM COATED ORAL at 08:55

## 2022-01-02 RX ADMIN — MEROPENEM 1000 MG: 1 INJECTION, POWDER, FOR SOLUTION INTRAVENOUS at 18:47

## 2022-01-02 RX ADMIN — DOCUSATE SODIUM 100 MG: 50 LIQUID ORAL at 08:56

## 2022-01-02 RX ADMIN — Medication 175 MCG/HR: at 12:00

## 2022-01-02 RX ADMIN — CHLORDIAZEPOXIDE HYDROCHLORIDE 15 MG: 5 CAPSULE ORAL at 15:35

## 2022-01-02 RX ADMIN — SODIUM CHLORIDE, PRESERVATIVE FREE 10 ML: 5 INJECTION INTRAVENOUS at 20:22

## 2022-01-02 RX ADMIN — DEXMEDETOMIDINE HYDROCHLORIDE 1.2 MCG/KG/HR: 4 INJECTION, SOLUTION INTRAVENOUS at 00:31

## 2022-01-02 RX ADMIN — BACLOFEN 5 MG: 10 TABLET ORAL at 08:55

## 2022-01-02 RX ADMIN — Medication 200 MCG/HR: at 00:35

## 2022-01-02 RX ADMIN — OXYCODONE HYDROCHLORIDE 10 MG: 5 SOLUTION ORAL at 05:36

## 2022-01-02 RX ADMIN — BACLOFEN 5 MG: 10 TABLET ORAL at 13:23

## 2022-01-02 RX ADMIN — ENOXAPARIN SODIUM 30 MG: 100 INJECTION SUBCUTANEOUS at 20:23

## 2022-01-02 RX ADMIN — HYDROMORPHONE HYDROCHLORIDE 1 MG: 1 INJECTION, SOLUTION INTRAMUSCULAR; INTRAVENOUS; SUBCUTANEOUS at 17:14

## 2022-01-02 RX ADMIN — CHLORDIAZEPOXIDE HYDROCHLORIDE 15 MG: 5 CAPSULE ORAL at 22:26

## 2022-01-02 RX ADMIN — Medication 125 MCG/HR: at 18:35

## 2022-01-02 RX ADMIN — ACETAMINOPHEN 650 MG: 160 SOLUTION ORAL at 13:25

## 2022-01-02 RX ADMIN — MEROPENEM 1000 MG: 1 INJECTION, POWDER, FOR SOLUTION INTRAVENOUS at 10:30

## 2022-01-02 RX ADMIN — CHLORDIAZEPOXIDE HYDROCHLORIDE 5 MG: 5 CAPSULE ORAL at 10:28

## 2022-01-02 RX ADMIN — OXYCODONE HYDROCHLORIDE 10 MG: 5 SOLUTION ORAL at 08:54

## 2022-01-02 RX ADMIN — PREDNISONE 20 MG: 20 TABLET ORAL at 08:55

## 2022-01-02 RX ADMIN — POLYETHYLENE GLYCOL 3350 17 G: 17 POWDER, FOR SOLUTION ORAL at 08:56

## 2022-01-02 RX ADMIN — CHLORDIAZEPOXIDE HYDROCHLORIDE 10 MG: 5 CAPSULE ORAL at 03:16

## 2022-01-02 RX ADMIN — Medication 200 MCG/HR: at 08:12

## 2022-01-02 RX ADMIN — Medication 2000 UNITS: at 08:55

## 2022-01-02 RX ADMIN — DEXMEDETOMIDINE HYDROCHLORIDE 0.7 MCG/KG/HR: 4 INJECTION, SOLUTION INTRAVENOUS at 12:00

## 2022-01-02 RX ADMIN — KETAMINE HYDROCHLORIDE 0.2 MG/KG/HR: 50 INJECTION INTRAMUSCULAR; INTRAVENOUS at 03:13

## 2022-01-02 RX ADMIN — OXYCODONE HYDROCHLORIDE 15 MG: 5 SOLUTION ORAL at 22:26

## 2022-01-02 RX ADMIN — DEXMEDETOMIDINE HYDROCHLORIDE 1.2 MCG/KG/HR: 4 INJECTION, SOLUTION INTRAVENOUS at 05:36

## 2022-01-02 RX ADMIN — BACLOFEN 5 MG: 10 TABLET ORAL at 21:15

## 2022-01-02 RX ADMIN — MEROPENEM 1000 MG: 1 INJECTION, POWDER, FOR SOLUTION INTRAVENOUS at 03:16

## 2022-01-02 RX ADMIN — LACTULOSE 20 G: 20 SOLUTION ORAL at 20:23

## 2022-01-02 RX ADMIN — ACETAMINOPHEN 650 MG: 160 SOLUTION ORAL at 08:54

## 2022-01-02 RX ADMIN — OXYCODONE HYDROCHLORIDE 10 MG: 5 SOLUTION ORAL at 01:31

## 2022-01-02 RX ADMIN — LACTULOSE 20 G: 20 SOLUTION ORAL at 11:25

## 2022-01-02 ASSESSMENT — PAIN SCALES - GENERAL
PAINLEVEL_OUTOF10: 0
PAINLEVEL_OUTOF10: 3
PAINLEVEL_OUTOF10: 0
PAINLEVEL_OUTOF10: 0

## 2022-01-02 ASSESSMENT — PULMONARY FUNCTION TESTS
PIF_VALUE: 23
PIF_VALUE: 15
PIF_VALUE: 22
PIF_VALUE: 22
PIF_VALUE: 21

## 2022-01-02 NOTE — PROGRESS NOTES
[facility] - Progress Note   Note COVID 19 Patient  Today's Date and Time: 1/2/2022, 9:50 AM    Impression :     COVID 19 Confirmed Infection  Covid tests:  12/11/2021: Positive  Elevated inflammatory markers  Acute hypoxic respiratory failure  History of asthma  Diabetes mellitus  Essential hypertension  IRMA on CPAP  Patient has not received the Covid vaccination  Intermittent fevers    Recommendations:   Antibiotic treatment:  The patient was having high-grade fevers and cultures have been sent.-No growth on cultures thus far  I will start the patient empirically on antibiotic therapy with cefepime on 12/18-fevers initially resolved but low grade fevers have resumed. Cefepime discontinued 12/28 and Meropenem initiated 12/28 for worsening leukocytosis and fevers     Covid Rx:    Remdesivir-out of window  Decadron-high-dose initiated  Actemra-ordered 12/12/2021  Monoclonal antibodies-out of window      Medical Decision Making/Summary/Discussion:1/2/2022     Patient admitted with COVID 19 infection  He may come out of isolation on 12/31/21    Infection Control Recommendations   [place] Precautions  Airborne isolation  Droplet Isolation    Antimicrobial Stewardship Recommendations     Discontinuation of therapy  Coordination of Outpatient Care:   Estimated Length of IV antimicrobials:TBD  Patient will need Midline Catheter Insertion: TBD  Patient will need PICC line Insertion: No  Patient will need: Home IV , [place],  SNF,  LTAC:TBD  Patient will need outpatient wound care:No    Chief complaint/reason for consultation:   Concern for COVID infection      History of Present Illness:   Ana Dooley is a 60y.o.-year-old male who was initially admitted on 12/12/2021.  Patient seen at the request of Dr. Taryn Kim:    Patient presented through [facility] ER on 12/11/2021 with complaints of worsening shortness of breath with associated generalized weakness and nonproductive cough over the past several days. He was exposed to his son who was diagnosed with Covid last week and has not received the Covid vaccine. The patient was very hypoxic with an SPO2 in the high 50s on room air. Imaging revealed bilateral pulmonary opacities typical of COVID-19    Abnormal labs include:    Ferritin 2800      Patient has been intubated and transferred to [facility]  He has been started on high-dose Decadron and Actemra has been ordered    CT CHEST PULMONARY EMBOLISM W CONTRAST 12/11/2021   Final Result   1. No evidence of pulmonary embolism   2. Diffuse ground-glass opacities throughout the lungs, typical of COVID-19   pulmonary disease.           XR CHEST (SINGLE VIEW FRONTAL) 12/11/2021   Final Result   Multifocal hazy opacities throughout both lungs consistent with COVID   pneumonia and or pulmonary edema       Patient admitted because of concerns with COVID 19. The patient remains on mechanical ventilation with 50% FiO2 and PEEP of 10. He is receiving fentanyl, Versed and Precedex for sedation. Nimbex was discontinued 12/31/21  He is opening his eyes to stimuli but not responding to command. Cefepime was initiated 12/18-discontinued 12/28  Meropenem initiated 12/28      CURRENT EVALUATION : 1/2/2022    Afebrile  VS stable    The patient seen and evaluated and continues on the ventilator at 40% FiO2 10 of PEEP. He is on ketamine Versed and fentanyl as well as Precedex for sedation. He has had some intermittent fevers. Has been concern for cellulitis/gout in the left foot. Patient exhibiting respiratory distress. yes  Respiratory secretions: no    Patient receiving supplemental oxygen.   Mechanical ventilation  RR: 24  02 sat:94    % FIO2: 90-->80-->85-->80-->65-->60->50-->75-->60-->50  PEEP:   21-->16-->14-->12-->10->8-->12-->10    QTc:       NEWS Score: 0-4 Low risk group; 5-6: Medium risk group; 7 or above: High risk group  Parameters 3 2 1 0 1 2 3   Age    < 65   = 65   RR = 8  9-11 12-20  21-24 = 25   O2 Sats = 91 92-93 94-95 = 96      Suppl O2  Yes  No      SBP = 90  101-110 111-219   = 220   HR = 40  41-50 51-90  111-130 = 131   Consciousness    Alert   Drowsiness, lethargy, or confusion   Temperature = 35.0 C (95.0 F)  35.1-36.0 C 95.1-96.9 F 36.1-38.0 C 97.0-100.4 F 38.1-39.0 C 100.5-102.3 F = 39.1 C = 102.4 F      NEWS Score:  12/12/2021: 13 high risk    Overall Daily Picture:     Slowly improving    Presence of secondary bacterial Infection:    Cultures no growth  Additional antibiotics: 12/18 Cefepime for leukocytosis and fevers-discontinued 12/28 and Meropenem restarted    Labs, X rays reviewed: 1/2/2022    BUN:30-->32  Cr:0.42-->0.22    WBC:17.3-->13.7-->13.1-->10.8  Hb:13-->13.0   Plat: 178-->146    Absolute Neutrophils:3.73  Absolute Lymphocytes:0.29  Neutrophil/Lymphocyte Ratio: 12.8 high risk    CRP:293-->277-->137-->50.8-->23  Ferritin:2800  LDH: 838    Pro Calcitonin:      Cultures:  Urine:  12/18/21: No bacterial growth  Blood:  12/18/21: No growth thus far  Sputum :  12/18/21: Normal respiratory sherry  Wound:      CXR:     12/29/21 12/22/21 12/19/21      1221 diffuse bilateral infiltrates  CAT:      Discussed with patient, RN, CC, IM.      12-12-21:      I have personally reviewed the past medical history, past surgical history, medications, social history, and family history, and I have updated the database accordingly.   Past Medical History:     Past Medical History:   Diagnosis Date    Arthritis     Asthma     Diabetes mellitus ([address]     Edema     GERD (gastroesophageal reflux disease)     Hypertension     on lasix    Migraine     IRMA on CPAP     seldom use machine       Past Surgical  History:     Past Surgical History:   Procedure Laterality Date    APPENDECTOMY      ARTHROPLASTY Left 11/1/2019    LEFT FOOT 1ST MTPJ ARTHROPLASTY WITH PEREZ IMPLANT AND GROMMETS ALLEN MED performed by Lo Mcinotsh MD at [address] [facility] [place] Right 12/12/2019    RIGHT FOOT ARTHROPLASTY 1ST MTPJ (Right Foot)    ARTHROPLASTY Right 12/12/2019    RIGHT FOOT ARTHROPLASTY 1ST MTPJ performed by Lo Mcintosh MD at [address] Right    [address]  2014    no blockage no stents    CHOLECYSTECTOMY      COLONOSCOPY      ENDOSCOPY, COLON, DIAGNOSTIC      TOE SURGERY Left 11/01/2019     LEFT FOOT 1ST MTPJ ARTHROPLASTY WITH PEREZ IMPLANT AND GROMMETS  ALLEN MED (Left )    TONSILLECTOMY         Medications:      lactulose  20 g Oral TID    oxyCODONE  15 mg Oral Q4H    chlordiazePOXIDE  15 mg Per NG tube 4x Daily    chlordiazePOXIDE  5 mg Oral Once    colchicine  0.6 mg Oral Daily    predniSONE  20 mg Per NG tube Daily    baclofen  5 mg Per NG tube TID    meropenem  1,000 mg IntraVENous Q8H    sodium chloride flush  5-40 mL IntraVENous 2 times per day    docusate  100 mg Per NG tube BID    insulin lispro  0-6 Units SubCUTAneous Q6H    lansoprazole  30 mg Oral QAM AC    insulin glargine  10 Units SubCUTAneous Nightly    sodium chloride flush  5-40 mL IntraVENous 2 times per day    enoxaparin  30 mg SubCUTAneous BID    Vitamin D  2,000 Units Oral Daily       Social History:     Social History     Socioeconomic History    Marital status:      Spouse name: Not on file    Number of children: Not on file    Years of education: Not on file    Highest education level: Not on file   Occupational History    Not on file   Tobacco Use    Smoking status: Former Smoker    Smokeless tobacco: Never Used   Vaping Use    Vaping Use: Never used   Substance and Sexual Activity    Alcohol use: Yes     Comment: every couple months    Drug use: Never    Sexual activity: Not on file   Other Topics Concern    Not on file   Social History Narrative    Not on file     Social Determinants of Health     Financial Resource Strain:     Difficulty of Paying Living Expenses: Not on file   Food Insecurity:     Worried About Running Out of Food in the Last Year: Not on file    Ran Out of Food in the Last Year: Not on file   Transportation Needs:     Lack of Transportation (Medical): Not on file    Lack of Transportation (Non-Medical):  Not on file   Physical Activity:     Days of Exercise per Week: Not on file    Minutes of Exercise per Session: Not on file   Stress:     Feeling of Stress : Not on file   Social Connections:     Frequency of Communication with Friends and Family: Not on file    Frequency of Social Gatherings with Friends and Family: Not on file    Attends [employer] Services: Not on file    Active Member of [address] [place] [address] [employer] or Organizations: Not on file    Attends Club or Organization Meetings: Not on file    Marital Status: Not on file   Intimate Partner Violence:     Fear of Current or Ex-Partner: Not on file    Emotionally Abused: Not on file    Physically Abused: Not on file    Sexually Abused: Not on file   Housing Stability:     Unable to Pay for Housing in the Last Year: Not on file    Number of [place] in the Last Year: Not on file    Unstable Housing in the Last Year: Not on file       Family History:     Family History   Problem Relation Age of Onset    Heart Attack Sister 32    Diabetes Paternal Grandmother     Heart Disease Paternal Uncle     Heart Disease Paternal Uncle     Heart Disease Paternal Uncle     Cancer Maternal Aunt     Cancer Maternal Aunt     Cancer Maternal Uncle     Cancer Maternal Uncle         Allergies:   Nuts [peanut-containing drug products] and Sunflower oil     Review of Systems:     Review of Systems   Unable to perform ROS: Intubated       Physical Examination :     Patient Vitals for the past 8 hrs:   BP Temp Temp src Pulse Resp SpO2   01/02/22 0900 107/66 -- -- 56 18 95 %   01/02/22 0830 -- -- -- 61 22 96 %   01/02/22 0800 100/65 -- -- 55 18 96 %   01/02/22 0701 -- -- -- 54 18 96 % 01/02/22 0425 -- -- -- 52 18 95 %   01/02/22 0400 98/66 101 °F (38.3 °C) Oral 52 18 95 %   01/02/22 0300 83/63 -- -- 51 18 96 %   01/02/22 0200 93/65 -- -- 52 19 96 %     Physical Exam  Constitutional:       Appearance: He is well-developed. He is obese. Interventions: He is intubated. HENT:      Head: Normocephalic and atraumatic. Cardiovascular:      Rate and Rhythm: Normal rate. Heart sounds: Normal heart sounds. No friction rub. No gallop. Pulmonary:      Effort: Pulmonary effort is normal. He is intubated. Breath sounds: Normal breath sounds. No wheezing. Abdominal:      General: Bowel sounds are normal.      Palpations: Abdomen is soft. There is no mass. Tenderness: There is no abdominal tenderness. Musculoskeletal:      Cervical back: Normal range of motion and neck supple. Lymphadenopathy:      Cervical: No cervical adenopathy. Skin:     General: Skin is warm and dry. Comments: The left foot is warm but there are no cellulitic changes noted           Medical Decision Making -Laboratory:   I have independently reviewed/ordered the following labs:    CBC with Differential:   Recent Labs     01/01/22 0427 01/02/22 0459   WBC 10.5 6.9   HGB 12.8* 12.2*   HCT 38.3* 38.0*   PLT See Reflexed IPF Result 147   LYMPHOPCT 11* 20*   MONOPCT 6 9     BMP:   Recent Labs     12/31/21 0438 12/31/21  0438 01/01/22 0427 01/02/22 0459      < > 143 143   K 3.4*   < > 3.9 4.2      < > 103 105   CO2 29   < > 32* 29   BUN 32*   < > 30* 29*   CREATININE 0.22*   < > 0.29* 0.37*   MG 2.1  --   --   --     < > = values in this interval not displayed. Hepatic Function Panel:   Recent Labs     01/01/22 0427 01/02/22 0459   PROT 5.5* 5.4*   LABALBU 3.1* 2.9*   BILIDIR 0.11 0.15   IBILI 0.24 0.20   BILITOT 0.35 0.35   ALKPHOS 53 56   ALT 57* 64*   AST 20 21     No results for input(s): RPR in the last 72 hours. No results for input(s): HIV in the last 72 hours.   No results for input(s): BC in the last 72 hours. Lab Results   Component Value Date    MUCUS 3+ 12/29/2021    RBC 3.91 01/02/2022    TRICHOMONAS NOT REPORTED 12/29/2021    WBC 6.9 01/02/2022    YEAST NOT REPORTED 12/29/2021    TURBIDITY Clear 12/29/2021     Lab Results   Component Value Date    CREATININE 0.37 01/02/2022    GLUCOSE 121 01/02/2022       Medical Decision Making-Imaging:   ONE XRAY VIEW OF THE CHEST 12/28/2021 7:24 am    Impression   Stable support lines and tubes.  Stable to slightly increased diffuse   interstitial and airspace opacities. Medical Decision Fzezaq-Bziisppk-Svenl:     Culture, Blood 2 [[id number]]    Order Status: No result Specimen: Blood    Culture, Blood 1 [[id number]]    Order Status: No result Specimen: Blood    Culture, Blood 1 [[id number]] Collected: 12/29/21 1708   Order Status: Completed Specimen: Blood Updated: 01/01/22 1719    Specimen Description . BLOOD    Special Requests  RFA 5 ML    Culture NO GROWTH 3 DAYS   Culture, Blood 1 [[id number]] Collected: 12/29/21 1708   Order Status: Completed Specimen: Blood Updated: 01/01/22 1719    Specimen Description . BLOOD    Special Requests LFA 3.5 ML    Culture NO GROWTH 3 DAYS   Culture, Respiratory [[id number]] (Abnormal)  Collected: 12/29/21 1710   Order Status: Completed Specimen: Tracheal Aspirate Updated: 01/01/22 1616    Specimen Description . TRACHEAL ASPIRATE    Special Requests NOT REPORTED    Direct Exam < 10 EPITHELIAL CELLS/LPF     >10, <25 NEUTROPHILS/LPF     NO SIGNIFICANT PATHOGENS SEEN    Culture STAPHYLOCOCCUS AUREUS LIGHT GROWTH This isolate is methicillin susceptible. Abnormal      NORMAL RESPIRATORY JOAQUÍN SCANT GROWTH   Susceptibility     Staphylococcus aureus     BACTERIAL SUSCEPTIBILITY PANEL MARYJANE     cefoxitin screen NOT REPORTED       ciprofloxacin NOT REPORTED       clindamycin <=0.25  Sensitive     erythromycin <=0.25  Sensitive     gentamicin <=0.5   Gentamicin . ..  Sensitive  Sensitive     Induced Clind Resist NOT REPORTED       levofloxacin 0.25  Sensitive     linezolid NOT REPORTED       moxifloxacin NOT REPORTED       nitrofurantoin NOT REPORTED       oxacillin <=0.25   This staph . .. Sensitive  Sensitive     penicillin NOT REPORTED       rifampin NOT REPORTED       Synercid NOT REPORTED       tetracycline <=1  Sensitive     tigecycline NOT REPORTED       trimethoprim-sulfamethoxazole <=10  Sensitive     vancomycin NOT REPORTED        Culture, Blood 1 [[id number]] Collected: 12/24/21 0935   Order Status: Completed Specimen: Blood Updated: 12/29/21 1015    Specimen Description . BLOOD    Special Requests 10 ML R WRIST    Culture NO GROWTH 5 DAYS   Culture, Blood 1 [[id number]] Collected: 12/24/21 0958   Order Status: Completed Specimen: Blood Updated: 12/29/21 1015    Specimen Description . BLOOD    Special Requests 5ML L ARM    Culture NO GROWTH 5 DAYS   Culture, Respiratory [[id number]] Collected: 12/24/21 1436   Order Status: Completed Specimen: Tracheal Aspirate Updated: 12/26/21 1002    Specimen Description . TRACHEAL ASPIRATE    Special Requests NOT REPORTED    Direct Exam < 10 EPITHELIAL CELLS/LPF     >25 NEUTROPHILS/LPF     NO ORGANISMS SEEN    Culture NORMAL RESPIRATORY JOAQUÍN LIGHT GROWTH   Culture, Blood 1 [[id number]] Collected: 12/18/21 1234   Order Status: Completed Specimen: Blood Updated: 12/23/21 1402    Specimen Description . BLOOD    Special Requests NOT REPORTED    Culture NO GROWTH 5 DAYS   Culture, Respiratory [[id number]] (Abnormal) Collected: 12/18/21 1143   Order Status: Completed Specimen: Endotracheal Updated: 12/20/21 1043    Specimen Description . ENDOTRACHEAL    Special Requests NOT REPORTED    Direct Exam >25 NEUTROPHILS/LPF     < 10 EPITHELIAL CELLS/LPF     MIXED BACTERIAL MORPHOTYPES SEEN ON GRAM STAIN.  Abnormal     Culture NORMAL RESPIRATORY JOAQUÍN LIGHT GROWTH     Culture, Urine [[id number]] Collected: 12/14/21 0043   Order Status: Completed Specimen: Urine, straight catheter Updated:

## 2022-01-02 NOTE — PROGRESS NOTES
Pulmonary Critical Care   Progress Note    Patient - Natalie Olsen  Date of Admission -  2021 11:39 AM  Date of Evaluation -  2022  Room and Bed Number -  3026/3026-01   Hospital Day -     CHIEF COMPLAINT : ACUTE HYPOXIC RESPIRATORY FAILURE DUE TO COVID -19 PNEUMONIA   HPI:   Natalie lOsen  60 y.o. male  admitted for COVID-19 at [facility] ER due to worsening oxygenation he was initially started on BiPAP and subsequently intubated. On room air his saturations had been 50%. CTA no PE but bilateral pulmonary infiltrates. He was accepted at [address] [place] [address] ICU. On arrival he is on PEEP of 22, 100% FiO2. 2022   Subjective   Fever down. Very low-grade. Hemodynamics stable. No vasopressors. Nurses report that intermittently follows some commands. None for me. Fentanyl 200 mcg, 6 mg of midazolam, and 1.2 mg of dexmedetomidine. No longer on cis-atacarium. FiO2 down to 40%. I's/O- 2.5 L. Laboratory studies no serious abnormalities. Bicarbonate elevated at 32 consistent with blood gases pH 7.43 PCO2 50 P O2 66. Does not tolerate spontaneous breathing trial.  Becomes tachypneic immediately. Very weak.     SECRETIONS  -Amount:  [x] Small [] Moderate  [] Large    [] None  Color:     [x] White [] Colored  [] Bloody    SEDATION:    As above    PARALYZED:  [x] No    [] Yes    VASOPRESSORS:  [] No    [x] Yes  [x] Levophed [] Dopamine [] Vasopressin  [] Dobutamine [] Phenylephrine [] Epinephrine    INHALED veletri  : [] No    [] Yes    PRONE :       [x] No    [] Yes    Actemra:             [] No    [x] Yes  21  DEXAMETHASONE : [] No    [x] Yes      Ros unable to perform due to intubation and sedation    OBJECTIVE:     VITAL SIGNS:  /79   Pulse 70   Temp 100 °F (37.8 °C) (Bladder)   Resp 19   Ht 6' 2\" (1.88 m)   Wt 283 lb 4.7 oz (128.5 kg)   SpO2 94%   BMI 36.37 kg/m²   Tmax over 24 hours:  Temp (24hrs), Av.4 °F (37.4 °C), Min:99 °F (37.2 °C), Max:100 °F (37.8 Frequency Provider Last Rate Last Admin    0.9 % sodium chloride infusion   Intravenous Continuous Dion Woodward MD        sodium chloride flush 0.9 % injection 10 mL  10 mL Intravenous 2 times per day Dion Woodward MD        sodium chloride flush 0.9 % injection 10 mL  10 mL Intravenous PRN Dion Woodward MD        0.9 % sodium chloride infusion  25 mL Intravenous PRN Dion Woodward MD        ceFAZolin (ANCEF) 2000 mg in dextrose 5 % 100 mL IVPB  2,000 mg Intravenous Once Jennifer Dial MD         Vital Signs (Current)   Vitals:    21 08   BP: (!) 115/58   Pulse: 74   Resp: 16   Temp: 97.6 °F (36.4 °C)   SpO2: 99%     Vital Signs Statistics (for past 48 hrs)     Temp  Av.6 °F (36.4 °C)  Min: 97.6 °F (36.4 °C)   Min taken time: 21  Max: 97.6 °F (36.4 °C)   Max taken time: 21  Pulse  Av  Min: 76   Min taken time: 21 08  Max: 76   Max taken time: 21 08  Resp  Av  Min: 12   Min taken time: 21  Max: 12   Max taken time: 21  BP  Min: 115/58   Min taken time: 21  Max: 115/58   Max taken time: 21 08  SpO2  Av %  Min: 99 %   Min taken time: 21  Max: 99 %   Max taken time: 21 0813    BP Readings from Last 3 Encounters:   21 (!) 115/58   21 (!) 130/93   20 101/69     BMI  Body mass index is 17.82 kg/m². Estimated body mass index is 17.82 kg/m² as calculated from the following:    Height as of this encounter: 5' 8\" (1.727 m). Weight as of this encounter: 117 lb 2.8 oz (53.1 kg). CBC No results found for: WBC, RBC, HGB, HCT, MCV, RDW, PLT  CMP    Lab Results   Component Value Date    GLUCOSE 84 2020     BMP    Lab Results   Component Value Date    GLUCOSE 84 2020     POCGlucose  No results for input(s): GLUCOSE in the last 72 hours.    Coags  No results found for: PROTIME, INR, APTT  HCG (If Applicable)   Lab Results °C)      Patient Vitals for the past 8 hrs:   BP Temp Temp src Pulse Resp SpO2   01/01/22 1700 123/79 -- -- 70 19 94 %   01/01/22 1630 -- -- -- 88 21 95 %   01/01/22 1600 119/72 100 °F (37.8 °C) Bladder 75 19 94 %   01/01/22 1530 -- -- -- 67 19 94 %   01/01/22 1516 -- -- -- 75 21 92 %   01/01/22 1500 135/89 -- -- 86 24 93 %   01/01/22 1430 -- -- -- 86 19 93 %   01/01/22 1400 107/85 -- -- 73 19 92 %   01/01/22 1330 -- -- -- 87 20 92 %   01/01/22 1300 (!) 132/91 -- -- 88 22 94 %   01/01/22 1230 -- -- -- 92 19 93 %   01/01/22 1200 129/80 99.1 °F (37.3 °C) Bladder 70 17 94 %   01/01/22 1130 -- -- -- 75 23 93 %         Intake/Output Summary (Last 24 hours) at 1/1/2022 1905  Last data filed at 1/1/2022 1700  Gross per 24 hour   Intake 1500.8 ml   Output 3005 ml   Net -1504.2 ml     Date 01/01/22 0000 - 01/01/22 2359   Shift 0638-6794 1980-8646 8595-7429 24 Hour Total   INTAKE   I.V.(mL/kg) 548. 7(4.3)   548. 7(4.3)   NG/GT(mL/kg) 542(4.2) 100(0.8) 50(0.4) 692(5.4)   Shift Total(mL/kg) 1090.7(8.5) 100(0.8) 50(0.4) 1240.7(9.7)   OUTPUT   Urine(mL/kg/hr) 300(0.3) 780(0.8) 200 1280   Shift Total(mL/kg) 300(2.3) 780(6.1) 200(1.6) 1280(10)   Weight (kg) 128.5 128.5 128.5 128.5     Wt Readings from Last 3 Encounters:   12/30/21 283 lb 4.7 oz (128.5 kg)   12/11/21 300 lb (136.1 kg)   12/12/19 (!) 355 lb 9.6 oz (161.3 kg)     Body mass index is 36.37 kg/m². PHYSICAL EXAM:      I have discussed the care of Chris Fu, including pertinent history and exam findings, with the resident/APC/staff. I have seen the patient and the key elements of all parts of the encounter have been performed by me. Physical exam was deferred to limit physical exposure and PPE resources. I reviewed the interval history, interpreted all available radiographic, laboratory and physiologic data at the time of service. I agree with the assessment and plan as documented by resident/APC/ ancillary staff.   Patient remains on the ventilator  Trachea is Component Value Date    PREGTESTUR Negative 08/18/2021      ABGs No results found for: PHART, PO2ART, PEC4XEO, YVT8KSQ, BEART, M9NQJTSK   Type & Screen (If Applicable)  No results found for: LABABO, LABRH                         BMI: Wt Readings from Last 3 Encounters:       NPO Status:   Date of last liquid consumption: 08/17/21   Time of last liquid consumption: 2000   Date of last solid food consumption: 08/17/21      Time of last solid consumption: 2000       Anesthesia Evaluation  Patient summary reviewed no history of anesthetic complications:   Airway: Mallampati: III  TM distance: >3 FB   Neck ROM: full   Dental: normal exam         Pulmonary:Negative Pulmonary ROS and normal exam                               Cardiovascular:Negative CV ROS  Exercise tolerance: good (>4 METS),           Rhythm: regular  Rate: normal           Beta Blocker:  Not on Beta Blocker         Neuro/Psych:   Negative Neuro/Psych ROS              GI/Hepatic/Renal:   (+) GERD: well controlled,           Endo/Other: Negative Endo/Other ROS                    Abdominal:             Vascular: negative vascular ROS. Other Findings:             Anesthesia Plan      general     ASA 2       Induction: intravenous. MIPS: Postoperative opioids intended and Prophylactic antiemetics administered. Anesthetic plan and risks discussed with patient. Plan discussed with CRNA. This pre-anesthesia assessment may be used as a history and physical.    DOS STAFF ADDENDUM:    Pt seen and examined, chart reviewed (including anesthesia, drug and allergy history). No interval changes to history and physical examination. Anesthetic plan, risks, benefits, alternatives, and personnel involved discussed with patient. Questions and concerns addressed. Patient(family) verbalized an understanding and agrees to proceed.       Joaquín Dominguez MD  August 18, 2021  8:43 AM in the midline  No subcutaneous air  No JVD  No rhonchi  Abdomen is not distended  No edema    MEDICATIONS:  Scheduled Meds:   [START ON 1/2/2022] colchicine  0.6 mg Oral Daily    predniSONE  20 mg Per NG tube Daily    baclofen  5 mg Per NG tube TID    oxyCODONE  10 mg Oral Q4H    meropenem  1,000 mg IntraVENous Q8H    chlordiazePOXIDE  10 mg Per NG tube 4x Daily    lidocaine 1 % injection  5 mL IntraDERmal Once    sodium chloride flush  5-40 mL IntraVENous 2 times per day    docusate  100 mg Per NG tube BID    insulin lispro  0-6 Units SubCUTAneous Q6H    lansoprazole  30 mg Oral QAM AC    insulin glargine  10 Units SubCUTAneous Nightly    sodium chloride flush  5-40 mL IntraVENous 2 times per day    enoxaparin  30 mg SubCUTAneous BID    Vitamin D  2,000 Units Oral Daily     Continuous Infusions:   ketamine (KETALAR) infusion for analgosedation 0.2 mg/kg/hr (12/31/21 2120)    cisatracurium (NIMBEX) infusion Stopped (12/30/21 1042)    dexmedetomidine 1.2 mcg/kg/hr (01/01/22 1900)    sodium chloride      sodium chloride      dextrose      norepinephrine Stopped (01/01/22 1000)    midazolam 6 mg/hr (01/01/22 1719)    fentaNYL 200 mcg/hr (01/01/22 1903)    dextrose      sodium chloride 10 mL/hr at 12/13/21 1548     PRN Meds:   potassium chloride, 40 mEq, PRN   Or  potassium alternative oral replacement, 40 mEq, PRN   Or  potassium chloride, 10 mEq, PRN  ibuprofen, 600 mg, Q6H PRN  sodium chloride flush, 5-40 mL, PRN  sodium chloride, 25 mL, PRN  metoprolol, 5 mg, Q6H PRN  polyethylene glycol, 17 g, BID PRN  senna, 5 mL, Nightly PRN  bisacodyl, 10 mg, Daily PRN  acetaminophen, 650 mg, Q4H PRN  sodium chloride flush, 5-40 mL, PRN  sodium chloride, 25 mL, PRN  ondansetron, 4 mg, Q8H PRN   Or  ondansetron, 4 mg, Q6H PRN  acetaminophen, 650 mg, Q6H PRN  glucose, 15 g, PRN  dextrose, 12.5 g, PRN  glucagon (rDNA), 1 mg, PRN  dextrose, 100 mL/hr, PRN  glucose, 15 g, PRN  dextrose, 12.5 g, PRN  glucagon (rDNA), 1 mg, PRN  dextrose, 100 mL/hr, PRN        SUPPORT DEVICES: [x] Ventilator [] BIPAP  [] Nasal Cannula [] Room Air      ABGs:     Lab Results   Component Value Date    XWE5CVP NOT REPORTED 01/01/2022    FIO2 40.0 01/01/2022       DATA:  Complete Blood Count:   Recent Labs     12/30/21  0600 12/31/21  0438 01/01/22  0427   WBC 13.1* 10.8 10.5   RBC 4.17* 4.05* 4.05*   HGB 13.0 13.0 12.8*   HCT 39.7* 37.9* 38.3*   MCV 95.2 93.6 94.6   MCH 31.2 32.1 31.6   MCHC 32.7 34.3 33.4   RDW 14.4 14.1 14.2    146 See Reflexed IPF Result   MPV 11.5 11.2 NOT REPORTED        Last 3 Blood Glucose:   Recent Labs     12/30/21  0600 12/31/21 0438 01/01/22  0427   GLUCOSE 125* 114* 97        PT/INR:    Lab Results   Component Value Date    PROTIME 13.0 12/12/2021    INR 1.0 12/12/2021     PTT:    Lab Results   Component Value Date    APTT 39.1 12/12/2021       Comprehensive Metabolic Profile:   Recent Labs     12/30/21  0600 12/31/21  0438 01/01/22  0427    141 143   K 3.8 3.4* 3.9    103 103   CO2 27 29 32*   BUN 29* 32* 30*   CREATININE 0.38* 0.22* 0.29*   GLUCOSE 125* 114* 97   CALCIUM 9.0 8.9 8.7   PROT 5.5* 5.5* 5.5*   LABALBU 3.0* 3.1* 3.1*   BILITOT 0.34 0.42 0.35   ALKPHOS 50 50 53   AST 15 24 20   ALT 47* 65* 57*      Magnesium:   Lab Results   Component Value Date    MG 2.1 12/31/2021    MG 2.2 12/28/2021    MG 2.5 12/17/2021     Phosphorus:   Lab Results   Component Value Date    PHOS 3.2 12/18/2021    PHOS 4.0 12/17/2021    PHOS 2.8 12/16/2021     Ionized Calcium: No results found for: FREDDY     Urinalysis:   Lab Results   Component Value Date    NITRU NEGATIVE 12/29/2021    COLORU Yellow 12/29/2021    PHUR 6.0 12/29/2021    WBCUA 2 TO 5 12/29/2021    RBCUA TOO NUMEROUS TO COUNT 12/29/2021    MUCUS 3+ 12/29/2021    TRICHOMONAS NOT REPORTED 12/29/2021    YEAST NOT REPORTED 12/29/2021    BACTERIA NOT REPORTED 12/29/2021    SPECGRAV 1.039 12/29/2021    LEUKOCYTESUR NEGATIVE 12/29/2021 UROBILINOGEN Normal 12/29/2021    BILIRUBINUR NEGATIVE 12/29/2021    GLUCOSEU NEGATIVE 12/29/2021    KETUA NEGATIVE 12/29/2021    AMORPHOUS NOT REPORTED 12/29/2021           XR CHEST (SINGLE VIEW FRONTAL)    Result Date: 12/14/2021  Mild interval improvement in multifocal airspace disease particularly at the right base. Support tubes and lines as above. XR CHEST (SINGLE VIEW FRONTAL)    Result Date: 12/12/2021  Stable appearing     XR CHEST (SINGLE VIEW FRONTAL)    Result Date: 12/11/2021  Multifocal hazy opacities throughout both lungs consistent with COVID pneumonia and or pulmonary edema. XR CHEST PORTABLE    Result Date: 12/12/2021  Stable appearing chest with unchanged support tubes/lines and persistent extensive bilateral airspace disease consistent with known COVID pneumonia. XR CHEST PORTABLE    Result Date: 12/12/2021  Right internal jugular central venous catheter tip projecting over the proximal SVC versus brachiocephalic vein. No pneumothorax. Otherwise stable exam from earlier today. XR CHEST PORTABLE    Result Date: 12/12/2021  Endotracheal tube tip is 3.2 cm above the saul. Overall significant worsening of multifocal airspace opacities keeping with history of COVID-19. CT CHEST PULMONARY EMBOLISM W CONTRAST    Result Date: 12/11/2021  1. No evidence of pulmonary embolism 2. Diffuse ground-glass opacities throughout the lungs, typical of COVID-19 pulmonary disease.            Past Medical History:   Diagnosis Date    Arthritis     Asthma     Diabetes mellitus ([address]     Edema     GERD (gastroesophageal reflux disease)     Hypertension     on lasix    Migraine     IRMA on CPAP     seldom use machine        Social History     Socioeconomic History    Marital status:      Spouse name: None    Number of children: None    Years of education: None    Highest education level: None   Occupational History    None   Tobacco Use    Smoking status: Former Smoker    Smokeless tobacco: Never Used   Vaping Use    Vaping Use: Never used   Substance and Sexual Activity    Alcohol use: Yes     Comment: every couple months    Drug use: Never    Sexual activity: None   Other Topics Concern    None   Social History Narrative    None     Social Determinants of Health     Financial Resource Strain:     Difficulty of Paying Living Expenses: Not on file   Food Insecurity:     Worried About Running Out of Food in the Last Year: Not on file    Lucina of Food in the Last Year: Not on file   Transportation Needs:     Lack of Transportation (Medical): Not on file    Lack of Transportation (Non-Medical): Not on file   Physical Activity:     Days of Exercise per Week: Not on file    Minutes of Exercise per Session: Not on file   Stress:     Feeling of Stress : Not on file   Social Connections:     Frequency of Communication with Friends and Family: Not on file    Frequency of Social Gatherings with Friends and Family: Not on file    Attends Zoroastrian Services: Not on file    Active Member of [address] [place] [address] or Organizations: Not on file    Attends Club or Organization Meetings: Not on file    Marital Status: Not on file   Intimate Partner Violence:     Fear of Current or Ex-Partner: Not on file    Emotionally Abused: Not on file    Physically Abused: Not on file    Sexually Abused: Not on file   Housing Stability:     Unable to Pay for Housing in the Last Year: Not on file    Number of [place] in the Last Year: Not on file    Unstable Housing in the Last Year: Not on file         There is no immunization history on file for this patient.       Family History   Problem Relation Age of Onset    Heart Attack Sister 32    Diabetes Paternal Grandmother     Heart Disease Paternal Uncle     Heart Disease Paternal Uncle     Heart Disease Paternal Uncle     Cancer Maternal Aunt     Cancer Maternal Aunt     Cancer Maternal Uncle     Cancer Maternal Uncle          Past Surgical History:   Procedure Laterality Date    APPENDECTOMY      ARTHROPLASTY Left 11/1/2019    LEFT FOOT 1ST MTPJ ARTHROPLASTY WITH PEREZ IMPLANT AND GROMMETS  [facility] performed by Lex Orosco MD at [address] [facility] Right 12/12/2019    RIGHT FOOT ARTHROPLASTY 1ST MTPJ (Right Foot)    ARTHROPLASTY Right 12/12/2019    RIGHT FOOT ARTHROPLASTY 1ST MTPJ performed by Lex Orosco MD at [address] Right    [address]  2014    no blockage no stents    CHOLECYSTECTOMY      COLONOSCOPY      ENDOSCOPY, COLON, DIAGNOSTIC      TOE SURGERY Left 11/01/2019     LEFT FOOT 1ST MTPJ ARTHROPLASTY WITH PEREZ IMPLANT AND GROMMETS  [facility] (Left )    TONSILLECTOMY            VENTILATOR SETTINGS:  Vent Information  $Ventilation: $Subsequent Day  Skin Assessment: Clean, dry, & intact  Suction Catheter Diameter: 14  Equipment ID: [id number]  Equipment Changed: Airway securing device (expiratory filter, hirsch)  Vent Type: Servo i  Vent Mode: PRVC  Vt Ordered: 540 mL  Rate Set: 15 bmp  Pressure Support: 10 cmH20  FiO2 : 40 %  SpO2: 94 %  SpO2/FiO2 ratio: 235  Sensitivity: 2  PEEP/CPAP: 10  I Time/ I Time %: 0.8 s  Humidification Source: HME  Humidification Temp: 37  Humidification Temp Measured: 36.8  Circuit Condensation: Drained  Nitric Oxide/Epoprostenol In Use?: No     PaO2/FiO2 RATIO:  Recent Labs     01/01/22  0324   POCPO2 66.0*      FiO2 : 40 %       LABS:  ABGs:   Recent Labs     12/30/21  0339 12/31/21  0545 01/01/22  0324   POCPH 7.437 7.548* 7.437   POCPCO2 48.3* 38.4 49.7*   POCPO2 73.7* 65.7* 66.0*   POCHCO3 32.6* 33.4* 33.5*   SIGU4QFV 95 95 93*            ASSESSMENT:     Principal Problem:    Pneumonia due to COVID-19 virus  Active Problems:    Shock (HCC)    Acute respiratory failure with hypoxia (HCC)    Type 2 diabetes mellitus (HCC)    Asthma    IRMA on CPAP    Hypertension    GERD (gastroesophageal reflux disease)    ARDS (adult respiratory distress syndrome) ([facility]  Resolved Problems:    * No resolved hospital problems. *              Acute hypoxic respiratory failure secondary to COVID 19   Acute respiratory distress syndrome   Bilateral multifocal pneumonia due to COVID 19 infection   Covid -19 pandemic emergency   Post hypercarbic metabolic alkalosis   Hyperglycemia, better   Leukocytosis, slightly better   Shock requiring norepinephrine? Sepsis    LOS: 20  PLAN:    · D/w RT  · D/w RN   · Chest x-ray on 12/24/2021 with improving bilateral pulmonary infiltrates  · Sedation reviewed. We will try to cut down on the sedatives and pain medications  · Cont ARDS ventilation  · Continue supportive care   · Cont treatment with medications for COVID  · Cont tube feeding  · Monitor endotracheal secretions   · Will obtain xray chest as needed  · ABG as needed  · Prognosis guarded given age and severity of illness. · On Lovenox and lansoprazole  · On Decadron  · Patient is on cefepime  · Check triglycerides as long as the patient is needing propofol  · Patient required placement of the Hahn catheter due to retention of urine requiring straight catheter multiple times a day  · Diuresis Lasix 40 mg twice daily for 3 days. Restrictive fluid strategy  · Treatment plan Discussed with nursing staff in detail , all questions answered . · Would proceed with trach and PEG is weaning likely to be prolonged and complicated. Total critical care time caring for this patient with life threatening, unstable organ failure, including direct patient contact, management of life support systems, review of data including imaging and labs, discussions with other team members and physicians at least 27  Min so far today, excluding procedures.   Electronically signed by Rosi Martinez DO on 1/1/2022 at 7:05 PM       This patient was evaluated in the context of the global SARS-CoV-2 (COVID-19) pandemic, which necessitated considerations that the patient either has COVID-19

## 2022-01-02 NOTE — PROGRESS NOTES
PROGRESS NOTE          PATIENT NAME: 160Arben [facility] RECORD NO. [id number]  DATE: 2022  PRIMARY CARE PHYSICIAN: Clifton Hammer MD    HD: # [id number]    ASSESSMENT    Patient Active Problem List   Diagnosis    Acute respiratory failure with hypoxia ([facility]    Pneumonia due to COVID-19 virus    Type 2 diabetes mellitus ([facility]    Asthma    IRMA on CPAP    Hypertension    GERD (gastroesophageal reflux disease)    ARDS (adult respiratory distress syndrome) (HCC)    Shock ([facility]       MEDICAL DECISION MAKING AND PLAN    1. Still planning for possible trach and PEG  2. No emergent surgical plans at this time  3. Rest per primary    Chief Complaint: respiratory failure    SUBJECTIVE    Stacy Rudi still continues to be intubated and sedated. Patient will likely need trach and PEG at some point. We will continue to follow patient plan for possible surgical intervention      OBJECTIVE  VITALS: Temp: Temp: 101 °F (38.3 °C)Temp  Av °F (37.8 °C)  Min: 99 °F (37.2 °C)  Max: 101 °F (82.4 °C) BP Systolic (40JVK), UZX:565 , Min:83 , FVR:157   Diastolic (27LBV), [place]:63, Min:34, Max:91   Pulse Pulse  Av.1  Min: 51  Max: 92 Resp Resp  Av.1  Min: 13  Max: 24 Pulse ox SpO2  Av.5 %  Min: 92 %  Max: 96 %  CONSTITUTIONAL: intubated and sedated  HEENT: Head is normocephalic, atraumatic. PERRLA  NECK: Soft, trachea midline and straight  LUNGS: Chest expands equally bilaterally upon respiration, no accessory muscle used. Ausculation reveals no wheezes, rales or rhonchi. CARDIOVASCULAR: Heart sounds are normal.  Regular rate and rhythm without murmur, gallop or rub. ABDOMEN: soft, nontender, nondistended, no masses or organomegaly, no hernias palpable, no guarding or peritoneal signs. Bowel sounds are present in all four quadrants Scars are consistent with previous surgical history. NEUROLOGIC: No obvious facial droop  EXTREMITIES: no cyanosis, clubbing or edema     I/O last 3 completed shifts:   In: 2295 [I.V.:1822; NG/GT:1387]  Out: 2980 [Urine:2480]    Drain/tube output: In: 3109 [I.V.:1822; NG/GT:1287]  Out: 1700 [Urine:1700]    LAB:  CBC:   Recent Labs     12/31/21 0438 01/01/22 0427 01/02/22  0459   WBC 10.8 10.5 6.9   HGB 13.0 12.8* 12.2*   HCT 37.9* 38.3* 38.0*   MCV 93.6 94.6 97.2    See Reflexed IPF Result 147     BMP:   Recent Labs     12/31/21 0438 01/01/22 0427 01/02/22  0459    143 143   K 3.4* 3.9 4.2    103 105   CO2 29 32* 29   BUN 32* 30* 29*   CREATININE 0.22* 0.29* 0.37*   GLUCOSE 114* 97 121*     COAGS:   Recent Labs     12/31/21 0438 01/01/22 0427 01/02/22  0459   PROT 5.5* 5.5* 5.4*       RADIOLOGY:  XR CHEST PORTABLE    Result Date: 12/29/2021  Stable support lines and tubes. Stable to slightly increased diffuse interstitial and airspace opacities.          Mojgan Moser DO  1/2/22, 7:41 AM

## 2022-01-02 NOTE — PROGRESS NOTES
patient was subsequently intubated. Patient was transferred to [facility] on 12/12/2021 as he was requiring high ventilator support 100% FiO2 with PEEP of 22. He was given Actemra and started on high-dose Decadron. Patient was started with he was started on Flolan and given paralytics for proning per ARDS protocol. Patient also received intermittent Lasix. Proning was stopped on 12/20/2021 and paralytics were stopped on 12/22/2021. Patient started to have fevers and was started on cefepime. Respiratory cultures were negative. Airborne isolation was removed on 12/31/2021. Review of Systems:     Unable to obtain secondary to intubation and sedation    Medications: Allergies:     Allergies   Allergen Reactions    Nuts [Peanut-Containing Drug Products] Anaphylaxis    Sunflower Oil Anaphylaxis     Sunflower seeds       Current Meds:   Scheduled Meds:    colchicine  0.6 mg Oral Daily    predniSONE  20 mg Per NG tube Daily    baclofen  5 mg Per NG tube TID    oxyCODONE  10 mg Oral Q4H    meropenem  1,000 mg IntraVENous Q8H    chlordiazePOXIDE  10 mg Per NG tube 4x Daily    lidocaine 1 % injection  5 mL IntraDERmal Once    sodium chloride flush  5-40 mL IntraVENous 2 times per day    docusate  100 mg Per NG tube BID    insulin lispro  0-6 Units SubCUTAneous Q6H    lansoprazole  30 mg Oral QAM AC    insulin glargine  10 Units SubCUTAneous Nightly    sodium chloride flush  5-40 mL IntraVENous 2 times per day    enoxaparin  30 mg SubCUTAneous BID    Vitamin D  2,000 Units Oral Daily     Continuous Infusions:    ketamine (KETALAR) infusion for analgosedation 0.2 mg/kg/hr (01/02/22 0313)    dexmedetomidine 1 mcg/kg/hr (01/02/22 0900)    sodium chloride      sodium chloride      dextrose      norepinephrine Stopped (01/01/22 1000)    midazolam 6 mg/hr (01/02/22 0131)    fentaNYL 200 mcg/hr (01/02/22 0812)    dextrose      sodium chloride 10 mL/hr at 12/13/21 1548     PRN Meds: potassium chloride **OR** potassium alternative oral replacement **OR** potassium chloride, ibuprofen, sodium chloride flush, sodium chloride, metoprolol, polyethylene glycol, senna, bisacodyl, acetaminophen, sodium chloride flush, sodium chloride, ondansetron **OR** ondansetron, [DISCONTINUED] acetaminophen **OR** acetaminophen, glucose, dextrose, glucagon (rDNA), dextrose, glucose, dextrose, glucagon (rDNA), dextrose    Data:     Past Medical History:   has a past medical history of Arthritis, Asthma, Diabetes mellitus ([address] Edema, GERD (gastroesophageal reflux disease), Hypertension, Migraine, and IRMA on CPAP. Social History:   reports that he has quit smoking. He has never used smokeless tobacco. He reports current alcohol use. He reports that he does not use drugs. Family History:   Family History   Problem Relation Age of Onset    Heart Attack Sister 32    Diabetes Paternal Grandmother     Heart Disease Paternal Uncle     Heart Disease Paternal Uncle     Heart Disease Paternal Uncle     Cancer Maternal Aunt     Cancer Maternal Aunt     Cancer Maternal Uncle     Cancer Maternal Uncle        Vitals:  /66   Pulse 56   Temp 101 °F (38.3 °C) (Oral)   Resp 18   Ht 6' 2\" (1.88 m)   Wt 283 lb 4.7 oz (128.5 kg)   SpO2 95%   BMI 36.37 kg/m²   Temp (24hrs), Av.2 °F (37.9 °C), Min:99.1 °F (37.3 °C), Max:101 °F (38.3 °C)    Recent Labs     22  1852 22  2332 22  0455 22  0535   POCGLU 144* 121* 127* 101       I/O (24Hr):     Intake/Output Summary (Last 24 hours) at 2022 0910  Last data filed at 2022 0545  Gross per 24 hour   Intake 3209 ml   Output 2290 ml   Net 919 ml       Labs:  Hematology:  Recent Labs     21  1802 21  0438 22  0427 22  0459   WBC  --  10.8 10.5 6.9   RBC  --  4.05* 4.05* 3.91*   HGB  --  13.0 12.8* 12.2*   HCT  --  37.9* 38.3* 38.0*   MCV  --  93.6 94.6 97.2   MCH  --  32.1 31.6 31.2   MCHC  --  34.3 33.4 32.1   RDW  -- 14.1 14.2 14.5*   PLT  --  146 See Reflexed IPF Result 147   MPV  --  11.2 NOT REPORTED 11.5   DDIMER 3.82  --   --   --      Chemistry:  Recent Labs     12/30/21 1802 12/31/21 0438 01/01/22  0427 01/02/22  0459   NA  --  141 143 143   K  --  3.4* 3.9 4.2   CL  --  103 103 105   CO2  --  29 32* 29   GLUCOSE  --  114* 97 121*   BUN  --  32* 30* 29*   CREATININE  --  0.22* 0.29* 0.37*   MG  --  2.1  --   --    ANIONGAP  --  9 8* 9   LABGLOM  --  >60 >60 >60   GFRAA  --  >60 >60 >60   CALCIUM  --  8.9 8.7 9.0   LACTACIDWB 2.0  --   --   --      Recent Labs     12/30/21 1802 12/30/21 1809 12/31/21 0438 12/31/21  0545 01/01/22  0324 01/01/22  0427 01/01/22  1046 01/01/22  1616 01/01/22  1852 01/01/22  2332 01/02/22  0455 01/02/22  0459 01/02/22  0535   PROT  --   --  5.5*  --   --  5.5*  --   --   --   --   --  5.4*  --    LABALBU  --   --  3.1*  --   --  3.1*  --   --   --   --   --  2.9*  --    AST  --   --  24  --   --  20  --   --   --   --   --  21  --    ALT  --   --  65*  --   --  57*  --   --   --   --   --  64*  --    ALKPHOS  --   --  50  --   --  53  --   --   --   --   --  56  --    BILITOT  --   --  0.42  --   --  0.35  --   --   --   --   --  0.35  --    BILIDIR  --   --  0.13  --   --  0.11  --   --   --   --   --  0.15  --    LIPASE 25  --   --   --   --   --   --   --   --   --   --   --   --    TRIG  --   --  128  --   --   --   --   --   --   --   --   --   --    URICACID  --   --   --   --   --  3.2*  --   --   --   --   --   --   --    POCGLU  --    < >  --    < >   < >  --  106 141* 144* 121* 127*  --  101    < > = values in this interval not displayed.      ABG:  Lab Results   Component Value Date    POCPH 7.434 01/02/2022    POCPCO2 49.7 01/02/2022    POCPO2 62.9 01/02/2022    POCHCO3 33.3 01/02/2022    NBEA NOT REPORTED 01/02/2022    PBEA 8 01/02/2022    QKO2NGF NOT REPORTED 01/02/2022    NMAZ7YXC 92 01/02/2022    FIO2 40.0 01/02/2022     Lab Results   Component Value Date/Time    SPECIAL NOT REPORTED 12/29/2021 05:10 PM     Lab Results   Component Value Date/Time    CULTURE (A) 12/29/2021 05:10 PM     STAPHYLOCOCCUS AUREUS LIGHT GROWTH This isolate is methicillin susceptible. CULTURE NORMAL RESPIRATORY JOAQUÍN SCANT GROWTH 12/29/2021 05:10 PM     Radiology:  XR CHEST PORTABLE    Result Date: 12/29/2021  Stable support lines and tubes. Stable to slightly increased diffuse interstitial and airspace opacities. Physical Examination:        General appearance: Morbidly obese  gentleman lying supine in bed. Intubated, sedated. HEENT: ET and OG tubes present. Lungs: Mechanical breath sounds, clear to auscultation bilaterally, normal effort  Heart:  regular rate and rhythm, no murmur  Abdomen:  soft, nontender, protuberant, nondistended, normal bowel sounds, no masses, hepatomegaly, splenomegaly  Extremities: SCDs present bilaterally, left calcaneus erythema and edema is noted along with erythema to the first MCP with a postsurgical scar noted on the dorsal aspect of the great toe  Skin: Erythema as noted above    Assessment:        Hospital Problems           Last Modified POA    * (Principal) Pneumonia due to COVID-19 virus 12/21/2021 Yes    Acute respiratory failure with hypoxia ([address] 12/21/2021 Yes    ARDS (adult respiratory distress syndrome) ([address] 12/21/2021 Yes    Shock ([address] 12/21/2021 No    Type 2 diabetes mellitus ([address] 12/21/2021 Yes    Asthma 12/21/2021 Yes    IRMA on CPAP 12/12/2021 Yes    Overview Signed 12/12/2021  2:39 PM by Reyna Barbosa, DO     seldom use machine         Hypertension 12/12/2021 Yes    Overview Signed 12/12/2021  2:39 PM by Reyna Barbosa, DO     on lasix         GERD (gastroesophageal reflux disease) 12/12/2021 Yes          Plan:        Acute respiratory failure with hypoxia due to Carmine COVID-19 pneumonia-pulmonology/critical care is following ventilator support. Remains on oral prednisone taper. Received Actemra on 12/12. Antibiotics per infectious disease. Received 10 days of cefepime earlier in his hospitalization. Now on meropenem secondary to concerns for elevated fevers. Febrile illness in adult-cultures have been drawn multiple times. He is growing MSSA in his sputum. He is on meropenem day number 6. Blood cultures have been negative. Ideally, would like to wean Precedex, as you can have fever as an adverse effect. Alternatively, you can have a fever from withdrawal.   Sedation remains a significant issue for this gentleman, as he is on Versed, fentanyl, ketamine, Precedex, Roxicodone, Librium to maintain his sedation. Increase oral benzodiazepine and opiate and decrease Versed, fentanyl, Precedex. Ketamine at the discretion of critical care. Ideally would like to wean this. Bowel regimen-restart lactulose. Continue Colace and as needed MiraLAX. Questionable gout-continue Colcrys and prednisone. On meropenem for possible cellulitis. Type 2 diabetes mellitus-continue insulin sliding scale and Lantus. Continue DVT prophylaxis with Lovenox  Continue GI prophylaxis with lansoprazole  Obesity with BMI of 36.37-will need diet/weight loss/exercise/therapies in the future  IRMA-was noncompliant with CPAP at home  Disposition: He will need trach/PEG once feasible.     33 Danisha Bourgeois DO  1/2/2022  9:10 AM

## 2022-01-02 NOTE — PROGRESS NOTES
Spoke with patient's daughter Rashawn Anders over the phone. Reviewed her earlier conversation with Dr. Richrd Soulier regarding risks and benefits of trach and PEG procedure. She had discussed with family members, and they wish to proceed with trach and PEG. All questions answered. Consent obtained with nursing as second witness to phone consent. Plan for or tomorrow for trach and PEG.

## 2022-01-02 NOTE — PLAN OF CARE
Problem: Airway Clearance - Ineffective  Goal: Achieve or maintain patent airway  Outcome: Ongoing  Note: Patient still on mechanical ventilator. FiO2 at 40% with peep 10. Will open eyes upon stimulation but no following. Requiring no pressor support at the moment. Fever of 100.7. Currently on versed, fentanyl, precedex, and ketamine. Will continue to monitor. Problem: Skin Integrity:  Goal: Will show no infection signs and symptoms  Description: Will show no infection signs and symptoms  Outcome: Ongoing  Note: Skin assessment performed. No new signs of skin breakdown. Patient repositions with assistance from staff. Problem: Non-Violent Restraints  Goal: Removal from restraints as soon as assessed to be safe  Outcome: Ongoing  Note: Restraints currently on and order in for the next scheduled calender day. ROM exercises and pulse checks every 2 hours and as needed. Will continue to monitor.

## 2022-01-02 NOTE — PLAN OF CARE
Problem: Airway Clearance - Ineffective  Goal: Achieve or maintain patent airway  1/2/2022 1026 by Sung Fontanez RCP  Outcome: Ongoing     Problem: Gas Exchange - Impaired  Goal: Absence of hypoxia  1/2/2022 1026 by Sung Fontanez RCP  Outcome: Ongoing     Problem: Gas Exchange - Impaired  Goal: Promote optimal lung function  1/2/2022 1026 by Sung Fontanez RCP  Outcome: Ongoing     Problem: Breathing Pattern - Ineffective  Goal: Ability to achieve and maintain a regular respiratory rate  1/2/2022 1026 by Sung Fontanez RCP  Outcome: Ongoing     Problem: OXYGENATION/RESPIRATORY FUNCTION  Goal: Patient will maintain patent airway  1/2/2022 1026 by Sung Fontanez RCP  Outcome: Ongoing     Problem: OXYGENATION/RESPIRATORY FUNCTION  Goal: Patient will achieve/maintain normal respiratory rate/effort  Description: Respiratory rate and effort will be within normal limits for the patient  1/2/2022 1026 by Sung Fontanez RCP  Outcome: Ongoing     Problem: MECHANICAL VENTILATION  Goal: Patient will maintain patent airway  1/2/2022 1026 by Sung Fontanez RCP  Outcome: Ongoing     Problem: MECHANICAL VENTILATION  Goal: Oral health is maintained or improved  1/2/2022 1026 by Sung Fontanez RCP  Outcome: Ongoing     Problem: MECHANICAL VENTILATION  Goal: ET tube will be managed safely  1/2/2022 1026 by Sung Fontanez RCP  Outcome: Ongoing     Problem: MECHANICAL VENTILATION  Goal: Ability to express needs and understand communication  1/2/2022 1026 by Sung Fontanez RCP  Outcome: Ongoing     Problem: MECHANICAL VENTILATION  Goal: Mobility/activity is maintained at optimum level for patient  1/2/2022 1026 by Sung Fontanez RCP  Outcome: Ongoing

## 2022-01-02 NOTE — PROGRESS NOTES
Surgeon On Call    I spoke with the patient's daughter [facility] over the phone. I had a long discussion with her about the tracheostomy and PEG tube. I explained to her the risks and benefits of both procedures. I also explained to her that since the patient is receiving steroids, he is at increased risk of wound breakdown and infection, however he is at a higher risk of complication in general due to his prolonged hospitalization and critical illness. He is currently on prednisone taper and will be day 3/5 tomorrow. I am willing to do both the trach and peg at the same time if they are agreeable to it, but if they wish to delay the PEG tube until a later time when he is off steroids I am willing to do that as well. I did explain to her that some surgeons prefer not to do PEG tubes when the patient is on steroids due to wound concerns however there is no absolute contraindication. She expressed understanding and will speak with her other siblings. Dr. Kamlesh Austin will reach out to her in an hour to get a formal witnessed consent once the family has spoken together. The patient is on the on call schedule for tomorrow so we will hopefully be able to do it tomorrow. Please call with any questions.      Stella Obrien MD

## 2022-01-02 NOTE — ADT AUTH CERT
Subscriber Details  Hospital Account [de-identified]  [facility] Subscriber Name/Sex/Relation Subscriber  Subscriber Address/Phone Subscriber Emp/Emp Phone   1. FARHAT   [id number] Maura Leon - Male   (Self) 1965 [address]  [address] [phone](R) OTHER        Utilization Reviews         Viral Illness, Acute - Care Day 19 (2021) by Massiel Hunter RN       Review Entered Review Status   2021 06:21 Completed      Criteria Review      Care Day: 19 Care Date: 2021 Level of Care:    Guideline Day 2    Level Of Care    ( ) Floor    2021 6:18 AM EST by Jerel Tubbs      ICU  Covid 19 unit  Insert Art Line  Ventilator  non destricve non violent restraints    Clinical Status    (X) * Hypotension absent    2021 6:18 AM EST by Sailaja Brambila      101.5  24-56 99/53  SpO2 93% on Vent  FiO2 50    Art Line  Ventilator    ( ) * No requirement for mechanical ventilation    ( ) * Oxygenation at baseline or improved    ( ) * Mental status at baseline    Routes    ( ) * Oral hydration    (X) Oral or IV medications    2021 6:21 AM EST by Sailaja Brambila      Nimbex 7.7 ml/hr  Precedex 20.8 ml/hr titrated x 7  Fentanyl 7.5 ml/hr titrated x 3  Versed 6ml/hr  Levophed 1.9 ml/hr  propofol 33.2 ml/hr titrated x 10    PRN  tylenol 650 mg po x 3  Motrin 600 mg po x 2    2021 6:18 AM EST by Sailaja Brambila      Librium 10 mg qid ng  Decadron 10 mg q 24 hr iv  colace 100 mg bid ng  Lasix 40 mg bid IV  Lantus 10 units SC HS  Humalog 0-6 units sc q 6 hr x 2  Lansoprazole 30 mg po qd  Oxycodone 10 mg po q 6 hr  Vit D 2000 units po qd    ( ) Usual diet    2021 6:18 AM EST by Sailaja Brambila      ADULT TUBE FEEDING; Orogastric; Peptide Based; Continuous; 10; Yes; 24; Q 4 hours; 50; 30; Q 4 hours; Protein;  Bolus 2 Proteinex modulars per day    Interventions    (X) Possible isolation    2021 6:18 AM EST by Jerel galvez plus    (X) Pulse oximetry    2021 6:18 AM EST by Enrique Fish      SpO2 93% on Vent  FiO2 50    (X) Possible oxygen    12/31/2021 6:18 AM EST by Enrique Fish      SpO2 93% on Vent  FiO2 50    Medications    (X) Possible antibiotic (eg, for bacterial coinfection or superinfection)    12/31/2021 6:18 AM EST by Enrique Fish      Merrem 1000 mg q 8 hr IV    (X) Possible DVT prophylaxis    12/31/2021 6:18 AM EST by Enrique Fish      Lovenox 30 mg bid sc    * Milestone   Additional Notes   DATE: 12/30/2021      Pertinent Updates:   Becomes agitated with any decrease in sedation.  Currently off neuromuscular blockers.  FiO2 50%.  Secretions small amount and not purulent.  Intermittent fever persists.  Etiology not clear. Physical Exam:   Constitutional:        Appearance: He is well-developed. He is ill-appearing.       Interventions: He is sedated and intubated. Neck:       Thyroid: No thyroid mass. Cardiovascular:       Rate and Rhythm: Normal rate and regular rhythm.       Pulses: Normal pulses.       Heart sounds: Normal heart sounds. No murmur heard.          Pulmonary:       Effort: He is intubated.       Breath sounds: Normal breath sounds. Musculoskeletal:       Right lower leg: No edema.       Left lower leg: No edema. Lymphadenopathy:       Cervical: No cervical adenopathy. Skin:      Capillary Refill: Capillary refill takes less than 2 seconds. Neurological:       Motor: No tremor or abnormal muscle tone.        Abnl/Pertinent Labs:      BUN: 29 (H)   Creatinine: 0.38 (L)   Glucose: 125 (H)   Total Protein: 5.5 (L)   Albumin: 3.0 (L)   ALT: 47 (H)   WBC: 13.1 (H)   RBC: 4.17 (L)   Hematocrit: 39.7 (L)   Eosinophils %: 0 (L)   Seg Neutrophils: 80 (H)   Segs Absolute: 10.52 (H)   Lymphocytes: 10 (L)   Immature Granulocytes: 1 (H)      12/30/2021 06:04   POC Glucose: 115 (H)      12/30/2021 12:01   POC Glucose: 133 (H)      12/30/2021 17:00   VL LOWER EXTREMITY BILATERAL VENOUS DUPLEX:    Findings:        Right Impression:                    Left Impression:    The small saphenous vein is          The small saphenous vein is partially    partially compressible.              compressible.        The common femoral, femoral,         The common femoral, femoral,    popliteal and tibial veins           popliteal and tibial veins    demonstrate normal compressibility   demonstrate normal compressibility    and augmentation.                    and augmentation.    Normal compressibility of the great  Normal compressibility of the great    saphenous vein.                      saphenous vein.          12/30/2021 17:39   POC Glucose: 164 (H)      12/30/2021 18:09   POC Glucose: 196 (H)      12/30/2021 20:38   POC Glucose: 141 (H)      12/30/2021 23:52   POC Glucose: 121 (H)            MD Consults/Assessments & Plans:      INTERNAL MEDICINE   Assessment and Plan  :          1. Acute respiratory failure with hypoxia due to ARDS from COVID-19 pneumonia - ventilator support, high-dose Decadron, s/p Actemra on 12/12/2021. Antibiotics per ID.  Treated with 10 days of  cefepime  12/18/2021 - 12/28/21, meropenem started on 12/28/2021 due to high fevers.  ID following. 2. Type 2 diabetes mellitus - on Lantus, blood sugar controlled.  Continue tube feed. 3. Obesity BMI 39   4. Essential hypertension -  Stable , off levophed. .   5. IRMA -was noncompliant with CPAP at home        Recurrent fevers-check lower extremity Doppler replace left Art Line        Continue DVT prophylaxis, GI prophylaxis.    Patient to remain in isolation till 12/31/2021.       Continue to monitor vitals , Intake / output ,  Cell count , HGB , Kidney function, oxygenation  as indicated .        INFECTIOUS DISEASE   Medical Decision Making/Summary/Discussion:12/30/2021       Patient admitted with COVID 19 infection   He may come out of isolation on 12/31/21   Critical Care   Vent Information   $Ventilation: $Subsequent Day   Skin Assessment: Clean, dry, & intact   Suction Catheter Diameter: 14 Equipment ID: [id number]   Equipment Changed: Airway securing device   Vent Type: Servo i   Vent Mode: PRVC   Vt Ordered: 550 mL   Rate Set: 24 bmp   FiO2 : 50 %   SpO2: 94 %   SpO2/FiO2 ratio: 192   Sensitivity: 3   PEEP/CPAP: 10   I Time/ I Time %: 0.7 s   Humidification Source: HME   Humidification Temp: 37   Humidification Temp Measured: 36.8   Circuit Condensation: Drained   Nitric Oxide/Epoprostenol In Use?: No   LOS: 18   PLAN:      Chest x-ray on 12/24/2021 with improving bilateral pulmonary infiltrates   Sedation reviewed. We will try to cut down on the sedatives and pain medications   Cont ARDS ventilation   Proning held off as it did not appear to make much of difference   Airborne isolation and droplet precautions to be continued   Continue supportive care    Cont treatment with medications for COVID   Cont tube feeding   Monitor endotracheal secretions    Will obtain xray chest as needed   ABG as needed   Prognosis guarded given age and severity of illness. On Lovenox and lansoprazole   On Decadron   Patient is on cefepime   Check triglycerides as long as the patient is needing propofol   Patient required placement of the Hahn catheter due to retention of urine requiring straight catheter multiple times a day   Diuresis Lasix 40 mg twice daily for 3 days.  Restrictive fluid strategy   Treatment plan Discussed with nursing staff in detail , all questions answered .     Work on decreasing sedation.                     Viral Illness, Acute - Care Day 17 (12/28/2021) by Dai Buchanan RN       Review Entered Review Status   12/30/2021 12:01 Completed      Criteria Review      Care Day: 17 Care Date: 12/28/2021 Level of Care:    Guideline Day 2    Level Of Care    ( ) Floor    12/30/2021 11:59 AM EST by Chandrakant Spencer      ICU  Covid 19 unit  Ventilator  Art Line    Clinical Status    ( ) * Hypotension absent    12/30/2021 11:59 AM EST by Chandrakant Spencer      86/56  87/57    101.0      ( ) * No requirement for mechanical ventilation    ( ) * Oxygenation at baseline or improved    ( ) * Mental status at baseline    Routes    ( ) * Oral hydration    (X) Oral or IV medications    12/30/2021 12:01 PM EST by Telepoan      precedex 13.9ml/hr titrated x 9  fentanyl 7.5ml/hr titrated x 3  Versed 7ml/hr titrated x 2  propofol 16.6ml/hr titrated x 9    PRN  tylenol 650 mg po x 2    12/30/2021 11:59 AM EST by Sailaja Pulido      librium 10 mg qid ng  nimbex 19 mg IV x 1  Decadron 10 mg q 24 hr IV  Lasix 40 mg qd IV  Lantus 10 units hs sc  humalog 1 unit sc w dinner  Lansoprazol 30 mg po qd  Oxycodone 10 mg po q 6 hr  KCl 20 meq IV x1  Vid D 2000 units po qd  Nimbex 7.7ml/hr titrated 2    Interventions    (X) Possible isolation    12/30/2021 12:01 PM EST by Hubbub      droplet plus    (X) Pulse oximetry    12/30/2021 11:59 AM EST by Telepoan      91% on ventilator    (X) Possible oxygen    12/30/2021 11:59 AM EST by Hubbub      ventilator    Medications    (X) Possible antibiotic (eg, for bacterial coinfection or superinfection)    12/30/2021 11:59 AM EST by Hubbub      Merrem 1000 mg q 8 hr IV    (X) Possible DVT prophylaxis    12/30/2021 11:59 AM EST by Hubbub      Lovenox 30 mg bid sc    * Milestone   Additional Notes   DATE: 12/28/2021      Pertinent Updates:   Interval History/Significant events :  12/28/21   Unchanged   Patient continues to have intermittent fevers, T-max 100 °F.  Patient is on fentanyl, propofol, Precedex.  He remains on ventilator support 70% FiO2 with PEEP of 12.  Urine output is adequate.  Patient is tolerating tube feed.     Vitals - Unstable afebrile   Labs -hypokalemia 3.3, glucose 115, leukocytosis 17.3. Chest x-ray 12/24/2021-bilateral opacities. Physical Exam:   Patient remains on the ventilator   Trachea is in the midline   No subcutaneous air   No JVD   No rhonchi   Abdomen is not distended   No edema      Constitutional:        Appearance: He is well-developed.  He is ill-appearing.       Interventions: He is sedated and intubated. Neck:       Thyroid: No thyroid mass. Cardiovascular:       Rate and Rhythm: Normal rate and regular rhythm.       Pulses: Normal pulses.       Heart sounds: Normal heart sounds. No murmur heard. Pulmonary:       Effort: He is intubated.       Breath sounds: Normal breath sounds. Musculoskeletal:       Right lower leg: No edema.       Left lower leg: No edema. Lymphadenopathy:       Cervical: No cervical adenopathy. Skin:      Capillary Refill: Capillary refill takes less than 2 seconds. Neurological:       Motor: No tremor or abnormal muscle tone. Abnl/Pertinent Labs:   ONE XRAY VIEW OF THE CHEST       12/28/2021 7:24 am       COMPARISON:   12/24/2021 and 12/22/2021       HISTORY:   ORDERING SYSTEM PROVIDED HISTORY: Decomposation   TECHNOLOGIST PROVIDED HISTORY:   Decomposation   Reason for Exam: Decomposation/  AP erect/ port       FINDINGS:   Endotracheal tube, enteric tube, and right arm PICC remain in place.  Cardiac   monitoring leads overlie the chest.  Mild cardiomegaly is unchanged.  Patchy   interstitial and airspace opacities in the lungs appear stable to slightly   increased from 12/24/2021.  No evidence of pneumothorax or pleural effusion. No free air beneath the diaphragm.           Impression   Stable support lines and tubes.  Stable to slightly increased diffuse   interstitial and airspace opacities.          POC Glucose: 132 (H)      POC Glucose: 129 (H)   POC HCO3: 31.4 (H)   POC O2 SAT: 89 (L)   POC pCO2: 48.5 (H)   POC PO2: 56.9 (L)   FIO2: 60.0   Positive Base Excess, Art: 6 (H)   Potassium: 3.3 (L)   BUN: 24 (H)   Creatinine: 0.36 (L)   Glucose: 115 (H)   Total Protein: 6.0 (L)   ALT: 71 (H)   WBC: 17.3 (H)   Absolute Mono #: 1.48 (H)   Eosinophils %: 0 (L)   Seg Neutrophils: 83 (H)   Segs Absolute: 14.35 (H)   Lymphocytes: 7 (L)   Immature Granulocytes: 1 (H)      12/28/2021 13:57   POC Glucose: it did not appear to make much of difference   Airborne isolation and droplet precautions to be continued   Continue supportive care    Cont treatment with medications for COVID   Cont tube feeding   Monitor endotracheal secretions    Will obtain xray chest as needed   ABG as needed   Prognosis guarded given age and severity of illness. On Lovenox and lansoprazole   On Decadron   Patient is on cefepime   Check triglycerides as long as the patient is needing propofol   Patient required placement of the Hahn catheter due to retention of urine requiring straight catheter multiple times a day   Diuresis Lasix 40 mg twice daily for 3 days.  Restrictive fluid strategy.       Internal Medicine   Clinical Course : unchanged   Assessment and Plan  :          Acute respiratory failure with hypoxia due to ARDS from COVID-19 pneumonia -ventilator support, high-dose Decadron, s/p Actemra on 12/12/2021. Antibiotics per ID.  Treated with 10 days of  cefepime  12/18/2021 - 12/28/21, meropenem started on 12/28/2021 due to high fevers.  ID following. Type 2 diabetes mellitus - on Lantus, blood sugar controlled.  Continue tube feed. Obesity BMI 39   Essential hypertension -  Stable , off levophed. .   IRMA -was noncompliant with CPAP at home            Continue DVT prophylaxis, GI prophylaxis.    Replace potassium       Continue to monitor vitals , Intake / output ,  Cell count , HGB , Kidney function, oxygenation  as indicated .                                Viral Illness, Acute - Care Day 15 (12/26/2021) by Erickson Velez RN       Review Entered Review Status   12/30/2021 10:41 Completed      Criteria Review      Care Day: 15 Care Date: 12/26/2021 Level of Care:    Guideline Day 2    Level Of Care    ( ) Floor    12/30/2021 10:41 AM EST by Shiloh Hollingsworth      Remains on a ventilator/Art Line in the ICU on a Covid 19 unit    Clinical Status    (X) * Hypotension absent    12/30/2021 10:41 AM EST by Shiloh Hollingsworth      see attachment ( ) * No requirement for mechanical ventilation    ( ) * Oxygenation at baseline or improved    ( ) * Mental status at baseline    Routes    ( ) * Oral hydration    (X) Oral or IV medications    12/30/2021 10:41 AM EST by Urmila Awe      see attachment    ( ) Usual diet    12/30/2021 10:41 AM EST by Urmila Cee      as per 12/24    Interventions    (X) Possible isolation    12/30/2021 10:41 AM EST by Urmila Cee      droplet plus    (X) Pulse oximetry    12/30/2021 10:41 AM EST by Urmila Cee      see attachment    (X) Possible oxygen    12/30/2021 10:41 AM EST by Urmila Awe      ventilator    Medications    (X) Possible antibiotic (eg, for bacterial coinfection or superinfection)    12/30/2021 10:41 AM EST by Urmila Cee      maxipime 2000 mg q 12 hr    (X) Possible DVT prophylaxis    12/30/2021 10:41 AM EST by Sailaja Dorsey      lovenox 30 mg sc bid    * Milestone   Additional Notes   DATE: 12/26/2021      ICU   Ventilator   Art Line   Covid 19 unit      Pertinent Updates:   Patient continues to have intermittent fevers. Patient remains intubated on 60% FiO2 with PEEP of 10 on PRVC. Urine output is adequate. Patient is tolerating tube feed. Vitals - Unstable    Labs - Creatinine stable 0.40. Chest x-ray 12/24/2021-bilateral opacities. Vitals: 100.9    82/53  SpO2 89%         Physical Exam:   Constitutional:        Appearance: He is well-developed. He is ill-appearing.       Interventions: He is sedated and intubated. Neck:       Thyroid: No thyroid mass. Cardiovascular:       Rate and Rhythm: Normal rate and regular rhythm.       Pulses: Normal pulses.       Heart sounds: Normal heart sounds. No murmur heard. Pulmonary:       Effort: He is intubated.       Breath sounds: Normal breath sounds. Musculoskeletal:       Right lower leg: No edema.       Left lower leg: No edema. Lymphadenopathy:       Cervical: No cervical adenopathy.     Skin:      Capillary Refill: Capillary refill takes less than 2 seconds. Neurological:       Motor: No tremor or abnormal muscle tone.        Abnl/Pertinent Labs:   POC Glucose: 139 (H)   POC HCO3: 34.6 (H)   POC pCO2: 49.7 (H)   POC pH: 7.451 (H)   FIO2: 70.0   Positive Base Excess, Art: 9 (H)         BUN: 28 (H)   Creatinine: 0.38 (L)   Glucose: 121 (H)   Total Protein: 5.6 (L)   Triglycerides: 201 (H)   Albumin: 3.4 (L)   ALT: 54 (H)   WBC: 14.4 (H)   Absolute Mono #: 1.47 (H)   Eosinophils %: 0 (L)   Seg Neutrophils: 79 (H)   Segs Absolute: 11.29 (H)   Lymphocytes: 9 (L)   Immature Granulocytes: 2 (H)      Vent Information   Ventilation: Subsequent Day   Skin Assessment: Clean, dry, & intact   Suction Catheter Diameter: 14   Equipment ID: [id number]   Equipment Changed: HME   Vent Type: Servo i   Vent Mode: PRVC   Vt Ordered: 550 mL   Rate Set: 24 bmp   FiO2 : (S) 60 % (Post-ABG)   SpO2: 96 %   SpO2/FiO2 ratio: 161.67   Sensitivity: 3   PEEP/CPAP: 10   I Time/ I Time %: 0.7 s   Humidification Source: HME   Humidification Temp: 37   Humidification Temp Measured: 36.8   Circuit Condensation: Drained   Nitric Oxide/Epoprostenol In Use?: No      Medications:      chlordiazePOXIDE, 10 mg, Per NG tube, 4x Daily   lidocaine 1 % injection, 5 mL, IntraDERmal, Once   sodium chloride flush, 5-40 mL, IntraVENous, 2 times per day   oxyCODONE, 10 mg, Oral, Q6H   docusate, 100 mg, Per NG tube, BID   insulin lispro, 0-6 Units, SubCUTAneous, Q6H   lansoprazole, 30 mg, Oral, QAM AC   cefepime, 2,000 mg, IntraVENous, Q12H   furosemide, 40 mg, IntraVENous, Daily   insulin glargine, 10 Units, SubCUTAneous, Nightly   sodium chloride flush, 5-40 mL, IntraVENous, 2 times per day   enoxaparin, 30 mg, SubCUTAneous, BID   Vitamin D, 2,000 Units, Oral, Daily   dexamethasone, 10 mg, IntraVENous, Q24H       Precedex 19 ml/hr titrated x 6   Fentanyl 10 ml/hr titrated x 6   Versed 10 ml/hr titrated x 6   Levophed 2.8 ml/hr titrated x 2   Propofol 16.6 ml/hr titrated x 7      PRN   Tylenol 650 mg po x 2   Motrin 600 mg po x 2      MD Consults/Assessments & Plans:   Internal Medicine   Clinical Course : unchanged   Assessment and Plan  :          Acute respiratory failure with hypoxia due to ARDS from COVID-19 pneumonia -ventilator support, high-dose Decadron, s/p Actemra on 12/12/2021. Antibiotics per ID.  On cefepime started 12/18/2021. Type 2 diabetes mellitus - on Lantus, blood sugar controlled.  Continue tube feed. Obesity BMI 39   Essential hypertension -on Levophed. IRMA -was noncompliant with CPAP at home        Continue current treatment.           Continue to monitor vitals , Intake / output ,  Cell count , HGB , Kidney function, oxygenation  as indicated .        Pulmonology   LOS: 14   PLAN:       D/w RT   D/w RN    Chest x-ray on 12/24/2021 with improving bilateral pulmonary infiltrates   Sedation reviewed. We will try to cut down on the sedatives and pain medications   Cont ARDS ventilation   Proning held off as it did not appear to make much of difference   Airborne isolation and droplet precautions to be continued   Continue supportive care    Cont treatment with medications for COVID   Cont tube feeding   Monitor endotracheal secretions    Will obtain xray chest as needed   ABG as needed   Prognosis guarded given age and severity of illness. On Lovenox and lansoprazole   On Decadron   Patient is on cefepime   Check triglycerides as long as the patient is needing propofol   Patient required placement of the Salgado catheter due to retention of urine requiring straight catheter multiple times a day       Infectious Disease   Recommendations:   Antibiotic treatment:   The patient was having high-grade fevers and cultures have been sent.-No growth on cultures thus far   I will start the patient empirically on antibiotic therapy with cefepime on 12/18-fevers initially resolved but low grade fevers have resumed.    Peripheral IVs >1days old should be removed   Remove salgado and commence voiding trial no that patient is off paralytic   Covid Rx:       Remdesivir-out of window   Decadron-high-dose initiated   Actemra-ordered 12/12/2021   Monoclonal antibodies-out of window                             Viral Illness, Acute - Care Day 13 (12/24/2021) by Erickson Pratt RN       Review Entered Review Status   12/30/2021 10:23 Completed      Criteria Review      Care Day: 13 Care Date: 12/24/2021 Level of Care:    Guideline Day 2    Level Of Care    ( ) Floor    12/30/2021 10:23 AM EST by Urmila Grubbs      ICU  Covid 19 unit    Orders:  Bladder scan  Straight cath  ADULT TUBE FEEDING; Orogastric; Peptide Based; Continuous; 10; Yes; 24; Q 4 hours; 50; 30; Q 4 hours; Protein;  Bolus 2 Proteinex modulars per day    Clinical Status    (X) * Hypotension absent    ( ) * No requirement for mechanical ventilation    ( ) * Oxygenation at baseline or improved    ( ) * Mental status at baseline    Routes    ( ) * Oral hydration    12/30/2021 10:23 AM EST by Sailaja Dorsey      Continuous IV Meds  Versed 10 ml/hr iv titrated x 9  Levophed 2.8 ml/hr titrated x 4  Propofol 16.6 ml/hr IV titrated x 18    PRN  tylenol 650 mg po x 1    (X) Oral or IV medications    12/30/2021 10:23 AM EST by Urmila Grubbs      Librium 10 mg NG qid  Decadron 10 mg q 24 hr IV  Colace 100 mg NG bid  Lasix 40 mg IV qd  Lantus 10 units sc hs  Humalog 1 unit w dinner  lansoprazol 30 mg po qd  oxycodone 10 mg po q 6 hr  Vit D 2000 mg po qd  Precedex 15.8 ml/hr titrated x 13 IV    Interventions    (X) Possible isolation    12/30/2021 10:23 AM EST by Sailaja Dorsey      droplet plus    (X) Pulse oximetry    12/30/2021 10:23 AM EST by Sailaja Dorsey      71.9 Low    POC HZR183.0 - 28.0 mmol/L30.8 High    Positive Base Excess, Art0.0 - 3.06 High    (X) Possible oxygen    12/30/2021 10:23 AM EST by Urmila Grubbs      ventilator  fio2 70    Medications    (X) Possible antibiotic (eg, for bacterial coinfection or superinfection)    12/30/2021 10:23 AM EST by Urmila Grubbs   Maxipine 2000 mg q 12 hr IV    (X) Possible DVT prophylaxis    12/30/2021 10:23 AM EST by Shiloh Hollingsworth      Lovenox 30 mg bid sc    * Milestone   Additional Notes   DATE: 12/24/2021      Remains on a Covid 19 unit on a Ventilator/Art line      100.2  24--112 101/62  SpO2 88% on Ventilator      Pertinent Updates:      Patient is having intermittent fevers. T-max 1 °F. Patient remains intubated on 70% FiO2 with PEEP of 14 on PRVC. Urine output is adequate. Patient is tolerating tube feed. Vitals - Unstable afebrile   Labs -blood sugar controlled. Creatinine stable 0.37.          Physical Exam:   Constitutional:        Appearance: He is well-developed. He is ill-appearing.       Interventions: He is sedated and intubated. Neck:       Thyroid: No thyroid mass. Cardiovascular:       Rate and Rhythm: Normal rate and regular rhythm.       Pulses: Normal pulses.       Heart sounds: Normal heart sounds. No murmur heard.          Pulmonary:       Effort: He is intubated.       Breath sounds: Normal breath sounds. Musculoskeletal:       Right lower leg: No edema.       Left lower leg: No edema. Lymphadenopathy:       Cervical: No cervical adenopathy. Skin:      Capillary Refill: Capillary refill takes less than 2 seconds.     Neurological:       Motor: No tremor or abnormal muscle tone.         Vent Information   $Ventilation: $Subsequent Day   Skin Assessment: Clean, dry, & intact   Suction Catheter Diameter: 14   Equipment ID: [id number]   Equipment Changed: (S) HME (expiratory filters, hirsch)   Vent Type: Servo i   Vent Mode: PRVC   Vt Ordered: 550 mL   Rate Set: 24 bmp   FiO2 : 70 %   SpO2: 94 %   SpO2/FiO2 ratio: 156.67   Sensitivity: 3   PEEP/CPAP: 10   I Time/ I Time %: 0.65 s   Humidification Source: HME   Humidification Temp: 37   Humidification Temp Measured: 36.8   Circuit Condensation: Drained      Abnl/Pertinent Labs:         BUN: 27 (H)   Creatinine: 0.37 (L)   Glucose: 128 (H)   Total Protein: 5.4 (L)   Albumin: 3.4 (L)   ALT: 60 (H)   WBC: 21.8 (H)   Absolute Mono #: 1.74 (H)   Eosinophils %: 0 (L)   Seg Neutrophils: 82 (H)   Segs Absolute: 17.88 (H)   Lymphocytes: 8 (L)   Immature Granulocytes: 2 (H)   Absolute Immature Granulocyte: 0.44 (H)   POC Glucose: 151 (H)   POC Lactic Acid: 1.51 (H)   POC HCO3: 30.8 (H)   POC PO2: 71.9 (L)   Positive Base Excess, Art: 6 (H)         MD Consults/Assessments & Plans:   Internal Medicine   Assessment and Plan  :          Acute respiratory failure with hypoxia due to ARDS from COVID-19 pneumonia -ventilator support, high-dose Decadron, s/p Actemra on 12/12/2021. Antibiotics per ID. Type 2 diabetes mellitus - on Lantus    Obesity BMI 39   Essential hypertension -on Levophed. IRMA -does not use CPAP at home            Continue to monitor vitals , Intake / output ,  Cell count , HGB , Kidney function, oxygenation  as indicated . Pulmonology       ASSESSMENT:       Principal Problem:     Pneumonia due to COVID-19 virus   Active Problems:     Shock ([address]     Acute respiratory failure with hypoxia (HCC)     Type 2 diabetes mellitus (HCC)     Asthma     IRMA on CPAP     Hypertension     GERD (gastroesophageal reflux disease)     ARDS (adult respiratory distress syndrome) (HCC)   Resolved Problems:     * No resolved hospital problems. *               Acute hypoxic respiratory failure secondary to COVID 19   Acute respiratory distress syndrome   Bilateral multifocal pneumonia due to COVID 19 infection   Covid -19 pandemic emergency   Post hypercarbic metabolic alkalosis   Hyperglycemia, acceptable   Leukocytosis, stable   Shock requiring norepinephrine?  Sepsis       LOS: 12   PLAN:       D/w RT   D/w RN    Chest x-ray on 12/24/2021 with improving bilateral pulmonary infiltrates   Sedation reviewed.  We will hold off on any sedation holiday   Cont ARDS ventilation   Proning held off as it did not appear to make much of difference   Airborne isolation and droplet precautions to be continued   Continue supportive care    Cont treatment with medications for COVID   Cont tube feeding   Monitor endotracheal secretions    Will obtain xray chest as needed   ABG as needed   Prognosis guarded given age and severity of illness. Sedation and neuromuscular holiday, if tolerated   On Lovenox and lansoprazole   On Decadron   Patient is on cefepime   Infectious Disease   Recommendations:   Antibiotic treatment:   The patient was having high-grade fevers and cultures have been sent.-No growth on cultures thus far   I will start the patient empirically on antibiotic therapy with cefepime on 12/18-fevers initially resolved but low grade fevers have resumed.    Peripheral IVs >1days old should be removed   Remove salgado and commence voiding trial no that patient is off paralytic   Covid Rx:       Remdesivir-out of window   Decadron-high-dose initiated   Actemra-ordered 12/12/2021   Monoclonal antibodies-out of window

## 2022-01-02 NOTE — PLAN OF CARE
Problem: Airway Clearance - Ineffective  Goal: Achieve or maintain patent airway  1/1/2022 2311 by Navya Padilla RCP  Outcome: Ongoing     Problem: OXYGENATION/RESPIRATORY FUNCTION  Goal: Patient will maintain patent airway  1/1/2022 2311 by Navya Padilla RCP  Outcome: Ongoing     Problem: OXYGENATION/RESPIRATORY FUNCTION  Goal: Patient will achieve/maintain normal respiratory rate/effort  Description: Respiratory rate and effort will be within normal limits for the patient  1/1/2022 2311 by Navya Padilla RCP  Outcome: Ongoing     Problem: MECHANICAL VENTILATION  Goal: Patient will maintain patent airway  1/1/2022 2311 by Navya Padilla RCP  Outcome: Ongoing     Problem: MECHANICAL VENTILATION  Goal: Oral health is maintained or improved  1/1/2022 2311 by Navya Padilla RCP  Outcome: Ongoing     Problem: MECHANICAL VENTILATION  Goal: ET tube will be managed safely  1/1/2022 2311 by Navya Padilla RCP  Outcome: Ongoing     Problem: MECHANICAL VENTILATION  Goal: Ability to express needs and understand communication  1/1/2022 2311 by Navya Padilla RCP  Outcome: Ongoing     Problem: MECHANICAL VENTILATION  Goal: Mobility/activity is maintained at optimum level for patient  1/1/2022 2311 by Navya Padilla RCP  Outcome: Ongoing

## 2022-01-03 ENCOUNTER — ANESTHESIA EVENT (OUTPATIENT)
Dept: OPERATING ROOM | Age: 57
DRG: 004 | End: 2022-01-03
Payer: COMMERCIAL

## 2022-01-03 ENCOUNTER — APPOINTMENT (OUTPATIENT)
Dept: GENERAL RADIOLOGY | Age: 57
DRG: 004 | End: 2022-01-03
Attending: INTERNAL MEDICINE
Payer: COMMERCIAL

## 2022-01-03 ENCOUNTER — ANESTHESIA (OUTPATIENT)
Dept: OPERATING ROOM | Age: 57
DRG: 004 | End: 2022-01-03
Payer: COMMERCIAL

## 2022-01-03 VITALS
DIASTOLIC BLOOD PRESSURE: 63 MMHG | TEMPERATURE: 99.8 F | SYSTOLIC BLOOD PRESSURE: 82 MMHG | RESPIRATION RATE: 15 BRPM | OXYGEN SATURATION: 93 %

## 2022-01-03 PROBLEM — M10.072 ACUTE IDIOPATHIC GOUT OF LEFT FOOT: Status: ACTIVE | Noted: 2022-01-03

## 2022-01-03 LAB
ABSOLUTE EOS #: 0.16 K/UL (ref 0–0.44)
ABSOLUTE IMMATURE GRANULOCYTE: 0.11 K/UL (ref 0–0.3)
ABSOLUTE LYMPH #: 1.26 K/UL (ref 1.1–3.7)
ABSOLUTE MONO #: 0.92 K/UL (ref 0.1–1.2)
ALBUMIN SERPL-MCNC: 3.1 G/DL (ref 3.5–5.2)
ALBUMIN/GLOBULIN RATIO: 1.1 (ref 1–2.5)
ALLEN TEST: ABNORMAL
ALP BLD-CCNC: 72 U/L (ref 40–129)
ALT SERPL-CCNC: 137 U/L (ref 5–41)
ANION GAP SERPL CALCULATED.3IONS-SCNC: 12 MMOL/L (ref 9–17)
AST SERPL-CCNC: 40 U/L
BASOPHILS # BLD: 1 % (ref 0–2)
BASOPHILS ABSOLUTE: 0.06 K/UL (ref 0–0.2)
BILIRUB SERPL-MCNC: 0.42 MG/DL (ref 0.3–1.2)
BILIRUBIN DIRECT: 0.14 MG/DL
BILIRUBIN, INDIRECT: 0.28 MG/DL (ref 0–1)
BUN BLDV-MCNC: 18 MG/DL (ref 6–20)
BUN/CREAT BLD: ABNORMAL (ref 9–20)
CALCIUM SERPL-MCNC: 8.9 MG/DL (ref 8.6–10.4)
CHLORIDE BLD-SCNC: 104 MMOL/L (ref 98–107)
CO2: 27 MMOL/L (ref 20–31)
CREAT SERPL-MCNC: 0.28 MG/DL (ref 0.7–1.2)
CULTURE: NORMAL
CULTURE: NORMAL
DIFFERENTIAL TYPE: ABNORMAL
EOSINOPHILS RELATIVE PERCENT: 1 % (ref 1–4)
FIO2: 40
GFR AFRICAN AMERICAN: >60 ML/MIN
GFR NON-AFRICAN AMERICAN: >60 ML/MIN
GFR SERPL CREATININE-BSD FRML MDRD: ABNORMAL ML/MIN/{1.73_M2}
GFR SERPL CREATININE-BSD FRML MDRD: ABNORMAL ML/MIN/{1.73_M2}
GLOBULIN: ABNORMAL G/DL (ref 1.5–3.8)
GLUCOSE BLD-MCNC: 113 MG/DL (ref 75–110)
GLUCOSE BLD-MCNC: 115 MG/DL (ref 75–110)
GLUCOSE BLD-MCNC: 117 MG/DL (ref 75–110)
GLUCOSE BLD-MCNC: 122 MG/DL (ref 75–110)
GLUCOSE BLD-MCNC: 91 MG/DL (ref 70–99)
GLUCOSE BLD-MCNC: 97 MG/DL (ref 74–100)
GLUCOSE BLD-MCNC: 99 MG/DL (ref 75–110)
HCT VFR BLD CALC: 41.3 % (ref 40.7–50.3)
HEMOGLOBIN: 13.2 G/DL (ref 13–17)
IMMATURE GRANULOCYTES: 1 %
LYMPHOCYTES # BLD: 11 % (ref 24–43)
Lab: NORMAL
Lab: NORMAL
MCH RBC QN AUTO: 31 PG (ref 25.2–33.5)
MCHC RBC AUTO-ENTMCNC: 32 G/DL (ref 28.4–34.8)
MCV RBC AUTO: 96.9 FL (ref 82.6–102.9)
MODE: ABNORMAL
MONOCYTES # BLD: 8 % (ref 3–12)
NEGATIVE BASE EXCESS, ART: ABNORMAL (ref 0–2)
NRBC AUTOMATED: 0 PER 100 WBC
O2 DEVICE/FLOW/%: ABNORMAL
PATIENT TEMP: ABNORMAL
PDW BLD-RTO: 14.3 % (ref 11.8–14.4)
PLATELET # BLD: 154 K/UL (ref 138–453)
PLATELET ESTIMATE: ABNORMAL
PMV BLD AUTO: 11 FL (ref 8.1–13.5)
POC HCO3: 33.8 MMOL/L (ref 21–28)
POC O2 SATURATION: 93 % (ref 94–98)
POC PCO2 TEMP: ABNORMAL MM HG
POC PCO2: 55.6 MM HG (ref 35–48)
POC PH TEMP: ABNORMAL
POC PH: 7.39 (ref 7.35–7.45)
POC PO2 TEMP: ABNORMAL MM HG
POC PO2: 69.2 MM HG (ref 83–108)
POSITIVE BASE EXCESS, ART: 7 (ref 0–3)
POTASSIUM SERPL-SCNC: 3.7 MMOL/L (ref 3.7–5.3)
RBC # BLD: 4.26 M/UL (ref 4.21–5.77)
RBC # BLD: ABNORMAL 10*6/UL
SAMPLE SITE: ABNORMAL
SEG NEUTROPHILS: 78 % (ref 36–65)
SEGMENTED NEUTROPHILS ABSOLUTE COUNT: 8.81 K/UL (ref 1.5–8.1)
SODIUM BLD-SCNC: 143 MMOL/L (ref 135–144)
SPECIMEN DESCRIPTION: NORMAL
SPECIMEN DESCRIPTION: NORMAL
TCO2 (CALC), ART: ABNORMAL MMOL/L (ref 22–29)
TOTAL PROTEIN: 5.9 G/DL (ref 6.4–8.3)
WBC # BLD: 11.3 K/UL (ref 3.5–11.3)
WBC # BLD: ABNORMAL 10*3/UL

## 2022-01-03 PROCEDURE — 2500000003 HC RX 250 WO HCPCS: Performed by: STUDENT IN AN ORGANIZED HEALTH CARE EDUCATION/TRAINING PROGRAM

## 2022-01-03 PROCEDURE — 51702 INSERT TEMP BLADDER CATH: CPT

## 2022-01-03 PROCEDURE — 6360000002 HC RX W HCPCS: Performed by: NURSE PRACTITIONER

## 2022-01-03 PROCEDURE — APPSS30 APP SPLIT SHARED TIME 16-30 MINUTES: Performed by: NURSE PRACTITIONER

## 2022-01-03 PROCEDURE — 2580000003 HC RX 258: Performed by: STUDENT IN AN ORGANIZED HEALTH CARE EDUCATION/TRAINING PROGRAM

## 2022-01-03 PROCEDURE — 80048 BASIC METABOLIC PNL TOTAL CA: CPT

## 2022-01-03 PROCEDURE — L3650 SO 8 ABD RESTRAINT PRE OTS: HCPCS | Performed by: SURGERY

## 2022-01-03 PROCEDURE — 2500000003 HC RX 250 WO HCPCS: Performed by: INTERNAL MEDICINE

## 2022-01-03 PROCEDURE — 6360000002 HC RX W HCPCS: Performed by: STUDENT IN AN ORGANIZED HEALTH CARE EDUCATION/TRAINING PROGRAM

## 2022-01-03 PROCEDURE — 71045 X-RAY EXAM CHEST 1 VIEW: CPT

## 2022-01-03 PROCEDURE — 82803 BLOOD GASES ANY COMBINATION: CPT

## 2022-01-03 PROCEDURE — 80076 HEPATIC FUNCTION PANEL: CPT

## 2022-01-03 PROCEDURE — 3600000030 HC TRACHEOSTOMY: Performed by: SURGERY

## 2022-01-03 PROCEDURE — 2720000010 HC SURG SUPPLY STERILE: Performed by: SURGERY

## 2022-01-03 PROCEDURE — 99232 SBSQ HOSP IP/OBS MODERATE 35: CPT | Performed by: INTERNAL MEDICINE

## 2022-01-03 PROCEDURE — 2580000003 HC RX 258: Performed by: NURSE PRACTITIONER

## 2022-01-03 PROCEDURE — 3700000001 HC ADD 15 MINUTES (ANESTHESIA): Performed by: SURGERY

## 2022-01-03 PROCEDURE — 6370000000 HC RX 637 (ALT 250 FOR IP): Performed by: FAMILY MEDICINE

## 2022-01-03 PROCEDURE — 6370000000 HC RX 637 (ALT 250 FOR IP): Performed by: STUDENT IN AN ORGANIZED HEALTH CARE EDUCATION/TRAINING PROGRAM

## 2022-01-03 PROCEDURE — 99291 CRITICAL CARE FIRST HOUR: CPT | Performed by: INTERNAL MEDICINE

## 2022-01-03 PROCEDURE — 6360000002 HC RX W HCPCS: Performed by: INTERNAL MEDICINE

## 2022-01-03 PROCEDURE — 2709999900 HC NON-CHARGEABLE SUPPLY: Performed by: SURGERY

## 2022-01-03 PROCEDURE — 94003 VENT MGMT INPAT SUBQ DAY: CPT

## 2022-01-03 PROCEDURE — 6360000002 HC RX W HCPCS: Performed by: SPECIALIST

## 2022-01-03 PROCEDURE — 2580000003 HC RX 258: Performed by: INTERNAL MEDICINE

## 2022-01-03 PROCEDURE — 3609013300 HC EGD TUBE PLACEMENT: Performed by: SURGERY

## 2022-01-03 PROCEDURE — 2000000000 HC ICU R&B

## 2022-01-03 PROCEDURE — 2500000003 HC RX 250 WO HCPCS: Performed by: SPECIALIST

## 2022-01-03 PROCEDURE — 37799 UNLISTED PX VASCULAR SURGERY: CPT

## 2022-01-03 PROCEDURE — 0DH63UZ INSERTION OF FEEDING DEVICE INTO STOMACH, PERCUTANEOUS APPROACH: ICD-10-PCS | Performed by: SURGERY

## 2022-01-03 PROCEDURE — 82947 ASSAY GLUCOSE BLOOD QUANT: CPT

## 2022-01-03 PROCEDURE — 6370000000 HC RX 637 (ALT 250 FOR IP): Performed by: NURSE PRACTITIONER

## 2022-01-03 PROCEDURE — 99233 SBSQ HOSP IP/OBS HIGH 50: CPT | Performed by: INTERNAL MEDICINE

## 2022-01-03 PROCEDURE — 94761 N-INVAS EAR/PLS OXIMETRY MLT: CPT

## 2022-01-03 PROCEDURE — 0B113F4 BYPASS TRACHEA TO CUTANEOUS WITH TRACHEOSTOMY DEVICE, PERCUTANEOUS APPROACH: ICD-10-PCS | Performed by: SURGERY

## 2022-01-03 PROCEDURE — 85025 COMPLETE CBC W/AUTO DIFF WBC: CPT

## 2022-01-03 PROCEDURE — 6370000000 HC RX 637 (ALT 250 FOR IP): Performed by: INTERNAL MEDICINE

## 2022-01-03 PROCEDURE — 3700000000 HC ANESTHESIA ATTENDED CARE: Performed by: SURGERY

## 2022-01-03 PROCEDURE — 2700000000 HC OXYGEN THERAPY PER DAY

## 2022-01-03 RX ORDER — CHLORDIAZEPOXIDE HYDROCHLORIDE 5 MG/1
20 CAPSULE, GELATIN COATED ORAL 4 TIMES DAILY
Status: DISCONTINUED | OUTPATIENT
Start: 2022-01-03 | End: 2022-01-05

## 2022-01-03 RX ORDER — FENTANYL CITRATE 50 UG/ML
INJECTION, SOLUTION INTRAMUSCULAR; INTRAVENOUS PRN
Status: DISCONTINUED | OUTPATIENT
Start: 2022-01-03 | End: 2022-01-03 | Stop reason: SDUPTHER

## 2022-01-03 RX ORDER — ROCURONIUM BROMIDE 10 MG/ML
INJECTION, SOLUTION INTRAVENOUS PRN
Status: DISCONTINUED | OUTPATIENT
Start: 2022-01-03 | End: 2022-01-03 | Stop reason: SDUPTHER

## 2022-01-03 RX ORDER — OXYCODONE HCL 5 MG/5 ML
20 SOLUTION, ORAL ORAL EVERY 4 HOURS
Status: DISCONTINUED | OUTPATIENT
Start: 2022-01-03 | End: 2022-01-05

## 2022-01-03 RX ORDER — PROPOFOL 10 MG/ML
INJECTION, EMULSION INTRAVENOUS PRN
Status: DISCONTINUED | OUTPATIENT
Start: 2022-01-03 | End: 2022-01-03 | Stop reason: SDUPTHER

## 2022-01-03 RX ADMIN — CHLORDIAZEPOXIDE HYDROCHLORIDE 15 MG: 5 CAPSULE ORAL at 03:24

## 2022-01-03 RX ADMIN — Medication 5 MCG/MIN: at 15:30

## 2022-01-03 RX ADMIN — KETAMINE HYDROCHLORIDE 0.2 MG/KG/HR: 50 INJECTION INTRAMUSCULAR; INTRAVENOUS at 05:40

## 2022-01-03 RX ADMIN — BACLOFEN 5 MG: 10 TABLET ORAL at 08:03

## 2022-01-03 RX ADMIN — SODIUM CHLORIDE, PRESERVATIVE FREE 10 ML: 5 INJECTION INTRAVENOUS at 08:04

## 2022-01-03 RX ADMIN — DEXMEDETOMIDINE HYDROCHLORIDE 0.7 MCG/KG/HR: 4 INJECTION, SOLUTION INTRAVENOUS at 06:01

## 2022-01-03 RX ADMIN — ROCURONIUM BROMIDE 50 MG: 10 INJECTION INTRAVENOUS at 10:47

## 2022-01-03 RX ADMIN — ENOXAPARIN SODIUM 30 MG: 100 INJECTION SUBCUTANEOUS at 19:21

## 2022-01-03 RX ADMIN — OXYCODONE HYDROCHLORIDE 15 MG: 5 SOLUTION ORAL at 05:36

## 2022-01-03 RX ADMIN — NAFCILLIN SODIUM 2000 MG: 2 INJECTION, POWDER, LYOPHILIZED, FOR SOLUTION INTRAMUSCULAR; INTRAVENOUS at 13:15

## 2022-01-03 RX ADMIN — LACTULOSE 20 G: 20 SOLUTION ORAL at 08:03

## 2022-01-03 RX ADMIN — DEXMEDETOMIDINE HYDROCHLORIDE 0.5 MCG/KG/HR: 4 INJECTION, SOLUTION INTRAVENOUS at 00:14

## 2022-01-03 RX ADMIN — OXYCODONE HYDROCHLORIDE 15 MG: 5 SOLUTION ORAL at 02:06

## 2022-01-03 RX ADMIN — DEXMEDETOMIDINE HYDROCHLORIDE 0.8 MCG/KG/HR: 4 INJECTION, SOLUTION INTRAVENOUS at 19:26

## 2022-01-03 RX ADMIN — PREDNISONE 20 MG: 20 TABLET ORAL at 08:03

## 2022-01-03 RX ADMIN — COLCHICINE 0.6 MG: 0.6 TABLET, FILM COATED ORAL at 08:03

## 2022-01-03 RX ADMIN — FENTANYL CITRATE 100 MCG: 50 INJECTION, SOLUTION INTRAMUSCULAR; INTRAVENOUS at 10:16

## 2022-01-03 RX ADMIN — Medication 150 MCG/HR: at 17:30

## 2022-01-03 RX ADMIN — MEROPENEM 1000 MG: 1 INJECTION, POWDER, FOR SOLUTION INTRAVENOUS at 02:04

## 2022-01-03 RX ADMIN — Medication 2000 UNITS: at 08:03

## 2022-01-03 RX ADMIN — DOCUSATE SODIUM 100 MG: 50 LIQUID ORAL at 08:02

## 2022-01-03 RX ADMIN — SODIUM CHLORIDE, PRESERVATIVE FREE 10 ML: 5 INJECTION INTRAVENOUS at 19:17

## 2022-01-03 RX ADMIN — SODIUM CHLORIDE, PRESERVATIVE FREE 10 ML: 5 INJECTION INTRAVENOUS at 19:16

## 2022-01-03 RX ADMIN — ROCURONIUM BROMIDE 50 MG: 10 INJECTION INTRAVENOUS at 10:07

## 2022-01-03 RX ADMIN — ACETAMINOPHEN 650 MG: 650 SUPPOSITORY RECTAL at 19:22

## 2022-01-03 RX ADMIN — Medication 8 MG/HR: at 06:31

## 2022-01-03 RX ADMIN — Medication 10 MG/HR: at 17:29

## 2022-01-03 RX ADMIN — NAFCILLIN SODIUM 2000 MG: 2 INJECTION, POWDER, LYOPHILIZED, FOR SOLUTION INTRAMUSCULAR; INTRAVENOUS at 17:27

## 2022-01-03 RX ADMIN — PROPOFOL 50 MG: 10 INJECTION, EMULSION INTRAVENOUS at 10:07

## 2022-01-03 RX ADMIN — DEXMEDETOMIDINE HYDROCHLORIDE 0.6 MCG/KG/HR: 4 INJECTION, SOLUTION INTRAVENOUS at 23:00

## 2022-01-03 RX ADMIN — NAFCILLIN SODIUM 2000 MG: 2 INJECTION, POWDER, LYOPHILIZED, FOR SOLUTION INTRAMUSCULAR; INTRAVENOUS at 21:11

## 2022-01-03 RX ADMIN — ROCURONIUM BROMIDE 50 MG: 10 INJECTION INTRAVENOUS at 10:20

## 2022-01-03 RX ADMIN — Medication 125 MCG/HR: at 02:08

## 2022-01-03 RX ADMIN — DEXMEDETOMIDINE HYDROCHLORIDE 0.7 MCG/KG/HR: 4 INJECTION, SOLUTION INTRAVENOUS at 15:30

## 2022-01-03 ASSESSMENT — PAIN SCALES - GENERAL
PAINLEVEL_OUTOF10: 0

## 2022-01-03 ASSESSMENT — PULMONARY FUNCTION TESTS
PIF_VALUE: 33
PIF_VALUE: 32
PIF_VALUE: 31
PIF_VALUE: 25
PIF_VALUE: 31
PIF_VALUE: 32
PIF_VALUE: 32
PIF_VALUE: 31
PIF_VALUE: 31
PIF_VALUE: 32
PIF_VALUE: 31
PIF_VALUE: 32
PIF_VALUE: 33
PIF_VALUE: 1
PIF_VALUE: 36
PIF_VALUE: 31
PIF_VALUE: 33
PIF_VALUE: 31
PIF_VALUE: 27
PIF_VALUE: 35
PIF_VALUE: 31
PIF_VALUE: 30
PIF_VALUE: 31
PIF_VALUE: 31
PIF_VALUE: 32
PIF_VALUE: 34
PIF_VALUE: 24
PIF_VALUE: 25
PIF_VALUE: 31
PIF_VALUE: 33
PIF_VALUE: 31
PIF_VALUE: 35
PIF_VALUE: 31
PIF_VALUE: 26
PIF_VALUE: 32
PIF_VALUE: 26
PIF_VALUE: 35
PIF_VALUE: 1
PIF_VALUE: 33
PIF_VALUE: 1
PIF_VALUE: 31
PIF_VALUE: 33
PIF_VALUE: 33
PIF_VALUE: 31
PIF_VALUE: 31
PIF_VALUE: 32
PIF_VALUE: 3
PIF_VALUE: 35
PIF_VALUE: 35
PIF_VALUE: 31
PIF_VALUE: 32
PIF_VALUE: 32
PIF_VALUE: 31
PIF_VALUE: 32
PIF_VALUE: 28
PIF_VALUE: 31
PIF_VALUE: 35
PIF_VALUE: 31
PIF_VALUE: 31
PIF_VALUE: 32
PIF_VALUE: 26
PIF_VALUE: 35
PIF_VALUE: 28
PIF_VALUE: 35
PIF_VALUE: 28
PIF_VALUE: 35
PIF_VALUE: 33
PIF_VALUE: 34
PIF_VALUE: 26

## 2022-01-03 NOTE — PLAN OF CARE
Nutrition Problem #1: Inadequate oral intake  Intervention: Food and/or Nutrient Delivery: Continue NPO  Nutritional Goals: Pt to meet % of est'd needs daily via EN

## 2022-01-03 NOTE — PLAN OF CARE
Problem: OXYGENATION/RESPIRATORY FUNCTION  Goal: Patient will maintain patent airway  1/2/2022 2026 by Tracy Perla RCP  Outcome: Ongoing     Problem: OXYGENATION/RESPIRATORY FUNCTION  Goal: Patient will achieve/maintain normal respiratory rate/effort  Description: Respiratory rate and effort will be within normal limits for the patient  1/2/2022 2026 by Tracy Perla RCP  Outcome: Ongoing     Problem: MECHANICAL VENTILATION  Goal: Patient will maintain patent airway  1/2/2022 2026 by Tracy Perla RCP  Outcome: Ongoing     Problem: MECHANICAL VENTILATION  Goal: Oral health is maintained or improved  1/2/2022 2026 by Tracy Perla RCP  Outcome: Ongoing     Problem: MECHANICAL VENTILATION  Goal: ET tube will be managed safely  1/2/2022 2026 by Tracy Perla RCP  Outcome: Ongoing     Problem: MECHANICAL VENTILATION  Goal: Ability to express needs and understand communication  1/2/2022 2026 by Tracy Perla RCP  Outcome: Ongoing     Problem: MECHANICAL VENTILATION  Goal: Mobility/activity is maintained at optimum level for patient  1/2/2022 2026 by Tracy Perla RCP  Outcome: Ongoing     Problem: SKIN INTEGRITY  Goal: Skin integrity is maintained or improved  Outcome: Ongoing

## 2022-01-03 NOTE — PROGRESS NOTES
[facility]  Office: [address], DO, Marcy Joseph, DO, Matt Palomo, DO, Brea Morris, DO, Shantel Mosquera MD, Austin Hess MD, Manjinder Young MD, Blyane Mora MD, Regulo Seay MD, Brennen Vu MD, David Miranda MD, Raciel Luu, DO, Yenni Logan, DO, Perla Galo MD,  Judy Morgan, DO, Jenny Altman MD, Manjinder Motta MD, Deborah De Jesus MD, Rhina Ravi MD, Sil Beckham MD, Eliceo Guaman MD, Lorie Rascon MD, Grey Mendoza [facility], [facility], CNP, Kailee Boggs, CNP, Luda Arrington, CNS, Zenobia Aguirre, CNP, Ana Lemon, CNP, Tiffany Conde, CNP, Sai Rene, CNP, Deborah Cardoso, CNP, JASON CernaC, Raphael Chowdhury, [facility], Nanette Stinson, [facility], Eduard Dai, CNP, Yoli Wheeler, CNP, Nannette Santos, CNP, Laurita Sethi, CNP, Fátima Webb, [facility], Ronnie Canseco, [address] [place] [address]    Progress Note    1/3/2022    4:16 PM    Name:   Estefany Solorio  MRN:     [id number]     [place]:      [de-identified]   Room:   [address] [place] [address] Day:  25  Admit Date:  12/12/2021 11:39 AM    PCP:   Giancarlo Palm MD  Code Status:  Full Code    Subjective:     C/C: Shortness of breath    Interval History Status: not changed. Patient seen and examined this morning. No acute events overnight. Still requiring high levels of sedation to maintain compliance with ventilator. To undergo trach/peg placement today. Placed on norepinephrine later in the afternoon after trach and peg placement. Brief History:     Estefany Solorio is 60 y.o. male who was admitted to the hospital on 12/12/2021 for treatment of Pneumonia due to COVID-19 virus. Pt initially presented with shortness of breath at [facility].  He had to suggest positive for COVID-19 infection. Patient reported that his son had also COVID-19 infection.   Patient was hypoxic in 50s on arrival and was treated with BiPAP however breathing worsened and patient was subsequently intubated. Patient was transferred to [facility] on 12/12/2021 as he was requiring high ventilator support 100% FiO2 with PEEP of 22. He was given Actemra and started on high-dose Decadron. Patient was started with he was started on Flolan and given paralytics for proning per ARDS protocol. Patient also received intermittent Lasix. Proning was stopped on 12/20/2021 and paralytics were stopped on 12/22/2021. Patient started to have fevers and was started on cefepime. Respiratory cultures were negative. Airborne isolation was removed on 12/31/2021. Review of Systems:     Unable to obtain secondary to intubation and sedation    Medications: Allergies:     Allergies   Allergen Reactions    Nuts [Peanut-Containing Drug Products] Anaphylaxis    Sunflower Oil Anaphylaxis     Sunflower seeds       Current Meds:   Scheduled Meds:    nafcillin  2,000 mg IntraVENous Q4H    lactulose  20 g Oral TID    oxyCODONE  15 mg Oral Q4H    chlordiazePOXIDE  15 mg Per NG tube 4x Daily    colchicine  0.6 mg Oral Daily    predniSONE  20 mg Per NG tube Daily    baclofen  5 mg Per NG tube TID    sodium chloride flush  5-40 mL IntraVENous 2 times per day    docusate  100 mg Per NG tube BID    insulin lispro  0-6 Units SubCUTAneous Q6H    lansoprazole  30 mg Oral QAM AC    insulin glargine  10 Units SubCUTAneous Nightly    sodium chloride flush  5-40 mL IntraVENous 2 times per day    enoxaparin  30 mg SubCUTAneous BID    Vitamin D  2,000 Units Oral Daily     Continuous Infusions:    ketamine (KETALAR) infusion for analgosedation 0.2 mg/kg/hr (01/03/22 0540)    dexmedetomidine 0.7 mcg/kg/hr (01/03/22 1530)    sodium chloride      sodium chloride      dextrose      norepinephrine 5 mcg/min (01/03/22 1530)    midazolam 10 mg/hr (01/03/22 1220)    fentaNYL 150 mcg/hr (01/03/22 1401)    dextrose      sodium chloride 10 mL/hr at 12/13/21 1548     PRN Meds: potassium chloride **OR** potassium alternative oral replacement **OR** potassium chloride, ibuprofen, sodium chloride flush, sodium chloride, metoprolol, polyethylene glycol, senna, bisacodyl, acetaminophen, sodium chloride flush, sodium chloride, ondansetron **OR** ondansetron, [DISCONTINUED] acetaminophen **OR** acetaminophen, glucose, dextrose, glucagon (rDNA), dextrose, glucose, dextrose, glucagon (rDNA), dextrose    Data:     Past Medical History:   has a past medical history of Arthritis, Asthma, Diabetes mellitus ([address] Edema, GERD (gastroesophageal reflux disease), Hypertension, Migraine, and IRMA on CPAP. Social History:   reports that he has quit smoking. He has never used smokeless tobacco. He reports current alcohol use. He reports that he does not use drugs. Family History:   Family History   Problem Relation Age of Onset    Heart Attack Sister 32    Diabetes Paternal Grandmother     Heart Disease Paternal Uncle     Heart Disease Paternal Uncle     Heart Disease Paternal Uncle     Cancer Maternal Aunt     Cancer Maternal Aunt     Cancer Maternal Uncle     Cancer Maternal Uncle        Vitals:  /76   Pulse 88   Temp 99.3 °F (37.4 °C) (Bladder)   Resp 21   Ht 6' 2\" (1.88 m)   Wt 287 lb 4.2 oz (130.3 kg)   SpO2 96%   BMI 36.88 kg/m²   Temp (24hrs), Av.8 °F (37.7 °C), Min:99.1 °F (37.3 °C), Max:99.9 °F (37.7 °C)    Recent Labs     22  1838 22  0049 22  0425 22  1219   POCGLU 113* 99 97 117*       I/O (24Hr):     Intake/Output Summary (Last 24 hours) at 1/3/2022 1616  Last data filed at 1/3/2022 0600  Gross per 24 hour   Intake 1975.97 ml   Output 3450 ml   Net -1474.03 ml       Labs:  Hematology:  Recent Labs     22  0459 22  1022 22  0510   WBC 6.9 6.8 11.3   RBC 3.91* 3.95* 4.26   HGB 12.2* 12.2* 13.2   HCT 38.0* 39.0* 41.3   MCV 97.2 98.7 96.9   MCH 31.2 30.9 31.0   MCHC 32.1 31.3 32.0   RDW 14.5* 14.6* 14.3    See Reflexed IPF Result 154   MPV 11.5 NOT REPORTED 11.0     Chemistry:  Recent Labs     01/01/22 0427 01/02/22  0459 01/03/22  0511    143 143   K 3.9 4.2 3.7    105 104   CO2 32* 29 27   GLUCOSE 97 121* 91   BUN 30* 29* 18   CREATININE 0.29* 0.37* 0.28*   ANIONGAP 8* 9 12   LABGLOM >60 >60 >60   GFRAA >60 >60 >60   CALCIUM 8.7 9.0 8.9     Recent Labs     01/01/22  0427 01/01/22  1046 01/02/22  0455 01/02/22  0459 01/02/22  0535 01/02/22  1126 01/02/22  1838 01/03/22  0049 01/03/22  0425 01/03/22  0511 01/03/22  1219   PROT 5.5*  --   --  5.4*  --   --   --   --   --  5.9*  --    LABALBU 3.1*  --   --  2.9*  --   --   --   --   --  3.1*  --    AST 20  --   --  21  --   --   --   --   --  40*  --    ALT 57*  --   --  64*  --   --   --   --   --  137*  --    ALKPHOS 53  --   --  56  --   --   --   --   --  72  --    BILITOT 0.35  --   --  0.35  --   --   --   --   --  0.42  --    BILIDIR 0.11  --   --  0.15  --   --   --   --   --  0.14  --    URICACID 3.2*  --   --   --   --   --   --   --   --   --   --    POCGLU  --    < >   < >  --  101 117* 113* 99 97  --  117*    < > = values in this interval not displayed. ABG:  Lab Results   Component Value Date    POCPH 7.392 01/03/2022    POCPCO2 55.6 01/03/2022    POCPO2 69.2 01/03/2022    POCHCO3 33.8 01/03/2022    NBEA NOT REPORTED 01/03/2022    PBEA 7 01/03/2022    KJM3QQP NOT REPORTED 01/03/2022    EZZE2GHQ 93 01/03/2022    FIO2 40.0 01/03/2022     Lab Results   Component Value Date/Time    SPECIAL NOT REPORTED 01/02/2022 10:39 AM    SPECIAL NOT REPORTED 01/02/2022 10:39 AM     Lab Results   Component Value Date/Time    CULTURE NO GROWTH 1 DAY 01/02/2022 10:39 AM    CULTURE NO GROWTH 1 DAY 01/02/2022 10:39 AM     Radiology:  XR CHEST PORTABLE    Result Date: 12/29/2021  Stable support lines and tubes. Stable to slightly increased diffuse interstitial and airspace opacities. Physical Examination:        General appearance: Morbidly obese  gentleman lying supine in bed. Intubated, sedated. Restless. HEENT: ET and OG tubes present. Lungs: Mechanical breath sounds, clear to auscultation bilaterally, normal effort  Heart:  regular rate and rhythm, no murmur  Abdomen:  soft, nontender, protuberant, nondistended, normal bowel sounds, no masses, hepatomegaly, splenomegaly  Extremities: SCDs present bilaterally, left calcaneus erythema and edema is noted along with erythema to the first MCP with a postsurgical scar noted on the dorsal aspect of the great toe  Skin: Erythema as noted above    Assessment:        Hospital Problems           Last Modified POA    * (Principal) Pneumonia due to COVID-19 virus 12/21/2021 Yes    Acute respiratory failure with hypoxia ([address] 12/21/2021 Yes    ARDS (adult respiratory distress syndrome) ([address] 12/21/2021 Yes    Shock ([address] 12/21/2021 No    Type 2 diabetes mellitus ([facility] 12/21/2021 Yes    Asthma 12/21/2021 Yes    IRMA on CPAP 12/12/2021 Yes    Overview Signed 12/12/2021  2:39 PM by Brandan Johansen, DO     seldom use machine         Hypertension 12/12/2021 Yes    Overview Signed 12/12/2021  2:39 PM by Brandan Johansen,      on lasix         GERD (gastroesophageal reflux disease) 12/12/2021 Yes          Plan:        Acute respiratory failure with hypoxia due to COVID-19 pneumonia-pulmonology/critical care is following ventilator support. Continue oral prednisone taper. Received Actemra on 12/12. MSSA pneumonia - meropenem changed to Nafcillin. Blood cultures have been negative. Intermittent fevers - Ideally, would like to wean Precedex, as you can have fever as an adverse effect. Alternatively, you can have a fever from withdrawal D/W critical care. Fevers may also be from MSSA pneumonia or COVID. Sedation remains a significant issue - he is on Versed, fentanyl, ketamine, Precedex, Roxicodone, Librium to maintain his sedation. Increase oral benzodiazepine and opiate and decrease Versed, fentanyl, Precedex. Ketamine at the discretion of critical care. Ideally would like to wean this. Bowel regimen-restart lactulose. Continue Colace and as needed MiraLAX. Questionable gout-continue Colcrys and prednisone. Type 2 diabetes mellitus-continue insulin sliding scale and Lantus. Continue DVT prophylaxis with Lovenox  Continue GI prophylaxis with lansoprazole  Obesity with BMI of 36.37-will need diet/weight loss/exercise/therapies in the future  IRMA-was noncompliant with CPAP at home  Disposition: POD #0 from trach/peg. Levo for worsening hypotension. Hold tube feeds for now.     33 Danisha Bourgeois DO  1/3/2022  4:16 PM

## 2022-01-03 NOTE — DISCHARGE INSTR - COC
Continuity of Care Form    Patient Name: Nancy Ortiz   :  1965  MRN:  [id number]    Admit date:  2021  Discharge date:  2022    Code Status Order: Full Code   Advance Directives:      Admitting Physician:  Kay Barahona MD  PCP: Mile Monahan MD    Discharging Nurse: Hina Merchant Unit/Room#: 5649/2172-14  Discharging Unit Phone Number: [phone]    Emergency Contact:   Extended Emergency Contact Information  Primary Emergency Contact: [address] [facility],  [address] Phone: [phone]  Relation: Child  Secondary Emergency Contact: Chris Hester  Mobile Phone: [phone]  Relation: Parent  Preferred language: English   needed? No    Past Surgical History:  Past Surgical History:   Procedure Laterality Date    APPENDECTOMY      ARTHROPLASTY Left 2019    LEFT FOOT 1ST MTPJ ARTHROPLASTY WITH PEREZ IMPLANT AND GROMMETS  ALLEN MED performed by Bambi An MD at [address] [place] [address] Right 2019    RIGHT FOOT ARTHROPLASTY 1ST MTPJ (Right Foot)    ARTHROPLASTY Right 2019    RIGHT FOOT ARTHROPLASTY 1ST MTPJ performed by Bambi An MD at [place] [address]. Right     CARDIAC CATHETERIZATION      no blockage no stents    CHOLECYSTECTOMY      COLONOSCOPY      ENDOSCOPY, COLON, DIAGNOSTIC      TOE SURGERY Left 2019     LEFT FOOT 1ST MTPJ ARTHROPLASTY WITH PEREZ IMPLANT AND GROMMETS  ALLEN MED (Left )    TONSILLECTOMY         Immunization History: There is no immunization history on file for this patient.     Active Problems:  Patient Active Problem List   Diagnosis Code    Acute respiratory failure with hypoxia (HCC) J96.01    Pneumonia due to COVID-19 virus U07.1, J12.82    Type 2 diabetes mellitus (HCC) E11.9    Asthma J45.909    IRMA on CPAP G47.33, Z99.89    Hypertension I10    GERD (gastroesophageal reflux disease) K21.9    ARDS (adult respiratory distress syndrome) ([address] J80    Shock ([facility] [address] R57.9 Isolation/Infection:   Isolation            No Isolation          Patient Infection Status       Infection Onset Added Last Indicated Last Indicated By Review Planned Expiration Resolved Resolved By    None active    Resolved    COVID-19 12/11/21 12/11/21 12/11/21 SARS-CoV-2 NAAT (Rapid)   12/31/21 Josué Cannon RN    OK to remove isolation per ID    COVID-19 (Rule Out) 12/11/21 12/11/21 12/11/21 SARS-CoV-2 NAAT (Rapid) (Ordered)   12/11/21 Rule-Out Test Resulted            Nurse Assessment:  Last Vital Signs: /76   Pulse 88   Temp 99.3 °F (37.4 °C) (Bladder)   Resp 21   Ht 6' 2\" (1.88 m)   Wt 287 lb 4.2 oz (130.3 kg)   SpO2 96%   BMI 36.88 kg/m²     Last documented pain score (0-10 scale): Pain Level:  (sedated)  Last Weight:   Wt Readings from Last 1 Encounters:   01/03/22 287 lb 4.2 oz (130.3 kg)     Mental Status:  alert and unable to assess orientation level    IV Access:  - PICC - site  R Basilic, insertion date: 12/23/2021    Nursing Mobility/ADLs:  Walking   Dependent  Transfer  Dependent  Bathing  Dependent  Dressing  Dependent  Toileting  Dependent  Feeding  Dependent  Med Admin  Dependent  Med Delivery   crushed and via PEG tube    Wound Care Documentation and Therapy:        Elimination:  Continence: Bowel: No  Bladder: No and salgado in place  Urinary Catheter: Insertion Date: 01/03/2022    Colostomy/Ileostomy/Ileal Conduit: No  Rectal Tube-Stool Appearance: Loose,Watery  Rectal Tube-Stool Color: Brown  Rectal Tube-Stool Amount: Other (Comment) (scant amount of stool noted in tube)    Date of Last BM: 01/07/2022 - FMS in place until discharge    Intake/Output Summary (Last 24 hours) at 1/3/2022 1510  Last data filed at 1/3/2022 0600  Gross per 24 hour   Intake 2235.97 ml   Output 3450 ml   Net -1214.03 ml     I/O last 3 completed shifts: In: 2236 [I.V.:1402; NG/GT:834]  Out: 3450 [Urine:2500; Stool:950]    Safety Concerns:      At Risk for Falls and Aspiration Risk    Impairments/Disabilities:      Speech and Language Barrier - trach/vent    Nutrition Therapy:  Current Nutrition Therapy:   - Tube Feedings:  Peptide based, 75 mL/hour; 150 mL free water bolus every 4 hours    Routes of Feeding: Gastrostomy Tube  Liquids: 150 mL free water bolus every 4 hours  Daily Fluid Restriction: no  Last Modified Barium Swallow with Video (Video Swallowing Test): not done    Treatments at the Time of Hospital Discharge:   Respiratory Treatments: Ventilator support  Oxygen Therapy:   Ventilator  Ventilator:    - Ventilator Settings:    Vt Ordered: 540 mL  Rate Set: 14 bmp  FiO2 : 40 %    PEEP/CPAP: 5  Pressure Support: 10 cmH20 (found on 10 cmh20)    Rehab Therapies: Physical Therapy  Weight Bearing Status/Restrictions: No weight bearing restirctions  Other Medical Equipment (for information only, NOT a DME order):  Bariatric bed  Other Treatments: N/A    Patient's personal belongings (please select all that are sent with patient):  Clothing, backpack    RN SIGNATURE:  Electronically signed by Fabio Riggins RN on 1/7/22 at 10:41 AM EST    CASE MANAGEMENT/SOCIAL WORK SECTION    Inpatient Status Date:     Readmission Risk Assessment Score:  Readmission Risk              Risk of Unplanned Readmission:  17           Discharging to Facility/ Agency   Name:   [facility] [address] of [place] Details  [address] [facility] [address] [facility] [address]       Phone: [phone]       Fax: [phone]          Dialysis Facility (if applicable)   Name:  Address:  Dialysis Schedule:  Phone:  Fax:    / signature: Electronically signed by Tiffanie Lee RN on 1/7/22 at 10:21 AM EST    PHYSICIAN SECTION    Prognosis: Fair    Condition at Discharge: Stable    Rehab Potential (if transferring to Rehab):  Fair    Recommended Labs or Other Treatments After Discharge:     Trauma/Acute Care Surgery Discharge Instructions    Please no gauze drain pads under flange of PEG tube. Maintain abdominal binder over PEG at all times to prevent dislodgement. Trach care every eight hours and as needed, suction as needed. Follow up in trauma/acute care surgery clinic when appropriate to assess for removal of trach/peg or with any issues. Call [phone] to schedule. For any questions or concerns regarding trach or PEG call RN message line at [phone]. Hahn and FMS removal at [place] [address]. Repeat CXR in 2 weeks. Physician Certification: I certify the above information and transfer of Alyce Norton  is necessary for the continuing treatment of the diagnosis listed and that he requires LTAC for greater 30 days.      Update Admission H&P: No change in H&P    PHYSICIAN SIGNATURE:  Electronically signed by Karyn Ryan DO on 1/7/22 at 10:25 AM EST

## 2022-01-03 NOTE — PLAN OF CARE
Problem: Airway Clearance - Ineffective  Goal: Achieve or maintain patent airway  Outcome: Ongoing     Problem:  Body Temperature -  Risk of, Imbalanced  Goal: Ability to maintain a body temperature within defined limits  Outcome: Ongoing  Goal: Will regain or maintain usual level of consciousness  Outcome: Ongoing  Goal: Complications related to the disease process, condition or treatment will be avoided or minimized  Outcome: Ongoing     Problem: Risk for Fluid Volume Deficit  Goal: Maintain normal heart rhythm  Outcome: Ongoing  Goal: Maintain absence of muscle cramping  Outcome: Ongoing  Goal: Maintain normal serum potassium, sodium, calcium, phosphorus, and pH  Outcome: Ongoing     Problem: Nutrition Deficits  Goal: Optimize nutritional status  Outcome: Ongoing     Problem: MECHANICAL VENTILATION  Goal: Patient will maintain patent airway  1/3/2022 0147 by Julian Rueda  Outcome: Ongoing  1/2/2022 2026 by Nicole Suarez RCP  Outcome: Ongoing  Goal: Oral health is maintained or improved  1/3/2022 0147 by Julian Rueda  Outcome: Ongoing  1/2/2022 2026 by Nicole Suarez RCP  Outcome: Ongoing  Goal: ET tube will be managed safely  1/3/2022 0147 by Julian Rueda  Outcome: Ongoing  1/2/2022 2026 by Nicole Suarez RCP  Outcome: Ongoing  Goal: Ability to express needs and understand communication  1/3/2022 0147 by Julian Rueda  Outcome: Ongoing  1/2/2022 2026 by Nicole Suarez RCP  Outcome: Ongoing  Goal: Mobility/activity is maintained at optimum level for patient  1/3/2022 0147 by Julian Rueda  Outcome: Ongoing  1/2/2022 2026 by Nicole Suarez RCP  Outcome: Ongoing     Problem: SKIN INTEGRITY  Goal: Skin integrity is maintained or improved  1/3/2022 0147 by Julian Rueda  Outcome: Ongoing  1/2/2022 2026 by Nicole Suarez RCP  Outcome: Ongoing     Problem: Skin Integrity:  Goal: Will show no infection signs and symptoms  Description: Will show no infection signs and symptoms  Outcome: Ongoing  Goal: Absence of new skin breakdown  Description: Absence of new skin breakdown  Outcome: Ongoing

## 2022-01-03 NOTE — PROGRESS NOTES
Post Op Note    SUBJECTIVE  Pt s/p percutaneous tracheostomy tube placement and percutaneous gastrostomy tube placement. He remains on minimal vent settings  HDS  Resting comfortably    OBJECTIVE  VITALS:  /76   Pulse 57   Temp 99.3 °F (37.4 °C) (Bladder)   Resp 23   Ht 6' 2\" (1.88 m)   Wt 287 lb 4.2 oz (130.3 kg)   SpO2 95%   BMI 36.88 kg/m²         GENERAL:  resting and in no acute distress  CARDIOVASCULAR:  regular rate and rhythm   LUNGS:  Symmetric chest rise and fall  ABDOMEN:   Abdomen soft, non-tender, non-distended, PEG at 5 at skin, abdominal binder in place  INCISION: trach in place without active bleeding/hematoma, Incision clean/dry/intact     ASSESSMENT  1. POD# 0 s/p trach and PEG     PLAN  1. Ok for meds through PEG 6 hours post op  2.  OK for feeds through PEG in am if no issues arise

## 2022-01-03 NOTE — PROGRESS NOTES
PULMONARY & CRITICAL CARE MEDICINE PROGRESS NOTE     Patient:  Lavon Low  MRN: [id number]  Admit date: 12/12/2021  Primary Care Physician: Jorge Rodriguez MD  Consulting Physician: Gee Tamayo DO  CODE Status: Full Code  LOS: 22     SUBJECTIVE     CHIEF COMPLAINT/REASON FOR INITIAL CONSULT:   Acute respiratory failure/COVID-19 pneumonia/ARDS    BRIEF HOSPITAL COURSE:   The patient is a 60 y.o. male admitted for COVID-19 at [facility] due to worsening oxygenation he was initially started on BiPAP and subsequently intubated. On room air his saturations had been 50%. CTA no PE but bilateral pulmonary infiltrates. He was accepted at [address] [place] [address] ICU. On arrival he is on PEEP of 22, 100% FiO2. INTERVAL HISTORY:  01/03/22    Chart reviewed, ICU events noted, other notes seen ventilator setting arterial blood gases and medications reviewed. Overnight he had low-grade temperature of T-max 99.7. He is hemodynamically stable does not require pressor support. Overnight he remains on FiO2 of 40% with PEEP of 8. On return from tracheostomy he had desaturation requiring increased FiO2 to 55% and PEEP of 10. He is on sedation with fentanyl 125 mcg propofol 50 mcg Versed 8 mg and on ketamine drip. Ventilator setting PRVC of rate of 15 tidal volume 4 a 40 PEEP 18 FiO2 40%. ABG 7.3 9/55/69/34.       REVIEW OF SYSTEMS:  Unobtainable from patient due to sedation/mechanical ventilation    OBJECTIVE     VENTILATOR SETTINGS:  Vent Information  $Ventilation: $Subsequent Day  Skin Assessment: Clean, dry, & intact  Suction Catheter Diameter: 14  Equipment ID: [id number]  Equipment Changed: (S) Suction catheter (trach length)  Vent Type: Servo i  Vent Mode: PRVC  Vt Ordered: 540 mL  Pressure Ordered: (S) 12 (decreased, patient appears to be tolerating wean well)  Rate Set: 15 bmp  Pressure Support: (S) 10 cmH20  FiO2 : 55 %  SpO2: 96 %  SpO2/FiO2 ratio: 174.55  Sensitivity: 2  PEEP/CPAP: 10  I Time/ I Time %: 0.8 s  Humidification Source: HME  Humidification Temp: 37  Humidification Temp Measured: 36.8  Circuit Condensation: Drained  Nitric Oxide/Epoprostenol In Use?: No     PaO2/FiO2 RATIO:  Recent Labs     22  0425   POCPO2 69.2*      FiO2 : 55 %     VITAL SIGNS:   LAST:  /76   Pulse 88   Temp 99.3 °F (37.4 °C) (Bladder)   Resp 21   Ht 6' 2\" (1.88 m)   Wt 287 lb 4.2 oz (130.3 kg)   SpO2 96%   BMI 36.88 kg/m²   8-24 HR RANGE:  TEMP Temp  Av.8 °F (37.7 °C)  Min: 99.1 °F (37.3 °C)  Max: 99.9 °F (02.7 °C)   BP Systolic (37FUJ), VUX:474 , Min:82 , EHD:101      Diastolic (21UOB), SHY:88, Min:58, Max:102     PULSE Pulse  Av  Min: 67  Max: 114   RR Resp  Avg: 15.1  Min: 0  Max: 24   O2 SAT SpO2  Av.9 %  Min: 88 %  Max: 97 %   OXYGEN DELIVERY No data recorded        SYSTEMIC EXAMINATION:   General appearance - Mechanically ventilated, ill-appearing  Mental status - sedated with fentanyl propofol and on ketamine drip  Eyes - pupils equal and reactive, sclera anicteric  Mouth - mucous membranes moist  Neck -tracheostomy no active bleeding, neck is supple, no significant adenopathy, carotids upstroke normal bilaterally, no bruits  Chest - Breath sounds bilaterally were dimnished to auscultation at bases. There were mild scattered crackles and rhonchi decreased breath sound at left base. There is no intercostal recession or use of accessory muscles  Heart - normal rate, regular rhythm, normal S1, S2, no murmurs, rubs, clicks or gallops  Abdomen - soft, nontender, nondistended, no masses or organomegaly  Neurological - DTR's normal and symmetric plantars downgoing, motor or sensory not done as patient is sedated on ventilator.   Extremities - peripheral pulses normal, no pedal edema, no clubbing or cyanosis  Skin - normal coloration and turgor, no rashes, no suspicious skin lesions noted     DATA REVIEW     Medications:  Scheduled Meds:   nafcillin  2,000 mg IntraVENous Q4H    lactulose 20 g Oral TID    oxyCODONE  15 mg Oral Q4H    chlordiazePOXIDE  15 mg Per NG tube 4x Daily    colchicine  0.6 mg Oral Daily    predniSONE  20 mg Per NG tube Daily    baclofen  5 mg Per NG tube TID    sodium chloride flush  5-40 mL IntraVENous 2 times per day    docusate  100 mg Per NG tube BID    insulin lispro  0-6 Units SubCUTAneous Q6H    lansoprazole  30 mg Oral QAM AC    insulin glargine  10 Units SubCUTAneous Nightly    sodium chloride flush  5-40 mL IntraVENous 2 times per day    enoxaparin  30 mg SubCUTAneous BID    Vitamin D  2,000 Units Oral Daily     Continuous Infusions:   ketamine (KETALAR) infusion for analgosedation 0.2 mg/kg/hr (01/03/22 0540)    dexmedetomidine 0.7 mcg/kg/hr (01/03/22 0601)    sodium chloride      sodium chloride      dextrose      norepinephrine Stopped (01/02/22 1200)    midazolam 8 mg/hr (01/03/22 0631)    fentaNYL 125 mcg/hr (01/03/22 0208)    dextrose      sodium chloride 10 mL/hr at 12/13/21 1548       INPUT/OUTPUT:  In: 2236 [I.V.:1402; NG/GT:834]  Out: 3450 [Urine:2500]  Date 01/03/22 0000 - 01/03/22 2359   Shift 4522-9512 6616-3573 1450-0656 24 Hour Total   INTAKE   I.V.(mL/kg) 541. 2(4.2)   541. 2(4.2)   NG/GT(mL/kg) 164(1.3)   164(1.3)   Shift Total(mL/kg) 705. 2(5.4)   705. 2(5.4)   OUTPUT   Urine(mL/kg/hr) 1050(1)   1050   Emesis/NG output(mL/kg) 0(0)   0(0)   Stool(mL/kg) 200(1.5)   200(1.5)   Shift Total(mL/kg) 1250(9.6)   1250(9.6)   Weight (kg) 130.3 130.3 130.3 130.3        LABS:  ABGs:   Recent Labs     01/01/22  0324 01/02/22  0455 01/03/22  0425   POCPH 7.437 7.434 7. 392   POCPCO2 49.7* 49.7* 55.6*   POCPO2 66.0* 62.9* 69.2*   POCHCO3 33.5* 33.3* 33.8*   QIAJ1GCK 93* 92* 93*     CBC:   Recent Labs     01/01/22  0427 01/02/22  0459 01/02/22  1022 01/03/22  0510   WBC 10.5 6.9 6.8 11.3   HGB 12.8* 12.2* 12.2* 13.2   HCT 38.3* 38.0* 39.0* 41.3   MCV 94.6 97.2 98.7 96.9   PLT See Reflexed IPF Result 147 See Reflexed IPF Result 154   LYMPHOPCT 11* 20*  --  11*   RBC 4.05* 3.91* 3.95* 4.26   MCH 31.6 31.2 30.9 31.0   MCHC 33.4 32.1 31.3 32.0   RDW 14.2 14.5* 14.6* 14.3     CRP:   No results for input(s): CRP in the last 72 hours. LDH:   No results for input(s): LDH in the last 72 hours. BMP:   Recent Labs     01/01/22 0427 01/02/22 0459 01/03/22  0511    143 143   K 3.9 4.2 3.7    105 104   CO2 32* 29 27   BUN 30* 29* 18   CREATININE 0.29* 0.37* 0.28*   GLUCOSE 97 121* 91     Liver Function Test:   Recent Labs     01/01/22 0427 01/02/22 0459 01/03/22  0511   PROT 5.5* 5.4* 5.9*   LABALBU 3.1* 2.9* 3.1*   ALT 57* 64* 137*   AST 20 21 40*   ALKPHOS 53 56 72   BILITOT 0.35 0.35 0.42     Coagulation Profile:   No results for input(s): INR, PROTIME, APTT in the last 72 hours. D-Dimer:  No results for input(s): DDIMER in the last 72 hours. Ferritin:    No results for input(s): FERRITIN in the last 72 hours. Lactic Acid:  No results for input(s): LACTA in the last 72 hours. Cardiac Enzymes:  No results for input(s): CKTOTAL, CKMB, CKMBINDEX, TROPONINI in the last 72 hours. Invalid input(s): TROPONIN, HSTROP  BNP/ProBNP:   No results for input(s): BNP, PROBNP in the last 72 hours. Triglycerides:  No results for input(s): TRIG in the last 72 hours. Microbiology:  Urine Culture:  No components found for: CURINE  Blood Culture:  No components found for: CBLOOD, CFUNGUSBL  Sputum Culture:  No components found for: CSPUTUM  Recent Labs     01/02/22  1039   [address] [facility] . BLOOD  . BLOOD   SPECIAL NOT REPORTED  NOT REPORTED   CULTURE NO GROWTH 1 DAY  NO GROWTH 1 DAY     Recent Labs     01/02/22  1039   [facility] . BLOOD  . BLOOD   SPECIAL NOT REPORTED  NOT REPORTED   CULTURE NO GROWTH 1 DAY  NO GROWTH 1 DAY        Pathology:    Radiology Reports:  XR CHEST PORTABLE   Preliminary Result   Interval tracheostomy. Bilateral patchy interstitial and airspace opacities   are not significantly changed.          VL DUP LOWER EXTREMITY VENOUS BILATERAL Final Result      XR CHEST PORTABLE   Final Result   Stable support lines and tubes. Stable to slightly increased diffuse   interstitial and airspace opacities. XR CHEST PORTABLE   Final Result   1. Stable cardiomegaly. 2.  Patchy airspace opacities, stable in the right lower chest, slightly   improved on the left. Findings may be related to pulmonary edema or   improving pneumonia. 3.  Support tubes and catheters in good position. XR ABDOMEN (KUB) (SINGLE AP VIEW)   Final Result   Stable mild cardiomegaly. Patchy airspace opacities, left more than right, may be related to pulmonary   edema versus pneumonia. Nonspecific bowel gas pattern. XR CHEST (SINGLE VIEW FRONTAL)   Final Result   Stable mild cardiomegaly. Patchy airspace opacities, left more than right, may be related to pulmonary   edema versus pneumonia. Nonspecific bowel gas pattern. XR CHEST PORTABLE   Final Result   Cardiomegaly, vascular congestion and worsening pulmonary opacities with   bilateral effusions favoring pulmonary edema over multifocal airspace   disease. Support tubes and lines as above. XR CHEST (SINGLE VIEW FRONTAL)   Final Result   No significant change in multifocal airspace opacities. Continued follow-up   is recommended document complete resolution following medical treatment   course. Stable position lines and catheters         XR CHEST (SINGLE VIEW FRONTAL)   Final Result   Mild interval improvement in multifocal airspace disease particularly at the   right base. Support tubes and lines as above. XR CHEST (SINGLE VIEW FRONTAL)   Final Result   Stable appearing              Echocardiogram:   No results found for this or any previous visit.        ASSESSMENT AND PLAN     Assessment:    // Acute hypoxic respiratory failure  // Acute respiratory distress syndrome secondary to Covid pneumonia  // Bilateral multifocal pneumonia due to COVID 19 infection  // Sepsis due to COVID 19  // Circulatory shock/septic shock resolved  // Hypertension  // Diabetes mellitus type 2  // Obstructive sleep apnea    Plan:    I personally interviewed/examined the patient; reviewed interval history, interpreted all available radiographic and laboratory data at the time of service. Patient currently sedated with fentanyl, ketamine, propofol drip  Post tracheostomy he had desaturation and require high PEEP will increase PEEP to 10 and FiO2 55% and slowly weaning down again to FiO2 less than 50% and PEEP to 8 if possible. Chest x-ray post cast to be seen shows tracheostomy is in good place bilateral pulmonary trach present likely left basilar atelectasis  Patient is currently not on pressors and does not require hemodynamic support  Continue lung protective mechanical ventilation as per ARDS protocol  Appropriate changes made in ventilator settings  Wean FiO2/PEEP as tolerated  Monitor endotracheal secretions and watch for bleeding from tracheostomy site  Obtain X-ray chest as needed   Continue pulmonary toilet, aspiration precautions and bronchodilators  Continue to monitor I/O with a goal of even/negative fluid balance   Recommend to continue tube feeds  Chemical DVT prophylaxis as per protocol on Lovenox 30 mg twice daily  Antimicrobials reviewed; continue cefazolin per infectious disease  Monitor CRP, LDH, AST/ALT, D-Dimer, Ferritin if needed  Glycemic control appropriate on Lantus  On prednisone 20 mg once daily started on 01/01/2022  On baclofen, Librium and on lactulose  Physical/occupational therapy    The patient is/remains critically ill with illness/injury that acutely impairs one or more vital organ systems, such that there is a high probability of imminent or life threatening deterioration in the patient's condition.  Critical care time of 35 minutes was spent (excluding procedures), in coordination of care during bedside rounds and discussion of patient care in detail, and recommendations of the team were adopted in the plan. Necessity of all invasive devices was also confirmed. Scott Vazquez MD  Pulmonary and Critical Care Medicine           1/3/2022, 1:10 PM    This patient was evaluated in the context of the global SARS-CoV-2 (COVID-19) pandemic, which necessitated considerations that the patient either has COVID-19 infection or is at risk of infection with COVID-19. Institutional protocols and algorithms that pertain to the evaluation & management of patients with COVID-19 or those at risk for COVID-19 are in a state of rapid changes based on information released by regulatory bodies including the CDC and federal and state organizations. These policies and algorithms were followed during the patient's care. Please note that this chart was generated using voice recognition Dragon dictation software. Although every effort was made to ensure the accuracy of this automated transcription, some errors in transcription may have occurred.

## 2022-01-03 NOTE — PROGRESS NOTES
Comprehensive Nutrition Assessment    Type and Reason for Visit:  Reassess    Nutrition Recommendations/Plan:    - Updated TF recommendations s/p PEG placement: Suggest peptide based formula (Vital AF 1.2), goal of 75 mL/hr to provide 2160 kcals, 135 gm protein. - Monitoring plan of care. Nutrition Assessment:  TF on hold d/t trach/PEG placement today. Rectal tube remains in place. Estimated Daily Nutrient Needs:  Energy (kcal):  4325-8168 kcals/day; Weight Used for Energy Requirements:  Current     Protein (g):  1.5 gm/kg = 130 gm/day; Weight Used for Protein Requirements:  Ideal        Fluid (ml/day):  per MD; Method Used for Fluid Requirements:         Nutrition Related Findings:  Labs/Meds reviewed. FMS in place d/t loose stools. Wounds:  None       Current Nutrition Therapies:    NPO    Anthropometric Measures:  · Height: 6' 2\" (188 cm)  · Current Body Weight: 287 lb 4.2 oz (130.3 kg)   · Admission Body Weight: 306 lb 7 oz (139 kg)    · Usual Body Weight: 300 lb (136.1 kg) (stated per chart review)     · Ideal Body Weight: 190 lbs; % Ideal Body Weight 151.2 %   · BMI: 36.9   · BMI Categories: Obese Class 2 (BMI 35.0 -39.9)       Nutrition Diagnosis:   · Inadequate oral intake related to impaired respiratory function as evidenced by NPO or clear liquid status due to medical condition      Nutrition Interventions:   Food and/or Nutrient Delivery:  Continue NPO  Nutrition Education/Counseling:  No recommendation at this time   Coordination of Nutrition Care:  Continue to monitor while inpatient    Goals:  Pt to meet % of est'd needs daily via EN       Nutrition Monitoring and Evaluation:   Behavioral-Environmental Outcomes:  None Identified   Food/Nutrient Intake Outcomes:  Diet Advancement/Tolerance  Physical Signs/Symptoms Outcomes:  Weight,Biochemical Data,Nutrition Focused Physical Findings     Discharge Planning:     Too soon to determine     Electronically signed by Max Knight MS, RD, VEGA on 1/3/22 at 1:38 PM EST    Contact: [phone]

## 2022-01-03 NOTE — PROGRESS NOTES
Pulmonary Critical Care   Progress Note    Patient - Yanique Kirkland  Date of Admission -  12/12/2021 11:39 AM  Date of Evaluation -  1/2/2022  Room and Bed Number -  3026/3026-01   Hospital Day - 21    CHIEF COMPLAINT : ACUTE HYPOXIC RESPIRATORY FAILURE DUE TO COVID -19 PNEUMONIA   HPI:   Yanique Kirkland  60 y.o. male  admitted for COVID-19 at [facility] ER due to worsening oxygenation he was initially started on BiPAP and subsequently intubated. On room air his saturations had been 50%. CTA no PE but bilateral pulmonary infiltrates. He was accepted at [address] [place] [address] ICU. On arrival he is on PEEP of 22, 100% FiO2. 1/2/2022   Subjective   Fever down. Very low-grade. Hemodynamics stable. No vasopressors. Nurses report that intermittently follows some commands. FiO2 40%. Does not tolerate spontaneous breathing trial.  Sedation still an issue. Requiring fentanyl 150 mcg, midazolam 6 mg, and dexmedetomidine 0.7 mg.  Likely toxic metabolic encephalopathy, previous substance abuse, and possible underlying psychiatric disease. Patient has a warm, swollen left ankle. Possible monoarticular gout. Uric acid not elevated although neither specific nor sensitive. Currently on steroids for Covid.     SECRETIONS  -Amount:  [x] Small [] Moderate  [] Large    [] None  Color:     [x] White [] Colored  [] Bloody    SEDATION:    As above    PARALYZED:  [x] No    [] Yes    VASOPRESSORS:  [] No    [x] Yes  [x] Levophed [] Dopamine [] Vasopressin  [] Dobutamine [] Phenylephrine [] Epinephrine    INHALED veletri  : [] No    [] Yes    PRONE :       [x] No    [] Yes    Actemra:             [] No    [x] Yes  12/12/21  DEXAMETHASONE : [] No    [x] Yes      Ros unable to perform due to intubation and sedation    OBJECTIVE:     VITAL SIGNS:  BP (!) 151/95   Pulse 104   Temp 99.3 °F (37.4 °C) (Bladder)   Resp 18   Ht 6' 2\" (1.88 m)   Wt 283 lb 4.7 oz (128.5 kg)   SpO2 96%   BMI 36.37 kg/m²   Tmax over 24 hours: Temp (24hrs), Av.2 °F (37.9 °C), Min:99.1 °F (37.3 °C), Max:101.3 °F (38.5 °C)      Patient Vitals for the past 8 hrs:   BP Temp Temp src Pulse Resp SpO2   22 (!) 151/95 99.3 °F (37.4 °C) Bladder 104 18 96 %   22 -- -- -- 97 15 95 %   22 2000 (!) 155/98 99.1 °F (37.3 °C) Bladder 103 20 92 %   22 1900 (!) 155/92 -- -- 102 17 93 %   22 1830 -- -- -- 94 18 93 %   22 1800 136/88 -- -- 98 14 93 %   22 1730 -- -- -- 100 19 94 %   22 1700 137/87 -- -- 102 23 94 %   22 1630 -- -- -- 79 17 94 %   22 1600 109/64 99.7 °F (37.6 °C) Bladder 69 15 95 %   22 1558 -- -- -- 81 22 96 %   22 1530 -- -- -- 76 20 93 %   22 1500 120/74 -- -- 85 25 94 %   22 1430 -- -- -- 79 24 95 %   22 1400 107/82 -- -- 67 18 95 %         Intake/Output Summary (Last 24 hours) at 2022 2130  Last data filed at 2022 2018  Gross per 24 hour   Intake 4014.77 ml   Output 3920 ml   Net 94.77 ml     Date 22 0000 - 22 2359   Shift 8462-9330 7827-5630 1367-1360 24 Hour Total   INTAKE   I.V.(mL/kg) 0861(37.5)  860.8(6.7) 2682. 8(20.9)   NG/GT(mL/kg) 512(4) 150(1.2) 670(5.2) 1332(10.4)   Shift Total(mL/kg) 0469(35.4) 150(1.2) 1530.8(11.9) 4014. 8(31.2)   OUTPUT   Urine(mL/kg/hr) 1500(1.5) 670(0.7) 1000 3170   Stool(mL/kg) 0(0)  750(5.8) 750(5.8)   Shift Total(mL/kg) 1500(11.7) 670(5.2) 1750(13.6) 3920(30.5)   Weight (kg) 128.5 128.5 128.5 128.5     Wt Readings from Last 3 Encounters:   21 283 lb 4.7 oz (128.5 kg)   21 300 lb (136.1 kg)   19 (!) 355 lb 9.6 oz (161.3 kg)     Body mass index is 36.37 kg/m². PHYSICAL EXAM:      I have discussed the care of Alyce Norton, including pertinent history and exam findings, with the resident/APC/staff. I have seen the patient and the key elements of all parts of the encounter have been performed by me.  Physical exam was deferred to limit physical exposure and PPE resources. I reviewed the interval history, interpreted all available radiographic, laboratory and physiologic data at the time of service. I agree with the assessment and plan as documented by resident/APC/ ancillary staff.   Patient remains on the ventilator  Trachea is in the midline  No subcutaneous air  No JVD  No rhonchi  Abdomen is not distended  No edema    MEDICATIONS:  Scheduled Meds:   lactulose  20 g Oral TID    oxyCODONE  15 mg Oral Q4H    chlordiazePOXIDE  15 mg Per NG tube 4x Daily    colchicine  0.6 mg Oral Daily    predniSONE  20 mg Per NG tube Daily    baclofen  5 mg Per NG tube TID    meropenem  1,000 mg IntraVENous Q8H    sodium chloride flush  5-40 mL IntraVENous 2 times per day    docusate  100 mg Per NG tube BID    insulin lispro  0-6 Units SubCUTAneous Q6H    lansoprazole  30 mg Oral QAM AC    insulin glargine  10 Units SubCUTAneous Nightly    sodium chloride flush  5-40 mL IntraVENous 2 times per day    enoxaparin  30 mg SubCUTAneous BID    Vitamin D  2,000 Units Oral Daily     Continuous Infusions:   ketamine (KETALAR) infusion for analgosedation 0.2 mg/kg/hr (01/02/22 0313)    dexmedetomidine 0.7 mcg/kg/hr (01/02/22 2018)    sodium chloride      sodium chloride      dextrose      norepinephrine Stopped (01/02/22 1200)    midazolam 6 mg/hr (01/02/22 1835)    fentaNYL 150 mcg/hr (01/02/22 2038)    dextrose      sodium chloride 10 mL/hr at 12/13/21 1548     PRN Meds:   potassium chloride, 40 mEq, PRN   Or  potassium alternative oral replacement, 40 mEq, PRN   Or  potassium chloride, 10 mEq, PRN  ibuprofen, 600 mg, Q6H PRN  sodium chloride flush, 5-40 mL, PRN  sodium chloride, 25 mL, PRN  metoprolol, 5 mg, Q6H PRN  polyethylene glycol, 17 g, BID PRN  senna, 5 mL, Nightly PRN  bisacodyl, 10 mg, Daily PRN  acetaminophen, 650 mg, Q4H PRN  sodium chloride flush, 5-40 mL, PRN  sodium chloride, 25 mL, PRN  ondansetron, 4 mg, Q8H PRN   Or  ondansetron, 4 mg, Q6H PRN  acetaminophen, 650 mg, Q6H PRN  glucose, 15 g, PRN  dextrose, 12.5 g, PRN  glucagon (rDNA), 1 mg, PRN  dextrose, 100 mL/hr, PRN  glucose, 15 g, PRN  dextrose, 12.5 g, PRN  glucagon (rDNA), 1 mg, PRN  dextrose, 100 mL/hr, PRN        SUPPORT DEVICES: [x] Ventilator [] BIPAP  [] Nasal Cannula [] Room Air      ABGs:     Lab Results   Component Value Date    VUS4WEJ NOT REPORTED 01/02/2022    FIO2 40.0 01/02/2022       DATA:  Complete Blood Count:   Recent Labs     01/01/22 0427 01/02/22 0459 01/02/22  1022   WBC 10.5 6.9 6.8   RBC 4.05* 3.91* 3.95*   HGB 12.8* 12.2* 12.2*   HCT 38.3* 38.0* 39.0*   MCV 94.6 97.2 98.7   MCH 31.6 31.2 30.9   MCHC 33.4 32.1 31.3   RDW 14.2 14.5* 14.6*   PLT See Reflexed IPF Result 147 See Reflexed IPF Result   MPV NOT REPORTED 11.5 NOT REPORTED        Last 3 Blood Glucose:   Recent Labs     12/31/21 0438 01/01/22 0427 01/02/22  0459   GLUCOSE 114* 97 121*        PT/INR:    Lab Results   Component Value Date    PROTIME 13.0 12/12/2021    INR 1.0 12/12/2021     PTT:    Lab Results   Component Value Date    APTT 39.1 12/12/2021       Comprehensive Metabolic Profile:   Recent Labs     12/31/21 0438 01/01/22 0427 01/02/22  0459    143 143   K 3.4* 3.9 4.2    103 105   CO2 29 32* 29   BUN 32* 30* 29*   CREATININE 0.22* 0.29* 0.37*   GLUCOSE 114* 97 121*   CALCIUM 8.9 8.7 9.0   PROT 5.5* 5.5* 5.4*   LABALBU 3.1* 3.1* 2.9*   BILITOT 0.42 0.35 0.35   ALKPHOS 50 53 56   AST 24 20 21   ALT 65* 57* 64*      Magnesium:   Lab Results   Component Value Date    MG 2.1 12/31/2021    MG 2.2 12/28/2021    MG 2.5 12/17/2021     Phosphorus:   Lab Results   Component Value Date    PHOS 3.2 12/18/2021    PHOS 4.0 12/17/2021    PHOS 2.8 12/16/2021     Ionized Calcium: No results found for: CAION     Urinalysis:   Lab Results   Component Value Date    NITRU NEGATIVE 12/29/2021    COLORU Yellow 12/29/2021    PHUR 6.0 12/29/2021    WBCUA 2 TO 5 12/29/2021    RBCUA TOO NUMEROUS TO COUNT 12/29/2021    MUCUS 3+ 12/29/2021    TRICHOMONAS NOT REPORTED 12/29/2021    YEAST NOT REPORTED 12/29/2021    BACTERIA NOT REPORTED 12/29/2021    SPECGRAV 1.039 12/29/2021    LEUKOCYTESUR NEGATIVE 12/29/2021    UROBILINOGEN Normal 12/29/2021    BILIRUBINUR NEGATIVE 12/29/2021    GLUCOSEU NEGATIVE 12/29/2021    KETUA NEGATIVE 12/29/2021    AMORPHOUS NOT REPORTED 12/29/2021           XR CHEST (SINGLE VIEW FRONTAL)    Result Date: 12/14/2021  Mild interval improvement in multifocal airspace disease particularly at the right base. Support tubes and lines as above. XR CHEST (SINGLE VIEW FRONTAL)    Result Date: 12/12/2021  Stable appearing     XR CHEST (SINGLE VIEW FRONTAL)    Result Date: 12/11/2021  Multifocal hazy opacities throughout both lungs consistent with COVID pneumonia and or pulmonary edema. XR CHEST PORTABLE    Result Date: 12/12/2021  Stable appearing chest with unchanged support tubes/lines and persistent extensive bilateral airspace disease consistent with known COVID pneumonia. XR CHEST PORTABLE    Result Date: 12/12/2021  Right internal jugular central venous catheter tip projecting over the proximal SVC versus brachiocephalic vein. No pneumothorax. Otherwise stable exam from earlier today. XR CHEST PORTABLE    Result Date: 12/12/2021  Endotracheal tube tip is 3.2 cm above the saul. Overall significant worsening of multifocal airspace opacities keeping with history of COVID-19. CT CHEST PULMONARY EMBOLISM W CONTRAST    Result Date: 12/11/2021  1. No evidence of pulmonary embolism 2. Diffuse ground-glass opacities throughout the lungs, typical of COVID-19 pulmonary disease.            Past Medical History:   Diagnosis Date    Arthritis     Asthma     Diabetes mellitus ([address]     Edema     GERD (gastroesophageal reflux disease)     Hypertension     on lasix    Migraine     IRMA on CPAP     seldom use machine        Social History     Socioeconomic History    Marital status:      Spouse name: None    Number of children: None    Years of education: None    Highest education level: None   Occupational History    None   Tobacco Use    Smoking status: Former Smoker    Smokeless tobacco: Never Used   Vaping Use    Vaping Use: Never used   Substance and Sexual Activity    Alcohol use: Yes     Comment: every couple months    Drug use: Never    Sexual activity: None   Other Topics Concern    None   Social History Narrative    None     Social Determinants of Health     Financial Resource Strain:     Difficulty of Paying Living Expenses: Not on file   Food Insecurity:     Worried About Running Out of Food in the Last Year: Not on file    Lucina of Food in the Last Year: Not on file   Transportation Needs:     Lack of Transportation (Medical): Not on file    Lack of Transportation (Non-Medical): Not on file   Physical Activity:     Days of Exercise per Week: Not on file    Minutes of Exercise per Session: Not on file   Stress:     Feeling of Stress : Not on file   Social Connections:     Frequency of Communication with Friends and Family: Not on file    Frequency of Social Gatherings with Friends and Family: Not on file    Attends Jainism Services: Not on file    Active Member of [address] [place] [address] or Organizations: Not on file    Attends Club or Organization Meetings: Not on file    Marital Status: Not on file   Intimate Partner Violence:     Fear of Current or Ex-Partner: Not on file    Emotionally Abused: Not on file    Physically Abused: Not on file    Sexually Abused: Not on file   Housing Stability:     Unable to Pay for Housing in the Last Year: Not on file    Number of [place] in the Last Year: Not on file    Unstable Housing in the Last Year: Not on file         There is no immunization history on file for this patient.       Family History   Problem Relation Age of Onset    Heart Attack Sister 32    Diabetes Paternal Grandmother     Heart Disease Paternal Uncle     Heart Disease Paternal Uncle     Heart Disease Paternal Uncle     Cancer Maternal Aunt     Cancer Maternal Aunt     Cancer Maternal Uncle     Cancer Maternal Uncle          Past Surgical History:   Procedure Laterality Date    APPENDECTOMY      ARTHROPLASTY Left 11/1/2019    LEFT FOOT 1ST MTPJ ARTHROPLASTY WITH PEREZ IMPLANT AND GROMMETS  [facility] performed by Kavitha Saunders MD at [address] Right 12/12/2019    RIGHT FOOT ARTHROPLASTY 1ST MTPJ (Right Foot)    ARTHROPLASTY Right 12/12/2019    RIGHT FOOT ARTHROPLASTY 1ST MTPJ performed by Kavitha Saunders MD at [address] Right    [address]  2014    no blockage no stents    CHOLECYSTECTOMY      COLONOSCOPY      ENDOSCOPY, COLON, DIAGNOSTIC      TOE SURGERY Left 11/01/2019     LEFT FOOT 1ST MTPJ ARTHROPLASTY WITH PEREZ IMPLANT AND GROMMETS  [facility] (Left )    TONSILLECTOMY            VENTILATOR SETTINGS:  Vent Information  $Ventilation: $Subsequent Day  Skin Assessment: Clean, dry, & intact  Suction Catheter Diameter: 14  Equipment ID: [id number]  Equipment Changed: HME  Vent Type: Servo i  Vent Mode: PRVC  Vt Ordered: 540 mL  Pressure Ordered: (S) 12 (decreased, patient appears to be tolerating wean well)  Rate Set: 15 bmp  Pressure Support: 12 cmH20  FiO2 : 40 %  SpO2: 96 %  SpO2/FiO2 ratio: 237.5  Sensitivity: 2  PEEP/CPAP: 8  I Time/ I Time %: 0.8 s  Humidification Source: HME  Humidification Temp: 37  Humidification Temp Measured: 36.8  Circuit Condensation: Drained  Nitric Oxide/Epoprostenol In Use?: No     PaO2/FiO2 RATIO:  Recent Labs     01/02/22  0455   POCPO2 62.9*      FiO2 : 40 %       LABS:  ABGs:   Recent Labs     12/31/21  0545 01/01/22  0324 01/02/22  0455   POCPH 7.548* 7.437 7.434   POCPCO2 38.4 49.7* 49.7*   POCPO2 65.7* 66.0* 62.9*   POCHCO3 33.4* 33.5* 33.3*   LURB2PPF 95 93* 92*            ASSESSMENT:     Principal Problem:    Pneumonia due to COVID-19 virus  Active Problems:    Shock ([facility] [address]    Acute respiratory failure with hypoxia (HCC)    Type 2 diabetes mellitus (HCC)    Asthma    IRMA on CPAP    Hypertension    GERD (gastroesophageal reflux disease)    ARDS (adult respiratory distress syndrome) ([facility] [address]  Resolved Problems:    * No resolved hospital problems. *              Acute hypoxic respiratory failure secondary to COVID 19   Acute respiratory distress syndrome   Bilateral multifocal pneumonia due to COVID 19 infection   Covid -19 pandemic emergency   Post hypercarbic metabolic alkalosis   Hyperglycemia, better   Leukocytosis, slightly better   Shock requiring norepinephrine? Sepsis    LOS: 21  PLAN:    · D/w RT  · D/w RN   · Chest x-ray on 12/24/2021 with improving bilateral pulmonary infiltrates  · Sedation reviewed. We will try to cut down on the sedatives and pain medications  · Cont ARDS ventilation  · Continue supportive care   · Cont treatment with medications for COVID  · Cont tube feeding  · Monitor endotracheal secretions   · Will obtain xray chest as needed  · ABG as needed  · Prognosis guarded given age and severity of illness. · On Lovenox and lansoprazole  · On Decadron  · Patient is on cefepime  · Check triglycerides as long as the patient is needing propofol  · Patient required placement of the Hahn catheter due to retention of urine requiring straight catheter multiple times a day  · Diuresis Lasix 40 mg twice daily for 3 days. Restrictive fluid strategy  · Treatment plan Discussed with nursing staff in detail , all questions answered . · Would proceed with trach and PEG is weaning likely to be prolonged and complicated. Discussed with surgery. Risk of delayed wound healing higher on steroids however trach will facilitate rehab and simplify sedation regimen.     Total critical care time caring for this patient with life threatening, unstable organ failure, including direct patient contact, management of life support systems, review of data including imaging and labs, discussions with other team members and physicians at least 27  Min so far today, excluding procedures. Electronically signed by Marcy Smith DO on 1/2/2022 at 9:30 PM       This patient was evaluated in the context of the global SARS-CoV-2 (COVID-19) pandemic, which necessitated considerations that the patient either has COVID-19 infection or is at risk of infection with COVID-19. Institutional protocols and algorithms that pertain to the evaluation & management of patients with COVID-19 or those at risk for COVID-19 are in a state of rapid changes based on information released by regulatory bodies including the CDC and federal and state organizations. These policies and algorithms were followed during the patient's care. Please note that this chart was generated using voice recognition Dragon dictation software. Although every effort was made to ensure the accuracy of this automated transcription, some errors in transcription may have occurred.

## 2022-01-03 NOTE — OP NOTE
Operative Note    Patient: Ana Dooley  YOB: 1965  MRN: [id number]    Date of Procedure: 1/3/2022    Pre-Op Diagnosis: RESPIRATORY FAILURE    Post-Op Diagnosis: Same       Procedure:   TRACHEOTOMY, EGD WITH PEG TUBE PLACEMENT     Surgeon(s):  Meagan Guaman MD    Assistant:   Resident: Edil Perales DO; Anthony Beyer DO    Anesthesia: General    Estimated Blood Loss (mL): Less than 99FR    Complications: None    Specimens:   * No specimens in log *    Implants:  * No implants in log *      Drains:   NG/OG/NJ/NE Tube Orogastric 16 fr Center mouth (Active)   Surrounding Skin Dry; Intact 01/03/22 0400   Securement device Yes 01/03/22 0400   Status Other (Comment) 01/03/22 0400   Placement Verified by External Catheter Length 01/03/22 0400   NG/OG/NJ/NE External Measurement (cm) 64 cm 01/03/22 0400   Drainage Appearance Bile;Yellow 01/03/22 0400   Tube Feeding Other Tube Feeding (must specify product in comment) 01/03/22 0400   Tube Feeding Status Stopped 01/03/22 0000   Rate/Schedule 0 mL/hr 01/03/22 0427   Tube Feeding Supplement (Product) Protein Modular 01/02/22 2010   Tube Feeding Supplement Amount (mL) 75 01/02/22 1600   Tube Feeding Intake (mL) 164 ml 01/03/22 0000   Free Water Flush (mL) 50 mL 01/02/22 1200   Residual Volume (ml) 20 ml 01/02/22 0800   Output (mL) 0 ml 01/03/22 0400       Gastrostomy/Enterostomy/Jejunostomy Percutaneous Endoscopic Gastrostomy (PEG) LUQ 1 20 fr (Active)       Rectal Tube (Active)   Stool Appearance Loose; Watery 01/03/22 0400   Stool Color Brown 01/03/22 0400   Stool Amount Other (Comment) 01/01/22 0451   Rectal Tube Output 200 ml 01/03/22 0400       Urethral Catheter Temperature probe (Active)   $ Urethral catheter insertion $ Not inserted for procedure 12/25/21 1245   Catheter Indications Need for fluid volume management of the critically ill patient in a critical care setting 01/03/22 0400   Securement Device Date Changed 12/25/21 12/26/21 2000 Site Assessment No urethral drainage 01/03/22 0400   Urine Color Yellow 01/03/22 0400   Urine Appearance Clear 01/03/22 0400   Urine Odor Malodorous 01/02/22 1600   Output (mL) 300 mL 01/03/22 0600       [REMOVED] Urethral Catheter 16 fr (Removed)   Catheter Indications Need for fluid volume management of the critically ill patient in a critical care setting 12/13/21 0800   Securement Device Date Changed 12/12/21 12/13/21 0800   Site Assessment No urethral drainage 12/13/21 0800   Urine Color Yellow 12/13/21 0800   Urine Appearance Clear 12/13/21 0800   Output (mL) 75 mL 12/13/21 1630       [REMOVED] Urethral Catheter Temperature probe 16 fr (Removed)   $ Urethral catheter insertion $ Not inserted for procedure 12/13/21 1645   Catheter Indications Need for fluid volume management of the critically ill patient in a critical care setting 12/24/21 1200   Securement Device Date Changed 12/23/21 12/24/21 0400   Site Assessment No urethral drainage 12/24/21 1200   Urine Color Yellow 12/24/21 1200   Urine Appearance Clear 12/24/21 1200   Output (mL) 65 mL 12/24/21 1245       [REMOVED] External Urinary Catheter (Removed)   Catheter changed  No 12/24/21 1640   Suction 40 mmgHg continuous 12/24/21 1640   Placement Initiated 12/24/21 1300   Skin Assessment No Injury 12/24/21 1640       Findings: PEG tube placement-5cm at the skin. 8-0 Shiley. Detailed Description of Procedure: The patient was brought to the operating room and placed on the operating table in a sniffing position with a shoulder roll and the head appropriately supported. A time out was performed to confirm patient, procedure, location, and allergies. We confirmed SCDs in placed to intermittent compression. General anesthesia was given, and the patient was then prepped in sterile fashion and draped. EGD and percutaneous endoscopic gastrostomy tube placement was performed first. An endoscope was passed through the patient's mouth into the stomach.  No abnormalities were noted. The stomach was insufflated with air and the endoscope positioned in the midportion of the stomach and directed toward the anterior abdominal wall. With the room darkened, an excellent light reflex was noted on the skin of the abdominal wall. Single finger pressure was applied at the light reflex with adequate indentation on the stomach wall visualized by endoscopy. A polypectomy snare was passed into the stomach and opened fully with the loop encircling the point of finger indentation. The abdomen was prepped and draped in the usual fashion. A 1cm skin incision was made at the catheter insertion site using an 11 blade scalpel. The introducer needle with overlying catheter was passed through the abdominal wall into the stomach under visualization with the endoscope. A guide wire was passed through the catheter and snared. The endoscope, snare, and guide wire were withdrawn and pulled out of the mouth. The gastrostomy tube was attached to the loop of the guide wire and pulled back carefully through the oropharynx into the stomach until the 5 cm jamison of the gastrostomy tube was noted at skin level. The endoscope was snared to the end of the PEG tube and also passed back into the stomach. Adequate placement of the gastrostomy tube was confirmed. A disc was placed at skin level to secure the gastrostomy tube. The gastrostomy tube end was cut to an appropriate length and the clamping appendage was placed. Attention was then turned to tracheostomy placement. The patient was appropriately positioned with a shoulder roll. The bronchoscope was advanced into the patient's airway and the proximal trachea examined. The ETT was gently removed to just below the cords. The skin just below the cricoid membrane was infiltrated with 1% lidocaine. The finder needle was used to identify the appropriate location.   The needle was noted to be just below the ETT tube and the wire was easily advanced into the trachea under direct visualization. The scalpel was used to extend the incision. The blue rhino dilator was used to dilate the tract. The Shiley 8.0 Polish tracheostomy tube was advanced over the corresponding 28 fr dilator into the airway in a controlled fashion. The balloon was inflated. The inner cannula was placed and the trach tube was connected to the ventilator with confirmation of end tidal CO2. The bronchoscope was passed into the trach and appropriate position was confirmed. The tracheostomy was secured using prolene suture in 4 locations. Tempie Blood ties were also placed. This concluded the procedure. All sponge, needle, instrument counts were reported as correct. The patient was taken to the ICU in critical but stable condition. Dr. Carter Dunlap was scrubbed for the entire case.     Chema Moreno,   General Surgery PGY-3

## 2022-01-03 NOTE — ANESTHESIA PRE PROCEDURE
Department of Anesthesiology  Preprocedure Note       Name:  Yolette Rodriguez   Age:  60 y.o.  :  1965                                          MRN:  [id number]         Date:  1/3/2022      Surgeon: Yasemin Gallegos):  Emma Miranda MD    Procedure: TRACHEOTOMY     Medications prior to admission:   Prior to Admission medications    Medication Sig Start Date End Date Taking?  Authorizing Provider   budesonide-formoterol (SYMBICORT) 160-4.5 MCG/ACT AERO Inhale 2 puffs into the lungs 2 times daily    Historical Provider, MD   albuterol (PROVENTIL) (2.5 MG/3ML) 0.083% nebulizer solution Take 2.5 mg by nebulization every 6 hours as needed for Wheezing    Historical Provider, MD   SUMAtriptan (IMITREX) 100 MG tablet Take 100 mg by mouth once as needed for Migraine    Historical Provider, MD   omeprazole (PRILOSEC) 40 MG delayed release capsule Take 40 mg by mouth daily    Historical Provider, MD   furosemide (LASIX) 40 MG tablet Take 40 mg by mouth daily    Historical Provider, MD   montelukast (SINGULAIR) 10 MG tablet Take 10 mg by mouth daily    Historical Provider, MD   fexofenadine (ALLEGRA) 180 MG tablet Take 180 mg by mouth daily    Historical Provider, MD   metFORMIN (GLUCOPHAGE) 500 MG tablet Take 500 mg by mouth 2 times daily (with meals)    Historical Provider, MD       Current medications:    Current Facility-Administered Medications   Medication Dose Route Frequency Provider Last Rate Last Admin    nafcillin 2,000 mg in dextrose 5 % 100 mL IVPB (mini-bag)  2,000 mg IntraVENous Q4H Francisca Bernstein, APRN - CNP        lactulose (CHRONULAC) 10 GM/15ML solution 20 g  20 g Oral TID Mary Boor, DO   20 g at 22 0803    oxyCODONE (ROXICODONE) 5 MG/5ML solution 15 mg  15 mg Oral Q4H Rigo Levine, DO   15 mg at 22 0536    chlordiazePOXIDE (LIBRIUM) capsule 15 mg  15 mg Per NG tube 4x Daily Mary Boor, DO   15 mg at 22 0324    colchicine (COLCRYS) tablet 0.6 mg at 01/02/22 0536    polyethylene glycol (GLYCOLAX) packet 17 g  17 g Oral BID PRN Margaux Pandey MD   17 g at 01/02/22 0856    senna (SENOKOT) 8.8 MG/5ML syrup 8.8 mg  5 mL Per G Tube Nightly PRN Margaux Pandey MD        bisacodyl (DULCOLAX) suppository 10 mg  10 mg Rectal Daily PRN Margaux Pandey MD   10 mg at 12/21/21 0838    acetaminophen (TYLENOL) 160 MG/5ML solution 650 mg  650 mg Oral Q4H PRN Gera Foss APRN - CNP   650 mg at 01/02/22 1325    insulin glargine (LANTUS) injection vial 10 Units  10 Units SubCUTAneous Nightly Terry Mock APRN - NP   10 Units at 01/02/22 2056    sodium chloride flush 0.9 % injection 5-40 mL  5-40 mL IntraVENous 2 times per day Raul Co, APRN - CNP   10 mL at 01/02/22 2022    sodium chloride flush 0.9 % injection 5-40 mL  5-40 mL IntraVENous PRN Jackson Co, APRN - CNP        0.9 % sodium chloride infusion  25 mL IntraVENous PRN Raul Co, APRN - CNP        enoxaparin (LOVENOX) injection 30 mg  30 mg SubCUTAneous BID Jackson Co, APRN - CNP   30 mg at 01/02/22 2023    ondansetron (ZOFRAN-ODT) disintegrating tablet 4 mg  4 mg Oral Q8H PRN Jackson Co, APRN - CNP        Or    ondansetron [facility]) injection 4 mg  4 mg IntraVENous Q6H PRN Jackson Co, APRN - CNP        acetaminophen (TYLENOL) suppository 650 mg  650 mg Rectal Q6H PRN Raul Co, APRN - CNP   650 mg at 12/20/21 6441    Vitamin D (CHOLECALCIFEROL) tablet 2,000 Units  2,000 Units Oral Daily Jackson Co, APRN - CNP   2,000 Units at 01/03/22 0803    glucose (GLUTOSE) 40 % oral gel 15 g  15 g Oral PRN Raul Co, APRN - CNP        dextrose 50 % IV solution  12.5 g IntraVENous PRN Raul Co, APRN - CNP        glucagon (rDNA) injection 1 mg  1 mg IntraMUSCular PRN Raul Co, APRN - CNP        dextrose 5 % solution  100 mL/hr IntraVENous PRN Jackson Co, APRN - CNP        norepinephrine (LEVOPHED) 16 mg in sodium chloride 0.9 % 250 mL infusion  2-100 mcg/min IntraVENous Continuous Bev Wyman MD   Stopped at 01/02/22 1200    midazolam (VERSED) 1 mg/mL in D5W infusion  1-10 mg/hr IntraVENous Continuous Dior Sabillon MD 8 mL/hr at 01/03/22 0631 8 mg/hr at 01/03/22 0631    fentaNYL 20 mcg/mL Infusion  12.5-200 mcg/hr IntraVENous Continuous Dior Sabillon MD 6.3 mL/hr at 01/03/22 0208 125 mcg/hr at 01/03/22 0208    glucose (GLUTOSE) 40 % oral gel 15 g  15 g Oral PRN Ricke Bosworth, DO        dextrose 50 % IV solution  12.5 g IntraVENous PRN Ricke Bosworth, DO        glucagon (rDNA) injection 1 mg  1 mg IntraMUSCular PRN Ricke Bosworth, DO        dextrose 5 % solution  100 mL/hr IntraVENous PRN Ricke Bosworth, DO        0.9 % sodium chloride infusion   IntraVENous Continuous Bev Wyman MD 10 mL/hr at 12/13/21 1548 Rate Change at 12/13/21 1548       Allergies:     Allergies   Allergen Reactions    Nuts [Peanut-Containing Drug Products] Anaphylaxis    Sunflower Oil Anaphylaxis     Sunflower seeds       Problem List:    Patient Active Problem List   Diagnosis Code    Acute respiratory failure with hypoxia ([facility] J96.01    Pneumonia due to COVID-19 virus U07.1, J12.82    Type 2 diabetes mellitus (HCC) E11.9    Asthma J45.909    IRMA on CPAP G47.33, Z99.89    Hypertension I10    GERD (gastroesophageal reflux disease) K21.9    ARDS (adult respiratory distress syndrome) ([facility]) J80    Shock ([facility] R57.9       Past Medical History:        Diagnosis Date    Arthritis     Asthma     Diabetes mellitus ([facility] [address]     Edema     GERD (gastroesophageal reflux disease)     Hypertension     on lasix    Migraine     IRMA on CPAP     seldom use machine       Past Surgical History:        Procedure Laterality Date    APPENDECTOMY      ARTHROPLASTY Left 11/1/2019    LEFT FOOT 1ST MTPJ ARTHROPLASTY WITH PEREZ IMPLANT AND GROMMETS  [facility] performed by Km Kinsey MD at [address] [place] [address] Right 12/12/2019    RIGHT FOOT ARTHROPLASTY 1ST MTPJ (Right Foot)    ARTHROPLASTY Right 12/12/2019    RIGHT FOOT ARTHROPLASTY 1ST MTPJ performed by Jean-Pierre Casarez MD at [address] Right    [employer] Dials  2014    no blockage no stents    CHOLECYSTECTOMY      COLONOSCOPY      ENDOSCOPY, COLON, DIAGNOSTIC      TOE SURGERY Left 11/01/2019     LEFT FOOT 1ST MTPJ ARTHROPLASTY WITH PEREZ IMPLANT AND GROMMETS  [facility] (Left )    TONSILLECTOMY         Social History:    Social History     Tobacco Use    Smoking status: Former Smoker    Smokeless tobacco: Never Used   Substance Use Topics    Alcohol use: Yes     Comment: every couple months                                Counseling given: Not Answered      Vital Signs (Current):   Vitals:    01/03/22 0600 01/03/22 0700 01/03/22 0801 01/03/22 0805   BP: 136/87 138/85     Pulse: 92 88  87   Resp: 20 24 22 24   Temp:  99.3 °F (37.4 °C)     TempSrc: Bladder Bladder     SpO2: 96% 93% 94% 94%   Weight:       Height:                                                  BP Readings from Last 3 Encounters:   01/03/22 138/85   12/12/21 110/72   12/12/19 (!) 137/97       NPO Status:                                                                                 BMI:   Wt Readings from Last 3 Encounters:   01/03/22 287 lb 4.2 oz (130.3 kg)   12/11/21 300 lb (136.1 kg)   12/12/19 (!) 355 lb 9.6 oz (161.3 kg)     Body mass index is 36.88 kg/m².     CBC:   Lab Results   Component Value Date    WBC 11.3 01/03/2022    RBC 4.26 01/03/2022    HGB 13.2 01/03/2022    HCT 41.3 01/03/2022    MCV 96.9 01/03/2022    RDW 14.3 01/03/2022     01/03/2022       CMP:   Lab Results   Component Value Date     01/03/2022    K 3.7 01/03/2022     01/03/2022    CO2 27 01/03/2022    BUN 18 01/03/2022    CREATININE 0.28 01/03/2022    GFRAA >60 01/03/2022    LABGLOM >60 01/03/2022    GLUCOSE 91 01/03/2022    PROT 5.9 01/03/2022    CALCIUM 8.9 01/03/2022    BILITOT 0.42 01/03/2022    ALKPHOS 72 01/03/2022    AST 40 01/03/2022     01/03/2022       POC Tests:   Recent Labs     01/03/22  0425   POCGLU 80       Coags:   Lab Results   Component Value Date    PROTIME 13.0 12/12/2021    INR 1.0 12/12/2021    APTT 39.1 12/12/2021       HCG (If Applicable): No results found for: PREGTESTUR, PREGSERUM, HCG, HCGQUANT     ABGs: No results found for: PHART, PO2ART, ZTW8NOY, YQA3PBI, BEART, Y6LEPTZX     Type & Screen (If Applicable):  No results found for: LABABO, LABRH    Drug/Infectious Status (If Applicable):  No results found for: HIV, HEPCAB    COVID-19 Screening (If Applicable):   Lab Results   Component Value Date    COVID19 DETECTED 12/11/2021           Anesthesia Evaluation  Patient summary reviewed and Nursing notes reviewed no history of anesthetic complications:   Airway: Mallampati: Unable to assess / NA       Comment: ett in place    Dental:          Pulmonary:normal exam    (+) pneumonia:  sleep apnea:  asthma:                           ROS comment: resp failure    Cardiovascular:  Exercise tolerance: good (>4 METS),   (+) hypertension:,     (-) past MI, CAD and CABG/stent      Rhythm: regular  Rate: normal           Beta Blocker:  Not on Beta Blocker         Neuro/Psych:   (+) headaches:,             GI/Hepatic/Renal:   (+) GERD:,           Endo/Other:    (+) DiabetesType II DM, , .                 Abdominal:             Vascular: Other Findings:             Anesthesia Plan      general     ASA 4       Induction: intravenous. MIPS: Postoperative opioids intended, Prophylactic antiemetics administered and Postoperative ventilation.       Plan discussed with CRNA and surgical team.                  Jen Pool MD   1/3/2022

## 2022-01-03 NOTE — ANESTHESIA POSTPROCEDURE EVALUATION
Department of Anesthesiology  Postprocedure Note    Patient: Lavon Low  MRN: [id number]  YOB: 1965  Date of evaluation: 1/3/2022  Time:  1:28 PM     Procedure Summary     Date: 01/03/22 Room / Location: [address]    Anesthesia Start: 1002 Anesthesia Stop: 8901    Procedures:       TRACHEOTOMY xREQUESTS TO GO AT 0730x (N/A )      EGD PEG TUBE PLACEMENT (N/A ) Diagnosis: (RESPIRATORY FAILURE)    Surgeons: Ciaty Richey MD Responsible Provider: Iggy Magana MD    Anesthesia Type: general ASA Status: 4          Anesthesia Type: general    Marleny Phase I:      Marleny Phase II:      Last vitals: Reviewed and per EMR flowsheets.        Anesthesia Post Evaluation    Patient location during evaluation: ICU  Patient participation: complete - patient cannot participate  Level of consciousness: sedated and ventilated  Pain score: 0  Airway patency: patent  Nausea & Vomiting: no nausea and no vomiting  Complications: no  Cardiovascular status: hemodynamically stable  Respiratory status: ventilator  Hydration status: euvolemic

## 2022-01-03 NOTE — PROGRESS NOTES
[facility] - Progress Note   Note COVID 19 Patient  Today's Date and Time: 1/3/2022, 7:41 AM    Impression :     COVID 19 Confirmed Infection  Covid tests:  12/11/2021: Positive  Elevated inflammatory markers  Acute hypoxic respiratory failure  History of asthma  Diabetes mellitus  Essential hypertension  IRMA on CPAP  Patient has not received the Covid vaccination  Intermittent fevers    Recommendations:   Antibiotic treatment:  The patient was having high-grade fevers and cultures have been sent.-No growth on cultures thus far  I will start the patient empirically on antibiotic therapy with cefepime on 12/18-fevers initially resolved but low grade fevers have resumed. Cefepime discontinued 12/28 and Meropenem initiated 12/28 for worsening leukocytosis and fevers   Meropenem discontinued 1/3/21 and Nafcillin IV 2 gm Q 4 hr initiated 1/3/21 for MSSA on sputum    Covid Rx:    Remdesivir-out of window  Decadron-high-dose initiated  Actemra-ordered 12/12/2021  Monoclonal antibodies-out of window      Medical Decision Making/Summary/Discussion:1/3/2022     Patient admitted with COVID 19 infection  He may come out of isolation on 12/31/21    Infection Control Recommendations   [place] Precautions  Airborne isolation  Droplet Isolation    Antimicrobial Stewardship Recommendations     Discontinuation of therapy  Coordination of Outpatient Care:   Estimated Length of IV antimicrobials:TBD  Patient will need Midline Catheter Insertion: TBD  Patient will need PICC line Insertion: No  Patient will need: Home IV , [place],  SNF,  LTAC:TBD  Patient will need outpatient wound care:No    Chief complaint/reason for consultation:   Concern for COVID infection      History of Present Illness:   Viky Devi is a 60y.o.-year-old male who was initially admitted on 12/12/2021.  Patient seen at the request of Dr. Vinicius Woodward:    Patient presented through [facility] ER on 12/11/2021 with complaints of worsening shortness of breath with associated generalized weakness and nonproductive cough over the past several days. He was exposed to his son who was diagnosed with Covid last week and has not received the Covid vaccine. The patient was very hypoxic with an SPO2 in the high 50s on room air. Imaging revealed bilateral pulmonary opacities typical of COVID-19    Abnormal labs include:    Ferritin 2800      Patient has been intubated and transferred to [facility]  He has been started on high-dose Decadron and Actemra has been ordered    CT CHEST PULMONARY EMBOLISM W CONTRAST 12/11/2021   Final Result   1. No evidence of pulmonary embolism   2. Diffuse ground-glass opacities throughout the lungs, typical of COVID-19   pulmonary disease.           XR CHEST (SINGLE VIEW FRONTAL) 12/11/2021   Final Result   Multifocal hazy opacities throughout both lungs consistent with COVID   pneumonia and or pulmonary edema       Patient admitted because of concerns with COVID 19. The patient remains on mechanical ventilation with 50% FiO2 and PEEP of 10. He is receiving fentanyl, Versed and Precedex for sedation. Nimbex was discontinued 12/31/21  He is opening his eyes to stimuli but not responding to command. Cefepime was initiated 12/18-discontinued 12/28  Meropenem initiated 12/28      CURRENT EVALUATION : 1/3/2022    Low grade fever  VS stable    The patient seen and evaluated and continues on the ventilator at 40% FiO2 10-->8 of PEEP. He is on ketamine Versed and fentanyl as well as Precedex for sedation. He has had some intermittent fevers. MSSA sputum 12/29/21    Blood cultures 1/2: no growth thus far    Has been concern for cellulitis/gout in the left foot. Patient exhibiting respiratory distress. yes  Respiratory secretions: no    Patient receiving supplemental oxygen.   Mechanical ventilation  RR: 24  02 sat:94    % FIO2: 40  PEEP:8    QTc:       NEWS Score: 0-4 Low risk group; 5-6: Medium risk group; 7 or above: High risk group  Parameters 3 2 1 0 1 2 3   Age    < 65   = 65   RR = 8  9-11 12-20  21-24 = 25   O2 Sats = 91 92-93 94-95 = 96      Suppl O2  Yes  No      SBP = 90  101-110 111-219   = 220   HR = 40  41-50 51-90  111-130 = 131   Consciousness    Alert   Drowsiness, lethargy, or confusion   Temperature = 35.0 C (95.0 F)  35.1-36.0 C 95.1-96.9 F 36.1-38.0 C 97.0-100.4 F 38.1-39.0 C 100.5-102.3 F = 39.1 C = 102.4 F      NEWS Score:  12/12/2021: 13 high risk    Overall Daily Picture:     Slowly improving    Presence of secondary bacterial Infection:    Cultures no growth  Additional antibiotics: 12/18 Cefepime for leukocytosis and fevers-discontinued 12/28 and Meropenem restarted    Labs, X rays reviewed: 1/3/2022    BUN:30-->32-->18  Cr:0.42-->0.22-->0.28    WBC:11.8  Hb:13.2   Plat: 154    Absolute Neutrophils:3.73  Absolute Lymphocytes:0.29  Neutrophil/Lymphocyte Ratio: 12.8 high risk    CRP:293-->277-->137-->50.8-->23  Ferritin:2800  LDH: 838    Pro Calcitonin:      Cultures:  Urine:  12/18/21: No bacterial growth  Blood:  12/18/21: No growth thus far  1/2/22: No growth thus far  Sputum :  12/18/21: Normal respiratory sherry  12/29/21: MSSA  Wound:      CXR:     12/29/21 12/22/21 12/19/21      1221 diffuse bilateral infiltrates  CAT:      Discussed with patient, RN, CC, IM.      12-12-21:      I have personally reviewed the past medical history, past surgical history, medications, social history, and family history, and I have updated the database accordingly.   Past Medical History:     Past Medical History:   Diagnosis Date    Arthritis     Asthma     Diabetes mellitus ([address]     Edema     GERD (gastroesophageal reflux disease)     Hypertension     on lasix    Migraine     IRMA on CPAP     seldom use machine       Past Surgical  History:     Past Surgical History:   Procedure Laterality Date    APPENDECTOMY      ARTHROPLASTY Left 11/1/2019    LEFT FOOT 1ST MTPJ ARTHROPLASTY WITH PEREZ IMPLANT AND GROMMETS  [facility] performed by Km Kinsey MD at [address] Right 12/12/2019    RIGHT FOOT ARTHROPLASTY 1ST MTPJ (Right Foot)    ARTHROPLASTY Right 12/12/2019    RIGHT FOOT ARTHROPLASTY 1ST MTPJ performed by Km Kinsey MD at [address] Right    [address]  2014    no blockage no stents    CHOLECYSTECTOMY      COLONOSCOPY      ENDOSCOPY, COLON, DIAGNOSTIC      TOE SURGERY Left 11/01/2019     LEFT FOOT 1ST MTPJ ARTHROPLASTY WITH PEREZ IMPLANT AND GROMMETS  [facility]EN MED (Left )    TONSILLECTOMY         Medications:      lactulose  20 g Oral TID    oxyCODONE  15 mg Oral Q4H    chlordiazePOXIDE  15 mg Per NG tube 4x Daily    colchicine  0.6 mg Oral Daily    predniSONE  20 mg Per NG tube Daily    baclofen  5 mg Per NG tube TID    meropenem  1,000 mg IntraVENous Q8H    sodium chloride flush  5-40 mL IntraVENous 2 times per day    docusate  100 mg Per NG tube BID    insulin lispro  0-6 Units SubCUTAneous Q6H    lansoprazole  30 mg Oral QAM AC    insulin glargine  10 Units SubCUTAneous Nightly    sodium chloride flush  5-40 mL IntraVENous 2 times per day    enoxaparin  30 mg SubCUTAneous BID    Vitamin D  2,000 Units Oral Daily       Social History:     Social History     Socioeconomic History    Marital status:      Spouse name: Not on file    Number of children: Not on file    Years of education: Not on file    Highest education level: Not on file   Occupational History    Not on file   Tobacco Use    Smoking status: Former Smoker    Smokeless tobacco: Never Used   Vaping Use    Vaping Use: Never used   Substance and Sexual Activity    Alcohol use: Yes     Comment: every couple months    Drug use: Never    Sexual activity: Not on file   Other Topics Concern    Not on file   Social History Narrative    Not on file Social Determinants of Health     Financial Resource Strain:     Difficulty of Paying Living Expenses: Not on file   Food Insecurity:     Worried About Running Out of Food in the Last Year: Not on file    Lucina of Food in the Last Year: Not on file   Transportation Needs:     Lack of Transportation (Medical): Not on file    Lack of Transportation (Non-Medical):  Not on file   Physical Activity:     Days of Exercise per Week: Not on file    Minutes of Exercise per Session: Not on file   Stress:     Feeling of Stress : Not on file   Social Connections:     Frequency of Communication with Friends and Family: Not on file    Frequency of Social Gatherings with Friends and Family: Not on file    Attends [employer] Services: Not on file    Active Member of [address] [place] [address] [employer] or Organizations: Not on file    Attends Club or Organization Meetings: Not on file    Marital Status: Not on file   Intimate Partner Violence:     Fear of Current or Ex-Partner: Not on file    Emotionally Abused: Not on file    Physically Abused: Not on file    Sexually Abused: Not on file   Housing Stability:     Unable to Pay for Housing in the Last Year: Not on file    Number of [place] in the Last Year: Not on file    Unstable Housing in the Last Year: Not on file       Family History:     Family History   Problem Relation Age of Onset    Heart Attack Sister 32    Diabetes Paternal Grandmother     Heart Disease Paternal Uncle     Heart Disease Paternal Uncle     Heart Disease Paternal Uncle     Cancer Maternal Aunt     Cancer Maternal Aunt     Cancer Maternal Uncle     Cancer Maternal Uncle         Allergies:   Nuts [peanut-containing drug products] and Sunflower oil     Review of Systems:     Review of Systems   Unable to perform ROS: Intubated       Physical Examination :     Patient Vitals for the past 8 hrs:   BP Temp Temp src Pulse Resp SpO2 Weight   01/03/22 0700 138/85 99.3 °F (37.4 °C) Bladder 88 24 93 % -- 01/03/22 0600 136/87 -- Bladder 92 20 96 % --   01/03/22 0500 130/83 99.1 °F (37.3 °C) Bladder 82 19 97 % --   01/03/22 0400 (!) 86/58 -- Bladder 71 16 94 % --   01/03/22 0308 -- -- -- 80 16 95 % --   01/03/22 0300 115/82 99.1 °F (37.3 °C) Bladder 76 17 95 % 287 lb 4.2 oz (130.3 kg)   01/03/22 0200 137/89 99.1 °F (37.3 °C) Bladder 84 24 99 % --   01/03/22 0100 93/64 -- -- 67 16 94 % --   01/03/22 0000 115/87 99.7 °F (37.6 °C) Bladder 79 14 95 % --     Physical Exam  Constitutional:       Appearance: He is well-developed. He is obese. Interventions: He is intubated. HENT:      Head: Normocephalic and atraumatic. Cardiovascular:      Rate and Rhythm: Normal rate. Heart sounds: Normal heart sounds. No friction rub. No gallop. Pulmonary:      Effort: Pulmonary effort is normal. He is intubated. Breath sounds: Normal breath sounds. No wheezing. Abdominal:      General: Bowel sounds are normal.      Palpations: Abdomen is soft. There is no mass. Tenderness: There is no abdominal tenderness. Musculoskeletal:      Cervical back: Normal range of motion and neck supple. Lymphadenopathy:      Cervical: No cervical adenopathy. Skin:     General: Skin is warm and dry. Comments: The left foot is warm but there are no cellulitic changes noted           Medical Decision Making -Laboratory:   I have independently reviewed/ordered the following labs:    CBC with Differential:   Recent Labs     01/02/22  0459 01/02/22  0459 01/02/22  1022 01/03/22  0510   WBC 6.9   < > 6.8 11.3   HGB 12.2*   < > 12.2* 13.2   HCT 38.0*   < > 39.0* 41.3      < > See Reflexed IPF Result 154   LYMPHOPCT 20*  --   --  11*   MONOPCT 9  --   --  8    < > = values in this interval not displayed.      BMP:   Recent Labs     01/02/22  0459 01/03/22  0511    143   K 4.2 3.7    104   CO2 29 27   BUN 29* 18   CREATININE 0.37* 0.28*     Hepatic Function Panel:   Recent Labs     01/02/22  0459 01/03/22  0511   PROT 5.4* 5.9*   LABALBU 2.9* 3.1*   BILIDIR 0.15 0.14   IBILI 0.20 0.28   BILITOT 0.35 0.42   ALKPHOS 56 72   ALT 64* 137*   AST 21 40*     No results for input(s): RPR in the last 72 hours. No results for input(s): HIV in the last 72 hours. No results for input(s): BC in the last 72 hours. Lab Results   Component Value Date    MUCUS 3+ 12/29/2021    RBC 4.26 01/03/2022    TRICHOMONAS NOT REPORTED 12/29/2021    WBC 11.3 01/03/2022    YEAST NOT REPORTED 12/29/2021    TURBIDITY Clear 12/29/2021     Lab Results   Component Value Date    CREATININE 0.28 01/03/2022    GLUCOSE 91 01/03/2022       Medical Decision Making-Imaging:   ONE XRAY VIEW OF THE CHEST 12/28/2021 7:24 am    Impression   Stable support lines and tubes.  Stable to slightly increased diffuse   interstitial and airspace opacities. Medical Decision Gaslbl-Nooeblmm-Ilryx:     Culture, Blood 2 [[id number]]    Order Status: No result Specimen: Blood    Culture, Blood 1 [[id number]]    Order Status: No result Specimen: Blood    Culture, Blood 1 [[id number]] Collected: 12/29/21 1708   Order Status: Completed Specimen: Blood Updated: 01/01/22 1719    Specimen Description . BLOOD    Special Requests  RFA 5 ML    Culture NO GROWTH 3 DAYS   Culture, Blood 1 [[id number]] Collected: 12/29/21 1708   Order Status: Completed Specimen: Blood Updated: 01/01/22 1719    Specimen Description . BLOOD    Special Requests LFA 3.5 ML    Culture NO GROWTH 3 DAYS   Culture, Respiratory [[id number]] (Abnormal)  Collected: 12/29/21 1710   Order Status: Completed Specimen: Tracheal Aspirate Updated: 01/01/22 1616    Specimen Description . TRACHEAL ASPIRATE    Special Requests NOT REPORTED    Direct Exam < 10 EPITHELIAL CELLS/LPF     >10, <25 NEUTROPHILS/LPF     NO SIGNIFICANT PATHOGENS SEEN    Culture STAPHYLOCOCCUS AUREUS LIGHT GROWTH This isolate is methicillin susceptible. Abnormal      NORMAL RESPIRATORY JOAQUÍN SCANT GROWTH   Susceptibility Staphylococcus aureus     BACTERIAL SUSCEPTIBILITY PANEL MARYJANE     cefoxitin screen NOT REPORTED       ciprofloxacin NOT REPORTED       clindamycin <=0.25  Sensitive     erythromycin <=0.25  Sensitive     gentamicin <=0.5   Gentamicin . .. Sensitive  Sensitive     Induced Clind Resist NOT REPORTED       levofloxacin 0.25  Sensitive     linezolid NOT REPORTED       moxifloxacin NOT REPORTED       nitrofurantoin NOT REPORTED       oxacillin <=0.25   This staph . .. Sensitive  Sensitive     penicillin NOT REPORTED       rifampin NOT REPORTED       Synercid NOT REPORTED       tetracycline <=1  Sensitive     tigecycline NOT REPORTED       trimethoprim-sulfamethoxazole <=10  Sensitive     vancomycin NOT REPORTED        Culture, Blood 1 [[id number]] Collected: 12/24/21 0935   Order Status: Completed Specimen: Blood Updated: 12/29/21 1015    Specimen Description . BLOOD    Special Requests 10 ML R WRIST    Culture NO GROWTH 5 DAYS   Culture, Blood 1 [[id number]] Collected: 12/24/21 0958   Order Status: Completed Specimen: Blood Updated: 12/29/21 1015    Specimen Description . BLOOD    Special Requests 5ML L ARM    Culture NO GROWTH 5 DAYS   Culture, Respiratory [[id number]] Collected: 12/24/21 1436   Order Status: Completed Specimen: Tracheal Aspirate Updated: 12/26/21 1002    Specimen Description . TRACHEAL ASPIRATE    Special Requests NOT REPORTED    Direct Exam < 10 EPITHELIAL CELLS/LPF     >25 NEUTROPHILS/LPF     NO ORGANISMS SEEN    Culture NORMAL RESPIRATORY JOAQUÍN LIGHT GROWTH   Culture, Blood 1 [[id number]] Collected: 12/18/21 1234   Order Status: Completed Specimen: Blood Updated: 12/23/21 1402    Specimen Description . BLOOD    Special Requests NOT REPORTED    Culture NO GROWTH 5 DAYS   Culture, Respiratory [[id number]] (Abnormal) Collected: 12/18/21 1143   Order Status: Completed Specimen: Endotracheal Updated: 12/20/21 1043    Specimen Description . ENDOTRACHEAL    Special Requests NOT REPORTED    Direct Exam >25 NEUTROPHILS/LPF     < 10 EPITHELIAL CELLS/LPF     MIXED BACTERIAL MORPHOTYPES SEEN ON GRAM STAIN.  Abnormal     Culture NORMAL RESPIRATORY JOAQUÍN LIGHT GROWTH     Culture, Urine [[id number]] Collected: 12/14/21 0043   Order Status: Completed Specimen: Urine, straight catheter Updated: 12/14/21 1934    Specimen Description . URINE,STRAIGHT CATHETER    Special Requests NOT REPORTED    Culture NO GROWTH   MRSA DNA Probe, Nasal [[id number]] Collected: 12/12/21 1245   Order Status: Completed Specimen: Nasal Updated: 12/13/21 1052    Specimen Description . NASAL SWAB    MRSA, DNA, Nasal NEGATIVE:  MRSA DNA not detected by nucleic acid amplification. Comment:                                                    Results should be used as an adjunct to nosocomial control efforts to identify patients   needing enhanced precautions.     The test is not intended to identify patients with staphylococcal infections.  Results   should not be used to guide or monitor treatment for MRSA infections. Specimen Description . TRACHEAL ASPIRATE    Special Requests NOT REPORTED    Direct Exam < 10 EPITHELIAL CELLS/LPF    Direct Exam >10, <25 NEUTROPHILS/LPF    Direct Exam NO SIGNIFICANT PATHOGENS SEEN    Culture STAPHYLOCOCCUS AUREUS LIGHT GROWTH This isolate is methicillin susceptible. Abnormal     Culture NORMAL RESPIRATORY JOAQUÍN SCANT GROWTH    Resulting Agency [address]          Susceptibility     Staphylococcus aureus     BACTERIAL SUSCEPTIBILITY PANEL MARYJANE     cefoxitin screen NOT REPORTED       ciprofloxacin NOT REPORTED       clindamycin <=0.25  Sensitive     erythromycin <=0.25  Sensitive     gentamicin <=0.5   Gentamicin . .. Sensitive  Sensitive     Induced Clind Resist NOT REPORTED       levofloxacin 0.25  Sensitive     linezolid NOT REPORTED       moxifloxacin NOT REPORTED       nitrofurantoin NOT REPORTED       oxacillin <=0.25   This staph . ..  Sensitive  Sensitive     penicillin NOT REPORTED rifampin NOT REPORTED       Synercid NOT REPORTED       tetracycline <=1  Sensitive     tigecycline NOT REPORTED       trimethoprim-sulfamethoxazole <=10  Sensitive     vancomycin NOT REPORTED                             Medical Decision Making-Other:     Note:  Labs, medications, radiologic studies were reviewed with personal review of films  Large amounts of data were reviewed  Discussed with nursing Staff, Discharge planner  Infection Control and Prevention measures reviewed  All prior entries were reviewed  Administer medications as ordered  Prognosis: Guarded  Discharge planning reviewed      Thank you for allowing us to participate in the care of this patient. Please call with questions. Electronically signed by SHO Monte - CNP on 1/3/2022 at 7:41 AM     ATTESTATION:    I have discussed the case, including pertinent history and exam findings with the APRN. I have evaluated the  History, physical findings and pictures of the patient and the key elements of the encounter have been performed by me. I have reviewed the laboratory data, other diagnostic studies and discussed them with the APRN. I have updated the medical record where necessary. I agree with the assessment, plan and orders as documented by the APRN.     Yadira Hardy MD.

## 2022-01-03 NOTE — PROGRESS NOTES
PROGRESS NOTE          PATIENT NAME: [address] RECORD NO. [id number]  DATE: 1/3/2022  PRIMARY CARE PHYSICIAN: Walter Mensah MD    HD: # 25    ASSESSMENT    Patient Active Problem List   Diagnosis    Acute respiratory failure with hypoxia ([facility]    Pneumonia due to COVID-19 virus    Type 2 diabetes mellitus ([facility]    Asthma    IRMA on CPAP    Hypertension    GERD (gastroesophageal reflux disease)    ARDS (adult respiratory distress syndrome) (HCC)    Shock ([facility]       MEDICAL DECISION MAKING AND PLAN    1. Plan for OR today for trach/PEG  2. Consent obtained  3. Rest per primary    Chief Complaint: respiratory failure    SUBJECTIVE    Yolette Rodriguez still continues to be intubated and sedated. No acute events overnight. Plan for OR today for trach and PEG     OBJECTIVE  VITALS: Temp: Temp: 99.3 °F (37.4 °C)Temp  Av.5 °F (37.5 °C)  Min: 99.1 °F (37.3 °C)  Max: 100.9 °F (21.2 °C) BP Systolic (43HIS), FZV:208 , Min:86 , PGV:961   Diastolic (04HMU), VVT:94, Min:58, Max:102   Pulse Pulse  Av.7  Min: 56  Max: 114 Resp Resp  Av.1  Min: 14  Max: 25 Pulse ox SpO2  Av.8 %  Min: 92 %  Max: 99 %    CONSTITUTIONAL: intubated and sedated  HEENT: Head is normocephalic, atraumatic. PERRLA  NECK: Soft, trachea midline and straight  LUNGS: Chest expands equally bilaterally upon respiration, no accessory muscle used. Ausculation reveals no wheezes, rales or rhonchi. CARDIOVASCULAR: Heart sounds are normal.  Regular rate and rhythm without murmur, gallop or rub. ABDOMEN: soft, nontender, nondistended, no masses or organomegaly, no hernias palpable, no guarding or peritoneal signs. Bowel sounds are present in all four quadrants Scars are consistent with previous surgical history. NEUROLOGIC: No obvious facial droop  EXTREMITIES: no cyanosis, clubbing or edema    I/O last 3 completed shifts: In: 2386 [I.V.:1402; NG/GT:984]  Out: 3296 [Urine:3170; Stool:950]    Drain/tube output:   In: 2236 [I.V.:1402; NG/GT:834]  Out: 3450 [Urine:2500]    LAB:  CBC:   Recent Labs     01/02/22  0459 01/02/22  1022 01/03/22  0510   WBC 6.9 6.8 11.3   HGB 12.2* 12.2* 13.2   HCT 38.0* 39.0* 41.3   MCV 97.2 98.7 96.9    See Reflexed IPF Result 154     BMP:   Recent Labs     01/01/22  0427 01/02/22  0459 01/03/22  0511    143 143   K 3.9 4.2 3.7    105 104   CO2 32* 29 27   BUN 30* 29* 18   CREATININE 0.29* 0.37* 0.28*   GLUCOSE 97 121* 91     COAGS:   Recent Labs     01/01/22  0427 01/02/22  0459 01/03/22  0511   PROT 5.5* 5.4* 5.9*       RADIOLOGY:  XR CHEST PORTABLE    Result Date: 12/29/2021  Stable support lines and tubes. Stable to slightly increased diffuse interstitial and airspace opacities.          Daphnie Miramontes DO  1/3/22, 8:07 AM

## 2022-01-03 NOTE — PLAN OF CARE
Problem: Airway Clearance - Ineffective  Goal: Achieve or maintain patent airway  Outcome: Ongoing     Problem: Gas Exchange - Impaired  Goal: Absence of hypoxia  Outcome: Ongoing  Goal: Promote optimal lung function  Outcome: Ongoing     Problem: Breathing Pattern - Ineffective  Goal: Ability to achieve and maintain a regular respiratory rate  Outcome: Ongoing     Problem: OXYGENATION/RESPIRATORY FUNCTION  Goal: Patient will maintain patent airway  Outcome: Ongoing  Goal: Patient will achieve/maintain normal respiratory rate/effort  Description: Respiratory rate and effort will be within normal limits for the patient  Outcome: Ongoing     Problem: MECHANICAL VENTILATION  Goal: Patient will maintain patent airway  Outcome: Ongoing  Goal: Oral health is maintained or improved  Outcome: Ongoing  Goal: ET tube will be managed safely  Outcome: Ongoing  Goal: Ability to express needs and understand communication  Outcome: Ongoing  Goal: Mobility/activity is maintained at optimum level for patient  Outcome: Ongoing     Problem: SKIN INTEGRITY  Goal: Skin integrity is maintained or improved  Outcome: Ongoing     Problem: Skin Integrity:  Goal: Will show no infection signs and symptoms  Description: Will show no infection signs and symptoms  Outcome: Ongoing  Goal: Absence of new skin breakdown  Description: Absence of new skin breakdown  Outcome: Ongoing

## 2022-01-04 LAB
ABSOLUTE EOS #: 0.23 K/UL (ref 0–0.44)
ABSOLUTE IMMATURE GRANULOCYTE: 0.12 K/UL (ref 0–0.3)
ABSOLUTE LYMPH #: 1.2 K/UL (ref 1.1–3.7)
ABSOLUTE MONO #: 0.73 K/UL (ref 0.1–1.2)
ALBUMIN SERPL-MCNC: 2.9 G/DL (ref 3.5–5.2)
ALBUMIN/GLOBULIN RATIO: 1 (ref 1–2.5)
ALLEN TEST: ABNORMAL
ALP BLD-CCNC: 69 U/L (ref 40–129)
ALT SERPL-CCNC: 87 U/L (ref 5–41)
ANION GAP SERPL CALCULATED.3IONS-SCNC: 10 MMOL/L (ref 9–17)
AST SERPL-CCNC: 17 U/L
BASOPHILS # BLD: 0 % (ref 0–2)
BASOPHILS ABSOLUTE: 0.03 K/UL (ref 0–0.2)
BILIRUB SERPL-MCNC: 0.57 MG/DL (ref 0.3–1.2)
BILIRUBIN DIRECT: 0.2 MG/DL
BILIRUBIN, INDIRECT: 0.37 MG/DL (ref 0–1)
BUN BLDV-MCNC: 15 MG/DL (ref 6–20)
BUN/CREAT BLD: ABNORMAL (ref 9–20)
CALCIUM SERPL-MCNC: 8.5 MG/DL (ref 8.6–10.4)
CHLORIDE BLD-SCNC: 104 MMOL/L (ref 98–107)
CO2: 27 MMOL/L (ref 20–31)
CREAT SERPL-MCNC: 0.3 MG/DL (ref 0.7–1.2)
DIFFERENTIAL TYPE: ABNORMAL
EOSINOPHILS RELATIVE PERCENT: 3 % (ref 1–4)
FIO2: 50
GFR AFRICAN AMERICAN: >60 ML/MIN
GFR NON-AFRICAN AMERICAN: >60 ML/MIN
GFR SERPL CREATININE-BSD FRML MDRD: ABNORMAL ML/MIN/{1.73_M2}
GFR SERPL CREATININE-BSD FRML MDRD: ABNORMAL ML/MIN/{1.73_M2}
GLOBULIN: ABNORMAL G/DL (ref 1.5–3.8)
GLUCOSE BLD-MCNC: 104 MG/DL (ref 74–100)
GLUCOSE BLD-MCNC: 105 MG/DL (ref 75–110)
GLUCOSE BLD-MCNC: 111 MG/DL (ref 70–99)
GLUCOSE BLD-MCNC: 122 MG/DL (ref 75–110)
GLUCOSE BLD-MCNC: 130 MG/DL (ref 75–110)
GLUCOSE BLD-MCNC: 156 MG/DL (ref 75–110)
GLUCOSE BLD-MCNC: 169 MG/DL (ref 75–110)
HCT VFR BLD CALC: 39.4 % (ref 40.7–50.3)
HEMOGLOBIN: 12.7 G/DL (ref 13–17)
IMMATURE GRANULOCYTES: 1 %
LYMPHOCYTES # BLD: 15 % (ref 24–43)
MCH RBC QN AUTO: 31.1 PG (ref 25.2–33.5)
MCHC RBC AUTO-ENTMCNC: 32.2 G/DL (ref 28.4–34.8)
MCV RBC AUTO: 96.3 FL (ref 82.6–102.9)
MODE: ABNORMAL
MONOCYTES # BLD: 9 % (ref 3–12)
NEGATIVE BASE EXCESS, ART: ABNORMAL (ref 0–2)
NRBC AUTOMATED: 0 PER 100 WBC
O2 DEVICE/FLOW/%: ABNORMAL
PATIENT TEMP: ABNORMAL
PDW BLD-RTO: 14.1 % (ref 11.8–14.4)
PLATELET # BLD: 146 K/UL (ref 138–453)
PLATELET ESTIMATE: ABNORMAL
PMV BLD AUTO: 10.9 FL (ref 8.1–13.5)
POC HCO3: 32.9 MMOL/L (ref 21–28)
POC O2 SATURATION: 96 % (ref 94–98)
POC PCO2 TEMP: ABNORMAL MM HG
POC PCO2: 48.6 MM HG (ref 35–48)
POC PH TEMP: ABNORMAL
POC PH: 7.44 (ref 7.35–7.45)
POC PO2 TEMP: ABNORMAL MM HG
POC PO2: 81.9 MM HG (ref 83–108)
POSITIVE BASE EXCESS, ART: 7 (ref 0–3)
POTASSIUM SERPL-SCNC: 3.8 MMOL/L (ref 3.7–5.3)
RBC # BLD: 4.09 M/UL (ref 4.21–5.77)
RBC # BLD: ABNORMAL 10*6/UL
SAMPLE SITE: ABNORMAL
SEG NEUTROPHILS: 72 % (ref 36–65)
SEGMENTED NEUTROPHILS ABSOLUTE COUNT: 5.98 K/UL (ref 1.5–8.1)
SODIUM BLD-SCNC: 141 MMOL/L (ref 135–144)
TCO2 (CALC), ART: ABNORMAL MMOL/L (ref 22–29)
TOTAL PROTEIN: 5.7 G/DL (ref 6.4–8.3)
WBC # BLD: 8.3 K/UL (ref 3.5–11.3)
WBC # BLD: ABNORMAL 10*3/UL

## 2022-01-04 PROCEDURE — 6360000002 HC RX W HCPCS: Performed by: STUDENT IN AN ORGANIZED HEALTH CARE EDUCATION/TRAINING PROGRAM

## 2022-01-04 PROCEDURE — 2580000003 HC RX 258: Performed by: STUDENT IN AN ORGANIZED HEALTH CARE EDUCATION/TRAINING PROGRAM

## 2022-01-04 PROCEDURE — 6370000000 HC RX 637 (ALT 250 FOR IP): Performed by: STUDENT IN AN ORGANIZED HEALTH CARE EDUCATION/TRAINING PROGRAM

## 2022-01-04 PROCEDURE — 94761 N-INVAS EAR/PLS OXIMETRY MLT: CPT

## 2022-01-04 PROCEDURE — 2500000003 HC RX 250 WO HCPCS: Performed by: STUDENT IN AN ORGANIZED HEALTH CARE EDUCATION/TRAINING PROGRAM

## 2022-01-04 PROCEDURE — 80048 BASIC METABOLIC PNL TOTAL CA: CPT

## 2022-01-04 PROCEDURE — 6370000000 HC RX 637 (ALT 250 FOR IP): Performed by: INTERNAL MEDICINE

## 2022-01-04 PROCEDURE — 85025 COMPLETE CBC W/AUTO DIFF WBC: CPT

## 2022-01-04 PROCEDURE — 2000000000 HC ICU R&B

## 2022-01-04 PROCEDURE — 94003 VENT MGMT INPAT SUBQ DAY: CPT

## 2022-01-04 PROCEDURE — 37799 UNLISTED PX VASCULAR SURGERY: CPT

## 2022-01-04 PROCEDURE — 82803 BLOOD GASES ANY COMBINATION: CPT

## 2022-01-04 PROCEDURE — 99232 SBSQ HOSP IP/OBS MODERATE 35: CPT | Performed by: INTERNAL MEDICINE

## 2022-01-04 PROCEDURE — 82947 ASSAY GLUCOSE BLOOD QUANT: CPT

## 2022-01-04 PROCEDURE — 2700000000 HC OXYGEN THERAPY PER DAY

## 2022-01-04 PROCEDURE — 80076 HEPATIC FUNCTION PANEL: CPT

## 2022-01-04 PROCEDURE — 99291 CRITICAL CARE FIRST HOUR: CPT | Performed by: INTERNAL MEDICINE

## 2022-01-04 RX ADMIN — IBUPROFEN 600 MG: 200 SUSPENSION ORAL at 19:08

## 2022-01-04 RX ADMIN — OXYCODONE HYDROCHLORIDE 20 MG: 5 SOLUTION ORAL at 17:41

## 2022-01-04 RX ADMIN — NAFCILLIN SODIUM 2000 MG: 2 INJECTION, POWDER, LYOPHILIZED, FOR SOLUTION INTRAMUSCULAR; INTRAVENOUS at 21:21

## 2022-01-04 RX ADMIN — DOCUSATE SODIUM 100 MG: 50 LIQUID ORAL at 08:48

## 2022-01-04 RX ADMIN — OXYCODONE HYDROCHLORIDE 20 MG: 5 SOLUTION ORAL at 21:37

## 2022-01-04 RX ADMIN — Medication 10 MG/HR: at 03:32

## 2022-01-04 RX ADMIN — LACTULOSE 20 G: 20 SOLUTION ORAL at 08:48

## 2022-01-04 RX ADMIN — SODIUM CHLORIDE, PRESERVATIVE FREE 10 ML: 5 INJECTION INTRAVENOUS at 20:36

## 2022-01-04 RX ADMIN — BACLOFEN 5 MG: 10 TABLET ORAL at 08:46

## 2022-01-04 RX ADMIN — INSULIN GLARGINE 10 UNITS: 100 INJECTION, SOLUTION SUBCUTANEOUS at 22:15

## 2022-01-04 RX ADMIN — Medication 2000 UNITS: at 08:47

## 2022-01-04 RX ADMIN — COLCHICINE 0.6 MG: 0.6 TABLET, FILM COATED ORAL at 08:46

## 2022-01-04 RX ADMIN — PREDNISONE 20 MG: 20 TABLET ORAL at 08:47

## 2022-01-04 RX ADMIN — Medication 50 MCG/HR: at 18:10

## 2022-01-04 RX ADMIN — SODIUM CHLORIDE, PRESERVATIVE FREE 10 ML: 5 INJECTION INTRAVENOUS at 08:48

## 2022-01-04 RX ADMIN — DEXMEDETOMIDINE HYDROCHLORIDE 0.6 MCG/KG/HR: 4 INJECTION, SOLUTION INTRAVENOUS at 14:10

## 2022-01-04 RX ADMIN — NAFCILLIN SODIUM 2000 MG: 2 INJECTION, POWDER, LYOPHILIZED, FOR SOLUTION INTRAMUSCULAR; INTRAVENOUS at 04:41

## 2022-01-04 RX ADMIN — NAFCILLIN SODIUM 2000 MG: 2 INJECTION, POWDER, LYOPHILIZED, FOR SOLUTION INTRAMUSCULAR; INTRAVENOUS at 08:49

## 2022-01-04 RX ADMIN — NAFCILLIN SODIUM 2000 MG: 2 INJECTION, POWDER, LYOPHILIZED, FOR SOLUTION INTRAMUSCULAR; INTRAVENOUS at 13:12

## 2022-01-04 RX ADMIN — Medication 150 MCG/HR: at 06:25

## 2022-01-04 RX ADMIN — OXYCODONE HYDROCHLORIDE 20 MG: 5 SOLUTION ORAL at 14:11

## 2022-01-04 RX ADMIN — NAFCILLIN SODIUM 2000 MG: 2 INJECTION, POWDER, LYOPHILIZED, FOR SOLUTION INTRAMUSCULAR; INTRAVENOUS at 17:41

## 2022-01-04 RX ADMIN — DEXMEDETOMIDINE HYDROCHLORIDE 0.6 MCG/KG/HR: 4 INJECTION, SOLUTION INTRAVENOUS at 09:07

## 2022-01-04 RX ADMIN — Medication 30 MG: at 08:48

## 2022-01-04 RX ADMIN — INSULIN LISPRO 1 UNITS: 100 INJECTION, SOLUTION INTRAVENOUS; SUBCUTANEOUS at 23:43

## 2022-01-04 RX ADMIN — ENOXAPARIN SODIUM 30 MG: 100 INJECTION SUBCUTANEOUS at 08:47

## 2022-01-04 RX ADMIN — DEXMEDETOMIDINE HYDROCHLORIDE 0.6 MCG/KG/HR: 4 INJECTION, SOLUTION INTRAVENOUS at 04:08

## 2022-01-04 RX ADMIN — OXYCODONE HYDROCHLORIDE 20 MG: 5 SOLUTION ORAL at 08:48

## 2022-01-04 RX ADMIN — BACLOFEN 5 MG: 10 TABLET ORAL at 20:36

## 2022-01-04 RX ADMIN — ENOXAPARIN SODIUM 30 MG: 100 INJECTION SUBCUTANEOUS at 20:36

## 2022-01-04 RX ADMIN — DEXMEDETOMIDINE HYDROCHLORIDE 1 MCG/KG/HR: 4 INJECTION, SOLUTION INTRAVENOUS at 20:37

## 2022-01-04 RX ADMIN — CHLORDIAZEPOXIDE HYDROCHLORIDE 20 MG: 5 CAPSULE ORAL at 16:35

## 2022-01-04 RX ADMIN — Medication 150 MCG/HR: at 00:24

## 2022-01-04 RX ADMIN — Medication 2 MG/HR: at 18:10

## 2022-01-04 RX ADMIN — NAFCILLIN SODIUM 2000 MG: 2 INJECTION, POWDER, LYOPHILIZED, FOR SOLUTION INTRAMUSCULAR; INTRAVENOUS at 01:08

## 2022-01-04 RX ADMIN — CHLORDIAZEPOXIDE HYDROCHLORIDE 20 MG: 5 CAPSULE ORAL at 21:37

## 2022-01-04 RX ADMIN — CHLORDIAZEPOXIDE HYDROCHLORIDE 20 MG: 5 CAPSULE ORAL at 08:47

## 2022-01-04 RX ADMIN — BACLOFEN 5 MG: 10 TABLET ORAL at 14:12

## 2022-01-04 RX ADMIN — DEXMEDETOMIDINE HYDROCHLORIDE 1 MCG/KG/HR: 4 INJECTION, SOLUTION INTRAVENOUS at 18:11

## 2022-01-04 ASSESSMENT — PULMONARY FUNCTION TESTS
PIF_VALUE: 28
PIF_VALUE: 20
PIF_VALUE: 29
PIF_VALUE: 24

## 2022-01-04 ASSESSMENT — PAIN SCALES - GENERAL
PAINLEVEL_OUTOF10: 0
PAINLEVEL_OUTOF10: 0

## 2022-01-04 NOTE — PROGRESS NOTES
PROGRESS NOTE          PATIENT NAME: [address] [facility] RECORD NO. [id number]  DATE: 2022  SURGEON: Anika Talley  PRIMARY CARE PHYSICIAN: Олег Nicholas MD    HD: # [address] Problems           Last Modified POA    * (Principal) Pneumonia due to COVID-19 virus 2021 Yes    Shock ([address] 2021 No    Acute idiopathic gout of left foot 1/3/2022 No    Acute respiratory failure with hypoxia ([address] 2021 Yes    Type 2 diabetes mellitus ([address] 2021 Yes    Asthma 2021 Yes    IRMA on CPAP 2021 Yes    Overview Signed 2021  2:39 PM by Jan Monroe, DO     seldom use machine         Hypertension 2021 Yes    Overview Signed 2021  2:39 PM by Jan Monroe, DO     on lasix         GERD (gastroesophageal reflux disease) 2021 Yes    ARDS (adult respiratory distress syndrome) ([facility] [facility] 2021 Yes          MEDICAL DECISION MAKING AND PLAN    1. POD 1 trach and PEG  2. PEG at 5 at skin  3. Ok for meds and feeds through PEG  4. Trauma will sign off at this time  5. Please reach out with any questions or concerns      SUBJECTIVE    Alyce Dinwiddie remains on minimal vent settings. No acute events overnight. Abdomen remains soft and non distended.        OBJECTIVE  VITALS: Temp: Temp: 100 °F (37.8 °C)Temp  Av.7 °F (38.2 °C)  Min: 100 °F (37.8 °C)  Max: 100.9 °F (69.5 °C) BP Systolic (41YPU), RAJINDER:145 , Min:73 , AET:340   Diastolic (82IYC), GNP:71, Min:47, Max:92   Pulse Pulse  Av.3  Min: 56  Max: 89 Resp Resp  Av.4  Min: 17  Max: 24 Pulse ox SpO2  Av.4 %  Min: 94 %  Max: 98 %  GENERAL: awkake, no distress  NEURO: sedated  HEENT: PERRLA, EOMI, trachea midline, trach in place without oozing or active bleeding  LUNGS: mechanically ventilated, symmetric chest rise and fall  HEART: normal rate and regular rhythm  ABDOMEN: soft, non-tender, non-distended and no guarding or peritoneal signs present  EXTERMITY: no cyanosis, clubbing or

## 2022-01-04 NOTE — ADT AUTH CERT
Subscriber Details  Hospital Account [de-identified]  [facility] Subscriber Name/Sex/Relation Subscriber  Subscriber Address/Phone Subscriber Emp/Emp Phone   1. [facility]   [id number] Catalina Salazar - Male   (Self) 1965 [address]  [id number]   [phone](V) OTHER        Utilization Reviews         Viral Illness, Acute - Care Day  (2022) by Ventura Moore RN       Review Entered Review Status   1/3/2022 12:31 Completed      Criteria Review      Care Day: 22 Care Date: 2022 Level of Care:    Guideline Day 2    Clinical Status    ( ) * Hypotension absent    1/3/2022 12:28 PM EST by Sailaja Turner      BP 79/42  Temp 100.9  RR 25      FiO2 40  Ventilator  Art Line    ( ) * No requirement for mechanical ventilation    ( ) * Oxygenation at baseline or improved    ( ) * Mental status at baseline    Routes    ( ) * Oral hydration    (X) Oral or IV medications    1/3/2022 12:31 PM EST by Natalio Novak      Deltasone 20 mg po ng tube qd  Vit D 2000 units po qd  Precedex 41.6 ml/hr titrated x 19  IV  Fentanyl 10 ml/hr titrated x 7 IV  Ketalor 8.2 ml/hr IV  Versed 6 ml/hr titrated x 3  Levophen 0.9 ml/hr  Tylenol 650 mg po x 2  motrin 600 mg ng x1  glycolax 17g ng    1/3/2022 12:28 PM EST by Sailaja Turner      Lioresal 5 mg NG tid  Librium 10 mg NG x 2  Libriom 15 mg ng x 2  Librium 5 mg po x 1  colcrys 0.6 mg po qd  Colace 100 mg NG bid  Dilaudid 1 mg IV x 1  Lactulose 20 g tid po  Lansoprazole 30 mg po qd  Oxycodone 10 mg q 4 hr po  Oxycodone 15 mg q 4 hr po    Interventions    (X) Pulse oximetry    (X) Possible oxygen    Medications    (X) Possible antibiotic (eg, for bacterial coinfection or superinfection)    1/3/2022 12:28 PM EST by Natalio Novak      Merrem 1000 mg q 8 hr IV    (X) Possible DVT prophylaxis    1/3/2022 12:28 PM EST by Sailaja Turner      Lovenox 30 mg bid sc    * Milestone   Additional Notes   DATE: 2022      Pertinent Updates:   Remains intubated and sedated.  Patient becomes more restless when sedation is weaned. Still requiring high levels of sedation. Very minimal bowel movements. Tube feeds running. Still having fevers. Physical Exam:   General appearance: Morbidly obese  gentleman lying supine in bed.  Intubated, sedated.     HEENT: ET and OG tubes present. Lungs: Mechanical breath sounds, clear to auscultation bilaterally, normal effort   Heart:  regular rate and rhythm, no murmur   Abdomen:  soft, nontender, protuberant, nondistended, normal bowel sounds, no masses, hepatomegaly, splenomegaly   Extremities: SCDs present bilaterally, left calcaneus erythema and edema is noted along with erythema to the first MCP with a postsurgical scar noted on the dorsal aspect of the great toe   Skin: Erythema as noted above      Abnl/Pertinent Labs:      POC Glucose: 127 (H)   POC HCO3: 33.3 (H)   POC O2 SAT: 92 (L)   POC pCO2: 49.7 (H)   POC PO2: 62.9 (L)   FIO2: 40.0   Positive Base Excess, Art: 8 (H)      BUN: 29 (H)   Creatinine: 0.37 (L)   Glucose: 121 (H)   Total Protein: 5.4 (L)   Albumin: 2.9 (L)   ALT: 64 (H)   RBC: 3.91 (L)   Hemoglobin Quant: 12.2 (L)   Hematocrit: 38.0 (L)   RDW: 14.5 (H)   Seg Neutrophils: 69 (H)   Lymphocytes: 20 (L)   Immature Granulocytes: 1 (H)         MD Consults/Assessments & Plans:   Critical Care   LOS: 21   PLAN:       D/w RT   D/w RN    Chest x-ray on 12/24/2021 with improving bilateral pulmonary infiltrates   Sedation reviewed. We will try to cut down on the sedatives and pain medications   Cont ARDS ventilation   Continue supportive care    Cont treatment with medications for COVID   Cont tube feeding   Monitor endotracheal secretions    Will obtain xray chest as needed   ABG as needed   Prognosis guarded given age and severity of illness.    On Lovenox and lansoprazole   On Decadron   Patient is on cefepime   Check triglycerides as long as the patient is needing propofol   Patient required placement of the Hahn catheter due to retention of urine requiring straight catheter multiple times a day   Diuresis Lasix 40 mg twice daily for 3 days.  Restrictive fluid strategy   Treatment plan Discussed with nursing staff in detail , all questions answered . Would proceed with trach and PEG is weaning likely to be prolonged and complicated.  Discussed with surgery.  Risk of delayed wound healing higher on steroids however trach will facilitate rehab and simplify sedation regimen.       Infectious Disease       Recommendations:   Antibiotic treatment:   The patient was having high-grade fevers and cultures have been sent.-No growth on cultures thus far   I will start the patient empirically on antibiotic therapy with cefepime on 12/18-fevers initially resolved but low grade fevers have resumed.    Cefepime discontinued 12/28 and Meropenem initiated 12/28 for worsening leukocytosis and fevers        Covid Rx:       Remdesivir-out of window   Decadron-high-dose initiated   Actemra-ordered 12/12/2021   Monoclonal antibodies-out of window       Internal Medicine   Assessment:          Hospital Problems      Last Modified POA     * (Principal) Pneumonia due to COVID-19 virus 12/21/2021 Yes     Acute respiratory failure with hypoxia ([address] 12/21/2021 Yes     ARDS (adult respiratory distress syndrome) ([address] 12/21/2021 Yes     Shock ([address] 12/21/2021 No     Type 2 diabetes mellitus ([address] 12/21/2021 Yes     Asthma 12/21/2021 Yes     IRMA on CPAP 12/12/2021 Yes     Overview Signed 12/12/2021  2:39 PM by Abelardo Heather, DO          seldom use machine            Hypertension 12/12/2021 Yes     Overview Signed 12/12/2021  2:39 PM by Abelardo Orrmell, DO          on lasix            GERD (gastroesophageal reflux disease) 12/12/2021 Yes               Plan:          Acute respiratory failure with hypoxia due to Carmine COVID-19 pneumonia-pulmonology/critical care is following ventilator support.  Remains on oral prednisone taper.  Received Actemra on 12/12.  Antibiotics per infectious disease.  Received 10 days of cefepime earlier in his hospitalization.  Now on meropenem secondary to concerns for elevated fevers. Febrile illness in adult-cultures have been drawn multiple times. Eladio Warren is growing MSSA in his sputum.  He is on meropenem day number 6.  Blood cultures have been negative.  Ideally, would like to wean Precedex, as you can have fever as an adverse effect.  Alternatively, you can have a fever from withdrawal.    Sedation remains a significant issue for this gentleman, as he is on Versed, fentanyl, ketamine, Precedex, Roxicodone, Librium to maintain his sedation.  Increase oral benzodiazepine and opiate and decrease Versed, fentanyl, Precedex.  Ketamine at the discretion of critical care. Rima Villalta would like to wean this. Bowel regimen-restart lactulose.  Continue Colace and as needed MiraLAX. Questionable gout-continue Colcrys and prednisone.  On meropenem for possible cellulitis. Type 2 diabetes mellitus-continue insulin sliding scale and Lantus.    Continue DVT prophylaxis with Lovenox   Continue GI prophylaxis with lansoprazole   Obesity with BMI of 36.37-will need diet/weight loss/exercise/therapies in the future   IRMA-was noncompliant with CPAP at home   Disposition: He will need trach/PEG once feasible.       General Surgery   HD: # 21       ASSESSMENT       Patient Active Problem List   Diagnosis   · Acute respiratory failure with hypoxia (HCC)   · Pneumonia due to COVID-19 virus   · Type 2 diabetes mellitus (HCC)   · Asthma   · IRMA on CPAP   · Hypertension   · GERD (gastroesophageal reflux disease)   · ARDS (adult respiratory distress syndrome) (HCC)   · Shock ([facility] [address]           MEDICAL DECISION MAKING AND PLAN       Still planning for possible trach and PEG   No emergent surgical plans at this time   Rest per primary                        Viral Illness, Acute - Care Day 20 (12/31/2021) by Nicole Jorge RN       Review Entered Review Status   1/3/2022 12:19 Completed    Criteria Review      Care Day: 20 Care Date: 12/31/2021 Level of Care:    Guideline Day 2    Level Of Care    ( ) Floor    1/3/2022 12:17 PM EST by Meenakshi Alves      ICU  Ventilator  Art Line  NG tube  Rectal tube  Urinay catheter    Clinical Status    ( ) * Hypotension absent    1/3/2022 12:17 PM EST by Sailaja Joseph      BP 86/56    Temp 98.6  RR 25-HR82    ( ) * No requirement for mechanical ventilation    ( ) * Oxygenation at baseline or improved    ( ) * Mental status at baseline    Routes    ( ) * Oral hydration    (X) Oral or IV medications    1/3/2022 12:19 PM EST by Meenakshi Alves      Ketalar 8.2 ml/hr IV  Versed 6 ml/hr titrated x 2  Levophed 1.9 ml/hr titrated x 2  Propofol 8.3 ml/hr titrated x 9  Effer K 40 meq po x 1    1/3/2022 12:17 PM EST by Sailaja Joseph      Lioreasal 5 mg po tid  librium 10 mg qid ng  Decadron 10 mg q 24 hr IV  colace 100 mg bid ng  Lasix 40 mg bid IV  lansoprazol 30 mg po qd  Oxycodone 10 mg q 6 hr po  VIt D 2000 units po qd  Precedex 20.8 ml/hr titrated x 20  Fentanyl 7.5 ml/hr titrated x 6    Interventions    (X) Possible isolation    1/3/2022 12:17 PM EST by Meenakshi Alves      Droplet Plus    (X) Pulse oximetry    1/3/2022 12:17 PM EST by Sailaja Joseph      Positive Base Excess, Art: 10 (H)  POC pH: 7.548 (H)  POC PO2: 65.7 (L)  POC HCO3: 33.4 (H)    (X) Possible oxygen    1/3/2022 12:17 PM EST by Sailaja Joseph      Ventilator  FiO2 50    Medications    (X) Possible antibiotic (eg, for bacterial coinfection or superinfection)    1/3/2022 12:17 PM EST by Meenakshi Alves      Merrem 1000 mg q 8 hr IV    (X) Possible DVT prophylaxis    1/3/2022 12:17 PM EST by Sailaja Joseph      Lovenox 20 mg bid sc    * Milestone   Additional Notes   DATE: 12/31/2021         Pertinent Updates:   Interval History/Significant events :  12/31/21   Unchanged   Patient remains afebrile. Oliverio Gillespie is still remains on ventilatory support currently at 40% FiO2, PEEP of 10.  Patient's temperature has improved.  He is responding to pain stimulus.  Unable to flex neck. Vitals - Unstable afebrile   Labs -normal electrolytes, creatinine 0.38      Physical Exam:   Constitutional:        Appearance: He is well-developed. He is ill-appearing.       Interventions: He is sedated and intubated. Neck:       Thyroid: No thyroid mass. Cardiovascular:       Rate and Rhythm: Normal rate and regular rhythm.       Pulses: Normal pulses.       Heart sounds: Normal heart sounds. No murmur heard.          Pulmonary:       Effort: He is intubated.       Breath sounds: Normal breath sounds. Musculoskeletal:       Right lower leg: No edema.       Left lower leg: No edema. Lymphadenopathy:       Cervical: No cervical adenopathy. Skin:      Capillary Refill: Capillary refill takes less than 2 seconds. Neurological:       Motor: No tremor or abnormal muscle tone. Abnl/Pertinent Labs:   Potassium: 3.4 (L)   BUN: 32 (H)   Creatinine: 0.22 (L)   Glucose: 114 (H)   Total Protein: 5.5 (L)   Albumin: 3.1 (L)   ALT: 65 (H)   RBC: 4.05 (L)   Hematocrit: 37.9 (L)   Eosinophils %: 0 (L)   Seg Neutrophils: 83 (H)   Segs Absolute: 8.91 (H)   Lymphocytes: 9 (L)   Absolute Lymph #: 0.95 (L)   Immature Granulocytes: 1 (H)      POC Glucose: 124 (H)   POC HCO3: 33.4 (H)   POC pH: 7.548 (H)   POC PO2: 65.7 (L)   FIO2: 50.0   Positive Base Excess, Art: 10 (H)         MD Consults/Assessments & Plans:   Critical Care   LOS: 19   PLAN:       D/w RT   D/w RN    Chest x-ray on 12/24/2021 with improving bilateral pulmonary infiltrates   Sedation reviewed. We will try to cut down on the sedatives and pain medications   Cont ARDS ventilation   Continue supportive care    Cont treatment with medications for COVID   Cont tube feeding   Monitor endotracheal secretions    Will obtain xray chest as needed   ABG as needed   Prognosis guarded given age and severity of illness.    On Lovenox and lansoprazole   On Decadron   Patient is on cefepime   Check triglycerides as long as the patient is needing propofol   Patient required placement of the Hahn catheter due to retention of urine requiring straight catheter multiple times a day   Diuresis Lasix 40 mg twice daily for 3 days.  Restrictive fluid strategy   Treatment plan Discussed with nursing staff in detail , all questions answered . Would proceed with trach and PEG is weaning likely to be prolonged and complicated. Internal Medicine   Clinical Course : unchanged   Assessment and Plan  :          Acute respiratory failure with hypoxia due to ARDS from COVID-19 pneumonia - ventilator support, high-dose Decadron, s/p Actemra on 12/12/2021. Antibiotics per ID.  Treated with 10 days of  cefepime  12/18/2021 - 12/28/21, meropenem started on 12/28/2021 due to high fevers.  Artline was removed on 12/30/21. Type 2 diabetes mellitus - on Lantus, blood sugar controlled.  Continue tube feed. Obesity BMI 39   Essential hypertension -  Stable , off levophed. .   IRMA -was noncompliant with CPAP at home        Continue current antibiotics. Cultures have been negative so far. Resume muscle relaxants.       Continue DVT prophylaxis, GI prophylaxis. Patient to remain in isolation till 12/31/2021.       Continue to monitor vitals , Intake / output ,  Cell count , HGB , Kidney function, oxygenation  as indicated .        Infectious Disease   Recommendations:   Antibiotic treatment:   The patient was having high-grade fevers and cultures have been sent.-No growth on cultures thus far   I will start the patient empirically on antibiotic therapy with cefepime on 12/18-fevers initially resolved but low grade fevers have resumed.    Cefepime discontinued 12/28 and Meropenem initiated 12/28 for worsening leukocytosis and fevers        Covid Rx:       Remdesivir-out of window   Decadron-high-dose initiated   Actemra-ordered 12/12/2021   Monoclonal antibodies-out of window

## 2022-01-04 NOTE — PLAN OF CARE
Problem: Airway Clearance - Ineffective  Goal: Achieve or maintain patent airway  Outcome: Ongoing     Problem: Gas Exchange - Impaired  Goal: Absence of hypoxia  Outcome: Ongoing  Goal: Promote optimal lung function  Outcome: Ongoing     Problem: Breathing Pattern - Ineffective  Goal: Ability to achieve and maintain a regular respiratory rate  Outcome: Ongoing     Problem: OXYGENATION/RESPIRATORY FUNCTION  Goal: Patient will maintain patent airway  Outcome: Ongoing  Goal: Patient will achieve/maintain normal respiratory rate/effort  Description: Respiratory rate and effort will be within normal limits for the patient  Outcome: Ongoing     Problem: MECHANICAL VENTILATION  Goal: Patient will maintain patent airway  Outcome: Ongoing  Goal: Oral health is maintained or improved  Outcome: Ongoing  Goal: ET tube will be managed safely  Outcome: Ongoing  Goal: Ability to express needs and understand communication  Outcome: Ongoing  Goal: Mobility/activity is maintained at optimum level for patient  Outcome: Ongoing     Problem: SKIN INTEGRITY  Goal: Skin integrity is maintained or improved  Outcome: Ongoing

## 2022-01-04 NOTE — PROGRESS NOTES
[facility] - Progress Note   Note COVID 19 Patient  Today's Date and Time: 1/4/2022, 7:36 AM    Impression :     COVID 19 Confirmed Infection  Covid tests:  12/11/2021: Positive  Elevated inflammatory markers  Acute hypoxic respiratory failure  History of asthma  Diabetes mellitus  Essential hypertension  IRMA on CPAP  Patient has not received the Covid vaccination  Intermittent fevers    Recommendations:   Antibiotic treatment:  The patient was having high-grade fevers and cultures have been sent.-No growth on cultures thus far  I will start the patient empirically on antibiotic therapy with cefepime on 12/18-fevers initially resolved but low grade fevers have resumed. Cefepime discontinued 12/28 and Meropenem initiated 12/28 for worsening leukocytosis and fevers   Meropenem discontinued 1/3/21 and Nafcillin IV 2 gm Q 4 hr initiated 1/3/21 for MSSA on sputum    Covid Rx:    Remdesivir-out of window  Decadron-high-dose initiated  Actemra-ordered 12/12/2021  Monoclonal antibodies-out of window      Medical Decision Making/Summary/Discussion:1/4/2022     Patient admitted with COVID 19 infection  He may come out of isolation on 12/31/21    Infection Control Recommendations   [place] Precautions  Airborne isolation  Droplet Isolation    Antimicrobial Stewardship Recommendations     Discontinuation of therapy  Coordination of Outpatient Care:   Estimated Length of IV antimicrobials:TBD  Patient will need Midline Catheter Insertion: TBD  Patient will need PICC line Insertion: No  Patient will need: Home IV , [place],  SNF,  LTAC:TBD  Patient will need outpatient wound care:No    Chief complaint/reason for consultation:   Concern for COVID infection      History of Present Illness:   Chris Fu is a 60y.o.-year-old male who was initially admitted on 12/12/2021.  Patient seen at the request of Dr. Huan Aj:    Patient presented through [facility] ER on 12/11/2021 with complaints of worsening shortness of breath with associated generalized weakness and nonproductive cough over the past several days. He was exposed to his son who was diagnosed with Covid last week and has not received the Covid vaccine. The patient was very hypoxic with an SPO2 in the high 50s on room air. Imaging revealed bilateral pulmonary opacities typical of COVID-19    Abnormal labs include:    Ferritin 2800      Patient has been intubated and transferred to [facility]  He has been started on high-dose Decadron and Actemra has been ordered    CT CHEST PULMONARY EMBOLISM W CONTRAST 12/11/2021   Final Result   1. No evidence of pulmonary embolism   2. Diffuse ground-glass opacities throughout the lungs, typical of COVID-19   pulmonary disease.           XR CHEST (SINGLE VIEW FRONTAL) 12/11/2021   Final Result   Multifocal hazy opacities throughout both lungs consistent with COVID   pneumonia and or pulmonary edema       Patient admitted because of concerns with COVID 19. The patient remains on mechanical ventilation with 50% FiO2 and PEEP of 10. He is receiving fentanyl, Versed and Precedex for sedation. Nimbex was discontinued 12/31/21  He is opening his eyes to stimuli but not responding to command. Cefepime was initiated 12/18-discontinued 12/28  Meropenem initiated 12/28      CURRENT EVALUATION : 1/4/2022    Fevers continue  VS stable    The patient seen and evaluated and continues on the ventilator at 40% FiO2 10-->8 of PEEP. He is on multiple sedatives. MSSA sputum 12/29/21  Blood cultures 1/2: no growth thus far    Has been concern for cellulitis/gout in the left foot. Patient exhibiting respiratory distress. yes  Respiratory secretions: no    Patient receiving supplemental oxygen.   Mechanical ventilation  RR: 24-->22  02 sat:94-->95    % FIO2: 40-->50  PEEP:8-->10    QTc:       NEWS Score: 0-4 Low risk group; 5-6: Medium risk group; 7 or above: High risk group  Parameters 3 2 1 0 1 2 3   Age    < 65   = 65   RR = 8  9-11 12-20  21-24 = 25   O2 Sats = 91 92-93 94-95 = 96      Suppl O2  Yes  No      SBP = 90  101-110 111-219   = 220   HR = 40  41-50 51-90  111-130 = 131   Consciousness    Alert   Drowsiness, lethargy, or confusion   Temperature = 35.0 C (95.0 F)  35.1-36.0 C 95.1-96.9 F 36.1-38.0 C 97.0-100.4 F 38.1-39.0 C 100.5-102.3 F = 39.1 C = 102.4 F      NEWS Score:  12/12/2021: 13 high risk    Overall Daily Picture:     Unchanged    Presence of secondary bacterial Infection:    Cultures no growth  Additional antibiotics: 12/18 Cefepime for leukocytosis and fevers-discontinued 12/28 and Meropenem restarted    Labs, X rays reviewed: 1/4/2022    BUN:15  Cr:0.30    WBC:11.8-->8.3  Hb:13.2-->12.7   Plat: 154-->146    Absolute Neutrophils:3.73  Absolute Lymphocytes:0.29  Neutrophil/Lymphocyte Ratio: 12.8 high risk    CRP:293-->277-->137-->50.8-->23  Ferritin:2800  LDH: 838    Pro Calcitonin:      Cultures:  Urine:  12/18/21: No bacterial growth  Blood:  12/18/21: No growth thus far  1/2/22: No growth thus far  Sputum :  12/18/21: Normal respiratory sherry  12/29/21: MSSA  Wound:      CXR:     12/29/21 12/22/21 12/19/21      1221 diffuse bilateral infiltrates  CAT:      Discussed with patient, RN, CC, IM.      12-12-21:      I have personally reviewed the past medical history, past surgical history, medications, social history, and family history, and I have updated the database accordingly.   Past Medical History:     Past Medical History:   Diagnosis Date    Arthritis     Asthma     Diabetes mellitus ([address]     Edema     GERD (gastroesophageal reflux disease)     Hypertension     on lasix    Migraine     IRMA on CPAP     seldom use machine       Past Surgical  History:     Past Surgical History:   Procedure Laterality Date    APPENDECTOMY      ARTHROPLASTY Left 11/1/2019    LEFT FOOT 1ST MTPJ ARTHROPLASTY WITH PEREZ IMPLANT AND GROMMETS  [facility] performed by Lizbeth Humphreys MD at [address] [facility] [place] Right 12/12/2019    RIGHT FOOT ARTHROPLASTY 1ST MTPJ (Right Foot)    ARTHROPLASTY Right 12/12/2019    RIGHT FOOT ARTHROPLASTY 1ST MTPJ performed by Lizbeth Humphreys MD at [address] Right    [address]  2014    no blockage no stents    CHOLECYSTECTOMY      COLONOSCOPY      ENDOSCOPY, COLON, DIAGNOSTIC      TOE SURGERY Left 11/01/2019     LEFT FOOT 1ST MTPJ ARTHROPLASTY WITH PEREZ IMPLANT AND GROMMETS  [facility] (Left )    TONSILLECTOMY         Medications:      nafcillin  2,000 mg IntraVENous Q4H    oxyCODONE  20 mg Per G Tube Q4H    chlordiazePOXIDE  20 mg Per NG tube 4x Daily    lactulose  20 g Oral TID    colchicine  0.6 mg Oral Daily    predniSONE  20 mg Per NG tube Daily    baclofen  5 mg Per NG tube TID    sodium chloride flush  5-40 mL IntraVENous 2 times per day    docusate  100 mg Per NG tube BID    insulin lispro  0-6 Units SubCUTAneous Q6H    lansoprazole  30 mg Oral QAM AC    insulin glargine  10 Units SubCUTAneous Nightly    sodium chloride flush  5-40 mL IntraVENous 2 times per day    enoxaparin  30 mg SubCUTAneous BID    Vitamin D  2,000 Units Oral Daily       Social History:     Social History     Socioeconomic History    Marital status:      Spouse name: Not on file    Number of children: Not on file    Years of education: Not on file    Highest education level: Not on file   Occupational History    Not on file   Tobacco Use    Smoking status: Former Smoker    Smokeless tobacco: Never Used   Vaping Use    Vaping Use: Never used   Substance and Sexual Activity    Alcohol use: Yes     Comment: every couple months    Drug use: Never    Sexual activity: Not on file   Other Topics Concern    Not on file   Social History Narrative    Not on file     Social Determinants of Health     Financial Resource Strain:     Difficulty of Paying Living Expenses: Not on file   Food Insecurity:     Worried About [address] [employer] in the Last Year: Not on file    Ran Out of Food in the Last Year: Not on file   Transportation Needs:     Lack of Transportation (Medical): Not on file    Lack of Transportation (Non-Medical):  Not on file   Physical Activity:     Days of Exercise per Week: Not on file    Minutes of Exercise per Session: Not on file   Stress:     Feeling of Stress : Not on file   Social Connections:     Frequency of Communication with Friends and Family: Not on file    Frequency of Social Gatherings with Friends and Family: Not on file    Attends [employer] Services: Not on file    Active Member of [address] [place] [address] or Organizations: Not on file    Attends Club or Organization Meetings: Not on file    Marital Status: Not on file   Intimate Partner Violence:     Fear of Current or Ex-Partner: Not on file    Emotionally Abused: Not on file    Physically Abused: Not on file    Sexually Abused: Not on file   Housing Stability:     Unable to Pay for Housing in the Last Year: Not on file    Number of [place] in the Last Year: Not on file    Unstable Housing in the Last Year: Not on file       Family History:     Family History   Problem Relation Age of Onset    Heart Attack Sister 32    Diabetes Paternal Grandmother     Heart Disease Paternal Uncle     Heart Disease Paternal Uncle     Heart Disease Paternal Uncle     Cancer Maternal Aunt     Cancer Maternal Aunt     Cancer Maternal Uncle     Cancer Maternal Uncle         Allergies:   Nuts [peanut-containing drug products] and Sunflower oil     Review of Systems:     Review of Systems   Unable to perform ROS: Intubated       Physical Examination :     Patient Vitals for the past 8 hrs:   BP Temp Temp src Pulse Resp SpO2 Weight   01/04/22 0600 94/61 100 °F (37.8 °C) Bladder 72 19 95 % 285 lb 0.9 oz (129.3 kg)   01/04/22 0500 101/64 -- -- 76 22 95 % --   01/04/22 0400 111/73 -- Bladder 78 19 95 % --   01/04/22 0300 110/81 -- -- 82 19 94 % --   01/04/22 0200 114/82 100.8 °F (38.2 °C) Bladder 78 20 95 % --   01/04/22 0141 -- -- -- 79 20 95 % --   01/04/22 0100 134/88 -- -- 76 19 95 % --   01/04/22 0000 128/89 100.9 °F (38.3 °C) Bladder 68 24 95 % --     Physical Exam  Constitutional:       Appearance: He is well-developed. He is obese. Interventions: He is intubated. HENT:      Head: Normocephalic and atraumatic. Cardiovascular:      Rate and Rhythm: Normal rate. Heart sounds: Normal heart sounds. No friction rub. No gallop. Pulmonary:      Effort: Pulmonary effort is normal. He is intubated. Breath sounds: Normal breath sounds. No wheezing. Abdominal:      General: Bowel sounds are normal.      Palpations: Abdomen is soft. There is no mass. Tenderness: There is no abdominal tenderness. Musculoskeletal:      Cervical back: Normal range of motion and neck supple. Lymphadenopathy:      Cervical: No cervical adenopathy. Skin:     General: Skin is warm and dry. Comments: The left foot is warm but there are no cellulitic changes noted           Medical Decision Making -Laboratory:   I have independently reviewed/ordered the following labs:    CBC with Differential:   Recent Labs     01/03/22  0510 01/04/22  0540   WBC 11.3 8.3   HGB 13.2 12.7*   HCT 41.3 39.4*    146   LYMPHOPCT 11* 15*   MONOPCT 8 9     BMP:   Recent Labs     01/03/22  0511 01/04/22  0540    141   K 3.7 3.8    104   CO2 27 27   BUN 18 15   CREATININE 0.28* 0.30*     Hepatic Function Panel:   Recent Labs     01/03/22  0511 01/04/22  0540   PROT 5.9* 5.7*   LABALBU 3.1* 2.9*   BILIDIR 0.14 0.20   IBILI 0.28 0.37   BILITOT 0.42 0.57   ALKPHOS 72 69   * 87*   AST 40* 17     No results for input(s): RPR in the last 72 hours. No results for input(s): HIV in the last 72 hours. No results for input(s): BC in the last 72 hours.      Lab Results   Component Value Date MUCUS 3+ 12/29/2021    RBC 4.09 01/04/2022    TRICHOMONAS NOT REPORTED 12/29/2021    WBC 8.3 01/04/2022    YEAST NOT REPORTED 12/29/2021    TURBIDITY Clear 12/29/2021     Lab Results   Component Value Date    CREATININE 0.30 01/04/2022    GLUCOSE 111 01/04/2022       Medical Decision Making-Imaging:   ONE XRAY VIEW OF THE CHEST 12/28/2021 7:24 am    Impression   Stable support lines and tubes.  Stable to slightly increased diffuse   interstitial and airspace opacities. Medical Decision Vpzzod-[id number]-[id number]:     Culture, Blood 2 [[id number]]    Order Status: No result Specimen: Blood    Culture, Blood 1 [[id number]]    Order Status: No result Specimen: Blood    Culture, Blood 1 [[id number]] Collected: 12/29/21 1708   Order Status: Completed Specimen: Blood Updated: 01/01/22 1719    Specimen Description . BLOOD    Special Requests  RFA 5 ML    Culture NO GROWTH 3 DAYS   Culture, Blood 1 [[id number]] Collected: 12/29/21 1708   Order Status: Completed Specimen: Blood Updated: 01/01/22 1719    Specimen Description . BLOOD    Special Requests LFA 3.5 ML    Culture NO GROWTH 3 DAYS   Culture, Respiratory [[id number]] (Abnormal)  Collected: 12/29/21 1710   Order Status: Completed Specimen: Tracheal Aspirate Updated: 01/01/22 1616    Specimen Description . TRACHEAL ASPIRATE    Special Requests NOT REPORTED    Direct Exam < 10 EPITHELIAL CELLS/LPF     >10, <25 NEUTROPHILS/LPF     NO SIGNIFICANT PATHOGENS SEEN    Culture STAPHYLOCOCCUS AUREUS LIGHT GROWTH This isolate is methicillin susceptible. Abnormal      NORMAL RESPIRATORY JOAQUÍN SCANT GROWTH   Susceptibility     Staphylococcus aureus     BACTERIAL SUSCEPTIBILITY PANEL MARYJANE     cefoxitin screen NOT REPORTED       ciprofloxacin NOT REPORTED       clindamycin <=0.25  Sensitive     erythromycin <=0.25  Sensitive     gentamicin <=0.5   Gentamicin . ..  Sensitive  Sensitive     Induced Clind Resist NOT REPORTED       levofloxacin 0.25  Sensitive     linezolid NOT REPORTED moxifloxacin NOT REPORTED       nitrofurantoin NOT REPORTED       oxacillin <=0.25   This staph . .. Sensitive  Sensitive     penicillin NOT REPORTED       rifampin NOT REPORTED       Synercid NOT REPORTED       tetracycline <=1  Sensitive     tigecycline NOT REPORTED       trimethoprim-sulfamethoxazole <=10  Sensitive     vancomycin NOT REPORTED        Culture, Blood 1 [[id number]] Collected: 12/24/21 0935   Order Status: Completed Specimen: Blood Updated: 12/29/21 1015    Specimen Description . BLOOD    Special Requests 10 ML R WRIST    Culture NO GROWTH 5 DAYS   Culture, Blood 1 [[id number]] Collected: 12/24/21 0958   Order Status: Completed Specimen: Blood Updated: 12/29/21 1015    Specimen Description . BLOOD    Special Requests 5ML L ARM    Culture NO GROWTH 5 DAYS   Culture, Respiratory [[id number]] Collected: 12/24/21 1436   Order Status: Completed Specimen: Tracheal Aspirate Updated: 12/26/21 1002    Specimen Description . TRACHEAL ASPIRATE    Special Requests NOT REPORTED    Direct Exam < 10 EPITHELIAL CELLS/LPF     >25 NEUTROPHILS/LPF     NO ORGANISMS SEEN    Culture NORMAL RESPIRATORY JOAQUÍN LIGHT GROWTH   Culture, Blood 1 [[id number]] Collected: 12/18/21 1234   Order Status: Completed Specimen: Blood Updated: 12/23/21 1402    Specimen Description . BLOOD    Special Requests NOT REPORTED    Culture NO GROWTH 5 DAYS   Culture, Respiratory [[id number]] (Abnormal) Collected: 12/18/21 1143   Order Status: Completed Specimen: Endotracheal Updated: 12/20/21 1043    Specimen Description . ENDOTRACHEAL    Special Requests NOT REPORTED    Direct Exam >25 NEUTROPHILS/LPF     < 10 EPITHELIAL CELLS/LPF     MIXED BACTERIAL MORPHOTYPES SEEN ON GRAM STAIN.  Abnormal     Culture NORMAL RESPIRATORY JOAQUÍN LIGHT GROWTH     Culture, Urine [[id number]] Collected: 12/14/21 0043   Order Status: Completed Specimen: Urine, straight catheter Updated: 12/14/21 1934    Specimen Description . URINE,STRAIGHT CATHETER    Special Requests NOT REPORTED    Culture NO GROWTH   MRSA DNA Probe, Nasal [[id number]] Collected: 12/12/21 1245   Order Status: Completed Specimen: Nasal Updated: 12/13/21 1052    Specimen Description . NASAL SWAB    MRSA, DNA, Nasal NEGATIVE:  MRSA DNA not detected by nucleic acid amplification. Comment:                                                    Results should be used as an adjunct to nosocomial control efforts to identify patients   needing enhanced precautions.     The test is not intended to identify patients with staphylococcal infections.  Results   should not be used to guide or monitor treatment for MRSA infections. Specimen Description . TRACHEAL ASPIRATE    Special Requests NOT REPORTED    Direct Exam < 10 EPITHELIAL CELLS/LPF    Direct Exam >10, <25 NEUTROPHILS/LPF    Direct Exam NO SIGNIFICANT PATHOGENS SEEN    Culture STAPHYLOCOCCUS AUREUS LIGHT GROWTH This isolate is methicillin susceptible. Abnormal     Culture NORMAL RESPIRATORY JOAQU[facility]N SCANT GROWTH    Resulting Agency [address]          Susceptibility     Staphylococcus aureus     BACTERIAL SUSCEPTIBILITY PANEL MARYJANE     cefoxitin screen NOT REPORTED       ciprofloxacin NOT REPORTED       clindamycin <=0.25  Sensitive     erythromycin <=0.25  Sensitive     gentamicin <=0.5   Gentamicin . .. Sensitive  Sensitive     Induced Clind Resist NOT REPORTED       levofloxacin 0.25  Sensitive     linezolid NOT REPORTED       moxifloxacin NOT REPORTED       nitrofurantoin NOT REPORTED       oxacillin <=0.25   This staph . ..  Sensitive  Sensitive     penicillin NOT REPORTED       rifampin NOT REPORTED       Synercid NOT REPORTED       tetracycline <=1  Sensitive     tigecycline NOT REPORTED       trimethoprim-sulfamethoxazole <=10  Sensitive     vancomycin NOT REPORTED                             Medical Decision Making-Other:     Note:  Labs, medications, radiologic studies were reviewed with personal review of films  Large amounts of data were reviewed  Discussed with nursing Staff, Discharge planner  Infection Control and Prevention measures reviewed  All prior entries were reviewed  Administer medications as ordered  Prognosis: Guarded  Discharge planning reviewed      Thank you for allowing us to participate in the care of this patient. Please call with questions. Electronically signed by SHO Joseph CNP on 1/4/2022 at 7:36 AM       ATTESTATION:    I have discussed the case, including pertinent history and exam findings with the APRN. I have evaluated the  History, physical findings and pictures of the patient and the key elements of the encounter have been performed by me. I have reviewed the laboratory data, other diagnostic studies and discussed them with the APRN. I have updated the medical record where necessary. I agree with the assessment, plan and orders as documented by the APRN.     Myrna Colon MD.

## 2022-01-04 NOTE — PROGRESS NOTES
PULMONARY & CRITICAL CARE MEDICINE PROGRESS NOTE     Patient:  Eduard Jefferson  MRN: [id number]  Admit date: 12/12/2021  Primary Care Physician: Macey Vines MD  Consulting Physician: Zehra Sotelo DO  CODE Status: Full Code  LOS: 23     SUBJECTIVE     CHIEF COMPLAINT/REASON FOR INITIAL CONSULT:   Acute respiratory failure/COVID-19 pneumonia/ARDS    BRIEF HOSPITAL COURSE:   The patient is a 60 y.o. male admitted for COVID-19 at [facility] ER due to worsening oxygenation he was initially started on BiPAP and subsequently intubated. On room air his saturations had been 50%. CTA no PE but bilateral pulmonary infiltrates. He was accepted at [address] [place] [address] ICU. On arrival he is on PEEP of 22, 100% FiO2. INTERVAL HISTORY:  01/04/22    Chart reviewed, ICU events noted, other notes seen ventilator setting arterial blood gases and medications reviewed. Overnight he had low-grade temperature T-max of 100.9 in last 24 hours. He had intermittent episode of hypotension but he had been off Levophed since overnight and was off this morning. He had tracheostomy and PEG done yesterday post recasting he require high FiO2 of 1555% and PEEP increased from 8-10 this morning he is on 50% FiO2 and PEEP of 10. Ventilator setting PRVC/15/540/10 PEEP/50% FiO2 and ABG was 7.4 4/48/82/33. He is sedated with Versed of 10 mg fentanyl 150 mcg Precedex 0.6 mcg and also on ketamine drip. He is also getting Librium 20 mg every 6 hour and on oxycodone 20 mg every 6 hours.         REVIEW OF SYSTEMS:  Unobtainable from patient due to sedation/mechanical ventilation    OBJECTIVE     VENTILATOR SETTINGS:  Vent Information  $Ventilation: $Subsequent Day  Skin Assessment: Clean, dry, & intact  Suction Catheter Diameter: 14  Equipment ID: [id number]  Equipment Changed: Expiratory Filter  Vent Type: Servo i  Vent Mode: PRVC  Vt Ordered: 540 mL  Pressure Ordered: (S) 12 (decreased, patient appears to be tolerating wean well)  Rate Set: 15 bmp  Pressure Support: (S) 10 cmH20  FiO2 : 50 %  SpO2: 94 %  SpO2/FiO2 ratio: 190  Sensitivity: 2  PEEP/CPAP: 10  I Time/ I Time %: 0.8 s  Humidification Source: HME  Humidification Temp: 37  Humidification Temp Measured: 36.8  Circuit Condensation: Drained  Nitric Oxide/Epoprostenol In Use?: No     PaO2/FiO2 RATIO:  Recent Labs     22  0423   POCPO2 81.9*      FiO2 : 50 %     VITAL SIGNS:   LAST:  BP (!) 73/47   Pulse 67   Temp 100 °F (37.8 °C) (Bladder)   Resp 19   Ht 6' 2\" (1.88 m)   Wt 285 lb 0.9 oz (129.3 kg)   SpO2 94%   BMI 36.60 kg/m²   8-24 HR RANGE:  TEMP Temp  Av.7 °F (38.2 °C)  Min: 100 °F (37.8 °C)  Max: 100.9 °F (70.0 °C)   BP Systolic (08VPN), XBD:701 , Min:73 , MGX:745      Diastolic (37FYC), KS, Min:47, Max:92     PULSE Pulse  Av.3  Min: 56  Max: 89   RR Resp  Av  Min: 18  Max: 22   O2 SAT SpO2  Av.7 %  Min: 94 %  Max: 95 %   OXYGEN DELIVERY No data recorded        SYSTEMIC EXAMINATION:   General appearance - Mechanically ventilated, ill-appearing  Mental status - sedated with fentanyl and on ketamine drip  Eyes - pupils equal and reactive, sclera anicteric  Mouth - mucous membranes moist  Neck -tracheostomy no active bleeding, neck is supple, no significant adenopathy, carotids upstroke normal bilaterally, no bruits  Chest - Breath sounds bilaterally were dimnished to auscultation at bases. There were mild scattered crackles and rhonchi decreased breath sound at left base. There is no intercostal recession or use of accessory muscles  Heart - normal rate, regular rhythm, normal S1, S2, no murmurs, rubs, clicks or gallops  Abdomen - soft, nontender, nondistended, no masses or organomegaly  Neurological - DTR's normal and symmetric plantars downgoing, motor or sensory not done as patient is sedated on ventilator.   Extremities - peripheral pulses normal, no pedal edema, no clubbing or cyanosis  Skin - normal coloration and turgor, no rashes, no suspicious skin lesions noted     DATA REVIEW     Medications:  Scheduled Meds:   nafcillin  2,000 mg IntraVENous Q4H    oxyCODONE  20 mg Per G Tube Q4H    chlordiazePOXIDE  20 mg Per NG tube 4x Daily    colchicine  0.6 mg Oral Daily    predniSONE  20 mg Per NG tube Daily    baclofen  5 mg Per NG tube TID    sodium chloride flush  5-40 mL IntraVENous 2 times per day    docusate  100 mg Per NG tube BID    insulin lispro  0-6 Units SubCUTAneous Q6H    lansoprazole  30 mg Oral QAM AC    insulin glargine  10 Units SubCUTAneous Nightly    sodium chloride flush  5-40 mL IntraVENous 2 times per day    enoxaparin  30 mg SubCUTAneous BID    Vitamin D  2,000 Units Oral Daily     Continuous Infusions:   ketamine (KETALAR) infusion for analgosedation Stopped (01/04/22 1000)    dexmedetomidine 0.6 mcg/kg/hr (01/04/22 0907)    sodium chloride      sodium chloride      dextrose      norepinephrine Stopped (01/04/22 0130)    midazolam 6 mg/hr (01/04/22 1120)    fentaNYL 100 mcg/hr (01/04/22 1117)    dextrose      sodium chloride 10 mL/hr at 12/13/21 1548       INPUT/OUTPUT:  In: 1414.6 [I.V.:1414.6]  Out: 3300 [Urine:3000]  Date 01/04/22 0000 - 01/04/22 2359   Shift 3848-4689 6994-3574 0358-5015 24 Hour Total   INTAKE   I.V.(mL/kg) 618(4.8)   618(4.8)   Shift Total(mL/kg) 618(4.8)   618(4.8)   OUTPUT   Urine(mL/kg/hr) 775(0.7)   775   Stool(mL/kg) 250(1.9)   250(1.9)   Shift Total(mL/kg) 1025(7.9)   1025(7.9)   Weight (kg) 129.3 129.3 129.3 129.3        LABS:  ABGs:   Recent Labs     01/02/22  0455 01/03/22  0425 01/04/22  0423   POCPH 7.434 7.392 7.439   POCPCO2 49.7* 55.6* 48.6*   POCPO2 62.9* 69.2* 81.9*   POCHCO3 33.3* 33.8* 32.9*   OTTJ2MCL 92* 93* 96     CBC:   Recent Labs     01/02/22  0459 01/02/22  1022 01/03/22  0510 01/04/22  0540   WBC 6.9 6.8 11.3 8.3   HGB 12.2* 12.2* 13.2 12.7*   HCT 38.0* 39.0* 41.3 39.4*   MCV 97.2 98.7 96.9 96.3    See Reflexed IPF Result 154 146   LYMPHOPCT 20*  -- 11* 15*   RBC 3.91* 3.95* 4.26 4.09*   MCH 31.2 30.9 31.0 31.1   MCHC 32.1 31.3 32.0 32.2   RDW 14.5* 14.6* 14.3 14.1     CRP:   No results for input(s): CRP in the last 72 hours. LDH:   No results for input(s): LDH in the last 72 hours. BMP:   Recent Labs     01/02/22 0459 01/03/22  0511 01/04/22  0540    143 141   K 4.2 3.7 3.8    104 104   CO2 29 27 27   BUN 29* 18 15   CREATININE 0.37* 0.28* 0.30*   GLUCOSE 121* 91 111*     Liver Function Test:   Recent Labs     01/02/22 0459 01/03/22  0511 01/04/22  0540   PROT 5.4* 5.9* 5.7*   LABALBU 2.9* 3.1* 2.9*   ALT 64* 137* 87*   AST 21 40* 17   ALKPHOS 56 72 69   BILITOT 0.35 0.42 0.57     Coagulation Profile:   No results for input(s): INR, PROTIME, APTT in the last 72 hours. D-Dimer:  No results for input(s): DDIMER in the last 72 hours. Ferritin:    No results for input(s): FERRITIN in the last 72 hours. Lactic Acid:  No results for input(s): LACTA in the last 72 hours. Cardiac Enzymes:  No results for input(s): CKTOTAL, CKMB, CKMBINDEX, TROPONINI in the last 72 hours. Invalid input(s): TROPONIN, HSTROP  BNP/ProBNP:   No results for input(s): BNP, PROBNP in the last 72 hours. Triglycerides:  No results for input(s): TRIG in the last 72 hours. Microbiology:  Urine Culture:  No components found for: CURINE  Blood Culture:  No components found for: CBLOOD, CFUNGUSBL  Sputum Culture:  No components found for: CSPUTUM  Recent Labs     01/02/22  1039   [facility] . BLOOD  . BLOOD   SPECIAL NOT REPORTED  NOT REPORTED   CULTURE NO GROWTH 2 DAYS  NO GROWTH 2 DAYS     Recent Labs     01/02/22  1039   SPECDESC . BLOOD  . BLOOD   SPECIAL NOT REPORTED  NOT REPORTED   CULTURE NO GROWTH 2 DAYS  NO GROWTH 2 DAYS        Pathology:    Radiology Reports:  XR CHEST PORTABLE   Final Result   Interval tracheostomy. Bilateral patchy interstitial and airspace opacities   are not significantly changed.          VL DUP LOWER EXTREMITY VENOUS BILATERAL   Final Result      XR CHEST PORTABLE   Final Result   Stable support lines and tubes. Stable to slightly increased diffuse   interstitial and airspace opacities. XR CHEST PORTABLE   Final Result   1. Stable cardiomegaly. 2.  Patchy airspace opacities, stable in the right lower chest, slightly   improved on the left. Findings may be related to pulmonary edema or   improving pneumonia. 3.  Support tubes and catheters in good position. XR ABDOMEN (KUB) (SINGLE AP VIEW)   Final Result   Stable mild cardiomegaly. Patchy airspace opacities, left more than right, may be related to pulmonary   edema versus pneumonia. Nonspecific bowel gas pattern. XR CHEST (SINGLE VIEW FRONTAL)   Final Result   Stable mild cardiomegaly. Patchy airspace opacities, left more than right, may be related to pulmonary   edema versus pneumonia. Nonspecific bowel gas pattern. XR CHEST PORTABLE   Final Result   Cardiomegaly, vascular congestion and worsening pulmonary opacities with   bilateral effusions favoring pulmonary edema over multifocal airspace   disease. Support tubes and lines as above. XR CHEST (SINGLE VIEW FRONTAL)   Final Result   No significant change in multifocal airspace opacities. Continued follow-up   is recommended document complete resolution following medical treatment   course. Stable position lines and catheters         XR CHEST (SINGLE VIEW FRONTAL)   Final Result   Mild interval improvement in multifocal airspace disease particularly at the   right base. Support tubes and lines as above. XR CHEST (SINGLE VIEW FRONTAL)   Final Result   Stable appearing              Echocardiogram:   No results found for this or any previous visit.        ASSESSMENT AND PLAN     Assessment:    // Acute hypoxic respiratory failure  // Acute respiratory distress syndrome secondary to Covid pneumonia  // Bilateral multifocal pneumonia due to COVID 19 infection  // spent (excluding procedures), in coordination of care during bedside rounds and discussion of patient care in detail, and recommendations of the team were adopted in the plan. Necessity of all invasive devices was also confirmed. Lluvia Alicia MD  Pulmonary and Critical Care Medicine           1/4/2022, 12:15 PM    This patient was evaluated in the context of the global SARS-CoV-2 (COVID-19) pandemic, which necessitated considerations that the patient either has COVID-19 infection or is at risk of infection with COVID-19. Institutional protocols and algorithms that pertain to the evaluation & management of patients with COVID-19 or those at risk for COVID-19 are in a state of rapid changes based on information released by regulatory bodies including the CDC and federal and state organizations. These policies and algorithms were followed during the patient's care. Please note that this chart was generated using voice recognition Dragon dictation software. Although every effort was made to ensure the accuracy of this automated transcription, some errors in transcription may have occurred.

## 2022-01-04 NOTE — PROGRESS NOTES
[facility]  Office: [address], DO, Lacy Abarca, DO, Alfredo Sanchez, DO, Taylor Morris, DO, Gemma Grant MD, Aparna Nash MD, Yarelis Hernández MD, Austin Richardson MD, Oksana Wong MD, Zuleima Urena MD, Edie Sebastian MD, July Wallace, DO, Danae Rivas DO, Margaux Pandey MD,  Liza Gardiner DO, Vertell Moritz, MD, Brook Mak MD, Marya Oseguera MD, Brent Valdez MD, Any Little MD, Fady Hill MD, Taco Shay MD, Amina Shaver, [facility], [facility], CNP, Refugio Agee, CNP, Cash Burroughs, CNS, Richard Appl, CNP, Lluvia Watters, CNP, Irene Fresh, CNP, Onofre Chowdhury, CNP, Elisa Seo, CNP, Audrey Mckeon PA-C, Terry Mock, [facility], Wes Relihermilo, [facility], Preet Cabrera, CNP, Liv Hollis, CNP, Dawayne Oppenheim, CNP, Ashley Madsen, CNP, Patricia Miller, [facility], [employer], [address] [place] [address]    Progress Note    1/4/2022    3:01 PM    Name:   Lizz Sosa  MRN:     [id number]     [place]:      [de-identified]   Room:   [address] [place] [address] Day:  21  Admit Date:  12/12/2021 11:39 AM    PCP:   Carmenza Morse MD  Code Status:  Full Code    Subjective:     C/C: Shortness of breath    Interval History Status: not changed. Patient seen and examined this morning. No acute events overnight. Weaning sedation. POD #1 from trach/peg placement. FMS preset with liquid stool present. Weaned off of norepinephrine overnight. On 50% FiO2. Brief History:     Lizz Sosa is 60 y.o. male who was admitted to the hospital on 12/12/2021 for treatment of Pneumonia due to COVID-19 virus. Pt initially presented with shortness of breath at [facility].  He had to suggest positive for COVID-19 infection. Patient reported that his son had also COVID-19 infection. Patient was hypoxic in 50s on arrival and was treated with BiPAP however breathing worsened and patient was subsequently intubated.   Patient was transferred to [facility] on 12/12/2021 as he was requiring high ventilator support 100% FiO2 with PEEP of 22. He was given Actemra and started on high-dose Decadron. Patient was started with he was started on Flolan and given paralytics for proning per ARDS protocol. Patient also received intermittent Lasix. Proning was stopped on 12/20/2021 and paralytics were stopped on 12/22/2021. Patient started to have fevers and was started on cefepime. Respiratory cultures were negative. Airborne isolation was removed on 12/31/2021. Trach/PEG placed on 1/3/2022. Review of Systems:     Unable to obtain secondary to intubation and sedation    Medications: Allergies:     Allergies   Allergen Reactions    Nuts [Peanut-Containing Drug Products] Anaphylaxis    Sunflower Oil Anaphylaxis     Sunflower seeds       Current Meds:   Scheduled Meds:    nafcillin  2,000 mg IntraVENous Q4H    oxyCODONE  20 mg Per G Tube Q4H    chlordiazePOXIDE  20 mg Per NG tube 4x Daily    colchicine  0.6 mg Oral Daily    predniSONE  20 mg Per NG tube Daily    baclofen  5 mg Per NG tube TID    sodium chloride flush  5-40 mL IntraVENous 2 times per day    docusate  100 mg Per NG tube BID    insulin lispro  0-6 Units SubCUTAneous Q6H    lansoprazole  30 mg Oral QAM AC    insulin glargine  10 Units SubCUTAneous Nightly    sodium chloride flush  5-40 mL IntraVENous 2 times per day    enoxaparin  30 mg SubCUTAneous BID    Vitamin D  2,000 Units Oral Daily     Continuous Infusions:    ketamine (KETALAR) infusion for analgosedation Stopped (01/04/22 1000)    dexmedetomidine 0.6 mcg/kg/hr (01/04/22 1410)    sodium chloride      sodium chloride      dextrose      norepinephrine Stopped (01/04/22 0130)    midazolam 3 mg/hr (01/04/22 1418)    fentaNYL 50 mcg/hr (01/04/22 1417)    dextrose      sodium chloride 10 mL/hr at 12/13/21 1548     PRN Meds: potassium chloride **OR** potassium alternative oral replacement **OR** potassium chloride, ibuprofen, sodium chloride flush, sodium chloride, metoprolol, polyethylene glycol, senna, bisacodyl, acetaminophen, sodium chloride flush, sodium chloride, ondansetron **OR** ondansetron, [DISCONTINUED] acetaminophen **OR** acetaminophen, glucose, dextrose, glucagon (rDNA), dextrose, glucose, dextrose, glucagon (rDNA), dextrose    Data:     Past Medical History:   has a past medical history of Arthritis, Asthma, Diabetes mellitus ([address] Edema, GERD (gastroesophageal reflux disease), Hypertension, Migraine, and IRMA on CPAP. Social History:   reports that he has quit smoking. He has never used smokeless tobacco. He reports current alcohol use. He reports that he does not use drugs. Family History:   Family History   Problem Relation Age of Onset    Heart Attack Sister 32    Diabetes Paternal Grandmother     Heart Disease Paternal Uncle     Heart Disease Paternal Uncle     Heart Disease Paternal Uncle     Cancer Maternal Aunt     Cancer Maternal Aunt     Cancer Maternal Uncle     Cancer Maternal Uncle        Vitals:  /88   Pulse 96   Temp 100 °F (37.8 °C) (Bladder)   Resp 19   Ht 6' 2\" (1.88 m)   Wt 285 lb 0.9 oz (129.3 kg)   SpO2 95%   BMI 36.60 kg/m²   Temp (24hrs), Av.7 °F (38.2 °C), Min:100 °F (37.8 °C), Max:100.9 °F (38.3 °C)    Recent Labs     22  2136 22  0423 22  0722 22  1118   POCGLU 115* 104* 105 130*       I/O (24Hr):     Intake/Output Summary (Last 24 hours) at 2022 1501  Last data filed at 2022 0600  Gross per 24 hour   Intake 1414.6 ml   Output 3300 ml   Net -1885.4 ml       Labs:  Hematology:  Recent Labs     22  1022 22  0510 22  0540   WBC 6.8 11.3 8.3   RBC 3.95* 4.26 4.09*   HGB 12.2* 13.2 12.7*   HCT 39.0* 41.3 39.4*   MCV 98.7 96.9 96.3   MCH 30.9 31.0 31.1   MCHC 31.3 32.0 32.2   RDW 14.6* 14.3 14.1   PLT See Reflexed IPF Result 154 146   MPV NOT REPORTED 11.0 10.9     Chemistry:  Recent Labs     22  7032 01/03/22  0511 01/04/22  0540    143 141   K 4.2 3.7 3.8    104 104   CO2 29 27 27   GLUCOSE 121* 91 111*   BUN 29* 18 15   CREATININE 0.37* 0.28* 0.30*   ANIONGAP 9 12 10   LABGLOM >60 >60 >60   GFRAA >60 >60 >60   CALCIUM 9.0 8.9 8.5*     Recent Labs     01/02/22  0459 01/02/22  0535 01/03/22  0425 01/03/22  0511 01/03/22  1219 01/03/22  1740 01/03/22  2136 01/04/22  0423 01/04/22  0540 01/04/22  0722 01/04/22  1118   PROT 5.4*  --   --  5.9*  --   --   --   --  5.7*  --   --    LABALBU 2.9*  --   --  3.1*  --   --   --   --  2.9*  --   --    AST 21  --   --  40*  --   --   --   --  17  --   --    ALT 64*  --   --  137*  --   --   --   --  87*  --   --    ALKPHOS 56  --   --  72  --   --   --   --  69  --   --    BILITOT 0.35  --   --  0.42  --   --   --   --  0.57  --   --    BILIDIR 0.15  --   --  0.14  --   --   --   --  0.20  --   --    POCGLU  --    < >   < >  --  117* 122* 115* 104*  --  105 130*    < > = values in this interval not displayed. ABG:  Lab Results   Component Value Date    POCPH 7.439 01/04/2022    POCPCO2 48.6 01/04/2022    POCPO2 81.9 01/04/2022    POCHCO3 32.9 01/04/2022    NBEA NOT REPORTED 01/04/2022    PBEA 7 01/04/2022    NFM1TGG NOT REPORTED 01/04/2022    QZOG8QIQ 96 01/04/2022    FIO2 50.0 01/04/2022     Lab Results   Component Value Date/Time    SPECIAL NOT REPORTED 01/02/2022 10:39 AM    SPECIAL NOT REPORTED 01/02/2022 10:39 AM     Lab Results   Component Value Date/Time    CULTURE NO GROWTH 2 DAYS 01/02/2022 10:39 AM    CULTURE NO GROWTH 2 DAYS 01/02/2022 10:39 AM     Radiology:  XR CHEST PORTABLE    Result Date: 12/29/2021  Stable support lines and tubes. Stable to slightly increased diffuse interstitial and airspace opacities. Physical Examination:        General appearance: Morbidly obese  gentleman lying supine in bed. Intubated, sedated. Restless. HEENT: Tracheostomy present; C/D/I incision.   Lungs: Mechanical breath sounds, clear to auscultation bilaterally, normal effort  Heart:  regular rate and rhythm, no murmur  Abdomen:  soft, nontender, protuberant, nondistended, normal bowel sounds, no masses, hepatomegaly, splenomegaly, PEG tube present  : Hahn catheter and FMS present. Extremities: SCDs present bilaterally, left calcaneus erythema and edema is noted along with erythema to the first MCP with a postsurgical scar noted on the dorsal aspect of the great toe  Skin: Erythema as noted above    Assessment:        Hospital Problems           Last Modified POA    * (Principal) Pneumonia due to COVID-19 virus 12/21/2021 Yes    Acute respiratory failure with hypoxia ([address] 12/21/2021 Yes    ARDS (adult respiratory distress syndrome) ([address] 12/21/2021 Yes    Shock ([address] 12/21/2021 No    Acute idiopathic gout of left foot 1/3/2022 No    Type 2 diabetes mellitus ([address] 12/21/2021 Yes    Asthma 12/21/2021 Yes    IRMA on CPAP 12/12/2021 Yes    Overview Signed 12/12/2021  2:39 PM by Becca Adler DO     seldom use machine         Hypertension 12/12/2021 Yes    Overview Signed 12/12/2021  2:39 PM by Becca Adler DO     on lasix         GERD (gastroesophageal reflux disease) 12/12/2021 Yes          Plan:        Acute respiratory failure with hypoxia due to COVID-19 pneumonia-pulmonology/critical care is following ventilator support. Continue oral prednisone taper. Received Actemra on 12/12. POD #1 from trach/Peg placement. Weaning sedation. MSSA pneumonia - meropenem changed to Nafcillin. Blood cultures have been negative. Intermittent fevers - Ideally, would like to wean Precedex, as you can have fever as an adverse effect. Alternatively, you can have a fever from withdrawal D/W critical care. Fevers may also be from MSSA pneumonia or COVID. Sedation remains a significant issue -discussed with pulmonology today. On Versed, fentanyl, ketamine, Precedex, Roxicodone, Librium to maintain his sedation.   Increase oral benzodiazepine and opiate and decrease Versed, fentanyl, Precedex. Stopping Ketamine today, per crit care  Bowel regimen-stop lactulose. Continue scheduled Colace and as needed MiraLAX. Questionable gout-continue Colcrys and prednisone. Type 2 diabetes mellitus-continue insulin sliding scale and Lantus. Continue DVT prophylaxis with Lovenox  Continue GI prophylaxis with lansoprazole  Obesity with BMI of 36.37-will need diet/weight loss/exercise/therapies in the future  IRMA-was noncompliant with CPAP at home  Disposition: POD #1 from trach/peg.  Restarting meds via [address], DO  1/4/2022  3:01 PM

## 2022-01-05 PROBLEM — R65.10 SIRS (SYSTEMIC INFLAMMATORY RESPONSE SYNDROME) (HCC): Status: ACTIVE | Noted: 2022-01-05

## 2022-01-05 LAB
ABSOLUTE EOS #: 0.28 K/UL (ref 0–0.44)
ABSOLUTE IMMATURE GRANULOCYTE: 0.09 K/UL (ref 0–0.3)
ABSOLUTE LYMPH #: 1.03 K/UL (ref 1.1–3.7)
ABSOLUTE MONO #: 0.83 K/UL (ref 0.1–1.2)
ANION GAP SERPL CALCULATED.3IONS-SCNC: 11 MMOL/L (ref 9–17)
BASOPHILS # BLD: 0 % (ref 0–2)
BASOPHILS ABSOLUTE: <0.03 K/UL (ref 0–0.2)
BUN BLDV-MCNC: 16 MG/DL (ref 6–20)
BUN/CREAT BLD: ABNORMAL (ref 9–20)
CALCIUM SERPL-MCNC: 9.5 MG/DL (ref 8.6–10.4)
CHLORIDE BLD-SCNC: 107 MMOL/L (ref 98–107)
CO2: 26 MMOL/L (ref 20–31)
CREAT SERPL-MCNC: 0.36 MG/DL (ref 0.7–1.2)
DIFFERENTIAL TYPE: ABNORMAL
EOSINOPHILS RELATIVE PERCENT: 3 % (ref 1–4)
GFR AFRICAN AMERICAN: >60 ML/MIN
GFR NON-AFRICAN AMERICAN: >60 ML/MIN
GFR SERPL CREATININE-BSD FRML MDRD: ABNORMAL ML/MIN/{1.73_M2}
GFR SERPL CREATININE-BSD FRML MDRD: ABNORMAL ML/MIN/{1.73_M2}
GLUCOSE BLD-MCNC: 121 MG/DL (ref 70–99)
GLUCOSE BLD-MCNC: 129 MG/DL (ref 75–110)
GLUCOSE BLD-MCNC: 129 MG/DL (ref 75–110)
GLUCOSE BLD-MCNC: 138 MG/DL (ref 75–110)
GLUCOSE BLD-MCNC: 156 MG/DL (ref 75–110)
GLUCOSE BLD-MCNC: 164 MG/DL (ref 75–110)
HCT VFR BLD CALC: 41.4 % (ref 40.7–50.3)
HEMOGLOBIN: 13.5 G/DL (ref 13–17)
IMMATURE GRANULOCYTES: 1 %
LACTIC ACID, SEPSIS WHOLE BLOOD: 0.9 MMOL/L (ref 0.5–1.9)
LACTIC ACID, SEPSIS: NORMAL MMOL/L (ref 0.5–1.9)
LYMPHOCYTES # BLD: 11 % (ref 24–43)
MAGNESIUM: 2.1 MG/DL (ref 1.6–2.6)
MCH RBC QN AUTO: 31.3 PG (ref 25.2–33.5)
MCHC RBC AUTO-ENTMCNC: 32.6 G/DL (ref 28.4–34.8)
MCV RBC AUTO: 95.8 FL (ref 82.6–102.9)
MONOCYTES # BLD: 9 % (ref 3–12)
NRBC AUTOMATED: 0 PER 100 WBC
PDW BLD-RTO: 14.1 % (ref 11.8–14.4)
PLATELET # BLD: 161 K/UL (ref 138–453)
PLATELET ESTIMATE: ABNORMAL
PMV BLD AUTO: 11.1 FL (ref 8.1–13.5)
POTASSIUM SERPL-SCNC: 3.2 MMOL/L (ref 3.7–5.3)
RBC # BLD: 4.32 M/UL (ref 4.21–5.77)
RBC # BLD: ABNORMAL 10*6/UL
SEG NEUTROPHILS: 76 % (ref 36–65)
SEGMENTED NEUTROPHILS ABSOLUTE COUNT: 7.42 K/UL (ref 1.5–8.1)
SODIUM BLD-SCNC: 144 MMOL/L (ref 135–144)
WBC # BLD: 9.7 K/UL (ref 3.5–11.3)
WBC # BLD: ABNORMAL 10*3/UL

## 2022-01-05 PROCEDURE — 2500000003 HC RX 250 WO HCPCS: Performed by: STUDENT IN AN ORGANIZED HEALTH CARE EDUCATION/TRAINING PROGRAM

## 2022-01-05 PROCEDURE — 6370000000 HC RX 637 (ALT 250 FOR IP): Performed by: INTERNAL MEDICINE

## 2022-01-05 PROCEDURE — 6370000000 HC RX 637 (ALT 250 FOR IP): Performed by: STUDENT IN AN ORGANIZED HEALTH CARE EDUCATION/TRAINING PROGRAM

## 2022-01-05 PROCEDURE — 94003 VENT MGMT INPAT SUBQ DAY: CPT

## 2022-01-05 PROCEDURE — 87040 BLOOD CULTURE FOR BACTERIA: CPT

## 2022-01-05 PROCEDURE — 85025 COMPLETE CBC W/AUTO DIFF WBC: CPT

## 2022-01-05 PROCEDURE — 99291 CRITICAL CARE FIRST HOUR: CPT | Performed by: INTERNAL MEDICINE

## 2022-01-05 PROCEDURE — 83735 ASSAY OF MAGNESIUM: CPT

## 2022-01-05 PROCEDURE — 99233 SBSQ HOSP IP/OBS HIGH 50: CPT | Performed by: INTERNAL MEDICINE

## 2022-01-05 PROCEDURE — 2000000000 HC ICU R&B

## 2022-01-05 PROCEDURE — 6360000002 HC RX W HCPCS: Performed by: STUDENT IN AN ORGANIZED HEALTH CARE EDUCATION/TRAINING PROGRAM

## 2022-01-05 PROCEDURE — 83605 ASSAY OF LACTIC ACID: CPT

## 2022-01-05 PROCEDURE — 82947 ASSAY GLUCOSE BLOOD QUANT: CPT

## 2022-01-05 PROCEDURE — 80048 BASIC METABOLIC PNL TOTAL CA: CPT

## 2022-01-05 PROCEDURE — 36415 COLL VENOUS BLD VENIPUNCTURE: CPT

## 2022-01-05 PROCEDURE — 2700000000 HC OXYGEN THERAPY PER DAY

## 2022-01-05 PROCEDURE — 2580000003 HC RX 258: Performed by: STUDENT IN AN ORGANIZED HEALTH CARE EDUCATION/TRAINING PROGRAM

## 2022-01-05 PROCEDURE — 94761 N-INVAS EAR/PLS OXIMETRY MLT: CPT

## 2022-01-05 RX ORDER — CHLORDIAZEPOXIDE HYDROCHLORIDE 5 MG/1
10 CAPSULE, GELATIN COATED ORAL 4 TIMES DAILY
Status: DISCONTINUED | OUTPATIENT
Start: 2022-01-05 | End: 2022-01-07 | Stop reason: HOSPADM

## 2022-01-05 RX ORDER — OXYCODONE HCL 5 MG/5 ML
10 SOLUTION, ORAL ORAL EVERY 4 HOURS
Status: DISCONTINUED | OUTPATIENT
Start: 2022-01-05 | End: 2022-01-07 | Stop reason: HOSPADM

## 2022-01-05 RX ADMIN — ENOXAPARIN SODIUM 30 MG: 100 INJECTION SUBCUTANEOUS at 20:34

## 2022-01-05 RX ADMIN — NAFCILLIN SODIUM 2000 MG: 2 INJECTION, POWDER, LYOPHILIZED, FOR SOLUTION INTRAMUSCULAR; INTRAVENOUS at 17:36

## 2022-01-05 RX ADMIN — ACETAMINOPHEN 650 MG: 160 SOLUTION ORAL at 20:40

## 2022-01-05 RX ADMIN — OXYCODONE HYDROCHLORIDE 20 MG: 5 SOLUTION ORAL at 10:45

## 2022-01-05 RX ADMIN — INSULIN GLARGINE 10 UNITS: 100 INJECTION, SOLUTION SUBCUTANEOUS at 20:44

## 2022-01-05 RX ADMIN — DEXMEDETOMIDINE HYDROCHLORIDE 0.4 MCG/KG/HR: 4 INJECTION, SOLUTION INTRAVENOUS at 11:17

## 2022-01-05 RX ADMIN — INSULIN LISPRO 1 UNITS: 100 INJECTION, SOLUTION INTRAVENOUS; SUBCUTANEOUS at 12:11

## 2022-01-05 RX ADMIN — COLCHICINE 0.6 MG: 0.6 TABLET, FILM COATED ORAL at 09:12

## 2022-01-05 RX ADMIN — OXYCODONE HYDROCHLORIDE 10 MG: 5 SOLUTION ORAL at 17:36

## 2022-01-05 RX ADMIN — DEXMEDETOMIDINE HYDROCHLORIDE 0.4 MCG/KG/HR: 4 INJECTION, SOLUTION INTRAVENOUS at 19:24

## 2022-01-05 RX ADMIN — DEXMEDETOMIDINE HYDROCHLORIDE 0.2 MCG/KG/HR: 4 INJECTION, SOLUTION INTRAVENOUS at 02:27

## 2022-01-05 RX ADMIN — ACETAMINOPHEN 650 MG: 160 SOLUTION ORAL at 09:27

## 2022-01-05 RX ADMIN — CHLORDIAZEPOXIDE HYDROCHLORIDE 10 MG: 5 CAPSULE ORAL at 21:53

## 2022-01-05 RX ADMIN — ACETAMINOPHEN 650 MG: 160 SOLUTION ORAL at 14:24

## 2022-01-05 RX ADMIN — NAFCILLIN SODIUM 2000 MG: 2 INJECTION, POWDER, LYOPHILIZED, FOR SOLUTION INTRAMUSCULAR; INTRAVENOUS at 09:14

## 2022-01-05 RX ADMIN — ENOXAPARIN SODIUM 30 MG: 100 INJECTION SUBCUTANEOUS at 09:14

## 2022-01-05 RX ADMIN — OXYCODONE HYDROCHLORIDE 20 MG: 5 SOLUTION ORAL at 01:45

## 2022-01-05 RX ADMIN — OXYCODONE HYDROCHLORIDE 10 MG: 5 SOLUTION ORAL at 21:53

## 2022-01-05 RX ADMIN — CHLORDIAZEPOXIDE HYDROCHLORIDE 20 MG: 5 CAPSULE ORAL at 09:13

## 2022-01-05 RX ADMIN — NAFCILLIN SODIUM 2000 MG: 2 INJECTION, POWDER, LYOPHILIZED, FOR SOLUTION INTRAMUSCULAR; INTRAVENOUS at 21:56

## 2022-01-05 RX ADMIN — BACLOFEN 5 MG: 10 TABLET ORAL at 09:12

## 2022-01-05 RX ADMIN — Medication 2000 UNITS: at 09:12

## 2022-01-05 RX ADMIN — OXYCODONE HYDROCHLORIDE 20 MG: 5 SOLUTION ORAL at 06:08

## 2022-01-05 RX ADMIN — Medication 30 MG: at 11:16

## 2022-01-05 RX ADMIN — PREDNISONE 20 MG: 20 TABLET ORAL at 09:12

## 2022-01-05 RX ADMIN — NAFCILLIN SODIUM 2000 MG: 2 INJECTION, POWDER, LYOPHILIZED, FOR SOLUTION INTRAMUSCULAR; INTRAVENOUS at 01:17

## 2022-01-05 RX ADMIN — NAFCILLIN SODIUM 2000 MG: 2 INJECTION, POWDER, LYOPHILIZED, FOR SOLUTION INTRAMUSCULAR; INTRAVENOUS at 14:22

## 2022-01-05 RX ADMIN — NAFCILLIN SODIUM 2000 MG: 2 INJECTION, POWDER, LYOPHILIZED, FOR SOLUTION INTRAMUSCULAR; INTRAVENOUS at 04:52

## 2022-01-05 RX ADMIN — POTASSIUM BICARBONATE 40 MEQ: 782 TABLET, EFFERVESCENT ORAL at 04:46

## 2022-01-05 RX ADMIN — CHLORDIAZEPOXIDE HYDROCHLORIDE 10 MG: 5 CAPSULE ORAL at 15:30

## 2022-01-05 RX ADMIN — SODIUM CHLORIDE, PRESERVATIVE FREE 10 ML: 5 INJECTION INTRAVENOUS at 20:34

## 2022-01-05 RX ADMIN — CHLORDIAZEPOXIDE HYDROCHLORIDE 20 MG: 5 CAPSULE ORAL at 04:01

## 2022-01-05 ASSESSMENT — PAIN SCALES - GENERAL
PAINLEVEL_OUTOF10: 0

## 2022-01-05 ASSESSMENT — PULMONARY FUNCTION TESTS
PIF_VALUE: 15
PIF_VALUE: 16
PIF_VALUE: 28
PIF_VALUE: 25

## 2022-01-05 NOTE — PLAN OF CARE
Problem: Airway Clearance - Ineffective  Goal: Achieve or maintain patent airway  1/5/2022 0949 by Yves Gar RN  Outcome: Ongoing  1/5/2022 0007 by Viv Watson  Outcome: Ongoing     Problem: Gas Exchange - Impaired  Goal: Absence of hypoxia  1/5/2022 0949 by Yves Gar RN  Outcome: Ongoing  1/5/2022 0007 by Viv Watson  Outcome: Ongoing  Goal: Promote optimal lung function  1/5/2022 0949 by Yves Gar RN  Outcome: Ongoing  1/5/2022 0007 by Viv Watson  Outcome: Ongoing     Problem: Breathing Pattern - Ineffective  Goal: Ability to achieve and maintain a regular respiratory rate  1/5/2022 0949 by Yves Gar RN  Outcome: Ongoing  1/5/2022 0007 by Viv Watson  Outcome: Ongoing     Problem:  Body Temperature -  Risk of, Imbalanced  Goal: Ability to maintain a body temperature within defined limits  1/5/2022 0949 by Yves Gar RN  Outcome: Ongoing  1/5/2022 0007 by Viv Watson  Outcome: Ongoing  Goal: Will regain or maintain usual level of consciousness  1/5/2022 0949 by Yves Gar RN  Outcome: Ongoing  1/5/2022 0007 by Viv Watson  Outcome: Ongoing  Goal: Complications related to the disease process, condition or treatment will be avoided or minimized  1/5/2022 0949 by Yves Gar RN  Outcome: Ongoing  1/5/2022 0007 by Viv Watson  Outcome: Ongoing     Problem: Isolation Precautions - Risk of Spread of Infection  Goal: Prevent transmission of infection  1/5/2022 0949 by Yves Gar RN  Outcome: Ongoing  1/5/2022 0007 by Viv Watson  Outcome: Ongoing     Problem: Nutrition Deficits  Goal: Optimize nutritional status  1/5/2022 0949 by Yves Gar RN  Outcome: Ongoing  1/5/2022 0007 by Viv Watson  Outcome: Ongoing     Problem: Risk for Fluid Volume Deficit  Goal: Maintain normal heart rhythm  1/5/2022 0949 by Yves Gar RN  Outcome: Ongoing  1/5/2022 0007 by Viv Watson  Outcome: Ongoing  Goal: Maintain absence of muscle cramping  1/5/2022 0949 by Amy Rapp RN  Outcome: Ongoing  1/5/2022 0007 by Leona Alonso  Outcome: Ongoing  Goal: Maintain normal serum potassium, sodium, calcium, phosphorus, and pH  1/5/2022 0949 by Amy Rapp RN  Outcome: Ongoing  1/5/2022 0007 by Leona Balloon  Outcome: Ongoing     Problem: Loneliness or Risk for Loneliness  Goal: Demonstrate positive use of time alone when socialization is not possible  1/5/2022 0949 by Amy Rapp RN  Outcome: Ongoing  1/5/2022 0007 by Leona Alonso  Outcome: Ongoing     Problem: Fatigue  Goal: Verbalize increase energy and improved vitality  1/5/2022 0949 by Amy Rapp RN  Outcome: Ongoing  1/5/2022 0007 by Leona Alonso  Outcome: Ongoing     Problem: Patient Education: Go to Patient Education Activity  Goal: Patient/Family Education  1/5/2022 0949 by Amy Rapp RN  Outcome: Ongoing  1/5/2022 0007 by Leona Alonso  Outcome: Ongoing     Problem: OXYGENATION/RESPIRATORY FUNCTION  Goal: Patient will maintain patent airway  1/5/2022 0949 by Amy Rapp RN  Outcome: Ongoing  1/5/2022 0551 by Silvio Madrigal RCP  Outcome: Ongoing  1/5/2022 0007 by Leona Alonso  Outcome: Ongoing  Goal: Patient will achieve/maintain normal respiratory rate/effort  Description: Respiratory rate and effort will be within normal limits for the patient  1/5/2022 0949 by Amy Rapp RN  Outcome: Ongoing  1/5/2022 0551 by Silvio Madrigal RCP  Outcome: Ongoing  1/5/2022 0007 by Leona Alonso  Outcome: Ongoing     Problem: MECHANICAL VENTILATION  Goal: Patient will maintain patent airway  1/5/2022 0949 by Amy Rapp RN  Outcome: Ongoing  1/5/2022 0551 by Silvio Madrigal RCP  Outcome: Ongoing  1/5/2022 0007 by Leona Alonso  Outcome: Ongoing  Goal: Patient will maintain patent airway  1/5/2022 0949 by Amy Rapp RN  Outcome: Ongoing  1/5/2022 0551 by Silvio Madrigal RCP  Outcome: Ongoing  1/5/2022 0007 by Leona Alonso  Outcome: Ongoing  Goal: Oral health is maintained or improved  1/5/2022 0949 by Yessy Vargas RN  Outcome: Ongoing  1/5/2022 0551 by Lionel Bee RCP  Outcome: Ongoing  1/5/2022 0007 by Kunal Bonds  Outcome: Ongoing  Goal: ET tube will be managed safely  1/5/2022 0949 by Yessy Vargas RN  Outcome: Ongoing  1/5/2022 0007 by Kunal Bonds  Outcome: Ongoing  Goal: Ability to express needs and understand communication  1/5/2022 0949 by Yessy Vargas RN  Outcome: Ongoing  1/5/2022 0551 by Lionel Bee RCP  Outcome: Ongoing  1/5/2022 0007 by Kunal Bonds  Outcome: Ongoing  Goal: Mobility/activity is maintained at optimum level for patient  1/5/2022 0949 by Yessy Vargas RN  Outcome: Ongoing  1/5/2022 0551 by Lionel Bee RCP  Outcome: Ongoing  1/5/2022 0007 by Kunal Bonds  Outcome: Ongoing  Goal: Tracheostomy will be managed safely  1/5/2022 0949 by Yessy Vargas RN  Outcome: Ongoing  1/5/2022 0551 by Lionel Bee RCP  Outcome: Ongoing     Problem: SKIN INTEGRITY  Goal: Skin integrity is maintained or improved  1/5/2022 0949 by Yessy Vargas RN  Outcome: Ongoing  1/5/2022 0551 by Lionel Bee RCP  Outcome: Ongoing  1/5/2022 0007 by Kunal Bonds  Outcome: Ongoing     Problem: Skin Integrity:  Goal: Will show no infection signs and symptoms  Description: Will show no infection signs and symptoms  1/5/2022 0949 by Yessy Vargas RN  Outcome: Ongoing  1/5/2022 0007 by Kunal Bonds  Outcome: Ongoing  Goal: Absence of new skin breakdown  Description: Absence of new skin breakdown  1/5/2022 0949 by Yessy Vargas RN  Outcome: Ongoing  1/5/2022 0007 by Kunal Bonds  Outcome: Ongoing     Problem: Falls - Risk of:  Goal: Will remain free from falls  Description: Will remain free from falls  1/5/2022 0949 by Yessy Vargas RN  Outcome: Ongoing  1/5/2022 0007 by Kunal Bonds  Outcome: Ongoing  Goal: Absence of physical injury  Description: Absence of physical injury  1/5/2022 0949 by Yessy Vargas RN  Outcome: Ongoing  1/5/2022 0007 by Dane Guillory  Outcome: Ongoing     Problem: Nutrition  Goal: Optimal nutrition therapy  1/5/2022 0949 by Kaylen Moreno RN  Outcome: Ongoing  1/5/2022 0007 by Dane Guillory  Outcome: Ongoing     Problem: Discharge Planning:  Goal: Participates in care planning  Description: Participates in care planning  1/5/2022 0949 by Kaylen Moreno RN  Outcome: Ongoing  1/5/2022 0007 by Dane Guillory  Outcome: Ongoing  Goal: Discharged to appropriate level of care  Description: Discharged to appropriate level of care  1/5/2022 0949 by Kaylen Moreno RN  Outcome: Ongoing  1/5/2022 0007 by Dane Guillory  Outcome: Ongoing     Problem: Non-Violent Restraints  Goal: Removal from restraints as soon as assessed to be safe  1/5/2022 0949 by Kaylen Moreno RN  Outcome: Ongoing  1/5/2022 0007 by Dane Guillory  Outcome: Ongoing  Goal: No harm/injury to patient while restraints in use  1/5/2022 0949 by Kaylen Moreno RN  Outcome: Ongoing  1/5/2022 0007 by Dane Guillory  Outcome: Ongoing  Goal: Patient's dignity will be maintained  1/5/2022 0949 by Kaylen Moreno RN  Outcome: Ongoing  1/5/2022 0007 by Dnae Guillory  Outcome: Ongoing

## 2022-01-05 NOTE — PROGRESS NOTES
[facility]  Office: [address], DO, Lalito Dupree, DO, Leonel Brent, DO, Nathaneleazar Bernal Blood, DO, Emanuel Villafuerte MD, Donna Rivera MD, Belgica Berman MD, Misha Persaud MD, Erika Flores MD, Queen Yessi MD, Alphonso Klinefelter, MD, Eladio Hernandez, DO, Maegan Diaz, DO, Armand Quan MD,  Ruthie Berry, DO, Viola Pelletier MD, Analy Kramer MD, Glenroy Ellington MD, Chrissy Sanchez MD, Leatha Guerrier MD, Dannie Cummins MD, Alysa Echavarria MD, Kevin Nash, [facility], [facility], CNP, Ad Warren, CNP, Vidhi Ahumada, CNS, Jordan Herndon, CNP, Alexander Almonte, CNP, Martinez Romero, CNP, Liliane Marion, CNP, Juno Perrin, CNP, Venecia Proctor PA-C, Shaw Bentley, [facility], Sarthak Pack, [facility], Roberto Barbosa, CNP, Nacho Beckwith, CNP, Korin Monaco, CNP, Charlene Salas, CNP, Janis Barbour, CNP, Theresa Rich, [address] [place] [address]    Progress Note    1/5/2022    2:59 PM    Name:   Coty Gregory  MRN:     [id number]     [place]:      [de-identified]   Room:   [address] [place] [address] Day:  24  Admit Date:  12/12/2021 11:39 AM    PCP:   Eliazar Merlin, MD  Code Status:  Full Code    Subjective:     C/C: Shortness of breath    Interval History Status: not changed. Patient seen and examined this afternoon. Awake and alert. Not completely following all commands. Significant fevers over the last 24 hours. Patient did have episode after emesis. POD #2 from trach/peg. Only on oral benzo/opiate and precedex. Tolerating sedation wean well. On 45% FiO2. Brief History:     Coty Gregory is 60 y.o. male who was admitted to the hospital on 12/12/2021 for treatment of Pneumonia due to COVID-19 virus. Pt initially presented with shortness of breath at [facility].  He had to suggest positive for COVID-19 infection. Patient reported that his son had also COVID-19 infection.   Patient was hypoxic in 50s on arrival and was treated with BiPAP however breathing worsened and patient was subsequently intubated. Patient was transferred to [facility] on 12/12/2021 as he was requiring high ventilator support 100% FiO2 with PEEP of 22. He was given Actemra and started on high-dose Decadron. Patient was started with he was started on Flolan and given paralytics for proning per ARDS protocol. Patient also received intermittent Lasix. Proning was stopped on 12/20/2021 and paralytics were stopped on 12/22/2021. Patient started to have fevers and was started on cefepime. Respiratory cultures were negative. Airborne isolation was removed on 12/31/2021. Trach/PEG placed on 1/3/2022. Review of Systems:     Unable to obtain secondary to intubation and sedation    Medications: Allergies:     Allergies   Allergen Reactions    Nuts [Peanut-Containing Drug Products] Anaphylaxis    Sunflower Oil Anaphylaxis     Sunflower seeds       Current Meds:   Scheduled Meds:    chlordiazePOXIDE  10 mg Per NG tube 4x Daily    oxyCODONE  10 mg Per G Tube Q4H    nafcillin  2,000 mg IntraVENous Q4H    colchicine  0.6 mg Oral Daily    baclofen  5 mg Per NG tube TID    sodium chloride flush  5-40 mL IntraVENous 2 times per day    docusate  100 mg Per NG tube BID    insulin lispro  0-6 Units SubCUTAneous Q6H    lansoprazole  30 mg Oral QAM AC    insulin glargine  10 Units SubCUTAneous Nightly    sodium chloride flush  5-40 mL IntraVENous 2 times per day    enoxaparin  30 mg SubCUTAneous BID    Vitamin D  2,000 Units Oral Daily     Continuous Infusions:    ketamine (KETALAR) infusion for analgosedation Stopped (01/04/22 1000)    dexmedetomidine 0.4 mcg/kg/hr (01/05/22 1117)    sodium chloride      sodium chloride      dextrose      norepinephrine Stopped (01/04/22 2335)    midazolam Stopped (01/05/22 0800)    fentaNYL Stopped (01/05/22 0235)    dextrose      sodium chloride 10 mL/hr at 12/13/21 1548     PRN Meds: potassium chloride **OR** potassium alternative oral replacement **OR** potassium chloride, ibuprofen, sodium chloride flush, sodium chloride, metoprolol, polyethylene glycol, senna, bisacodyl, acetaminophen, sodium chloride flush, sodium chloride, ondansetron **OR** ondansetron, [DISCONTINUED] acetaminophen **OR** acetaminophen, glucose, dextrose, glucagon (rDNA), dextrose, glucose, dextrose, glucagon (rDNA), dextrose    Data:     Past Medical History:   has a past medical history of Arthritis, Asthma, Diabetes mellitus ([address] Edema, GERD (gastroesophageal reflux disease), Hypertension, Migraine, and IRMA on CPAP. Social History:   reports that he has quit smoking. He has never used smokeless tobacco. He reports current alcohol use. He reports that he does not use drugs. Family History:   Family History   Problem Relation Age of Onset    Heart Attack Sister 32    Diabetes Paternal Grandmother     Heart Disease Paternal Uncle     Heart Disease Paternal Uncle     Heart Disease Paternal Uncle     Cancer Maternal Aunt     Cancer Maternal Aunt     Cancer Maternal Uncle     Cancer Maternal Uncle        Vitals:  BP (!) 142/101   Pulse 119   Temp 101.5 °F (38.6 °C) (Bladder)   Resp (!) 31   Ht 6' 2\" (1.88 m)   Wt 279 lb 1.6 oz (126.6 kg)   SpO2 96%   BMI 35.83 kg/m²   Temp (24hrs), Av.9 °F (38.3 °C), Min:100 °F (37.8 °C), Max:101.5 °F (38.6 °C)    Recent Labs     22  1959 22  2338 22  0712 22  1147   POCGLU 156* 169* 138* 164*       I/O (24Hr):     Intake/Output Summary (Last 24 hours) at 2022 1459  Last data filed at 2022 1414  Gross per 24 hour   Intake 1591.4 ml   Output 3223 ml   Net -1631.6 ml       Labs:  Hematology:  Recent Labs     22  0510 22  0540 22  0340   WBC 11.3 8.3 9.7   RBC 4.26 4.09* 4.32   HGB 13.2 12.7* 13.5   HCT 41.3 39.4* 41.4   MCV 96.9 96.3 95.8   MCH 31.0 31.1 31.3   MCHC 32.0 32.2 32.6   RDW 14.3 14.1 14.1    146 161   MPV 11.0 10.9 11.1     Chemistry:  Recent Labs 01/03/22  0511 01/04/22  0540 01/05/22  0340    141 144   K 3.7 3.8 3.2*    104 107   CO2 27 27 26   GLUCOSE 91 111* 121*   BUN 18 15 16   CREATININE 0.28* 0.30* 0.36*   MG  --   --  2.1   ANIONGAP 12 10 11   LABGLOM >60 >60 >60   GFRAA >60 >60 >60   CALCIUM 8.9 8.5* 9.5     Recent Labs     01/03/22  0511 01/03/22  1219 01/04/22  0540 01/04/22  0722 01/04/22  1118 01/04/22  1615 01/04/22  1959 01/04/22  2338 01/05/22 0712 01/05/22  1147   PROT 5.9*  --  5.7*  --   --   --   --   --   --   --    LABALBU 3.1*  --  2.9*  --   --   --   --   --   --   --    AST 40*  --  17  --   --   --   --   --   --   --    *  --  87*  --   --   --   --   --   --   --    ALKPHOS 72  --  69  --   --   --   --   --   --   --    BILITOT 0.42  --  0.57  --   --   --   --   --   --   --    BILIDIR 0.14  --  0.20  --   --   --   --   --   --   --    POCGLU  --    < >  --    < > 130* 122* 156* 169* 138* 164*    < > = values in this interval not displayed. ABG:  Lab Results   Component Value Date    POCPH 7.439 01/04/2022    POCPCO2 48.6 01/04/2022    POCPO2 81.9 01/04/2022    POCHCO3 32.9 01/04/2022    NBEA NOT REPORTED 01/04/2022    PBEA 7 01/04/2022    KIU0UTG NOT REPORTED 01/04/2022    DLIA0XQA 96 01/04/2022    FIO2 50.0 01/04/2022     Lab Results   Component Value Date/Time    SPECIAL NOT REPORTED 01/02/2022 10:39 AM    SPECIAL NOT REPORTED 01/02/2022 10:39 AM     Lab Results   Component Value Date/Time    CULTURE NO GROWTH 3 DAYS 01/02/2022 10:39 AM    CULTURE NO GROWTH 3 DAYS 01/02/2022 10:39 AM     Radiology:  XR CHEST PORTABLE    Result Date: 12/29/2021  Stable support lines and tubes. Stable to slightly increased diffuse interstitial and airspace opacities. Physical Examination:        General appearance: Morbidly obese  gentleman lying supine in bed. Intubated, sedated. Restless. Awake and alert, not following all commands. Has just had an episode of emesis.   HEENT: Tracheostomy present; C/D/I incision. Lungs: Mechanical breath sounds, clear to auscultation bilaterally, increased effort  Heart:  regular rate and rhythm, no murmur  Abdomen:  soft, nontender, protuberant, nondistended, normal bowel sounds, no masses, hepatomegaly, splenomegaly, PEG tube present  : Hahn catheter and FMS present. Extremities: SCDs present bilaterally, no edema noted. Skin: Erythema improved    Assessment:        Hospital Problems           Last Modified POA    * (Principal) Pneumonia due to COVID-19 virus 12/21/2021 Yes    Acute respiratory failure with hypoxia ([address] 12/21/2021 Yes    ARDS (adult respiratory distress syndrome) ([address] 12/21/2021 Yes    Shock ([address] 12/21/2021 No    Acute idiopathic gout of left foot 1/3/2022 No    Type 2 diabetes mellitus ([address] 12/21/2021 Yes    Asthma 12/21/2021 Yes    IRMA on CPAP 12/12/2021 Yes    Overview Signed 12/12/2021  2:39 PM by Danielle Verma, DO     seldom use machine         Hypertension 12/12/2021 Yes    Overview Signed 12/12/2021  2:39 PM by Danielle Verma, DO     on lasix         GERD (gastroesophageal reflux disease) 12/12/2021 Yes          Plan:        Acute respiratory failure with hypoxia due to COVID-19 pneumonia-pulmonology/critical care is following ventilator support. Continue oral prednisone taper. Received Actemra on 12/12. POD #2 from trach/Peg placement. Weaning sedation. MSSA pneumonia - continue Nafcillin. Blood cultures have been negative. Intermittent fevers - worse over the last 24 hours. Recheck blood cultures. Cultures from 1/2 have been negative. Sedation has been weaned quite well. Decrease dose of roxicodone and librium by half today. Stop baclofen. Emesis - after PEG placement today. Discussed with RN. Antiemetics. PEG to gravity for now  Bowel regimen-Continue scheduled Colace and as needed MiraLAX. Gout-continue Colcrys and prednisone. Type 2 diabetes mellitus-continue insulin sliding scale and Lantus.   Continue DVT prophylaxis with Lovenox  Continue GI prophylaxis with lansoprazole  Obesity with BMI of 36.37-will need diet/weight loss/exercise/therapies in the future  IRMA-was noncompliant with CPAP at home  Disposition: POD #2 from trach/peg. Emesis today. PEG to gravity this afternoon. Starting to plan for discharge. Discussed with CM.     33 Danisha Bourgeois DO  1/5/2022  2:59 PM

## 2022-01-05 NOTE — PLAN OF CARE
Problem: OXYGENATION/RESPIRATORY FUNCTION  Goal: Patient will maintain patent airway  1/5/2022 0551 by Antonio Hernandez RCP  Outcome: Ongoing     Problem: OXYGENATION/RESPIRATORY FUNCTION  Goal: Patient will achieve/maintain normal respiratory rate/effort  Description: Respiratory rate and effort will be within normal limits for the patient  1/5/2022 0551 by Antonio Hernandez RCP  Outcome: Ongoing     Problem: MECHANICAL VENTILATION  Goal: Patient will maintain patent airway  1/5/2022 0551 by Antonio Hernandez RCP  Outcome: Ongoing     Problem: MECHANICAL VENTILATION  Goal: Patient will maintain patent airway  1/5/2022 0551 by Antonio Hernandez RCP  Outcome: Ongoing     Problem: MECHANICAL VENTILATION  Goal: Oral health is maintained or improved  1/5/2022 0551 by Antonio Hernandez RCP  Outcome: Ongoing     Problem: MECHANICAL VENTILATION  Goal: Ability to express needs and understand communication  1/5/2022 0551 by Antonio Hernandez RCP  Outcome: Ongoing     Problem: MECHANICAL VENTILATION  Goal: Tracheostomy will be managed safely  Outcome: Ongoing     Problem: SKIN INTEGRITY  Goal: Skin integrity is maintained or improved  1/5/2022 0551 by Antonio Hernandez RCP  Outcome: Ongoing

## 2022-01-05 NOTE — CONSULTS
Clinical Ethics Consultation Note    MRN: [id number]  Date: 1-4-22      59 yo patient with Covid trach and peg awaiting placement. This is an ethics screening based on [facility] standard of ethics review at 21 days ICU or ventilator stay. Please perfect serve me with any questions. Thank you.

## 2022-01-05 NOTE — PROGRESS NOTES
[facility] - Progress Note   Note COVID 19 Patient  Today's Date and Time: 1/5/2022, 8:49 AM    Impression :     COVID 19 Confirmed Infection  Covid tests:  12/11/2021: Positive  Elevated inflammatory markers  Acute hypoxic respiratory failure  History of asthma  Diabetes mellitus  Essential hypertension  IRMA on CPAP  Patient has not received the Covid vaccination  Fevers    Recommendations:   Antibiotic treatment:  The patient was having high-grade fevers and cultures have been sent.-No growth on cultures thus far  I will start the patient empirically on antibiotic therapy with cefepime on 12/18-fevers initially resolved but low grade fevers have resumed. Cefepime discontinued 12/28 and Meropenem initiated 12/28 for worsening leukocytosis and fevers   Meropenem discontinued 1/3/21 and Nafcillin IV 2 gm Q 4 hr initiated 1/3/21 for MSSA on sputum    Covid Rx:    Remdesivir-out of window  Decadron-high-dose initiated  Actemra-ordered 12/12/2021  Monoclonal antibodies-out of window      Medical Decision Making/Summary/Discussion:1/5/2022     Patient admitted with COVID 19 infection  He may come out of isolation on 12/31/21  Trach & Peg 1/3/21    Infection Control Recommendations   [place] Precautions  Airborne isolation  Droplet Isolation    Antimicrobial Stewardship Recommendations     Discontinuation of therapy  Coordination of Outpatient Care:   Estimated Length of IV antimicrobials:TBD  Patient will need Midline Catheter Insertion: TBD  Patient will need PICC line Insertion: No  Patient will need: Home IV , [place],  SNF,  LTAC:TBD  Patient will need outpatient wound care:No    Chief complaint/reason for consultation:   Concern for COVID infection      History of Present Illness:   Alyce Norton is a 60y.o.-year-old male who was initially admitted on 12/12/2021.  Patient seen at the request of Dr. Devin Butts:    Patient presented through [facility] risk group; 5-6: Medium risk group; 7 or above: High risk group  Parameters 3 2 1 0 1 2 3   Age    < 65   = 65   RR = 8  9-11 12-20  21-24 = 25   O2 Sats = 91 92-93 94-95 = 96      Suppl O2  Yes  No      SBP = 90  101-110 111-219   = 220   HR = 40  41-50 51-90  111-130 = 131   Consciousness    Alert   Drowsiness, lethargy, or confusion   Temperature = 35.0 C (95.0 F)  35.1-36.0 C 95.1-96.9 F 36.1-38.0 C 97.0-100.4 F 38.1-39.0 C 100.5-102.3 F = 39.1 C = 102.4 F      NEWS Score:  12/12/2021: 13 high risk    Overall Daily Picture:     Unchanged    Presence of secondary bacterial Infection:    Cultures no growth  Additional antibiotics: 12/18 Cefepime for leukocytosis and fevers-discontinued 12/28 and Meropenem restarted    Labs, X rays reviewed: 1/5/2022    BUN:15-->16  Cr:0.30-->0.36    WBC:11.8-->8.3-->9.7  Hb:13.2-->12.7 -->13.5  Plat: 154-->146-->161    Absolute Neutrophils:3.73  Absolute Lymphocytes:0.29  Neutrophil/Lymphocyte Ratio: 12.8 high risk    CRP:293-->277-->137-->50.8-->23  Ferritin:2800  LDH: 838    Pro Calcitonin:      Cultures:  Urine:  12/18/21: No bacterial growth  Blood:  12/18/21: No growth thus far  1/2/22: No growth thus far  Sputum :  12/18/21: Normal respiratory sherry  12/29/21: MSSA  Wound:      CXR:     12/29/21 12/22/21 12/19/21      1221 diffuse bilateral infiltrates  CAT:      Discussed with patient, RN, CC, IM.      12-12-21:      I have personally reviewed the past medical history, past surgical history, medications, social history, and family history, and I have updated the database accordingly.   Past Medical History:     Past Medical History:   Diagnosis Date    Arthritis     Asthma     Diabetes mellitus ([address]     Edema     GERD (gastroesophageal reflux disease)     Hypertension     on lasix    Migraine     IRMA on CPAP     seldom use machine       Medications:      nafcillin  2,000 mg IntraVENous Q4H    oxyCODONE  20 mg Per G Tube Q4H    chlordiazePOXIDE  20 mg Per NG tube 4x Daily    colchicine  0.6 mg Oral Daily    predniSONE  20 mg Per NG tube Daily    baclofen  5 mg Per NG tube TID    sodium chloride flush  5-40 mL IntraVENous 2 times per day    docusate  100 mg Per NG tube BID    insulin lispro  0-6 Units SubCUTAneous Q6H    lansoprazole  30 mg Oral QAM AC    insulin glargine  10 Units SubCUTAneous Nightly    sodium chloride flush  5-40 mL IntraVENous 2 times per day    enoxaparin  30 mg SubCUTAneous BID    Vitamin D  2,000 Units Oral Daily       Social History:     Social History     Socioeconomic History    Marital status:      Spouse name: Not on file    Number of children: Not on file    Years of education: Not on file    Highest education level: Not on file   Occupational History    Not on file   Tobacco Use    Smoking status: Former Smoker    Smokeless tobacco: Never Used   Vaping Use    Vaping Use: Never used   Substance and Sexual Activity    Alcohol use: Yes     Comment: every couple months    Drug use: Never    Sexual activity: Not on file   Other Topics Concern    Not on file   Social History Narrative    Not on file     Social Determinants of Health     Financial Resource Strain:     Difficulty of Paying Living Expenses: Not on file   Food Insecurity:     Worried About Running Out of Food in the Last Year: Not on file    Lucina of Food in the Last Year: Not on file   Transportation Needs:     Lack of Transportation (Medical): Not on file    Lack of Transportation (Non-Medical):  Not on file   Physical Activity:     Days of Exercise per Week: Not on file    Minutes of Exercise per Session: Not on file   Stress:     Feeling of Stress : Not on file   Social Connections:     Frequency of Communication with Friends and Family: Not on file    Frequency of Social Gatherings with Friends and Family: Not on file    Attends Yazidism Services: Not on file    Active Member of Clubs or Organizations: Not on file    Attends Club or Organization Meetings: Not on file    Marital Status: Not on file   Intimate Partner Violence:     Fear of Current or Ex-Partner: Not on file    Emotionally Abused: Not on file    Physically Abused: Not on file    Sexually Abused: Not on file   Housing Stability:     Unable to Pay for Housing in the Last Year: Not on file    Number of Places Lived in the Last Year: Not on file    Unstable Housing in the Last Year: Not on file       Family History:     Family History   Problem Relation Age of Onset    Heart Attack Sister 32    Diabetes Paternal Grandmother     Heart Disease Paternal Uncle     Heart Disease Paternal Uncle     Heart Disease Paternal Uncle     Cancer Maternal Aunt     Cancer Maternal Aunt     Cancer Maternal Uncle     Cancer Maternal Uncle         Allergies:   Nuts [peanut-containing drug products] and Sunflower oil     Review of Systems:     Review of Systems   Unable to perform ROS: Intubated       Physical Examination :     Patient Vitals for the past 8 hrs:   BP Temp Temp src Pulse Resp SpO2 Weight   01/05/22 0600 -- 100 °F (37.8 °C) Bladder 101 22 97 % --   01/05/22 0500 -- -- -- 99 20 98 % --   01/05/22 0400 115/80 -- -- 96 21 97 % --   01/05/22 0300 -- -- -- 99 22 98 % --   01/05/22 0256 -- -- -- 101 20 98 % --   01/05/22 0233 -- -- -- 89 18 97 % 279 lb 1.6 oz (126.6 kg)   01/05/22 0200 -- 100.5 °F (38.1 °C) Bladder 94 21 98 % --   01/05/22 0100 -- -- -- 97 23 100 % --     Physical Exam  Constitutional:       Appearance: He is well-developed. He is obese. Interventions: He is intubated. HENT:      Head: Normocephalic and atraumatic. Cardiovascular:      Rate and Rhythm: Normal rate. Heart sounds: Normal heart sounds. No friction rub. No gallop. Pulmonary:      Effort: Pulmonary effort is normal. He is intubated. Breath sounds: Normal breath sounds. No wheezing.    Abdominal:      General: Bowel sounds are normal. Palpations: Abdomen is soft. There is no mass. Tenderness: There is no abdominal tenderness. Musculoskeletal:      Cervical back: Normal range of motion and neck supple. Lymphadenopathy:      Cervical: No cervical adenopathy. Skin:     General: Skin is warm and dry. Comments: The left foot is warm but there are no cellulitic changes noted           Medical Decision Making -Laboratory:   I have independently reviewed/ordered the following labs:    CBC with Differential:   Recent Labs     01/04/22  0540 01/05/22  0340   WBC 8.3 9.7   HGB 12.7* 13.5   HCT 39.4* 41.4    161   LYMPHOPCT 15* 11*   MONOPCT 9 9     BMP:   Recent Labs     01/04/22  0540 01/05/22  0340    144   K 3.8 3.2*    107   CO2 27 26   BUN 15 16   CREATININE 0.30* 0.36*   MG  --  2.1     Hepatic Function Panel:   Recent Labs     01/03/22  0511 01/04/22  0540   PROT 5.9* 5.7*   LABALBU 3.1* 2.9*   BILIDIR 0.14 0.20   IBILI 0.28 0.37   BILITOT 0.42 0.57   ALKPHOS 72 69   * 87*   AST 40* 17     No results for input(s): RPR in the last 72 hours. No results for input(s): HIV in the last 72 hours. No results for input(s): BC in the last 72 hours. Lab Results   Component Value Date    MUCUS 3+ 12/29/2021    RBC 4.32 01/05/2022    TRICHOMONAS NOT REPORTED 12/29/2021    WBC 9.7 01/05/2022    YEAST NOT REPORTED 12/29/2021    TURBIDITY Clear 12/29/2021     Lab Results   Component Value Date    CREATININE 0.36 01/05/2022    GLUCOSE 121 01/05/2022       Medical Decision Making-Imaging:   ONE XRAY VIEW OF THE CHEST 12/28/2021 7:24 am    Impression   Stable support lines and tubes.  Stable to slightly increased diffuse   interstitial and airspace opacities.          Medical Decision Mcante-[id number]-[id number]:     Culture, Blood 2 [[id number]]    Order Status: No result Specimen: Blood    Culture, Blood 1 [[id number]]    Order Status: No result Specimen: Blood    Culture, Blood 1 [[id number]] Collected: 12/29/21 1708   Order Status: Completed Specimen: Blood Updated: 01/01/22 1719    Specimen Description . BLOOD    Special Requests  RFA 5 ML    Culture NO GROWTH 3 DAYS   Culture, Blood 1 [[id number]] Collected: 12/29/21 1708   Order Status: Completed Specimen: Blood Updated: 01/01/22 1719    Specimen Description . BLOOD    Special Requests LFA 3.5 ML    Culture NO GROWTH 3 DAYS   Culture, Respiratory [[id number]] (Abnormal)  Collected: 12/29/21 1710   Order Status: Completed Specimen: Tracheal Aspirate Updated: 01/01/22 1616    Specimen Description . TRACHEAL ASPIRATE    Special Requests NOT REPORTED    Direct Exam < 10 EPITHELIAL CELLS/LPF     >10, <25 NEUTROPHILS/LPF     NO SIGNIFICANT PATHOGENS SEEN    Culture STAPHYLOCOCCUS AUREUS LIGHT GROWTH This isolate is methicillin susceptible. Abnormal      NORMAL RESPIRATORY JOAQUÍN SCANT GROWTH   Susceptibility     Staphylococcus aureus     BACTERIAL SUSCEPTIBILITY PANEL MARYJANE     cefoxitin screen NOT REPORTED       ciprofloxacin NOT REPORTED       clindamycin <=0.25  Sensitive     erythromycin <=0.25  Sensitive     gentamicin <=0.5   Gentamicin . .. Sensitive  Sensitive     Induced Clind Resist NOT REPORTED       levofloxacin 0.25  Sensitive     linezolid NOT REPORTED       moxifloxacin NOT REPORTED       nitrofurantoin NOT REPORTED       oxacillin <=0.25   This staph . .. Sensitive  Sensitive     penicillin NOT REPORTED       rifampin NOT REPORTED       Synercid NOT REPORTED       tetracycline <=1  Sensitive     tigecycline NOT REPORTED       trimethoprim-sulfamethoxazole <=10  Sensitive     vancomycin NOT REPORTED        Culture, Blood 1 [[id number]] Collected: 12/24/21 0935   Order Status: Completed Specimen: Blood Updated: 12/29/21 1015    Specimen Description . BLOOD    Special Requests 10 ML R WRIST    Culture NO GROWTH 5 DAYS   Culture, Blood 1 [[id number]] Collected: 12/24/21 0958   Order Status: Completed Specimen: Blood Updated: 12/29/21 1015    Specimen Description . BLOOD    Special Requests 5ML L ARM    Culture NO GROWTH 5 DAYS   Culture, Respiratory [[id number]] Collected: 12/24/21 1436   Order Status: Completed Specimen: Tracheal Aspirate Updated: 12/26/21 1002    Specimen Description . TRACHEAL ASPIRATE    Special Requests NOT REPORTED    Direct Exam < 10 EPITHELIAL CELLS/LPF     >25 NEUTROPHILS/LPF     NO ORGANISMS SEEN    Culture NORMAL RESPIRATORY JOAQUÍN LIGHT GROWTH   Culture, Blood 1 [[id number]] Collected: 12/18/21 1234   Order Status: Completed Specimen: Blood Updated: 12/23/21 1402    Specimen Description . BLOOD    Special Requests NOT REPORTED    Culture NO GROWTH 5 DAYS   Culture, Respiratory [[id number]] (Abnormal) Collected: 12/18/21 1143   Order Status: Completed Specimen: Endotracheal Updated: 12/20/21 1043    Specimen Description . ENDOTRACHEAL    Special Requests NOT REPORTED    Direct Exam >25 NEUTROPHILS/LPF     < 10 EPITHELIAL CELLS/LPF     MIXED BACTERIAL MORPHOTYPES SEEN ON GRAM STAIN.  Abnormal     Culture NORMAL RESPIRATORY JOAQUÍN LIGHT GROWTH     Culture, Urine [[id number]] Collected: 12/14/21 0043   Order Status: Completed Specimen: Urine, straight catheter Updated: 12/14/21 1934    Specimen Description . URINE,STRAIGHT CATHETER    Special Requests NOT REPORTED    Culture NO GROWTH   MRSA DNA Probe, Nasal [[id number]] Collected: 12/12/21 1245   Order Status: Completed Specimen: Nasal Updated: 12/13/21 1052    Specimen Description . NASAL SWAB    MRSA, DNA, Nasal NEGATIVE:  MRSA DNA not detected by nucleic acid amplification. Comment:                                                    Results should be used as an adjunct to nosocomial control efforts to identify patients   needing enhanced precautions.     The test is not intended to identify patients with staphylococcal infections.  Results   should not be used to guide or monitor treatment for MRSA infections. Specimen Description . TRACHEAL ASPIRATE    Special Requests NOT REPORTED    Direct Exam < 10 EPITHELIAL CELLS/LPF    Direct Exam >10, <25 NEUTROPHILS/LPF    Direct Exam NO SIGNIFICANT PATHOGENS SEEN    Culture STAPHYLOCOCCUS AUREUS LIGHT GROWTH This isolate is methicillin susceptible. Abnormal     Culture NORMAL RESPIRATORY JOAQU[facility]N SCANT GROWTH    Resulting Agency [address]          Susceptibility     Staphylococcus aureus     BACTERIAL SUSCEPTIBILITY PANEL MARYJANE     cefoxitin screen NOT REPORTED       ciprofloxacin NOT REPORTED       clindamycin <=0.25  Sensitive     erythromycin <=0.25  Sensitive     gentamicin <=0.5   Gentamicin . .. Sensitive  Sensitive     Induced Clind Resist NOT REPORTED       levofloxacin 0.25  Sensitive     linezolid NOT REPORTED       moxifloxacin NOT REPORTED       nitrofurantoin NOT REPORTED       oxacillin <=0.25   This staph . .. Sensitive  Sensitive     penicillin NOT REPORTED       rifampin NOT REPORTED       Synercid NOT REPORTED       tetracycline <=1  Sensitive     tigecycline NOT REPORTED       trimethoprim-sulfamethoxazole <=10  Sensitive     vancomycin NOT REPORTED                             Medical Decision Making-Other:     Note:  Labs, medications, radiologic studies were reviewed with personal review of films  Large amounts of data were reviewed  Discussed with nursing Staff, Discharge planner  Infection Control and Prevention measures reviewed  All prior entries were reviewed  Administer medications as ordered  Prognosis: Guarded  Discharge planning reviewed      Thank you for allowing us to participate in the care of this patient. Please call with questions. Electronically signed by SHO Gongora - CNP on 1/5/2022 at 8:49 AM       ATTESTATION:    I have discussed the case, including pertinent history and exam findings with the APRN. I have evaluated the  History, physical findings and pictures of the patient and the key elements of the encounter have been performed by me.  I have reviewed the laboratory data, other diagnostic studies and discussed them with the APRN. I have updated the medical record where necessary. I agree with the assessment, plan and orders as documented by the APRN.     Nacho Irizarry MD.

## 2022-01-05 NOTE — PLAN OF CARE
Problem: Airway Clearance - Ineffective  Goal: Achieve or maintain patent airway  Outcome: Ongoing     Problem: Gas Exchange - Impaired  Goal: Absence of hypoxia  Outcome: Ongoing  Goal: Promote optimal lung function  Outcome: Ongoing     Problem:  Body Temperature -  Risk of, Imbalanced  Goal: Ability to maintain a body temperature within defined limits  Outcome: Ongoing  Goal: Will regain or maintain usual level of consciousness  Outcome: Ongoing  Goal: Complications related to the disease process, condition or treatment will be avoided or minimized  Outcome: Ongoing     Problem: Nutrition Deficits  Goal: Optimize nutritional status  Outcome: Ongoing     Problem: Loneliness or Risk for Loneliness  Goal: Demonstrate positive use of time alone when socialization is not possible  Outcome: Ongoing     Problem: OXYGENATION/RESPIRATORY FUNCTION  Goal: Patient will maintain patent airway  Outcome: Ongoing  Goal: Patient will achieve/maintain normal respiratory rate/effort  Description: Respiratory rate and effort will be within normal limits for the patient  Outcome: Ongoing     Problem: SKIN INTEGRITY  Goal: Skin integrity is maintained or improved  Outcome: Ongoing     Problem: Nutrition  Goal: Optimal nutrition therapy  Outcome: Ongoing

## 2022-01-05 NOTE — CARE COORDINATION
Spoke with Dr. Ngozi Prieto, who relates patient close to being ready for discharge.   Spoke with Josué Chow at [facility], states they have precert, will need to check bed availability tomorrow

## 2022-01-05 NOTE — PROGRESS NOTES
PULMONARY & CRITICAL CARE MEDICINE PROGRESS NOTE     Patient:  Naomi Oakley  MRN: [id number]  Admit date: 12/12/2021  Primary Care Physician: Cruz Ch MD  Consulting Physician: Elie White DO  CODE Status: Full Code  LOS: 24     SUBJECTIVE     CHIEF COMPLAINT/REASON FOR INITIAL CONSULT:   Acute respiratory failure/COVID-19 pneumonia/ARDS    BRIEF HOSPITAL COURSE:   The patient is a 60 y.o. male admitted for COVID-19 at [facility] ER due to worsening oxygenation he was initially started on BiPAP and subsequently intubated. On room air his saturations had been 50%. CTA no PE but bilateral pulmonary infiltrates. He was accepted at [address] [place] [address] ICU. On arrival he is on PEEP of 22, 100% FiO2. INTERVAL HISTORY:  01/05/22    Overnight events noted, chart reviewed medications reviewed and labs ventilator setting and arterial blood gases seen. Patient has been gradually weaned down on sedation and he is more arousable this morning with eyes opening unable to move any of the extremities because of significant weakness. He remains on FiO2 50% and PEEP of 10 this morning. He had a T-max of 101.5 overnight hemodynamically stable does not require pressor support heart rate is in 100s. Tracheostomy site was looking okay without any bleeding. Ventilator setting PRVC/15/540/10/50 percent FiO2. He was gradually taken off fentanyl drip overnight and ketamine was discontinued yesterday morning he was on low-dose of Versed overnight and this morning he was weaned off Versed drip he is on Precedex currently. He is also getting oxycodone 20 mg every 6 hours and Librium 20 mg every 6 hours and he is also on baclofen.   Next        REVIEW OF SYSTEMS:  Unobtainable from patient due to sedation/mechanical ventilation    OBJECTIVE     VENTILATOR SETTINGS:  Vent Information  $Ventilation: $Subsequent Day  Skin Assessment: Clean, dry, & intact  Suction Catheter Diameter: 14  Equipment ID: [id number]  Equipment Changed: Expiratory Filter  Vent Type: Servo i  Vent Mode: CPAP  Vt Ordered: 540 mL  Pressure Ordered: (S) 12 (decreased, patient appears to be tolerating wean well)  Rate Set: 15 bmp  Pressure Support: 10 cmH20  FiO2 : 50 %  SpO2: 96 %  SpO2/FiO2 ratio: 194  Sensitivity: 2  PEEP/CPAP: 6  I Time/ I Time %: 0.8 s  Humidification Source: HME  Humidification Temp: 37  Humidification Temp Measured: 36.8  Circuit Condensation: Drained  Nitric Oxide/Epoprostenol In Use?: No     PaO2/FiO2 RATIO:  Recent Labs     22  0423   POCPO2 81.9*      FiO2 : 50 %     VITAL SIGNS:   LAST:  /89   Pulse 103   Temp 100.8 °F (38.2 °C) (Bladder)   Resp 22   Ht 6' 2\" (1.88 m)   Wt 279 lb 1.6 oz (126.6 kg)   SpO2 96%   BMI 35.83 kg/m²   8-24 HR RANGE:  TEMP Temp  Av.8 °F (38.2 °C)  Min: 100 °F (37.8 °C)  Max: 101.5 °F (71.3 °C)   BP Systolic (32BIW), UAT:962 , Min:111 , XUR:916      Diastolic (49TYD), YTC:63, Min:74, Max:96     PULSE Pulse  Av.3  Min: 73  Max: 111   RR Resp  Av.3  Min: 20  Max: 23   O2 SAT SpO2  Av %  Min: 94 %  Max: 98 %   OXYGEN DELIVERY No data recorded        SYSTEMIC EXAMINATION:   General appearance - Mechanically ventilated, ill-appearing  Mental status -arousable with all eyes opening respond to name  Eyes - pupils equal and reactive, sclera anicteric  Mouth - mucous membranes moist  Neck -tracheostomy no active bleeding, neck is supple, no significant adenopathy, carotids upstroke normal bilaterally  Chest - Breath sounds bilaterally were dimnished to auscultation at bases. There were mild scattered crackles and rhonchi decreased breath sound at left base.   There is no intercostal recession or use of accessory muscles  Heart - normal rate, regular rhythm, normal S1, S2, no murmurs, rubs, clicks or gallops  Abdomen - soft, nontender, nondistended, no masses or organomegaly  Neurological - DTR's normal and symmetric plantars downgoing, motor or sensory not done as patient is sedated on ventilator. Extremities - peripheral pulses normal, no pedal edema, no clubbing or cyanosis  Skin - normal coloration and turgor, no rashes, no suspicious skin lesions noted     DATA REVIEW     Medications:  Scheduled Meds:   nafcillin  2,000 mg IntraVENous Q4H    oxyCODONE  20 mg Per G Tube Q4H    chlordiazePOXIDE  20 mg Per NG tube 4x Daily    colchicine  0.6 mg Oral Daily    baclofen  5 mg Per NG tube TID    sodium chloride flush  5-40 mL IntraVENous 2 times per day    docusate  100 mg Per NG tube BID    insulin lispro  0-6 Units SubCUTAneous Q6H    lansoprazole  30 mg Oral QAM AC    insulin glargine  10 Units SubCUTAneous Nightly    sodium chloride flush  5-40 mL IntraVENous 2 times per day    enoxaparin  30 mg SubCUTAneous BID    Vitamin D  2,000 Units Oral Daily     Continuous Infusions:   ketamine (KETALAR) infusion for analgosedation Stopped (01/04/22 1000)    dexmedetomidine 0.4 mcg/kg/hr (01/05/22 1117)    sodium chloride      sodium chloride      dextrose      norepinephrine Stopped (01/04/22 2335)    midazolam Stopped (01/05/22 0800)    fentaNYL Stopped (01/05/22 0235)    dextrose      sodium chloride 10 mL/hr at 12/13/21 1548       INPUT/OUTPUT:  In: 1591.4 [I.V.:758.4; NG/GT:833]  Out: 8619 [Urine:2575]  Date 01/05/22 0000 - 01/05/22 2359   Shift 2893-4846 9895-5830 8623-4008 24 Hour Total   INTAKE   I.V.(mL/kg) 407.8(3.2) 160(1.3)  567.8(4.5)   NG/GT(mL/kg) 387(3.1) 340(2.7)  727(5.7)   Shift Total(mL/kg) 794. 8(6.3) 500(3.9)  1294. 8(10.2)   OUTPUT   Urine(mL/kg/hr) 575(0.6) 375  950   Stool(mL/kg) 0(0)   0(0)   Shift Total(mL/kg) 575(4.5) 375(3)  950(7.5)   Weight (kg) 126.6 126.6 126.6 126.6        LABS:  ABGs:   Recent Labs     01/03/22  0425 01/04/22  0423   POCPH 7.392 7.439   POCPCO2 55.6* 48.6*   POCPO2 69.2* 81.9*   POCHCO3 33.8* 32.9*   LJWE0UMD 93* 96     CBC:   Recent Labs     01/03/22  0510 01/04/22  0540 01/05/22  0340   WBC 11.3 8.3 9.7   HGB 13.2 12.7* 13.5   HCT 41.3 39.4* 41.4   MCV 96.9 96.3 95.8    146 161   LYMPHOPCT 11* 15* 11*   RBC 4.26 4.09* 4.32   MCH 31.0 31.1 31.3   MCHC 32.0 32.2 32.6   RDW 14.3 14.1 14.1     CRP:   No results for input(s): CRP in the last 72 hours. LDH:   No results for input(s): LDH in the last 72 hours. BMP:   Recent Labs     01/03/22  0511 01/04/22  0540 01/05/22  0340    141 144   K 3.7 3.8 3.2*    104 107   CO2 27 27 26   BUN 18 15 16   CREATININE 0.28* 0.30* 0.36*   GLUCOSE 91 111* 121*     Liver Function Test:   Recent Labs     01/03/22  0511 01/04/22  0540   PROT 5.9* 5.7*   LABALBU 3.1* 2.9*   * 87*   AST 40* 17   ALKPHOS 72 69   BILITOT 0.42 0.57     Coagulation Profile:   No results for input(s): INR, PROTIME, APTT in the last 72 hours. D-Dimer:  No results for input(s): DDIMER in the last 72 hours. Ferritin:    No results for input(s): FERRITIN in the last 72 hours. Lactic Acid:  No results for input(s): LACTA in the last 72 hours. Cardiac Enzymes:  No results for input(s): CKTOTAL, CKMB, CKMBINDEX, TROPONINI in the last 72 hours. Invalid input(s): TROPONIN, HSTROP  BNP/ProBNP:   No results for input(s): BNP, PROBNP in the last 72 hours. Triglycerides:  No results for input(s): TRIG in the last 72 hours. Microbiology:  Urine Culture:  No components found for: CURINE  Blood Culture:  No components found for: CBLOOD, CFUNGUSBL  Sputum Culture:  No components found for: CSPUTUM  No results for input(s): SPECDESC, SPECIAL, CULTURE, STATUS, ORG, CDIFFTOXPCR, CAMPYLOBPCR, SALMONELLAPC, SHIGAPCR, SHIGELLAPCR, MPNEUG, MPNEUM, LACTOQL in the last 72 hours. No results for input(s): SPUTUM, SPECDESC, SPECIAL, CULTURE, STATUS, ORG, CDIFFTOXPCR, MPNEUM, MPNEUG in the last 72 hours. Invalid input(s): MICA, [address], CFUNGUSBL     Pathology:    Radiology Reports:  XR CHEST PORTABLE   Final Result   Interval tracheostomy.   Bilateral patchy interstitial and airspace opacities   are not significantly changed. VL DUP LOWER EXTREMITY VENOUS BILATERAL   Final Result      XR CHEST PORTABLE   Final Result   Stable support lines and tubes. Stable to slightly increased diffuse   interstitial and airspace opacities. XR CHEST PORTABLE   Final Result   1. Stable cardiomegaly. 2.  Patchy airspace opacities, stable in the right lower chest, slightly   improved on the left. Findings may be related to pulmonary edema or   improving pneumonia. 3.  Support tubes and catheters in good position. XR ABDOMEN (KUB) (SINGLE AP VIEW)   Final Result   Stable mild cardiomegaly. Patchy airspace opacities, left more than right, may be related to pulmonary   edema versus pneumonia. Nonspecific bowel gas pattern. XR CHEST (SINGLE VIEW FRONTAL)   Final Result   Stable mild cardiomegaly. Patchy airspace opacities, left more than right, may be related to pulmonary   edema versus pneumonia. Nonspecific bowel gas pattern. XR CHEST PORTABLE   Final Result   Cardiomegaly, vascular congestion and worsening pulmonary opacities with   bilateral effusions favoring pulmonary edema over multifocal airspace   disease. Support tubes and lines as above. XR CHEST (SINGLE VIEW FRONTAL)   Final Result   No significant change in multifocal airspace opacities. Continued follow-up   is recommended document complete resolution following medical treatment   course. Stable position lines and catheters         XR CHEST (SINGLE VIEW FRONTAL)   Final Result   Mild interval improvement in multifocal airspace disease particularly at the   right base. Support tubes and lines as above. XR CHEST (SINGLE VIEW FRONTAL)   Final Result   Stable appearing              Echocardiogram:   No results found for this or any previous visit.        ASSESSMENT AND PLAN     Assessment:    // Acute hypoxic respiratory failure  // Acute respiratory distress syndrome secondary to Covid pneumonia  // Bilateral multifocal pneumonia due to COVID 19 infection  // Sepsis due to COVID 19  // Circulatory shock/septic shock resolved  // Hypertension  // Diabetes mellitus type 2  // Obstructive sleep apnea    Plan:    I personally interviewed/examined the patient; reviewed interval history, interpreted all available radiographic and laboratory data at the time of service. Patient is off fentanyl drip ketamine and Versed drip and currently on Precedex drip. He is on oxycodone 20 mg every 6 hours and Librium 20 mg every 6 hour consider decreasing Librium gradually. Patient is also on baclofen per primary service would recommend to wean off baclofen  Daily spontaneous breathing trial on ventilator and will try to wean PEEP from 10-8 and FiO2 from 40 to 45%  Patient is currently not on pressors and does not require hemodynamic support   Continue lung protective mechanical ventilation as per ARDS protocol  Appropriate changes made in ventilator settings  Monitor endotracheal secretions and watch for any bleeding from tracheostomy site  Obtain X-ray chest as needed   Continue pulmonary toilet, aspiration precautions and bronchodilators  Continue to monitor I/O with a goal of even/negative fluid balance   Recommend to continue tube feeds  Chemical DVT prophylaxis as per protocol on Lovenox 30 mg twice daily  Antimicrobials reviewed; continue nafcillin per infectious disease  Glycemic control appropriate on Lantus  On prednisone 20 mg once daily started on 01/01/2022 for 5 days. Today's last dose  Physical/occupational therapy    Discussed with nursing staff. Discussed with respiratory therapist.      The patient is/remains critically ill with illness/injury that acutely impairs one or more vital organ systems, such that there is a high probability of imminent or life threatening deterioration in the patient's condition.  Critical care time of 35 minutes was spent (excluding procedures), in coordination of care during bedside rounds and discussion of patient care in detail, and recommendations of the team were adopted in the plan. Necessity of all invasive devices was also confirmed. Rainer Gutierrez MD  Pulmonary and Critical Care Medicine           1/5/2022, 12:13 PM    This patient was evaluated in the context of the global SARS-CoV-2 (COVID-19) pandemic, which necessitated considerations that the patient either has COVID-19 infection or is at risk of infection with COVID-19. Institutional protocols and algorithms that pertain to the evaluation & management of patients with COVID-19 or those at risk for COVID-19 are in a state of rapid changes based on information released by regulatory bodies including the CDC and federal and state organizations. These policies and algorithms were followed during the patient's care. Please note that this chart was generated using voice recognition Dragon dictation software. Although every effort was made to ensure the accuracy of this automated transcription, some errors in transcription may have occurred.

## 2022-01-06 LAB
ABSOLUTE EOS #: 0.16 K/UL (ref 0–0.44)
ABSOLUTE IMMATURE GRANULOCYTE: 0.09 K/UL (ref 0–0.3)
ABSOLUTE LYMPH #: 1.09 K/UL (ref 1.1–3.7)
ABSOLUTE MONO #: 1.08 K/UL (ref 0.1–1.2)
ALBUMIN SERPL-MCNC: 3 G/DL (ref 3.5–5.2)
ALBUMIN/GLOBULIN RATIO: 1 (ref 1–2.5)
ALP BLD-CCNC: 76 U/L (ref 40–129)
ALT SERPL-CCNC: 52 U/L (ref 5–41)
ANION GAP SERPL CALCULATED.3IONS-SCNC: 11 MMOL/L (ref 9–17)
AST SERPL-CCNC: 13 U/L
BASOPHILS # BLD: 0 % (ref 0–2)
BASOPHILS ABSOLUTE: 0.03 K/UL (ref 0–0.2)
BILIRUB SERPL-MCNC: 0.46 MG/DL (ref 0.3–1.2)
BILIRUBIN DIRECT: 0.15 MG/DL
BILIRUBIN, INDIRECT: 0.31 MG/DL (ref 0–1)
BUN BLDV-MCNC: 12 MG/DL (ref 6–20)
BUN/CREAT BLD: ABNORMAL (ref 9–20)
CALCIUM SERPL-MCNC: 9.3 MG/DL (ref 8.6–10.4)
CHLORIDE BLD-SCNC: 105 MMOL/L (ref 98–107)
CO2: 27 MMOL/L (ref 20–31)
CREAT SERPL-MCNC: 0.31 MG/DL (ref 0.7–1.2)
DIFFERENTIAL TYPE: ABNORMAL
EOSINOPHILS RELATIVE PERCENT: 1 % (ref 1–4)
GFR AFRICAN AMERICAN: >60 ML/MIN
GFR NON-AFRICAN AMERICAN: >60 ML/MIN
GFR SERPL CREATININE-BSD FRML MDRD: ABNORMAL ML/MIN/{1.73_M2}
GFR SERPL CREATININE-BSD FRML MDRD: ABNORMAL ML/MIN/{1.73_M2}
GLOBULIN: ABNORMAL G/DL (ref 1.5–3.8)
GLUCOSE BLD-MCNC: 108 MG/DL (ref 75–110)
GLUCOSE BLD-MCNC: 116 MG/DL (ref 75–110)
GLUCOSE BLD-MCNC: 130 MG/DL (ref 75–110)
GLUCOSE BLD-MCNC: 138 MG/DL (ref 75–110)
GLUCOSE BLD-MCNC: 148 MG/DL (ref 70–99)
GLUCOSE BLD-MCNC: 153 MG/DL (ref 75–110)
HCT VFR BLD CALC: 40.7 % (ref 40.7–50.3)
HEMOGLOBIN: 13.2 G/DL (ref 13–17)
IMMATURE GRANULOCYTES: 1 %
LYMPHOCYTES # BLD: 9 % (ref 24–43)
MAGNESIUM: 2 MG/DL (ref 1.6–2.6)
MCH RBC QN AUTO: 31.4 PG (ref 25.2–33.5)
MCHC RBC AUTO-ENTMCNC: 32.4 G/DL (ref 28.4–34.8)
MCV RBC AUTO: 96.7 FL (ref 82.6–102.9)
MONOCYTES # BLD: 9 % (ref 3–12)
NRBC AUTOMATED: 0 PER 100 WBC
PDW BLD-RTO: 14.3 % (ref 11.8–14.4)
PLATELET # BLD: 175 K/UL (ref 138–453)
PLATELET ESTIMATE: ABNORMAL
PMV BLD AUTO: 11.1 FL (ref 8.1–13.5)
POTASSIUM SERPL-SCNC: 3 MMOL/L (ref 3.7–5.3)
POTASSIUM SERPL-SCNC: 3.7 MMOL/L (ref 3.7–5.3)
RBC # BLD: 4.21 M/UL (ref 4.21–5.77)
RBC # BLD: ABNORMAL 10*6/UL
SEG NEUTROPHILS: 80 % (ref 36–65)
SEGMENTED NEUTROPHILS ABSOLUTE COUNT: 9.09 K/UL (ref 1.5–8.1)
SODIUM BLD-SCNC: 143 MMOL/L (ref 135–144)
TOTAL PROTEIN: 6 G/DL (ref 6.4–8.3)
WBC # BLD: 11.5 K/UL (ref 3.5–11.3)
WBC # BLD: ABNORMAL 10*3/UL

## 2022-01-06 PROCEDURE — 2700000000 HC OXYGEN THERAPY PER DAY

## 2022-01-06 PROCEDURE — 85025 COMPLETE CBC W/AUTO DIFF WBC: CPT

## 2022-01-06 PROCEDURE — 80076 HEPATIC FUNCTION PANEL: CPT

## 2022-01-06 PROCEDURE — 82947 ASSAY GLUCOSE BLOOD QUANT: CPT

## 2022-01-06 PROCEDURE — 6370000000 HC RX 637 (ALT 250 FOR IP): Performed by: INTERNAL MEDICINE

## 2022-01-06 PROCEDURE — 6370000000 HC RX 637 (ALT 250 FOR IP): Performed by: STUDENT IN AN ORGANIZED HEALTH CARE EDUCATION/TRAINING PROGRAM

## 2022-01-06 PROCEDURE — 2580000003 HC RX 258: Performed by: STUDENT IN AN ORGANIZED HEALTH CARE EDUCATION/TRAINING PROGRAM

## 2022-01-06 PROCEDURE — 36415 COLL VENOUS BLD VENIPUNCTURE: CPT

## 2022-01-06 PROCEDURE — 87205 SMEAR GRAM STAIN: CPT

## 2022-01-06 PROCEDURE — 99291 CRITICAL CARE FIRST HOUR: CPT | Performed by: INTERNAL MEDICINE

## 2022-01-06 PROCEDURE — 2060000000 HC ICU INTERMEDIATE R&B

## 2022-01-06 PROCEDURE — 84132 ASSAY OF SERUM POTASSIUM: CPT

## 2022-01-06 PROCEDURE — 94003 VENT MGMT INPAT SUBQ DAY: CPT

## 2022-01-06 PROCEDURE — 6360000002 HC RX W HCPCS: Performed by: STUDENT IN AN ORGANIZED HEALTH CARE EDUCATION/TRAINING PROGRAM

## 2022-01-06 PROCEDURE — 2500000003 HC RX 250 WO HCPCS: Performed by: STUDENT IN AN ORGANIZED HEALTH CARE EDUCATION/TRAINING PROGRAM

## 2022-01-06 PROCEDURE — 2500000003 HC RX 250 WO HCPCS: Performed by: NURSE PRACTITIONER

## 2022-01-06 PROCEDURE — 87070 CULTURE OTHR SPECIMN AEROBIC: CPT

## 2022-01-06 PROCEDURE — 94761 N-INVAS EAR/PLS OXIMETRY MLT: CPT

## 2022-01-06 PROCEDURE — 6370000000 HC RX 637 (ALT 250 FOR IP): Performed by: NURSE PRACTITIONER

## 2022-01-06 PROCEDURE — 83735 ASSAY OF MAGNESIUM: CPT

## 2022-01-06 PROCEDURE — 80048 BASIC METABOLIC PNL TOTAL CA: CPT

## 2022-01-06 PROCEDURE — 99232 SBSQ HOSP IP/OBS MODERATE 35: CPT | Performed by: INTERNAL MEDICINE

## 2022-01-06 PROCEDURE — 89220 SPUTUM SPECIMEN COLLECTION: CPT

## 2022-01-06 RX ORDER — SIMETHICONE 20 MG/.3ML
40 EMULSION ORAL EVERY 6 HOURS PRN
Status: DISCONTINUED | OUTPATIENT
Start: 2022-01-06 | End: 2022-01-07 | Stop reason: HOSPADM

## 2022-01-06 RX ORDER — CYCLOBENZAPRINE HCL 10 MG
10 TABLET ORAL 3 TIMES DAILY PRN
Status: DISCONTINUED | OUTPATIENT
Start: 2022-01-06 | End: 2022-01-07 | Stop reason: HOSPADM

## 2022-01-06 RX ORDER — METOPROLOL TARTRATE 5 MG/5ML
5 INJECTION INTRAVENOUS EVERY 6 HOURS PRN
Status: DISCONTINUED | OUTPATIENT
Start: 2022-01-06 | End: 2022-01-07 | Stop reason: HOSPADM

## 2022-01-06 RX ORDER — ACETAMINOPHEN 325 MG/1
650 TABLET ORAL ONCE
Status: COMPLETED | OUTPATIENT
Start: 2022-01-06 | End: 2022-01-06

## 2022-01-06 RX ADMIN — SIMETHICONE 40 MG: 20 SUSPENSION/ DROPS ORAL at 10:49

## 2022-01-06 RX ADMIN — POTASSIUM CHLORIDE 10 MEQ: 10 INJECTION, SOLUTION INTRAVENOUS at 08:45

## 2022-01-06 RX ADMIN — NAFCILLIN SODIUM 2000 MG: 2 INJECTION, POWDER, LYOPHILIZED, FOR SOLUTION INTRAMUSCULAR; INTRAVENOUS at 17:19

## 2022-01-06 RX ADMIN — POTASSIUM CHLORIDE 10 MEQ: 10 INJECTION, SOLUTION INTRAVENOUS at 06:19

## 2022-01-06 RX ADMIN — SODIUM CHLORIDE, PRESERVATIVE FREE 10 ML: 5 INJECTION INTRAVENOUS at 09:00

## 2022-01-06 RX ADMIN — CHLORDIAZEPOXIDE HYDROCHLORIDE 10 MG: 5 CAPSULE ORAL at 16:45

## 2022-01-06 RX ADMIN — INSULIN LISPRO 1 UNITS: 100 INJECTION, SOLUTION INTRAVENOUS; SUBCUTANEOUS at 06:07

## 2022-01-06 RX ADMIN — COLCHICINE 0.6 MG: 0.6 TABLET, FILM COATED ORAL at 11:00

## 2022-01-06 RX ADMIN — SIMETHICONE 40 MG: 20 SUSPENSION/ DROPS ORAL at 03:18

## 2022-01-06 RX ADMIN — METOPROLOL TARTRATE 5 MG: 5 INJECTION INTRAVENOUS at 23:36

## 2022-01-06 RX ADMIN — DEXMEDETOMIDINE HYDROCHLORIDE 0.4 MCG/KG/HR: 4 INJECTION, SOLUTION INTRAVENOUS at 00:43

## 2022-01-06 RX ADMIN — SODIUM CHLORIDE, PRESERVATIVE FREE 10 ML: 5 INJECTION INTRAVENOUS at 21:38

## 2022-01-06 RX ADMIN — CHLORDIAZEPOXIDE HYDROCHLORIDE 10 MG: 5 CAPSULE ORAL at 03:32

## 2022-01-06 RX ADMIN — ACETAMINOPHEN 650 MG: 160 SOLUTION ORAL at 02:40

## 2022-01-06 RX ADMIN — DOCUSATE SODIUM 100 MG: 50 LIQUID ORAL at 10:50

## 2022-01-06 RX ADMIN — ENOXAPARIN SODIUM 30 MG: 100 INJECTION SUBCUTANEOUS at 21:32

## 2022-01-06 RX ADMIN — CHLORDIAZEPOXIDE HYDROCHLORIDE 10 MG: 5 CAPSULE ORAL at 10:50

## 2022-01-06 RX ADMIN — ACETAMINOPHEN 650 MG: 325 TABLET ORAL at 23:11

## 2022-01-06 RX ADMIN — Medication 30 MG: at 10:49

## 2022-01-06 RX ADMIN — OXYCODONE HYDROCHLORIDE 10 MG: 5 SOLUTION ORAL at 17:30

## 2022-01-06 RX ADMIN — NAFCILLIN SODIUM 2000 MG: 2 INJECTION, POWDER, LYOPHILIZED, FOR SOLUTION INTRAMUSCULAR; INTRAVENOUS at 04:57

## 2022-01-06 RX ADMIN — INSULIN GLARGINE 10 UNITS: 100 INJECTION, SOLUTION SUBCUTANEOUS at 22:20

## 2022-01-06 RX ADMIN — NAFCILLIN SODIUM 2000 MG: 2 INJECTION, POWDER, LYOPHILIZED, FOR SOLUTION INTRAMUSCULAR; INTRAVENOUS at 09:30

## 2022-01-06 RX ADMIN — SODIUM CHLORIDE: 9 INJECTION, SOLUTION INTRAVENOUS at 22:14

## 2022-01-06 RX ADMIN — OXYCODONE HYDROCHLORIDE 10 MG: 5 SOLUTION ORAL at 06:10

## 2022-01-06 RX ADMIN — POTASSIUM CHLORIDE 10 MEQ: 10 INJECTION, SOLUTION INTRAVENOUS at 07:28

## 2022-01-06 RX ADMIN — POTASSIUM CHLORIDE 10 MEQ: 10 INJECTION, SOLUTION INTRAVENOUS at 10:37

## 2022-01-06 RX ADMIN — NAFCILLIN SODIUM 2000 MG: 2 INJECTION, POWDER, LYOPHILIZED, FOR SOLUTION INTRAMUSCULAR; INTRAVENOUS at 01:25

## 2022-01-06 RX ADMIN — OXYCODONE HYDROCHLORIDE 10 MG: 5 SOLUTION ORAL at 21:31

## 2022-01-06 RX ADMIN — CHLORDIAZEPOXIDE HYDROCHLORIDE 10 MG: 5 CAPSULE ORAL at 22:21

## 2022-01-06 RX ADMIN — ENOXAPARIN SODIUM 30 MG: 100 INJECTION SUBCUTANEOUS at 10:50

## 2022-01-06 RX ADMIN — NAFCILLIN SODIUM 2000 MG: 2 INJECTION, POWDER, LYOPHILIZED, FOR SOLUTION INTRAMUSCULAR; INTRAVENOUS at 23:26

## 2022-01-06 RX ADMIN — ACETAMINOPHEN 650 MG: 160 SOLUTION ORAL at 10:50

## 2022-01-06 RX ADMIN — POTASSIUM CHLORIDE 10 MEQ: 10 INJECTION, SOLUTION INTRAVENOUS at 12:00

## 2022-01-06 RX ADMIN — OXYCODONE HYDROCHLORIDE 10 MG: 5 SOLUTION ORAL at 02:25

## 2022-01-06 RX ADMIN — OXYCODONE HYDROCHLORIDE 10 MG: 5 SOLUTION ORAL at 10:50

## 2022-01-06 RX ADMIN — OXYCODONE HYDROCHLORIDE 10 MG: 5 SOLUTION ORAL at 14:13

## 2022-01-06 RX ADMIN — NAFCILLIN SODIUM 2000 MG: 2 INJECTION, POWDER, LYOPHILIZED, FOR SOLUTION INTRAMUSCULAR; INTRAVENOUS at 14:00

## 2022-01-06 RX ADMIN — Medication 2000 UNITS: at 10:51

## 2022-01-06 RX ADMIN — POTASSIUM CHLORIDE 10 MEQ: 10 INJECTION, SOLUTION INTRAVENOUS at 14:55

## 2022-01-06 ASSESSMENT — PAIN SCALES - GENERAL
PAINLEVEL_OUTOF10: 0
PAINLEVEL_OUTOF10: 2

## 2022-01-06 ASSESSMENT — PULMONARY FUNCTION TESTS
PIF_VALUE: 31
PIF_VALUE: 16
PIF_VALUE: 21
PIF_VALUE: 21
PIF_VALUE: 16

## 2022-01-06 NOTE — PROGRESS NOTES
[facility] - Progress Note   Note COVID 19 Patient  Today's Date and Time: 1/6/2022, 7:48 AM    Impression :     COVID 19 Confirmed Infection  Covid tests:  12/11/2021: Positive  Elevated inflammatory markers  Acute hypoxic respiratory failure  History of asthma  Diabetes mellitus  Essential hypertension  IRMA on CPAP  Patient has not received the Covid vaccination  Fevers    Recommendations:   Antibiotic treatment:  The patient was having high-grade fevers and cultures have been sent.-No growth on cultures thus far  I will start the patient empirically on antibiotic therapy with cefepime on 12/18-fevers initially resolved but low grade fevers have resumed. Cefepime discontinued 12/28 and Meropenem initiated 12/28 for worsening leukocytosis and fevers   Meropenem discontinued 1/3/21 and Nafcillin IV 2 gm Q 4 hr initiated 1/3/21 for MSSA on sputum    Covid Rx:    Remdesivir-out of window  Decadron-high-dose initiated  Actemra-ordered 12/12/2021  Monoclonal antibodies-out of window      Medical Decision Making/Summary/Discussion:1/6/2022     Patient admitted with COVID 19 infection  He may come out of isolation on 12/31/21  Trach & Peg 1/3/21    Infection Control Recommendations   [place] Precautions  Airborne isolation  Droplet Isolation    Antimicrobial Stewardship Recommendations     Discontinuation of therapy  Coordination of Outpatient Care:   Estimated Length of IV antimicrobials:TBD  Patient will need Midline Catheter Insertion: TBD  Patient will need PICC line Insertion: No  Patient will need: Home IV , [place],  SNF,  LTAC:TBD  Patient will need outpatient wound care:No    Chief complaint/reason for consultation:   Concern for COVID infection      History of Present Illness:   Ana Dooley is a 60y.o.-year-old male who was initially admitted on 12/12/2021.  Patient seen at the request of Dr. Taryn Kim:    Patient presented through [facility] on 12/11/2021 with complaints of worsening shortness of breath with associated generalized weakness and nonproductive cough over the past several days. He was exposed to his son who was diagnosed with Covid last week and has not received the Covid vaccine. The patient was very hypoxic with an SPO2 in the high 50s on room air. Imaging revealed bilateral pulmonary opacities typical of COVID-19    Abnormal labs include:    Ferritin 2800      Patient has been intubated and transferred to [facility]  He has been started on high-dose Decadron and Actemra has been ordered    CT CHEST PULMONARY EMBOLISM W CONTRAST 12/11/2021   Final Result   1. No evidence of pulmonary embolism   2. Diffuse ground-glass opacities throughout the lungs, typical of COVID-19   pulmonary disease.           XR CHEST (SINGLE VIEW FRONTAL) 12/11/2021   Final Result   Multifocal hazy opacities throughout both lungs consistent with COVID   pneumonia and or pulmonary edema       Patient admitted because of concerns with COVID 19. The patient remains on mechanical ventilation with 50% FiO2 and PEEP of 10. He is receiving fentanyl, Versed and Precedex for sedation. Nimbex was discontinued 12/31/21  He is opening his eyes to stimuli but not responding to command. Cefepime was initiated 12/18-discontinued 12/28  Meropenem initiated 12/28      CURRENT EVALUATION : 1/6/2022    Fevers continue  VS stable     The patient seen and evaluated and continues on the ventilator at 40-->50% FiO2 10 of PEEP via trach. Sedation has been decreased to low dose Precedex    MSSA sputum 12/29/21  Blood cultures 1/2: no growth   Blood cultures 1/5: Pending    Hypokalemia continues    Trach and peg placed 1/3/21    [facility]charge planning to [facility] underway    Patient exhibiting respiratory distress. yes  Respiratory secretions: no    Patient receiving supplemental oxygen.   Mechanical ventilation  RR: 24  02 sat:95    QTc: NEWS Score: 0-4 Low risk group; 5-6: Medium risk group; 7 or above: High risk group  Parameters 3 2 1 0 1 2 3   Age    < 65   = 65   RR = 8  9-11 12-20  21-24 = 25   O2 Sats = 91 92-93 94-95 = 96      Suppl O2  Yes  No      SBP = 90  101-110 111-219   = 220   HR = 40  41-50 51-90  111-130 = 131   Consciousness    Alert   Drowsiness, lethargy, or confusion   Temperature = 35.0 C (95.0 F)  35.1-36.0 C 95.1-96.9 F 36.1-38.0 C 97.0-100.4 F 38.1-39.0 C 100.5-102.3 F = 39.1 C = 102.4 F      NEWS Score:  12/12/2021: 13 high risk    Overall Daily Picture:     Unchanged    Presence of secondary bacterial Infection:    Cultures no growth  Additional antibiotics: 12/18 Cefepime for leukocytosis and fevers-discontinued 12/28 and Meropenem restarted    Labs, X rays reviewed: 1/6/2022    BUN:15-->16-->12  Cr:0.30-->0.36-->0.31    WBC:8.3-->9.7-->11.5  Hb:12.7 -->13.5-->13.2  Plat: 146-->161-->175    Absolute Neutrophils:3.73  Absolute Lymphocytes:0.29  Neutrophil/Lymphocyte Ratio: 12.8 high risk    CRP:293-->277-->137-->50.8-->23  Ferritin:2800  LDH: 838    Pro Calcitonin:      Cultures:  Urine:  12/18/21: No bacterial growth  Blood:  12/18/21: No growth thus far  1/2/22: No growth thus far  Sputum :  12/18/21: Normal respiratory sherry  12/29/21: MSSA  Wound:      CXR:     12/29/21 12/22/21 12/19/21      1221 diffuse bilateral infiltrates  CAT:      Discussed with patient, RN, CC, IM.      12-12-21:      I have personally reviewed the past medical history, past surgical history, medications, social history, and family history, and I have updated the database accordingly.   Past Medical History:     Past Medical History:   Diagnosis Date    Arthritis     Asthma     Diabetes mellitus ([facility] [address]     Edema     GERD (gastroesophageal reflux disease)     Hypertension     on lasix    Migraine     IRMA on CPAP     seldom use machine       Medications:      chlordiazePOXIDE  10 mg Per NG tube 4x Daily    oxyCODONE  10 mg Per G Tube Q4H    nafcillin  2,000 mg IntraVENous Q4H    colchicine  0.6 mg Oral Daily    sodium chloride flush  5-40 mL IntraVENous 2 times per day    docusate  100 mg Per NG tube BID    insulin lispro  0-6 Units SubCUTAneous Q6H    lansoprazole  30 mg Oral QAM AC    insulin glargine  10 Units SubCUTAneous Nightly    sodium chloride flush  5-40 mL IntraVENous 2 times per day    enoxaparin  30 mg SubCUTAneous BID    Vitamin D  2,000 Units Oral Daily       Social History:     Social History     Socioeconomic History    Marital status:      Spouse name: Not on file    Number of children: Not on file    Years of education: Not on file    Highest education level: Not on file   Occupational History    Not on file   Tobacco Use    Smoking status: Former Smoker    Smokeless tobacco: Never Used   Vaping Use    Vaping Use: Never used   Substance and Sexual Activity    Alcohol use: Yes     Comment: every couple months    Drug use: Never    Sexual activity: Not on file   Other Topics Concern    Not on file   Social History Narrative    Not on file     Social Determinants of Health     Financial Resource Strain:     Difficulty of Paying Living Expenses: Not on file   Food Insecurity:     Worried About Running Out of Food in the Last Year: Not on file    Lucina of Food in the Last Year: Not on file   Transportation Needs:     Lack of Transportation (Medical): Not on file    Lack of Transportation (Non-Medical):  Not on file   Physical Activity:     Days of Exercise per Week: Not on file    Minutes of Exercise per Session: Not on file   Stress:     Feeling of Stress : Not on file   Social Connections:     Frequency of Communication with Friends and Family: Not on file    Frequency of Social Gatherings with Friends and Family: Not on file    Attends Episcopal Services: Not on file    Active Member of Clubs or Organizations: Not on file    Attends Club or Organization Meetings: Not on file    Marital Status: Not on file   Intimate Partner Violence:     Fear of Current or Ex-Partner: Not on file    Emotionally Abused: Not on file    Physically Abused: Not on file    Sexually Abused: Not on file   Housing Stability:     Unable to Pay for Housing in the Last Year: Not on file    Number of Places Lived in the Last Year: Not on file    Unstable Housing in the Last Year: Not on file       Family History:     Family History   Problem Relation Age of Onset    Heart Attack Sister 32    Diabetes Paternal Grandmother     Heart Disease Paternal Uncle     Heart Disease Paternal Uncle     Heart Disease Paternal Uncle     Cancer Maternal Aunt     Cancer Maternal Aunt     Cancer Maternal Uncle     Cancer Maternal Uncle         Allergies:   Nuts [peanut-containing drug products] and Sunflower oil     Review of Systems:     Review of Systems   Unable to perform ROS: Intubated       Physical Examination :     Patient Vitals for the past 8 hrs:   Temp Temp src Pulse Resp SpO2 Weight   01/06/22 0600 100.9 °F (38.3 °C) Bladder 100 26 94 % --   01/06/22 0500 101.1 °F (38.4 °C) Bladder 94 26 96 % --   01/06/22 0427 -- -- 89 24 96 % 274 lb 0.5 oz (124.3 kg)   01/06/22 0400 101.5 °F (38.6 °C) Bladder 83 23 94 % --   01/06/22 0236 -- -- 87 19 98 % --   01/06/22 0200 101.4 °F (38.6 °C) Bladder 107 27 96 % --   01/06/22 0100 -- -- 106 25 97 % --   01/06/22 0000 101.4 °F (38.6 °C) Bladder 114 29 97 % --     Physical Exam  Constitutional:       Appearance: He is well-developed. He is obese. Interventions: He is intubated. HENT:      Head: Normocephalic and atraumatic. Cardiovascular:      Rate and Rhythm: Normal rate. Heart sounds: Normal heart sounds. No friction rub. No gallop. Pulmonary:      Effort: Pulmonary effort is normal. He is intubated. Breath sounds: Normal breath sounds. No wheezing.    Abdominal:      General: Bowel sounds are normal.      Palpations: Abdomen is soft. There is no mass. Tenderness: There is no abdominal tenderness. Musculoskeletal:      Cervical back: Normal range of motion and neck supple. Lymphadenopathy:      Cervical: No cervical adenopathy. Skin:     General: Skin is warm and dry. Comments: The left foot is warm but there are no cellulitic changes noted           Medical Decision Making -Laboratory:   I have independently reviewed/ordered the following labs:    CBC with Differential:   Recent Labs     01/05/22  0340 01/06/22  0500   WBC 9.7 11.5*   HGB 13.5 13.2   HCT 41.4 40.7    175   LYMPHOPCT 11* 9*   MONOPCT 9 9     BMP:   Recent Labs     01/05/22  0340 01/06/22  0500    143   K 3.2* 3.0*    105   CO2 26 27   BUN 16 12   CREATININE 0.36* 0.31*   MG 2.1 2.0     Hepatic Function Panel:   Recent Labs     01/04/22  0540 01/06/22  0500   PROT 5.7* 6.0*   LABALBU 2.9* 3.0*   BILIDIR 0.20 0.15   IBILI 0.37 0.31   BILITOT 0.57 0.46   ALKPHOS 69 76   ALT 87* 52*   AST 17 13     No results for input(s): RPR in the last 72 hours. No results for input(s): HIV in the last 72 hours. No results for input(s): BC in the last 72 hours. Lab Results   Component Value Date    MUCUS 3+ 12/29/2021    RBC 4.21 01/06/2022    TRICHOMONAS NOT REPORTED 12/29/2021    WBC 11.5 01/06/2022    YEAST NOT REPORTED 12/29/2021    TURBIDITY Clear 12/29/2021     Lab Results   Component Value Date    CREATININE 0.31 01/06/2022    GLUCOSE 148 01/06/2022       Medical Decision Making-Imaging:   ONE XRAY VIEW OF THE CHEST 12/28/2021 7:24 am    Impression   Stable support lines and tubes.  Stable to slightly increased diffuse   interstitial and airspace opacities.          Medical Decision [id number]-[id number]-[id number]:     Culture, Blood 2 [[id number]]    Order Status: No result Specimen: Blood    Culture, Blood 1 [[id number]]    Order Status: No result Specimen: Blood    Culture, Blood 1 [[id number]] Collected: 12/29/21 1708   Order Status: Completed Specimen: Blood Updated: 01/01/22 1719    Specimen Description . BLOOD    Special Requests  RFA 5 ML    Culture NO GROWTH 3 DAYS   Culture, Blood 1 [[id number]] Collected: 12/29/21 1708   Order Status: Completed Specimen: Blood Updated: 01/01/22 1719    Specimen Description . BLOOD    Special Requests LFA 3.5 ML    Culture NO GROWTH 3 DAYS   Culture, Respiratory [[id number]] (Abnormal)  Collected: 12/29/21 1710   Order Status: Completed Specimen: Tracheal Aspirate Updated: 01/01/22 1616    Specimen Description . TRACHEAL ASPIRATE    Special Requests NOT REPORTED    Direct Exam < 10 EPITHELIAL CELLS/LPF     >10, <25 NEUTROPHILS/LPF     NO SIGNIFICANT PATHOGENS SEEN    Culture STAPHYLOCOCCUS AUREUS LIGHT GROWTH This isolate is methicillin susceptible. Abnormal      NORMAL RESPIRATORY JOAQUÍN SCANT GROWTH   Susceptibility     Staphylococcus aureus     BACTERIAL SUSCEPTIBILITY PANEL MARYJANE     cefoxitin screen NOT REPORTED       ciprofloxacin NOT REPORTED       clindamycin <=0.25  Sensitive     erythromycin <=0.25  Sensitive     gentamicin <=0.5   Gentamicin . .. Sensitive  Sensitive     Induced Clind Resist NOT REPORTED       levofloxacin 0.25  Sensitive     linezolid NOT REPORTED       moxifloxacin NOT REPORTED       nitrofurantoin NOT REPORTED       oxacillin <=0.25   This staph . .. Sensitive  Sensitive     penicillin NOT REPORTED       rifampin NOT REPORTED       Synercid NOT REPORTED       tetracycline <=1  Sensitive     tigecycline NOT REPORTED       trimethoprim-sulfamethoxazole <=10  Sensitive     vancomycin NOT REPORTED        Culture, Blood 1 [[id number]] Collected: 12/24/21 0935   Order Status: Completed Specimen: Blood Updated: 12/29/21 1015    Specimen Description . BLOOD    Special Requests 10 ML R WRIST    Culture NO GROWTH 5 DAYS   Culture, Blood 1 [[id number]] Collected: 12/24/21 0958   Order Status: Completed Specimen: Blood Updated: 12/29/21 1015    Specimen Description . BLOOD    Special Requests 5ML L ARM Culture NO GROWTH 5 DAYS   Culture, Respiratory [[id number]] Collected: 12/24/21 1436   Order Status: Completed Specimen: Tracheal Aspirate Updated: 12/26/21 1002    Specimen Description . TRACHEAL ASPIRATE    Special Requests NOT REPORTED    Direct Exam < 10 EPITHELIAL CELLS/LPF     >25 NEUTROPHILS/LPF     NO ORGANISMS SEEN    Culture NORMAL RESPIRATORY JOAQUÍN LIGHT GROWTH   Culture, Blood 1 [[id number]] Collected: 12/18/21 1234   Order Status: Completed Specimen: Blood Updated: 12/23/21 1402    Specimen Description . BLOOD    Special Requests NOT REPORTED    Culture NO GROWTH 5 DAYS   Culture, Respiratory [[id number]] (Abnormal) Collected: 12/18/21 1143   Order Status: Completed Specimen: Endotracheal Updated: 12/20/21 1043    Specimen Description . ENDOTRACHEAL    Special Requests NOT REPORTED    Direct Exam >25 NEUTROPHILS/LPF     < 10 EPITHELIAL CELLS/LPF     MIXED BACTERIAL MORPHOTYPES SEEN ON GRAM STAIN.  Abnormal     Culture NORMAL RESPIRATORY JOAQUÍN LIGHT GROWTH     Culture, Urine [[id number]] Collected: 12/14/21 0043   Order Status: Completed Specimen: Urine, straight catheter Updated: 12/14/21 1934    Specimen Description . URINE,STRAIGHT CATHETER    Special Requests NOT REPORTED    Culture NO GROWTH   MRSA DNA Probe, Nasal [[id number]] Collected: 12/12/21 1245   Order Status: Completed Specimen: Nasal Updated: 12/13/21 1052    Specimen Description . NASAL SWAB    MRSA, DNA, Nasal NEGATIVE:  MRSA DNA not detected by nucleic acid amplification. Comment:                                                    Results should be used as an adjunct to nosocomial control efforts to identify patients   needing enhanced precautions.     The test is not intended to identify patients with staphylococcal infections.  Results   should not be used to guide or monitor treatment for MRSA infections. Specimen Description . TRACHEAL ASPIRATE    Special Requests NOT REPORTED    Direct Exam < 10 EPITHELIAL CELLS/LPF Direct Exam >10, <25 NEUTROPHILS/LPF    Direct Exam NO SIGNIFICANT PATHOGENS SEEN    Culture STAPHYLOCOCCUS AUREUS LIGHT GROWTH This isolate is methicillin susceptible. Abnormal     Culture NORMAL RESPIRATORY JOAQUÍN SCANT GROWTH    Resulting Agency [address]          Susceptibility     Staphylococcus aureus     BACTERIAL SUSCEPTIBILITY PANEL MARYJANE     cefoxitin screen NOT REPORTED       ciprofloxacin NOT REPORTED       clindamycin <=0.25  Sensitive     erythromycin <=0.25  Sensitive     gentamicin <=0.5   Gentamicin . .. Sensitive  Sensitive     Induced Clind Resist NOT REPORTED       levofloxacin 0.25  Sensitive     linezolid NOT REPORTED       moxifloxacin NOT REPORTED       nitrofurantoin NOT REPORTED       oxacillin <=0.25   This staph . .. Sensitive  Sensitive     penicillin NOT REPORTED       rifampin NOT REPORTED       Synercid NOT REPORTED       tetracycline <=1  Sensitive     tigecycline NOT REPORTED       trimethoprim-sulfamethoxazole <=10  Sensitive     vancomycin NOT REPORTED                             Medical Decision Making-Other:     Note:  Labs, medications, radiologic studies were reviewed with personal review of films  Large amounts of data were reviewed  Discussed with nursing Staff, Discharge planner  Infection Control and Prevention measures reviewed  All prior entries were reviewed  Administer medications as ordered  Prognosis: Guarded  Discharge planning reviewed      Thank you for allowing us to participate in the care of this patient. Please call with questions. Electronically signed by SHO Childers CNP on 1/6/2022 at 7:48 AM     ATTESTATION:    I have discussed the case, including pertinent history and exam findings with the APRN. I have evaluated the  History, physical findings and pictures of the patient and the key elements of the encounter have been performed by me.  I have reviewed the laboratory data, other diagnostic studies and discussed them with the APRN. I have updated the medical record where necessary. I agree with the assessment, plan and orders as documented by the APRN.     Tarun Shrestha MD.

## 2022-01-06 NOTE — PLAN OF CARE
RCP  Outcome: Ongoing  1/6/2022 0638 by Hay Pagan  Outcome: Ongoing  1/5/2022 2121 by Ha Yeager RCP  Outcome: Ongoing     Problem: MECHANICAL VENTILATION  Goal: Oral health is maintained or improved  1/6/2022 0900 by Sonja Dixon RCP  Outcome: Ongoing  1/6/2022 0638 by Hay Pagan  Outcome: Ongoing  1/5/2022 2121 by Ha Yeager RCP  Outcome: Ongoing     Problem: MECHANICAL VENTILATION  Goal: Ability to express needs and understand communication  1/6/2022 0900 by Sonja Dixon RCP  Outcome: Ongoing  1/6/2022 0638 by Hay Pagan  Outcome: Ongoing  1/5/2022 2121 by Ha Yeager RCP  Outcome: Ongoing     Problem: MECHANICAL VENTILATION  Goal: Mobility/activity is maintained at optimum level for patient  1/6/2022 0900 by Sonja Dixon RCP  Outcome: Ongoing  1/6/2022 0638 by Hay Pagan  Outcome: Ongoing  1/5/2022 2121 by Ha Yeager RCP  Outcome: Ongoing     Problem: MECHANICAL VENTILATION  Goal: Tracheostomy will be managed safely  1/6/2022 0900 by Sonja Dixon RCP  Outcome: Ongoing  1/6/2022 0638 by Hay Pagan  Outcome: Ongoing  1/5/2022 2121 by Ha Yeager RCP  Outcome: Ongoing     Problem: SKIN INTEGRITY  Goal: Skin integrity is maintained or improved  1/6/2022 0900 by Sonja Dixon RCP  Outcome: Ongoing  1/6/2022 0638 by Hay Pagan  Outcome: Ongoing  1/5/2022 2121 by Ha Yeager RCP  Outcome: Ongoing

## 2022-01-06 NOTE — PROGRESS NOTES
PULMONARY & CRITICAL CARE MEDICINE PROGRESS NOTE     Patient:  Fletcher Malhotra  MRN: [id number]  Admit date: 12/12/2021  Primary Care Physician: Keri Vora MD  Consulting Physician: Ashley Diaz DO  CODE Status: Full Code  LOS: 25     SUBJECTIVE     CHIEF COMPLAINT/REASON FOR INITIAL CONSULT:   Acute respiratory failure/COVID-19 pneumonia/ARDS    BRIEF HOSPITAL COURSE:   The patient is a 60 y.o. male admitted for COVID-19 at [facility] ER due to worsening oxygenation he was initially started on BiPAP and subsequently intubated. On room air his saturations had been 50%. CTA no PE but bilateral pulmonary infiltrates. He was accepted at [address] [place] [address] ICU. On arrival he is on PEEP of 22, 100% FiO2. INTERVAL HISTORY:  01/06/22    Overnight events noted, chart reviewed medications reviewed and labs ventilator setting and arterial blood gases seen. Overnight patient remained febrile T-max 101.5 but does not look toxic respiratory rate is in mid to high 20s hemodynamically stable does not require pressor support no worsening hypoxia was reported and he is FiO2 was weaned down from 50% to 40%. He is more arousable does respond to name tried his head and follows commands with blinking his eyes not able to move extremities weakness of all extremities are present. He has been off fentanyl drip, Versed drip and he is on very low-dose of Precedex drip at this time. He is on Librium but lower dose of Librium and on oxycodone around-the-clock. Ventilator setting PRVC/16/540/10 PEEP/50% FiO2 and he is saturating above 92%.   Tracheostomy site look okay no bleeding or discharge reported from tracheostomy site        REVIEW OF SYSTEMS:  Unobtainable from patient due to sedation/mechanical ventilation    OBJECTIVE     VENTILATOR SETTINGS:  Vent Information  $Ventilation: $Subsequent Day  Skin Assessment: Clean, dry, & intact  Suction Catheter Diameter: 14  Equipment ID: [id number]  Equipment Changed: peripheral pulses normal, no pedal edema, no clubbing or cyanosis  Skin - normal coloration and turgor, no rashes, no suspicious skin lesions noted     DATA REVIEW     Medications:  Scheduled Meds:   chlordiazePOXIDE  10 mg Per NG tube 4x Daily    oxyCODONE  10 mg Per G Tube Q4H    nafcillin  2,000 mg IntraVENous Q4H    colchicine  0.6 mg Oral Daily    sodium chloride flush  5-40 mL IntraVENous 2 times per day    docusate  100 mg Per NG tube BID    insulin lispro  0-6 Units SubCUTAneous Q6H    lansoprazole  30 mg Oral QAM AC    insulin glargine  10 Units SubCUTAneous Nightly    sodium chloride flush  5-40 mL IntraVENous 2 times per day    enoxaparin  30 mg SubCUTAneous BID    Vitamin D  2,000 Units Oral Daily     Continuous Infusions:   sodium chloride      sodium chloride      dextrose      dextrose      sodium chloride 10 mL/hr at 12/13/21 1548       INPUT/OUTPUT:  In: 2401 [I.V.:892; NG/GT:1509]  Out: 2110 [Urine:1910]  Date 01/06/22 0000 - 01/06/22 2359   Shift 8845-3050 3915-5859 4006-4629 24 Hour Total   INTAKE   I.V.(mL/kg) 281(2.3)   281(2.3)   NG/GT(mL/kg) 533(4.3)   533(4.3)   Shift Total(mL/kg) 814(6.5)   814(6.5)   OUTPUT   Urine(mL/kg/hr) 625(0.6)   625   Stool(mL/kg) 100(0.8)   100(0.8)   Shift Total(mL/kg) 725(5.8)   725(5.8)   Weight (kg) 124.3 124.3 124.3 124.3        LABS:  ABGs:   Recent Labs     01/04/22  0423   POCPH 7.439   POCPCO2 48.6*   POCPO2 81.9*   POCHCO3 32.9*   [id number] 96     CBC:   Recent Labs     01/04/22  0540 01/05/22  0340 01/06/22  0500   WBC 8.3 9.7 11.5*   HGB 12.7* 13.5 13.2   HCT 39.4* 41.4 40.7   MCV 96.3 95.8 96.7    161 175   LYMPHOPCT 15* 11* 9*   RBC 4.09* 4.32 4.21   MCH 31.1 31.3 31.4   MCHC 32.2 32.6 32.4   RDW 14.1 14.1 14.3     CRP:   No results for input(s): CRP in the last 72 hours. LDH:   No results for input(s): LDH in the last 72 hours.   BMP:   Recent Labs     01/04/22  0540 01/05/22  0340 01/06/22  0500    144 143   K 3.8 3.2* 3.0*    107 105   CO2 27 26 27   BUN 15 16 12   CREATININE 0.30* 0.36* 0.31*   GLUCOSE 111* 121* 148*     Liver Function Test:   Recent Labs     01/04/22  0540 01/06/22  0500   PROT 5.7* 6.0*   LABALBU 2.9* 3.0*   ALT 87* 52*   AST 17 13   ALKPHOS 69 76   BILITOT 0.57 0.46     Coagulation Profile:   No results for input(s): INR, PROTIME, APTT in the last 72 hours. D-Dimer:  No results for input(s): DDIMER in the last 72 hours. Ferritin:    No results for input(s): FERRITIN in the last 72 hours. Lactic Acid:  No results for input(s): LACTA in the last 72 hours. Cardiac Enzymes:  No results for input(s): CKTOTAL, CKMB, CKMBINDEX, TROPONINI in the last 72 hours. Invalid input(s): TROPONIN, HSTROP  BNP/ProBNP:   No results for input(s): BNP, PROBNP in the last 72 hours. Triglycerides:  No results for input(s): TRIG in the last 72 hours. Microbiology:  Urine Culture:  No components found for: CURINE  Blood Culture:  No components found for: CBLOOD, CFUNGUSBL  Sputum Culture:  No components found for: CSPUTUM  Recent Labs     01/05/22 1909   SPECDESC . BLOOD  . BLOOD   SPECIAL RT HAND 10ML  RT ARM 10ML   CULTURE NO GROWTH 12 HOURS  NO GROWTH 12 HOURS     Recent Labs     01/05/22 1909   SPECDESC . BLOOD  . BLOOD   SPECIAL RT HAND 10ML  RT ARM 10ML   CULTURE NO GROWTH 12 HOURS  NO GROWTH 12 HOURS        Pathology:    Radiology Reports:  XR CHEST PORTABLE   Final Result   Interval tracheostomy. Bilateral patchy interstitial and airspace opacities   are not significantly changed. VL DUP LOWER EXTREMITY VENOUS BILATERAL   Final Result      XR CHEST PORTABLE   Final Result   Stable support lines and tubes. Stable to slightly increased diffuse   interstitial and airspace opacities. XR CHEST PORTABLE   Final Result   1. Stable cardiomegaly. 2.  Patchy airspace opacities, stable in the right lower chest, slightly   improved on the left.   Findings may be related to pulmonary edema or improving pneumonia. 3.  Support tubes and catheters in good position. XR ABDOMEN (KUB) (SINGLE AP VIEW)   Final Result   Stable mild cardiomegaly. Patchy airspace opacities, left more than right, may be related to pulmonary   edema versus pneumonia. Nonspecific bowel gas pattern. XR CHEST (SINGLE VIEW FRONTAL)   Final Result   Stable mild cardiomegaly. Patchy airspace opacities, left more than right, may be related to pulmonary   edema versus pneumonia. Nonspecific bowel gas pattern. XR CHEST PORTABLE   Final Result   Cardiomegaly, vascular congestion and worsening pulmonary opacities with   bilateral effusions favoring pulmonary edema over multifocal airspace   disease. Support tubes and lines as above. XR CHEST (SINGLE VIEW FRONTAL)   Final Result   No significant change in multifocal airspace opacities. Continued follow-up   is recommended document complete resolution following medical treatment   course. Stable position lines and catheters         XR CHEST (SINGLE VIEW FRONTAL)   Final Result   Mild interval improvement in multifocal airspace disease particularly at the   right base. Support tubes and lines as above. XR CHEST (SINGLE VIEW FRONTAL)   Final Result   Stable appearing              Echocardiogram:   No results found for this or any previous visit. ASSESSMENT AND PLAN     Assessment:    // Acute hypoxic respiratory failure  // Acute respiratory distress syndrome secondary to Covid pneumonia  // Bilateral multifocal pneumonia due to COVID 19 infection  // Sepsis due to COVID 19  // Circulatory shock/septic shock resolved  // Hypertension  // Diabetes mellitus type 2  // Obstructive sleep apnea    Plan:    I personally interviewed/examined the patient; reviewed interval history, interpreted all available radiographic and laboratory data at the time of service.     Patient is off fentanyl drip and Versed drip and currently on low-dose Precedex drip. He was also taken off baclofen. He is currently on oxycodone 10 mg which is reduced from 20 mg and still on Librium 10 mg 4 times a day. Recommend to gradually wean off Librium and wean off Precedex. Monitor temperature especially after he is off Precedex drip. Fever work-up so far negative by infectious disease  Daily spontaneous breathing trial on ventilator and will try to wean PEEP from 10-8 and FiO2 from 40 to 45%  Patient is currently not on pressors and does not require hemodynamic support   Continue lung protective mechanical ventilation as per ARDS protocol  Appropriate changes made in ventilator settings  Monitor endotracheal secretions and watch for any bleeding from tracheostomy site  Obtain X-ray chest as needed   Continue pulmonary toilet, aspiration precautions and bronchodilators  Continue to monitor I/O with a goal of even/negative fluid balance   Recommend to continue tube feeds  Chemical DVT prophylaxis as per protocol on Lovenox 30 mg twice daily  Antimicrobials reviewed; continue nafcillin per infectious disease  Glycemic control appropriate on Lantus  Finish prednisone/steroid on 01/05/2022  Physical/occupational therapy. 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 Discussed with respiratory therapist.      The patient is/remains critically ill with illness/injury that acutely impairs one or more vital organ systems, such that there is a high probability of imminent or life threatening deterioration in the patient's condition. Critical care time of 35 minutes was spent (excluding procedures), in coordination of care during bedside rounds and discussion of patient care in detail, and recommendations of the team were adopted in the plan.  Necessity of all invasive devices was also confirmed. Onesimo Whitehead MD  Pulmonary and Critical Care Medicine           1/6/2022, 1:50 PM    This patient was evaluated in the context of the global SARS-CoV-2 (COVID-19) pandemic, which necessitated considerations that the patient either has COVID-19 infection or is at risk of infection with COVID-19. Institutional protocols and algorithms that pertain to the evaluation & management of patients with COVID-19 or those at risk for COVID-19 are in a state of rapid changes based on information released by regulatory bodies including the CDC and federal and state organizations. These policies and algorithms were followed during the patient's care. Please note that this chart was generated using voice recognition Dragon dictation software. Although every effort was made to ensure the accuracy of this automated transcription, some errors in transcription may have occurred.

## 2022-01-06 NOTE — PROGRESS NOTES
[facility]  Office: [address], DO, Marcy Joseph, DO, Matt Palomo, DO, Brea Morris, DO, Shantel Mosquera MD, Austin Hess MD, Manjinder Young MD, Blayne Mora MD, Regulo Seay MD, Brennen Vu MD, David Miranda MD, Raciel Luu, DO, Yenni Logan, DO, Perla Galo MD,  Judy Morgan, DO, Jenny Altman MD, Manjinder Motta MD, Deborah De Jesus MD, Rhina Ravi MD, Sil Beckham MD, Eliceo Guaman MD, Lorie Rascon MD, Grey Mendoza [facility], [facility], CNP, Kailee Boggs, CNP, Luda Arrington, CNS, Zenobia Agiurre, CNP, Ana Lemon, CNP, Tiffany Conde, CNP, Sai Rene, CNP, Deborah Cardsoo, CNP, Mando Conner PA-C, Raphael Chowdhury, [facility], Nanette Stinson, [facility], Eduard Dai, CNP, Yoli Wheeler, CNP, Nannette Santos, CNP, Laurita Sethi, CNP, Fátima Webb, [facility], Ronnie Canseco, [address] [place] [address]    Progress Note    1/6/2022    4:29 PM    Name:   Estefany Solorio  MRN:     [id number]     [place]:      [de-identified]   Room:   [address] [place] [address] Day:  25  Admit Date:  12/12/2021 11:39 AM    PCP:   Giancarlo Palm MD  Code Status:  Full Code    Subjective:     C/C: Shortness of breath    Interval History Status: not changed. Patient seen and examined this afternoon. No acute events overnight. Following all commands. Able to converse by mouthing words. Reports that he is uncomfortable. Significant secretions noted. Tolerating tracheostomy. Arterial line being removed. Sedation being weaned. Brief History:     Estefany Solorio is 60 y.o. male who was admitted to the hospital on 12/12/2021 for treatment of Pneumonia due to COVID-19 virus. Pt initially presented with shortness of breath at [facility].  He had to suggest positive for COVID-19 infection. Patient reported that his son had also COVID-19 infection.   Patient was hypoxic in 50s on arrival and was treated with BiPAP however breathing worsened and patient was subsequently intubated. Patient was transferred to [facility] on 12/12/2021 as he was requiring high ventilator support 100% FiO2 with PEEP of 22. He was given Actemra and started on high-dose Decadron. Patient was started with he was started on Flolan and given paralytics for proning per ARDS protocol. Patient also received intermittent Lasix. Proning was stopped on 12/20/2021 and paralytics were stopped on 12/22/2021. Patient started to have fevers and was started on cefepime. Respiratory cultures were negative. Airborne isolation was removed on 12/31/2021. Trach/PEG placed on 1/3/2022. Review of Systems:     General: Denies fever, chills, reports fatigue  Pulm: Denies shortness of breath  Cardio: Denies chest pain  GI: Denies abdominal pain   MSK: Reports body aches    Medications: Allergies:     Allergies   Allergen Reactions    Nuts [Peanut-Containing Drug Products] Anaphylaxis    Sunflower Oil Anaphylaxis     Sunflower seeds       Current Meds:   Scheduled Meds:    chlordiazePOXIDE  10 mg Per NG tube 4x Daily    oxyCODONE  10 mg Per G Tube Q4H    nafcillin  2,000 mg IntraVENous Q4H    colchicine  0.6 mg Oral Daily    sodium chloride flush  5-40 mL IntraVENous 2 times per day    docusate  100 mg Per NG tube BID    insulin lispro  0-6 Units SubCUTAneous Q6H    lansoprazole  30 mg Oral QAM AC    insulin glargine  10 Units SubCUTAneous Nightly    sodium chloride flush  5-40 mL IntraVENous 2 times per day    enoxaparin  30 mg SubCUTAneous BID    Vitamin D  2,000 Units Oral Daily     Continuous Infusions:    sodium chloride      sodium chloride      dextrose      dextrose      sodium chloride 10 mL/hr at 12/13/21 1548     PRN Meds: simethicone, potassium chloride **OR** potassium alternative oral replacement **OR** potassium chloride, ibuprofen, sodium chloride flush, sodium chloride, metoprolol, polyethylene glycol, senna, bisacodyl, acetaminophen, sodium chloride flush, sodium chloride, ondansetron **OR** ondansetron, [DISCONTINUED] acetaminophen **OR** acetaminophen, glucose, dextrose, glucagon (rDNA), dextrose, glucose, dextrose, glucagon (rDNA), dextrose    Data:     Past Medical History:   has a past medical history of Arthritis, Asthma, Diabetes mellitus ([address] Edema, GERD (gastroesophageal reflux disease), Hypertension, Migraine, and IRMA on CPAP. Social History:   reports that he has quit smoking. He has never used smokeless tobacco. He reports current alcohol use. He reports that he does not use drugs. Family History:   Family History   Problem Relation Age of Onset    Heart Attack Sister 32    Diabetes Paternal Grandmother     Heart Disease Paternal Uncle     Heart Disease Paternal Uncle     Heart Disease Paternal Uncle     Cancer Maternal Aunt     Cancer Maternal Aunt     Cancer Maternal Uncle     Cancer Maternal Uncle        Vitals:  BP (!) 142/101   Pulse 106   Temp 100.9 °F (38.3 °C) (Bladder)   Resp 28   Ht 6' 2\" (1.88 m)   Wt 274 lb 0.5 oz (124.3 kg)   SpO2 95%   BMI 35.18 kg/m²   Temp (24hrs), Av.3 °F (38.5 °C), Min:100.9 °F (38.3 °C), Max:101.7 °F (38.7 °C)    Recent Labs     22  0046 22  0701 22  1125 22  1614   POCGLU 130* 153* 138* 116*       I/O (24Hr):     Intake/Output Summary (Last 24 hours) at 2022 1629  Last data filed at 2022 0600  Gross per 24 hour   Intake 1901 ml   Output 1425 ml   Net 476 ml       Labs:  Hematology:  Recent Labs     22  0540 22  0340 22  0500   WBC 8.3 9.7 11.5*   RBC 4.09* 4.32 4.21   HGB 12.7* 13.5 13.2   HCT 39.4* 41.4 40.7   MCV 96.3 95.8 96.7   MCH 31.1 31.3 31.4   MCHC 32.2 32.6 32.4   RDW 14.1 14.1 14.3    161 175   MPV 10.9 11.1 11.1     Chemistry:  Recent Labs     22  0540 22  0340 22  0500    144 143   K 3.8 3.2* 3.0*    107 105   CO2 27 26 27   GLUCOSE 111* 121* 148*   BUN 15 16 12   CREATININE 0.30* 0.36* 0.31*   MG  --  2.1 2.0   ANIONGAP 10 11 11   LABGLOM >60 >60 >60   GFRAA >60 >60 >60   CALCIUM 8.5* 9.5 9.3     Recent Labs     01/04/22  0540 01/04/22  0722 01/05/22  1733 01/05/22  1959 01/06/22  0046 01/06/22  0500 01/06/22  0701 01/06/22  1125 01/06/22  1614   PROT 5.7*  --   --   --   --  6.0*  --   --   --    LABALBU 2.9*  --   --   --   --  3.0*  --   --   --    AST 17  --   --   --   --  13  --   --   --    ALT 87*  --   --   --   --  52*  --   --   --    ALKPHOS 69  --   --   --   --  76  --   --   --    BILITOT 0.57  --   --   --   --  0.46  --   --   --    BILIDIR 0.20  --   --   --   --  0.15  --   --   --    POCGLU  --    < > 129* 156* 130*  --  153* 138* 116*    < > = values in this interval not displayed. ABG:  Lab Results   Component Value Date    POCPH 7.439 01/04/2022    POCPCO2 48.6 01/04/2022    POCPO2 81.9 01/04/2022    POCHCO3 32.9 01/04/2022    NBEA NOT REPORTED 01/04/2022    PBEA 7 01/04/2022    DZT7GCB NOT REPORTED 01/04/2022    ENMW3OZY 96 01/04/2022    FIO2 50.0 01/04/2022     Lab Results   Component Value Date/Time    SPECIAL NOT REPORTED 01/06/2022 02:50 PM     Lab Results   Component Value Date/Time    CULTURE PENDING 01/06/2022 02:50 PM     Radiology:  XR CHEST PORTABLE    Result Date: 12/29/2021  Stable support lines and tubes. Stable to slightly increased diffuse interstitial and airspace opacities. Physical Examination:        General appearance: Morbidly obese  gentleman lying supine in bed. Restless. Awake and alert, not following all commands. Has just had an episode of emesis. HEENT: Tracheostomy present; C/D/I incision. Lungs: Mechanical breath sounds, coarse ronchi present, clear to auscultation bilaterally, increased effort  Heart:  regular rate and rhythm, no murmur  Abdomen:  soft, nontender, protuberant, nondistended, normal bowel sounds, no masses, hepatomegaly, splenomegaly, PEG tube present  : Hahn catheter and FMS present.   Extremities: SCDs present bilaterally, no edema noted, bleeding from right radial artery after art line has been removed  Skin: Erythema improved to left foot    Assessment:        Hospital Problems           Last Modified POA    * (Principal) Pneumonia due to COVID-19 virus 12/21/2021 Yes    Acute respiratory failure with hypoxia ([address] 12/21/2021 Yes    ARDS (adult respiratory distress syndrome) ([address] 12/21/2021 Yes    Shock ([address] 12/21/2021 No    Acute idiopathic gout of left foot 1/3/2022 No    SIRS (systemic inflammatory response syndrome) ([address] 1/5/2022 No    Type 2 diabetes mellitus ([address] 12/21/2021 Yes    Asthma 12/21/2021 Yes    IRMA on CPAP 12/12/2021 Yes    Overview Signed 12/12/2021  2:39 PM by Dinah Ward, DO     seldom use machine         Hypertension 12/12/2021 Yes    Overview Signed 12/12/2021  2:39 PM by Dinah Ward, DO     on lasix         GERD (gastroesophageal reflux disease) 12/12/2021 Yes    Constipation 1/5/2022 Yes    Restlessness and agitation 1/5/2022 Yes          Plan:        Acute respiratory failure with hypoxia due to COVID-19 pneumonia-pulmonology/critical care is following ventilator support. Continue oral prednisone taper. Received Actemra on 12/12. POD #3 from trach/Peg placement. Weaning sedation. MSSA pneumonia - continue Nafcillin. Blood cultures have been negative. Intermittent fevers - persistent. Discussed with ID and critical care. Stopping precedex and monitoring. Possibly due to precedex infusion. Cultures have been negative. Start PRN flexeril for body aches/spasms  Bowel regimen-Continue scheduled Colace and as needed MiraLAX. Gout-continue Colcrys and prednisone. Type 2 diabetes mellitus-continue insulin sliding scale and Lantus.   Continue DVT prophylaxis with Lovenox  Continue GI prophylaxis with lansoprazole  Obesity with BMI of 36.37-will need diet/weight loss/exercise/therapies in the future  IRMA-was noncompliant with CPAP at home  Stopping precedex, removing lines  OK for transfer

## 2022-01-06 NOTE — PROGRESS NOTES
Comprehensive Nutrition Assessment    Type and Reason for Visit:  Reassess    Nutrition Recommendations/Plan:   - Continue NPO  - Continue current TF: Peptide-based at goal rate 75 mL/hr continuous  - Will monitor for tolerance    Nutrition Assessment:  Pt remains intubated. Per RN, TF running at goal rate at visit. Pt had some emesis yesterday, but per RN tolerating TF since then. LBM 1/5. Wt fluctuations noted since admission, will monitor w/ pitting edema. Malnutrition Assessment:  Malnutrition Status:  Insufficient data      Estimated Daily Nutrient Needs:  Energy (kcal):  0604-5356 kcals/day; Weight Used for Energy Requirements:  Current     Protein (g):  1.5 gm/kg = 130 gm/day; Weight Used for Protein Requirements:  Ideal        Fluid (ml/day):  per MD; Method Used for Fluid Requirements:         Nutrition Related Findings:  Labs/meds reviewed. LBM 1/5. BUE, RLE +2 pitting, LLE +3 pitting edema.       Wounds:  None       Current Nutrition Therapies:    Diet NPO  ADULT TUBE FEEDING; PEG; Peptide Based; Continuous; 10; Yes; 10; Q 4 hours; 75; 150; Q 4 hours    Anthropometric Measures:  · Height: 6' 2\" (188 cm)  · Current Body Weight: 274 lb 0.5 oz (124.3 kg) (1/6, [facility])   · Admission Body Weight: 306 lb 7 oz (139 kg)    · Usual Body Weight: 300 lb (136.1 kg) (stated per chart review)     · Ideal Body Weight: 190 lbs; % Ideal Body Weight 144.2 %   · BMI: 35.2  · BMI Categories: Obese Class 2 (BMI 35.0 -39.9)       Nutrition Diagnosis:   · Inadequate oral intake related to impaired respiratory function as evidenced by NPO or clear liquid status due to medical condition    Nutrition Interventions:   Food and/or Nutrient Delivery:  Continue NPO,Continue Current Tube Feeding  Nutrition Education/Counseling:  No recommendation at this time   Coordination of Nutrition Care:  Continue to monitor while inpatient    Goals:  Pt to meet % of est'd needs daily via EN       Nutrition Monitoring and Evaluation: Behavioral-Environmental Outcomes:  None Identified   Food/Nutrient Intake Outcomes:  Enteral Nutrition Intake/Tolerance  Physical Signs/Symptoms Outcomes:  Biochemical Data,Fluid Status or Edema,Weight     Discharge Planning:     Too soon to determine     Electronically signed by Clair Medina MS, RD, LD on 1/6/22 at 11:15 AM EST    Contact: [phone]

## 2022-01-06 NOTE — PLAN OF CARE
Problem: Airway Clearance - Ineffective  Goal: Achieve or maintain patent airway  1/6/2022 0638 by Leona Balloon  Outcome: Ongoing  1/5/2022 2121 by Shea Reece RCP  Outcome: Ongoing     Problem: Gas Exchange - Impaired  Goal: Absence of hypoxia  1/6/2022 0638 by Leona Balloon  Outcome: Ongoing  1/5/2022 2121 by Shea Reece RCP  Outcome: Ongoing  Goal: Promote optimal lung function  1/6/2022 0638 by Leona Balloon  Outcome: Ongoing  1/5/2022 2121 by Shea Reece RCP  Outcome: Ongoing     Problem:  Body Temperature -  Risk of, Imbalanced  Goal: Ability to maintain a body temperature within defined limits  Outcome: Ongoing  Goal: Will regain or maintain usual level of consciousness  Outcome: Ongoing  Goal: Complications related to the disease process, condition or treatment will be avoided or minimized  Outcome: Ongoing     Problem: Nutrition Deficits  Goal: Optimize nutritional status  Outcome: Ongoing     Problem: Risk for Fluid Volume Deficit  Goal: Maintain normal heart rhythm  Outcome: Ongoing  Goal: Maintain absence of muscle cramping  Outcome: Ongoing  Goal: Maintain normal serum potassium, sodium, calcium, phosphorus, and pH  Outcome: Ongoing     Problem: OXYGENATION/RESPIRATORY FUNCTION  Goal: Patient will maintain patent airway  1/6/2022 0638 by Leona Balloon  Outcome: Ongoing  1/5/2022 2121 by Shea Reece RCP  Outcome: Ongoing  Goal: Patient will achieve/maintain normal respiratory rate/effort  Description: Respiratory rate and effort will be within normal limits for the patient  1/6/2022 9767 by Leona Balloon  Outcome: Ongoing  1/5/2022 2121 by Shea Reece RCP  Outcome: Ongoing     Problem: MECHANICAL VENTILATION  Goal: Patient will maintain patent airway  1/6/2022 0638 by Leona Balloon  Outcome: Ongoing  1/5/2022 2121 by Shea Reece RCP  Outcome: Ongoing  Goal: Patient will maintain patent airway  1/6/2022 0638 by Leona Balloon  Outcome: Ongoing  1/5/2022 2121 by Shea Reece RCP  Outcome: Ongoing  Goal: Oral health is maintained or improved  1/6/2022 0638 by Charlene Valera  Outcome: Ongoing  1/5/2022 2121 by Shana Reilly RCP  Outcome: Ongoing  Goal: ET tube will be managed safely  1/6/2022 0638 by Charlene Valera  Outcome: Ongoing  1/5/2022 2121 by Shana Reilly RCP  Outcome: Ongoing  Goal: Ability to express needs and understand communication  1/6/2022 0638 by Charlene Valera  Outcome: Ongoing  1/5/2022 2121 by Shana Reilly RCP  Outcome: Ongoing  Goal: Mobility/activity is maintained at optimum level for patient  1/6/2022 5917 by Charlene Valera  Outcome: Ongoing  1/5/2022 2121 by Shana Reilly RCP  Outcome: Ongoing  Goal: Tracheostomy will be managed safely  1/6/2022 6588 by Charlene Valera  Outcome: Ongoing  1/5/2022 2121 by Shana Reilly RCP  Outcome: Ongoing     Problem: SKIN INTEGRITY  Goal: Skin integrity is maintained or improved  1/6/2022 9900 by Charlene Valera  Outcome: Ongoing  1/5/2022 2121 by Shana Reilly RCP  Outcome: Ongoing     Problem: Skin Integrity:  Goal: Will show no infection signs and symptoms  Description: Will show no infection signs and symptoms  Outcome: Ongoing  Goal: Absence of new skin breakdown  Description: Absence of new skin breakdown  Outcome: Ongoing     Problem: Falls - Risk of:  Goal: Will remain free from falls  Description: Will remain free from falls  Outcome: Ongoing  Goal: Absence of physical injury  Description: Absence of physical injury  Outcome: Ongoing     Problem: Nutrition  Goal: Optimal nutrition therapy  Outcome: Ongoing

## 2022-01-06 NOTE — PLAN OF CARE
Problem: Airway Clearance - Ineffective  Goal: Achieve or maintain patent airway  1/5/2022 2121 by Evangelina Wilson RCP  Outcome: Ongoing     Problem: Gas Exchange - Impaired  Goal: Absence of hypoxia  1/5/2022 2121 by Evangelina Wilson RCP  Outcome: Ongoing     Problem: Gas Exchange - Impaired  Goal: Promote optimal lung function  1/5/2022 2121 by Evangelina Wilson RCP  Outcome: Ongoing     Problem: Breathing Pattern - Ineffective  Goal: Ability to achieve and maintain a regular respiratory rate  1/5/2022 2121 by Evangelina Wilson RCP  Outcome: Ongoing     Problem: OXYGENATION/RESPIRATORY FUNCTION  Goal: Patient will maintain patent airway  1/5/2022 2121 by Evangelina Wilson RCP  Outcome: Ongoing     Problem: OXYGENATION/RESPIRATORY FUNCTION  Goal: Patient will achieve/maintain normal respiratory rate/effort  Description: Respiratory rate and effort will be within normal limits for the patient  1/5/2022 2121 by Evangelina Wilson RCP  Outcome: Ongoing     Problem: MECHANICAL VENTILATION  Goal: Patient will maintain patent airway  1/5/2022 2121 by Evangelina Wilson RCP  Outcome: Ongoing     Problem: MECHANICAL VENTILATION  Goal: Patient will maintain patent airway  1/5/2022 2121 by Evangelina Wilson RCP  Outcome: Ongoing     Problem: MECHANICAL VENTILATION  Goal: Oral health is maintained or improved  1/5/2022 2121 by Evangelina Wilson RCP  Outcome: Ongoing     Problem: MECHANICAL VENTILATION  Goal: ET tube will be managed safely  1/5/2022 2121 by Evangelina Wilson RCP  Outcome: Ongoing     Problem: MECHANICAL VENTILATION  Goal: Ability to express needs and understand communication  1/5/2022 2121 by Evangelina Wilson RCP  Outcome: Ongoing     Problem: MECHANICAL VENTILATION  Goal: Mobility/activity is maintained at optimum level for patient  1/5/2022 2121 by Evangelina Wilson RCP  Outcome: Ongoing     Problem: MECHANICAL VENTILATION  Goal: Tracheostomy will be managed safely  1/5/2022 2121 by Evangelina Wilson RCP  Outcome: Ongoing     Problem: SKIN INTEGRITY  Goal: Skin integrity is maintained or improved  1/5/2022 2121 by Jero Washington RCP  Outcome: Ongoing

## 2022-01-07 VITALS
RESPIRATION RATE: 26 BRPM | DIASTOLIC BLOOD PRESSURE: 72 MMHG | TEMPERATURE: 100.3 F | SYSTOLIC BLOOD PRESSURE: 110 MMHG | HEIGHT: 74 IN | OXYGEN SATURATION: 95 % | HEART RATE: 102 BPM | WEIGHT: 274.03 LBS | BODY MASS INDEX: 35.17 KG/M2

## 2022-01-07 PROBLEM — J80 ARDS (ADULT RESPIRATORY DISTRESS SYNDROME) (HCC): Status: RESOLVED | Noted: 2021-12-12 | Resolved: 2022-01-07

## 2022-01-07 PROBLEM — R65.10 SIRS (SYSTEMIC INFLAMMATORY RESPONSE SYNDROME) (HCC): Status: RESOLVED | Noted: 2022-01-05 | Resolved: 2022-01-07

## 2022-01-07 PROBLEM — R57.9 SHOCK (HCC): Status: RESOLVED | Noted: 2021-12-21 | Resolved: 2022-01-07

## 2022-01-07 LAB
ABSOLUTE EOS #: 0.22 K/UL (ref 0–0.44)
ABSOLUTE IMMATURE GRANULOCYTE: 0.08 K/UL (ref 0–0.3)
ABSOLUTE LYMPH #: 1.14 K/UL (ref 1.1–3.7)
ABSOLUTE MONO #: 1.02 K/UL (ref 0.1–1.2)
ANION GAP SERPL CALCULATED.3IONS-SCNC: 10 MMOL/L (ref 9–17)
BASOPHILS # BLD: 0 % (ref 0–2)
BASOPHILS ABSOLUTE: 0.04 K/UL (ref 0–0.2)
BUN BLDV-MCNC: 10 MG/DL (ref 6–20)
BUN/CREAT BLD: ABNORMAL (ref 9–20)
CALCIUM SERPL-MCNC: 9.2 MG/DL (ref 8.6–10.4)
CHLORIDE BLD-SCNC: 103 MMOL/L (ref 98–107)
CO2: 25 MMOL/L (ref 20–31)
CREAT SERPL-MCNC: 0.28 MG/DL (ref 0.7–1.2)
CULTURE: NORMAL
DIFFERENTIAL TYPE: ABNORMAL
DIRECT EXAM: NORMAL
EOSINOPHILS RELATIVE PERCENT: 2 % (ref 1–4)
GFR AFRICAN AMERICAN: >60 ML/MIN
GFR NON-AFRICAN AMERICAN: >60 ML/MIN
GFR SERPL CREATININE-BSD FRML MDRD: ABNORMAL ML/MIN/{1.73_M2}
GFR SERPL CREATININE-BSD FRML MDRD: ABNORMAL ML/MIN/{1.73_M2}
GLUCOSE BLD-MCNC: 104 MG/DL (ref 75–110)
GLUCOSE BLD-MCNC: 110 MG/DL (ref 75–110)
GLUCOSE BLD-MCNC: 119 MG/DL (ref 70–99)
GLUCOSE BLD-MCNC: 126 MG/DL (ref 75–110)
HCT VFR BLD CALC: 38.8 % (ref 40.7–50.3)
HEMOGLOBIN: 12.5 G/DL (ref 13–17)
IMMATURE GRANULOCYTES: 1 %
LYMPHOCYTES # BLD: 11 % (ref 24–43)
Lab: NORMAL
MAGNESIUM: 2 MG/DL (ref 1.6–2.6)
MCH RBC QN AUTO: 31.4 PG (ref 25.2–33.5)
MCHC RBC AUTO-ENTMCNC: 32.2 G/DL (ref 28.4–34.8)
MCV RBC AUTO: 97.5 FL (ref 82.6–102.9)
MONOCYTES # BLD: 10 % (ref 3–12)
NRBC AUTOMATED: 0 PER 100 WBC
PDW BLD-RTO: 14.4 % (ref 11.8–14.4)
PLATELET # BLD: 177 K/UL (ref 138–453)
PLATELET ESTIMATE: ABNORMAL
PMV BLD AUTO: 10.7 FL (ref 8.1–13.5)
POTASSIUM SERPL-SCNC: 3.1 MMOL/L (ref 3.7–5.3)
RBC # BLD: 3.98 M/UL (ref 4.21–5.77)
RBC # BLD: ABNORMAL 10*6/UL
SEG NEUTROPHILS: 76 % (ref 36–65)
SEGMENTED NEUTROPHILS ABSOLUTE COUNT: 7.51 K/UL (ref 1.5–8.1)
SODIUM BLD-SCNC: 138 MMOL/L (ref 135–144)
SPECIMEN DESCRIPTION: NORMAL
WBC # BLD: 10 K/UL (ref 3.5–11.3)
WBC # BLD: ABNORMAL 10*3/UL

## 2022-01-07 PROCEDURE — 94003 VENT MGMT INPAT SUBQ DAY: CPT

## 2022-01-07 PROCEDURE — 36415 COLL VENOUS BLD VENIPUNCTURE: CPT

## 2022-01-07 PROCEDURE — 94761 N-INVAS EAR/PLS OXIMETRY MLT: CPT

## 2022-01-07 PROCEDURE — 6370000000 HC RX 637 (ALT 250 FOR IP): Performed by: STUDENT IN AN ORGANIZED HEALTH CARE EDUCATION/TRAINING PROGRAM

## 2022-01-07 PROCEDURE — 83735 ASSAY OF MAGNESIUM: CPT

## 2022-01-07 PROCEDURE — 2500000003 HC RX 250 WO HCPCS: Performed by: STUDENT IN AN ORGANIZED HEALTH CARE EDUCATION/TRAINING PROGRAM

## 2022-01-07 PROCEDURE — 6370000000 HC RX 637 (ALT 250 FOR IP): Performed by: INTERNAL MEDICINE

## 2022-01-07 PROCEDURE — 99239 HOSP IP/OBS DSCHRG MGMT >30: CPT | Performed by: INTERNAL MEDICINE

## 2022-01-07 PROCEDURE — 99232 SBSQ HOSP IP/OBS MODERATE 35: CPT | Performed by: INTERNAL MEDICINE

## 2022-01-07 PROCEDURE — 2700000000 HC OXYGEN THERAPY PER DAY

## 2022-01-07 PROCEDURE — 2580000003 HC RX 258: Performed by: STUDENT IN AN ORGANIZED HEALTH CARE EDUCATION/TRAINING PROGRAM

## 2022-01-07 PROCEDURE — 82947 ASSAY GLUCOSE BLOOD QUANT: CPT

## 2022-01-07 PROCEDURE — 6360000002 HC RX W HCPCS: Performed by: STUDENT IN AN ORGANIZED HEALTH CARE EDUCATION/TRAINING PROGRAM

## 2022-01-07 PROCEDURE — 85025 COMPLETE CBC W/AUTO DIFF WBC: CPT

## 2022-01-07 PROCEDURE — 80048 BASIC METABOLIC PNL TOTAL CA: CPT

## 2022-01-07 RX ORDER — SIMETHICONE 20 MG/.3ML
40 EMULSION ORAL EVERY 6 HOURS PRN
Qty: 60 ML | Refills: 3 | DISCHARGE
Start: 2022-01-07

## 2022-01-07 RX ORDER — SUMATRIPTAN 100 MG/1
100 TABLET, FILM COATED ORAL
Qty: 9 TABLET | Refills: 0 | DISCHARGE
Start: 2022-01-07 | End: 2022-01-07

## 2022-01-07 RX ORDER — LANSOPRAZOLE
30 KIT
Qty: 300 ML | DISCHARGE
Start: 2022-01-07

## 2022-01-07 RX ORDER — MONTELUKAST SODIUM 10 MG/1
10 TABLET ORAL DAILY
Qty: 30 TABLET | Refills: 0 | DISCHARGE
Start: 2022-01-07

## 2022-01-07 RX ORDER — ONDANSETRON 4 MG/1
4 TABLET, FILM COATED ORAL EVERY 8 HOURS PRN
Qty: 30 TABLET | Refills: 0 | DISCHARGE
Start: 2022-01-07

## 2022-01-07 RX ORDER — OXYCODONE HCL 5 MG/5 ML
10 SOLUTION, ORAL ORAL EVERY 4 HOURS
Refills: 0 | Status: SHIPPED | OUTPATIENT
Start: 2022-01-07 | End: 2022-01-10

## 2022-01-07 RX ORDER — CYCLOBENZAPRINE HCL 10 MG
10 TABLET ORAL 3 TIMES DAILY PRN
Refills: 0 | DISCHARGE
Start: 2022-01-07 | End: 2022-01-17

## 2022-01-07 RX ORDER — COLCHICINE 0.6 MG/1
0.6 TABLET ORAL DAILY
Qty: 7 TABLET | Refills: 0 | DISCHARGE
Start: 2022-01-08 | End: 2022-01-15

## 2022-01-07 RX ORDER — ALLOPURINOL 100 MG/1
100 TABLET ORAL DAILY
Qty: 30 TABLET | Refills: 0 | DISCHARGE
Start: 2022-01-15

## 2022-01-07 RX ORDER — CHOLECALCIFEROL (VITAMIN D3) 50 MCG
2000 TABLET ORAL DAILY
Qty: 60 TABLET | Refills: 0 | DISCHARGE
Start: 2022-01-08

## 2022-01-07 RX ORDER — BUDESONIDE AND FORMOTEROL FUMARATE DIHYDRATE 160; 4.5 UG/1; UG/1
2 AEROSOL RESPIRATORY (INHALATION) 2 TIMES DAILY
Qty: 10.2 G | Refills: 0 | DISCHARGE
Start: 2022-01-07

## 2022-01-07 RX ORDER — ALBUTEROL SULFATE 2.5 MG/3ML
2.5 SOLUTION RESPIRATORY (INHALATION) EVERY 4 HOURS PRN
Qty: 120 EACH | Refills: 3 | DISCHARGE
Start: 2022-01-07

## 2022-01-07 RX ADMIN — NAFCILLIN SODIUM 2000 MG: 2 INJECTION, POWDER, LYOPHILIZED, FOR SOLUTION INTRAMUSCULAR; INTRAVENOUS at 12:38

## 2022-01-07 RX ADMIN — CHLORDIAZEPOXIDE HYDROCHLORIDE 10 MG: 5 CAPSULE ORAL at 05:00

## 2022-01-07 RX ADMIN — NAFCILLIN SODIUM 2000 MG: 2 INJECTION, POWDER, LYOPHILIZED, FOR SOLUTION INTRAMUSCULAR; INTRAVENOUS at 03:01

## 2022-01-07 RX ADMIN — COLCHICINE 0.6 MG: 0.6 TABLET, FILM COATED ORAL at 09:33

## 2022-01-07 RX ADMIN — OXYCODONE HYDROCHLORIDE 10 MG: 5 SOLUTION ORAL at 14:22

## 2022-01-07 RX ADMIN — OXYCODONE HYDROCHLORIDE 10 MG: 5 SOLUTION ORAL at 02:12

## 2022-01-07 RX ADMIN — Medication 2000 UNITS: at 09:33

## 2022-01-07 RX ADMIN — POTASSIUM CHLORIDE 40 MEQ: 1500 TABLET, EXTENDED RELEASE ORAL at 09:33

## 2022-01-07 RX ADMIN — ENOXAPARIN SODIUM 30 MG: 100 INJECTION SUBCUTANEOUS at 09:33

## 2022-01-07 RX ADMIN — OXYCODONE HYDROCHLORIDE 10 MG: 5 SOLUTION ORAL at 09:33

## 2022-01-07 RX ADMIN — DOCUSATE SODIUM 100 MG: 50 LIQUID ORAL at 09:33

## 2022-01-07 RX ADMIN — SODIUM CHLORIDE, PRESERVATIVE FREE 10 ML: 5 INJECTION INTRAVENOUS at 09:34

## 2022-01-07 RX ADMIN — NAFCILLIN SODIUM 2000 MG: 2 INJECTION, POWDER, LYOPHILIZED, FOR SOLUTION INTRAMUSCULAR; INTRAVENOUS at 07:52

## 2022-01-07 RX ADMIN — ACETAMINOPHEN 650 MG: 160 SOLUTION ORAL at 14:23

## 2022-01-07 RX ADMIN — CHLORDIAZEPOXIDE HYDROCHLORIDE 10 MG: 5 CAPSULE ORAL at 09:33

## 2022-01-07 RX ADMIN — DOCUSATE SODIUM 100 MG: 50 LIQUID ORAL at 00:16

## 2022-01-07 RX ADMIN — Medication 30 MG: at 12:38

## 2022-01-07 RX ADMIN — ACETAMINOPHEN 650 MG: 160 SOLUTION ORAL at 09:33

## 2022-01-07 RX ADMIN — OXYCODONE HYDROCHLORIDE 10 MG: 5 SOLUTION ORAL at 05:52

## 2022-01-07 ASSESSMENT — PULMONARY FUNCTION TESTS
PIF_VALUE: 18
PIF_VALUE: 16
PIF_VALUE: 21
PIF_VALUE: 15
PIF_VALUE: 26

## 2022-01-07 ASSESSMENT — PAIN SCALES - GENERAL
PAINLEVEL_OUTOF10: 2
PAINLEVEL_OUTOF10: 7
PAINLEVEL_OUTOF10: 2
PAINLEVEL_OUTOF10: 8

## 2022-01-07 NOTE — PLAN OF CARE
Problem: Airway Clearance - Ineffective  Goal: Achieve or maintain patent airway  Outcome: Completed     Problem: Gas Exchange - Impaired  Goal: Absence of hypoxia  Outcome: Completed  Goal: Promote optimal lung function  Outcome: Completed     Problem: Breathing Pattern - Ineffective  Goal: Ability to achieve and maintain a regular respiratory rate  Outcome: Completed     Problem:  Body Temperature -  Risk of, Imbalanced  Goal: Ability to maintain a body temperature within defined limits  Outcome: Completed  Goal: Will regain or maintain usual level of consciousness  Outcome: Completed  Goal: Complications related to the disease process, condition or treatment will be avoided or minimized  Outcome: Completed     Problem: Isolation Precautions - Risk of Spread of Infection  Goal: Prevent transmission of infection  Outcome: Completed     Problem: Nutrition Deficits  Goal: Optimize nutritional status  Outcome: Completed     Problem: Risk for Fluid Volume Deficit  Goal: Maintain normal heart rhythm  Outcome: Completed  Goal: Maintain absence of muscle cramping  Outcome: Completed  Goal: Maintain normal serum potassium, sodium, calcium, phosphorus, and pH  Outcome: Completed     Problem: Loneliness or Risk for Loneliness  Goal: Demonstrate positive use of time alone when socialization is not possible  Outcome: Completed     Problem: Fatigue  Goal: Verbalize increase energy and improved vitality  Outcome: Completed     Problem: Patient Education: Go to Patient Education Activity  Goal: Patient/Family Education  Outcome: Completed     Problem: OXYGENATION/RESPIRATORY FUNCTION  Goal: Patient will maintain patent airway  Outcome: Completed  Goal: Patient will achieve/maintain normal respiratory rate/effort  Description: Respiratory rate and effort will be within normal limits for the patient  Outcome: Completed     Problem: MECHANICAL VENTILATION  Goal: Patient will maintain patent airway  Outcome: Completed  Goal: Patient will maintain patent airway  Outcome: Completed  Goal: Oral health is maintained or improved  Outcome: Completed  Goal: Ability to express needs and understand communication  Outcome: Completed  Goal: Mobility/activity is maintained at optimum level for patient  Outcome: Completed  Goal: Tracheostomy will be managed safely  Outcome: Completed     Problem: SKIN INTEGRITY  Goal: Skin integrity is maintained or improved  Outcome: Completed     Problem: Skin Integrity:  Goal: Will show no infection signs and symptoms  Description: Will show no infection signs and symptoms  Outcome: Completed  Goal: Absence of new skin breakdown  Description: Absence of new skin breakdown  Outcome: Completed     Problem: Falls - Risk of:  Goal: Will remain free from falls  Description: Will remain free from falls  Outcome: Completed  Goal: Absence of physical injury  Description: Absence of physical injury  Outcome: Completed     Problem: Nutrition  Goal: Optimal nutrition therapy  Outcome: Completed     Problem: Discharge Planning:  Goal: Participates in care planning  Description: Participates in care planning  Outcome: Completed  Goal: Discharged to appropriate level of care  Description: Discharged to appropriate level of care  Outcome: Completed

## 2022-01-07 NOTE — DISCHARGE INSTR - DIET
Good nutrition is important when healing from an illness, injury, or surgery. Follow any nutrition recommendations given to you during your hospital stay. If you were given an oral nutrition supplement while in the hospital, continue to take this supplement at home. You can take it with meals, in-between meals, and/or before bedtime. These supplements can be purchased at most local grocery stores, pharmacies, and chain [employer]ores. If you have any questions about your diet or nutrition, call the hospital and ask for the dietitian.     Peptide-based at goal rate 75 mL/hr continuous

## 2022-01-07 NOTE — CARE COORDINATION
Transitional Planning    Spoke with [facility], [phone], with [facility]. She states they can accept patient today but cannot obtain librium until Monday. reqesting switch to ativan. PS to Dr. Mi Barker, he is agreeable to change librium to ativan. He will place d/c order    [address] [facility]. She is agreeable for writer to set up transport. Transport requested    [facility] [id number]. Laura updated and agreeable    # for report [phone]          Discharge [address] [place] [address] Case Management Department  Written by:  Mark Maya RN    Patient Name: Ana Dooley  Attending Provider: Payam Nina DO  Admit Date: 2021 11:39 AM  MRN: [id number]  Account: [de-identified]                     : 1965  Discharge Date: 22      Disposition: [facility]    Mark Maya RN

## 2022-01-07 NOTE — PROGRESS NOTES
[facility] - Progress Note   Note COVID 19 Patient  Today's Date and Time: 1/7/2022, 10:46 AM    Impression :     COVID 19 Confirmed Infection  Covid tests:  12/11/2021: Positive  Elevated inflammatory markers  Acute hypoxic respiratory failure  History of asthma  Diabetes mellitus  Essential hypertension  IRMA on CPAP  Patient has not received the Covid vaccination  Fevers    Recommendations:   Antibiotic treatment:  Nafcillin IV 2 gm Q 4 hr initiated 1/3/22 for MSSA on sputum  Will modify to ceftriaxone 2 gm daily to decrease fluid load. Stop date 1-17-22  Low grade fevers may persist for a while yet from the Covid related inflammation. Prior antibiotics:  [id number]--> 12/28  Meropenem initiated 12/28/21 --> for worsening leukocytosis and fevers     Covid Rx:  Remdesivir-out of window  Decadron-high-dose. Completed  Actemra-Administered 12/12/2021  Monoclonal antibodies-out of window      Medical Decision Making/Summary/Discussion:1/7/2022     Patient admitted with COVID 19 infection  Acute hypoxic respiratory failure  Trach & Peg 1/3/22  Has residual low grade fevers    Infection Control Recommendations   Universal Precautions  He came out of isolation on 12/31/21    Antimicrobial Stewardship Recommendations     Discontinuation of therapy  Coordination of Outpatient Care:   Estimated Length of IV antimicrobials:TBD  Patient will need Midline Catheter Insertion: TBD  Patient will need PICC line Insertion: No  Patient will need: Home IV , [place],  SNF,  LTAC:TBD  Patient will need outpatient wound care:No    Chief complaint/reason for consultation:   Concern for COVID infection      History of Present Illness:   Eduard Jefferson is a 60y.o.-year-old male who was initially admitted on 12/12/2021.  Patient seen at the request of Dr. Sunny Servin:    Patient presented through [facility] ER on 12/11/2021 with complaints of worsening shortness of breath with associated generalized weakness and nonproductive cough over the past several days. He was exposed to his son who was diagnosed with Covid last week and has not received the Covid vaccine. The patient was very hypoxic with an SPO2 in the high 50s on room air. Imaging revealed bilateral pulmonary opacities typical of COVID-19    Abnormal labs include:    Ferritin 2800      Patient has been intubated and transferred to [facility]  He has been started on high-dose Decadron and Actemra has been ordered    CT CHEST PULMONARY EMBOLISM W CONTRAST 12/11/2021   Final Result   1. No evidence of pulmonary embolism   2. Diffuse ground-glass opacities throughout the lungs, typical of COVID-19   pulmonary disease.           XR CHEST (SINGLE VIEW FRONTAL) 12/11/2021   Final Result   Multifocal hazy opacities throughout both lungs consistent with COVID   pneumonia and or pulmonary edema       Patient admitted because of concerns with COVID 19. The patient remains on mechanical ventilation with 50% FiO2 and PEEP of 10. He is receiving fentanyl, Versed and Precedex for sedation. Nimbex was discontinued 12/31/21  He is opening his eyes to stimuli but not responding to command. Cefepime was initiated 12/18-discontinued 12/28  Meropenem initiated 12/28      CURRENT EVALUATION : 1/7/2022    Afebrile to low grade fevers. T max overnight 100.5  VS stable  Tachycardia      The patient seen and evaluated. Continues on the ventilator   Fi02 40-->50-->40 %   PEEP 5  RR 21  02 sat 94    Trach and peg placed 1/3/21    Discharge planning to [facility] underway    Patient exhibiting respiratory distress.  yes  Respiratory secretions: minimal      NEWS Score: 0-4 Low risk group; 5-6: Medium risk group; 7 or above: High risk group  Parameters 3 2 1 0 1 2 3   Age    < 65   = 65   RR = 8  9-11 12-20  21-24 = 25   O2 Sats = 91 92-93 94-95 = 96      Suppl O2  Yes  No      SBP = 90  101-110 111-219   = 220   HR = 40  41-50 51-90  111-130 = 131   Consciousness    Alert   Drowsiness, lethargy, or confusion   Temperature = 35.0 C (95.0 F)  35.1-36.0 C 95.1-96.9 F 36.1-38.0 C 97.0-100.4 F 38.1-39.0 C 100.5-102.3 F = 39.1 C = 102.4 F      NEWS Score:  12/12/2021: 13 high risk    Overall Daily Picture:     Unchanged    Presence of secondary bacterial Infection:    Cultures: MSSA sputum  Additional antibiotics: Nafcillin    Labs, X rays reviewed: 1/7/2022    BUN:15-->16-->12 > 10  Cr:0.30-->0.36-->0.31 > 0.28  Na 138    WBC:8.3-->9.7-->11.5 > 10  Hb:12.7 -->13.5-->13.2 > 12.5  Plat: 146-->161-->175 > 177    Absolute Neutrophils:3.73  Absolute Lymphocytes:0.29  Neutrophil/Lymphocyte Ratio: 12.8 high risk    CRP:293-->277-->137-->50.8-->23  Ferritin:2800  LDH: 838      Cultures:  Urine:  12/18/21: No bacterial growth  Blood:  12/18/21: No growth   1/2/22: No growth   15-22: No growth   Sputum :  12/18/21: Normal respiratory sherry  12/29/21: MSSA  1-6-22: Normal sherry  Wound:      CXR:     12/29/21 12/22/21 12/19/21      1221 diffuse bilateral infiltrates  CAT:      Discussed with patient, RN, CC, IM.      12-12-21:      I have personally reviewed the past medical history, past surgical history, medications, social history, and family history, and I have updated the database accordingly.   Past Medical History:     Past Medical History:   Diagnosis Date    Arthritis     Asthma     Diabetes mellitus ([address]     Edema     GERD (gastroesophageal reflux disease)     Hypertension     on lasix    Migraine     IRMA on CPAP     seldom use machine       Medications:      chlordiazePOXIDE  10 mg Per NG tube 4x Daily    oxyCODONE  10 mg Per G Tube Q4H    nafcillin  2,000 mg IntraVENous Q4H    colchicine  0.6 mg Oral Daily    sodium chloride flush  5-40 mL IntraVENous 2 times per day    insulin lispro  0-6 Units SubCUTAneous Q6H    lansoprazole  30 mg Oral QAM AC    insulin glargine  10 Units SubCUTAneous Nightly    sodium chloride flush  5-40 mL IntraVENous 2 times per day    enoxaparin  30 mg SubCUTAneous BID    Vitamin D  2,000 Units Oral Daily       Social History:     Social History     Socioeconomic History    Marital status:      Spouse name: Not on file    Number of children: Not on file    Years of education: Not on file    Highest education level: Not on file   Occupational History    Not on file   Tobacco Use    Smoking status: Former Smoker    Smokeless tobacco: Never Used   Vaping Use    Vaping Use: Never used   Substance and Sexual Activity    Alcohol use: Yes     Comment: every couple months    Drug use: Never    Sexual activity: Not on file   Other Topics Concern    Not on file   Social History Narrative    Not on file     Social Determinants of Health     Financial Resource Strain:     Difficulty of Paying Living Expenses: Not on file   Food Insecurity:     Worried About [address] in the Last Year: Not on file    [email or web address] [employer] in the Last Year: Not on file   Transportation Needs:     Lack of Transportation (Medical): Not on file    Lack of Transportation (Non-Medical):  Not on file   Physical Activity:     Days of Exercise per Week: Not on file    Minutes of Exercise per Session: Not on file   Stress:     Feeling of Stress : Not on file   Social Connections:     Frequency of Communication with Friends and Family: Not on file    Frequency of Social Gatherings with Friends and Family: Not on file    Attends Druze Services: Not on file    Active Member of Clubs or Organizations: Not on file    Attends Club or Organization Meetings: Not on file    Marital Status: Not on file   Intimate Partner Violence:     Fear of Current or Ex-Partner: Not on file    Emotionally Abused: Not on file    Physically Abused: Not on file    Sexually Abused: Not on file   Housing Stability:     Unable to Pay for Housing in the Last Year: Not on file    01/07/22  0541   WBC 11.5* 10.0   HGB 13.2 12.5*   HCT 40.7 38.8*    177   LYMPHOPCT 9* 11*   MONOPCT 9 10     BMP:   Recent Labs     01/06/22  0500 01/06/22  0500 01/06/22  1616 01/07/22  0541     --   --  138   K 3.0*   < > 3.7 3.1*     --   --  103   CO2 27  --   --  25   BUN 12  --   --  10   CREATININE 0.31*  --   --  0.28*   MG 2.0  --   --  2.0    < > = values in this interval not displayed. Hepatic Function Panel:   Recent Labs     01/06/22  0500   PROT 6.0*   LABALBU 3.0*   BILIDIR 0.15   IBILI 0.31   BILITOT 0.46   ALKPHOS 76   ALT 52*   AST 13     No results for input(s): RPR in the last 72 hours. No results for input(s): HIV in the last 72 hours. No results for input(s): BC in the last 72 hours. Lab Results   Component Value Date    MUCUS 3+ 12/29/2021    RBC 3.98 01/07/2022    TRICHOMONAS NOT REPORTED 12/29/2021    WBC 10.0 01/07/2022    YEAST NOT REPORTED 12/29/2021    TURBIDITY Clear 12/29/2021     Lab Results   Component Value Date    CREATININE 0.28 01/07/2022    GLUCOSE 119 01/07/2022       Medical Decision Making-Imaging:   ONE XRAY VIEW OF THE CHEST 12/28/2021 7:24 am    Impression   Stable support lines and tubes.  Stable to slightly increased diffuse   interstitial and airspace opacities. Medical Decision Ksjxlo-Rueifcdk-Xycvb:     Culture, Blood 2 [[id number]]    Order Status: No result Specimen: Blood    Culture, Blood 1 [[id number]]    Order Status: No result Specimen: Blood    Culture, Blood 1 [[id number]] Collected: 12/29/21 1708   Order Status: Completed Specimen: Blood Updated: 01/01/22 1719    Specimen Description . BLOOD    Special Requests  RFA 5 ML    Culture NO GROWTH 3 DAYS   Culture, Blood 1 [[id number]] Collected: 12/29/21 1708   Order Status: Completed Specimen: Blood Updated: 01/01/22 1719    Specimen Description . BLOOD    Special Requests LFA 3.5 ML    Culture NO GROWTH 3 DAYS   Culture, Respiratory [[id number]] (Abnormal)  Collected: 12/29/21 1710   Order Status: Completed Specimen: Tracheal Aspirate Updated: 01/01/22 1616    Specimen Description . TRACHEAL ASPIRATE    Special Requests NOT REPORTED    Direct Exam < 10 EPITHELIAL CELLS/LPF     >10, <25 NEUTROPHILS/LPF     NO SIGNIFICANT PATHOGENS SEEN    Culture STAPHYLOCOCCUS AUREUS LIGHT GROWTH This isolate is methicillin susceptible. Abnormal      NORMAL RESPIRATORY JOAQUÍN SCANT GROWTH   Susceptibility     Staphylococcus aureus     BACTERIAL SUSCEPTIBILITY PANEL MARYJANE     cefoxitin screen NOT REPORTED       ciprofloxacin NOT REPORTED       clindamycin <=0.25  Sensitive     erythromycin <=0.25  Sensitive     gentamicin <=0.5   Gentamicin . .. Sensitive  Sensitive     Induced Clind Resist NOT REPORTED       levofloxacin 0.25  Sensitive     linezolid NOT REPORTED       moxifloxacin NOT REPORTED       nitrofurantoin NOT REPORTED       oxacillin <=0.25   This staph . .. Sensitive  Sensitive     penicillin NOT REPORTED       rifampin NOT REPORTED       Synercid NOT REPORTED       tetracycline <=1  Sensitive     tigecycline NOT REPORTED       trimethoprim-sulfamethoxazole <=10  Sensitive     vancomycin NOT REPORTED        Culture, Blood 1 [[id number]] Collected: 12/24/21 0935   Order Status: Completed Specimen: Blood Updated: 12/29/21 1015    Specimen Description . BLOOD    Special Requests 10 ML R WRIST    Culture NO GROWTH 5 DAYS   Culture, Blood 1 [[id number]] Collected: 12/24/21 0958   Order Status: Completed Specimen: Blood Updated: 12/29/21 1015    Specimen Description . BLOOD    Special Requests 5ML L ARM    Culture NO GROWTH 5 DAYS   Culture, Respiratory [[id number]] Collected: 12/24/21 1436   Order Status: Completed Specimen: Tracheal Aspirate Updated: 12/26/21 1002    Specimen Description . TRACHEAL ASPIRATE    Special Requests NOT REPORTED    Direct Exam < 10 EPITHELIAL CELLS/LPF     >25 NEUTROPHILS/LPF     NO ORGANISMS SEEN    Culture NORMAL RESPIRATORY JOAQUÍN LIGHT GROWTH   Culture, Blood 1 [[id number]] Collected: 12/18/21 1234   Order Status: Completed Specimen: Blood Updated: 12/23/21 1402    Specimen Description . BLOOD    Special Requests NOT REPORTED    Culture NO GROWTH 5 DAYS   Culture, Respiratory [[id number]] (Abnormal) Collected: 12/18/21 1143   Order Status: Completed Specimen: Endotracheal Updated: 12/20/21 1043    Specimen Description . ENDOTRACHEAL    Special Requests NOT REPORTED    Direct Exam >25 NEUTROPHILS/LPF     < 10 EPITHELIAL CELLS/LPF     MIXED BACTERIAL MORPHOTYPES SEEN ON GRAM STAIN.  Abnormal     Culture NORMAL RESPIRATORY JOAQUÍN LIGHT GROWTH     Culture, Urine [[id number]] Collected: 12/14/21 0043   Order Status: Completed Specimen: Urine, straight catheter Updated: 12/14/21 1934    Specimen Description . URINE,STRAIGHT CATHETER    Special Requests NOT REPORTED    Culture NO GROWTH   MRSA DNA Probe, Nasal [[id number]] Collected: 12/12/21 1245   Order Status: Completed Specimen: Nasal Updated: 12/13/21 1052    Specimen Description . NASAL SWAB    MRSA, DNA, Nasal NEGATIVE:  MRSA DNA not detected by nucleic acid amplification. Comment:                                                    Results should be used as an adjunct to nosocomial control efforts to identify patients   needing enhanced precautions.     The test is not intended to identify patients with staphylococcal infections.  Results   should not be used to guide or monitor treatment for MRSA infections. Specimen Description . TRACHEAL ASPIRATE    Special Requests NOT REPORTED    Direct Exam < 10 EPITHELIAL CELLS/LPF    Direct Exam >10, <25 NEUTROPHILS/LPF    Direct Exam NO SIGNIFICANT PATHOGENS SEEN    Culture STAPHYLOCOCCUS AUREUS LIGHT GROWTH This isolate is methicillin susceptible. Abnormal     Culture NORMAL RESPIRATORY JOAQU[facility]N SCANT GROWTH    Resulting Agency [address]          Susceptibility     Staphylococcus aureus     BACTERIAL SUSCEPTIBILITY PANEL MARYJANE     cefoxitin screen NOT REPORTED ciprofloxacin NOT REPORTED       clindamycin <=0.25  Sensitive     erythromycin <=0.25  Sensitive     gentamicin <=0.5   Gentamicin . .. Sensitive  Sensitive     Induced Clind Resist NOT REPORTED       levofloxacin 0.25  Sensitive     linezolid NOT REPORTED       moxifloxacin NOT REPORTED       nitrofurantoin NOT REPORTED       oxacillin <=0.25   This staph . .. Sensitive  Sensitive     penicillin NOT REPORTED       rifampin NOT REPORTED       Synercid NOT REPORTED       tetracycline <=1  Sensitive     tigecycline NOT REPORTED       trimethoprim-sulfamethoxazole <=10  Sensitive     vancomycin NOT REPORTED                             Medical Decision Making-Other:     Note:  Labs, medications, radiologic studies were reviewed with personal review of films  Large amounts of data were reviewed  Discussed with nursing Staff, Discharge planner  Infection Control and Prevention measures reviewed  All prior entries were reviewed  Administer medications as ordered  Prognosis: Guarded  Discharge planning reviewed      Thank you for allowing us to participate in the care of this patient. Please call with questions.       Nikolai Ann  PGY-1  Infectious disease  [facility]   10:54 [place] 1/7/2022

## 2022-01-07 NOTE — PROGRESS NOTES
Called patient's daughter, Mary Luz, to let her know that pt would be discharged to [facility] around 1630. All questions answered.

## 2022-01-07 NOTE — DISCHARGE SUMMARY
[facility]  Office: [address], DO, Love Favre, DO, Grayland Runner, DO, Priscilla Eye Blood, DO, Portillo Baez MD, Nicole Villatoro MD, Carl Segovia MD, Kelly Jimenez MD, Maddy Sheppard MD, Gordon Cleveland MD, Jovan Bentley MD, Lissette Saez, DO, Tato Mckinley, DO, Murlean Gowers, MD,  Joy Pham DO, Johan Ramos MD, Jaida Bernal MD, Triston Alvarez MD, Fabiola Oliver MD, Sabino Hackett MD, Dedra Jerome MD, Shadi Cruz MD, Nickie Bean [facility], [facility] Jackdelmis, CNP, Titi Garcia, CNP, Jane Zepeda, CNS, Odessa Canchola, CNP, Delaney Yates, CNP, Amy Dunaway, CNP, Mily Lemon, CNP, Shauna Burns, CNP, Nancy Eisenberg PA-C, Mehreen Richmond, [facility], Nia Liriano, [facility], Blanche Vyas, CNP, Felix House, CNP, Yamil Santoro CNP, Josr De Jesus, CNP, Ramin Haque, CNP, Oswaldo Fatima, [facility]    Discharge Summary     Patient ID: Elmer Cedillo  :  1965   MRN: [id number]     ACCOUNT:  [de-identified]   Patient's PCP: Jaye Gutierrez MD  Admit Date: 2021   Discharge Date: 2022    Length of Stay: 26  Code Status:  Full Code  Admitting Physician: Rosalinda Matthews DO  Discharge Physician: Deforest Hashimoto, DO     Active Discharge Diagnoses:     Hospital Problem Lists:  Principal Problem:    Pneumonia due to COVID-19 virus  Active Problems:    Acute respiratory failure with hypoxia ([facility]    Acute idiopathic gout of left foot    Type 2 diabetes mellitus (HCC)    Asthma    IRMA on CPAP    Hypertension    GERD (gastroesophageal reflux disease)  Resolved Problems:    ARDS (adult respiratory distress syndrome) ([facility]    Shock ([address]    SIRS (systemic inflammatory response syndrome) ([facility]    Constipation    Restlessness and agitation      Admission Condition:  critical     Discharged Condition: fair    Hospital Stay:     Hospital Course:  Elmer Cedillo is a 60year old  gentleman who initially presented to [facility] on [facility]/11/2021 for evaluation of shortness of breath. He had previously been exposed to [place]. He was noted to be quite hypoxic and was trialed on bipap therapy. Unfortunately he continued to deteriorate and was subsequently intubated. He was transferred to [facility] for further care on 12/12/2021. He was maintained on mechanical ventilation, sedation, and paralytic for multiple days. He was evaluated by multiple consultants. We did have to bagley persistent fevers throughout his hospitalization. He was eventually weaned from sedation, but was unable to be liberated from mechanical ventilation. Family has opted for trach/peg placement, which occurred on 1/3. He has been weaned from all infusions and is ultimately appropriate for discharge to [facility]. Please see daily progress notes for further details. Significant therapeutic interventions:   - Intubation, sedation  - Paralytic  - ID, critical care, surgical evals  - Trach/PEG    Significant Diagnostic Studies:   Labs / Micro:  CBC:   Lab Results   Component Value Date    WBC 10.0 01/07/2022    RBC 3.98 01/07/2022    HGB 12.5 01/07/2022    HCT 38.8 01/07/2022    MCV 97.5 01/07/2022    MCH 31.4 01/07/2022    MCHC 32.2 01/07/2022    RDW 14.4 01/07/2022     01/07/2022     CMP:    Lab Results   Component Value Date    GLUCOSE 119 01/07/2022     01/07/2022    K 3.1 01/07/2022     01/07/2022    CO2 25 01/07/2022    BUN 10 01/07/2022    CREATININE 0.28 01/07/2022    ANIONGAP 10 01/07/2022    ALKPHOS 76 01/06/2022    ALT 52 01/06/2022    AST 13 01/06/2022    BILITOT 0.46 01/06/2022    LABALBU 3.0 01/06/2022    ALBUMIN 1.0 01/06/2022    LABGLOM >60 01/07/2022    GFRAA >60 01/07/2022    GFR      01/07/2022    GFR NOT REPORTED 01/07/2022    PROT 6.0 01/06/2022    CALCIUM 9.2 01/07/2022       Radiology:  XR CHEST PORTABLE    Result Date: 1/3/2022  Interval tracheostomy. Bilateral patchy interstitial and airspace opacities are not significantly changed. Consultations:    Consults:     Final Specialist Recommendations/Findings:   IP CONSULT TO INFECTIOUS DISEASES  IP CONSULT TO DIETITIAN  IP CONSULT TO SPIRITUAL SERVICES  IP CONSULT TO TRAUMA SURGERY      The patient was seen and examined on day of discharge and this discharge summary is in conjunction with any daily progress note from day of discharge. Discharge plan:     Disposition: Long-Term Acute Care    Physician Follow Up:     Pancho Molina MD  [address]  [phone]    Schedule an appointment as soon as possible for a visit in 2 weeks  For follow up after hospitalization    Adeola Washington MD  [address] [facility] [address]  [place]  [phone]    Schedule an appointment as soon as possible for a visit in 3 weeks  For trach / peg re-eval       Requiring Further Evaluation/Follow Up POST HOSPITALIZATION/Incidental Findings: None    Diet: Tube feeds as written for    Activity: As tolerated with assistance    Instructions to Patient: Follow up with PCP and surgeon.     Discharge Medications:      Medication List      START taking these medications    allopurinol 100 MG tablet  Commonly known as: ZYLOPRIM  1 tablet by PEG Tube route daily  Start taking on: January 15, 2022     cefTRIAXone  infusion  Commonly known as: ROCEPHIN  Infuse 2,000 mg intravenously every 24 hours for 14 days Compound per protocol     colchicine 0.6 MG tablet  Commonly known as: COLCRYS  1 tablet by PEG Tube route daily for 7 days     cyclobenzaprine 10 MG tablet  Commonly known as: FLEXERIL  1 tablet by PEG Tube route 3 times daily as needed for Muscle spasms     docusate 50 MG/5ML liquid  Commonly known as: COLACE  10 mLs by Per G Tube route 2 times daily as needed (For constipation)     enoxaparin 30 MG/0.3ML injection  Commonly known as: LOVENOX  Inject 0.3 mLs into the skin 2 times daily     ibuprofen 100 MG/5ML suspension  Commonly known as: ADVIL;MOTRIN  30 mLs by Per G Tube route every 6 hours as needed for Pain or Fever     lansoprazole 3 MG/ML Susp  Take 10 mLs by mouth every morning (before breakfast)     ondansetron 4 MG tablet  Commonly known as: ZOFRAN  1 tablet by PEG Tube route every 8 hours as needed for Nausea or Vomiting     oxyCODONE 5 MG/5ML solution  Commonly known as: ROXICODONE  10 mLs by Per G Tube route every 4 hours for 3 days.      simethicone 40 MG/0.6ML drops  Commonly known as: MYLICON  0.6 mLs by Per G Tube route every 6 hours as needed (for abdominal crampin)     vitamin D 50 MCG (2000 UT) Tabs tablet  Commonly known as: CHOLECALCIFEROL  1 tablet by PEG Tube route daily        CHANGE how you take these medications    albuterol (2.5 MG/3ML) 0.083% nebulizer solution  Commonly known as: PROVENTIL  Take 3 mLs by nebulization every 4 hours as needed for Wheezing or Shortness of Breath  What changed:   when to take this  reasons to take this     metFORMIN 500 MG tablet  Commonly known as: GLUCOPHAGE  1 tablet by PEG Tube route 2 times daily (with meals)  What changed: how to take this     montelukast 10 MG tablet  Commonly known as: SINGULAIR  1 tablet by PEG Tube route daily  What changed: how to take this     SUMAtriptan 100 MG tablet  Commonly known as: IMITREX  1 tablet by PEG Tube route once as needed for Migraine  What changed: how to take this        CONTINUE taking these medications    budesonide-formoterol 160-4.5 MCG/ACT Aero  Commonly known as: Symbicort  Inhale 2 puffs into the lungs 2 times daily        STOP taking these medications    fexofenadine 180 MG tablet  Commonly known as: ALLEGRA     furosemide 40 MG tablet  Commonly known as: LASIX     omeprazole 40 MG delayed release capsule  Commonly known as: PRILOSEC           Where to Get Your Medications      You can get these medications from any pharmacy    Bring a paper prescription for each of these medications  oxyCODONE 5 MG/5ML solution     Information about where to get these medications is not yet available    Ask your nurse or doctor about these medications  albuterol (2.5 MG/3ML) 0.083% nebulizer solution  allopurinol 100 MG tablet  budesonide-formoterol 160-4.5 MCG/ACT Aero  cefTRIAXone  infusion  colchicine 0.6 MG tablet  cyclobenzaprine 10 MG tablet  docusate 50 MG/5ML liquid  enoxaparin 30 MG/0.3ML injection  ibuprofen 100 MG/5ML suspension  lansoprazole 3 MG/ML Susp  metFORMIN 500 MG tablet  montelukast 10 MG tablet  ondansetron 4 MG tablet  simethicone 40 MG/0.6ML drops  SUMAtriptan 100 MG tablet  vitamin D 50 MCG (2000 UT) Tabs tablet         No discharge procedures on file. Time Spent on discharge is  50 mins in patient examination, evaluation, counseling as well as medication reconciliation, prescriptions for required medications, discharge plan and follow up. Electronically signed by   Pavel Schwartz DO  1/7/2022  10:40 AM      Thank you Dr. Radha Anthony MD for the opportunity to be involved in this patient's care.

## 2022-01-07 NOTE — PROGRESS NOTES
[facility]  Office: [address], DO, Marcy Joseph, DO, Matt Palomo, DO, Brea Patelnorbertkorin Morris, DO, Shantel Mosquera MD, Austin Hess MD, Manjinder Young MD, Blayne Mora MD, Regulo Seay MD, Brennen Vu MD, David Miranda MD, Raciel Luu, DO, Yenni Logan, DO, Perla Galo MD,  Judy Morgan, DO, Jenny Altman MD, Manjinder Motta MD, Deborah De Jesus MD, Rhina Ravi MD, Sil Beckham MD, Eliceo Guaman MD, Lorie Rascon MD, Grey Mendoza [facility], [facility], CNP, Kailee Boggs, CNP, Luda Arrington, CNS, Zenobia Aguirre, CNP, Ana Lemon, CNP, Tiffany Conde, CNP, Sai Rene, CNP, Deborah Cardoso, CNP, Mando Conner PATheresaC, Raphael Chowdhury, [facility], Nanette Stinson, [facility], Eduard Dai, CNP, Yoli Wheeler, CNP, Nannette Santos, CNP, Laurita Sethi, CNP, Fátima Webb, [facility], Ronnie Canseco, [address] [place] [address]    Progress Note    1/7/2022    9:57 AM    Name:   Estefany Solorio  MRN:     [id number]     Acct:      [de-identified]   Room:   [address] [place] [address] Day:  32  Admit Date:  12/12/2021 11:39 AM    PCP:   Giancarlo Palm MD  Code Status:  Full Code    Subjective:     C/C: Shortness of breath    Interval History Status: not changed. Patient seen and examined this morning. No acute events overnight. Moved to a stepdown unit yesterday afternoon. Following commands. Still appears somewhat shaky. Coughing up phlegm via trach. FMS with liquid stool present. Hahn with good urine output. Fevers improving. HR still up. Reports that he is not in any pain. Brief History:     Estefany Solorio is 60 y.o. male who was admitted to the hospital on 12/12/2021 for treatment of Pneumonia due to COVID-19 virus. Pt initially presented with shortness of breath at [facility].  He had to suggest positive for COVID-19 infection. Patient reported that his son had also COVID-19 infection.   Patient was hypoxic in 46s on arrival and was treated with BiPAP however breathing worsened and patient was subsequently intubated. Patient was transferred to [facility] on 12/12/2021 as he was requiring high ventilator support 100% FiO2 with PEEP of 22. He was given Actemra and started on high-dose Decadron. Patient was started with he was started on Flolan and given paralytics for proning per ARDS protocol. Patient also received intermittent Lasix. Proning was stopped on 12/20/2021 and paralytics were stopped on 12/22/2021. Patient started to have fevers and was started on cefepime. Respiratory cultures were negative. Airborne isolation was removed on 12/31/2021. Trach/PEG placed on 1/3/2022. Review of Systems:     General: Denies fever, chills, reports fatigue  Pulm: Denies shortness of breath  Cardio: Denies chest pain  GI: Denies abdominal pain   MSK: Reports body aches    Medications: Allergies:     Allergies   Allergen Reactions    Nuts [Peanut-Containing Drug Products] Anaphylaxis    Sunflower Oil Anaphylaxis     Sunflower seeds       Current Meds:   Scheduled Meds:    chlordiazePOXIDE  10 mg Per NG tube 4x Daily    oxyCODONE  10 mg Per G Tube Q4H    nafcillin  2,000 mg IntraVENous Q4H    colchicine  0.6 mg Oral Daily    sodium chloride flush  5-40 mL IntraVENous 2 times per day    docusate  100 mg Per NG tube BID    insulin lispro  0-6 Units SubCUTAneous Q6H    lansoprazole  30 mg Oral QAM AC    insulin glargine  10 Units SubCUTAneous Nightly    sodium chloride flush  5-40 mL IntraVENous 2 times per day    enoxaparin  30 mg SubCUTAneous BID    Vitamin D  2,000 Units Oral Daily     Continuous Infusions:    sodium chloride      sodium chloride      dextrose      dextrose      sodium chloride 10 mL/hr at 01/06/22 2214     PRN Meds: simethicone, cyclobenzaprine, metoprolol, potassium chloride **OR** potassium alternative oral replacement **OR** potassium chloride, ibuprofen, sodium chloride flush, sodium chloride, polyethylene glycol, senna, bisacodyl, acetaminophen, sodium chloride flush, sodium chloride, ondansetron **OR** ondansetron, [DISCONTINUED] acetaminophen **OR** acetaminophen, glucose, dextrose, glucagon (rDNA), dextrose, glucose, dextrose, glucagon (rDNA), dextrose    Data:     Past Medical History:   has a past medical history of Arthritis, Asthma, Diabetes mellitus ([address] Edema, GERD (gastroesophageal reflux disease), Hypertension, Migraine, and IRMA on CPAP. Social History:   reports that he has quit smoking. He has never used smokeless tobacco. He reports current alcohol use. He reports that he does not use drugs. Family History:   Family History   Problem Relation Age of Onset    Heart Attack Sister 32    Diabetes Paternal Grandmother     Heart Disease Paternal Uncle     Heart Disease Paternal Uncle     Heart Disease Paternal Uncle     Cancer Maternal Aunt     Cancer Maternal Aunt     Cancer Maternal Uncle     Cancer Maternal Uncle        Vitals:  /70   Pulse 108   Temp 100.2 °F (37.9 °C) (Axillary)   Resp 30   Ht 6' 2\" (1.88 m)   Wt 274 lb 0.5 oz (124.3 kg)   SpO2 96%   BMI 35.18 kg/m²   Temp (24hrs), Av.9 °F (37.7 °C), Min:98.8 °F (37.1 °C), Max:100.5 °F (38.1 °C)    Recent Labs     22  1614 22  1956 22  0044 22  0601   POCGLU 116* 108 126* 110       I/O (24Hr):     Intake/Output Summary (Last 24 hours) at 2022 0957  Last data filed at 2022 0934  Gross per 24 hour   Intake 460 ml   Output --   Net 460 ml       Labs:  Hematology:  Recent Labs     22  0340 22  0500 22  0541   WBC 9.7 11.5* 10.0   RBC 4.32 4.21 3.98*   HGB 13.5 13.2 12.5*   HCT 41.4 40.7 38.8*   MCV 95.8 96.7 97.5   MCH 31.3 31.4 31.4   MCHC 32.6 32.4 32.2   RDW 14.1 14.3 14.4    175 177   MPV 11.1 11.1 10.7     Chemistry:  Recent Labs     22  0340 22  0340 22  0500 22  1616 22  0541     --  143  --  138   K 3.2*   < > 3. 0* 3.7 3.1*     --  105  --  103   CO2 26  --  27  --  25   GLUCOSE 121*  --  148*  --  119*   BUN 16  --  12  --  10   CREATININE 0.36*  --  0.31*  --  0.28*   MG 2.1  --  2.0  --  2.0   ANIONGAP 11  --  11  --  10   LABGLOM >60  --  >60  --  >60   GFRAA >60  --  >60  --  >60   CALCIUM 9.5  --  9.3  --  9.2    < > = values in this interval not displayed. Recent Labs     01/06/22  0046 01/06/22  0500 01/06/22  0701 01/06/22  1125 01/06/22  1614 01/06/22  1956 01/07/22  0044 01/07/22  0601   PROT  --  6.0*  --   --   --   --   --   --    LABALBU  --  3.0*  --   --   --   --   --   --    AST  --  13  --   --   --   --   --   --    ALT  --  52*  --   --   --   --   --   --    ALKPHOS  --  76  --   --   --   --   --   --    BILITOT  --  0.46  --   --   --   --   --   --    BILIDIR  --  0.15  --   --   --   --   --   --    POCGLU   < >  --  153* 138* 116* 108 126* 110    < > = values in this interval not displayed. ABG:  Lab Results   Component Value Date    POCPH 7.439 01/04/2022    POCPCO2 48.6 01/04/2022    POCPO2 81.9 01/04/2022    POCHCO3 32.9 01/04/2022    NBEA NOT REPORTED 01/04/2022    PBEA 7 01/04/2022    GCR0HII NOT REPORTED 01/04/2022    WHKE5GJL 96 01/04/2022    FIO2 50.0 01/04/2022     Lab Results   Component Value Date/Time    SPECIAL NOT REPORTED 01/06/2022 02:50 PM     Lab Results   Component Value Date/Time    CULTURE PENDING 01/06/2022 02:50 PM     Radiology:  XR CHEST PORTABLE    Result Date: 12/29/2021  Stable support lines and tubes. Stable to slightly increased diffuse interstitial and airspace opacities. Physical Examination:        General appearance: Morbidly obese  gentleman lying supine in bed. Restless. Awake and alert, not following all commands. Has just had an episode of emesis. HEENT: Tracheostomy present; C/D/I incision.   Lungs: Mechanical breath sounds, coarse ronchi present, clear to auscultation bilaterally, increased effort  Heart:  regular rate and rhythm, no murmur  Abdomen:  soft, nontender, protuberant, nondistended, normal bowel sounds, no masses, hepatomegaly, splenomegaly, PEG tube present  : Hahn catheter and FMS present. Extremities: SCDs present bilaterally, no edema noted, bleeding from right radial artery after art line has been removed  Skin: Erythema improved to left foot    Assessment:        Hospital Problems           Last Modified POA    * (Principal) Pneumonia due to COVID-19 virus 12/21/2021 Yes    Acute respiratory failure with hypoxia ([address] 12/21/2021 Yes    ARDS (adult respiratory distress syndrome) ([address] 12/21/2021 Yes    Shock ([address] 12/21/2021 No    Acute idiopathic gout of left foot 1/3/2022 No    SIRS (systemic inflammatory response syndrome) ([address] 1/5/2022 No    Type 2 diabetes mellitus ([address] 12/21/2021 Yes    Asthma 12/21/2021 Yes    IRMA on CPAP 12/12/2021 Yes    Overview Signed 12/12/2021  2:39 PM by Maegan Diaz, DO     seldom use machine         Hypertension 12/12/2021 Yes    Overview Signed 12/12/2021  2:39 PM by Maegan Diaz, DO     on lasix         GERD (gastroesophageal reflux disease) 12/12/2021 Yes    Constipation 1/5/2022 Yes    Restlessness and agitation 1/5/2022 Yes          Plan:        Acute respiratory failure with hypoxia due to COVID-19 pneumonia-pulmonology/critical care is following ventilator support. Continue oral prednisone taper. Received Actemra on 12/12. POD #4 from trach/peg placement  MSSA pneumonia - continue Nafcillin. Blood cultures have been negative. Intermittent fevers - improving since being weaned from precedex. Likely drug reaction to Precedex. PRN flexeril for body aches/spasms  Bowel regimen - prn colace. Once stools slow down, remove FMS. Gout-continue Colcrys for a total of 14 days and start allopurinol at day #14. Type 2 diabetes mellitus-continue insulin sliding scale and Lantus.   Continue DVT prophylaxis with Lovenox  Continue GI prophylaxis with lansoprazole  Obesity with BMI of 36.37-will need diet/weight loss/exercise/therapies in the future  IRMA-was noncompliant with CPAP at home  Working on placement to [facility] - medically speaking, he's stable for transfer to [place] [address]. Disposition: POD #4 from trach/peg. Discharge planning.     33 Danisha Bourgeois DO  1/7/2022  9:57 AM

## 2022-01-10 LAB
CULTURE: NORMAL
CULTURE: NORMAL
Lab: NORMAL
Lab: NORMAL
SPECIMEN DESCRIPTION: NORMAL
SPECIMEN DESCRIPTION: NORMAL

## 2022-03-10 ENCOUNTER — TELEPHONE (OUTPATIENT)
Dept: INFECTIOUS DISEASES | Age: 57
End: 2022-03-10

## 2022-03-10 NOTE — TELEPHONE ENCOUNTER
[facility] called and wants to know if you saw hiv lab results and if patient needs to follow up with you. There is no appt at this time you were waiting on results. Please advise. Thanks!

## 2022-03-29 ENCOUNTER — OFFICE VISIT (OUTPATIENT)
Dept: SURGERY | Age: 57
End: 2022-03-29

## 2022-03-29 VITALS
HEIGHT: 74 IN | BODY MASS INDEX: 34.65 KG/M2 | WEIGHT: 270 LBS | HEART RATE: 77 BPM | DIASTOLIC BLOOD PRESSURE: 85 MMHG | SYSTOLIC BLOOD PRESSURE: 147 MMHG

## 2022-03-29 DIAGNOSIS — Z43.1 PEG (PERCUTANEOUS ENDOSCOPIC GASTROSTOMY) ADJUSTMENT/REPLACEMENT/REMOVAL (HCC): Primary | ICD-10-CM

## 2022-03-29 PROCEDURE — 99024 POSTOP FOLLOW-UP VISIT: CPT | Performed by: NURSE PRACTITIONER

## 2022-03-29 ASSESSMENT — ENCOUNTER SYMPTOMS
ABDOMINAL PAIN: 0
DIARRHEA: 0
CONSTIPATION: 0
VOMITING: 0

## 2022-03-29 NOTE — PROGRESS NOTES
[facility] New Patient Visit      CHIEF COMPLAINT:    Chief Complaint   Patient presents with    New Patient     discuss removing feeding tube       HISTORY OF PRESENT ILLNESS:      The patient is a 60 y.o. male who is being seen for consultation and evaluation of PEG removal.  This was placed 1/3 as he suffered a coma from Covid. He has not used it for 2 months. Eating and moving bowels well.  No abdominal pain    Past Medical History:    Past Medical History:   Diagnosis Date    Arthritis     Asthma     Diabetes mellitus ([address]     Edema     GERD (gastroesophageal reflux disease)     Hypertension     on lasix    Migraine     IRMA on CPAP     seldom use machine       Past SurgicalHistory:        Current Medications:   Current Outpatient Medications   Medication Sig Dispense Refill    albuterol (PROVENTIL) (2.5 MG/3ML) 0.083% nebulizer solution Take 3 mLs by nebulization every 4 hours as needed for Wheezing or Shortness of Breath 120 each 3    budesonide-formoterol (SYMBICORT) 160-4.5 MCG/ACT AERO Inhale 2 puffs into the lungs 2 times daily 10.2 g 0    metFORMIN (GLUCOPHAGE) 500 MG tablet 1 tablet by PEG Tube route 2 times daily (with meals) 60 tablet 3    montelukast (SINGULAIR) 10 MG tablet 1 tablet by PEG Tube route daily 30 tablet 0    allopurinol (ZYLOPRIM) 100 MG tablet 1 tablet by PEG Tube route daily 30 tablet 0    docusate (COLACE) 50 MG/5ML liquid 10 mLs by Per G Tube route 2 times daily as needed (For constipation)      enoxaparin (LOVENOX) 30 MG/0.3ML injection Inject 0.3 mLs into the skin 2 times daily  0    ibuprofen (ADVIL;MOTRIN) 100 MG/5ML suspension 30 mLs by Per G Tube route every 6 hours as needed for Pain or Fever 240 mL 3    lansoprazole 3 MG/ML SUSP Take 10 mLs by mouth every morning (before breakfast) 300 mL     Vitamin D (CHOLECALCIFEROL) 50 MCG (2000 UT) TABS tablet 1 tablet by PEG Tube route daily 60 tablet 0    simethicone (MYLICON) 40 FK/3.1FG drops 0.6 mLs by Per G Tube route every 6 hours as needed (for abdominal crampin) 60 mL 3    ondansetron (ZOFRAN) 4 MG tablet 1 tablet by PEG Tube route every 8 hours as needed for Nausea or Vomiting 30 tablet 0    SUMAtriptan (IMITREX) 100 MG tablet 1 tablet by PEG Tube route once as needed for Migraine 9 tablet 0    colchicine (COLCRYS) 0.6 MG tablet 1 tablet by PEG Tube route daily for 7 days 7 tablet 0     No current facility-administered medications for this visit. Allergies:    Nuts [peanut-containing drug products] and Sunflower oil    Social History:   Social History     Socioeconomic History    Marital status:      Spouse name: Not on file    Number of children: Not on file    Years of education: Not on file    Highest education level: Not on file   Occupational History    Not on file   Tobacco Use    Smoking status: Former Smoker     Packs/day: 3.00     Quit date:      Years since quittin.2    Smokeless tobacco: Never Used   Vaping Use    Vaping Use: Never used   Substance and Sexual Activity    Alcohol use: Yes     Comment: every couple months    Drug use: Never    Sexual activity: Not on file   Other Topics Concern    Not on file   Social History Narrative    Not on file     Social Determinants of Health     Financial Resource Strain:     Difficulty of Paying Living Expenses: Not on file   Food Insecurity:     Worried About [address] in the Last Year: Not on file    [address] in the Last Year: Not on file   Transportation Needs:     Lack of Transportation (Medical): Not on file    Lack of Transportation (Non-Medical):  Not on file   Physical Activity:     Days of Exercise per Week: Not on file    Minutes of Exercise per Session: Not on file   Stress:     Feeling of Stress : Not on file   Social Connections:     Frequency of Communication with Friends and Family: Not on file    Frequency of Social Gatherings with Friends and Family: Not on file    Attends Catholic Services: Not on file    Active Member of Clubs or Organizations: Not on file    Attends Club or Organization Meetings: Not on file    Marital Status: Not on file   Intimate Partner Violence:     Fear of Current or Ex-Partner: Not on file    Emotionally Abused: Not on file    Physically Abused: Not on file    Sexually Abused: Not on file   Housing Stability:     Unable to Pay for Housing in the Last Year: Not on file    Number of [place] in the Last Year: Not on file    Unstable Housing in the Last Year: Not on file       Family History:  Family History   Problem Relation Age of Onset    Heart Attack Sister 32    Diabetes Paternal Grandmother     Heart Disease Paternal Uncle     Heart Disease Paternal Uncle     Heart Disease Paternal Uncle     Cancer Maternal Aunt     Cancer Maternal Aunt     Cancer Maternal Uncle     Cancer Maternal Uncle        Review of Systems   Constitutional: Negative for fever. Gastrointestinal: Negative for abdominal pain, constipation, diarrhea and vomiting. PHYSICAL EXAM:  BP (!) 147/85   Pulse 77   Ht 6' 2\" (1.88 m)   Wt 270 lb (122.5 kg)   BMI 34.67 kg/m²   CONSTITUTIONAL: awake, alert, cooperative, no apparent distress  Physical Exam  Vitals and nursing note reviewed. Constitutional:       General: He is not in acute distress. Appearance: He is well-developed. HENT:      Head: Normocephalic and atraumatic. Cardiovascular:      Rate and Rhythm: Normal rate. Pulmonary:      Effort: Pulmonary effort is normal. No respiratory distress. Abdominal:      Palpations: Abdomen is soft. Tenderness: There is no abdominal tenderness. Comments: PEG   Musculoskeletal:         General: Normal range of motion. Cervical back: Normal range of motion and neck supple. Skin:     General: Skin is warm and dry. Capillary Refill: Capillary refill takes less than 2 seconds. Neurological:      General: No focal deficit present. Mental Status: He is alert and oriented to person, place, and time. Psychiatric:         Mood and Affect: Mood normal.         Behavior: Behavior normal.         Thought Content: Thought content normal.         Judgment: Judgment normal.     PEG removed after first attempt intact. Overall patient tolerated procedure well. Sterile dressing applied and return precautions provided. Radiology:       ASSESSMENT:     1.  PEG (percutaneous endoscopic gastrostomy) adjustment/replacement/removal ([facility] [address]         PLAN:  -Okay to shower  -Liquids for next 3 hrs  -If site still leaking after 7 days return to the clinic  -Tylenol/Ibuprofen for pain control  -F/u only as needed      Laz Flores, [address] [facility], [place], [place]   2:56 PM 3/29/2022

## 2022-12-12 NOTE — PROGRESS NOTES
Pulmonary Critical Care   Consult Note    Patient - Alyce Norton  Date of Admission -  2021 11:39 AM  Date of Evaluation -  2021  Room and Bed Number -  3026/3026-01   Hospital Day - 1    CHIEF COMPLAINT : ACUTE HYPOXIC RESPIRATORY FAILURE DUE TO COVID -19 PNEUMONIA   HPI:   Alyce Norton  60 y.o. male  admitted for COVID-19 at [facility] ER due to worsening oxygenation he was initially started on BiPAP and subsequently intubated. On room air his saturations had been 50%. CTA no PE but bilateral pulmonary infiltrates. He was accepted at [address] [place] [address] ICU.   On arrival he is on PEEP of 22, 100% FiO2.    2021   Subjective      Plateau pressure -27   Prone         SECRETIONS  -Amount:  [x] Small [] Moderate  [] Large    [] None  Color:     [x] White [] Colored  [] Bloody    SEDATION:    [] Propofol gtt  [x] Versed gtt  [x] FENTANYL  gtt  gtt   [] No Sedation    PARALYZED:  [] No    [x] Yes    VASOPRESSORS:  [x] No    [] Yes  [] Levophed [] Dopamine [] Vasopressin  [] Dobutamine [] Phenylephrine [] Epinephrine    INHALED veletri  : [] No    [x] Yes started 21    PRONE :       [] No    [x] Yes    Actemra:             [] No    [x] Yes  21  DEXAMETHASONE : [] No    [x] Yes      Ros unable to perform due to intubation and sedation    OBJECTIVE:     VITAL SIGNS:  /81   Pulse 70   Temp 100.6 °F (38.1 °C) (Esophageal)   Resp 30   Ht 6' 2\" (1.88 m)   Wt (!) 306 lb 7 oz (139 kg)   SpO2 90%   BMI 39.34 kg/m²   Tmax over 24 hours:  Temp (24hrs), Av.6 °F (38.1 °C), Min:100.6 °F (38.1 °C), Max:100.6 °F (38.1 °C)      Patient Vitals for the past 8 hrs:   BP Pulse Resp SpO2 Height   21 1300 124/81 70 30 90 % --   21 1200 122/77 70 30 (!) 89 % --   21 1153 -- 70 28 90 % --   21 1133 -- -- -- -- 6' 2\" (1.88 m)   21 1130 118/73 73 30 91 % --   21 1100 118/77 70 30 95 % --   21 1030 115/73 67 30 94 % --   21 1000 106/68 66 28 94 % --   12/13/21 0900 113/72 68 30 94 % --   12/13/21 0853 -- 67 29 94 % --   12/13/21 0800 125/86 69 30 94 % --         Intake/Output Summary (Last 24 hours) at 12/13/2021 1511  Last data filed at 12/13/2021 1100  Gross per 24 hour   Intake 1824.41 ml   Output 1650 ml   Net 174.41 ml     Date 12/13/21 0000 - 12/13/21 2359   Shift 3809-2938 9050-4655 4699-4298 24 Hour Total   INTAKE   I.V.(mL/kg) 927(6.7)   927(6.7)   NG/GT(mL/kg)  80(0.6)  80(0.6)   Shift Total(mL/kg) 927(6.7) 80(0.6)  1007(7.2)   OUTPUT   Urine(mL/kg/hr) 750(0.7) 275  1025   Emesis/NG output(mL/kg) 250(1.8)   250(1.8)   Shift Total(mL/kg) 1000(7.2) 275(2)  1275(9.2)   Weight (kg) 139 139 139 139     Wt Readings from Last 3 Encounters:   12/13/21 (!) 306 lb 7 oz (139 kg)   12/11/21 300 lb (136.1 kg)   12/12/19 (!) 355 lb 9.6 oz (161.3 kg)     Body mass index is 39.34 kg/m².         PHYSICAL EXAM:  Intubated , prone  Sedated   Lungs rales   cvs r s1 s2 r   Abdomen nt nd bs +  Le no edema       MEDICATIONS:  Scheduled Meds:   sodium chloride flush  5-40 mL IntraVENous 2 times per day    enoxaparin  30 mg SubCUTAneous BID    Vitamin D  6,000 Units Oral Daily    Followed by   Shaunna Nolan ON 12/19/2021] Vitamin D  2,000 Units Oral Daily    dexamethasone  20 mg IntraVENous Q24H    Followed by   Shaunna Nolan ON 12/17/2021] dexamethasone  10 mg IntraVENous Q24H    insulin lispro  0-6 Units SubCUTAneous Q4H     Continuous Infusions:   sodium chloride      dextrose      norepinephrine Stopped (12/12/21 1149)    cisatracurium (NIMBEX) infusion 3 mcg/kg/min (12/13/21 1311)    midazolam 7 mg/hr (12/13/21 1017)    fentaNYL 100 mcg/hr (12/13/21 0554)    epoprostenol (VELETRI) nebulization solution 50 ng/kg/min (12/13/21 0641)    propofol 15 mcg/kg/min (12/13/21 1332)    dextrose      sodium chloride 50 mL/hr at 12/12/21 4837     PRN Meds:   sodium chloride flush, 5-40 mL, PRN  sodium chloride, 25 mL, PRN  ondansetron, 4 mg, Q8H PRN   Or  ondansetron, 4 mg, Q6H PRN  polyethylene glycol, 17 g, Daily PRN  acetaminophen, 650 mg, Q6H PRN   Or  acetaminophen, 650 mg, Q6H PRN  glucose, 15 g, PRN  dextrose, 12.5 g, PRN  glucagon (rDNA), 1 mg, PRN  dextrose, 100 mL/hr, PRN  glucose, 15 g, PRN  dextrose, 12.5 g, PRN  glucagon (rDNA), 1 mg, PRN  dextrose, 100 mL/hr, PRN        SUPPORT DEVICES: [x] Ventilator [] BIPAP  [] Nasal Cannula [] Room Air      ABGs:     Lab Results   Component Value Date    [id number] NOT REPORTED 12/13/2021    FIO2 100.0 12/13/2021       DATA:  Complete Blood Count:   Recent Labs     12/11/21  1445 12/12/21  0645 12/13/21  0539   WBC 4.1 5.2 3.3*   RBC 5.12 4.83 4.78   HGB 15.7 14.6 14.8   HCT 47.2 45.2 45.1   MCV 92.2 93.6 94.4   MCH 30.7 30.2 31.0   MCHC 33.3 32.3 32.8   RDW 14.1 14.5* 14.6*   * 164 205   MPV 11.1 10.7 10.6        Last 3 Blood Glucose:   Recent Labs     12/11/21  1445 12/12/21  0645 12/13/21  0539   GLUCOSE 139* 152* 158*        PT/INR:    Lab Results   Component Value Date    PROTIME 13.0 12/12/2021    INR 1.0 12/12/2021     PTT:    Lab Results   Component Value Date    APTT 39.1 12/12/2021       Comprehensive Metabolic Profile:   Recent Labs     12/11/21  1445 12/12/21  0645 12/13/21  0539    141 143   K 3.9 4.6 4.9    105 110*   CO2 18* 20 20   BUN 22* 23* 33*   CREATININE 1.13 1.15 0.91   GLUCOSE 139* 152* 158*   CALCIUM 8.6 8.1* 8.4*   PROT 6.7  --  6.0*   LABALBU 3.4*  --  3.1*   BILITOT 0.32  --  0.25*   ALKPHOS 99  --  104   AST 90*  --  79*   ALT 48*  --  50*      Magnesium:   Lab Results   Component Value Date    MG 2.2 12/13/2021     Phosphorus: No results found for: PHOS  Ionized Calcium: No results found for: CAION     Urinalysis:   Lab Results   Component Value Date    NITRU NEGATIVE 12/11/2021    COLORU Yellow 12/11/2021    PHUR 6.0 12/11/2021    WBCUA 5 TO 10 12/11/2021    RBCUA 5 TO 10 12/11/2021    MUCUS 2+ 12/11/2021    TRICHOMONAS NOT REPORTED 12/11/2021    YEAST NOT REPORTED 12/11/2021    BACTERIA NOT REPORTED 12/11/2021    SPECGRAV 1.015 12/11/2021    LEUKOCYTESUR NEGATIVE 12/11/2021    UROBILINOGEN Normal 12/11/2021    BILIRUBINUR NEGATIVE 12/11/2021    GLUCOSEU NEGATIVE 12/11/2021    KETUA NEGATIVE 12/11/2021    AMORPHOUS NOT REPORTED 12/11/2021               XR CHEST (SINGLE VIEW FRONTAL)    Result Date: 12/11/2021  Multifocal hazy opacities throughout both lungs consistent with COVID pneumonia and or pulmonary edema. XR CHEST PORTABLE    Result Date: 12/12/2021  Stable appearing chest with unchanged support tubes/lines and persistent extensive bilateral airspace disease consistent with known COVID pneumonia. XR CHEST PORTABLE    Result Date: 12/12/2021  Right internal jugular central venous catheter tip projecting over the proximal SVC versus brachiocephalic vein. No pneumothorax. Otherwise stable exam from earlier today. XR CHEST PORTABLE    Result Date: 12/12/2021  Endotracheal tube tip is 3.2 cm above the saul. Overall significant worsening of multifocal airspace opacities keeping with history of COVID-19. CT CHEST PULMONARY EMBOLISM W CONTRAST    Result Date: 12/11/2021  1. No evidence of pulmonary embolism 2. Diffuse ground-glass opacities throughout the lungs, typical of COVID-19 pulmonary disease.         Past Medical History:   Diagnosis Date    Arthritis     Asthma     Diabetes mellitus ([address]     Edema     GERD (gastroesophageal reflux disease)     Hypertension     on lasix    Migraine     IRAM on CPAP     seldom use machine        Social History     Socioeconomic History    Marital status:      Spouse name: Not on file    Number of children: Not on file    Years of education: Not on file    Highest education level: Not on file   Occupational History    Not on file   Tobacco Use    Smoking status: Former Smoker    Smokeless tobacco: Never Used   Vaping Use    Vaping Use: Never used   Substance and Sexual Activity    Alcohol use: Yes     Comment: every ARTHROPLASTY WITH PEREZ IMPLANT AND GROMMETS  ALL[facility] performed by Carol Chaudhari MD at [address] [facility] Right 12/12/2019    RIGHT FOOT ARTHROPLASTY 1ST MTPJ (Right Foot)    ARTHROPLASTY Right 12/12/2019    RIGHT FOOT ARTHROPLASTY 1ST MTPJ performed by Carol Chaudhari MD at [address] Right    [address]  2014    no blockage no stents    CHOLECYSTECTOMY      COLONOSCOPY      ENDOSCOPY, COLON, DIAGNOSTIC      TOE SURGERY Left 11/01/2019     LEFT FOOT 1ST MTPJ ARTHROPLASTY WITH PEREZ IMPLANT AND GROMMETS  ALLEN MED (Left )    TONSILLECTOMY            VENTILATOR SETTINGS:  Vent Information  $Ventilation: $Subsequent Day  Skin Assessment: Clean, dry, & intact  Equipment Changed: Vent Circuit  Vent Type: Servo i  Vent Mode: PRVC  Vt Ordered: 500 mL  Rate Set: 30 bmp  FiO2 : (S) 70 %  SpO2: 90 %  SpO2/FiO2 ratio: 128.57  Sensitivity: 5  PEEP/CPAP: 16  I Time/ I Time %: 0.7 s  Humidification Source: Heated wire  Humidification Temp: 37  Humidification Temp Measured: 37.1  Circuit Condensation: Drained  Nitric Oxide/Epoprostenol In Use?: Yes     PaO2/FiO2 RATIO:  Recent Labs     12/13/21  0447   POCPO2 319.5*      FiO2 : (S) 70 %       LABS:  ABGs:   Recent Labs     12/11/21  2217 12/12/21  0420 12/12/21  1320 12/13/21  0009 12/13/21  0447   POCPH 7.380 7.326* 7.244* 7.278* 7.358   POCPCO2 37.3 46.8 60.2* 63.7* 49.1*   POCPO2 85.8 95.3 67.7* 97.9 319.5*   POCHCO3 22.1 24.5 26.0 29.8* 27.7   [id number] 96 97 89* 96 100*            ASSESSMENT:     Principal Problem:    Pneumonia due to COVID-19 virus  Active Problems:    Acute respiratory failure with hypoxia (HCC)    Diabetes mellitus (HCC)    Asthma    IRMA on CPAP    Hypertension    GERD (gastroesophageal reflux disease)    ARDS (adult respiratory distress syndrome) (HCC)  Resolved Problems:    * No resolved hospital problems.  *              Acute hypoxic respiratory failure secondary to COVID 19   Acute respiratory distress syndrome   Bilateral multifocal pneumonia due to COVID 19 infection   Covid -19 pandemic emergency     LOS: 1  PLAN:    D/w RT  D/w RN   Vent setting reviewed - Cont vent support per ARDS protocol - wean peep and fio2 - keep sats >90 %   Wean off inhaled flolan from 12/14/21 . kvo iv fluids   Sedation reviewed nimbex    prone positioning   Airborne isolation and droplet precautions to be continued  Continue supportive care   Cont treatment with medications for COVID  Minimize iv fluids to keep the patient on the dry side  Cont tube feeding    Monitor endotracheal secretions   Will obtain xray chest in am   ABG         Treatment plan Discussed with nursing staff in detail , all questions answered . Total critical care time caring for this patient with life threatening, unstable organ failure, including direct patient contact, management of life support systems, review of data including imaging and labs, discussions with other team members and physicians at least 28  Min so far today, excluding procedures. Electronically signed by Kelsea Churchill MD on 12/13/2021 at 3:11 PM       This patient was evaluated in the context of the global SARS-CoV-2 (COVID-19) pandemic, which necessitated considerations that the patient either has COVID-19 infection or is at risk of infection with COVID-19. Institutional protocols and algorithms that pertain to the evaluation & management of patients with COVID-19 or those at risk for COVID-19 are in a state of rapid changes based on information released by regulatory bodies including the CDC and federal and state organizations. These policies and algorithms were followed during the patient's care. Please note that this chart was generated using voice recognition Dragon dictation software. Although every effort was made to ensure the accuracy of this automated transcription, some errors in transcription may have occurred. Stable Nsaids Pregnancy And Lactation Text: These medications are considered safe up to 30 weeks gestation. It is excreted in breast milk.

## (undated) DEVICE — GLOVE SURG SZ 7 L12IN FNGR THK87MIL WHT LTX FREE

## (undated) DEVICE — GLOVE SURG SZ 75 L12IN FNGR THK87MIL WHT LTX FREE

## (undated) DEVICE — 5.0MM PRECISION ROUND

## (undated) DEVICE — PAD,ABDOMINAL,5"X9",ST,LF,25/BX: Brand: MEDLINE INDUSTRIES, INC.

## (undated) DEVICE — HANDPIECE SET WITH COAXIAL HIGH FLOW TIP AND SUCTION TUBE: Brand: INTERPULSE

## (undated) DEVICE — PADDING CAST W6INXL4YD ST COT RAYON MICROPLEATED HIGHLY

## (undated) DEVICE — GLOVE ORANGE PI 8   MSG9080

## (undated) DEVICE — GLOVE SURG SZ 8 L12IN FNGR THK87MIL WHT LTX FREE

## (undated) DEVICE — GLOVE SURG SZ 65 L12IN FNGR THK87MIL WHT LTX FREE

## (undated) DEVICE — TOURNIQUET CUF BLD PRESSURE 4X18 IN 2 PRT SINGLE BLDR RED

## (undated) DEVICE — STOCKINETTE ORTH UNIV W4INXL25YD COT W DISPNS BX PROTOUCH

## (undated) DEVICE — STRIP,CLOSURE,WOUND,MEDI-STRIP,1/2X4: Brand: MEDLINE

## (undated) DEVICE — TOWEL,OR,DSP,ST,BLUE,DLX,XR,4/PK,20PK/CS: Brand: MEDLINE

## (undated) DEVICE — PRECISION THIN (5.5 X 0.38 X 11.5MM)

## (undated) DEVICE — SPONGE,PEANUT,XRAY,ST,SM,3/8",5/CARD: Brand: MEDLINE INDUSTRIES, INC.

## (undated) DEVICE — BINDER ABD XL W15IN 62 74IN CIRC UNISX 5 PNL E CNTCT CLSR

## (undated) DEVICE — GOWN,AURORA,NON-REINFORCED,2XL: Brand: MEDLINE

## (undated) DEVICE — GLOVE SURG BIOGEL PI ULTRATCH PF 8

## (undated) DEVICE — SKIN PREP TRAY W/CHG: Brand: MEDLINE INDUSTRIES, INC.

## (undated) DEVICE — GLOVE SURG SZ 8 L11.77IN FNGR THK9.8MIL STRW LTX POLYMER

## (undated) DEVICE — CHLORAPREP 26ML ORANGE

## (undated) DEVICE — PADDING UNDERCAST W4INXL4YD COT FBR LO LINTING WYTEX

## (undated) DEVICE — KIT PEG 20FR STD PUL EN ACCS DEV ENDOVIVE

## (undated) DEVICE — MIC* SAFETY PERCUTANEOUS ENDOSCOPIC GASTROSTOMY PEG KIT - 20 FR - PULL: Brand: MIC PEG TUBE

## (undated) DEVICE — GLOVE SURG SZ 65 THK91MIL LTX FREE SYN POLYISOPRENE

## (undated) DEVICE — SVMMC HD AND NK PK

## (undated) DEVICE — HOLDER TUBE TRACH LG COTTON STRP HK LOOP CLOSURE BRTRC FOAM

## (undated) DEVICE — GAUZE,SPONGE,4"X4",16PLY,XRAY,STRL,LF: Brand: MEDLINE

## (undated) DEVICE — Device

## (undated) DEVICE — TUBING, SUCTION, 1/4" X 12', STRAIGHT: Brand: MEDLINE

## (undated) DEVICE — TUBE TRACH AD SZ 8 L79MM OD122MM ID85MM DK BLU PVC CUF CANN

## (undated) DEVICE — CATHETER,URETHRAL,REDRUBBER,STRL,14FR: Brand: MEDLINE INDUSTRIES, INC.

## (undated) DEVICE — TUBE TRACH AD SZ 6 L77MM INNR CANN ID65MM OUTER CANN

## (undated) DEVICE — GLOVE ORANGE PI 7   MSG9070

## (undated) DEVICE — GARMENT,MEDLINE,DVT,INT,CALF,MED, GEN2: Brand: MEDLINE

## (undated) DEVICE — YANKAUER,FLEXIBLE HANDLE,REGLR CAPACITY: Brand: MEDLINE INDUSTRIES, INC.

## (undated) DEVICE — GOWN,SIRUS,NONRNF,SETINSLV,XL,20/CS: Brand: MEDLINE

## (undated) DEVICE — APPLICATOR MEDICATED 10.5 CC SOLUTION HI LT ORNG CHLORAPREP

## (undated) DEVICE — GLOVE ORANGE PI 7 1/2   MSG9075

## (undated) DEVICE — GOWN,AURORA,NONREINFORCED,LARGE: Brand: MEDLINE

## (undated) DEVICE — TUBING, SUCTION, 9/32" X 20', STRAIGHT: Brand: MEDLINE INDUSTRIES, INC.

## (undated) DEVICE — THIN OFFSET (9.0 X 0.38 X 25.0MM)

## (undated) DEVICE — GLOVE SURG SZ 6 THK91MIL LTX FREE SYN POLYISOPRENE ANTI

## (undated) DEVICE — MASTISOL ADHESIVE LIQ 2/3ML

## (undated) DEVICE — GOWN,SIRUS,NONRNF,SETINSLV,2XL,18/CS: Brand: MEDLINE

## (undated) DEVICE — INTENDED FOR TISSUE SEPARATION, AND OTHER PROCEDURES THAT REQUIRE A SHARP SURGICAL BLADE TO PUNCTURE OR CUT.: Brand: BARD-PARKER ® CARBON RIB-BACK BLADES